# Patient Record
Sex: FEMALE | Race: WHITE | Employment: OTHER | URBAN - METROPOLITAN AREA
[De-identification: names, ages, dates, MRNs, and addresses within clinical notes are randomized per-mention and may not be internally consistent; named-entity substitution may affect disease eponyms.]

---

## 2017-01-09 ENCOUNTER — OFFICE VISIT (OUTPATIENT)
Dept: DERMATOLOGY | Age: 72
End: 2017-01-09

## 2017-01-09 DIAGNOSIS — L29.9 GENERALIZED PRURITUS: Primary | ICD-10-CM

## 2017-01-09 PROCEDURE — 99213 OFFICE O/P EST LOW 20 MIN: CPT | Performed by: DERMATOLOGY

## 2017-01-09 RX ORDER — GABAPENTIN 100 MG/1
100 CAPSULE ORAL EVERY MORNING
Qty: 30 CAPSULE | Refills: 2 | Status: SHIPPED | OUTPATIENT
Start: 2017-01-09 | End: 2017-01-16

## 2017-01-11 LAB
ALBUMIN SERPL-MCNC: 3.8 G/DL
ALP BLD-CCNC: 71 U/L
ALT SERPL-CCNC: 18 U/L
AST SERPL-CCNC: 23 U/L
BASOPHILS ABSOLUTE: 0 /ΜL
BASOPHILS RELATIVE PERCENT: 0 %
BILIRUB SERPL-MCNC: 0.6 MG/DL (ref 0.1–1.4)
BUN BLDV-MCNC: 20 MG/DL
CALCIUM SERPL-MCNC: 8.9 MG/DL
CHLORIDE BLD-SCNC: 95 MMOL/L
CHOLESTEROL, TOTAL: 148 MG/DL
CHOLESTEROL/HDL RATIO: ABNORMAL
CO2: 22 MMOL/L
CREAT SERPL-MCNC: 0.84 MG/DL
EOSINOPHILS ABSOLUTE: 0.1 /ΜL
EOSINOPHILS RELATIVE PERCENT: 1 %
GFR CALCULATED: NORMAL
GLUCOSE BLD-MCNC: NORMAL MG/DL
HCT VFR BLD CALC: 28.2 % (ref 36–46)
HDLC SERPL-MCNC: 73 MG/DL (ref 35–70)
HEMOGLOBIN: 9.2 G/DL (ref 12–16)
INR BLD: 1
LDL CHOLESTEROL CALCULATED: 53 MG/DL (ref 0–160)
LYMPHOCYTES ABSOLUTE: 1.4 /ΜL
LYMPHOCYTES RELATIVE PERCENT: 22 %
MCH RBC QN AUTO: 24.7 PG
MCHC RBC AUTO-ENTMCNC: 32.6 G/DL
MCV RBC AUTO: 76 FL
MONOCYTES ABSOLUTE: 0.4 /ΜL
MONOCYTES RELATIVE PERCENT: 6 %
NEUTROPHILS ABSOLUTE: 4.6 /ΜL
NEUTROPHILS RELATIVE PERCENT: 71 %
PLATELET # BLD: 222 K/ΜL
PMV BLD AUTO: ABNORMAL FL
POTASSIUM SERPL-SCNC: 4.6 MMOL/L
PROTIME: NORMAL SECONDS
RBC # BLD: 3.72 10^6/ΜL
SODIUM BLD-SCNC: 135 MMOL/L
TOTAL PROTEIN: 7
TRIGL SERPL-MCNC: 110 MG/DL
VLDLC SERPL CALC-MCNC: 22 MG/DL
WBC # BLD: 6.6 10^3/ML

## 2017-01-16 ENCOUNTER — TELEPHONE (OUTPATIENT)
Dept: DERMATOLOGY | Age: 72
End: 2017-01-16

## 2017-01-20 ENCOUNTER — OFFICE VISIT (OUTPATIENT)
Dept: ORTHOPEDIC SURGERY | Age: 72
End: 2017-01-20

## 2017-01-20 VITALS
WEIGHT: 211 LBS | SYSTOLIC BLOOD PRESSURE: 140 MMHG | DIASTOLIC BLOOD PRESSURE: 84 MMHG | HEIGHT: 66 IN | HEART RATE: 84 BPM | BODY MASS INDEX: 33.91 KG/M2

## 2017-01-20 DIAGNOSIS — M25.552 LEFT HIP PAIN: Primary | ICD-10-CM

## 2017-01-20 DIAGNOSIS — M70.72 HIP BURSITIS, LEFT: ICD-10-CM

## 2017-01-20 PROBLEM — M70.70 HIP BURSITIS: Status: ACTIVE | Noted: 2017-01-20

## 2017-01-20 PROCEDURE — 73502 X-RAY EXAM HIP UNI 2-3 VIEWS: CPT | Performed by: PHYSICIAN ASSISTANT

## 2017-01-20 PROCEDURE — 99213 OFFICE O/P EST LOW 20 MIN: CPT | Performed by: PHYSICIAN ASSISTANT

## 2017-01-20 RX ORDER — DICLOFENAC SODIUM 75 MG/1
75 TABLET, DELAYED RELEASE ORAL 2 TIMES DAILY
Qty: 60 TABLET | Refills: 3 | Status: SHIPPED | OUTPATIENT
Start: 2017-01-20 | End: 2017-03-31 | Stop reason: ALTCHOICE

## 2017-01-26 ENCOUNTER — OFFICE VISIT (OUTPATIENT)
Dept: CARDIOLOGY CLINIC | Age: 72
End: 2017-01-26

## 2017-01-26 VITALS
OXYGEN SATURATION: 95 % | WEIGHT: 224 LBS | BODY MASS INDEX: 36 KG/M2 | HEIGHT: 66 IN | DIASTOLIC BLOOD PRESSURE: 66 MMHG | HEART RATE: 75 BPM | SYSTOLIC BLOOD PRESSURE: 128 MMHG

## 2017-01-26 DIAGNOSIS — R06.02 SHORTNESS OF BREATH: ICD-10-CM

## 2017-01-26 DIAGNOSIS — E78.2 MIXED HYPERLIPIDEMIA: ICD-10-CM

## 2017-01-26 DIAGNOSIS — I10 ESSENTIAL HYPERTENSION: Primary | ICD-10-CM

## 2017-01-26 DIAGNOSIS — I47.1 SVT (SUPRAVENTRICULAR TACHYCARDIA) (HCC): ICD-10-CM

## 2017-01-26 PROCEDURE — 99214 OFFICE O/P EST MOD 30 MIN: CPT | Performed by: INTERNAL MEDICINE

## 2017-01-26 RX ORDER — METOPROLOL SUCCINATE 50 MG/1
75 TABLET, EXTENDED RELEASE ORAL DAILY
Qty: 135 TABLET | Refills: 3 | Status: SHIPPED | OUTPATIENT
Start: 2017-01-26 | End: 2017-04-12 | Stop reason: ALTCHOICE

## 2017-02-06 ENCOUNTER — OFFICE VISIT (OUTPATIENT)
Dept: ORTHOPEDIC SURGERY | Age: 72
End: 2017-02-06

## 2017-02-06 VITALS
HEIGHT: 66 IN | HEART RATE: 67 BPM | DIASTOLIC BLOOD PRESSURE: 74 MMHG | BODY MASS INDEX: 34.39 KG/M2 | SYSTOLIC BLOOD PRESSURE: 167 MMHG | WEIGHT: 214 LBS

## 2017-02-06 DIAGNOSIS — M79.644 FINGER PAIN, RIGHT: ICD-10-CM

## 2017-02-06 DIAGNOSIS — S60.031A CONTUSION OF RIGHT MIDDLE FINGER WITHOUT DAMAGE TO NAIL, INITIAL ENCOUNTER: ICD-10-CM

## 2017-02-06 DIAGNOSIS — M25.511 RIGHT SHOULDER PAIN, UNSPECIFIED CHRONICITY: Primary | ICD-10-CM

## 2017-02-06 PROCEDURE — 73140 X-RAY EXAM OF FINGER(S): CPT | Performed by: ORTHOPAEDIC SURGERY

## 2017-02-06 PROCEDURE — 73030 X-RAY EXAM OF SHOULDER: CPT | Performed by: ORTHOPAEDIC SURGERY

## 2017-02-06 PROCEDURE — 99213 OFFICE O/P EST LOW 20 MIN: CPT | Performed by: ORTHOPAEDIC SURGERY

## 2017-02-27 ENCOUNTER — OFFICE VISIT (OUTPATIENT)
Dept: DERMATOLOGY | Age: 72
End: 2017-02-27

## 2017-02-27 DIAGNOSIS — G47.00 INSOMNIA, UNSPECIFIED TYPE: ICD-10-CM

## 2017-02-27 DIAGNOSIS — L29.9 PRURITUS: Primary | ICD-10-CM

## 2017-02-27 PROCEDURE — 99213 OFFICE O/P EST LOW 20 MIN: CPT | Performed by: DERMATOLOGY

## 2017-03-31 ENCOUNTER — OFFICE VISIT (OUTPATIENT)
Dept: CARDIOLOGY CLINIC | Age: 72
End: 2017-03-31

## 2017-03-31 VITALS
SYSTOLIC BLOOD PRESSURE: 138 MMHG | OXYGEN SATURATION: 92 % | HEART RATE: 70 BPM | DIASTOLIC BLOOD PRESSURE: 60 MMHG | WEIGHT: 226 LBS | HEIGHT: 66 IN | BODY MASS INDEX: 36.32 KG/M2

## 2017-03-31 DIAGNOSIS — E78.2 MIXED HYPERLIPIDEMIA: ICD-10-CM

## 2017-03-31 DIAGNOSIS — R00.2 PALPITATIONS: ICD-10-CM

## 2017-03-31 DIAGNOSIS — R05.8 PRODUCTIVE COUGH: ICD-10-CM

## 2017-03-31 DIAGNOSIS — R06.02 SHORTNESS OF BREATH: Primary | ICD-10-CM

## 2017-03-31 DIAGNOSIS — I10 ESSENTIAL HYPERTENSION: ICD-10-CM

## 2017-03-31 PROCEDURE — 99214 OFFICE O/P EST MOD 30 MIN: CPT | Performed by: INTERNAL MEDICINE

## 2017-03-31 RX ORDER — ROPINIROLE 1 MG/1
TABLET, FILM COATED ORAL
COMMUNITY
Start: 2017-03-09 | End: 2022-07-15

## 2017-03-31 RX ORDER — FLUTICASONE PROPIONATE 50 MCG
SPRAY, SUSPENSION (ML) NASAL
Status: ON HOLD | COMMUNITY
Start: 2017-03-15 | End: 2018-02-21

## 2017-03-31 RX ORDER — MULTIVIT-MIN/IRON/FOLIC ACID/K 18-600-40
2000 CAPSULE ORAL DAILY
COMMUNITY
End: 2021-09-23

## 2017-03-31 RX ORDER — ASPIRIN 81 MG/1
81 TABLET ORAL DAILY
COMMUNITY
End: 2022-07-12 | Stop reason: ALTCHOICE

## 2017-04-10 DIAGNOSIS — R00.2 PALPITATIONS: ICD-10-CM

## 2017-04-10 PROCEDURE — 93224 XTRNL ECG REC UP TO 48 HRS: CPT | Performed by: INTERNAL MEDICINE

## 2017-04-12 ENCOUNTER — TELEPHONE (OUTPATIENT)
Dept: CARDIOLOGY CLINIC | Age: 72
End: 2017-04-12

## 2017-04-12 PROBLEM — A41.9 SEPSIS (HCC): Status: ACTIVE | Noted: 2017-04-12

## 2017-04-12 RX ORDER — DILTIAZEM HYDROCHLORIDE 180 MG/1
180 CAPSULE, COATED, EXTENDED RELEASE ORAL DAILY
Qty: 30 CAPSULE | Refills: 3 | Status: SHIPPED | OUTPATIENT
Start: 2017-04-12 | End: 2017-04-18 | Stop reason: SDUPTHER

## 2017-04-18 RX ORDER — DILTIAZEM HYDROCHLORIDE 180 MG/1
180 CAPSULE, COATED, EXTENDED RELEASE ORAL DAILY
Qty: 90 CAPSULE | Refills: 3 | Status: ON HOLD | OUTPATIENT
Start: 2017-04-18 | End: 2018-02-25 | Stop reason: HOSPADM

## 2017-04-20 ENCOUNTER — TELEPHONE (OUTPATIENT)
Dept: CASE MANAGEMENT | Age: 72
End: 2017-04-20

## 2017-05-08 RX ORDER — LOSARTAN POTASSIUM 50 MG/1
TABLET ORAL
Qty: 90 TABLET | Refills: 3 | Status: ON HOLD | OUTPATIENT
Start: 2017-05-08 | End: 2017-06-25

## 2017-05-30 ENCOUNTER — OFFICE VISIT (OUTPATIENT)
Dept: DERMATOLOGY | Age: 72
End: 2017-05-30

## 2017-05-30 DIAGNOSIS — L29.9 GENERALIZED PRURITUS: Primary | ICD-10-CM

## 2017-05-30 DIAGNOSIS — K75.81 LIVER CIRRHOSIS SECONDARY TO NASH (HCC): ICD-10-CM

## 2017-05-30 DIAGNOSIS — K74.60 LIVER CIRRHOSIS SECONDARY TO NASH (HCC): ICD-10-CM

## 2017-05-30 PROCEDURE — 99213 OFFICE O/P EST LOW 20 MIN: CPT | Performed by: DERMATOLOGY

## 2017-06-12 LAB
CREATININE URINE: NORMAL MG/DL
MICROALBUMIN/CREAT 24H UR: 0.3 MG/G{CREAT}

## 2017-06-22 ENCOUNTER — OFFICE VISIT (OUTPATIENT)
Dept: ORTHOPEDIC SURGERY | Age: 72
End: 2017-06-22

## 2017-06-22 VITALS — WEIGHT: 235.23 LBS | HEIGHT: 66 IN | BODY MASS INDEX: 37.8 KG/M2

## 2017-06-22 DIAGNOSIS — M54.50 LUMBAR SPINE PAIN: ICD-10-CM

## 2017-06-22 DIAGNOSIS — M25.562 PAIN IN BOTH KNEES, UNSPECIFIED CHRONICITY: ICD-10-CM

## 2017-06-22 DIAGNOSIS — M25.561 PAIN IN BOTH KNEES, UNSPECIFIED CHRONICITY: ICD-10-CM

## 2017-06-22 DIAGNOSIS — E08.42 DIABETIC POLYNEUROPATHY ASSOCIATED WITH DIABETES MELLITUS DUE TO UNDERLYING CONDITION (HCC): Primary | ICD-10-CM

## 2017-06-22 PROCEDURE — 99213 OFFICE O/P EST LOW 20 MIN: CPT | Performed by: ORTHOPAEDIC SURGERY

## 2017-06-22 PROCEDURE — 73562 X-RAY EXAM OF KNEE 3: CPT | Performed by: ORTHOPAEDIC SURGERY

## 2017-06-22 PROCEDURE — 72100 X-RAY EXAM L-S SPINE 2/3 VWS: CPT | Performed by: ORTHOPAEDIC SURGERY

## 2017-06-25 PROBLEM — R07.9 CHEST PAIN: Status: ACTIVE | Noted: 2017-06-25

## 2017-07-13 ENCOUNTER — OFFICE VISIT (OUTPATIENT)
Dept: CARDIOLOGY CLINIC | Age: 72
End: 2017-07-13

## 2017-07-13 ENCOUNTER — TELEPHONE (OUTPATIENT)
Dept: CARDIOLOGY CLINIC | Age: 72
End: 2017-07-13

## 2017-07-13 VITALS
SYSTOLIC BLOOD PRESSURE: 134 MMHG | DIASTOLIC BLOOD PRESSURE: 60 MMHG | BODY MASS INDEX: 37.45 KG/M2 | HEIGHT: 66 IN | HEART RATE: 80 BPM | WEIGHT: 233 LBS | OXYGEN SATURATION: 95 %

## 2017-07-13 DIAGNOSIS — I10 ESSENTIAL HYPERTENSION: ICD-10-CM

## 2017-07-13 DIAGNOSIS — E78.2 MIXED HYPERLIPIDEMIA: ICD-10-CM

## 2017-07-13 DIAGNOSIS — I47.1 SVT (SUPRAVENTRICULAR TACHYCARDIA) (HCC): Primary | ICD-10-CM

## 2017-07-13 DIAGNOSIS — D64.89 ANEMIA DUE TO OTHER CAUSE: ICD-10-CM

## 2017-07-13 PROCEDURE — 99214 OFFICE O/P EST MOD 30 MIN: CPT | Performed by: INTERNAL MEDICINE

## 2017-07-13 RX ORDER — FAMOTIDINE 20 MG/1
20 TABLET, FILM COATED ORAL 2 TIMES DAILY
COMMUNITY
End: 2020-06-26 | Stop reason: SDUPTHER

## 2017-07-13 RX ORDER — DIPHENHYDRAMINE HCL 25 MG
50 TABLET ORAL NIGHTLY
COMMUNITY
End: 2022-07-15

## 2017-07-14 PROBLEM — M70.70 HIP BURSITIS: Status: RESOLVED | Noted: 2017-01-20 | Resolved: 2017-07-14

## 2017-07-14 PROBLEM — A41.9 SEPSIS (HCC): Status: RESOLVED | Noted: 2017-04-12 | Resolved: 2017-07-14

## 2017-08-13 RX ORDER — GABAPENTIN 300 MG/1
300 CAPSULE ORAL NIGHTLY
Qty: 90 CAPSULE | Refills: 3 | Status: SHIPPED | OUTPATIENT
Start: 2017-08-13 | End: 2018-04-23 | Stop reason: SDUPTHER

## 2017-08-31 PROBLEM — D50.9 MICROCYTIC ANEMIA: Status: ACTIVE | Noted: 2017-08-31

## 2017-08-31 PROBLEM — J18.9 PNA (PNEUMONIA): Status: ACTIVE | Noted: 2017-08-31

## 2017-08-31 PROBLEM — D72.829 LEUKOCYTOSIS: Status: ACTIVE | Noted: 2017-08-31

## 2017-08-31 PROBLEM — J96.20 RESPIRATORY FAILURE, ACUTE AND CHRONIC (HCC): Status: ACTIVE | Noted: 2017-08-31

## 2017-09-12 ENCOUNTER — OFFICE VISIT (OUTPATIENT)
Dept: DERMATOLOGY | Age: 72
End: 2017-09-12

## 2017-09-12 DIAGNOSIS — L29.9 GENERALIZED PRURITUS: Primary | ICD-10-CM

## 2017-09-12 PROCEDURE — 99213 OFFICE O/P EST LOW 20 MIN: CPT | Performed by: DERMATOLOGY

## 2017-10-17 ENCOUNTER — TELEPHONE (OUTPATIENT)
Dept: DERMATOLOGY | Age: 72
End: 2017-10-17

## 2017-10-17 NOTE — TELEPHONE ENCOUNTER
Pt c/b [phone]  Pt states:   - to return call after 12 if you have any questions   - need medication refill  Medication Triamcinolone ointment  [address]  [phone]  Please call to discuss thanks

## 2017-11-02 ENCOUNTER — HOSPITAL ENCOUNTER (OUTPATIENT)
Dept: MAMMOGRAPHY | Age: 72
Discharge: HOME | End: 2017-11-02
Attending: OBSTETRICS & GYNECOLOGY | Admitting: OBSTETRICS & GYNECOLOGY

## 2017-11-02 DIAGNOSIS — Z12.31 VISIT FOR SCREENING MAMMOGRAM: ICD-10-CM

## 2018-01-18 ENCOUNTER — OFFICE VISIT (OUTPATIENT)
Dept: ORTHOPEDIC SURGERY | Age: 73
End: 2018-01-18

## 2018-01-18 VITALS — WEIGHT: 222 LBS | BODY MASS INDEX: 35.68 KG/M2 | HEIGHT: 66 IN

## 2018-01-18 DIAGNOSIS — M25.512 PAIN OF LEFT SHOULDER REGION: Primary | ICD-10-CM

## 2018-01-18 DIAGNOSIS — M75.42 IMPINGEMENT SYNDROME OF LEFT SHOULDER: ICD-10-CM

## 2018-01-18 PROCEDURE — 99213 OFFICE O/P EST LOW 20 MIN: CPT | Performed by: ORTHOPAEDIC SURGERY

## 2018-01-18 NOTE — PROGRESS NOTES
lateral deltoid. Range of Motion: Forward flexion to 160°. Internal rotation to the level of back pocket. External rotation to 50°. Strength:  5/5 internal and external rotation strength, 4/5 supraspinatus strength with pain. Biceps and triceps strength is 5/5. Special Tests:  Positive Collier and Neer impingement exam.  Negative speed sign. Negative crossover examination. Skin: There are no rashes, ulcerations or lesions. Gait: Normal gait pattern    Reflex normal deep tendon reflexes, hand is neurovascularly intact    Additional Comments:       Additional Examinations:         Contralateral Exam: Examination of the right shoulder reveals no atrophy or deformity. Skin is warm and dry. Range of motion is within normal limits. There is no focal tenderness with palpation. No AC joint tenderness. Negative Neer and Collier-Shant exams. Strength is graded 5/5 throughout. Neck: Examination of the neck does not show any tenderness, deformity or injury. Range of motion is unremarkable. There is no gross instability. There are no rashes, ulcerations or lesions. Strength and tone are normal.    Radiology:     X-rays obtained and reviewed in office:  Views 3 views including AP, Y, axillary  Location left shoulder  Impression there is a well-maintained glenohumeral joint with mild arthritic changes. There is superior migration of the humeral head. No fractures or dislocations. Impression:  Encounter Diagnoses   Name Primary?     Pain of left shoulder region Yes    Impingement syndrome of left shoulder        Office Procedures:  Orders Placed This Encounter   Procedures    XR SHOULDER LEFT (MIN 2 VIEWS)     [id number]     Order Specific Question:   Reason for exam:     Answer:   Pain    MRI Shoulder Left WO Contrast     Scheduling Instructions:      Sandie Thorne [phone]      MRI LT SHOULDER W/O CONTRAST      [facility]            SCHED: ONCE AUTHORIZED Treatment Plan:  Given the character 6 of her pain and her x-ray findings I am concerned for a rotator cuff tear. We will get an MRI to evaluate this.   She will follow up once her scan is done

## 2018-02-15 ENCOUNTER — OFFICE VISIT (OUTPATIENT)
Dept: ORTHOPEDIC SURGERY | Age: 73
End: 2018-02-15

## 2018-02-15 VITALS
SYSTOLIC BLOOD PRESSURE: 191 MMHG | HEIGHT: 66 IN | BODY MASS INDEX: 35.68 KG/M2 | DIASTOLIC BLOOD PRESSURE: 81 MMHG | WEIGHT: 222 LBS | HEART RATE: 88 BPM

## 2018-02-15 DIAGNOSIS — M76.899 QUADRICEPS TENDINITIS: ICD-10-CM

## 2018-02-15 DIAGNOSIS — M25.562 LEFT KNEE PAIN, UNSPECIFIED CHRONICITY: Primary | ICD-10-CM

## 2018-02-15 DIAGNOSIS — M62.81 QUADRICEPS WEAKNESS: ICD-10-CM

## 2018-02-15 DIAGNOSIS — Z96.652 HISTORY OF TOTAL LEFT KNEE REPLACEMENT: ICD-10-CM

## 2018-02-15 DIAGNOSIS — M75.02 ADHESIVE CAPSULITIS OF LEFT SHOULDER: ICD-10-CM

## 2018-02-15 PROCEDURE — 99214 OFFICE O/P EST MOD 30 MIN: CPT | Performed by: ORTHOPAEDIC SURGERY

## 2018-02-15 RX ORDER — MELOXICAM 15 MG/1
15 TABLET ORAL DAILY
Qty: 30 TABLET | Refills: 3 | Status: ON HOLD | OUTPATIENT
Start: 2018-02-15 | End: 2018-02-25 | Stop reason: HOSPADM

## 2018-02-15 NOTE — PROGRESS NOTES
tendinopathy of supraspinatus and infraspinatus, no tear. Teres minor tendon intact. Subscapularis tendon intact. Biceps long head tendon intact and in normal position.        No substantive active AC arthropathy. Acromion type 2.        No acute muscle strain. Generalized decreased muscle volume and mild fatty infiltration.        Mild-moderate glenohumeral capsular inflammation with a miniscule effusion.        CONCLUSION:    1. Mild-moderate glenohumeral capsulitis, probably with an adhesive component. 2. Mild tendinopathy of supraspinatus and infraspinatus, no tear.                  Impression:  Encounter Diagnoses   Name Primary?  Left knee pain, unspecified chronicity Yes    Quadriceps weakness     Adhesive capsulitis of left shoulder     History of total left knee replacement     Quadriceps tendinitis        Office Procedures:  Orders Placed This Encounter   Procedures    XR KNEE LEFT (3 VIEWS)     [id number]     Order Specific Question:   Reason for exam:     Answer:   Pain    [facility] PT [facility] Physical Therapy     Referral Priority:   Routine     Referral Type:   Eval and Treat     Referral Reason:   Specialty Services Required     Requested Specialty:   Physical Therapy     Number of Visits Requested:   1       Treatment Plan: Today we've gone over the patient's diagnosis and recommendations. At this time there is no indication for any surgical intervention. Recommended a course of oral anti-inflammatories. She is going to check with her primary care physician about the use of meloxicam for both her left shoulder and her left knee. We'll also get her involved in outpatient physical therapy for both issues as well. We've also explained that there may be a cervical component related to her left upper extremity symptoms. Follow-up with us as needed.

## 2018-02-20 PROBLEM — A41.9 SEPSIS (HCC): Status: ACTIVE | Noted: 2018-02-20

## 2018-02-25 PROBLEM — A41.9 SEPSIS (HCC): Status: RESOLVED | Noted: 2018-02-20 | Resolved: 2018-02-25

## 2018-03-01 ENCOUNTER — HOSPITAL ENCOUNTER (OUTPATIENT)
Dept: PHYSICAL THERAPY | Age: 73
Discharge: HOME | End: 2018-03-31
Attending: ORTHOPAEDIC SURGERY | Admitting: ORTHOPAEDIC SURGERY

## 2018-03-05 ENCOUNTER — OFFICE VISIT (OUTPATIENT)
Dept: CARDIOLOGY CLINIC | Age: 73
End: 2018-03-05

## 2018-03-05 VITALS
WEIGHT: 230 LBS | OXYGEN SATURATION: 95 % | HEART RATE: 74 BPM | HEIGHT: 65 IN | DIASTOLIC BLOOD PRESSURE: 42 MMHG | SYSTOLIC BLOOD PRESSURE: 156 MMHG | BODY MASS INDEX: 38.32 KG/M2

## 2018-03-05 DIAGNOSIS — G45.3 AMAUROSIS FUGAX: ICD-10-CM

## 2018-03-05 DIAGNOSIS — I10 ESSENTIAL HYPERTENSION: ICD-10-CM

## 2018-03-05 DIAGNOSIS — R06.02 SHORTNESS OF BREATH: ICD-10-CM

## 2018-03-05 DIAGNOSIS — E78.2 MIXED HYPERLIPIDEMIA: Primary | ICD-10-CM

## 2018-03-05 DIAGNOSIS — I47.1 SVT (SUPRAVENTRICULAR TACHYCARDIA) (HCC): ICD-10-CM

## 2018-03-05 PROCEDURE — 99214 OFFICE O/P EST MOD 30 MIN: CPT | Performed by: INTERNAL MEDICINE

## 2018-03-05 NOTE — COMMUNICATION BODY
mouth daily       rOPINIRole (REQUIP) 1 MG tablet Take 1 mg by mouth nightly 1mg at 1730, 1mg at 2030, 2mg at 2200 (total of 4mg nightly in divided doses)      potassium chloride SA (K-DUR;KLOR-CON M) 20 MEQ tablet Take 20 mEq by mouth daily       ursodiol (ACTIGALL) 500 MG tablet Take 500 mg by mouth 2 times daily       doxepin (SINEQUAN) 25 MG capsule Take 50 mg by mouth nightly       losartan (COZAAR) 50 MG tablet Take 50 mg by mouth daily       calcium carbonate 600 MG TABS tablet Take 1 tablet by mouth daily       levothyroxine (SYNTHROID) 100 MCG tablet Take 100 mcg by mouth Daily      atorvastatin (LIPITOR) 20 MG tablet Take 1 tablet by mouth nightly. 30 tablet 1    Multiple Vitamins-Minerals (CENTRUM PO) Take 1 tablet by mouth daily.  beclomethasone (QVAR) 80 MCG/ACT inhaler Inhale 2 puffs into the lungs 2 times daily.  Aclidinium Bromide (TUDORZA PRESSAIR) 400 MCG/ACT AEPB Inhale 1 puff into the lungs 2 times daily       traZODone (DESYREL) 50 MG tablet Take 50 mg by mouth nightly       metformin (GLUCOPHAGE) 500 MG tablet Take 1,000 mg by mouth 2 times daily (with meals)       cetirizine (ZYRTEC) 10 MG tablet Take 10 mg by mouth daily.  montelukast (SINGULAIR) 10 MG tablet Take 10 mg by mouth daily       venlafaxine (EFFEXOR) 37.5 MG tablet Take 18.25 mg by mouth daily       cyanocobalamin 1000 MCG/ML injection Inject 1,000 mcg into the muscle every 14 days Last dose was taken on 4/9/17      estrogens, conjugated, (PREMARIN) 1.25 MG tablet Take 1.25 mg by mouth daily.  levalbuterol (XOPENEX HFA) 45 MCG/ACT inhaler Inhale 2 puffs into the lungs every 6 hours as needed        No facility-administered encounter medications on file as of 3/5/2018. Lab Data:  CBC: No results for input(s): WBC, HGB, HCT, MCV, PLT in the last 72 hours. BMP: No results for input(s): NA, K, CL, CO2, PHOS, BUN, CREATININE in the last 72 hours.     Invalid input(s): CA  LIVER PROFILE: No results for input(s): AST, ALT, LIPASE, BILIDIR, BILITOT, ALKPHOS in the last 72 hours. Invalid input(s): AMYLASE,  ALB  LIPID:   No components found for: CHLPL  Lab Results   Component Value Date    TRIG 110 2017    TRIG 125 2014    TRIG 248 (H) 2013     Lab Results   Component Value Date    HDL 73 (A) 2017    HDL 49 2014    HDL 72 (H) 2013     Lab Results   Component Value Date    LDLCALC 53 2017    LDLCALC 62 2014    LDLCALC 117 (H) 2013     Lab Results   Component Value Date    LABVLDL 25 2014    LABVLDL 50 2013     PT/INR: No results for input(s): PROTIME, INR in the last 72 hours. A1C:   Lab Results   Component Value Date    LABA1C 7.7 2017     BNP:  No results for input(s): BNP in the last 72 hours. IMAGIN/2015 CXR    Opinion: There is persistent mild middle lobe disease. The disease   is unchanged for the past one month and this may represent middle   lobe scarring. Cardiac cath: 2014  1. Mild coronary artery disease involving left anterior descending,  circumflex and right coronary arteries. 2.  Hyperdynamic left ventricle with an ejection fraction of 65%. 3.  Elevated left ventricular end-diastolic pressure, 15 mmHg. 4.  Patent renal arteries bilaterally. RECOMMENDATIONS:   The patient is noted to have mild coronary artery disease  which does not explain her shortness of breath. She, however, does have    elevated LVEDP from uncontrolled blood pressure. She needs aggressive  antihypertensive therapy for optimal blood pressure control. Will start her  on lasix for dyspnea and add losartan to her antihypertensive regimen. She    will continue the aspirin and Lipitor as well as Toprol. Echo: 4/16/15  Left ventricular size is normal .  Mild concentric left ventricular hypertrophy is present. Global ejection fraction is normal and estimated from 55 % to 60 %.   No regional wall motion abnormalities are noted.  Diastolic filling parameters suggests grade I diastolic dysfunction . Mild mitral regurgitation is present. Mild tricuspid regurgitation. Systolic pulmonary artery pressure (SPAP) is normal and estimated at 35   mmHg  (RA pressure 8 mm Hg). Mild pulmonic regurgitation present. Cardiac Cath: 04/05/15  1. Mild coronary artery disease involving left anterior descending,  circumflex and right coronary arteries. 2. Hyperdynamic left ventricle with an ejection fraction of 65%. 3. Elevated left ventricular end-diastolic pressure, 15 mmHg. 4. Patent renal arteries bilaterally. RECOMMENDATIONS: The patient is noted to have mild coronary artery disease  which does not explain her shortness of breath. She, however, does have   elevated LVEDP from uncontrolled blood pressure. She needs aggressive  antihypertensive therapy for optimal blood pressure control. Will start her  on lasix for dyspnea and add losartan to her antihypertensive regimen. She   will continue the aspirin and Lipitor as well as Toprol. Martina Larios MD    Assessment:  Markus Springer was seen today for  follow-up  SVT. Diagnoses and associated orders for this visit:    Type 2 diabetes mellitus ([facility] [address] PCP follows    Hyperlipidemia  PCP follows Just had done with PCP 8/2015 LDL 66   HTN (hypertension)   SVT with intermittent palpitations. BP (!) 156/42   Pulse 74   Ht 5' 5\" (1.651 m)   Wt 230 lb (104.3 kg)   SpO2 95%   BMI 38.27 kg/m²        Plan:  1. We will redo weight   2. Medications reviewed, no changes at this time   3. Remain as active as possible. 4. recommend compression stockings   5. Continue to follow with Dr. Franc Roth   6. Follow fluid restriction and daily weights  7. Follow up with me in 6 months.       Caleb Fairchild MD 3/5/2018 12:54 PM

## 2018-03-05 NOTE — LETTER
Past Surgical History:   Procedure Laterality Date    BONE MARROW BIOPSY      BREAST BIOPSY      CARDIAC CATHETERIZATION  07/14/14    CATARACT REMOVAL      COLONOSCOPY      X 3 per PT 6/8/16    FINE NEEDLE ASPIRATION      FOOT SURGERY Right 06/03/2013    CORAL BUNIONECTOMY RIGHT FOOT WITH SCREW FIXATION;    HERNIA REPAIR      HYSTERECTOMY      JOINT REPLACEMENT Bilateral     TKR    KNEE SURGERY  09    left    KNEE SURGERY  2010    right       Objective:   BP (!) 156/42   Pulse 74   Ht 5' 5\" (1.651 m)   Wt 230 lb (104.3 kg)   SpO2 95%   BMI 38.27 kg/m²      Wt Readings from Last 3 Encounters:   03/05/18 230 lb (104.3 kg)   02/25/18 219 lb 6.4 oz (99.5 kg)   02/15/18 222 lb 0.1 oz (100.7 kg)       Physical Exam:  General: No Respiratory distress, appears well developed and well nourished. Eyes:  Sclera nonicteric  Nose/Sinuses:  negative findings: nose shows no deformity, asymmetry, or inflammation, nasal mucosa normal, septum midline with no perforation or bleeding  Back:  no pain to palpation  Joint:  no active joint inflammation  Musculoskeletal:  negative  Skin:  Warm and dry  Neck:  Negative for JVD and Carotid Bruits. Pain on movement of neck to left. Chest:  Clear to auscultation, respiration easy  Cardiovascular:  RRR, S1S2 normal, no murmur, no rub or thrill.   Extremities:   No edema, clubbing, cyanosis,  Neuro: intact    Medications:   Outpatient Encounter Prescriptions as of 3/5/2018   Medication Sig Dispense Refill    hydrALAZINE (APRESOLINE) 25 MG tablet Take 1 tablet by mouth every 8 hours 90 tablet 3    diltiazem (CARDIZEM CD) 240 MG extended release capsule Take 1 capsule by mouth daily 30 capsule 3    furosemide (LASIX) 20 MG tablet Take 1 tablet by mouth 2 times daily 60 tablet 0    SITagliptin (JANUVIA) 100 MG tablet Take 100 mg by mouth daily      triamcinolone (KENALOG) 0.1 % ointment Apply to affected areas on the  cyanocobalamin 1000 MCG/ML injection Inject 1,000 mcg into the muscle every 14 days Last dose was taken on 17      estrogens, conjugated, (PREMARIN) 1.25 MG tablet Take 1.25 mg by mouth daily.  levalbuterol (XOPENEX HFA) 45 MCG/ACT inhaler Inhale 2 puffs into the lungs every 6 hours as needed        No facility-administered encounter medications on file as of 3/5/2018. Lab Data:  CBC: No results for input(s): WBC, HGB, HCT, MCV, PLT in the last 72 hours. BMP: No results for input(s): NA, K, CL, CO2, PHOS, BUN, CREATININE in the last 72 hours. Invalid input(s): CA  LIVER PROFILE: No results for input(s): AST, ALT, LIPASE, BILIDIR, BILITOT, ALKPHOS in the last 72 hours. Invalid input(s): AMYLASE,  ALB  LIPID:   No components found for: CHLPL  Lab Results   Component Value Date    TRIG 110 2017    TRIG 125 2014    TRIG 248 (H) 2013     Lab Results   Component Value Date    HDL 73 (A) 2017    HDL 49 2014    HDL 72 (H) 2013     Lab Results   Component Value Date    LDLCALC 53 2017    LDLCALC 62 2014    LDLCALC 117 (H) 2013     Lab Results   Component Value Date    LABVLDL 25 2014    LABVLDL 50 2013     PT/INR: No results for input(s): PROTIME, INR in the last 72 hours. A1C:   Lab Results   Component Value Date    LABA1C 7.7 2017     BNP:  No results for input(s): BNP in the last 72 hours. IMAGIN/2015 CXR    Opinion: There is persistent mild middle lobe disease. The disease   is unchanged for the past one month and this may represent middle   lobe scarring. Cardiac cath: 2014  1. Mild coronary artery disease involving left anterior descending,  circumflex and right coronary arteries. 2.  Hyperdynamic left ventricle with an ejection fraction of 65%. 3.  Elevated left ventricular end-diastolic pressure, 15 mmHg. 4.  Patent renal arteries bilaterally.

## 2018-03-05 NOTE — PROGRESS NOTES
95%   BMI 38.27 kg/m²     Wt Readings from Last 3 Encounters:   03/05/18 230 lb (104.3 kg)   02/25/18 219 lb 6.4 oz (99.5 kg)   02/15/18 222 lb 0.1 oz (100.7 kg)       Physical Exam:  General: No Respiratory distress, appears well developed and well nourished. Eyes:  Sclera nonicteric  Nose/Sinuses:  negative findings: nose shows no deformity, asymmetry, or inflammation, nasal mucosa normal, septum midline with no perforation or bleeding  Back:  no pain to palpation  Joint:  no active joint inflammation  Musculoskeletal:  negative  Skin:  Warm and dry  Neck:  Negative for JVD and Carotid Bruits. Pain on movement of neck to left. Chest:  Clear to auscultation, respiration easy  Cardiovascular:  RRR, S1S2 normal, no murmur, no rub or thrill. Extremities:   No edema, clubbing, cyanosis,  Neuro: intact    Medications:   Outpatient Encounter Prescriptions as of 3/5/2018   Medication Sig Dispense Refill    hydrALAZINE (APRESOLINE) 25 MG tablet Take 1 tablet by mouth every 8 hours 90 tablet 3    diltiazem (CARDIZEM CD) 240 MG extended release capsule Take 1 capsule by mouth daily 30 capsule 3    furosemide (LASIX) 20 MG tablet Take 1 tablet by mouth 2 times daily 60 tablet 0    SITagliptin (JANUVIA) 100 MG tablet Take 100 mg by mouth daily      triamcinolone (KENALOG) 0.1 % ointment Apply to affected areas on the abdomen twice daily for up to 2 weeks or until improved.  80 g 2    gabapentin (NEURONTIN) 300 MG capsule Take 1 capsule by mouth nightly 90 capsule 3    famotidine (PEPCID) 20 MG tablet Take 20 mg by mouth 2 times daily      diphenhydrAMINE (BENADRYL) 25 MG tablet Take 50 mg by mouth nightly       pantoprazole (PROTONIX) 40 MG tablet Take 40 mg by mouth 2 times daily      ferrous sulfate 325 (65 FE) MG tablet Take 1 tablet by mouth daily (with breakfast) 30 tablet 0    aspirin 81 MG EC tablet Take 81 mg by mouth daily       Cholecalciferol (VITAMIN D) 2000 UNITS CAPS capsule Take 2,000 Units by

## 2018-03-21 ENCOUNTER — HOSPITAL ENCOUNTER (OUTPATIENT)
Dept: PHYSICAL THERAPY | Age: 73
Discharge: HOME | End: 2018-03-22
Admitting: ORTHOPAEDIC SURGERY

## 2018-03-21 NOTE — PLAN OF CARE
[facility], [address]  Phone: [phone]  Fax [phone]     Physical Therapy Certification    Dear Referring Practitioner: Dr. Shiela Tenorio,    We had the pleasure of evaluating the following patient for physical therapy services at [address] [place] [address]. A summary of our findings can be found in the initial assessment below. This includes our plan of care. If you have any questions or concerns regarding these findings, please do not hesitate to contact me at the office phone number checked above. Thank you for the referral.       Physician Signature:_______________________________Date:__________________  By signing above (or electronic signature), therapists plan is approved by physician    Patient: Phuong Mejias   : 1945   MRN: [id number]  Referring Physician: Referring Practitioner: Dr. Shiela Tenorio      Evaluation Date: 3/21/2018      Medical Diagnosis Information:  Diagnosis: right knee pain  M25.562, quad weakness M62.81, quad tendinitis   M76.899   Treatment Diagnosis: right knee pain  M25.562,                                         Insurance information: PT Insurance Information: 2018 [employer]  - 100%-$0CP-PT/OT MED NEC - NO AUTH     Precautions/ Contra-indications: DM, TIA, OA, RA, bilat TKR, history of pneumonia 5x in a year (recent hospitalization)   Latex Allergy:  []NO      [x]YES  Preferred Language for Healthcare:   [x]English       []other:    SUBJECTIVE: Patient stated complaint: Pt had TKR on the left , right . Pt did well after replacements, and she was able to return to golf. Right before 2017, she started having pain. Pt is not able to take anti inflammatory due to liver issues. Pt has pain along the superior portion of her knee and into the thigh. Pt has pain with walking and sleeping. Pt uses step to gait on stairs.   Pt has 14 limited. [x] Patient history, allergies, meds reviewed. Medical chart reviewed. See intake form. Review Of Systems (ROS):  [x]Performed Review of systems (Integumentary, CardioPulmonary, Neurological) by intake and observation. Intake form has been scanned into medical record. Patient has been instructed to contact their primary care physician regarding ROS issues if not already being addressed at this time. Co-morbidities/Complexities (which will affect course of rehabilitation):   []None           Arthritic conditions   [x]Rheumatoid arthritis (M05.9)  [x]Osteoarthritis (M19.91)   Cardiovascular conditions   []Hypertension (I10)  []Hyperlipidemia (E78.5)  []Angina pectoris (I20)  []Atherosclerosis (I70)   Musculoskeletal conditions   []Disc pathology   []Congenital spine pathologies   []Prior surgical intervention  [x]Osteoporosis (M81.8)  []Osteopenia (M85.8)   Endocrine conditions   []Hypothyroid (E03.9)  []Hyperthyroid Gastrointestinal conditions   []Constipation (S87.17)   Metabolic conditions   []Morbid obesity (E66.01)  [x]Diabetes type 1(E10.65) or 2 (E11.65)   [x]Neuropathy (G60.9)     Pulmonary conditions   [x]Asthma (J45)  []Coughing   []COPD (J44.9)   Psychological Disorders  []Anxiety (F41.9)  []Depression (F32.9)   []Other:   [x]Other:   TIA       Barriers to/and or personal factors that will affect rehab potential:              []Age  []Sex              []Motivation/Lack of Motivation                        [x]Co-Morbidities              []Cognitive Function, education/learning barriers              []Environmental, home barriers              []profession/work barriers  [x]past PT/medical experience  []other:  Justification: Pt lacks full knee ext after her total knees. Pt has had multiple health episodes that have led to increased deconditioning. Falls Risk Assessment (30 days):   [x] Falls Risk assessed and no intervention required.   [] Falls Risk assessed and

## 2018-03-21 NOTE — FLOWSHEET NOTE
[facility],  [address]  Phone: [phone]  Fax [phone]    Physical Therapy Daily Treatment Note  Date:  3/21/2018    Patient Name:  Jessica Turcios    :  1945  MRN: [id number]  Restrictions/Precautions:    Physician Information:  Referring Practitioner: Dr. Sky Bauman  Medical/Treatment Diagnosis Information:  · Diagnosis: right knee pain  M25.562, quad weakness M62.81, quad tendinitis   M76.899  · Treatment Diagnosis:right knee pain  M25.562,   [] Conservative / [] Surgical - DOS:  Therapy Diagnosis/Practice Pattern:  Practice Pattern E: Localized Inflammation  Insurance/Certification information:  PT Insurance Information:  [employer]  - 100%-$0CP-PT/OT MED NEC - NO AUTH  Plan of care signed: [] YES  [] NO  Number of Comorbidities:  []0     []1-2    [x]3+  Date of Patient follow up with Physician:     G-Code (if applicable):      Date G-Code Applied:   3/21/18  PT G-Codes  Functional Assessment Tool Used: LEFS  Score: 45%  Functional Limitation: Mobility: Walking and moving around  Mobility: Walking and Moving Around Current Status (): At least 40 percent but less than 60 percent impaired, limited or restricted  Mobility: Walking and Moving Around Goal Status ():  At least 20 percent but less than 40 percent impaired, limited or restricted    Progress Note: [x]  Yes  []  No  Next due by: Visit #10         Latex Allergy:  []NO      [x]YES  Preferred Language for Healthcare:   [x]English       []other:    Visit # Insurance Allowable Reporting Period   1 [facility] Dawson Begin Date: 3/21/2018               End Date:      RECERT DUE BY:     SUBJECTIVE:  See eval    OBJECTIVE: See eval  Observation:  Palpation:     Test used Initial score Current Score   Pain Summary VAS 4-5    Functional questionnaire LEFS 45    ROM flexion      extension     Strength quad      ABD      flexion coordination, kinesthetic sense, posture, motor skill, proprioception of core, proximal hip and LE for self care, mobility, lifting, and ambulation/stair navigation      Manual Treatments:  PROM / STM / Oscillations-Mobs:  G-I, II, III, IV (PA's, Inf., Post.)  [x] (20263) Provided manual therapy to mobilize LE, proximal hip and/or LS spine soft tissue/joints for the purpose of modulating pain, promoting relaxation,  increasing ROM, reducing/eliminating soft tissue swelling/inflammation/restriction, improving soft tissue extensibility and allowing for proper ROM for normal function with self care, mobility, lifting and ambulation. Modalities:  Esu/ cp x 15 min. Charges:  Timed Code Treatment Minutes: 30   Total Treatment Minutes: 60     [x] EVAL (LOW) 02127 (typically 20 minutes face-to-face)  [] EVAL (MOD) 95278 (typically 30 minutes face-to-face)  [] EVAL (HIGH) 05291 (typically 45 minutes face-to-face)  [] RE-EVAL     [x] KD(49442) x      [] IONTO  [] NMR (93511) x      [] VASO  [x] Manual (02034) x       [] Other:  [] TA x       [] Mech Traction (46836)  [] ES(attended) (36320)      [x] ES (un) (90067):     GOALS:   Patient stated goal: fix the problem. Therapist goals for Patient:   Short Term Goals: To be achieved in: 2 weeks  1. Independent in HEP and progression per patient tolerance, in order to prevent re-injury. 2. Patient will have a decrease in pain to facilitate improvement in movement, function, and ADLs as indicated by Functional Deficits. Long Term Goals: To be achieved in: 8 weeks  1. Disability index score of 20% or less for the LEFS to assist with reaching prior level of function. 2. Patient will demonstrate increased AROM of bilateral knees from 0 - 128 to allow for proper joint functioning as indicated by patients Functional Deficits.    3. Patient will demonstrate an increase in Strength to good proximal hip strength and control, within 5lb HHD in LE to allow for proper functional mobility as indicated by patients Functional Deficits. 4. Patient will return to amb on level and unlevel surfaces without increased symptoms or restriction. 5. Able to sleep 7-8 hours without waking from knee pain  6. Able to ascend and descend stairs with a reciprocal gait. New or Updated Goals (if applicable):  [x] No change to goals established upon initial eval/last progress note:  New Goals:    Progression Towards Functional goals:   [] Patient is progressing as expected towards functional goals listed. [] Progression is slowed due to complexities listed. [] Progression has been slowed due to co-morbidities.   [x] Plan just implemented, too soon to assess goals progression  [] Other:     ASSESSMENT:    [] Improvement noted relative to goals:  [] No Improvement noted related to goals:  Summary/Patient's response to treatment: See Eval    Treatment/Activity Tolerance:  [] Patient tolerated treatment well [x] Patient limited by fatique  [] Patient limited by pain  [] Patient limited by other medical complications  [] Other:     Prognosis: [] Good [x] Fair  [] Poor    Patient Requires Follow-up: [x] Yes  [] No    PLAN: See eval  [] Continue per plan of care [] Alter current plan (see comments)  [x] Plan of care initiated [] Hold pending MD visit [] Discharge    Electronically signed by: Jeffry Rob PT

## 2018-03-28 ENCOUNTER — HOSPITAL ENCOUNTER (OUTPATIENT)
Dept: PHYSICAL THERAPY | Age: 73
Discharge: HOME | End: 2018-03-29
Admitting: ORTHOPAEDIC SURGERY

## 2018-03-28 NOTE — FLOWSHEET NOTE
level of function. 2. Patient will demonstrate increased AROM of bilateral knees from 0 - 128 to allow for proper joint functioning as indicated by patients Functional Deficits. 3. Patient will demonstrate an increase in Strength to good proximal hip strength and control, within 5lb HHD in LE to allow for proper functional mobility as indicated by patients Functional Deficits. 4. Patient will return to amb on level and unlevel surfaces without increased symptoms or restriction. 5. Able to sleep 7-8 hours without waking from knee pain  6. Able to ascend and descend stairs with a reciprocal gait. New or Updated Goals (if applicable):  [x] No change to goals established upon initial eval/last progress note:  New Goals:    Progression Towards Functional goals:   [] Patient is progressing as expected towards functional goals listed. [] Progression is slowed due to complexities listed. [] Progression has been slowed due to co-morbidities. [x] Plan just implemented, too soon to assess goals progression  [] Other:     ASSESSMENT:    [] Improvement noted relative to goals:  [] No Improvement noted related to goals:  Summary/Patient's response to treatment: Patient hesitant with progression of exercises due to soreness last session. Complains of back pain with manual stretching / exercises in supine. Declined standing exercises due to balance and weakness. Encouraged patient on reasons behind performing exercises was to increase strength but patient declined due to deconditioned.      Treatment/Activity Tolerance:  [] Patient tolerated treatment well [x] Patient limited by fatique  [] Patient limited by pain  [] Patient limited by other medical complications  [] Other:     Prognosis: [] Good [x] Fair  [] Poor    Patient Requires Follow-up: [x] Yes  [] No    PLAN: See eval  [x] Continue per plan of care [] Alter current plan (see comments)  [] Plan of care initiated [] Hold pending MD visit []

## 2018-03-30 ENCOUNTER — HOSPITAL ENCOUNTER (OUTPATIENT)
Dept: PHYSICAL THERAPY | Age: 73
Discharge: HOME | End: 2018-03-31
Admitting: ORTHOPAEDIC SURGERY

## 2018-04-01 ENCOUNTER — HOSPITAL ENCOUNTER (OUTPATIENT)
Dept: PHYSICAL THERAPY | Age: 73
Discharge: HOME | End: 2018-04-30
Attending: ORTHOPAEDIC SURGERY | Admitting: ORTHOPAEDIC SURGERY

## 2018-04-23 ENCOUNTER — TELEPHONE (OUTPATIENT)
Dept: DERMATOLOGY | Age: 73
End: 2018-04-23

## 2018-04-23 RX ORDER — GABAPENTIN 300 MG/1
300 CAPSULE ORAL NIGHTLY
Qty: 90 CAPSULE | Refills: 0 | Status: CANCELLED | OUTPATIENT
Start: 2018-04-23

## 2018-04-23 RX ORDER — GABAPENTIN 300 MG/1
300 CAPSULE ORAL NIGHTLY
Qty: 14 CAPSULE | Refills: 0 | Status: SHIPPED | OUTPATIENT
Start: 2018-04-23 | End: 2018-04-23 | Stop reason: SDUPTHER

## 2018-04-23 RX ORDER — GABAPENTIN 300 MG/1
300 CAPSULE ORAL NIGHTLY
Qty: 90 CAPSULE | Refills: 1 | Status: SHIPPED | OUTPATIENT
Start: 2018-04-23 | End: 2018-04-25 | Stop reason: SDUPTHER

## 2018-04-24 ENCOUNTER — TELEPHONE (OUTPATIENT)
Dept: DERMATOLOGY | Age: 73
End: 2018-04-24

## 2018-04-24 ENCOUNTER — TELEPHONE (OUTPATIENT)
Dept: RHEUMATOLOGY | Age: 73
End: 2018-04-24

## 2018-04-25 RX ORDER — GABAPENTIN 300 MG/1
300 CAPSULE ORAL 3 TIMES DAILY
Qty: 270 CAPSULE | Refills: 3 | Status: SHIPPED | OUTPATIENT
Start: 2018-04-25 | End: 2018-04-26 | Stop reason: SDUPTHER

## 2018-04-26 ENCOUNTER — OFFICE VISIT (OUTPATIENT)
Dept: ORTHOPEDIC SURGERY | Age: 73
End: 2018-04-26

## 2018-04-26 VITALS
SYSTOLIC BLOOD PRESSURE: 176 MMHG | HEART RATE: 97 BPM | BODY MASS INDEX: 38.31 KG/M2 | DIASTOLIC BLOOD PRESSURE: 94 MMHG | WEIGHT: 229.94 LBS | HEIGHT: 65 IN

## 2018-04-26 DIAGNOSIS — Z96.652 STATUS POST LEFT KNEE REPLACEMENT: ICD-10-CM

## 2018-04-26 DIAGNOSIS — M76.899 QUADRICEPS TENDINITIS: Primary | ICD-10-CM

## 2018-04-26 DIAGNOSIS — M22.2X2 PATELLOFEMORAL PAIN SYNDROME OF LEFT KNEE: ICD-10-CM

## 2018-04-26 PROCEDURE — 99213 OFFICE O/P EST LOW 20 MIN: CPT | Performed by: ORTHOPAEDIC SURGERY

## 2018-04-26 PROCEDURE — L1812 KO ELASTIC W/JOINTS PRE OTS: HCPCS | Performed by: ORTHOPAEDIC SURGERY

## 2018-04-26 RX ORDER — GABAPENTIN 300 MG/1
300 CAPSULE ORAL 3 TIMES DAILY
Qty: 42 CAPSULE | Refills: 0 | Status: ON HOLD | OUTPATIENT
Start: 2018-04-26 | End: 2018-07-22 | Stop reason: HOSPADM

## 2018-04-26 RX ORDER — NIFEDIPINE 30 MG/1
30 TABLET, FILM COATED, EXTENDED RELEASE ORAL 2 TIMES DAILY
COMMUNITY
End: 2018-08-20 | Stop reason: ALTCHOICE

## 2018-05-16 ENCOUNTER — OFFICE VISIT (OUTPATIENT)
Dept: ORTHOPEDIC SURGERY | Age: 73
End: 2018-05-16

## 2018-05-16 VITALS — HEIGHT: 65 IN | WEIGHT: 229 LBS | BODY MASS INDEX: 38.15 KG/M2

## 2018-05-16 DIAGNOSIS — S13.9XXA CERVICAL SPRAIN, INITIAL ENCOUNTER: Primary | ICD-10-CM

## 2018-05-16 DIAGNOSIS — M54.2 CERVICAL SPINE PAIN: ICD-10-CM

## 2018-05-16 PROCEDURE — 99213 OFFICE O/P EST LOW 20 MIN: CPT | Performed by: PHYSICIAN ASSISTANT

## 2018-05-16 RX ORDER — METHOCARBAMOL 500 MG/1
500 TABLET, FILM COATED ORAL 4 TIMES DAILY
Qty: 40 TABLET | Refills: 0 | Status: SHIPPED | OUTPATIENT
Start: 2018-05-16 | End: 2018-05-25 | Stop reason: ALTCHOICE

## 2018-05-23 LAB
DIABETIC RETINOPATHY: NEGATIVE
DIABETIC RETINOPATHY: NEGATIVE

## 2018-05-25 ENCOUNTER — OFFICE VISIT (OUTPATIENT)
Dept: RHEUMATOLOGY | Age: 73
End: 2018-05-25

## 2018-05-25 VITALS
WEIGHT: 216.3 LBS | HEART RATE: 94 BPM | BODY MASS INDEX: 35.99 KG/M2 | DIASTOLIC BLOOD PRESSURE: 74 MMHG | SYSTOLIC BLOOD PRESSURE: 168 MMHG

## 2018-05-25 DIAGNOSIS — Z71.9 VISIT FOR COUNSELING: Primary | ICD-10-CM

## 2018-05-25 PROCEDURE — 99213 OFFICE O/P EST LOW 20 MIN: CPT | Performed by: INTERNAL MEDICINE

## 2018-07-20 ENCOUNTER — APPOINTMENT (OUTPATIENT)
Dept: CT IMAGING | Age: 73
DRG: 378 | End: 2018-07-20
Payer: MEDICARE

## 2018-07-20 ENCOUNTER — HOSPITAL ENCOUNTER (INPATIENT)
Age: 73
LOS: 2 days | Discharge: HOME | DRG: 378 | End: 2018-07-22
Attending: EMERGENCY MEDICINE | Admitting: INTERNAL MEDICINE
Payer: MEDICARE

## 2018-07-20 ENCOUNTER — APPOINTMENT (OUTPATIENT)
Dept: ULTRASOUND IMAGING | Age: 73
DRG: 378 | End: 2018-07-20
Payer: MEDICARE

## 2018-07-20 DIAGNOSIS — K92.2 GASTROINTESTINAL HEMORRHAGE, UNSPECIFIED GASTROINTESTINAL HEMORRHAGE TYPE: ICD-10-CM

## 2018-07-20 DIAGNOSIS — R31.9 URINARY TRACT INFECTION WITH HEMATURIA, SITE UNSPECIFIED: ICD-10-CM

## 2018-07-20 DIAGNOSIS — R10.9 ABDOMINAL PAIN, UNSPECIFIED ABDOMINAL LOCATION: Primary | ICD-10-CM

## 2018-07-20 DIAGNOSIS — R11.2 NAUSEA AND VOMITING, INTRACTABILITY OF VOMITING NOT SPECIFIED, UNSPECIFIED VOMITING TYPE: ICD-10-CM

## 2018-07-20 DIAGNOSIS — N39.0 URINARY TRACT INFECTION WITH HEMATURIA, SITE UNSPECIFIED: ICD-10-CM

## 2018-07-20 DIAGNOSIS — K80.20 CALCULUS OF GALLBLADDER WITHOUT CHOLECYSTITIS WITHOUT OBSTRUCTION: ICD-10-CM

## 2018-07-20 LAB
A/G RATIO: 1.1 (ref 1.1–2.2)
ALBUMIN SERPL-MCNC: 3.6 G/DL (ref 3.4–5)
ALP BLD-CCNC: 51 U/L (ref 40–129)
ALT SERPL-CCNC: 18 U/L (ref 10–40)
ANION GAP SERPL CALCULATED.3IONS-SCNC: 13 MMOL/L (ref 3–16)
AST SERPL-CCNC: 17 U/L (ref 15–37)
BACTERIA: ABNORMAL /HPF
BASOPHILS ABSOLUTE: 0 K/UL (ref 0–0.2)
BASOPHILS RELATIVE PERCENT: 0.5 %
BILIRUB SERPL-MCNC: 0.4 MG/DL (ref 0–1)
BILIRUBIN URINE: NEGATIVE
BLOOD, URINE: ABNORMAL
BUN BLDV-MCNC: 40 MG/DL (ref 7–20)
CALCIUM SERPL-MCNC: 9.6 MG/DL (ref 8.3–10.6)
CHLORIDE BLD-SCNC: 95 MMOL/L (ref 99–110)
CLARITY: CLEAR
CO2: 23 MMOL/L (ref 21–32)
COLOR: YELLOW
CREAT SERPL-MCNC: 0.8 MG/DL (ref 0.6–1.2)
EOSINOPHILS ABSOLUTE: 0.1 K/UL (ref 0–0.6)
EOSINOPHILS RELATIVE PERCENT: 0.6 %
EPITHELIAL CELLS, UA: ABNORMAL /HPF
GFR AFRICAN AMERICAN: >60
GFR NON-AFRICAN AMERICAN: >60
GLOBULIN: 3.4 G/DL
GLUCOSE BLD-MCNC: 139 MG/DL (ref 70–99)
GLUCOSE BLD-MCNC: 144 MG/DL (ref 70–99)
GLUCOSE BLD-MCNC: 90 MG/DL (ref 70–99)
GLUCOSE URINE: NEGATIVE MG/DL
HCT VFR BLD CALC: 25.1 % (ref 36–48)
HCT VFR BLD CALC: 28.7 % (ref 36–48)
HEMOGLOBIN: 8.5 G/DL (ref 12–16)
HEMOGLOBIN: 9.7 G/DL (ref 12–16)
INR BLD: 1.13 (ref 0.86–1.14)
KETONES, URINE: NEGATIVE MG/DL
LACTIC ACID: 2.1 MMOL/L (ref 0.4–2)
LEUKOCYTE ESTERASE, URINE: ABNORMAL
LIPASE: 28 U/L (ref 13–60)
LYMPHOCYTES ABSOLUTE: 1.3 K/UL (ref 1–5.1)
LYMPHOCYTES RELATIVE PERCENT: 13.6 %
MCH RBC QN AUTO: 28.6 PG (ref 26–34)
MCHC RBC AUTO-ENTMCNC: 33.9 G/DL (ref 31–36)
MCV RBC AUTO: 84.3 FL (ref 80–100)
MICROSCOPIC EXAMINATION: YES
MONOCYTES ABSOLUTE: 0.5 K/UL (ref 0–1.3)
MONOCYTES RELATIVE PERCENT: 4.9 %
NEUTROPHILS ABSOLUTE: 8 K/UL (ref 1.7–7.7)
NEUTROPHILS RELATIVE PERCENT: 80.4 %
NITRITE, URINE: NEGATIVE
OCCULT BLOOD SCREENING: ABNORMAL
PDW BLD-RTO: 16.5 % (ref 12.4–15.4)
PERFORMED ON: ABNORMAL
PERFORMED ON: NORMAL
PH UA: 5.5
PLATELET # BLD: 194 K/UL (ref 135–450)
PMV BLD AUTO: 7.4 FL (ref 5–10.5)
POTASSIUM SERPL-SCNC: 4.4 MMOL/L (ref 3.5–5.1)
PROTEIN UA: NEGATIVE MG/DL
PROTHROMBIN TIME: 12.9 SEC (ref 9.8–13)
RBC # BLD: 3.4 M/UL (ref 4–5.2)
RBC UA: ABNORMAL /HPF (ref 0–2)
SODIUM BLD-SCNC: 131 MMOL/L (ref 136–145)
SPECIFIC GRAVITY UA: 1.01
TOTAL PROTEIN: 7 G/DL (ref 6.4–8.2)
URINE REFLEX TO CULTURE: YES
URINE TYPE: ABNORMAL
UROBILINOGEN, URINE: 0.2 E.U./DL
WBC # BLD: 9.9 K/UL (ref 4–11)
WBC UA: ABNORMAL /HPF (ref 0–5)
YEAST: PRESENT /HPF

## 2018-07-20 PROCEDURE — 81001 URINALYSIS AUTO W/SCOPE: CPT

## 2018-07-20 PROCEDURE — 99285 EMERGENCY DEPT VISIT HI MDM: CPT

## 2018-07-20 PROCEDURE — 2580000003 HC RX 258: Performed by: PHYSICIAN ASSISTANT

## 2018-07-20 PROCEDURE — 83605 ASSAY OF LACTIC ACID: CPT

## 2018-07-20 PROCEDURE — 2580000003 HC RX 258: Performed by: INTERNAL MEDICINE

## 2018-07-20 PROCEDURE — 96361 HYDRATE IV INFUSION ADD-ON: CPT

## 2018-07-20 PROCEDURE — 96365 THER/PROPH/DIAG IV INF INIT: CPT

## 2018-07-20 PROCEDURE — 6370000000 HC RX 637 (ALT 250 FOR IP): Performed by: INTERNAL MEDICINE

## 2018-07-20 PROCEDURE — 6360000002 HC RX W HCPCS: Performed by: PHYSICIAN ASSISTANT

## 2018-07-20 PROCEDURE — 74177 CT ABD & PELVIS W/CONTRAST: CPT

## 2018-07-20 PROCEDURE — 85014 HEMATOCRIT: CPT

## 2018-07-20 PROCEDURE — 85018 HEMOGLOBIN: CPT

## 2018-07-20 PROCEDURE — C9113 INJ PANTOPRAZOLE SODIUM, VIA: HCPCS | Performed by: PHYSICIAN ASSISTANT

## 2018-07-20 PROCEDURE — 1200000000 HC SEMI PRIVATE

## 2018-07-20 PROCEDURE — 76705 ECHO EXAM OF ABDOMEN: CPT

## 2018-07-20 PROCEDURE — 94664 DEMO&/EVAL PT USE INHALER: CPT

## 2018-07-20 PROCEDURE — 2700000000 HC OXYGEN THERAPY PER DAY

## 2018-07-20 PROCEDURE — G0328 FECAL BLOOD SCRN IMMUNOASSAY: HCPCS

## 2018-07-20 PROCEDURE — 87086 URINE CULTURE/COLONY COUNT: CPT

## 2018-07-20 PROCEDURE — 94761 N-INVAS EAR/PLS OXIMETRY MLT: CPT

## 2018-07-20 PROCEDURE — 83690 ASSAY OF LIPASE: CPT

## 2018-07-20 PROCEDURE — 80053 COMPREHEN METABOLIC PANEL: CPT

## 2018-07-20 PROCEDURE — 6360000002 HC RX W HCPCS

## 2018-07-20 PROCEDURE — 94640 AIRWAY INHALATION TREATMENT: CPT

## 2018-07-20 PROCEDURE — 96375 TX/PRO/DX INJ NEW DRUG ADDON: CPT

## 2018-07-20 PROCEDURE — 36415 COLL VENOUS BLD VENIPUNCTURE: CPT

## 2018-07-20 PROCEDURE — 85610 PROTHROMBIN TIME: CPT

## 2018-07-20 PROCEDURE — 85025 COMPLETE CBC W/AUTO DIFF WBC: CPT

## 2018-07-20 PROCEDURE — 6360000004 HC RX CONTRAST MEDICATION: Performed by: PHYSICIAN ASSISTANT

## 2018-07-20 PROCEDURE — 6370000000 HC RX 637 (ALT 250 FOR IP): Performed by: NURSE PRACTITIONER

## 2018-07-20 RX ORDER — DEXTROSE MONOHYDRATE 25 G/50ML
12.5 INJECTION, SOLUTION INTRAVENOUS PRN
Status: DISCONTINUED | OUTPATIENT
Start: 2018-07-20 | End: 2018-07-22 | Stop reason: HOSPADM

## 2018-07-20 RX ORDER — GABAPENTIN 300 MG/1
600 CAPSULE ORAL NIGHTLY
Status: DISCONTINUED | OUTPATIENT
Start: 2018-07-20 | End: 2018-07-22 | Stop reason: HOSPADM

## 2018-07-20 RX ORDER — ACETAMINOPHEN 325 MG/1
650 TABLET ORAL EVERY 4 HOURS PRN
Status: DISCONTINUED | OUTPATIENT
Start: 2018-07-20 | End: 2018-07-22 | Stop reason: HOSPADM

## 2018-07-20 RX ORDER — GABAPENTIN 600 MG/1
600 TABLET ORAL NIGHTLY
Status: ON HOLD | COMMUNITY
End: 2021-05-12 | Stop reason: SDUPTHER

## 2018-07-20 RX ORDER — ROPINIROLE 0.5 MG/1
1 TABLET, FILM COATED ORAL NIGHTLY
Status: DISCONTINUED | OUTPATIENT
Start: 2018-07-20 | End: 2018-07-21

## 2018-07-20 RX ORDER — ONDANSETRON 2 MG/ML
4 INJECTION INTRAMUSCULAR; INTRAVENOUS ONCE
Status: COMPLETED | OUTPATIENT
Start: 2018-07-20 | End: 2018-07-20

## 2018-07-20 RX ORDER — VENLAFAXINE 37.5 MG/1
18.25 TABLET ORAL DAILY
Status: DISCONTINUED | OUTPATIENT
Start: 2018-07-20 | End: 2018-07-22 | Stop reason: HOSPADM

## 2018-07-20 RX ORDER — MORPHINE SULFATE 2 MG/ML
INJECTION, SOLUTION INTRAMUSCULAR; INTRAVENOUS
Status: COMPLETED
Start: 2018-07-20 | End: 2018-07-20

## 2018-07-20 RX ORDER — DOXEPIN HYDROCHLORIDE 25 MG/1
50 CAPSULE ORAL NIGHTLY
Status: DISCONTINUED | OUTPATIENT
Start: 2018-07-20 | End: 2018-07-22 | Stop reason: HOSPADM

## 2018-07-20 RX ORDER — TRAZODONE HYDROCHLORIDE 50 MG/1
50 TABLET ORAL NIGHTLY
Status: DISCONTINUED | OUTPATIENT
Start: 2018-07-20 | End: 2018-07-21

## 2018-07-20 RX ORDER — SODIUM CHLORIDE 0.9 % (FLUSH) 0.9 %
10 SYRINGE (ML) INJECTION EVERY 12 HOURS SCHEDULED
Status: DISCONTINUED | OUTPATIENT
Start: 2018-07-20 | End: 2018-07-22 | Stop reason: HOSPADM

## 2018-07-20 RX ORDER — 0.9 % SODIUM CHLORIDE 0.9 %
1000 INTRAVENOUS SOLUTION INTRAVENOUS ONCE
Status: COMPLETED | OUTPATIENT
Start: 2018-07-20 | End: 2018-07-20

## 2018-07-20 RX ORDER — DEXTROSE MONOHYDRATE 50 MG/ML
100 INJECTION, SOLUTION INTRAVENOUS PRN
Status: DISCONTINUED | OUTPATIENT
Start: 2018-07-20 | End: 2018-07-22 | Stop reason: HOSPADM

## 2018-07-20 RX ORDER — NICOTINE POLACRILEX 4 MG
15 LOZENGE BUCCAL PRN
Status: DISCONTINUED | OUTPATIENT
Start: 2018-07-20 | End: 2018-07-22 | Stop reason: HOSPADM

## 2018-07-20 RX ORDER — ATORVASTATIN CALCIUM 10 MG/1
20 TABLET, FILM COATED ORAL NIGHTLY
Status: DISCONTINUED | OUTPATIENT
Start: 2018-07-20 | End: 2018-07-22 | Stop reason: HOSPADM

## 2018-07-20 RX ORDER — SODIUM CHLORIDE 0.9 % (FLUSH) 0.9 %
10 SYRINGE (ML) INJECTION PRN
Status: DISCONTINUED | OUTPATIENT
Start: 2018-07-20 | End: 2018-07-22 | Stop reason: HOSPADM

## 2018-07-20 RX ORDER — LOSARTAN POTASSIUM 25 MG/1
100 TABLET ORAL DAILY
Status: DISCONTINUED | OUTPATIENT
Start: 2018-07-20 | End: 2018-07-22 | Stop reason: HOSPADM

## 2018-07-20 RX ORDER — URSODIOL 500 MG/1
500 TABLET, FILM COATED ORAL 2 TIMES DAILY
Status: DISCONTINUED | OUTPATIENT
Start: 2018-07-20 | End: 2018-07-22 | Stop reason: HOSPADM

## 2018-07-20 RX ORDER — MORPHINE SULFATE 4 MG/ML
4 INJECTION, SOLUTION INTRAMUSCULAR; INTRAVENOUS ONCE
Status: DISCONTINUED | OUTPATIENT
Start: 2018-07-20 | End: 2018-07-21

## 2018-07-20 RX ORDER — SODIUM CHLORIDE 9 MG/ML
INJECTION, SOLUTION INTRAVENOUS CONTINUOUS
Status: DISCONTINUED | OUTPATIENT
Start: 2018-07-20 | End: 2018-07-22

## 2018-07-20 RX ORDER — ONDANSETRON 2 MG/ML
4 INJECTION INTRAMUSCULAR; INTRAVENOUS EVERY 6 HOURS PRN
Status: DISCONTINUED | OUTPATIENT
Start: 2018-07-20 | End: 2018-07-22 | Stop reason: HOSPADM

## 2018-07-20 RX ORDER — LEVOTHYROXINE SODIUM 0.1 MG/1
100 TABLET ORAL DAILY
Status: DISCONTINUED | OUTPATIENT
Start: 2018-07-21 | End: 2018-07-22 | Stop reason: HOSPADM

## 2018-07-20 RX ADMIN — SODIUM CHLORIDE 8 MG/HR: 9 INJECTION, SOLUTION INTRAVENOUS at 23:12

## 2018-07-20 RX ADMIN — LOSARTAN POTASSIUM 100 MG: 25 TABLET, FILM COATED ORAL at 17:53

## 2018-07-20 RX ADMIN — SODIUM CHLORIDE: 9 INJECTION, SOLUTION INTRAVENOUS at 17:53

## 2018-07-20 RX ADMIN — ONDANSETRON 4 MG: 2 INJECTION INTRAMUSCULAR; INTRAVENOUS at 11:23

## 2018-07-20 RX ADMIN — Medication 10 ML: at 20:41

## 2018-07-20 RX ADMIN — ATORVASTATIN CALCIUM 20 MG: 10 TABLET, FILM COATED ORAL at 20:41

## 2018-07-20 RX ADMIN — IOPAMIDOL 100 ML: 755 INJECTION, SOLUTION INTRAVENOUS at 14:10

## 2018-07-20 RX ADMIN — SODIUM CHLORIDE 80 MG: 9 INJECTION, SOLUTION INTRAVENOUS at 13:06

## 2018-07-20 RX ADMIN — TRAZODONE HYDROCHLORIDE 50 MG: 50 TABLET ORAL at 20:42

## 2018-07-20 RX ADMIN — VENLAFAXINE 18.75 MG: 37.5 TABLET ORAL at 17:53

## 2018-07-20 RX ADMIN — Medication 2 PUFF: at 19:59

## 2018-07-20 RX ADMIN — SODIUM CHLORIDE 1000 ML: 9 INJECTION, SOLUTION INTRAVENOUS at 11:23

## 2018-07-20 RX ADMIN — DOXEPIN HYDROCHLORIDE 50 MG: 25 CAPSULE ORAL at 20:41

## 2018-07-20 RX ADMIN — CEFTRIAXONE SODIUM 1 G: 1 INJECTION, POWDER, FOR SOLUTION INTRAMUSCULAR; INTRAVENOUS at 15:43

## 2018-07-20 RX ADMIN — MORPHINE SULFATE 4 MG: 2 INJECTION, SOLUTION INTRAMUSCULAR; INTRAVENOUS at 11:23

## 2018-07-20 RX ADMIN — GABAPENTIN 600 MG: 300 CAPSULE ORAL at 21:52

## 2018-07-20 RX ADMIN — SODIUM CHLORIDE 8 MG/HR: 9 INJECTION, SOLUTION INTRAVENOUS at 13:43

## 2018-07-20 RX ADMIN — ROPINIROLE HYDROCHLORIDE 1 MG: 0.5 TABLET, FILM COATED ORAL at 20:41

## 2018-07-20 ASSESSMENT — PAIN SCALES - GENERAL
PAINLEVEL_OUTOF10: 9
PAINLEVEL_OUTOF10: 0

## 2018-07-20 NOTE — ED NOTES
Assessment:  GI:abd dist (per pt is baseline), non-acute abd, non-rigid, BX x4.   : WNL  Cardiac: WNL  Lung:  WNL     Bao Johnson RN  07/20/18 9403

## 2018-07-20 NOTE — ED NOTES
Report given bedside to RN. Pt to new room on portable tele mon via stretcher. Will s/o.      Valencia Bonilla RN  07/20/18 3197

## 2018-07-21 LAB
ANION GAP SERPL CALCULATED.3IONS-SCNC: 10 MMOL/L (ref 3–16)
BUN BLDV-MCNC: 19 MG/DL (ref 7–20)
CALCIUM SERPL-MCNC: 8.4 MG/DL (ref 8.3–10.6)
CHLORIDE BLD-SCNC: 103 MMOL/L (ref 99–110)
CO2: 26 MMOL/L (ref 21–32)
CREAT SERPL-MCNC: 0.7 MG/DL (ref 0.6–1.2)
GFR AFRICAN AMERICAN: >60
GFR NON-AFRICAN AMERICAN: >60
GLUCOSE BLD-MCNC: 92 MG/DL (ref 70–99)
GLUCOSE BLD-MCNC: 96 MG/DL (ref 70–99)
GLUCOSE BLD-MCNC: 98 MG/DL (ref 70–99)
HCT VFR BLD CALC: 25.6 % (ref 36–48)
HCT VFR BLD CALC: 25.9 % (ref 36–48)
HEMOGLOBIN: 8.6 G/DL (ref 12–16)
HEMOGLOBIN: 8.8 G/DL (ref 12–16)
PERFORMED ON: NORMAL
PERFORMED ON: NORMAL
POTASSIUM REFLEX MAGNESIUM: 4.2 MMOL/L (ref 3.5–5.1)
SODIUM BLD-SCNC: 139 MMOL/L (ref 136–145)
URINE CULTURE, ROUTINE: NORMAL

## 2018-07-21 PROCEDURE — 6370000000 HC RX 637 (ALT 250 FOR IP): Performed by: INTERNAL MEDICINE

## 2018-07-21 PROCEDURE — C9113 INJ PANTOPRAZOLE SODIUM, VIA: HCPCS | Performed by: PHYSICIAN ASSISTANT

## 2018-07-21 PROCEDURE — 7100000011 HC PHASE II RECOVERY - ADDTL 15 MIN: Performed by: INTERNAL MEDICINE

## 2018-07-21 PROCEDURE — 88305 TISSUE EXAM BY PATHOLOGIST: CPT

## 2018-07-21 PROCEDURE — 6360000002 HC RX W HCPCS: Performed by: INTERNAL MEDICINE

## 2018-07-21 PROCEDURE — 85014 HEMATOCRIT: CPT

## 2018-07-21 PROCEDURE — 36415 COLL VENOUS BLD VENIPUNCTURE: CPT

## 2018-07-21 PROCEDURE — 80048 BASIC METABOLIC PNL TOTAL CA: CPT

## 2018-07-21 PROCEDURE — 2709999900 HC NON-CHARGEABLE SUPPLY: Performed by: INTERNAL MEDICINE

## 2018-07-21 PROCEDURE — 2580000003 HC RX 258: Performed by: PHYSICIAN ASSISTANT

## 2018-07-21 PROCEDURE — 94640 AIRWAY INHALATION TREATMENT: CPT

## 2018-07-21 PROCEDURE — 1200000000 HC SEMI PRIVATE

## 2018-07-21 PROCEDURE — 0DB68ZX EXCISION OF STOMACH, VIA NATURAL OR ARTIFICIAL OPENING ENDOSCOPIC, DIAGNOSTIC: ICD-10-PCS | Performed by: INTERNAL MEDICINE

## 2018-07-21 PROCEDURE — 6370000000 HC RX 637 (ALT 250 FOR IP): Performed by: REGISTERED NURSE

## 2018-07-21 PROCEDURE — 6370000000 HC RX 637 (ALT 250 FOR IP): Performed by: NURSE PRACTITIONER

## 2018-07-21 PROCEDURE — 7100000010 HC PHASE II RECOVERY - FIRST 15 MIN: Performed by: INTERNAL MEDICINE

## 2018-07-21 PROCEDURE — 94761 N-INVAS EAR/PLS OXIMETRY MLT: CPT

## 2018-07-21 PROCEDURE — 6360000002 HC RX W HCPCS: Performed by: PHYSICIAN ASSISTANT

## 2018-07-21 PROCEDURE — 85018 HEMOGLOBIN: CPT

## 2018-07-21 PROCEDURE — 3609012400 HC EGD TRANSORAL BIOPSY SINGLE/MULTIPLE: Performed by: INTERNAL MEDICINE

## 2018-07-21 PROCEDURE — 99152 MOD SED SAME PHYS/QHP 5/>YRS: CPT | Performed by: INTERNAL MEDICINE

## 2018-07-21 PROCEDURE — 2700000000 HC OXYGEN THERAPY PER DAY

## 2018-07-21 PROCEDURE — 2580000003 HC RX 258: Performed by: INTERNAL MEDICINE

## 2018-07-21 PROCEDURE — 2720000010 HC SURG SUPPLY STERILE: Performed by: INTERNAL MEDICINE

## 2018-07-21 RX ORDER — GABAPENTIN 300 MG/1
300 CAPSULE ORAL
Status: DISCONTINUED | OUTPATIENT
Start: 2018-07-21 | End: 2018-07-22 | Stop reason: HOSPADM

## 2018-07-21 RX ORDER — MONTELUKAST SODIUM 10 MG/1
10 TABLET ORAL DAILY
Status: DISCONTINUED | OUTPATIENT
Start: 2018-07-21 | End: 2018-07-22 | Stop reason: HOSPADM

## 2018-07-21 RX ORDER — TRAZODONE HYDROCHLORIDE 50 MG/1
100 TABLET ORAL NIGHTLY
Status: DISCONTINUED | OUTPATIENT
Start: 2018-07-21 | End: 2018-07-22 | Stop reason: HOSPADM

## 2018-07-21 RX ORDER — FENTANYL CITRATE 50 UG/ML
INJECTION, SOLUTION INTRAMUSCULAR; INTRAVENOUS PRN
Status: DISCONTINUED | OUTPATIENT
Start: 2018-07-21 | End: 2018-07-21 | Stop reason: HOSPADM

## 2018-07-21 RX ORDER — ROPINIROLE 0.5 MG/1
1 TABLET, FILM COATED ORAL EVERY 24 HOURS
Status: DISCONTINUED | OUTPATIENT
Start: 2018-07-21 | End: 2018-07-22 | Stop reason: HOSPADM

## 2018-07-21 RX ORDER — MIDAZOLAM HYDROCHLORIDE 1 MG/ML
INJECTION INTRAMUSCULAR; INTRAVENOUS PRN
Status: DISCONTINUED | OUTPATIENT
Start: 2018-07-21 | End: 2018-07-21 | Stop reason: HOSPADM

## 2018-07-21 RX ORDER — ROPINIROLE 2 MG/1
2 TABLET, FILM COATED ORAL EVERY 24 HOURS
Status: DISCONTINUED | OUTPATIENT
Start: 2018-07-21 | End: 2018-07-22 | Stop reason: HOSPADM

## 2018-07-21 RX ORDER — NIFEDIPINE 30 MG/1
30 TABLET, FILM COATED, EXTENDED RELEASE ORAL 2 TIMES DAILY
Status: DISCONTINUED | OUTPATIENT
Start: 2018-07-21 | End: 2018-07-22 | Stop reason: HOSPADM

## 2018-07-21 RX ORDER — DIPHENHYDRAMINE HCL 25 MG
50 TABLET ORAL NIGHTLY PRN
Status: DISCONTINUED | OUTPATIENT
Start: 2018-07-21 | End: 2018-07-22 | Stop reason: HOSPADM

## 2018-07-21 RX ADMIN — GABAPENTIN 600 MG: 300 CAPSULE ORAL at 22:12

## 2018-07-21 RX ADMIN — DOXEPIN HYDROCHLORIDE 50 MG: 25 CAPSULE ORAL at 20:59

## 2018-07-21 RX ADMIN — ROPINIROLE HYDROCHLORIDE 2 MG: 2 TABLET, FILM COATED ORAL at 22:12

## 2018-07-21 RX ADMIN — ROPINIROLE HYDROCHLORIDE 1 MG: 0.5 TABLET, FILM COATED ORAL at 20:58

## 2018-07-21 RX ADMIN — GABAPENTIN 300 MG: 300 CAPSULE ORAL at 14:10

## 2018-07-21 RX ADMIN — SODIUM CHLORIDE 8 MG/HR: 9 INJECTION, SOLUTION INTRAVENOUS at 22:12

## 2018-07-21 RX ADMIN — INSULIN LISPRO 1 UNITS: 100 INJECTION, SOLUTION INTRAVENOUS; SUBCUTANEOUS at 17:51

## 2018-07-21 RX ADMIN — Medication 2 PUFF: at 21:00

## 2018-07-21 RX ADMIN — TRAZODONE HYDROCHLORIDE 100 MG: 50 TABLET ORAL at 22:12

## 2018-07-21 RX ADMIN — MONTELUKAST SODIUM 10 MG: 10 TABLET, FILM COATED ORAL at 14:10

## 2018-07-21 RX ADMIN — Medication 10 ML: at 10:36

## 2018-07-21 RX ADMIN — DIPHENHYDRAMINE HCL 50 MG: 25 TABLET ORAL at 20:58

## 2018-07-21 RX ADMIN — ESTROGENS, CONJUGATED 1.25 MG: 0.62 TABLET, FILM COATED ORAL at 14:20

## 2018-07-21 RX ADMIN — Medication 2 PUFF: at 08:40

## 2018-07-21 RX ADMIN — ROPINIROLE HYDROCHLORIDE 1 MG: 0.5 TABLET, FILM COATED ORAL at 17:54

## 2018-07-21 RX ADMIN — SODIUM CHLORIDE: 9 INJECTION, SOLUTION INTRAVENOUS at 19:11

## 2018-07-21 RX ADMIN — LEVOTHYROXINE SODIUM 100 MCG: 100 TABLET ORAL at 06:03

## 2018-07-21 RX ADMIN — LOSARTAN POTASSIUM 100 MG: 25 TABLET, FILM COATED ORAL at 10:36

## 2018-07-21 RX ADMIN — SODIUM CHLORIDE: 9 INJECTION, SOLUTION INTRAVENOUS at 06:04

## 2018-07-21 RX ADMIN — NIFEDIPINE 30 MG: 30 TABLET, FILM COATED, EXTENDED RELEASE ORAL at 20:59

## 2018-07-21 RX ADMIN — ATORVASTATIN CALCIUM 20 MG: 10 TABLET, FILM COATED ORAL at 21:00

## 2018-07-21 RX ADMIN — SODIUM CHLORIDE 8 MG/HR: 9 INJECTION, SOLUTION INTRAVENOUS at 16:13

## 2018-07-21 RX ADMIN — NIFEDIPINE 30 MG: 30 TABLET, FILM COATED, EXTENDED RELEASE ORAL at 14:10

## 2018-07-21 RX ADMIN — URSODIOL 500 MG: 500 TABLET, FILM COATED ORAL at 10:37

## 2018-07-21 RX ADMIN — URSODIOL 500 MG: 500 TABLET, FILM COATED ORAL at 21:02

## 2018-07-21 RX ADMIN — VENLAFAXINE 18.75 MG: 37.5 TABLET ORAL at 10:36

## 2018-07-21 RX ADMIN — SODIUM CHLORIDE 8 MG/HR: 9 INJECTION, SOLUTION INTRAVENOUS at 06:03

## 2018-07-21 ASSESSMENT — PAIN SCALES - GENERAL
PAINLEVEL_OUTOF10: 0

## 2018-07-21 NOTE — PROGRESS NOTES
per day    insulin lispro  0-6 Units Subcutaneous TID     insulin lispro  0-3 Units Subcutaneous Nightly    gabapentin  600 mg Oral Nightly     PRN Meds: diphenhydrAMINE, fentaNYL, midazolam, sodium chloride flush, acetaminophen, ondansetron, glucose, dextrose, glucagon (rDNA), dextrose      Intake/Output Summary (Last 24 hours) at 07/21/18 1322  Last data filed at 07/21/18 1238   Gross per 24 hour   Intake              550 ml   Output              600 ml   Net              -50 ml       Physical Exam Performed:    BP (!) 130/54   Pulse 80   Temp 97.7 °F (36.5 °C) (Temporal)   Resp 18   Ht 5' 6\" (1.676 m)   Wt 215 lb 9.6 oz (97.8 kg)   SpO2 96%   BMI 34.80 kg/m²     General appearance: No apparent distress, appears stated age and cooperative. HEENT: Pupils equal, round, and reactive to light. Conjunctivae/corneas clear. Neck: Supple, with full range of motion. No jugular venous distention. Trachea midline. Respiratory:  Normal respiratory effort. Clear to auscultation, bilaterally without Rales/Wheezes/Rhonchi. Cardiovascular: Regular rate and rhythm with normal S1/S2 without murmurs, rubs or gallops. Abdomen: Soft, non-tender, non-distended with normal bowel sounds. Musculoskeletal: No clubbing, cyanosis or edema bilaterally. Full range of motion without deformity. Skin: Skin color, texture, turgor normal.  No rashes or lesions. Neurologic:  Neurovascularly intact without any focal sensory/motor deficits.  Cranial nerves: II-XII intact, grossly non-focal.  Psychiatric: Alert and oriented, thought content appropriate, normal insight  Capillary Refill: Brisk,< 3 seconds   Peripheral Pulses: +2 palpable, equal bilaterally       Labs:   Recent Labs      07/20/18   1001  07/20/18   2312  07/21/18   0637   WBC  9.9   --    --    HGB  9.7*  8.5*  8.6*   HCT  28.7*  25.1*  25.6*   PLT  194   --    --      Recent Labs      07/20/18   1001  07/21/18   0637   NA  131*  139   K  4.4  4.2   CL  95*  103   CO2 23  26   BUN  40*  19   CREATININE  0.8  0.7   CALCIUM  9.6  8.4     Recent Labs      07/20/18   1001   AST  17   ALT  18   BILITOT  0.4   ALKPHOS  51     Recent Labs      07/20/18   0957   INR  1.13     Urinalysis:    Lab Results   Component Value Date    NITRU Negative 07/20/2018    WBCUA  07/20/2018    BACTERIA 2+ 07/20/2018    RBCUA 10-20 07/20/2018    BLOODU LARGE 07/20/2018    SPECGRAV 1.010 07/20/2018    GLUCOSEU Negative 07/20/2018    GLUCOSEU NEGATIVE 12/30/2009       Radiology:  US GALLBLADDER RUQ   Final Result   1. Cholelithiasis. 2.  Sonography findings are consistent with cirrhosis. CT ABDOMEN PELVIS W IV CONTRAST Additional Contrast? None   Final Result   Cirrhotic appearing liver with enlarged portal and splenic veins suggestive   of portal hypertension. Cholelithiasis. Assessment/Plan:    Active Hospital Problems    Diagnosis Date Noted    Upper GI bleed [K92.2] 07/20/2018       Nausea vomiting with black tarry stools with history of SNOW and esophageal varices status post banding X 5 (last done one and half years ago) concerning for acute upper GI bleed:   - As evidenced by positive stool occult and history   - Hemoglobin on admission 9.7 at baseline; monitor H&H every 12 hours  - GI consulted from ED and advised to start on IV Protonix gtt. Plan for EGD today. - Initial lactic 2.1 - will treat with IV fluids and repeat lactic.  - Patient usually follows with [facility] and had H/o 100 benign Polyps - Reports having 3 EGD and 4 colonoscopy in the past by Dr. Rigo Ivory . - Hold home aspirin   - Continue home Ursodiol       Hypertension:  - Optimal control on home medications      Hyperlipidemia:  - Continue statin      Hypothyroidism:   - Continue levothyroxine      Obesity:  - With Body mass index is 35.67 kg/m². Complicating assessment and treatment. Placing patient at risk for multiple co-morbidities .  Counseled on weight loss.      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 Will continue to follow serial labs. Reviewed and documented as above.      Diabetes mellitus type 2:  - Hold home meds while inpt, continue SSI    Chronic hypoxic respiratory failure/COPD:  - Stable, continue home O2 of 3 LPM and home inhalers     DVT Prophylaxis: SCDs  Diet: Diet NPO, After Midnight  Code Status: Full Code    PT/OT Eval Status: Not indicated     Dispo - Pending GI workup/evaluation     ZIGGY Beck - CNP

## 2018-07-21 NOTE — BRIEF OP NOTE
Padmini Bhardwaj   7/21/2018  Esophagogastroduodenoscopy  A pre-procedure re-evaluation was performed immediately prior to the procedure.   Preprocedure Dx: coffee ground emesis  Postprocedure Dx: very small esophageal varices without bleeding  Nodular gastritis biopsied  Low risk for brisk bleed  Continue PPI  Slight drop in Hct likely dilutional  Observe overnight  Medications: Procedural sedation with Versed & Fentanyl  Complications: None  Estimated Blood Loss: <5cc  Specimens: were obtained  Isadora Peralta

## 2018-07-21 NOTE — H&P
History and Physical / Pre-Sedation Assessment    Patient: Lord Armenta   :   1945     Intended Procedure: EGD     HPI: melena coffee ground emesis    Nurses notes reviewed and agreed. Medications reviewed  Allergies: Allergies   Allergen Reactions    Latex Swelling    Actos [Pioglitazone]     Ativan [Lorazepam] Other (See Comments)     Had weird dreams and hallucinations    Augmentin [Amoxicillin-Pot Clavulanate]     Bactrim [Sulfamethoxazole-Trimethoprim]     Ciprofloxacin Other (See Comments)     Makes anxious and she has weird dreams    Doxycycline Other (See Comments)     Feels like she is smothering, chest tightness    Pcn [Penicillins] Rash    Proventil [Albuterol] Anxiety       Physical Exam:  Vital Signs: /62   Pulse 78   Temp 97.6 °F (36.4 °C) (Oral)   Resp 16   Ht 5' 6\" (1.676 m)   Wt 215 lb 9.6 oz (97.8 kg)   SpO2 98%   BMI 34.80 kg/m²  Body mass index is 34.8 kg/m². Airway:Normal  Pulmonary:Normal  Cardiac:Normal  Abdomen:Normal    Pre-Procedure Assessment / Plan:  ASA 2 - Patient with mild systemic disease with no functional limitations  Level of Sedation Plan: Moderate  sedation  Post Procedure plan: Return to same level of care    I assessed the patient and find that the patient is in satisfactory condition to proceed with the planned procedure and sedation plan. I have explained the risk, benefits, and alternatives to the procedure. The patient understands and agrees to proceed.   Yes    Diana Ran  12:28 PM 2018

## 2018-07-22 VITALS
TEMPERATURE: 98.3 F | BODY MASS INDEX: 34.65 KG/M2 | WEIGHT: 215.6 LBS | HEIGHT: 66 IN | SYSTOLIC BLOOD PRESSURE: 129 MMHG | HEART RATE: 87 BPM | RESPIRATION RATE: 18 BRPM | OXYGEN SATURATION: 97 % | DIASTOLIC BLOOD PRESSURE: 54 MMHG

## 2018-07-22 LAB
ANION GAP SERPL CALCULATED.3IONS-SCNC: 11 MMOL/L (ref 3–16)
BUN BLDV-MCNC: 10 MG/DL (ref 7–20)
CALCIUM SERPL-MCNC: 8.1 MG/DL (ref 8.3–10.6)
CHLORIDE BLD-SCNC: 101 MMOL/L (ref 99–110)
CO2: 24 MMOL/L (ref 21–32)
CREAT SERPL-MCNC: 0.6 MG/DL (ref 0.6–1.2)
GFR AFRICAN AMERICAN: >60
GFR NON-AFRICAN AMERICAN: >60
GLUCOSE BLD-MCNC: 121 MG/DL (ref 70–99)
GLUCOSE BLD-MCNC: 122 MG/DL (ref 70–99)
GLUCOSE BLD-MCNC: 135 MG/DL (ref 70–99)
GLUCOSE BLD-MCNC: 197 MG/DL (ref 70–99)
HCT VFR BLD CALC: 23.3 % (ref 36–48)
HEMOGLOBIN: 7.9 G/DL (ref 12–16)
MCH RBC QN AUTO: 28.9 PG (ref 26–34)
MCHC RBC AUTO-ENTMCNC: 34 G/DL (ref 31–36)
MCV RBC AUTO: 85.1 FL (ref 80–100)
PDW BLD-RTO: 16.1 % (ref 12.4–15.4)
PERFORMED ON: ABNORMAL
PLATELET # BLD: 136 K/UL (ref 135–450)
PMV BLD AUTO: 7.7 FL (ref 5–10.5)
POTASSIUM REFLEX MAGNESIUM: 4.2 MMOL/L (ref 3.5–5.1)
RBC # BLD: 2.74 M/UL (ref 4–5.2)
SODIUM BLD-SCNC: 136 MMOL/L (ref 136–145)
WBC # BLD: 8.5 K/UL (ref 4–11)

## 2018-07-22 PROCEDURE — 85027 COMPLETE CBC AUTOMATED: CPT

## 2018-07-22 PROCEDURE — 2700000000 HC OXYGEN THERAPY PER DAY

## 2018-07-22 PROCEDURE — 6370000000 HC RX 637 (ALT 250 FOR IP): Performed by: INTERNAL MEDICINE

## 2018-07-22 PROCEDURE — 2580000003 HC RX 258: Performed by: INTERNAL MEDICINE

## 2018-07-22 PROCEDURE — 94664 DEMO&/EVAL PT USE INHALER: CPT

## 2018-07-22 PROCEDURE — 6370000000 HC RX 637 (ALT 250 FOR IP): Performed by: REGISTERED NURSE

## 2018-07-22 PROCEDURE — 36415 COLL VENOUS BLD VENIPUNCTURE: CPT

## 2018-07-22 PROCEDURE — 94640 AIRWAY INHALATION TREATMENT: CPT

## 2018-07-22 PROCEDURE — 94761 N-INVAS EAR/PLS OXIMETRY MLT: CPT

## 2018-07-22 PROCEDURE — 80048 BASIC METABOLIC PNL TOTAL CA: CPT

## 2018-07-22 RX ORDER — PANTOPRAZOLE SODIUM 40 MG/1
40 TABLET, DELAYED RELEASE ORAL
Status: DISCONTINUED | OUTPATIENT
Start: 2018-07-22 | End: 2018-07-22 | Stop reason: HOSPADM

## 2018-07-22 RX ADMIN — MONTELUKAST SODIUM 10 MG: 10 TABLET, FILM COATED ORAL at 09:05

## 2018-07-22 RX ADMIN — Medication 10 ML: at 09:06

## 2018-07-22 RX ADMIN — VENLAFAXINE 18.75 MG: 37.5 TABLET ORAL at 09:05

## 2018-07-22 RX ADMIN — URSODIOL 500 MG: 500 TABLET, FILM COATED ORAL at 09:06

## 2018-07-22 RX ADMIN — LOSARTAN POTASSIUM 100 MG: 25 TABLET, FILM COATED ORAL at 09:05

## 2018-07-22 RX ADMIN — Medication 2 PUFF: at 09:35

## 2018-07-22 RX ADMIN — LEVOTHYROXINE SODIUM 100 MCG: 100 TABLET ORAL at 05:33

## 2018-07-22 RX ADMIN — NIFEDIPINE 30 MG: 30 TABLET, FILM COATED, EXTENDED RELEASE ORAL at 09:06

## 2018-07-22 ASSESSMENT — PAIN SCALES - GENERAL
PAINLEVEL_OUTOF10: 0
PAINLEVEL_OUTOF10: 0

## 2018-07-22 NOTE — DISCHARGE SUMMARY
transitioned to protonix PO 40 mg BID. Pt is s/p EGD which showed 1+ esophageal varices without active bleeding, nodular gastritis, biopsy pending.   - Initial lactic 2.1 - Pt given IV fluids.   - Patient usually follows with [facility] and had H/o 100 benign Polyps - Reports having 3 EGD and 4 colonoscopy in the past by Dr. Rigo Ivory . - Hold home aspirin - can resume at discharge   - Continue home Ursodiol       Hypertension:  - Optimal control on home medications      Hyperlipidemia:  - Continue statin      Hypothyroidism:   - Continue levothyroxine      Obesity:  - With Body mass index is 00.43 kg/m². Complicating assessment and treatment. Placing patient at risk for multiple co-morbidities . Counseled on weight loss.      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      Diabetes mellitus type 2:  - Hold home meds and use SSI while inpt      Chronic hypoxic respiratory failure/COPD:  - Stable, continue home O2 of 3 LPM and home inhalers     Physical Exam Performed:     BP (!) 129/54   Pulse 87   Temp 98.3 °F (36.8 °C) (Oral)   Resp 18   Ht 5' 6\" (1.676 m)   Wt 215 lb 9.6 oz (97.8 kg)   SpO2 98%   BMI 34.80 kg/m²       General appearance:  Elderly female in no apparent distress, appears stated age and cooperative. HEENT:  Normal cephalic, atraumatic without obvious deformity. Pupils equal, round, and reactive to light. Extra ocular muscles intact. Conjunctivae/corneas clear. Neck: Supple, with full range of motion. No jugular venous distention. Trachea midline. Respiratory:  Normal respiratory effort. Clear to auscultation, bilaterally without Rales/Wheezes/Rhonchi. Cardiovascular:  Regular rate and rhythm with normal S1/S2 without murmurs, rubs or gallops. Abdomen: Soft, non-tender, non-distended with normal bowel sounds. Musculoskeletal:  No clubbing, cyanosis or edema bilaterally. until improved. Qty: 80 g, Refills: 2      famotidine (PEPCID) 20 MG tablet Take 20 mg by mouth 2 times daily      diphenhydrAMINE (BENADRYL) 25 MG tablet Take 50 mg by mouth nightly       pantoprazole (PROTONIX) 40 MG tablet Take 40 mg by mouth 2 times daily      aspirin 81 MG EC tablet Take 81 mg by mouth daily       Cholecalciferol (VITAMIN D) 2000 UNITS CAPS capsule Take 2,000 Units by mouth daily       rOPINIRole (REQUIP) 1 MG tablet Take 1 mg by mouth nightly 1mg at 1730, 1mg at 2030, 2mg at 2200 (total of 4mg nightly in divided doses)      potassium chloride SA (K-DUR;KLOR-CON M) 20 MEQ tablet Take 20 mEq by mouth daily       ursodiol (ACTIGALL) 500 MG tablet Take 500 mg by mouth 2 times daily       doxepin (SINEQUAN) 25 MG capsule Take 50 mg by mouth nightly       losartan (COZAAR) 50 MG tablet Take 100 mg by mouth daily       calcium carbonate 600 MG TABS tablet Take 1 tablet by mouth daily       levothyroxine (SYNTHROID) 100 MCG tablet Take 100 mcg by mouth Daily      atorvastatin (LIPITOR) 20 MG tablet Take 1 tablet by mouth nightly. Qty: 30 tablet, Refills: 1      Multiple Vitamins-Minerals (CENTRUM PO) Take 1 tablet by mouth daily. levalbuterol (XOPENEX HFA) 45 MCG/ACT inhaler Inhale 2 puffs into the lungs every 6 hours as needed       beclomethasone (QVAR) 80 MCG/ACT inhaler Inhale 2 puffs into the lungs 2 times daily. Aclidinium Bromide (TUDORZA PRESSAIR) 400 MCG/ACT AEPB Inhale 1 puff into the lungs 2 times daily       traZODone (DESYREL) 50 MG tablet Take 50 mg by mouth nightly       metformin (GLUCOPHAGE) 500 MG tablet Take 1,000 mg by mouth 2 times daily (with meals)       cetirizine (ZYRTEC) 10 MG tablet Take 10 mg by mouth daily.       montelukast (SINGULAIR) 10 MG tablet Take 10 mg by mouth daily       venlafaxine (EFFEXOR) 37.5 MG tablet Take 18.25 mg by mouth daily       cyanocobalamin 1000 MCG/ML injection Inject 1,000 mcg into the muscle every 14 days Last dose was taken on 4/9/17      estrogens, conjugated, (PREMARIN) 1.25 MG tablet Take 1.25 mg by mouth daily. Time Spent on discharge is more than 30 minutes in the examination, evaluation, counseling and review of medications and discharge plan. Signed:    [facility], APRN - CNP   7/22/2018      Thank you Aylin Henderson MD for the opportunity to be involved in this patient's care. If you have any questions or concerns please feel free to contact me at [phone].

## 2018-07-27 LAB
AVERAGE GLUCOSE: NORMAL
HBA1C MFR BLD: 5.6 %

## 2018-08-16 ENCOUNTER — TELEPHONE (OUTPATIENT)
Dept: CARDIOLOGY CLINIC | Age: 73
End: 2018-08-16

## 2018-08-16 NOTE — TELEPHONE ENCOUNTER
Spoke with patient. She does not have a copy of EKG. Sent message to [facility] - medical records - asking for her to have copy sent to our office. She is scheduled to see Dr. Dimitry Godwin 8/20/18 at 345 (ok per [facility]). Dr. Dimitry Godwin - EKG reports in Care Everywhere, without tracings, for your review.

## 2018-08-20 ENCOUNTER — OFFICE VISIT (OUTPATIENT)
Dept: CARDIOLOGY CLINIC | Age: 73
End: 2018-08-20

## 2018-08-20 VITALS
OXYGEN SATURATION: 95 % | WEIGHT: 224 LBS | SYSTOLIC BLOOD PRESSURE: 162 MMHG | DIASTOLIC BLOOD PRESSURE: 78 MMHG | BODY MASS INDEX: 36 KG/M2 | HEART RATE: 78 BPM | HEIGHT: 66 IN

## 2018-08-20 DIAGNOSIS — I47.1 SVT (SUPRAVENTRICULAR TACHYCARDIA) (HCC): Primary | ICD-10-CM

## 2018-08-20 DIAGNOSIS — I10 ESSENTIAL HYPERTENSION: ICD-10-CM

## 2018-08-20 PROCEDURE — 99214 OFFICE O/P EST MOD 30 MIN: CPT | Performed by: INTERNAL MEDICINE

## 2018-08-20 RX ORDER — FERROUS SULFATE 325(65) MG
325 TABLET ORAL
COMMUNITY

## 2018-08-20 RX ORDER — DILTIAZEM HYDROCHLORIDE 120 MG/1
120 CAPSULE, COATED, EXTENDED RELEASE ORAL DAILY
COMMUNITY
End: 2018-08-20 | Stop reason: SDUPTHER

## 2018-08-20 RX ORDER — DILTIAZEM HYDROCHLORIDE 240 MG/1
120 CAPSULE, COATED, EXTENDED RELEASE ORAL DAILY
Qty: 30 CAPSULE | Refills: 1 | Status: SHIPPED | OUTPATIENT
Start: 2018-08-20 | End: 2019-10-03

## 2018-08-20 RX ORDER — DILTIAZEM HYDROCHLORIDE 240 MG/1
120 CAPSULE, COATED, EXTENDED RELEASE ORAL DAILY
Qty: 90 CAPSULE | Refills: 3 | Status: SHIPPED | OUTPATIENT
Start: 2018-08-20 | End: 2018-08-20 | Stop reason: SDUPTHER

## 2018-08-20 NOTE — LETTER
[address] [place] [address] Cardiology - [address] [place] [address] GARETH Cohen. [address]  Phone: [phone]  Fax: [phone]    Gerardo Conley MD        August 21, 2018     Sourav Torres, [address] [facility] [address]    Patient: Vira Howell  MR Number: [id number]  YOB: 1945  Date of Visit: 8/20/2018    Dear Dr. Sourav Torres:    Thank you for the request for consultation for Marty Cheng to me for the evaluation. Below are the relevant portions of my assessment and plan of care. [address] Office Note  8/20/2018     Subjective: Vira Howell is here for follow up of SVT, HTN, HLD   Today she reports feeling ok. She states she recently had her BP med changed and it is running higher than normal.  She had an episode on SVT on 8/15/18 while at [facility], . In July 2018 she had a bout of vomiting and diarrhea and was hospitalized. She had an EGD done and it showed gastritis and esophageal varices without active bleeding. She wears NC O2 @ 3L. She states she does have some BLE edema. She denies CP, dizziness or syncope. [place]:    On 6/24/16 she stated she had not had palpitations since her discharge from the hospital.  Her main complaint was of fatigue which worsened in April. 4/2016 TSH 1.08. She had lost 45 lbs over the past year. She is on multiple medications for rash. She is also on Trazodone and Effexor. She has a rash due to liver disease and has been on multiple medications for her rash and itching. She started taking Atarax for itching - first dose taken today. She was taken off Benadryl and Periactin due to liver disease. She is also doing phototherapy by Dr. Lindajo Bosworth (dermatology). She has had abdominal cramping since undergoing Colonoscopy in May 2016. She has pernicious anemia for which she takes Vit V 12 every two weeks. 6/2016 H/H 9.6, 29.8. On 3/31/17 she reported her palpitations have returned.  They occur  triamcinolone (KENALOG) 0.1 % ointment Apply to affected areas on the abdomen twice daily for up to 2 weeks or until improved. 80 g 2    gabapentin (NEURONTIN) 600 MG tablet Take 600 mg by mouth nightly. 600 mg at bedtime and 300 mg BID.  SITagliptin (JANUVIA) 100 MG tablet Take 100 mg by mouth daily      famotidine (PEPCID) 20 MG tablet Take 20 mg by mouth 2 times daily      diphenhydrAMINE (BENADRYL) 25 MG tablet Take 50 mg by mouth nightly       pantoprazole (PROTONIX) 40 MG tablet Take 40 mg by mouth 2 times daily      aspirin 81 MG EC tablet Take 81 mg by mouth daily       Cholecalciferol (VITAMIN D) 2000 UNITS CAPS capsule Take 2,000 Units by mouth daily       rOPINIRole (REQUIP) 1 MG tablet Take 1 mg by mouth nightly 1mg at 1730, 1mg at 2030, 2mg at 2200 (total of 4mg nightly in divided doses)      potassium chloride SA (K-DUR;KLOR-CON M) 20 MEQ tablet Take 20 mEq by mouth daily       ursodiol (ACTIGALL) 500 MG tablet Take 500 mg by mouth 2 times daily       doxepin (SINEQUAN) 25 MG capsule Take 50 mg by mouth nightly       losartan (COZAAR) 50 MG tablet Take 100 mg by mouth daily       calcium carbonate 600 MG TABS tablet Take 1 tablet by mouth daily       levothyroxine (SYNTHROID) 100 MCG tablet Take 100 mcg by mouth Daily      atorvastatin (LIPITOR) 20 MG tablet Take 1 tablet by mouth nightly. 30 tablet 1    Multiple Vitamins-Minerals (CENTRUM PO) Take 1 tablet by mouth daily.  levalbuterol (XOPENEX HFA) 45 MCG/ACT inhaler Inhale 2 puffs into the lungs every 6 hours as needed       beclomethasone (QVAR) 80 MCG/ACT inhaler Inhale 2 puffs into the lungs 2 times daily.       Aclidinium Bromide (TUDORZA PRESSAIR) 400 MCG/ACT AEPB Inhale 1 puff into the lungs 2 times daily       traZODone (DESYREL) 50 MG tablet Take 100 mg by mouth nightly       metformin (GLUCOPHAGE) 500 MG tablet Take 1,000 mg by mouth 2 times daily (with meals) will continue the aspirin and Lipitor as well as Toprol. Natalia Alcantar MD    Assessment:  hospitals was seen today for  follow-up  SVT. Diagnoses and associated orders for this visit:    Type 2 diabetes mellitus ([address] PCP follows    Hyperlipidemia  PCP follows Just had done with PCP 8/2015 LDL 66   HTN (hypertension)   SVT with intermittent palpitations. BP (!) 162/78   Pulse 78   Ht 5' 6\" (1.676 m)   Wt 224 lb (101.6 kg)   SpO2 95%   BMI 36.15 kg/m²        Plan:  1. Increase Diltiazem to 240 mg daily for HBP and SVT  2. Take the Lasix on Monday, Wednesday and Friday  3. Continue to monitor your blood pressure  4. Follow up with me in 3 months  5. [facility] - LAB    Debora Harmon MD 8/20/2018 4:17 PM        If you have questions, please do not hesitate to call me. I look forward to following hospitals along with you.     Sincerely,        [address] MD Ezio

## 2018-08-20 NOTE — PROGRESS NOTES
[facility] Office Note  8/20/2018     Subjective: Vira Howell is here for follow up of SVT, HTN, HLD   Today she reports feeling ok. She states she recently had her BP med changed and it is running higher than normal.  She had an episode on SVT on 8/15/18 while at [facility], . In July 2018 she had a bout of vomiting and diarrhea and was hospitalized. She had an EGD done and it showed gastritis and esophageal varices without active bleeding. She wears NC O2 @ 3L. She states she does have some BLE edema. She denies CP, dizziness or syncope. [place]:    On 6/24/16 she stated she had not had palpitations since her discharge from the hospital.  Her main complaint was of fatigue which worsened in April. 4/2016 TSH 1.08. She had lost 45 lbs over the past year. She is on multiple medications for rash. She is also on Trazodone and Effexor. She has a rash due to liver disease and has been on multiple medications for her rash and itching. She started taking Atarax for itching - first dose taken today. She was taken off Benadryl and Periactin due to liver disease. She is also doing phototherapy by Dr. Lindajo Bosworth (dermatology). She has had abdominal cramping since undergoing Colonoscopy in May 2016. She has pernicious anemia for which she takes Vit V 12 every two weeks. 6/2016 H/H 9.6, 29.8. On 3/31/17 she reported her palpitations have returned. They occur randomly and last a few minutes. She describes it as a racing heart beat. They do not occur daily. They have been worsening in frequency. She also states her SOB is worsening. She follows up with Silvano Howard for this. Her oxygen level today in the office began at 88% and ivone to 92%. She complains of a productive cough. She states this has been occurring for 1 year and has had no testing. She smoked for roughly 40 years. She also complains of worsening fatigue and poor sleep. This has also been worsening recently.  She has been given Requip left    KNEE SURGERY  2010    right    UPPER GASTROINTESTINAL ENDOSCOPY N/A 7/21/2018    EGD BIOPSY performed by Cale Wright MD at [address] [place] [address]       Objective:   BP (!) 162/78   Pulse 78   Ht 5' 6\" (1.676 m)   Wt 224 lb (101.6 kg)   SpO2 95%   BMI 36.15 kg/m²     Wt Readings from Last 3 Encounters:   08/20/18 224 lb (101.6 kg)   07/20/18 215 lb 9.6 oz (97.8 kg)   05/25/18 216 lb 4.8 oz (98.1 kg)       Physical Exam:  General: No Respiratory distress, appears well developed and well nourished. Eyes:  Sclera nonicteric  Nose/Sinuses:  negative findings: nose shows no deformity, asymmetry, or inflammation, nasal mucosa normal, septum midline with no perforation or bleeding  Back:  no pain to palpation  Joint:  no active joint inflammation  Musculoskeletal:  negative  Skin:  Warm and dry  Neck:  Negative for JVD and Carotid Bruits. Pain on movement of neck to left. Chest:  Crackles bilaterally, respiration easy  Cardiovascular:  RRR, S1S2 normal, no murmur, no rub or thrill. Extremities:   No edema, clubbing, cyanosis,  Neuro: intact    Medications:   Outpatient Encounter Prescriptions as of 8/20/2018   Medication Sig Dispense Refill    ferrous sulfate 325 (65 Fe) MG tablet Take 325 mg by mouth 3 times daily (with meals)      diltiazem (CARDIZEM CD) 240 MG extended release capsule Take 1 capsule by mouth daily 90 capsule 3    triamcinolone (KENALOG) 0.1 % ointment Apply to affected areas on the abdomen twice daily for up to 2 weeks or until improved. 80 g 2    gabapentin (NEURONTIN) 600 MG tablet Take 600 mg by mouth nightly. 600 mg at bedtime and 300 mg BID.       SITagliptin (JANUVIA) 100 MG tablet Take 100 mg by mouth daily      famotidine (PEPCID) 20 MG tablet Take 20 mg by mouth 2 times daily      diphenhydrAMINE (BENADRYL) 25 MG tablet Take 50 mg by mouth nightly       pantoprazole (PROTONIX) 40 MG tablet Take 40 mg by mouth 2 times daily      aspirin 81 MG EC tablet Take 81 mg by No facility-administered encounter medications on file as of 2018. Lab Data:  CBC: No results for input(s): WBC, HGB, HCT, MCV, PLT in the last 72 hours. BMP: No results for input(s): NA, K, CL, CO2, PHOS, BUN, CREATININE in the last 72 hours. Invalid input(s): CA  LIVER PROFILE: No results for input(s): AST, ALT, LIPASE, BILIDIR, BILITOT, ALKPHOS in the last 72 hours. Invalid input(s): AMYLASE,  ALB  LIPID:   No components found for: CHLPL  Lab Results   Component Value Date    TRIG 110 2017    TRIG 125 2014    TRIG 248 (H) 2013     Lab Results   Component Value Date    HDL 73 (A) 2017    HDL 49 2014    HDL 72 (H) 2013     Lab Results   Component Value Date    LDLCALC 53 2017    LDLCALC 62 2014    LDLCALC 117 (H) 2013     Lab Results   Component Value Date    LABVLDL 25 2014    LABVLDL 50 2013     PT/INR: No results for input(s): PROTIME, INR in the last 72 hours. A1C:   Lab Results   Component Value Date    LABA1C 7.7 2017     BNP:  No results for input(s): BNP in the last 72 hours. IMAGIN/2015 CXR    Opinion: There is persistent mild middle lobe disease. The disease   is unchanged for the past one month and this may represent middle   lobe scarring. Cardiac cath: 2014  1. Mild coronary artery disease involving left anterior descending,  circumflex and right coronary arteries. 2.  Hyperdynamic left ventricle with an ejection fraction of 65%. 3.  Elevated left ventricular end-diastolic pressure, 15 mmHg. 4.  Patent renal arteries bilaterally. RECOMMENDATIONS:   The patient is noted to have mild coronary artery disease  which does not explain her shortness of breath. She, however, does have    elevated LVEDP from uncontrolled blood pressure. She needs aggressive  antihypertensive therapy for optimal blood pressure control.   Will start her  on lasix for dyspnea and add losartan to her antihypertensive regimen. She    will continue the aspirin and Lipitor as well as Toprol. Echo: 4/16/15  Left ventricular size is normal .  Mild concentric left ventricular hypertrophy is present. Global ejection fraction is normal and estimated from 55 % to 60 %. No regional wall motion abnormalities are noted. Diastolic filling parameters suggests grade I diastolic dysfunction . Mild mitral regurgitation is present. Mild tricuspid regurgitation. Systolic pulmonary artery pressure (SPAP) is normal and estimated at 35   mmHg  (RA pressure 8 mm Hg). Mild pulmonic regurgitation present. Cardiac Cath: 04/05/15  1. Mild coronary artery disease involving left anterior descending,  circumflex and right coronary arteries. 2. Hyperdynamic left ventricle with an ejection fraction of 65%. 3. Elevated left ventricular end-diastolic pressure, 15 mmHg. 4. Patent renal arteries bilaterally. RECOMMENDATIONS: The patient is noted to have mild coronary artery disease  which does not explain her shortness of breath. She, however, does have   elevated LVEDP from uncontrolled blood pressure. She needs aggressive  antihypertensive therapy for optimal blood pressure control. Will start her  on lasix for dyspnea and add losartan to her antihypertensive regimen. She   will continue the aspirin and Lipitor as well as Toprol. Adelaida Vasquez MD    Assessment:  Akanksha Marquez was seen today for  follow-up  SVT. Diagnoses and associated orders for this visit:    Type 2 diabetes mellitus ([facility] [address] PCP follows    Hyperlipidemia  PCP follows Just had done with PCP 8/2015 LDL 66   HTN (hypertension)   SVT with intermittent palpitations. BP (!) 162/78   Pulse 78   Ht 5' 6\" (1.676 m)   Wt 224 lb (101.6 kg)   SpO2 95%   BMI 36.15 kg/m²       Plan:  1. Increase Diltiazem to 240 mg daily for HBP and SVT  2. Take the Lasix on Monday, Wednesday and Friday  3. Continue to monitor your blood pressure  4.  Follow up with me in 3

## 2018-08-21 ENCOUNTER — TELEPHONE (OUTPATIENT)
Dept: CARDIOLOGY CLINIC | Age: 73
End: 2018-08-21

## 2018-08-21 NOTE — COMMUNICATION BODY
[facility] Office Note  8/20/2018     Subjective: Yolie Bass is here for follow up of SVT, HTN, HLD   Today she reports feeling ok. She states she recently had her BP med changed and it is running higher than normal.  She had an episode on SVT on 8/15/18 while at [facility], . In July 2018 she had a bout of vomiting and diarrhea and was hospitalized. She had an EGD done and it showed gastritis and esophageal varices without active bleeding. She wears NC O2 @ 3L. She states she does have some BLE edema. She denies CP, dizziness or syncope. [place]:    On 6/24/16 she stated she had not had palpitations since her discharge from the hospital.  Her main complaint was of fatigue which worsened in April. 4/2016 TSH 1.08. She had lost 45 lbs over the past year. She is on multiple medications for rash. She is also on Trazodone and Effexor. She has a rash due to liver disease and has been on multiple medications for her rash and itching. She started taking Atarax for itching - first dose taken today. She was taken off Benadryl and Periactin due to liver disease. She is also doing phototherapy by Dr. Margarito Lagos (dermatology). She has had abdominal cramping since undergoing Colonoscopy in May 2016. She has pernicious anemia for which she takes Vit V 12 every two weeks. 6/2016 H/H 9.6, 29.8. On 3/31/17 she reported her palpitations have returned. They occur randomly and last a few minutes. She describes it as a racing heart beat. They do not occur daily. They have been worsening in frequency. She also states her SOB is worsening. She follows up with Silvano Obrien for this. Her oxygen level today in the office began at 88% and ivone to 92%. She complains of a productive cough. She states this has been occurring for 1 year and has had no testing. She smoked for roughly 40 years. She also complains of worsening fatigue and poor sleep. This has also been worsening recently.  She has been given Requip No facility-administered encounter medications on file as of 2018. Lab Data:  CBC: No results for input(s): WBC, HGB, HCT, MCV, PLT in the last 72 hours. BMP: No results for input(s): NA, K, CL, CO2, PHOS, BUN, CREATININE in the last 72 hours. Invalid input(s): CA  LIVER PROFILE: No results for input(s): AST, ALT, LIPASE, BILIDIR, BILITOT, ALKPHOS in the last 72 hours. Invalid input(s): AMYLASE,  ALB  LIPID:   No components found for: CHLPL  Lab Results   Component Value Date    TRIG 110 2017    TRIG 125 2014    TRIG 248 (H) 2013     Lab Results   Component Value Date    HDL 73 (A) 2017    HDL 49 2014    HDL 72 (H) 2013     Lab Results   Component Value Date    LDLCALC 53 2017    LDLCALC 62 2014    LDLCALC 117 (H) 2013     Lab Results   Component Value Date    LABVLDL 25 2014    LABVLDL 50 2013     PT/INR: No results for input(s): PROTIME, INR in the last 72 hours. A1C:   Lab Results   Component Value Date    LABA1C 7.7 2017     BNP:  No results for input(s): BNP in the last 72 hours. IMAGIN/2015 CXR    Opinion: There is persistent mild middle lobe disease. The disease   is unchanged for the past one month and this may represent middle   lobe scarring. Cardiac cath: 2014  1. Mild coronary artery disease involving left anterior descending,  circumflex and right coronary arteries. 2.  Hyperdynamic left ventricle with an ejection fraction of 65%. 3.  Elevated left ventricular end-diastolic pressure, 15 mmHg. 4.  Patent renal arteries bilaterally. RECOMMENDATIONS:   The patient is noted to have mild coronary artery disease  which does not explain her shortness of breath. She, however, does have    elevated LVEDP from uncontrolled blood pressure. She needs aggressive  antihypertensive therapy for optimal blood pressure control.   Will start her  on lasix for dyspnea and add losartan to her antihypertensive regimen. She    will continue the aspirin and Lipitor as well as Toprol. Echo: 4/16/15  Left ventricular size is normal .  Mild concentric left ventricular hypertrophy is present. Global ejection fraction is normal and estimated from 55 % to 60 %. No regional wall motion abnormalities are noted. Diastolic filling parameters suggests grade I diastolic dysfunction . Mild mitral regurgitation is present. Mild tricuspid regurgitation. Systolic pulmonary artery pressure (SPAP) is normal and estimated at 35   mmHg  (RA pressure 8 mm Hg). Mild pulmonic regurgitation present. Cardiac Cath: 04/05/15  1. Mild coronary artery disease involving left anterior descending,  circumflex and right coronary arteries. 2. Hyperdynamic left ventricle with an ejection fraction of 65%. 3. Elevated left ventricular end-diastolic pressure, 15 mmHg. 4. Patent renal arteries bilaterally. RECOMMENDATIONS: The patient is noted to have mild coronary artery disease  which does not explain her shortness of breath. She, however, does have   elevated LVEDP from uncontrolled blood pressure. She needs aggressive  antihypertensive therapy for optimal blood pressure control. Will start her  on lasix for dyspnea and add losartan to her antihypertensive regimen. She   will continue the aspirin and Lipitor as well as Toprol. Bailey Hardin MD    Assessment:  [place] was seen today for  follow-up  SVT. Diagnoses and associated orders for this visit:    Type 2 diabetes mellitus ([facility] [address] PCP follows    Hyperlipidemia  PCP follows Just had done with PCP 8/2015 LDL 66   HTN (hypertension)   SVT with intermittent palpitations. BP (!) 162/78   Pulse 78   Ht 5' 6\" (1.676 m)   Wt 224 lb (101.6 kg)   SpO2 95%   BMI 36.15 kg/m²        Plan:  1. Increase Diltiazem to 240 mg daily for HBP and SVT  2. Take the Lasix on Monday, Wednesday and Friday  3. Continue to monitor your blood pressure  4.  Follow up with me in 3 months  5. [facility] - LAB    Jennifer Dubois MD 8/20/2018 4:17 PM

## 2018-08-27 ENCOUNTER — TELEPHONE (OUTPATIENT)
Dept: CARDIOLOGY CLINIC | Age: 73
End: 2018-08-27

## 2018-08-31 LAB
AVERAGE GLUCOSE: NORMAL
HBA1C MFR BLD: 7.6 %

## 2018-09-10 ENCOUNTER — TELEPHONE (OUTPATIENT)
Dept: CARDIOLOGY CLINIC | Age: 73
End: 2018-09-10

## 2018-09-18 ENCOUNTER — OFFICE VISIT (OUTPATIENT)
Dept: DERMATOLOGY | Age: 73
End: 2018-09-18

## 2018-09-18 DIAGNOSIS — L29.9 GENERALIZED PRURITUS: Primary | ICD-10-CM

## 2018-09-18 PROCEDURE — 99213 OFFICE O/P EST LOW 20 MIN: CPT | Performed by: DERMATOLOGY

## 2018-09-26 ENCOUNTER — TELEPHONE (OUTPATIENT)
Dept: CARDIOLOGY CLINIC | Age: 73
End: 2018-09-26

## 2018-09-26 NOTE — TELEPHONE ENCOUNTER
Spoke to pt. She will be seeing her neuropathy doctor today. Advised against tylenol due to cirrhosis. Suggest ibuprofen occasionally but not daily due to history of GI bleed.  MARILYN

## 2018-10-08 ENCOUNTER — TELEPHONE (OUTPATIENT)
Dept: CARDIOLOGY CLINIC | Age: 73
End: 2018-10-08

## 2018-10-10 ENCOUNTER — OFFICE VISIT (OUTPATIENT)
Dept: ORTHOPEDIC SURGERY | Age: 73
End: 2018-10-10
Payer: COMMERCIAL

## 2018-10-10 VITALS
BODY MASS INDEX: 36 KG/M2 | DIASTOLIC BLOOD PRESSURE: 60 MMHG | SYSTOLIC BLOOD PRESSURE: 145 MMHG | HEART RATE: 89 BPM | WEIGHT: 224 LBS | HEIGHT: 66 IN

## 2018-10-10 DIAGNOSIS — M79.671 FOOT PAIN, BILATERAL: Primary | ICD-10-CM

## 2018-10-10 DIAGNOSIS — M79.672 FOOT PAIN, BILATERAL: Primary | ICD-10-CM

## 2018-10-10 PROCEDURE — 99214 OFFICE O/P EST MOD 30 MIN: CPT | Performed by: ORTHOPAEDIC SURGERY

## 2018-10-16 ENCOUNTER — HOSPITAL ENCOUNTER (OUTPATIENT)
Dept: PHYSICAL THERAPY | Age: 73
Setting detail: THERAPIES SERIES
Discharge: HOME | End: 2018-10-16
Payer: MEDICARE

## 2018-10-16 PROCEDURE — G0283 ELEC STIM OTHER THAN WOUND: HCPCS | Performed by: PHYSICAL THERAPIST

## 2018-10-16 PROCEDURE — 97110 THERAPEUTIC EXERCISES: CPT | Performed by: PHYSICAL THERAPIST

## 2018-10-16 PROCEDURE — 97162 PT EVAL MOD COMPLEX 30 MIN: CPT | Performed by: PHYSICAL THERAPIST

## 2018-10-16 PROCEDURE — G8979 MOBILITY GOAL STATUS: HCPCS | Performed by: PHYSICAL THERAPIST

## 2018-10-16 PROCEDURE — G8978 MOBILITY CURRENT STATUS: HCPCS | Performed by: PHYSICAL THERAPIST

## 2018-10-16 PROCEDURE — 97112 NEUROMUSCULAR REEDUCATION: CPT | Performed by: PHYSICAL THERAPIST

## 2018-10-16 NOTE — PLAN OF CARE
[facility], [address] [place] [address]  Phone: [phone]  Fax [phone]     Physical Therapy Certification    Dear Referring Practitioner: Dr. Jahaira Thorne,    We had the pleasure of evaluating the following patient for physical therapy services at [address] [place] [address]. A summary of our findings can be found in the initial assessment below. This includes our plan of care. If you have any questions or concerns regarding these findings, please do not hesitate to contact me at the office phone number checked above. Thank you for the referral.       Physician Signature:_______________________________Date:__________________  By signing above (or electronic signature), therapists plan is approved by physician    Patient: Fabiano Alba   : 1945   MRN: [id number]  Referring Physician: Referring Practitioner: Dr. 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 Insidious in onset. Woke up one night and had pain with walking to bathroom. Hurts the most with standing and walking. Also has pain with sitting and lying. History of Restless leg syndrome and neuropathy. Recent increase in Neurotin helps a little. Ongoing knee pain since February. Has old fracture( healed) on 3rd MT on L foot. Previous history of PF B feet several years ago.     [employer]

## 2018-10-23 ENCOUNTER — HOSPITAL ENCOUNTER (OUTPATIENT)
Dept: PHYSICAL THERAPY | Age: 73
Setting detail: THERAPIES SERIES
Discharge: HOME | End: 2018-10-23
Payer: MEDICARE

## 2018-10-23 ENCOUNTER — TELEPHONE (OUTPATIENT)
Dept: ORTHOPEDIC SURGERY | Age: 73
End: 2018-10-23

## 2018-10-23 PROCEDURE — 97110 THERAPEUTIC EXERCISES: CPT | Performed by: PHYSICAL THERAPIST

## 2018-10-23 PROCEDURE — 97112 NEUROMUSCULAR REEDUCATION: CPT | Performed by: PHYSICAL THERAPIST

## 2018-10-23 PROCEDURE — G0283 ELEC STIM OTHER THAN WOUND: HCPCS | Performed by: PHYSICAL THERAPIST

## 2018-10-23 PROCEDURE — 97140 MANUAL THERAPY 1/> REGIONS: CPT | Performed by: PHYSICAL THERAPIST

## 2018-10-24 NOTE — TELEPHONE ENCOUNTER
Pt states that she is already on 600 mg 3 times per day and it hasn't helped. Do you have any other suggestions?

## 2018-10-26 ENCOUNTER — HOSPITAL ENCOUNTER (OUTPATIENT)
Dept: PHYSICAL THERAPY | Age: 73
Setting detail: THERAPIES SERIES
Discharge: HOME | End: 2018-10-26
Payer: MEDICARE

## 2018-10-26 PROCEDURE — 97140 MANUAL THERAPY 1/> REGIONS: CPT | Performed by: PHYSICAL THERAPIST

## 2018-10-26 PROCEDURE — 97110 THERAPEUTIC EXERCISES: CPT | Performed by: PHYSICAL THERAPIST

## 2018-10-26 PROCEDURE — 97112 NEUROMUSCULAR REEDUCATION: CPT | Performed by: PHYSICAL THERAPIST

## 2018-10-26 PROCEDURE — G0283 ELEC STIM OTHER THAN WOUND: HCPCS | Performed by: PHYSICAL THERAPIST

## 2018-10-29 ENCOUNTER — HOSPITAL ENCOUNTER (OUTPATIENT)
Dept: PHYSICAL THERAPY | Age: 73
Setting detail: THERAPIES SERIES
Discharge: HOME | End: 2018-10-29
Payer: MEDICARE

## 2018-10-29 PROCEDURE — G0283 ELEC STIM OTHER THAN WOUND: HCPCS | Performed by: PHYSICAL THERAPIST

## 2018-10-29 PROCEDURE — 97110 THERAPEUTIC EXERCISES: CPT | Performed by: PHYSICAL THERAPIST

## 2018-10-29 PROCEDURE — 97112 NEUROMUSCULAR REEDUCATION: CPT | Performed by: PHYSICAL THERAPIST

## 2018-10-29 PROCEDURE — 97140 MANUAL THERAPY 1/> REGIONS: CPT | Performed by: PHYSICAL THERAPIST

## 2018-10-29 NOTE — FLOWSHEET NOTE
[facility],  [address] [place]  Phone: [phone]  Fax [phone]    Physical Therapy Daily Treatment Note  Date:  10/29/2018    Patient Name:  Corey Thomas    :  1945  MRN: [id number]  Restrictions/Precautions:    Medical/Treatment Diagnosis Information:  · Diagnosis: B foot pain M79.671 and M79.672  · Treatment Diagnosis: B foot pain M79.671 and R12.605  Insurance/Certification information:  PT Insurance Information: [employer]/ BMN/ No auth  Physician Information:  Referring Practitioner: Dr. Megan Vale of care signed (Y/N):     Date of Patient follow up with Physician: none scheduled  G-Code (if applicable):      Date G-Code Applied:         Progress Note: []  Yes  [x]  No  Next due by: Visit #10       Latex Allergy:  []NO      [x]YES  Preferred Language for Healthcare:   [x]English       []other:    Visit # Insurance Allowable Requires auth   4 BMN    [x]no        []yes:       Pain level:  10/10     SUBJECTIVE: Reports feels like it continues to get worse with the pain on B feet. OBJECTIVE: See eval. Suggested that a home trial of TENS may be helpful for pain. Patient at this time is not interested.    Observation:   Test measurements:  DF: L -5, R: 0.    RESTRICTIONS/PRECAUTIONS:     Exercises/Interventions:     Therapeutic Ex Sets/sec Reps Notes   Gastroc/ Soleus stretches with strap H30 5 HEP         Ankle pumps/Circles  10x HEP         Sitting TR/HR H5 2x10 reps HEP   1/2 foam roller A-P, M-L 15 each     Bottle roll 5' each   HEP         Standing WS M-L 20 reps  + to HEP               Manual Intervention      Joint mobs/PROM B feet/talar/rearfoot mobs 15'                                   NMR re-education      Rubbing/tapping/desensitization techniques Patient education                                       Therapeutic Exercise and NMR EXR  [x] (20712) Provided verbal/tactile cueing for activities related to strengthening, flexibility, endurance, ROM for improvements in LE, proximal hip, and core control with self care, mobility, lifting, ambulation.  [] (58863) Provided verbal/tactile cueing for activities related to improving balance, coordination, kinesthetic sense, posture, motor skill, proprioception  to assist with LE, proximal hip, and core control in self care, mobility, lifting, ambulation and eccentric single leg control. NMR and Therapeutic Activities:    [x] (45098 or 52150) Provided verbal/tactile cueing for activities related to improving balance, coordination, kinesthetic sense, posture, motor skill, proprioception and motor activation to allow for proper function of core, proximal hip and LE with self care and ADLs  [] (85599) Gait Re-education- Provided training and instruction to the patient for proper LE, core and proximal hip recruitment and positioning and eccentric body weight control with ambulation re-education including up and down stairs     Home Exercise Program:    [x] (70671) Reviewed/Progressed HEP activities related to strengthening, flexibility, endurance, ROM of core, proximal hip and LE for functional self-care, mobility, lifting and ambulation/stair navigation   [] (91806)Reviewed/Progressed HEP activities related to improving balance, coordination, kinesthetic sense, posture, motor skill, proprioception of core, proximal hip and LE for self care, mobility, lifting, and ambulation/stair navigation      Manual Treatments:  PROM / STM / Oscillations-Mobs:  G-I, II, III, IV (PA's, Inf., Post.)  [x] (24077) Provided manual therapy to mobilize LE, proximal hip and/or LS spine soft tissue/joints for the purpose of modulating pain, promoting relaxation,  increasing ROM, reducing/eliminating soft tissue swelling/inflammation/restriction, improving soft tissue extensibility and allowing for proper ROM for normal function with self care, mobility, lifting and ambulation.

## 2018-10-30 ENCOUNTER — TELEPHONE (OUTPATIENT)
Dept: DERMATOLOGY | Age: 73
End: 2018-10-30

## 2018-10-31 RX ORDER — TRIAMCINOLONE ACETONIDE 1 MG/G
CREAM TOPICAL
Qty: 80 G | Refills: 3 | Status: SHIPPED | OUTPATIENT
Start: 2018-10-31 | End: 2019-11-04 | Stop reason: SDUPTHER

## 2018-11-02 ENCOUNTER — APPOINTMENT (OUTPATIENT)
Dept: PHYSICAL THERAPY | Age: 73
End: 2018-11-02
Payer: MEDICARE

## 2018-11-05 ENCOUNTER — HOSPITAL ENCOUNTER (OUTPATIENT)
Dept: PHYSICAL THERAPY | Age: 73
Setting detail: THERAPIES SERIES
Discharge: HOME | End: 2018-11-05
Payer: MEDICARE

## 2018-11-05 PROCEDURE — 97140 MANUAL THERAPY 1/> REGIONS: CPT | Performed by: PHYSICAL THERAPIST

## 2018-11-05 PROCEDURE — 97110 THERAPEUTIC EXERCISES: CPT | Performed by: PHYSICAL THERAPIST

## 2018-11-05 PROCEDURE — G0283 ELEC STIM OTHER THAN WOUND: HCPCS | Performed by: PHYSICAL THERAPIST

## 2018-11-05 PROCEDURE — 97112 NEUROMUSCULAR REEDUCATION: CPT | Performed by: PHYSICAL THERAPIST

## 2018-11-07 ENCOUNTER — APPOINTMENT (OUTPATIENT)
Dept: PHYSICAL THERAPY | Age: 73
End: 2018-11-07
Payer: MEDICARE

## 2018-11-12 ENCOUNTER — APPOINTMENT (OUTPATIENT)
Dept: PHYSICAL THERAPY | Age: 73
End: 2018-11-12
Payer: MEDICARE

## 2018-11-12 LAB
AVERAGE GLUCOSE: NORMAL
AVERAGE GLUCOSE: NORMAL
HBA1C MFR BLD: 4.9 %
HBA1C MFR BLD: 4.9 %

## 2018-11-14 ENCOUNTER — APPOINTMENT (OUTPATIENT)
Dept: PHYSICAL THERAPY | Age: 73
End: 2018-11-14
Payer: MEDICARE

## 2019-01-18 ENCOUNTER — TELEPHONE (OUTPATIENT)
Dept: INTERNAL MEDICINE CLINIC | Age: 74
End: 2019-01-18

## 2019-01-18 NOTE — TELEPHONE ENCOUNTER
Pt calling wanting to get in to see you right away---hurting all over wakes her up in the middle of the night---offered her this afternoon but she can't do it someone is coming to there home today for something---Please call the pt. Thanks.

## 2019-01-22 ENCOUNTER — HOSPITAL ENCOUNTER (OUTPATIENT)
Dept: WOMENS IMAGING | Age: 74
Discharge: HOME | End: 2019-01-22
Payer: MEDICARE

## 2019-01-22 DIAGNOSIS — Z78.0 ASYMPTOMATIC MENOPAUSAL STATE: ICD-10-CM

## 2019-01-22 DIAGNOSIS — Z12.31 ENCOUNTER FOR SCREENING MAMMOGRAM FOR BREAST CANCER: ICD-10-CM

## 2019-01-22 PROCEDURE — 77080 DXA BONE DENSITY AXIAL: CPT

## 2019-01-22 PROCEDURE — 77067 SCR MAMMO BI INCL CAD: CPT

## 2019-01-25 ENCOUNTER — OFFICE VISIT (OUTPATIENT)
Dept: RHEUMATOLOGY | Age: 74
End: 2019-01-25
Payer: MEDICARE

## 2019-01-25 VITALS
HEART RATE: 79 BPM | SYSTOLIC BLOOD PRESSURE: 163 MMHG | WEIGHT: 215.4 LBS | DIASTOLIC BLOOD PRESSURE: 68 MMHG | BODY MASS INDEX: 34.77 KG/M2

## 2019-01-25 DIAGNOSIS — M15.9 PRIMARY OSTEOARTHRITIS INVOLVING MULTIPLE JOINTS: ICD-10-CM

## 2019-01-25 DIAGNOSIS — E55.9 VITAMIN D DEFICIENCY: ICD-10-CM

## 2019-01-25 DIAGNOSIS — M15.9 PRIMARY OSTEOARTHRITIS INVOLVING MULTIPLE JOINTS: Primary | ICD-10-CM

## 2019-01-25 DIAGNOSIS — R20.2 PARESTHESIAS: ICD-10-CM

## 2019-01-25 LAB
TOTAL CK: 34 U/L (ref 26–192)
URIC ACID, SERUM: 4.1 MG/DL (ref 2.6–6)
VITAMIN D 25-HYDROXY: 54.9 NG/ML

## 2019-01-25 PROCEDURE — 99214 OFFICE O/P EST MOD 30 MIN: CPT | Performed by: INTERNAL MEDICINE

## 2019-01-25 PROCEDURE — G8417 CALC BMI ABV UP PARAM F/U: HCPCS | Performed by: INTERNAL MEDICINE

## 2019-01-25 PROCEDURE — 4040F PNEUMOC VAC/ADMIN/RCVD: CPT | Performed by: INTERNAL MEDICINE

## 2019-01-25 PROCEDURE — 1036F TOBACCO NON-USER: CPT | Performed by: INTERNAL MEDICINE

## 2019-01-25 PROCEDURE — G8399 PT W/DXA RESULTS DOCUMENT: HCPCS | Performed by: INTERNAL MEDICINE

## 2019-01-25 PROCEDURE — 1101F PT FALLS ASSESS-DOCD LE1/YR: CPT | Performed by: INTERNAL MEDICINE

## 2019-01-25 PROCEDURE — 3017F COLORECTAL CA SCREEN DOC REV: CPT | Performed by: INTERNAL MEDICINE

## 2019-01-25 PROCEDURE — G8427 DOCREV CUR MEDS BY ELIG CLIN: HCPCS | Performed by: INTERNAL MEDICINE

## 2019-01-25 PROCEDURE — 1090F PRES/ABSN URINE INCON ASSESS: CPT | Performed by: INTERNAL MEDICINE

## 2019-01-25 PROCEDURE — 1123F ACP DISCUSS/DSCN MKR DOCD: CPT | Performed by: INTERNAL MEDICINE

## 2019-01-25 PROCEDURE — G8484 FLU IMMUNIZE NO ADMIN: HCPCS | Performed by: INTERNAL MEDICINE

## 2019-01-25 RX ORDER — DILTIAZEM HYDROCHLORIDE 240 MG/1
240 CAPSULE, EXTENDED RELEASE ORAL DAILY
COMMUNITY
End: 2019-02-07

## 2019-01-28 ENCOUNTER — TELEPHONE (OUTPATIENT)
Dept: INTERNAL MEDICINE CLINIC | Age: 74
End: 2019-01-28

## 2019-01-28 LAB
ALDOLASE: 3.1 U/L (ref 1.5–8.1)
ANA INTERPRETATION: ABNORMAL
ANA TITER: ABNORMAL
ANGIOTENSIN CONVERTING ENZYME: 26 U/L (ref 9–67)
ANTI-NUCLEAR ANTIBODY (ANA): POSITIVE

## 2019-02-04 LAB
AVERAGE GLUCOSE: NORMAL
CHOLESTEROL, TOTAL: 171 MG/DL
CHOLESTEROL/HDL RATIO: NORMAL
HBA1C MFR BLD: 4.9 %
HDLC SERPL-MCNC: 78 MG/DL (ref 35–70)
LDL CHOLESTEROL CALCULATED: 66 MG/DL (ref 0–160)
TRIGL SERPL-MCNC: 133 MG/DL
VLDLC SERPL CALC-MCNC: 27 MG/DL

## 2019-02-06 PROBLEM — S60.031A CONTUSION OF RIGHT MIDDLE FINGER WITHOUT DAMAGE TO NAIL: Status: RESOLVED | Noted: 2017-02-06 | Resolved: 2019-02-06

## 2019-02-06 PROBLEM — M75.42 IMPINGEMENT SYNDROME OF LEFT SHOULDER: Status: RESOLVED | Noted: 2018-01-18 | Resolved: 2019-02-06

## 2019-02-06 PROBLEM — S13.9XXA CERVICAL SPRAIN, INITIAL ENCOUNTER: Status: RESOLVED | Noted: 2018-05-16 | Resolved: 2019-02-06

## 2019-02-07 ENCOUNTER — OFFICE VISIT (OUTPATIENT)
Dept: FAMILY MEDICINE CLINIC | Age: 74
End: 2019-02-07
Payer: MEDICARE

## 2019-02-07 VITALS
OXYGEN SATURATION: 95 % | SYSTOLIC BLOOD PRESSURE: 167 MMHG | HEIGHT: 66 IN | DIASTOLIC BLOOD PRESSURE: 66 MMHG | WEIGHT: 215 LBS | HEART RATE: 77 BPM | BODY MASS INDEX: 34.55 KG/M2

## 2019-02-07 DIAGNOSIS — I10 ESSENTIAL HYPERTENSION: ICD-10-CM

## 2019-02-07 DIAGNOSIS — Z23 NEED FOR PROPHYLACTIC VACCINATION AND INOCULATION AGAINST VARICELLA: ICD-10-CM

## 2019-02-07 DIAGNOSIS — Z13.220 SCREENING FOR HYPERLIPIDEMIA: ICD-10-CM

## 2019-02-07 DIAGNOSIS — E08.42 DIABETIC POLYNEUROPATHY ASSOCIATED WITH DIABETES MELLITUS DUE TO UNDERLYING CONDITION (HCC): Primary | ICD-10-CM

## 2019-02-07 DIAGNOSIS — Z12.11 SCREENING FOR COLON CANCER: ICD-10-CM

## 2019-02-07 DIAGNOSIS — G62.9 NEUROPATHY: ICD-10-CM

## 2019-02-07 DIAGNOSIS — I25.10 CORONARY ARTERY DISEASE INVOLVING NATIVE HEART WITHOUT ANGINA PECTORIS, UNSPECIFIED VESSEL OR LESION TYPE: ICD-10-CM

## 2019-02-07 DIAGNOSIS — K75.81 NASH (NONALCOHOLIC STEATOHEPATITIS): ICD-10-CM

## 2019-02-07 DIAGNOSIS — J44.9 CHRONIC OBSTRUCTIVE PULMONARY DISEASE, UNSPECIFIED COPD TYPE (HCC): ICD-10-CM

## 2019-02-07 DIAGNOSIS — D51.0 PERNICIOUS ANEMIA: ICD-10-CM

## 2019-02-07 LAB — HBA1C MFR BLD: 5.2 %

## 2019-02-07 PROCEDURE — G8399 PT W/DXA RESULTS DOCUMENT: HCPCS | Performed by: FAMILY MEDICINE

## 2019-02-07 PROCEDURE — G8417 CALC BMI ABV UP PARAM F/U: HCPCS | Performed by: FAMILY MEDICINE

## 2019-02-07 PROCEDURE — 1101F PT FALLS ASSESS-DOCD LE1/YR: CPT | Performed by: FAMILY MEDICINE

## 2019-02-07 PROCEDURE — 2022F DILAT RTA XM EVC RTNOPTHY: CPT | Performed by: FAMILY MEDICINE

## 2019-02-07 PROCEDURE — 3023F SPIROM DOC REV: CPT | Performed by: FAMILY MEDICINE

## 2019-02-07 PROCEDURE — 83036 HEMOGLOBIN GLYCOSYLATED A1C: CPT | Performed by: FAMILY MEDICINE

## 2019-02-07 PROCEDURE — 1090F PRES/ABSN URINE INCON ASSESS: CPT | Performed by: FAMILY MEDICINE

## 2019-02-07 PROCEDURE — G8598 ASA/ANTIPLAT THER USED: HCPCS | Performed by: FAMILY MEDICINE

## 2019-02-07 PROCEDURE — 1123F ACP DISCUSS/DSCN MKR DOCD: CPT | Performed by: FAMILY MEDICINE

## 2019-02-07 PROCEDURE — 3017F COLORECTAL CA SCREEN DOC REV: CPT | Performed by: FAMILY MEDICINE

## 2019-02-07 PROCEDURE — G8926 SPIRO NO PERF OR DOC: HCPCS | Performed by: FAMILY MEDICINE

## 2019-02-07 PROCEDURE — 99204 OFFICE O/P NEW MOD 45 MIN: CPT | Performed by: FAMILY MEDICINE

## 2019-02-07 PROCEDURE — 1036F TOBACCO NON-USER: CPT | Performed by: FAMILY MEDICINE

## 2019-02-07 PROCEDURE — G8427 DOCREV CUR MEDS BY ELIG CLIN: HCPCS | Performed by: FAMILY MEDICINE

## 2019-02-07 PROCEDURE — 4040F PNEUMOC VAC/ADMIN/RCVD: CPT | Performed by: FAMILY MEDICINE

## 2019-02-07 PROCEDURE — G8484 FLU IMMUNIZE NO ADMIN: HCPCS | Performed by: FAMILY MEDICINE

## 2019-02-07 ASSESSMENT — PATIENT HEALTH QUESTIONNAIRE - PHQ9
2. FEELING DOWN, DEPRESSED OR HOPELESS: 0
SUM OF ALL RESPONSES TO PHQ QUESTIONS 1-9: 0
1. LITTLE INTEREST OR PLEASURE IN DOING THINGS: 0
SUM OF ALL RESPONSES TO PHQ9 QUESTIONS 1 & 2: 0
SUM OF ALL RESPONSES TO PHQ QUESTIONS 1-9: 0

## 2019-02-07 ASSESSMENT — ENCOUNTER SYMPTOMS: BLOOD IN STOOL: 0

## 2019-02-08 ENCOUNTER — OFFICE VISIT (OUTPATIENT)
Dept: ORTHOPEDIC SURGERY | Age: 74
End: 2019-02-08
Payer: MEDICARE

## 2019-02-08 DIAGNOSIS — G60.9 IDIOPATHIC PERIPHERAL NEUROPATHY: Primary | ICD-10-CM

## 2019-02-08 PROCEDURE — 95886 MUSC TEST DONE W/N TEST COMP: CPT | Performed by: PHYSICAL MEDICINE & REHABILITATION

## 2019-02-08 PROCEDURE — 95909 NRV CNDJ TST 5-6 STUDIES: CPT | Performed by: PHYSICAL MEDICINE & REHABILITATION

## 2019-02-22 ENCOUNTER — OFFICE VISIT (OUTPATIENT)
Dept: RHEUMATOLOGY | Age: 74
End: 2019-02-22
Payer: MEDICARE

## 2019-02-22 VITALS
SYSTOLIC BLOOD PRESSURE: 158 MMHG | WEIGHT: 217 LBS | HEART RATE: 81 BPM | BODY MASS INDEX: 35.02 KG/M2 | DIASTOLIC BLOOD PRESSURE: 70 MMHG

## 2019-02-22 DIAGNOSIS — R20.2 PARESTHESIAS: ICD-10-CM

## 2019-02-22 DIAGNOSIS — E55.9 VITAMIN D DEFICIENCY: ICD-10-CM

## 2019-02-22 DIAGNOSIS — M15.9 PRIMARY OSTEOARTHRITIS INVOLVING MULTIPLE JOINTS: Primary | ICD-10-CM

## 2019-02-22 PROCEDURE — 1036F TOBACCO NON-USER: CPT | Performed by: INTERNAL MEDICINE

## 2019-02-22 PROCEDURE — G8399 PT W/DXA RESULTS DOCUMENT: HCPCS | Performed by: INTERNAL MEDICINE

## 2019-02-22 PROCEDURE — 99213 OFFICE O/P EST LOW 20 MIN: CPT | Performed by: INTERNAL MEDICINE

## 2019-02-22 PROCEDURE — 1090F PRES/ABSN URINE INCON ASSESS: CPT | Performed by: INTERNAL MEDICINE

## 2019-02-22 PROCEDURE — 3017F COLORECTAL CA SCREEN DOC REV: CPT | Performed by: INTERNAL MEDICINE

## 2019-02-22 PROCEDURE — G8417 CALC BMI ABV UP PARAM F/U: HCPCS | Performed by: INTERNAL MEDICINE

## 2019-02-22 PROCEDURE — G8427 DOCREV CUR MEDS BY ELIG CLIN: HCPCS | Performed by: INTERNAL MEDICINE

## 2019-02-22 PROCEDURE — 4040F PNEUMOC VAC/ADMIN/RCVD: CPT | Performed by: INTERNAL MEDICINE

## 2019-02-22 PROCEDURE — G8598 ASA/ANTIPLAT THER USED: HCPCS | Performed by: INTERNAL MEDICINE

## 2019-02-22 PROCEDURE — G8484 FLU IMMUNIZE NO ADMIN: HCPCS | Performed by: INTERNAL MEDICINE

## 2019-02-22 PROCEDURE — 1101F PT FALLS ASSESS-DOCD LE1/YR: CPT | Performed by: INTERNAL MEDICINE

## 2019-02-22 PROCEDURE — 1123F ACP DISCUSS/DSCN MKR DOCD: CPT | Performed by: INTERNAL MEDICINE

## 2019-03-26 ENCOUNTER — OFFICE VISIT (OUTPATIENT)
Dept: CARDIOLOGY CLINIC | Age: 74
End: 2019-03-26
Payer: MEDICARE

## 2019-03-26 VITALS
HEART RATE: 79 BPM | SYSTOLIC BLOOD PRESSURE: 160 MMHG | DIASTOLIC BLOOD PRESSURE: 80 MMHG | WEIGHT: 223 LBS | BODY MASS INDEX: 35.84 KG/M2 | OXYGEN SATURATION: 97 % | HEIGHT: 66 IN

## 2019-03-26 DIAGNOSIS — E78.2 MIXED HYPERLIPIDEMIA: ICD-10-CM

## 2019-03-26 DIAGNOSIS — I10 ESSENTIAL HYPERTENSION: ICD-10-CM

## 2019-03-26 DIAGNOSIS — I47.1 SVT (SUPRAVENTRICULAR TACHYCARDIA) (HCC): Primary | ICD-10-CM

## 2019-03-26 PROCEDURE — G8399 PT W/DXA RESULTS DOCUMENT: HCPCS | Performed by: INTERNAL MEDICINE

## 2019-03-26 PROCEDURE — G8484 FLU IMMUNIZE NO ADMIN: HCPCS | Performed by: INTERNAL MEDICINE

## 2019-03-26 PROCEDURE — 1090F PRES/ABSN URINE INCON ASSESS: CPT | Performed by: INTERNAL MEDICINE

## 2019-03-26 PROCEDURE — 99214 OFFICE O/P EST MOD 30 MIN: CPT | Performed by: INTERNAL MEDICINE

## 2019-03-26 PROCEDURE — G8427 DOCREV CUR MEDS BY ELIG CLIN: HCPCS | Performed by: INTERNAL MEDICINE

## 2019-03-26 PROCEDURE — 4040F PNEUMOC VAC/ADMIN/RCVD: CPT | Performed by: INTERNAL MEDICINE

## 2019-03-26 PROCEDURE — G8598 ASA/ANTIPLAT THER USED: HCPCS | Performed by: INTERNAL MEDICINE

## 2019-03-26 PROCEDURE — 1123F ACP DISCUSS/DSCN MKR DOCD: CPT | Performed by: INTERNAL MEDICINE

## 2019-03-26 PROCEDURE — 1101F PT FALLS ASSESS-DOCD LE1/YR: CPT | Performed by: INTERNAL MEDICINE

## 2019-03-26 PROCEDURE — G8417 CALC BMI ABV UP PARAM F/U: HCPCS | Performed by: INTERNAL MEDICINE

## 2019-03-26 PROCEDURE — 3017F COLORECTAL CA SCREEN DOC REV: CPT | Performed by: INTERNAL MEDICINE

## 2019-03-26 PROCEDURE — 1036F TOBACCO NON-USER: CPT | Performed by: INTERNAL MEDICINE

## 2019-03-26 RX ORDER — FUROSEMIDE 20 MG/1
10 TABLET ORAL DAILY
Qty: 90 TABLET | Refills: 3 | Status: SHIPPED | OUTPATIENT
Start: 2019-03-26 | End: 2019-10-03 | Stop reason: DRUGHIGH

## 2019-03-26 RX ORDER — DOXAZOSIN 2 MG/1
2 TABLET ORAL NIGHTLY
Qty: 30 TABLET | Refills: 3 | Status: SHIPPED | OUTPATIENT
Start: 2019-03-26 | End: 2019-03-27 | Stop reason: ALTCHOICE

## 2019-03-26 RX ORDER — LOSARTAN POTASSIUM 50 MG/1
100 TABLET ORAL DAILY
Qty: 90 TABLET | Refills: 3 | Status: SHIPPED | OUTPATIENT
Start: 2019-03-26 | End: 2019-04-30 | Stop reason: DRUGHIGH

## 2019-03-27 ENCOUNTER — TELEPHONE (OUTPATIENT)
Dept: CARDIOLOGY CLINIC | Age: 74
End: 2019-03-27

## 2019-03-27 RX ORDER — CLONIDINE HYDROCHLORIDE 0.1 MG/1
0.1 TABLET ORAL NIGHTLY
Qty: 60 TABLET | Refills: 3 | Status: SHIPPED | OUTPATIENT
Start: 2019-03-27 | End: 2019-12-03 | Stop reason: ALTCHOICE

## 2019-03-28 ENCOUNTER — TELEPHONE (OUTPATIENT)
Dept: CARDIOLOGY CLINIC | Age: 74
End: 2019-03-28

## 2019-04-30 ENCOUNTER — TELEPHONE (OUTPATIENT)
Dept: CARDIOLOGY CLINIC | Age: 74
End: 2019-04-30

## 2019-04-30 RX ORDER — LOSARTAN POTASSIUM 100 MG/1
100 TABLET ORAL DAILY
COMMUNITY
End: 2019-06-04 | Stop reason: SDUPTHER

## 2019-04-30 NOTE — TELEPHONE ENCOUNTER
Patient called stating she received a letter from [address] [place], [place] [address]y [address] stating that her 50mg Losartan was recalled. Patient states the last prescription for 50mg Losartan that was called in was wrong and should have been for 100mg. Patient states she does not currently need a prescription that she has 100mg Losartan and it has refills.  Patient would like the 50mg removed from her medication list

## 2019-05-13 ENCOUNTER — TELEPHONE (OUTPATIENT)
Dept: CARDIOLOGY CLINIC | Age: 74
End: 2019-05-13

## 2019-05-13 NOTE — TELEPHONE ENCOUNTER
Pt sts her losartan (COZAAR) was [facility] as [facility]. She take 100 mg. 1 time a day.   Please send corrected order.    :  Swapna Zuniga, [address] [facility] [phone] - F [phone]

## 2019-05-13 NOTE — TELEPHONE ENCOUNTER
Please order losartan 100 mg daily [facility] by patient  #90 at a time R F X3

## 2019-06-04 RX ORDER — LOSARTAN POTASSIUM 100 MG/1
100 TABLET ORAL DAILY
Qty: 90 TABLET | Refills: 3 | Status: CANCELLED | OUTPATIENT
Start: 2019-06-04

## 2019-06-04 RX ORDER — LOSARTAN POTASSIUM 100 MG/1
100 TABLET ORAL DAILY
Qty: 90 TABLET | Refills: 3 | Status: SHIPPED | OUTPATIENT
Start: 2019-06-04 | End: 2019-06-05 | Stop reason: SDUPTHER

## 2019-06-05 RX ORDER — LOSARTAN POTASSIUM 100 MG/1
100 TABLET ORAL DAILY
Qty: 90 TABLET | Refills: 3 | Status: SHIPPED | OUTPATIENT
Start: 2019-06-05 | End: 2020-06-02

## 2019-07-23 RX ORDER — DILTIAZEM HYDROCHLORIDE 240 MG/1
CAPSULE, EXTENDED RELEASE ORAL
Qty: 90 CAPSULE | Refills: 3 | Status: SHIPPED | OUTPATIENT
Start: 2019-07-23 | End: 2020-06-15 | Stop reason: SDUPTHER

## 2019-08-14 ENCOUNTER — TELEPHONE (OUTPATIENT)
Dept: FAMILY MEDICINE CLINIC | Age: 74
End: 2019-08-14

## 2019-10-03 ENCOUNTER — OFFICE VISIT (OUTPATIENT)
Dept: CARDIOLOGY CLINIC | Age: 74
End: 2019-10-03
Payer: MEDICARE

## 2019-10-03 VITALS — DIASTOLIC BLOOD PRESSURE: 72 MMHG | OXYGEN SATURATION: 95 % | SYSTOLIC BLOOD PRESSURE: 145 MMHG | HEART RATE: 84 BPM

## 2019-10-03 DIAGNOSIS — I10 ESSENTIAL HYPERTENSION: ICD-10-CM

## 2019-10-03 DIAGNOSIS — I10 HYPERTENSION, UNSPECIFIED TYPE: ICD-10-CM

## 2019-10-03 DIAGNOSIS — I47.1 SVT (SUPRAVENTRICULAR TACHYCARDIA) (HCC): Primary | ICD-10-CM

## 2019-10-03 DIAGNOSIS — E78.2 MIXED HYPERLIPIDEMIA: ICD-10-CM

## 2019-10-03 PROCEDURE — G8427 DOCREV CUR MEDS BY ELIG CLIN: HCPCS | Performed by: INTERNAL MEDICINE

## 2019-10-03 PROCEDURE — G8417 CALC BMI ABV UP PARAM F/U: HCPCS | Performed by: INTERNAL MEDICINE

## 2019-10-03 PROCEDURE — 1090F PRES/ABSN URINE INCON ASSESS: CPT | Performed by: INTERNAL MEDICINE

## 2019-10-03 PROCEDURE — G8484 FLU IMMUNIZE NO ADMIN: HCPCS | Performed by: INTERNAL MEDICINE

## 2019-10-03 PROCEDURE — 99214 OFFICE O/P EST MOD 30 MIN: CPT | Performed by: INTERNAL MEDICINE

## 2019-10-03 PROCEDURE — G8598 ASA/ANTIPLAT THER USED: HCPCS | Performed by: INTERNAL MEDICINE

## 2019-10-03 PROCEDURE — 3017F COLORECTAL CA SCREEN DOC REV: CPT | Performed by: INTERNAL MEDICINE

## 2019-10-03 PROCEDURE — 1123F ACP DISCUSS/DSCN MKR DOCD: CPT | Performed by: INTERNAL MEDICINE

## 2019-10-03 PROCEDURE — G8399 PT W/DXA RESULTS DOCUMENT: HCPCS | Performed by: INTERNAL MEDICINE

## 2019-10-03 PROCEDURE — 4040F PNEUMOC VAC/ADMIN/RCVD: CPT | Performed by: INTERNAL MEDICINE

## 2019-10-03 PROCEDURE — 1036F TOBACCO NON-USER: CPT | Performed by: INTERNAL MEDICINE

## 2019-10-03 RX ORDER — POTASSIUM CHLORIDE 20 MEQ/1
20 TABLET, EXTENDED RELEASE ORAL DAILY
Qty: 90 TABLET | Refills: 3 | Status: SHIPPED | OUTPATIENT
Start: 2019-10-03 | End: 2020-06-19 | Stop reason: SDUPTHER

## 2019-10-03 RX ORDER — FUROSEMIDE 20 MG/1
10 TABLET ORAL DAILY
Qty: 90 TABLET | Refills: 3 | Status: CANCELLED | OUTPATIENT
Start: 2019-10-03

## 2019-10-03 RX ORDER — FUROSEMIDE 20 MG/1
20 TABLET ORAL DAILY
Qty: 90 TABLET | Refills: 3 | Status: SHIPPED | OUTPATIENT
Start: 2019-10-03 | End: 2020-05-14 | Stop reason: SDUPTHER

## 2019-10-03 RX ORDER — ATORVASTATIN CALCIUM 20 MG/1
20 TABLET, FILM COATED ORAL NIGHTLY
Qty: 90 TABLET | Refills: 3 | Status: SHIPPED | OUTPATIENT
Start: 2019-10-03 | End: 2020-09-17

## 2019-10-07 ENCOUNTER — TELEPHONE (OUTPATIENT)
Dept: CARDIOLOGY CLINIC | Age: 74
End: 2019-10-07

## 2019-10-17 ENCOUNTER — TELEPHONE (OUTPATIENT)
Dept: CARDIOLOGY CLINIC | Age: 74
End: 2019-10-17

## 2019-10-25 ENCOUNTER — TELEPHONE (OUTPATIENT)
Dept: CARDIOLOGY CLINIC | Age: 74
End: 2019-10-25

## 2019-10-25 RX ORDER — TRAZODONE HYDROCHLORIDE 50 MG/1
100 TABLET ORAL NIGHTLY
Qty: 180 TABLET | Refills: 1 | Status: SHIPPED | OUTPATIENT
Start: 2019-10-25 | End: 2020-09-18 | Stop reason: SDUPTHER

## 2019-11-04 ENCOUNTER — OFFICE VISIT (OUTPATIENT)
Dept: DERMATOLOGY | Age: 74
End: 2019-11-04
Payer: MEDICARE

## 2019-11-04 DIAGNOSIS — L29.9 PRURITUS: Primary | ICD-10-CM

## 2019-11-04 PROCEDURE — G8399 PT W/DXA RESULTS DOCUMENT: HCPCS | Performed by: DERMATOLOGY

## 2019-11-04 PROCEDURE — 99213 OFFICE O/P EST LOW 20 MIN: CPT | Performed by: DERMATOLOGY

## 2019-11-04 PROCEDURE — 4040F PNEUMOC VAC/ADMIN/RCVD: CPT | Performed by: DERMATOLOGY

## 2019-11-04 PROCEDURE — G8417 CALC BMI ABV UP PARAM F/U: HCPCS | Performed by: DERMATOLOGY

## 2019-11-04 PROCEDURE — 1123F ACP DISCUSS/DSCN MKR DOCD: CPT | Performed by: DERMATOLOGY

## 2019-11-04 PROCEDURE — 1090F PRES/ABSN URINE INCON ASSESS: CPT | Performed by: DERMATOLOGY

## 2019-11-04 PROCEDURE — G8598 ASA/ANTIPLAT THER USED: HCPCS | Performed by: DERMATOLOGY

## 2019-11-04 PROCEDURE — G8427 DOCREV CUR MEDS BY ELIG CLIN: HCPCS | Performed by: DERMATOLOGY

## 2019-11-04 PROCEDURE — 3017F COLORECTAL CA SCREEN DOC REV: CPT | Performed by: DERMATOLOGY

## 2019-11-04 PROCEDURE — G8484 FLU IMMUNIZE NO ADMIN: HCPCS | Performed by: DERMATOLOGY

## 2019-11-04 PROCEDURE — 1036F TOBACCO NON-USER: CPT | Performed by: DERMATOLOGY

## 2019-11-04 RX ORDER — TRIAMCINOLONE ACETONIDE 1 MG/G
CREAM TOPICAL
Qty: 80 G | Refills: 3 | Status: SHIPPED | OUTPATIENT
Start: 2019-11-04 | End: 2019-12-03 | Stop reason: ALTCHOICE

## 2019-11-25 ENCOUNTER — TELEPHONE (OUTPATIENT)
Dept: CARDIOLOGY CLINIC | Age: 74
End: 2019-11-25

## 2019-12-03 ENCOUNTER — OFFICE VISIT (OUTPATIENT)
Dept: FAMILY MEDICINE CLINIC | Age: 74
End: 2019-12-03
Payer: MEDICARE

## 2019-12-03 VITALS
HEIGHT: 66 IN | OXYGEN SATURATION: 96 % | WEIGHT: 228.2 LBS | HEART RATE: 80 BPM | SYSTOLIC BLOOD PRESSURE: 156 MMHG | BODY MASS INDEX: 36.67 KG/M2 | DIASTOLIC BLOOD PRESSURE: 58 MMHG

## 2019-12-03 DIAGNOSIS — I10 ESSENTIAL HYPERTENSION: Primary | ICD-10-CM

## 2019-12-03 DIAGNOSIS — E03.9 HYPOTHYROIDISM, UNSPECIFIED TYPE: ICD-10-CM

## 2019-12-03 DIAGNOSIS — E55.9 VITAMIN D DEFICIENCY: ICD-10-CM

## 2019-12-03 DIAGNOSIS — E78.2 MIXED HYPERLIPIDEMIA: ICD-10-CM

## 2019-12-03 DIAGNOSIS — E11.42 TYPE 2 DIABETES MELLITUS WITH DIABETIC POLYNEUROPATHY, WITHOUT LONG-TERM CURRENT USE OF INSULIN (HCC): ICD-10-CM

## 2019-12-03 DIAGNOSIS — K75.81 NASH (NONALCOHOLIC STEATOHEPATITIS): ICD-10-CM

## 2019-12-03 DIAGNOSIS — D51.0 PERNICIOUS ANEMIA: ICD-10-CM

## 2019-12-03 DIAGNOSIS — I25.10 CORONARY ARTERY DISEASE INVOLVING NATIVE HEART WITHOUT ANGINA PECTORIS, UNSPECIFIED VESSEL OR LESION TYPE: ICD-10-CM

## 2019-12-03 DIAGNOSIS — J44.9 CHRONIC OBSTRUCTIVE PULMONARY DISEASE, UNSPECIFIED COPD TYPE (HCC): ICD-10-CM

## 2019-12-03 PROCEDURE — G8484 FLU IMMUNIZE NO ADMIN: HCPCS | Performed by: FAMILY MEDICINE

## 2019-12-03 PROCEDURE — 3023F SPIROM DOC REV: CPT | Performed by: FAMILY MEDICINE

## 2019-12-03 PROCEDURE — 3017F COLORECTAL CA SCREEN DOC REV: CPT | Performed by: FAMILY MEDICINE

## 2019-12-03 PROCEDURE — G8598 ASA/ANTIPLAT THER USED: HCPCS | Performed by: FAMILY MEDICINE

## 2019-12-03 PROCEDURE — 99213 OFFICE O/P EST LOW 20 MIN: CPT | Performed by: FAMILY MEDICINE

## 2019-12-03 PROCEDURE — G8427 DOCREV CUR MEDS BY ELIG CLIN: HCPCS | Performed by: FAMILY MEDICINE

## 2019-12-03 PROCEDURE — 3044F HG A1C LEVEL LT 7.0%: CPT | Performed by: FAMILY MEDICINE

## 2019-12-03 PROCEDURE — 4040F PNEUMOC VAC/ADMIN/RCVD: CPT | Performed by: FAMILY MEDICINE

## 2019-12-03 PROCEDURE — 2022F DILAT RTA XM EVC RTNOPTHY: CPT | Performed by: FAMILY MEDICINE

## 2019-12-03 PROCEDURE — G8399 PT W/DXA RESULTS DOCUMENT: HCPCS | Performed by: FAMILY MEDICINE

## 2019-12-03 PROCEDURE — 1090F PRES/ABSN URINE INCON ASSESS: CPT | Performed by: FAMILY MEDICINE

## 2019-12-03 PROCEDURE — 1123F ACP DISCUSS/DSCN MKR DOCD: CPT | Performed by: FAMILY MEDICINE

## 2019-12-03 PROCEDURE — 1036F TOBACCO NON-USER: CPT | Performed by: FAMILY MEDICINE

## 2019-12-03 PROCEDURE — G8417 CALC BMI ABV UP PARAM F/U: HCPCS | Performed by: FAMILY MEDICINE

## 2019-12-03 PROCEDURE — G8926 SPIRO NO PERF OR DOC: HCPCS | Performed by: FAMILY MEDICINE

## 2019-12-13 ENCOUNTER — OFFICE VISIT (OUTPATIENT)
Dept: ORTHOPEDIC SURGERY | Age: 74
End: 2019-12-13
Payer: MEDICARE

## 2019-12-13 VITALS — WEIGHT: 228.18 LBS | HEIGHT: 66 IN | BODY MASS INDEX: 36.67 KG/M2

## 2019-12-13 DIAGNOSIS — R52 PAIN: Primary | ICD-10-CM

## 2019-12-13 DIAGNOSIS — M19.90 ARTHRITIS: ICD-10-CM

## 2019-12-13 PROCEDURE — G8399 PT W/DXA RESULTS DOCUMENT: HCPCS | Performed by: ORTHOPAEDIC SURGERY

## 2019-12-13 PROCEDURE — G8427 DOCREV CUR MEDS BY ELIG CLIN: HCPCS | Performed by: ORTHOPAEDIC SURGERY

## 2019-12-13 PROCEDURE — 1036F TOBACCO NON-USER: CPT | Performed by: ORTHOPAEDIC SURGERY

## 2019-12-13 PROCEDURE — G8484 FLU IMMUNIZE NO ADMIN: HCPCS | Performed by: ORTHOPAEDIC SURGERY

## 2019-12-13 PROCEDURE — 99213 OFFICE O/P EST LOW 20 MIN: CPT | Performed by: ORTHOPAEDIC SURGERY

## 2019-12-13 PROCEDURE — 1090F PRES/ABSN URINE INCON ASSESS: CPT | Performed by: ORTHOPAEDIC SURGERY

## 2019-12-13 PROCEDURE — 4040F PNEUMOC VAC/ADMIN/RCVD: CPT | Performed by: ORTHOPAEDIC SURGERY

## 2019-12-13 PROCEDURE — 3017F COLORECTAL CA SCREEN DOC REV: CPT | Performed by: ORTHOPAEDIC SURGERY

## 2019-12-13 PROCEDURE — 1123F ACP DISCUSS/DSCN MKR DOCD: CPT | Performed by: ORTHOPAEDIC SURGERY

## 2019-12-13 PROCEDURE — G8598 ASA/ANTIPLAT THER USED: HCPCS | Performed by: ORTHOPAEDIC SURGERY

## 2019-12-13 PROCEDURE — G8417 CALC BMI ABV UP PARAM F/U: HCPCS | Performed by: ORTHOPAEDIC SURGERY

## 2019-12-17 ENCOUNTER — TELEPHONE (OUTPATIENT)
Dept: ORTHOPEDIC SURGERY | Age: 74
End: 2019-12-17

## 2019-12-23 ASSESSMENT — ENCOUNTER SYMPTOMS
SHORTNESS OF BREATH: 0
COUGH: 0
DIARRHEA: 0
EYE PAIN: 0
ABDOMINAL PAIN: 0

## 2019-12-26 NOTE — TELEPHONE ENCOUNTER
Patient would like to know if you would take over the medication Cetirizine - HCL 10 mg. It was originally written by Dr. Daniel Quan.     Aguila Giles [phone] (home) [phone] (work)   is requesting refill(s) of medication Cetirizine - HCL 10 mg to preferred pharmacy [facility], [address] [place] [address] 12-3-19 (pertaining to medication)   Last refill 02-20-18 (per medication requested)  Next office visit scheduled or attempted Yes  Date 06-04-20  If No, reason made

## 2019-12-30 RX ORDER — CETIRIZINE HYDROCHLORIDE 10 MG/1
10 TABLET ORAL DAILY
Qty: 30 TABLET | Refills: 5 | Status: SHIPPED | OUTPATIENT
Start: 2019-12-30 | End: 2020-06-19 | Stop reason: SDUPTHER

## 2019-12-30 NOTE — TELEPHONE ENCOUNTER
Spoke to pt and gave message. She will be waiting on letter to see what other locations she can call for a doctor. I told her we could send a message back to one of the doctor's here or PA's. She refused a PA and wanted a male doctor.

## 2019-12-30 NOTE — TELEPHONE ENCOUNTER
Done.      Nurse - Please let her know I refilled it for 6 months, but unfortunately, she will be needing to get a NEW provider again. Can let her know I was planning to go to the [place] at some point later this year, but I will be leaving our practice as of late Feb/early March, with immediate plans pending, but long-term will still likely be heading to the [place]. She will be getting a letter in the mail about this within the next 2 weeks or so, but wanted to let her know now, since she JUST became an established patient here less than one month ago. I am sorry for all the hassle this causes for her.

## 2020-01-10 ENCOUNTER — TELEPHONE (OUTPATIENT)
Dept: CARDIOLOGY CLINIC | Age: 75
End: 2020-01-10

## 2020-01-10 NOTE — TELEPHONE ENCOUNTER
Pt would like another recommendation for a PCP. She went to Dr Salas Sender as recommended and really liked him, but he is leaving to go to the [place] now. She prefers a male doctor and not an NP or PA she states. Please advise.

## 2020-01-13 ENCOUNTER — TELEPHONE (OUTPATIENT)
Dept: FAMILY MEDICINE CLINIC | Age: 75
End: 2020-01-13

## 2020-01-13 NOTE — TELEPHONE ENCOUNTER
Patient is calling wanting lab results she had done at [facility] on 1/8/2020. Please call.

## 2020-01-16 NOTE — TELEPHONE ENCOUNTER
Nurse - My question to patient was never answered - was she feeling sick or having any significant breathing sxs (around when her blood was drawn on 1/6/20)? ? Also - Why is patient wanting the YONATAN, rheumatoid factor and sed rate? (We have to provide a dx for every test we order.   In addition, we try to avoid ordering an YONATAN without a fairly high suspicion of lupus or similar significant autoimmune process, because it often comes back positive at a low titer, in which case it often causes concern/worry, when the vast majority of the time a low titer does NOT mean any significant autoimmune process.)

## 2020-01-16 NOTE — TELEPHONE ENCOUNTER
Reviewed result note with pt. She is wanting the lab orders sent to [facility]. She is wanting to know if Dr. Keisha Dumont will also order an YONATAN, Rheumatoid factor,and Sed rate.  Please advise

## 2020-01-20 NOTE — TELEPHONE ENCOUNTER
Patient returned call. She had a cough and her legs were swollen the day of the blood draw. She states she has had the cough for a long time. No other symptoms. She said her daughter has Lupus. She states she would like to be tested for Rheumatoid Arthritis because of constant feet numbing, and leg swelling up to the knees.

## 2020-01-20 NOTE — TELEPHONE ENCOUNTER
Please have her look up some one in [address] [facility] practice on [address] [place] [address] DrLeslie There is big group in [place] [address] [address] they are [facility]. Please tell her that it is hard to find primary care docs.

## 2020-01-29 ENCOUNTER — OFFICE VISIT (OUTPATIENT)
Dept: ENDOCRINOLOGY | Age: 75
End: 2020-01-29
Payer: COMMERCIAL

## 2020-01-29 VITALS
BODY MASS INDEX: 36.64 KG/M2 | HEART RATE: 76 BPM | SYSTOLIC BLOOD PRESSURE: 159 MMHG | DIASTOLIC BLOOD PRESSURE: 70 MMHG | WEIGHT: 228 LBS | OXYGEN SATURATION: 90 % | HEIGHT: 66 IN

## 2020-01-29 PROCEDURE — 99204 OFFICE O/P NEW MOD 45 MIN: CPT | Performed by: INTERNAL MEDICINE

## 2020-01-29 NOTE — PROGRESS NOTES
Endocrinology       New Patient Consultation  Evangelist Dudley M.D. Phone: [phone]   FAX: [phone]       Randy Fleming [address] [place] [address]   YOB: 1945    Date of Visit:  1/29/2020    Allergies   Allergen Reactions    Latex Swelling and Rash    Clindamycin Itching    Pennsaid [Diclofenac Sodium] Itching and Rash    Torsemide Itching    Actos [Pioglitazone] Diarrhea    Amoxicillin Other (See Comments)    Augmentin [Amoxicillin-Pot Clavulanate] Other (See Comments)    Bactrim [Sulfamethoxazole-Trimethoprim] Other (See Comments)    Ciprofloxacin Other (See Comments)     Makes her weird  Makes anxious and she has weird dreams    Doxycycline Other (See Comments)     Feels like she is smothering, chest tightness    Ativan [Lorazepam] Other (See Comments) and Anxiety     And confusion  Had weird dreams and hallucinations    Cephalosporins Rash    Pcn [Penicillins] Rash and Itching    Proventil [Albuterol] Anxiety     Outpatient Medications Marked as Taking for the 1/29/20 encounter (Office Visit) with Álvaro Dave MD   Medication Sig Dispense Refill    cetirizine (ZYRTEC) 10 MG tablet Take 1 tablet by mouth daily 30 tablet 5    diclofenac 2 % SOLN Place 2 Pump onto the skin 2 times daily 1 Bottle 0    traZODone (DESYREL) 50 MG tablet Take 2 tablets by mouth nightly 180 tablet 1    potassium chloride (KLOR-CON M) 20 MEQ extended release tablet Take 1 tablet by mouth daily 90 tablet 3    atorvastatin (LIPITOR) 20 MG tablet Take 1 tablet by mouth nightly 90 tablet 3    furosemide (LASIX) 20 MG tablet Take 1 tablet by mouth daily 90 tablet 3    diltiazem (TIAZAC) 240 MG extended release capsule TAKE 1 CAPSULE DAILY 90 capsule 3    losartan (COZAAR) 100 MG tablet Take 1 tablet by mouth daily 90 tablet 3    ferrous sulfate 325 (65 Fe) MG tablet Take 325 mg by mouth 3 times daily (with meals)      gabapentin (NEURONTIN) 600 MG tablet Take 600 mg by mouth nightly.  600 mg at bedtime and Readings from Last 3 Encounters:   01/29/20 228 lb (103.4 kg)   12/13/19 228 lb 2.8 oz (103.5 kg)   12/03/19 228 lb 3.2 oz (103.5 kg)     BP Readings from Last 3 Encounters:   01/29/20 (!) 156/66   12/03/19 (!) 156/58   10/03/19 (!) 145/72        Past Medical History:   Diagnosis Date    Allergic     Anemia     Anesthesia complication     \"woke up during surgery\"    Arthritis     Osteoarthritis of bilateral CMC joints    Asthma     Followed by Dr. De La Paz Client Cirrhosis [facility])     non alcoholic    COPD (chronic obstructive [facility] disease) ([facility]     GERD (gastroesophageal reflux disease)     GIB (gastrointestinal bleeding)     LGIB 4/2016    Heart murmur     Hernia     Hyperlipidemia     Hypertension     Lung collapse     Murmur, heart     Pneumonia     Reflux     Rheumatic fever     Stress fracture     right foot    SVT (supraventricular tachycardia) ([facility]     6/19/16 - admitted X 1 day per PT     TIA (transient ischemic attack) 2015    Type II or unspecified type diabetes mellitus without mention of complication, not stated as uncontrolled     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 ([facility]     Wears glasses     as needed    Wears partial dentures     upper partial     Past Surgical History:   Procedure Laterality Date    BONE MARROW BIOPSY      BREAST BIOPSY      CARDIAC CATHETERIZATION  07/14/14    CATARACT REMOVAL      COLONOSCOPY      X 3 per PT 6/8/16    FINE NEEDLE ASPIRATION      FOOT SURGERY Right 06/03/2013    CORAL BUNIONECTOMY RIGHT FOOT WITH SCREW FIXATION;    HERNIA REPAIR      HYSTERECTOMY      JOINT REPLACEMENT Bilateral     TKR    KNEE SURGERY  09    left    KNEE SURGERY  2010    right    UPPER GASTROINTESTINAL ENDOSCOPY N/A 7/21/2018    EGD BIOPSY performed by Jim Blum MD at [address] [place] [address]     Family History   Problem Relation Age of Onset    Lupus Other     Colon Cancer Mother     Diabetes Father     Kidney Disease Paternal Grandmother Status: She is alert and oriented to person, place, and time. Psychiatric:         Behavior: Behavior normal.         Thought Content: Thought content normal.           Lab Results   Component Value Date    LABA1C 5.2 02/07/2019         Assessment/Plan        1. Type 2 DM     Angela Franco is a 69 y.o. female has Type 2 DM with obesity and insulin resistance. J3L 5.4 %     Complicated by neuropathy    Discussed with her that given her age, tight glycemic control is not recommended. - Reduce the dose of metformin to 500 mg BID    -Continue januvia 100 mg daily. Advised follow-up with the ophthalmologist once year. Urine microalbumin/cr ratio normal 08/19. Follows with podiatry every 9 weeks ( Dr. Sravani Muñoz )         2. SVT/Hypertension. Managed by cardiology. 3. Hyperlipidemia. On statins. 4. Hypothyroidism     On levothyroxine 100 mcg daily. TSH 1.86 in 01/20    Continue same dose.

## 2020-01-29 NOTE — Clinical Note
Dear Dr. Merlyn Parker,  Thank you so much for involving me in the care of your patient. Please review the enclosed note.  Clement Urias

## 2020-02-06 ENCOUNTER — TELEPHONE (OUTPATIENT)
Dept: ENDOCRINOLOGY | Age: 75
End: 2020-02-06

## 2020-03-17 NOTE — TELEPHONE ENCOUNTER
Patient called and said since she has been off of metformin her sugars have been high. She would like go back on it and go over her sugars with payton.  Please call patient     [phone]

## 2020-03-30 ENCOUNTER — TELEPHONE (OUTPATIENT)
Dept: ENDOCRINOLOGY | Age: 75
End: 2020-03-30

## 2020-03-30 NOTE — TELEPHONE ENCOUNTER
Patient needs a new prescription for januvia and metformin sent to    [address], [address] [facility] [phone]

## 2020-05-14 RX ORDER — FUROSEMIDE 20 MG/1
20 TABLET ORAL DAILY
Qty: 90 TABLET | Refills: 3 | Status: SHIPPED | OUTPATIENT
Start: 2020-05-14 | End: 2020-06-15

## 2020-05-14 NOTE — TELEPHONE ENCOUNTER
Refill furosemide (LASIX) 20 MG tablet     Pt requesting 90 day supply  :  [address], [address]

## 2020-06-02 RX ORDER — LOSARTAN POTASSIUM 100 MG/1
TABLET ORAL
Qty: 90 TABLET | Refills: 3 | Status: SHIPPED | OUTPATIENT
Start: 2020-06-02 | End: 2021-05-28

## 2020-06-15 ENCOUNTER — OFFICE VISIT (OUTPATIENT)
Dept: CARDIOLOGY CLINIC | Age: 75
End: 2020-06-15
Payer: COMMERCIAL

## 2020-06-15 VITALS
DIASTOLIC BLOOD PRESSURE: 70 MMHG | SYSTOLIC BLOOD PRESSURE: 160 MMHG | BODY MASS INDEX: 36.48 KG/M2 | WEIGHT: 227 LBS | HEIGHT: 66 IN | HEART RATE: 88 BPM | OXYGEN SATURATION: 96 %

## 2020-06-15 PROCEDURE — 99214 OFFICE O/P EST MOD 30 MIN: CPT | Performed by: INTERNAL MEDICINE

## 2020-06-15 RX ORDER — FUROSEMIDE 40 MG/1
40 TABLET ORAL DAILY
Qty: 90 TABLET | Refills: 3 | Status: SHIPPED | OUTPATIENT
Start: 2020-06-15 | End: 2020-06-16

## 2020-06-15 RX ORDER — DILTIAZEM HYDROCHLORIDE 240 MG/1
CAPSULE, EXTENDED RELEASE ORAL
Qty: 90 CAPSULE | Refills: 3 | Status: SHIPPED | OUTPATIENT
Start: 2020-06-15 | End: 2021-05-27

## 2020-06-15 NOTE — PROGRESS NOTES
glasses     as needed    Wears partial dentures     upper partial     Past Surgical History:   Procedure Laterality Date    BONE MARROW BIOPSY      BREAST BIOPSY      CARDIAC CATHETERIZATION  07/14/14    CATARACT REMOVAL      COLONOSCOPY      X 3 per PT 6/8/16    FINE NEEDLE ASPIRATION      FOOT SURGERY Right 06/03/2013    CORAL BUNIONECTOMY RIGHT FOOT WITH SCREW FIXATION;    HERNIA REPAIR      HYSTERECTOMY      JOINT REPLACEMENT Bilateral     TKR    KNEE SURGERY  09    left    KNEE SURGERY  2010    right    UPPER GASTROINTESTINAL ENDOSCOPY N/A 7/21/2018    EGD BIOPSY performed by Antonio Zacarias MD at [address] [place] [address]       Objective:   BP (!) 174/70   Pulse 88   Ht 5' 6\" (1.676 m)   Wt 227 lb (103 kg)   SpO2 96%   BMI 36.64 kg/m²     Wt Readings from Last 3 Encounters:   06/15/20 227 lb (103 kg)   01/29/20 228 lb (103.4 kg)   12/13/19 228 lb 2.8 oz (103.5 kg)       Physical Exam:  General: No Respiratory distress, appears well developed and well nourished. Eyes:  Sclera nonicteric  Nose/Sinuses:  negative findings: nose shows no deformity, asymmetry, or inflammation, nasal mucosa normal, septum midline with no perforation or bleeding  Back:  no pain to palpation  Joint:  no active joint inflammation  Musculoskeletal:  negative  Skin:  Warm and dry  Neck:  Negative for JVD and Carotid Bruits. Pain on movement of neck to left. Chest:  Clear bilaterally, respiration easy  Cardiovascular:  RRR, S1S2 normal, 2/6 MOHSEN all over heart, no rub or thrill.   Extremities:   1+ bilat pretibial leg edema, no clubbing, cyanosis,  Neuro: intact    Medications:   Outpatient Encounter Medications as of 6/15/2020   Medication Sig Dispense Refill    losartan (COZAAR) 100 MG tablet TAKE 1 TABLET DAILY 90 tablet 3    furosemide (LASIX) 20 MG tablet Take 1 tablet by mouth daily 90 tablet 3    metFORMIN (GLUCOPHAGE) 500 MG tablet Take 1 tablet BID. 180 tablet 2    SITagliptin (JANUVIA) 100 MG tablet Take 1 tablet by mouth daily 90 tablet 2    cetirizine (ZYRTEC) 10 MG tablet Take 1 tablet by mouth daily 30 tablet 5    diclofenac 2 % SOLN Place 2 Pump onto the skin 2 times daily 1 Bottle 0    traZODone (DESYREL) 50 MG tablet Take 2 tablets by mouth nightly 180 tablet 1    potassium chloride (KLOR-CON M) 20 MEQ extended release tablet Take 1 tablet by mouth daily 90 tablet 3    atorvastatin (LIPITOR) 20 MG tablet Take 1 tablet by mouth nightly 90 tablet 3    diltiazem (TIAZAC) 240 MG extended release capsule TAKE 1 CAPSULE DAILY 90 capsule 3    ferrous sulfate 325 (65 Fe) MG tablet Take 325 mg by mouth 3 times daily (with meals)      gabapentin (NEURONTIN) 600 MG tablet Take 600 mg by mouth nightly. 600 mg at bedtime and 300 mg BID.  famotidine (PEPCID) 20 MG tablet Take 20 mg by mouth 2 times daily      diphenhydrAMINE (BENADRYL) 25 MG tablet Take 50 mg by mouth nightly       pantoprazole (PROTONIX) 40 MG tablet Take 40 mg by mouth 2 times daily      aspirin 81 MG EC tablet Take 81 mg by mouth daily       Cholecalciferol (VITAMIN D) 2000 UNITS CAPS capsule Take 2,000 Units by mouth daily       rOPINIRole (REQUIP) 1 MG tablet Take 1 mg by mouth nightly 1mg at 1730, 1mg at 2030, 2mg at 2200 (total of 4mg nightly in divided doses)      doxepin (SINEQUAN) 25 MG capsule Take 50 mg by mouth nightly       calcium carbonate 600 MG TABS tablet Take 1 tablet by mouth daily       levothyroxine (SYNTHROID) 100 MCG tablet Take 100 mcg by mouth Daily      Multiple Vitamins-Minerals (CENTRUM PO) Take 1 tablet by mouth daily.  levalbuterol (XOPENEX HFA) 45 MCG/ACT inhaler Inhale 2 puffs into the lungs every 6 hours as needed       beclomethasone (QVAR) 80 MCG/ACT inhaler Inhale 2 puffs into the lungs 2 times daily.       Aclidinium Bromide (TUDORZA PRESSAIR) 400 MCG/ACT AEPB Inhale 1 puff into the lungs 2 times daily       montelukast (SINGULAIR) 10 MG tablet Take 10 mg by mouth daily       venlafaxine (EFFEXOR) 37.5 MG tablet Take 37.5 mg by mouth daily       cyanocobalamin 1000 MCG/ML injection Inject 1,000 mcg into the muscle every 14 days Last dose was taken on 17      estrogens, conjugated, (PREMARIN) 1.25 MG tablet Take 1.25 mg by mouth daily.  ursodiol (ACTIGALL) 500 MG tablet Take 500 mg by mouth 2 times daily        No facility-administered encounter medications on file as of 6/15/2020. Lab Data:    Lab Results   Component Value Date    TRIG 133 2019    TRIG 110 2017    TRIG 125 2014     Lab Results   Component Value Date    HDL 78 (A) 2019    HDL 73 (A) 2017    HDL 49 2014     Lab Results   Component Value Date    LDLCALC 66 2019    LDLCALC 53 2017    LDLCALC 62 2014     Lab Results   Component Value Date    LABVLDL 25 2014    LABVLDL 50 2013     A1C:   Lab Results   Component Value Date    LABA1C 5.2 2019   LABS: 2019 - [facility] - , , HDL 78, LDL 66, AIC 4.9    IMAGIN/2015 CXR    Opinion: There is persistent mild middle lobe disease. The disease   is unchanged for the past one month and this may represent middle   lobe scarring. Cardiac cath: 2014  1. Mild coronary artery disease involving left anterior descending,  circumflex and right coronary arteries. 2.  Hyperdynamic left ventricle with an ejection fraction of 65%. 3.  Elevated left ventricular end-diastolic pressure, 15 mmHg. 4.  Patent renal arteries bilaterally. RECOMMENDATIONS:   The patient is noted to have mild coronary artery disease  which does not explain her shortness of breath. She, however, does have    elevated LVEDP from uncontrolled blood pressure. She needs aggressive  antihypertensive therapy for optimal blood pressure control. Will start her  on lasix for dyspnea and add losartan to her antihypertensive regimen. She    will continue the aspirin and Lipitor as well as Toprol.     Echo: 4/16/15  Left ventricular size is normal .  Mild concentric left ventricular hypertrophy is present. Global ejection fraction is normal and estimated from 55 % to 60 %. No regional wall motion abnormalities are noted. Diastolic filling parameters suggests grade I diastolic dysfunction . Mild mitral regurgitation is present. Mild tricuspid regurgitation. Systolic pulmonary artery pressure (SPAP) is normal and estimated at 35   mmHg  (RA pressure 8 mm Hg). Mild pulmonic regurgitation present. Cardiac Cath: 04/05/15  1. Mild coronary artery disease involving left anterior descending,  circumflex and right coronary arteries. 2. Hyperdynamic left ventricle with an ejection fraction of 65%. 3. Elevated left ventricular end-diastolic pressure, 15 mmHg. 4. Patent renal arteries bilaterally. Assessment:  Diagnoses and associated orders for this visit:  1. SVT controlled   2. Hyperlipidemia Most recent lipids 2/4/19   3. HTN (hypertension) mild patient very anxious   4. Edema legs appears to be chronic dependent no crackles in lungs. Type 2 diabetes mellitus [facility]) seeing Dr Jessie Dixon endocrinologist  Plan:  1. Increase lasix to 40mg a day in the AM.  2. Referral for PCP establishment   - Dr Paco Landry at patient's request  3. Follow up with me in 6 months  4. LABS- TSH, A1C, Lipids, CMP. Fasting   5. Echo doppler of heart        QUALITY MEASURES  1. Tobacco Cessation Counseling: Yes  2. Retake of BP if >140/90:   NA  3. Documentation to PCP/referring for new patient:  Sent to PCP at close of office visit  4. CAD patient on anti-platelet: NA   5. CAD patient on STATIN therapy:  yes  6. Patient with CHF and aFib on anticoagulation:  NA     I, Dr. Braydon Roth, personally performed the services described in this documentation, as scribed by the above signed scribe in my presence. It is both accurate and complete to my knowledge.  I agree with the details independently gathered by the clinical support staff, while the remaining

## 2020-06-16 ENCOUNTER — TELEPHONE (OUTPATIENT)
Dept: CARDIOLOGY CLINIC | Age: 75
End: 2020-06-16

## 2020-06-16 RX ORDER — FUROSEMIDE 20 MG/1
20 TABLET ORAL DAILY
Qty: 90 TABLET | Refills: 3 | Status: SHIPPED | OUTPATIENT
Start: 2020-06-16 | End: 2021-07-19

## 2020-06-16 NOTE — TELEPHONE ENCOUNTER
Pt called and sts that [facility] send in a script for a Lasix 40mg tablet but the pt needs a new script for Lasix 20mg tablet? 90 day supply to be sent to [address] [place] [address] Delivery [phone], please advise, thank you

## 2020-06-18 ENCOUNTER — TELEPHONE (OUTPATIENT)
Dept: CARDIOLOGY CLINIC | Age: 75
End: 2020-06-18

## 2020-06-19 RX ORDER — CETIRIZINE HYDROCHLORIDE 10 MG/1
10 TABLET ORAL DAILY
Qty: 30 TABLET | Refills: 3 | Status: SHIPPED | OUTPATIENT
Start: 2020-06-19 | End: 2021-06-28

## 2020-06-19 RX ORDER — CETIRIZINE HYDROCHLORIDE 10 MG/1
10 TABLET ORAL DAILY
Qty: 90 TABLET | Refills: 3 | Status: ON HOLD
Start: 2020-06-19 | End: 2021-05-12 | Stop reason: HOSPADM

## 2020-06-19 RX ORDER — POTASSIUM CHLORIDE 20 MEQ/1
20 TABLET, EXTENDED RELEASE ORAL DAILY
Qty: 90 TABLET | Refills: 3 | Status: SHIPPED | OUTPATIENT
Start: 2020-06-19 | End: 2021-11-04

## 2020-06-19 RX ORDER — POTASSIUM CHLORIDE 20 MEQ/1
20 TABLET, EXTENDED RELEASE ORAL DAILY
Qty: 90 TABLET | Refills: 3 | Status: SHIPPED | OUTPATIENT
Start: 2020-06-19 | End: 2020-10-29 | Stop reason: SDUPTHER

## 2020-06-22 ENCOUNTER — HOSPITAL ENCOUNTER (OUTPATIENT)
Age: 75
Discharge: HOME | End: 2020-06-22
Payer: MEDICARE

## 2020-06-22 LAB
A/G RATIO: 1 (ref 1.1–2.2)
ALBUMIN SERPL-MCNC: 4 G/DL (ref 3.4–5)
ALP BLD-CCNC: 60 U/L (ref 40–129)
ALT SERPL-CCNC: 22 U/L (ref 10–40)
ANION GAP SERPL CALCULATED.3IONS-SCNC: 16 MMOL/L (ref 3–16)
AST SERPL-CCNC: 25 U/L (ref 15–37)
BILIRUB SERPL-MCNC: 0.4 MG/DL (ref 0–1)
BUN BLDV-MCNC: 12 MG/DL (ref 7–20)
CALCIUM SERPL-MCNC: 8.6 MG/DL (ref 8.3–10.6)
CHLORIDE BLD-SCNC: 92 MMOL/L (ref 99–110)
CHOLESTEROL, FASTING: 163 MG/DL (ref 0–199)
CO2: 27 MMOL/L (ref 21–32)
CREAT SERPL-MCNC: 0.6 MG/DL (ref 0.6–1.2)
GFR AFRICAN AMERICAN: >60
GFR NON-AFRICAN AMERICAN: >60
GLOBULIN: 3.9 G/DL
GLUCOSE BLD-MCNC: 118 MG/DL (ref 70–99)
HDLC SERPL-MCNC: 75 MG/DL (ref 40–60)
LDL CHOLESTEROL CALCULATED: 69 MG/DL
POTASSIUM SERPL-SCNC: 4.2 MMOL/L (ref 3.5–5.1)
SODIUM BLD-SCNC: 135 MMOL/L (ref 136–145)
TOTAL PROTEIN: 7.9 G/DL (ref 6.4–8.2)
TRIGLYCERIDE, FASTING: 97 MG/DL (ref 0–150)
TSH REFLEX FT4: 1.72 UIU/ML (ref 0.27–4.2)
VLDLC SERPL CALC-MCNC: 19 MG/DL

## 2020-06-22 PROCEDURE — 83036 HEMOGLOBIN GLYCOSYLATED A1C: CPT

## 2020-06-22 PROCEDURE — 84443 ASSAY THYROID STIM HORMONE: CPT

## 2020-06-22 PROCEDURE — 80053 COMPREHEN METABOLIC PANEL: CPT

## 2020-06-22 PROCEDURE — 80061 LIPID PANEL: CPT

## 2020-06-22 PROCEDURE — 36415 COLL VENOUS BLD VENIPUNCTURE: CPT

## 2020-06-23 LAB
ESTIMATED AVERAGE GLUCOSE: 128.4 MG/DL
HBA1C MFR BLD: 6.1 %

## 2020-06-24 ENCOUNTER — TELEPHONE (OUTPATIENT)
Dept: CARDIOLOGY CLINIC | Age: 75
End: 2020-06-24

## 2020-06-24 NOTE — TELEPHONE ENCOUNTER
Created telephone encounter. Spoke with Bijan Smith relayed message per [facility] regarding labs. Pt verbalized understanding.

## 2020-06-26 ENCOUNTER — TELEPHONE (OUTPATIENT)
Dept: CARDIOLOGY CLINIC | Age: 75
End: 2020-06-26

## 2020-06-26 RX ORDER — PANTOPRAZOLE SODIUM 40 MG/1
40 TABLET, DELAYED RELEASE ORAL 2 TIMES DAILY
Qty: 60 TABLET | Refills: 1 | Status: SHIPPED | OUTPATIENT
Start: 2020-06-26 | End: 2020-12-09 | Stop reason: SDUPTHER

## 2020-06-26 RX ORDER — FAMOTIDINE 20 MG/1
20 TABLET, FILM COATED ORAL 2 TIMES DAILY
Qty: 30 TABLET | Refills: 1 | Status: SHIPPED | OUTPATIENT
Start: 2020-06-26 | End: 2020-08-05 | Stop reason: SDUPTHER

## 2020-07-07 ENCOUNTER — TELEPHONE (OUTPATIENT)
Dept: CARDIOLOGY CLINIC | Age: 75
End: 2020-07-07

## 2020-07-07 RX ORDER — DOXEPIN HYDROCHLORIDE 50 MG/1
50 CAPSULE ORAL NIGHTLY
Qty: 90 CAPSULE | Refills: 1 | Status: SHIPPED | OUTPATIENT
Start: 2020-07-07 | End: 2020-12-21 | Stop reason: SDUPTHER

## 2020-07-07 NOTE — TELEPHONE ENCOUNTER
I will fill this one time please have her see her PCP  If she is having trouble with getting appointment let me know we can call her PCP

## 2020-07-16 RX ORDER — METFORMIN HYDROCHLORIDE 500 MG/1
TABLET, EXTENDED RELEASE ORAL
Qty: 360 TABLET | Refills: 1 | Status: SHIPPED | OUTPATIENT
Start: 2020-07-16 | End: 2020-07-24 | Stop reason: SDUPTHER

## 2020-07-16 NOTE — TELEPHONE ENCOUNTER
LOV: 1/29/2020    NOV: NONE     DOSE: 1000 mg    Frequency: BID    Pharmacy as Pended:     Per LOV Note: Ok to increase the dose of metformin to 1000 mg BID.      Per Last PHONE NOTE:     Lab Results   Component Value Date    LABA1C 6.1 06/22/2020       No results found for: TSH, [id number], [id number], THYROIDAB, FT3, T4FREE

## 2020-07-16 NOTE — TELEPHONE ENCOUNTER
Pt called and said the metformin that was sent in was not the ER that she was taking before so she needs resent  .  Also now needs januvia called in 100 mg 1 a day with refills

## 2020-07-24 ENCOUNTER — TELEPHONE (OUTPATIENT)
Dept: ENDOCRINOLOGY | Age: 75
End: 2020-07-24

## 2020-07-24 RX ORDER — METFORMIN HYDROCHLORIDE 500 MG/1
TABLET, EXTENDED RELEASE ORAL
Qty: 120 TABLET | Refills: 1 | Status: SHIPPED | OUTPATIENT
Start: 2020-07-24 | End: 2020-11-18 | Stop reason: SDUPTHER

## 2020-07-24 NOTE — TELEPHONE ENCOUNTER
LOV: 1/29/2020    NOV: 7/31/2020    DOSE: 1000 mg    Frequency: BID    Pharmacy as Pended:     Per LOV Note:     Per Last PHONE NOTE: [id number] Jaylyn Garduno to increase the dose of metformin to 1000 mg BID.      Lab Results   Component Value Date    LABA1C 6.1 06/22/2020

## 2020-07-24 NOTE — TELEPHONE ENCOUNTER
[facility] called and stated [facility] not [facility] medication to her until next week.  She needs a 30 day supply sent to the local pharmacy     [facility] OF [address], [facility] [address] [facility] - [facility] [phone]

## 2020-08-05 RX ORDER — FAMOTIDINE 20 MG/1
20 TABLET, FILM COATED ORAL 2 TIMES DAILY
Qty: 180 TABLET | Refills: 3 | Status: SHIPPED | OUTPATIENT
Start: 2020-08-05 | End: 2020-10-29 | Stop reason: SDUPTHER

## 2020-08-05 RX ORDER — FAMOTIDINE 20 MG/1
20 TABLET, FILM COATED ORAL 2 TIMES DAILY
Qty: 60 TABLET | Refills: 1 | Status: SHIPPED | OUTPATIENT
Start: 2020-08-05 | End: 2020-08-05 | Stop reason: SDUPTHER

## 2020-08-05 NOTE — TELEPHONE ENCOUNTER
Pt requesting a 90 day supply refill of Famotidine to [facility] and also, a 30 day supply of Famotidine to her local [address] [place] [address] -  [phone])  per her request.

## 2020-08-06 RX ORDER — FAMOTIDINE 20 MG/1
20 TABLET, FILM COATED ORAL 2 TIMES DAILY
Qty: 30 TABLET | Refills: 6 | OUTPATIENT
Start: 2020-08-06

## 2020-09-17 RX ORDER — ATORVASTATIN CALCIUM 20 MG/1
TABLET, FILM COATED ORAL
Qty: 90 TABLET | Refills: 3 | Status: SHIPPED | OUTPATIENT
Start: 2020-09-17 | End: 2021-09-09 | Stop reason: SDUPTHER

## 2020-09-18 ENCOUNTER — TELEPHONE (OUTPATIENT)
Dept: CARDIOLOGY CLINIC | Age: 75
End: 2020-09-18

## 2020-09-18 NOTE — TELEPHONE ENCOUNTER
Refill    traZODone (DESYREL) 50 MG tablet   90 day supply    venlafaxine (EFFEXOR) 37.5 MG tablet     :  [address], [address]

## 2020-09-19 RX ORDER — TRAZODONE HYDROCHLORIDE 50 MG/1
100 TABLET ORAL NIGHTLY
Qty: 180 TABLET | Refills: 0 | Status: SHIPPED | OUTPATIENT
Start: 2020-09-19 | End: 2020-12-22

## 2020-09-19 RX ORDER — VENLAFAXINE 37.5 MG/1
37.5 TABLET ORAL DAILY
Qty: 90 TABLET | Refills: 1 | Status: SHIPPED | OUTPATIENT
Start: 2020-09-19 | End: 2021-03-16

## 2020-09-21 ENCOUNTER — TELEPHONE (OUTPATIENT)
Dept: DERMATOLOGY | Age: 75
End: 2020-09-21

## 2020-09-21 RX ORDER — TRIAMCINOLONE ACETONIDE 1 MG/G
CREAM TOPICAL
Qty: 80 G | Refills: 3 | Status: SHIPPED | OUTPATIENT
Start: 2020-09-21 | End: 2021-03-04 | Stop reason: ALTCHOICE

## 2020-09-21 NOTE — TELEPHONE ENCOUNTER
Dr Abdoul Teran patient  Pt c/b #[phone]  Pt stated  Requesting refill for Triamcinolone cream.  [address] [facility] - [facility] [phone]

## 2020-10-07 ENCOUNTER — TELEPHONE (OUTPATIENT)
Dept: CARDIOLOGY CLINIC | Age: 75
End: 2020-10-07

## 2020-10-28 ENCOUNTER — TELEPHONE (OUTPATIENT)
Dept: CARDIOLOGY CLINIC | Age: 75
End: 2020-10-28

## 2020-10-28 NOTE — TELEPHONE ENCOUNTER
Patient called stating they have not received a prescription for famotidine. Patient also needs new prescription for her potassium. Patient states [facility] states she does not have refills for potassium but patient has bottle showing refills.

## 2020-10-29 NOTE — TELEPHONE ENCOUNTER
These have already been sent in. [facility] to call in RX by phone as this is the third time patient has called with this request. Called patient back and informed her that RX x2 called in. Please sign for approval.     ROMIE OOT. Pended to BENNETT.

## 2020-11-01 RX ORDER — POTASSIUM CHLORIDE 20 MEQ/1
20 TABLET, EXTENDED RELEASE ORAL DAILY
Qty: 90 TABLET | Refills: 3 | OUTPATIENT
Start: 2020-11-01 | End: 2020-11-18 | Stop reason: SDUPTHER

## 2020-11-01 RX ORDER — FAMOTIDINE 20 MG/1
20 TABLET, FILM COATED ORAL 2 TIMES DAILY
Qty: 180 TABLET | Refills: 3 | OUTPATIENT
Start: 2020-11-01 | End: 2022-01-07

## 2020-11-18 ENCOUNTER — OFFICE VISIT (OUTPATIENT)
Dept: ENDOCRINOLOGY | Age: 75
End: 2020-11-18
Payer: MEDICARE

## 2020-11-18 ENCOUNTER — OFFICE VISIT (OUTPATIENT)
Dept: CARDIOLOGY CLINIC | Age: 75
End: 2020-11-18
Payer: MEDICARE

## 2020-11-18 VITALS
BODY MASS INDEX: 36.58 KG/M2 | DIASTOLIC BLOOD PRESSURE: 62 MMHG | HEART RATE: 80 BPM | SYSTOLIC BLOOD PRESSURE: 133 MMHG | WEIGHT: 227.6 LBS | HEIGHT: 66 IN | OXYGEN SATURATION: 94 %

## 2020-11-18 VITALS
DIASTOLIC BLOOD PRESSURE: 70 MMHG | SYSTOLIC BLOOD PRESSURE: 130 MMHG | HEIGHT: 67 IN | OXYGEN SATURATION: 96 % | HEART RATE: 81 BPM | WEIGHT: 226 LBS | BODY MASS INDEX: 35.47 KG/M2

## 2020-11-18 PROBLEM — E66.01 MORBIDLY OBESE (HCC): Status: ACTIVE | Noted: 2020-11-18

## 2020-11-18 PROCEDURE — 99214 OFFICE O/P EST MOD 30 MIN: CPT | Performed by: INTERNAL MEDICINE

## 2020-11-18 RX ORDER — METFORMIN HYDROCHLORIDE 500 MG/1
TABLET, EXTENDED RELEASE ORAL
Qty: 120 TABLET | Refills: 1 | Status: SHIPPED | OUTPATIENT
Start: 2020-11-18 | End: 2021-03-01 | Stop reason: SDUPTHER

## 2020-11-18 RX ORDER — CYANOCOBALAMIN 1000 UG/ML
1000 INJECTION INTRAMUSCULAR; SUBCUTANEOUS
Qty: 6 VIAL | Refills: 3 | Status: SHIPPED | OUTPATIENT
Start: 2020-11-18 | End: 2021-10-28 | Stop reason: SDUPTHER

## 2020-11-18 ASSESSMENT — ENCOUNTER SYMPTOMS
PHOTOPHOBIA: 0
BACK PAIN: 0
COUGH: 0

## 2020-11-18 NOTE — PATIENT INSTRUCTIONS
Plan:  1. Echocardiogram as ordered. 2. Labs: CBC   3. Continue medications as prescribed. No refills needed today per patient. 4. Follow up in February 2021.

## 2020-11-18 NOTE — PROGRESS NOTES
[facility] Office Note  11/18/2020     Subjective: Anya Langston is here for follow up of SVT, HTN, HLD  She c/o pain in legs and swelling. She is on O2 3 L/min 24/7    [place]:  Patient's lasix was increased to 40mg per day at last office visit on 6/15/2020 due to swelling. She was instructed to establish care with a new endocrinologist, but Dr. Ariana Dudley is moving out of the country and she has to find a new doctor. She reported has not found a new PCP yet. Today, 11/18/2020, she reports she has not a lot of time for herself. She is tearful and worried about her . She reports her swelling comes and goes. She reports she has been taking all medications as prescribed and tolerates them well. Patient denies current chest pain, palpitations, dizziness or syncope. PMH:    On 6/24/16 she stated she had not had palpitations since her discharge from the hospital.  Her main complaint was of fatigue which worsened in April. 4/2016 TSH 1.08. She had lost 45 lbs over the past year. She is on multiple medications for rash. She is also on Trazodone and Effexor. She has a rash due to liver disease and has been on multiple medications for her rash and itching. She started taking Atarax for itching - first dose taken today. She was taken off Benadryl and Periactin due to liver disease. She is also doing phototherapy by Dr. Marvel Dancer (dermatology). She has had abdominal cramping since undergoing Colonoscopy in May 2016. She has pernicious anemia for which she takes Vit V 12 every two weeks. 6/2016 H/H 9.6, 29.8. On 3/31/17 she reported her palpitations have returned. They occur randomly and last a few minutes. She describes it as a racing heart beat. They do not occur daily. They have been worsening in frequency. She also states her SOB is worsening. She follows up with Silvano Cota for this. Her oxygen level today in the office began at 88% and ivone to 92%. She complains of a productive cough.  She states this has been occurring for 1 year and has had no testing. She smoked for roughly 40 years. She also complains of worsening fatigue and poor sleep. This has also been worsening recently. She has been given Requip for this. She attributes her poor sleep to her cough. She also complains of generalized dryness. She had an episode on SVT on 8/15/18 while at [facility], . In 2018 she had a bout of vomiting and diarrhea and was hospitalized. She had an EGD done and it showed gastritis and esophageal varices without active bleeding. She did follow up with Dr Bc Mantilla on 2020 and he reduced her metformin to 500mg BID.  6/15/2020 patient presented to office for follow up. Patient reports that today she is having difficulty sleeping and that her legs and feet hurt which she relates to her diabetic neuropathy, and is not new. Patient reports she has swelling in bilateral legs and feet and she mentioned it to her foot doctor. She states she has swelling to her hands and legs in the morning when she wakes, reports her left foot is worse. Patient reports that she wears 3L oxygen at night. Patient reports she drinks about 50 ounces of water a day and 16 ounces of iced tea a day and a cup of coffee in the morning. Review of Systems: 12 point ROS negative in all areas as listed below except as in [place]  Constitutional, EENT, Cardiovascular, pulmonary, GI, , Musculoskeletal, skin, neurological, hematological, endocrine, Psychiatric   Reviewed past medical history, social, and family history. Nonsmoker. No alcohol. Mother: no cardiac history. Father:  of MI age 63's.    Past Medical History:   Diagnosis Date    Allergic     Anemia     Anesthesia complication     \"woke up during surgery\"    Arthritis     Osteoarthritis of bilateral CMC joints    Asthma     Followed by Dr. Citllai Owusu [facility])     non alcoholic    COPD [facility] obstructive [facility] disease) [facility] [address]     GERD (gastroesophageal reflux disease)     GIB (gastrointestinal bleeding)     LGIB 4/2016    Heart murmur     Hernia     Hyperlipidemia     Hypertension     Lung collapse     Murmur, heart     Pneumonia     Reflux     Rheumatic fever     Stress fracture     right foot    SVT (supraventricular tachycardia) ([facility] [address]     6/19/16 - admitted X 1 day per PT     TIA (transient ischemic attack) 2015    Type II or unspecified type diabetes mellitus without mention of complication, not stated as uncontrolled     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 ([address]     Wears glasses     as needed    Wears partial dentures     upper partial     Past Surgical History:   Procedure Laterality Date    BONE MARROW BIOPSY      BREAST BIOPSY      CARDIAC CATHETERIZATION  07/14/14    CATARACT REMOVAL      COLONOSCOPY      X 3 per PT 6/8/16    FINE NEEDLE ASPIRATION      FOOT SURGERY Right 06/03/2013    CORAL BUNIONECTOMY RIGHT FOOT WITH SCREW FIXATION;    HERNIA REPAIR      HYSTERECTOMY      JOINT REPLACEMENT Bilateral     TKR    KNEE SURGERY  09    left    KNEE SURGERY  2010    right    UPPER GASTROINTESTINAL ENDOSCOPY N/A 7/21/2018    EGD BIOPSY performed by Meredith Gaxiola MD at [facility]       Objective:   /70   Pulse 81   Ht 5' 7\" (1.702 m)   Wt 226 lb (102.5 kg)   SpO2 96%   BMI 35.40 kg/m²     Wt Readings from Last 3 Encounters:   11/18/20 226 lb (102.5 kg)   11/18/20 227 lb 9.6 oz (103.2 kg)   06/15/20 227 lb (103 kg)       Physical Exam:  General: No Respiratory distress, appears well developed and well nourished. Eyes:  Sclera nonicteric  Nose/Sinuses:  negative findings: nose shows no deformity, asymmetry, or inflammation, nasal mucosa normal, septum midline with no perforation or bleeding  Back:  no pain to palpation  Joint:  no active joint inflammation  Musculoskeletal:  negative  Skin:  Warm and dry  Neck:  Negative for JVD and Carotid Bruits.  Pain on movement of neck to left.  Chest:  Crackles bases bilaterally, respiration easy  Cardiovascular:  RRR, S1S2 normal, 2/6 MOHSEN lower left sternal border, no rub or thrill. Extremities:   1+ bilat pretibial leg edema, no clubbing, cyanosis,  Neuro: intact    Medications:   Outpatient Encounter Medications as of 11/18/2020   Medication Sig Dispense Refill    cyanocobalamin 1000 MCG/ML injection Inject 1 mL into the muscle every 14 days Last dose was taken on 4/9/17 6 vial 3    SITagliptin (JANUVIA) 100 MG tablet Take 1 tablet by mouth daily 90 tablet 2    metFORMIN (GLUCOPHAGE-XR) 500 MG extended release tablet Take 2 tablets PO BID. 120 tablet 1    famotidine (PEPCID) 20 MG tablet Take 1 tablet by mouth 2 times daily 180 tablet 3    traZODone (DESYREL) 50 MG tablet Take 2 tablets by mouth nightly 180 tablet 0    venlafaxine (EFFEXOR) 37.5 MG tablet Take 1 tablet by mouth daily 90 tablet 1    atorvastatin (LIPITOR) 20 MG tablet TAKE 1 TABLET NIGHTLY 90 tablet 3    doxepin (SINEQUAN) 50 MG capsule Take 1 capsule by mouth nightly 90 capsule 1    pantoprazole (PROTONIX) 40 MG tablet Take 1 tablet by mouth 2 times daily 60 tablet 1    cetirizine (ZYRTEC) 10 MG tablet Take 1 tablet by mouth daily 30 tablet 3    cetirizine (ZYRTEC) 10 MG tablet Take 1 tablet by mouth daily 90 tablet 3    potassium chloride (KLOR-CON M) 20 MEQ extended release tablet Take 1 tablet by mouth daily 90 tablet 3    furosemide (LASIX) 20 MG tablet Take 1 tablet by mouth daily 90 tablet 3    dilTIAZem (TIAZAC) 240 MG extended release capsule TAKE 1 CAPSULE DAILY 90 capsule 3    losartan (COZAAR) 100 MG tablet TAKE 1 TABLET DAILY 90 tablet 3    diclofenac 2 % SOLN Place 2 Pump onto the skin 2 times daily 1 Bottle 0    ferrous sulfate 325 (65 Fe) MG tablet Take 325 mg by mouth 3 times daily (with meals)      gabapentin (NEURONTIN) 600 MG tablet Take 600 mg by mouth nightly. 600 mg at bedtime and 300 mg BID.       diphenhydrAMINE (BENADRYL) 25 MG tablet Take 50 mg by mouth nightly       aspirin 81 MG EC tablet Take 81 mg by mouth daily       Cholecalciferol (VITAMIN D) 2000 UNITS CAPS capsule Take 2,000 Units by mouth daily       rOPINIRole (REQUIP) 1 MG tablet Take 1 mg by mouth nightly 1mg at 1730, 1mg at 2030, 2mg at 2200 (total of 4mg nightly in divided doses)      ursodiol (ACTIGALL) 500 MG tablet Take 500 mg by mouth 2 times daily       doxepin (SINEQUAN) 25 MG capsule Take 50 mg by mouth nightly       calcium carbonate 600 MG TABS tablet Take 1 tablet by mouth daily       levothyroxine (SYNTHROID) 100 MCG tablet Take 100 mcg by mouth Daily      Multiple Vitamins-Minerals (CENTRUM PO) Take 1 tablet by mouth daily.  levalbuterol (XOPENEX HFA) 45 MCG/ACT inhaler Inhale 2 puffs into the lungs every 6 hours as needed       beclomethasone (QVAR) 80 MCG/ACT inhaler Inhale 2 puffs into the lungs 2 times daily.  Aclidinium Bromide (TUDORZA PRESSAIR) 400 MCG/ACT AEPB Inhale 1 puff into the lungs 2 times daily       montelukast (SINGULAIR) 10 MG tablet Take 10 mg by mouth daily       estrogens, conjugated, (PREMARIN) 1.25 MG tablet Take 1.25 mg by mouth daily.  [DISCONTINUED] potassium chloride (KLOR-CON M) 20 MEQ extended release tablet Take 1 tablet by mouth daily (Patient not taking: Reported on 11/18/2020) 90 tablet 3    triamcinolone (KENALOG) 0.1 % cream Apply to affected areas twice daily for up to 2 weeks or until improved. 80 g 3    [DISCONTINUED] metFORMIN (GLUCOPHAGE-XR) 500 MG extended release tablet Take 2 tablets PO BID. 120 tablet 1    [DISCONTINUED] SITagliptin (JANUVIA) 100 MG tablet Take 1 tablet by mouth daily 90 tablet 2    [DISCONTINUED] cyanocobalamin 1000 MCG/ML injection Inject 1,000 mcg into the muscle every 14 days Last dose was taken on 4/9/17       No facility-administered encounter medications on file as of 11/18/2020.          Lab Data:    Lab Results   Component Value Date    TRIG 133 02/04/2019    TRIG 110 01/11/2017    TRIG 125 07/14/2014     Lab Results   Component Value Date    HDL 75 (H) 06/22/2020    HDL 78 (A) 02/04/2019    HDL 73 (A) 01/11/2017     Lab Results   Component Value Date    LDLCALC 69 06/22/2020    LDLCALC 66 02/04/2019    LDLCALC 53 01/11/2017     Lab Results   Component Value Date    LABVLDL 19 06/22/2020    LABVLDL 25 07/14/2014    LABVLDL 50 11/13/2013     A1C:   Lab Results   Component Value Date    LABA1C 6.1 06/22/2020   LABS: 2/2019 - [facility] - , , HDL 78, LDL 66, AIC 4.9    IMAGING:     ECHO 4/12/2017:    Summary   Left ventricular systolic function is hyperdynamic with ejection fraction   estimated at >65 %. No regional wall motion abnormalities. Grade II diastolic dysfunction with elevated filling pressure. There is moderate concentric left ventricular hypertrophy. Left ventricle size is normal.   Mildly dilated left atrium. 5/2015 CXR    Opinion: There is persistent mild middle lobe disease. The disease   is unchanged for the past one month and this may represent middle   lobe scarring. Cardiac cath: 07/14/2014  1. Mild coronary artery disease involving left anterior descending,  circumflex and right coronary arteries. 2.  Hyperdynamic left ventricle with an ejection fraction of 65%. 3.  Elevated left ventricular end-diastolic pressure, 15 mmHg. 4.  Patent renal arteries bilaterally. RECOMMENDATIONS:   The patient is noted to have mild coronary artery disease  which does not explain her shortness of breath. She, however, does have    elevated LVEDP from uncontrolled blood pressure. She needs aggressive  antihypertensive therapy for optimal blood pressure control. Will start her  on lasix for dyspnea and add losartan to her antihypertensive regimen. She    will continue the aspirin and Lipitor as well as Toprol. Echo: 4/16/15  Left ventricular size is normal .  Mild concentric left ventricular hypertrophy is present.   Global ejection fraction is normal and estimated from 55 % to 60 %. No regional wall motion abnormalities are noted. Diastolic filling parameters suggests grade I diastolic dysfunction . Mild mitral regurgitation is present. Mild tricuspid regurgitation. Systolic pulmonary artery pressure (SPAP) is normal and estimated at 35   mmHg  (RA pressure 8 mm Hg). Mild pulmonic regurgitation present. Cardiac Cath: 04/05/15  1. Mild coronary artery disease involving left anterior descending,  circumflex and right coronary arteries. 2. Hyperdynamic left ventricle with an ejection fraction of 65%. 3. Elevated left ventricular end-diastolic pressure, 15 mmHg. 4. Patent renal arteries bilaterally. Assessment:  Encounter Diagnoses   Name Primary?  SVT (supraventricular tachycardia) (HCC) Yes    Hypertension, unspecified type     Mixed hyperlipidemia     TIA (transient ischemic attack)     SOB (shortness of breath)     Medication management        Diagnoses and associated orders for this visit:  1. SVT controlled   2. Hyperlipidemia Most recent lipids 6.22.20   3. HTN (hypertension) mild patient very anxious   4. Edema legs appears to be chronic dependent no crackles in lungs. Type 2 diabetes mellitus [facility]) seeing Dr Annika Reeder endocrinologist    Plan:  1. Echocardiogram as ordered. 2. Labs: CBC   3. Continue medications as prescribed. No refills needed today per patient. 4. Follow up in February 2021. QUALITY MEASURES  1. Tobacco Cessation Counseling: Yes  2. Retake of BP if >140/90:   NA  3. Documentation to PCP/referring for new patient:  Sent to PCP at close of office visit  4. CAD patient on anti-platelet: NA   5. CAD patient on STATIN therapy:  yes  6.  Patient with CHF and aFib on anticoagulation:  NA     This note has been scribed in the presence of Dr. Brett Bauman MD by Lyons FRANKLYN Mohan.     I, Dr. Lyda Mcardle, personally performed the services described in this documentation, as scribed by the above signed scribe in my presence. It is both accurate and complete to my knowledge. I agree with the details independently gathered by the clinical support staff, while the remaining scribed note accurately describes my personal service to the patient.

## 2020-11-18 NOTE — PROGRESS NOTES
Emmett Lee M.D. Phone: [phone]   FAX: [phone]       Gay Palumbo [address] [facility]   YOB: 1945    Date of Visit:  11/18/2020    Allergies   Allergen Reactions    Latex Swelling and Rash    Clindamycin Itching    Pennsaid [Diclofenac Sodium] Itching and Rash    Torsemide Itching    Actos [Pioglitazone] Diarrhea    Amoxicillin Other (See Comments)    Augmentin [Amoxicillin-Pot Clavulanate] Other (See Comments)    Bactrim [Sulfamethoxazole-Trimethoprim] Other (See Comments)    Ciprofloxacin Other (See Comments)     Makes her weird  Makes anxious and she has weird dreams    Doxycycline Other (See Comments)     Feels like she is smothering, chest tightness    Ativan [Lorazepam] Other (See Comments) and Anxiety     And confusion  Had weird dreams and hallucinations    Cephalosporins Rash    Pcn [Penicillins] Rash and Itching    Proventil [Albuterol] Anxiety     Outpatient Medications Marked as Taking for the 11/18/20 encounter (Office Visit) with Hanh Arceo MD   Medication Sig Dispense Refill    famotidine (PEPCID) 20 MG tablet Take 1 tablet by mouth 2 times daily 180 tablet 3    triamcinolone (KENALOG) 0.1 % cream Apply to affected areas twice daily for up to 2 weeks or until improved.  80 g 3    traZODone (DESYREL) 50 MG tablet Take 2 tablets by mouth nightly 180 tablet 0    venlafaxine (EFFEXOR) 37.5 MG tablet Take 1 tablet by mouth daily 90 tablet 1    atorvastatin (LIPITOR) 20 MG tablet TAKE 1 TABLET NIGHTLY 90 tablet 3    metFORMIN (GLUCOPHAGE-XR) 500 MG extended release tablet Take 2 tablets PO BID. 120 tablet 1    SITagliptin (JANUVIA) 100 MG tablet Take 1 tablet by mouth daily 90 tablet 2    doxepin (SINEQUAN) 50 MG capsule Take 1 capsule by mouth nightly 90 capsule 1    pantoprazole (PROTONIX) 40 MG tablet Take 1 tablet by mouth 2 times daily 60 tablet 1    cetirizine (ZYRTEC) 10 MG tablet Take 1 tablet by mouth daily 30 tablet 3    cetirizine (ZYRTEC) tablet Take 1.25 mg by mouth daily. Vitals:    11/18/20 0952   BP: 133/62   Site: Right Upper Arm   Position: Sitting   Cuff Size: Medium Adult   Pulse: 80   SpO2: 94%   Weight: 227 lb 9.6 oz (103.2 kg)   Height: 5' 6\" (1.676 m)     Body mass index is 36.74 kg/m².      Wt Readings from Last 3 Encounters:   11/18/20 227 lb 9.6 oz (103.2 kg)   06/15/20 227 lb (103 kg)   01/29/20 228 lb (103.4 kg)     BP Readings from Last 3 Encounters:   11/18/20 133/62   06/15/20 (!) 160/70   01/29/20 (!) 159/70        Past Medical History:   Diagnosis Date    Allergic     Anemia     Anesthesia complication     \"woke up during surgery\"    Arthritis     Osteoarthritis of bilateral CMC joints    Asthma     Followed by Dr. Hendricks [facility])     non alcoholic    COPD (ch[facility] obstructive pulmonary disease) ([facility] [address]     GERD (gastroesophageal reflux disease)     GIB (gastrointestinal bleeding)     LGIB 4/2016    Heart murmur     Hernia     Hyperlipidemia     Hypertension     Lung collapse     Murmur, heart     Pneumonia     Reflux     Rheumatic fever     Stress fracture     right foot    SVT (supraventricular tachycardia) ([address]     6/19/16 - admitted X 1 day per PT     TIA (transient ischemic attack) 2015    Type II or unspecified type diabetes mellitus without mention of complication, not stated as uncontrolled     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 ([facility] [address]     Wears glasses     as needed    Wears partial dentures     upper partial     Past Surgical History:   Procedure Laterality Date    BONE MARROW BIOPSY      BREAST BIOPSY      CARDIAC CATHETERIZATION  07/14/14    CATARACT REMOVAL      COLONOSCOPY      X 3 per PT 6/8/16    FINE NEEDLE ASPIRATION      FOOT SURGERY Right 06/03/2013    CORAL BUNIONECTOMY RIGHT FOOT WITH SCREW FIXATION;    HERNIA REPAIR      HYSTERECTOMY      JOINT REPLACEMENT Bilateral     TKR    KNEE SURGERY  09    left    KNEE SURGERY  2010    right    UPPER GASTROINTESTINAL ENDOSCOPY N/A 2018    EGD BIOPSY performed by Florence Christianson MD at [address] [place] [address]     Family History   Problem Relation Age of Onset    Lupus Other     Colon Cancer Mother     Diabetes Father     Kidney Disease Paternal Grandmother      Social History     Tobacco Use   Smoking Status Former Smoker    Packs/day: 1.00    Years: 40.00    Pack years: 40.00    Types: Cigarettes    Last attempt to quit: 2000    Years since quittin.4   Smokeless Tobacco Never Used   Tobacco Comment    stopped cold turkey in !! ( )      Social History     Substance and Sexual Activity   Alcohol Use No    Alcohol/week: 0.0 standard drinks       HPI      Herlinda Herrera is a 77 y.o. female who is here for  management of DM. Patty Sam MD ( cardiology )      Patient has a PMH of Type 2 DM, hypertension, hyperlipidemia, obesity , hypothyroidism, COPD    Diagnosed with Diabetes Mellitus type 2 in . Course has been variable . Microvascular complications: No known retinopathy (Last eye exam: )   No Nephropathy :  Has  Peripheral neuropathy:    Home regimen:   Metformin 1000 mg BID  Januvia 100 mg daily. Previous medications:    Blood glucose trend  Fasting     Evening 120-180      Diet: Eats  meals/day . Nutrition education:  Exercise:  No     Hyperlipidemia:  On atorvastatin 20 mg daily. Hypothyroidism : On levothyroxine 100 mcg daily      Review of Systems   Constitutional: Negative for chills, diaphoresis and fever. Eyes: Negative for photophobia. Respiratory: Negative for cough. Cardiovascular: Negative for chest pain and palpitations. Endocrine: Negative for polydipsia. Genitourinary: Negative for dysuria, flank pain, frequency, hematuria and urgency. Musculoskeletal: Negative for back pain, myalgias and neck pain. Skin: Negative for rash. Allergic/Immunologic: Negative for environmental allergies.    Neurological: Negative for dizziness, tremors, seizures and headaches. Hematological: Does not bruise/bleed easily. Psychiatric/Behavioral: Negative for hallucinations and suicidal ideas. The patient is not nervous/anxious. Physical Exam  Constitutional:       General: She is not in acute distress. Appearance: She is well-developed. She is not diaphoretic. Neck:      Thyroid: No thyromegaly. Cardiovascular:      Rate and Rhythm: Normal rate. Heart sounds: Murmur present. Pulmonary:      Effort: Pulmonary effort is normal. No respiratory distress. Breath sounds: No wheezing. Abdominal:      General: Bowel sounds are normal.      Palpations: Abdomen is soft. Tenderness: There is no abdominal tenderness. Skin:     General: Skin is warm and dry. Neurological:      Mental Status: She is alert and oriented to person, place, and time. Psychiatric:         Behavior: Behavior normal.         Thought Content: Thought content normal.         Foot exam :   Visual inspection:  Skin lesions/pre-ulcerative calluses: + callus   Edema: right- 1+, left- 1+    Sensory exam:  Monofilament sensation: abnormal -     Plus at least one of the following:  Pulses: normal,         Lab Results   Component Value Date    LABA1C 6.1 06/22/2020         Assessment/Plan        1. Type 2 DM     Jay Trotter is a 77 y.o. female has Type 2 DM with obesity and insulin resistance. A1c 5.4 % ---> 6.2 %   Complicated by neuropathy        - Continue metformin 1000 mg BID    -Continue januvia 100 mg daily. Advised follow-up with the ophthalmologist once year. Urine microalbumin/cr ratio normal 08/19. Follows with podiatry every 9 weeks ( Dr. Laura Coles )     Foot exam done today     2. SVT/Hypertension. Managed by cardiology. 3. Hyperlipidemia. On statins. Lipids at goal.     4. Hypothyroidism     On levothyroxine 100 mcg daily. TSH 1.86 in 01/20---> 1.34 ( 11/05/20)     Continue same dose.

## 2020-11-18 NOTE — LETTER
[facility] Office Note  11/18/2020     Subjective: Antoinette Santillan is here for follow up of SVT, HTN, HLD  She c/o pain in legs and swelling. She is on O2 3 L/min 24/7    [place]:  Patient's lasix was increased to 40mg per day at last office visit on 6/15/2020 due to swelling. She was instructed to establish care with a new endocrinologist, but Dr. Felipe Craig is moving out of the country and she has to find a new doctor. She reported has not found a new PCP yet. Today, 11/18/2020, she reports she has not a lot of time for herself. She is tearful and worried about her . She reports her swelling comes and goes. She reports she has been taking all medications as prescribed and tolerates them well. Patient denies current chest pain, palpitations, dizziness or syncope. PMH:    On 6/24/16 she stated she had not had palpitations since her discharge from the hospital.  Her main complaint was of fatigue which worsened in April. 4/2016 TSH 1.08. She had lost 45 lbs over the past year. She is on multiple medications for rash. She is also on Trazodone and Effexor. She has a rash due to liver disease and has been on multiple medications for her rash and itching. She started taking Atarax for itching - first dose taken today. She was taken off Benadryl and Periactin due to liver disease. She is also doing phototherapy by Dr. Danielle Verduzco (dermatology). She has had abdominal cramping since undergoing Colonoscopy in May 2016. She has pernicious anemia for which she takes Vit V 12 every two weeks. 6/2016 H/H 9.6, 29.8. On 3/31/17 she reported her palpitations have returned. They occur randomly and last a few minutes. She describes it as a racing heart beat. They do not occur daily. They have been worsening in frequency. She also states her SOB is worsening. She follows up with Silvano Solis for this. Her oxygen level today in the office began at 88% and ivone to 92%.  She complains of a productive cough. She states this has been occurring for 1 year and has had no testing. She smoked for roughly 40 years. She also complains of worsening fatigue and poor sleep. This has also been worsening recently. She has been given Requip for this. She attributes her poor sleep to her cough. She also complains of generalized dryness. She had an episode on SVT on 8/15/18 while at [facility], . In 2018 she had a bout of vomiting and diarrhea and was hospitalized. She had an EGD done and it showed gastritis and esophageal varices without active bleeding. She did follow up with Dr Felipe Craig on 2020 and he reduced her metformin to 500mg BID.  6/15/2020 patient presented to office for follow up. Patient reports that today she is having difficulty sleeping and that her legs and feet hurt which she relates to her diabetic neuropathy, and is not new. Patient reports she has swelling in bilateral legs and feet and she mentioned it to her foot doctor. She states she has swelling to her hands and legs in the morning when she wakes, reports her left foot is worse. Patient reports that she wears 3L oxygen at night. Patient reports she drinks about 50 ounces of water a day and 16 ounces of iced tea a day and a cup of coffee in the morning. Review of Systems: 12 point ROS negative in all areas as listed below except as in [place]  Constitutional, EENT, Cardiovascular, pulmonary, GI, , Musculoskeletal, skin, neurological, hematological, endocrine, Psychiatric   Reviewed past medical history, social, and family history. Nonsmoker. No alcohol. Mother: no cardiac history. Father:  of MI age 63's.    Past Medical History:   Diagnosis Date    Allergic     Anemia     Anesthesia complication     \"woke up during surgery\"    Arthritis     Osteoarthritis of bilateral CMC joints    Asthma     Followed by Dr. Diego Calvo Cirrh[facility])     non alcoholic  COPD (chronic obstructive pulmonary disease) (HCC)     GERD (gastroesophageal reflux disease)     GIB (gastrointestinal bleeding)     LGIB 4/2016    Heart murmur     Hernia     Hyperlipidemia     Hypertension     Lung collapse     Murmur, heart     Pneumonia     Reflux     Rheumatic fever     Stress fracture     right foot    SVT [facility]supraventricular tachycardia) ([facility] [address]     6/19/16 - admitted X 1 day per PT     TIA (transient ischemic attack) 2015    Type II or unspecified type diabetes mellitus without mention of complication, not stated as uncontrolled     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 ([facility] [address]     Wears glasses     as needed    Wears partial dentures     upper partial     Past Surgical History:   Procedure Laterality Date    BONE MARROW BIOPSY      BREAST BIOPSY      CARDIAC CATHETERIZATION  07/14/14    CATARACT REMOVAL      COLONOSCOPY      X 3 per PT 6/8/16    FINE NEEDLE ASPIRATION      FOOT SURGERY Right 06/03/2013    CORAL BUNIONECTOMY RIGHT FOOT WITH SCREW FIXATION;    HERNIA REPAIR      HYSTERECTOMY      JOINT REPLACEMENT Bilateral     TKR    KNEE SURGERY  09    left    KNEE SURGERY  2010    right    UPPER GASTROINTESTINAL ENDOSCOPY N/A 7/21/2018    EGD BIOPSY performed by Douglas Hull MD at [id number] [facility] ENDOSCOPY       Objective:   /70   Pulse 81   Ht 5' 7\" (1.702 m)   Wt 226 lb (102.5 kg)   SpO2 96%   BMI 35.40 kg/m²     Wt Readings from Last 3 Encounters:   11/18/20 226 lb (102.5 kg)   11/18/20 227 lb 9.6 oz (103.2 kg)   06/15/20 227 lb (103 kg)       Physical Exam:  General: No Respiratory distress, appears well developed and well nourished.    Eyes:  Sclera nonicteric  Nose/Sinuses:  negative findings: nose shows no deformity, asymmetry, or inflammation, nasal mucosa normal, septum midline with no perforation or bleeding  Back:  no pain to palpation  Joint:  no active joint inflammation  Musculoskeletal:  negative  Skin:  Warm and dry Neck:  Negative for JVD and Carotid Bruits. Pain on movement of neck to left. Chest:  Crackles bases bilaterally, respiration easy  Cardiovascular:  RRR, S1S2 normal, 2/6 MOHSEN lower left sternal border, no rub or thrill. Extremities:   1+ bilat pretibial leg edema, no clubbing, cyanosis,  Neuro: intact    Medications:   Outpatient Encounter Medications as of 11/18/2020   Medication Sig Dispense Refill    cyanocobalamin 1000 MCG/ML injection Inject 1 mL into the muscle every 14 days Last dose was taken on 4/9/17 6 vial 3    SITagliptin (JANUVIA) 100 MG tablet Take 1 tablet by mouth daily 90 tablet 2    metFORMIN (GLUCOPHAGE-XR) 500 MG extended release tablet Take 2 tablets PO BID. 120 tablet 1    famotidine (PEPCID) 20 MG tablet Take 1 tablet by mouth 2 times daily 180 tablet 3    traZODone (DESYREL) 50 MG tablet Take 2 tablets by mouth nightly 180 tablet 0    venlafaxine (EFFEXOR) 37.5 MG tablet Take 1 tablet by mouth daily 90 tablet 1    atorvastatin (LIPITOR) 20 MG tablet TAKE 1 TABLET NIGHTLY 90 tablet 3    doxepin (SINEQUAN) 50 MG capsule Take 1 capsule by mouth nightly 90 capsule 1    pantoprazole (PROTONIX) 40 MG tablet Take 1 tablet by mouth 2 times daily 60 tablet 1    cetirizine (ZYRTEC) 10 MG tablet Take 1 tablet by mouth daily 30 tablet 3    cetirizine (ZYRTEC) 10 MG tablet Take 1 tablet by mouth daily 90 tablet 3    potassium chloride (KLOR-CON M) 20 MEQ extended release tablet Take 1 tablet by mouth daily 90 tablet 3    furosemide (LASIX) 20 MG tablet Take 1 tablet by mouth daily 90 tablet 3    dilTIAZem (TIAZAC) 240 MG extended release capsule TAKE 1 CAPSULE DAILY 90 capsule 3    losartan (COZAAR) 100 MG tablet TAKE 1 TABLET DAILY 90 tablet 3    diclofenac 2 % SOLN Place 2 Pump onto the skin 2 times daily 1 Bottle 0    ferrous sulfate 325 (65 Fe) MG tablet Take 325 mg by mouth 3 times daily (with meals)      gabapentin (NEURONTIN) 600 MG tablet Take 600 mg by mouth nightly.  0 [facility] mg at bedtime and 300 mg BID.  diphenhydrAMINE (BENADRYL) 25 MG tablet Take 50 mg by mouth nightly       aspirin 81 MG EC tablet Take 81 mg by mouth daily       Cholecalciferol (VITAMIN D) 2000 UNITS CAPS capsule Take 2,000 Units by mouth daily       rOPINIRole (REQUIP) 1 MG tablet Take 1 mg by mouth nightly 1mg at 1730, 1mg at 2030, 2mg at 2200 (total of 4mg nightly in divided doses)      ursodiol (ACTIGALL) 500 MG tablet Take 500 mg by mouth 2 times daily       doxepin (SINEQUAN) 25 MG capsule Take 50 mg by mouth nightly       calcium carbonate 600 MG TABS tablet Take 1 tablet by mouth daily       levothyroxine (SYNTHROID) 100 MCG tablet Take 100 mcg by mouth Daily      Multiple Vitamins-Minerals (CENTRUM PO) Take 1 tablet by mouth daily.  levalbuterol (XOPENEX HFA) 45 MCG/ACT inhaler Inhale 2 puffs into the lungs every 6 hours as needed       beclomethasone (QVAR) 80 MCG/ACT inhaler Inhale 2 puffs into the lungs 2 times daily.  Aclidinium Bromide (TUDORZA PRESSAIR) 400 MCG/ACT AEPB Inhale 1 puff into the lungs 2 times daily       montelukast (SINGULAIR) 10 MG tablet Take 10 mg by mouth daily       estrogens, conjugated, (PREMARIN) 1.25 MG tablet Take 1.25 mg by mouth daily.  [DISCONTINUED] potassium chloride (KLOR-CON M) 20 MEQ extended release tablet Take 1 tablet by mouth daily (Patient not taking: Reported on 11/18/2020) 90 tablet 3    triamcinolone (KENALOG) 0.1 % cream Apply to affected areas twice daily for up to 2 weeks or until improved. 80 g 3    [DISCONTINUED] metFORMIN (GLUCOPHAGE-XR) 500 MG extended release tablet Take 2 tablets PO BID. 120 tablet 1    [DISCONTINUED] SITagliptin (JANUVIA) 100 MG tablet Take 1 tablet by mouth daily 90 tablet 2    [DISCONTINUED] cyanocobalamin 1000 MCG/ML injection Inject 1,000 mcg into the muscle every 14 days Last dose was taken on 4/9/17       No facility-administered encounter medications on file as of 11/18/2020. Lab Data:    Lab Results   Component Value Date    TRIG 133 02/04/2019    TRIG 110 01/11/2017    TRIG 125 07/14/2014     Lab Results   Component Value Date    HDL 75 (H) 06/22/2020    HDL 78 (A) 02/04/2019    HDL 73 (A) 01/11/2017     Lab Results   Component Value Date    LDLCALC 69 06/22/2020    LDLCALC 66 02/04/2019    LDLCALC 53 01/11/2017     Lab Results   Component Value Date    LABVLDL 19 06/22/2020    LABVLDL 25 07/14/2014    LABVLDL 50 11/13/2013     A1C:   Lab Results   Component Value Date    LABA1C 6.1 06/22/2020   LABS: 2/2019 - [facility] - , , HDL 78, LDL 66, AIC 4.9    IMAGING:     ECHO 4/12/2017:    Summary   Left ventricular systolic function is hyperdynamic with ejection fraction   estimated at >65 %. No regional wall motion abnormalities. Grade II diastolic dysfunction with elevated filling pressure. There is moderate concentric left ventricular hypertrophy. Left ventricle size is normal.   Mildly dilated left atrium. 5/2015 CXR    Opinion: There is persistent mild middle lobe disease. The disease   is unchanged for the past one month and this may represent middle   lobe scarring. Cardiac cath: 07/14/2014  1. Mild coronary artery disease involving left anterior descending,  circumflex and right coronary arteries. 2.  Hyperdynamic left ventricle with an ejection fraction of 65%. 3.  Elevated left ventricular end-diastolic pressure, 15 mmHg. 4.  Patent renal arteries bilaterally. RECOMMENDATIONS:   The patient is noted to have mild coronary artery disease  which does not explain her shortness of breath. She, however, does have    elevated LVEDP from uncontrolled blood pressure. She needs aggressive  antihypertensive therapy for optimal blood pressure control. Will start her  on lasix for dyspnea and add losartan to her antihypertensive regimen. She    will continue the aspirin and Lipitor as well as Toprol.     Echo: 4/16/15  Left ventricular size is normal . Mild concentric left ventricular hypertrophy is present. Global ejection fraction is normal and estimated from 55 % to 60 %. No regional wall motion abnormalities are noted. Diastolic filling parameters suggests grade I diastolic dysfunction . Mild mitral regurgitation is present. Mild tricuspid regurgitation. Systolic pulmonary artery pressure (SPAP) is normal and estimated at 35   mmHg  (RA pressure 8 mm Hg). Mild pulmonic regurgitation present. Cardiac Cath: 04/05/15  1. Mild coronary artery disease involving left anterior descending,  circumflex and right coronary arteries. 2. Hyperdynamic left ventricle with an ejection fraction of 65%. 3. Elevated left ventricular end-diastolic pressure, 15 mmHg. 4. Patent renal arteries bilaterally. Assessment:  Encounter Diagnoses   Name Primary?  SVT (supraventricular tachycardia) (HCC) Yes    Hypertension, unspecified type     Mixed hyperlipidemia     TIA (transient ischemic attack)     SOB (shortness of breath)     Medication management        Diagnoses and associated orders for this visit:  1. SVT controlled   2. Hyperlipidemia Most recent lipids 6.22.20   3. HTN (hypertension) mild patient very anxious   4. Edema legs appears to be chronic dependent no crackles in lungs. Type 2 diabetes mellitus [facility]) seeing Dr Cristina Demarco endocrinologist    Plan:  1. Echocardiogram as ordered. 2. Labs: CBC   3. Continue medications as prescribed. No refills needed today per patient. 4. Follow up in February 2021. QUALITY MEASURES  1. Tobacco Cessation Counseling: Yes  2. Retake of BP if >140/90:   NA  3. Documentation to PCP/referring for new patient:  Sent to PCP at close of office visit  4. CAD patient on anti-platelet: NA   5. CAD patient on STATIN therapy:  yes  6. Patient with CHF and aFib on anticoagulation:  NA     This note has been scribed in the presence of Dr. Sofía Gallegos MD by Vimal Rowan RN. I, Dr. Iveth Mendoza, personally performed the services described in this documentation, as scribed by the above signed scribe in my presence. It is both accurate and complete to my knowledge. I agree with the details independently gathered by the clinical support staff, while the remaining scribed note accurately describes my personal service to the patient.

## 2020-12-01 ENCOUNTER — TELEPHONE (OUTPATIENT)
Dept: CARDIOLOGY CLINIC | Age: 75
End: 2020-12-01

## 2020-12-01 LAB
BASOPHILS ABSOLUTE: 0 /ΜL
BASOPHILS RELATIVE PERCENT: 1 %
EOSINOPHILS ABSOLUTE: 0.1 /ΜL
EOSINOPHILS RELATIVE PERCENT: 1 %
HCT VFR BLD CALC: 34.1 % (ref 36–46)
HEMOGLOBIN: 11.3 G/DL (ref 12–16)
LYMPHOCYTES ABSOLUTE: 1 /ΜL
LYMPHOCYTES RELATIVE PERCENT: 14 %
MCH RBC QN AUTO: 29 PG
MCHC RBC AUTO-ENTMCNC: 33.1 G/DL
MCV RBC AUTO: 87 FL
MONOCYTES ABSOLUTE: 0.4 /ΜL
MONOCYTES RELATIVE PERCENT: 6 %
NEUTROPHILS ABSOLUTE: 6 /ΜL
NEUTROPHILS RELATIVE PERCENT: 78 %
PLATELET # BLD: 183 K/ΜL
PMV BLD AUTO: ABNORMAL FL
RBC # BLD: 3.9 10^6/ΜL
WBC # BLD: 7.6 10^3/ML

## 2020-12-01 NOTE — TELEPHONE ENCOUNTER
----- Message from Oliver Hill MD sent at 12/1/2020 10:24 AM EST -----  CBC test looks good. Please call patient.

## 2020-12-09 RX ORDER — PANTOPRAZOLE SODIUM 40 MG/1
40 TABLET, DELAYED RELEASE ORAL 2 TIMES DAILY
Qty: 180 TABLET | Refills: 2 | Status: SHIPPED | OUTPATIENT
Start: 2020-12-09 | End: 2021-08-17

## 2020-12-09 NOTE — TELEPHONE ENCOUNTER
Pharmacy called, pt is requesting refill of pantoprazole (PROTONIX) 40 MG tablet    [address], [address] [facility] [phone]

## 2020-12-21 NOTE — TELEPHONE ENCOUNTER
Pt last OV 11/18/20 RKG  Assessment:       Encounter Diagnoses   Name Primary?  SVT (supraventricular tachycardia) (HCC) Yes    Hypertension, unspecified type      Mixed hyperlipidemia      TIA (transient ischemic attack)      SOB (shortness of breath)      Medication management           Diagnoses and associated orders for this visit:  1. SVT controlled   2. Hyperlipidemia Most recent lipids 6.22.20   3. HTN (hypertension) mild patient very anxious   4. Edema legs appears to be chronic dependent no crackles in lungs. Type 2 diabetes mellitus [facility]) seeing Dr Bc Mantilla endocrinologist     Plan:  1. Echocardiogram as ordered. 2. Labs: CBC   3. Continue medications as prescribed. No refills needed today per patient.    4. Follow up in February 2021.

## 2020-12-22 RX ORDER — DOXEPIN HYDROCHLORIDE 50 MG/1
50 CAPSULE ORAL NIGHTLY
Qty: 90 CAPSULE | Refills: 1 | Status: SHIPPED | OUTPATIENT
Start: 2020-12-22 | End: 2021-02-15 | Stop reason: SDUPTHER

## 2020-12-22 RX ORDER — TRAZODONE HYDROCHLORIDE 50 MG/1
TABLET ORAL
Qty: 180 TABLET | Refills: 3 | Status: SHIPPED | OUTPATIENT
Start: 2020-12-22 | End: 2021-12-03

## 2021-01-25 ENCOUNTER — TELEPHONE (OUTPATIENT)
Dept: ENDOCRINOLOGY | Age: 76
End: 2021-01-25

## 2021-01-25 ENCOUNTER — TELEPHONE (OUTPATIENT)
Dept: FAMILY MEDICINE CLINIC | Age: 76
End: 2021-01-25

## 2021-01-25 ENCOUNTER — CLINICAL DOCUMENTATION (OUTPATIENT)
Dept: SPIRITUAL SERVICES | Age: 76
End: 2021-01-25

## 2021-01-25 NOTE — PROGRESS NOTES
received a call from patient. She lost her spouse recently and called to express her gratitude for the support in her life. We processed her thoughts/feelings, offered prayer and ongoing calls to support her as the grief journey unfolds. Continue periodic  calls.

## 2021-01-25 NOTE — TELEPHONE ENCOUNTER
pts daughter called and said snow signed off for diabetic shoes, and they will not accept unless by a doctor. Please redo and send back to [facility] clinic.

## 2021-01-25 NOTE — TELEPHONE ENCOUNTER
Spoke with [facility] clinic and they have the needed paperwork signed by Dr. Shiva Ortiz. Called the patients daughter and informed her of this. She states her mother might have been confused and will check into the matter with [facility]. She will call back if she has anymore questions or concerns.

## 2021-02-15 RX ORDER — DOXEPIN HYDROCHLORIDE 50 MG/1
50 CAPSULE ORAL NIGHTLY
Qty: 90 CAPSULE | Refills: 3 | Status: SHIPPED | OUTPATIENT
Start: 2021-02-15 | End: 2022-03-03 | Stop reason: SDUPTHER

## 2021-02-15 NOTE — TELEPHONE ENCOUNTER
Last OV 11/18/20 , next scheduled OV 4/21/21 . RKG, pt sts that she takes 50mg BID @ night please clarify and change on script. TY  Assessment:       Encounter Diagnoses   Name Primary?  SVT (supraventricular tachycardia) (HCC) Yes    Hypertension, unspecified type      Mixed hyperlipidemia      TIA (transient ischemic attack)      SOB (shortness of breath)      Medication management           Diagnoses and associated orders for this visit:  1. SVT controlled   2. Hyperlipidemia Most recent lipids 6.22.20   3. HTN (hypertension) mild patient very anxious   4. Edema legs appears to be chronic dependent no crackles in lungs. Type 2 diabetes mellitus [facility]) seeing Dr Lidia Cuevas endocrinologist     Plan:  1. Echocardiogram as ordered. 2. Labs: CBC   3. Continue medications as prescribed. No refills needed today per patient. 4. Follow up in February 2021.

## 2021-02-15 NOTE — TELEPHONE ENCOUNTER
Pt sts she takes doxepin (SINEQUAN) 50 MG capsule BID at night. She will need new order sent to   [address], [place]. Nayeli Cheema   For 90 day supply.

## 2021-02-24 ENCOUNTER — TELEPHONE (OUTPATIENT)
Dept: SPIRITUAL SERVICES | Age: 76
End: 2021-02-24

## 2021-02-24 NOTE — TELEPHONE ENCOUNTER
Outpatient spiritual care follow-up check in call. Patient did not answer. I left a message and will try to reach her later.

## 2021-02-25 ENCOUNTER — CLINICAL DOCUMENTATION (OUTPATIENT)
Dept: SPIRITUAL SERVICES | Age: 76
End: 2021-02-25

## 2021-02-25 NOTE — PROGRESS NOTES
Outpatient spiritual care follow-up. Patient is still grieving and states family has been very supportive. She states nights are the hardest.  We processed her grief and had prayer. We will continue to offer support calls.

## 2021-03-01 DIAGNOSIS — E11.42 TYPE 2 DIABETES MELLITUS WITH DIABETIC POLYNEUROPATHY, WITHOUT LONG-TERM CURRENT USE OF INSULIN (HCC): ICD-10-CM

## 2021-03-01 RX ORDER — METFORMIN HYDROCHLORIDE 500 MG/1
TABLET, EXTENDED RELEASE ORAL
Qty: 120 TABLET | Refills: 1 | Status: SHIPPED | OUTPATIENT
Start: 2021-03-01 | End: 2021-04-26 | Stop reason: SDUPTHER

## 2021-03-01 NOTE — TELEPHONE ENCOUNTER
Medication:   Requested Prescriptions     Pending Prescriptions Disp Refills    metFORMIN (GLUCOPHAGE-XR) 500 MG extended release tablet 120 tablet 1     Sig: Take 2 tablets PO BID.        Last Filled:      Patient Phone Number: [phone] (home)     Last appt: Visit date not found   Next appt: 4/12/2021    Last Labs DM:   Lab Results   Component Value Date    LABA1C 6.1 06/22/2020

## 2021-03-04 ENCOUNTER — OFFICE VISIT (OUTPATIENT)
Dept: FAMILY MEDICINE CLINIC | Age: 76
End: 2021-03-04
Payer: MEDICARE

## 2021-03-04 VITALS
HEIGHT: 66 IN | OXYGEN SATURATION: 96 % | HEART RATE: 86 BPM | BODY MASS INDEX: 36.58 KG/M2 | TEMPERATURE: 97.1 F | WEIGHT: 227.6 LBS

## 2021-03-04 DIAGNOSIS — K74.60 CIRRHOSIS OF LIVER WITHOUT ASCITES, UNSPECIFIED HEPATIC CIRRHOSIS TYPE (HCC): ICD-10-CM

## 2021-03-04 DIAGNOSIS — I27.20 PULMONARY HTN (HCC): ICD-10-CM

## 2021-03-04 DIAGNOSIS — E08.42 DIABETIC POLYNEUROPATHY ASSOCIATED WITH DIABETES MELLITUS DUE TO UNDERLYING CONDITION (HCC): Primary | ICD-10-CM

## 2021-03-04 DIAGNOSIS — I47.1 SVT (SUPRAVENTRICULAR TACHYCARDIA) (HCC): ICD-10-CM

## 2021-03-04 DIAGNOSIS — I85.00 ESOPHAGEAL VARICES WITHOUT BLEEDING, UNSPECIFIED ESOPHAGEAL VARICES TYPE (HCC): ICD-10-CM

## 2021-03-04 DIAGNOSIS — I10 ESSENTIAL HYPERTENSION: ICD-10-CM

## 2021-03-04 DIAGNOSIS — K21.9 GASTROESOPHAGEAL REFLUX DISEASE, UNSPECIFIED WHETHER ESOPHAGITIS PRESENT: ICD-10-CM

## 2021-03-04 DIAGNOSIS — G25.81 RESTLESS LEG SYNDROME: ICD-10-CM

## 2021-03-04 DIAGNOSIS — J44.9 ASTHMA WITH COPD (HCC): ICD-10-CM

## 2021-03-04 DIAGNOSIS — E11.42 TYPE 2 DIABETES MELLITUS WITH DIABETIC POLYNEUROPATHY, WITHOUT LONG-TERM CURRENT USE OF INSULIN (HCC): ICD-10-CM

## 2021-03-04 PROBLEM — E66.01 MORBIDLY OBESE (HCC): Status: RESOLVED | Noted: 2020-11-18 | Resolved: 2021-03-04

## 2021-03-04 PROBLEM — J18.9 PNA (PNEUMONIA): Status: RESOLVED | Noted: 2017-08-31 | Resolved: 2021-03-04

## 2021-03-04 PROBLEM — J96.20 RESPIRATORY FAILURE, ACUTE AND CHRONIC (HCC): Status: RESOLVED | Noted: 2017-08-31 | Resolved: 2021-03-04

## 2021-03-04 PROCEDURE — 99214 OFFICE O/P EST MOD 30 MIN: CPT | Performed by: STUDENT IN AN ORGANIZED HEALTH CARE EDUCATION/TRAINING PROGRAM

## 2021-03-04 ASSESSMENT — PATIENT HEALTH QUESTIONNAIRE - PHQ9
SUM OF ALL RESPONSES TO PHQ QUESTIONS 1-9: 0
2. FEELING DOWN, DEPRESSED OR HOPELESS: 0
1. LITTLE INTEREST OR PLEASURE IN DOING THINGS: 0
SUM OF ALL RESPONSES TO PHQ9 QUESTIONS 1 & 2: 0
SUM OF ALL RESPONSES TO PHQ QUESTIONS 1-9: 0

## 2021-03-04 NOTE — PROGRESS NOTES
3/4/2021    Krys Baig (:  1945) is a 77 y.o. female, here for evaluation of the following medical concerns:    HPI    HTN  Dilt, losartan. No issues with medication    Esophageal Varices/Cirrhosis  SNOW. Dr. Howard Khan [phone]). Placed on ursodiol 600mg BID. Has had 5 banded varices. Asthma/COPD  Dr. Aida Sandy, inhaled steroid, montelukast  5 years on oxygen, 3L  Stable unless walking long distances  Has chairs spaced throughout the house if she needs to rest.     Pulm HTN/SVT  Following with Dr. Feliciano Gaviria. diltizaem  No issues with Svt for 3-4 years. Has discussed ablation but decided against it do to surgical risk. DM2  Dr. Bran Sam, Januvia  Lipitor, ARB    Acid reflux  pepcid BID    Vitamind D def  2000u QD,     Hypothyroid  Synthroid 100mcg, stable for 2 years    Restless leg  Requip nightly, stable for years. 1 at 3pm, 8pm, and 2 before bed    Neuropathy from diabetes  Gabapentin 600mg BId    Lower extremity swelling  Lasix, K    Follows Dr. Raymond Sheffield for Derm    Review of Systems  All other systems reviewed and negative    Prior to Visit Medications    Medication Sig Taking? Authorizing Provider   metFORMIN (GLUCOPHAGE-XR) 500 MG extended release tablet Take 2 tablets PO BID.  Yes Long Vieira MD   doxepin (SINEQUAN) 50 MG capsule Take 1 capsule by mouth nightly Yes Ramon Dalal MD   traZODone (DESYREL) 50 MG tablet TAKE 2 TABLETS NIGHTLY Yes Ramon Dalal MD   pantoprazole (PROTONIX) 40 MG tablet Take 1 tablet by mouth 2 times daily Yes Ramon Dalal MD   cyanocobalamin 1000 MCG/ML injection Inject 1 mL into the muscle every 14 days Last dose was taken on 17 Yes Burnie Kehr, MD   SITagliptin (JANUVIA) 100 MG tablet Take 1 tablet by mouth daily Yes Burnie Kehr, MD   famotidine (PEPCID) 20 MG tablet Take 1 tablet by mouth 2 times daily Yes Ramon Dalal MD   venlafaxine (EFFEXOR) 37.5 MG tablet Take 1 tablet by mouth daily Yes Scott Owusu Radhames Forrest MD   atorvastatin (LIPITOR) 20 MG tablet TAKE 1 TABLET NIGHTLY Yes Dot Fearing, MD   cetirizine (ZYRTEC) 10 MG tablet Take 1 tablet by mouth daily Yes Dot Fearing, MD   cetirizine (ZYRTEC) 10 MG tablet Take 1 tablet by mouth daily Yes Dot Fearing, MD   potassium chloride (KLOR-CON M) 20 MEQ extended release tablet Take 1 tablet by mouth daily Yes Dot Fearing, MD   furosemide (LASIX) 20 MG tablet Take 1 tablet by mouth daily Yes Dot Fearing, MD   dilTIAZem [facility]) 240 MG extended release capsule TAKE 1 CAPSULE DAILY Yes Dot Fearing, MD   losartan (COZAAR) 100 MG tablet TAKE 1 TABLET DAILY Yes Dot Fearing, MD   diclofenac 2 % SOLN Place 2 Pump onto the skin 2 times daily Yes Ketan William MD   ferrous sulfate 325 (65 Fe) MG tablet Take 325 mg by mouth 3 times daily (with meals) Yes Historical Provider, MD   gabapentin (NEURONTIN) 600 MG tablet Take 600 mg by mouth nightly. 600 mg at bedtime and 300 mg BID. Yes Historical Provider, MD   diphenhydrAMINE (BENADRYL) 25 MG tablet Take 50 mg by mouth nightly  Yes Historical Provider, MD   aspirin 81 MG EC tablet Take 81 mg by mouth daily  Yes Historical Provider, MD   Cholecalciferol (VITAMIN D) 2000 UNITS CAPS capsule Take 2,000 Units by mouth daily  Yes Historical Provider, MD   rOPINIRole (REQUIP) 1 MG tablet Take 1 mg by mouth nightly 1mg at 1730, 1mg at 2030, 2mg at 2200 (total of 4mg nightly in divided doses) Yes Historical Provider, MD   ursodiol (ACTIGALL) 500 MG tablet Take 500 mg by mouth 2 times daily  Yes Historical Provider, MD   calcium carbonate 600 MG TABS tablet Take 1 tablet by mouth daily  Yes Historical Provider, MD   levothyroxine (SYNTHROID) 100 MCG tablet Take 100 mcg by mouth Daily Yes Historical Provider, MD   Multiple Vitamins-Minerals (CENTRUM PO) Take 1 tablet by mouth daily.  Yes Historical Provider, MD   levalbuterol (XOPENEX HFA) 45 MCG/ACT inhaler Inhale 2 puffs into the lungs every 6 hours as needed  Yes Historical Provider, MD   beclomethasone (QVAR) 80 MCG/ACT inhaler Inhale 2 puffs into the lungs 2 times daily. Yes Historical Provider, MD   Aclidinium Bromide (TUDORZA PRESSAIR) 400 MCG/ACT AEPB Inhale 1 puff into the lungs 2 times daily  Yes Historical Provider, MD   montelukast (SINGULAIR) 10 MG tablet Take 10 mg by mouth daily  Yes Historical Provider, MD   estrogens, conjugated, (PREMARIN) 1.25 MG tablet Take 1.25 mg by mouth daily.  Yes Historical Provider, MD        Allergies   Allergen Reactions    Latex Swelling and Rash    Clindamycin Itching    Pennsaid [Diclofenac Sodium] Itching and Rash    Torsemide Itching    Actos [Pioglitazone] Diarrhea    Amoxicillin Other (See Comments)    Augmentin [Amoxicillin-Pot Clavulanate] Other (See Comments)    Bactrim [Sulfamethoxazole-Trimethoprim] Other (See Comments)    Ciprofloxacin Other (See Comments)     Makes her weird  Makes anxious and she has weird dreams    Doxycycline Other (See Comments)     Feels like she is smothering, chest tightness    Ativan [Lorazepam] Other (See Comments) and Anxiety     And confusion  Had weird dreams and hallucinations    Cephalosporins Rash    Pcn [Penicillins] Rash and Itching    Proventil [Albuterol] Anxiety       Past Medical History:   Diagnosis Date    Allergic     Anemia     Anesthesia complication     \"woke up during surgery\"    Arthritis     Osteoarthritis of bilateral CMC joints    Asthma     Followed by Dr. Debi Gillespie [facility]rhosis [facility])     non alcoholic    [facility]     GERD (gastroesophageal reflux disease)     GIB (gastrointestinal bleeding)     LGIB 4/2016    Heart murmur     Hernia     Hyperlipidemia     Hypertension     Lung collapse     Murmur, heart     Pneumonia     Reflux     Rheumatic fever     Stress fracture     right foot    SVT [facility]ra[facility]ricular tachycardia) [facility]     6/19/16 - admitted X 1 day per PT     TIA (transient ischemic attack) 2015    Type II or unspecified type diabetes mellitus without mention of complication, not stated as uncontrolled     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 ([facility]     Wears glasses     as needed    Wears partial dentures     upper partial       Past Surgical History:   Procedure Laterality Date    BONE MARROW BIOPSY      BREAST BIOPSY      CARDIAC CATHETERIZATION  14    CATARACT REMOVAL      COLONOSCOPY      X 3 per PT 16    FINE NEEDLE ASPIRATION      FOOT SURGERY Right 2013    CORAL BUNIONECTOMY RIGHT FOOT WITH SCREW FIXATION;    HERNIA REPAIR      HYSTERECTOMY      JOINT REPLACEMENT Bilateral     TKR    KNEE SURGERY  09    left    KNEE SURGERY  2010    right    UPPER GASTROINTESTINAL ENDOSCOPY N/A 2018    EGD BIOPSY performed by Vicky Castaneda MD at [address] History     Socioeconomic History    Marital status:      Spouse name: Not on file    Number of children: Not on file    Years of education: Not on file    Highest education level: Not on file   Occupational History    Not on file   Social Needs    Financial resource strain: Not on file    Food insecurity     Worry: Not on file     Inability: Not on file    Transportation needs     Medical: Not on file     Non-medical: Not on file   Tobacco Use    Smoking status: Former Smoker     Packs/day: 1.00     Years: 40.00     Pack years: 40.00     Types: Cigarettes     Quit date: 2000     Years since quittin.7    Smokeless tobacco: Never Used    Tobacco comment: stopped cold turkey in !! ( )   Substance and Sexual Activity    Alcohol use: No     Alcohol/week: 0.0 standard drinks    Drug use: No    Sexual activity: Not Currently   Lifestyle    Physical activity     Days per week: Not on file     Minutes per session: Not on file    Stress: Not on file   Relationships    Social connections     Talks on phone: Not on file     Gets together: Not on file     Attends [employer] service: Not on file     Active member of club or organization: Not on file     Attends meetings of clubs or organizations: Not on file     Relationship status: Not on file    Intimate partner violence     Fear of current or ex partner: Not on file     Emotionally abused: Not on file     Physically abused: Not on file     Forced sexual activity: Not on file   Other Topics Concern    Not on file   Social History Narrative    Not on file        Family History   Problem Relation Age of Onset    Lupus Other     Colon Cancer Mother     Diabetes Father     Kidney Disease Paternal Grandmother        Vitals:    03/04/21 1406   Pulse: 86   Temp: 97.1 °F (36.2 °C)   SpO2: 96%   Weight: 227 lb 9.6 oz (103.2 kg)   Height: 5' 6\" (1.676 m)     Estimated body mass index is 36.74 kg/m² as calculated from the following:    Height as of this encounter: 5' 6\" (1.676 m). Weight as of this encounter: 227 lb 9.6 oz (103.2 kg). Physical Exam  Vitals signs reviewed. Constitutional:       General: She is not in acute distress. Appearance: Normal appearance. She is not ill-appearing. HENT:      Head: Normocephalic and atraumatic. Right Ear: Tympanic membrane normal.      Left Ear: Tympanic membrane normal.   Eyes:      Extraocular Movements: Extraocular movements intact. Conjunctiva/sclera: Conjunctivae normal.   Cardiovascular:      Rate and Rhythm: Normal rate and regular rhythm. Pulses: Normal pulses. Heart sounds: Normal heart sounds. No murmur. Pulmonary:      Effort: Pulmonary effort is normal.      Breath sounds: Normal breath sounds. Abdominal:      General: Abdomen is flat. Bowel sounds are normal. There is no distension. Palpations: Abdomen is soft. Tenderness: There is no abdominal tenderness. Musculoskeletal: Normal range of motion. General: No swelling. Right lower leg: No edema. Left lower leg: No edema.    Skin:     General: Skin is warm and

## 2021-03-08 ENCOUNTER — TELEPHONE (OUTPATIENT)
Dept: ENDOCRINOLOGY | Age: 76
End: 2021-03-08

## 2021-03-08 NOTE — TELEPHONE ENCOUNTER
Pt called back they wont except the ones that was sent due to having Dr. Chery Marc name. I spoke to the clinic they are faxing us new forms and she will need another foot exam as well .

## 2021-03-08 NOTE — TELEPHONE ENCOUNTER
Pt said in November her paperwork was faxed to [facility] clinic for her diabetic shoes. They are now  Saying they never got the paperwork or a script for her shoes. She would like this to be resent ( the paperwork is in media) and someone call her when done .

## 2021-03-15 ENCOUNTER — TELEPHONE (OUTPATIENT)
Dept: FAMILY MEDICINE CLINIC | Age: 76
End: 2021-03-15

## 2021-03-16 RX ORDER — VENLAFAXINE 37.5 MG/1
TABLET ORAL
Qty: 90 TABLET | Refills: 1 | Status: SHIPPED | OUTPATIENT
Start: 2021-03-16 | End: 2021-09-16

## 2021-03-18 ENCOUNTER — VIRTUAL VISIT (OUTPATIENT)
Dept: FAMILY MEDICINE CLINIC | Age: 76
End: 2021-03-18
Payer: MEDICARE

## 2021-03-18 DIAGNOSIS — Z00.00 ROUTINE GENERAL MEDICAL EXAMINATION AT A HEALTH CARE FACILITY: Primary | ICD-10-CM

## 2021-03-18 PROCEDURE — G0438 PPPS, INITIAL VISIT: HCPCS | Performed by: STUDENT IN AN ORGANIZED HEALTH CARE EDUCATION/TRAINING PROGRAM

## 2021-03-18 RX ORDER — BECLOMETHASONE DIPROPIONATE HFA 80 UG/1
AEROSOL, METERED RESPIRATORY (INHALATION)
COMMUNITY
Start: 2021-02-22 | End: 2021-06-08 | Stop reason: CLARIF

## 2021-03-18 RX ORDER — LEVOFLOXACIN 500 MG/1
TABLET, FILM COATED ORAL
COMMUNITY
Start: 2021-03-16 | End: 2021-04-12

## 2021-03-18 RX ORDER — GABAPENTIN 100 MG/1
CAPSULE ORAL
Status: ON HOLD | COMMUNITY
Start: 2021-02-17 | End: 2021-05-12 | Stop reason: HOSPADM

## 2021-03-18 ASSESSMENT — PATIENT HEALTH QUESTIONNAIRE - PHQ9
SUM OF ALL RESPONSES TO PHQ9 QUESTIONS 1 & 2: 1
SUM OF ALL RESPONSES TO PHQ QUESTIONS 1-9: 1
1. LITTLE INTEREST OR PLEASURE IN DOING THINGS: 0

## 2021-03-18 NOTE — PROGRESS NOTES
Medicare Annual Wellness Visit  Name: Diana Dowd Date: 3/18/2021   MRN: <[id number]> Sex: Female   Age: 77 y.o. Ethnicity: Non-/Non    : 1945 Race: Billy Hopper is here for Medicare AWV    Screenings for behavioral, psychosocial and functional/safety risks, and cognitive dysfunction are all negative except as indicated below. These results, as well as other patient data from the [address] form, are documented in Flowsheets linked to this Encounter. Allergies   Allergen Reactions    Latex Swelling and Rash    Clindamycin Itching    Pennsaid [Diclofenac Sodium] Itching and Rash    Torsemide Itching    Actos [Pioglitazone] Diarrhea    Amoxicillin Other (See Comments)    Augmentin [Amoxicillin-Pot Clavulanate] Other (See Comments)    Bactrim [Sulfamethoxazole-Trimethoprim] Other (See Comments)    Ciprofloxacin Other (See Comments)     Makes her weird  Makes anxious and she has weird dreams    Doxycycline Other (See Comments)     Feels like she is smothering, chest tightness    Ativan [Lorazepam] Other (See Comments) and Anxiety     And confusion  Had weird dreams and hallucinations    Cephalosporins Rash    Pcn [Penicillins] Rash and Itching    Proventil [Albuterol] Anxiety       Prior to Visit Medications    Medication Sig Taking? Authorizing Provider   mupirocin (BACTROBAN) 2 % ointment   Historical Provider, MD   levoFLOXacin (LEVAQUIN) 500 MG tablet   Historical Provider, MD   gabapentin (NEURONTIN) 100 MG capsule   Historical Provider, MD   QVAR REDIHALER 80 MCG/ACT AERB inhaler   Historical Provider, MD   venlafaxine (EFFEXOR) 37.5 MG tablet TAKE 1 TABLET DAILY  Jordon Astorga MD   metFORMIN (GLUCOPHAGE-XR) 500 MG extended release tablet Take 2 tablets PO BID.   Chloe Champagne MD   doxepin (SINEQUAN) 50 MG capsule Take 1 capsule by mouth nightly  Jordon Astorga MD   traZODone (DESYREL) 50 MG tablet TAKE 2 TABLETS NIGHTLY  Jazzmine Rascon tablet by mouth daily   Historical Provider, MD   levothyroxine (SYNTHROID) 100 MCG tablet Take 100 mcg by mouth Daily  Historical Provider, MD   Multiple Vitamins-Minerals (CENTRUM PO) Take 1 tablet by mouth daily. Historical Provider, MD   [facility]val[facility]erol [facility]) 45 MCG/ACT inhaler Inhale 2 puffs into the lungs every 6 hours as needed   Historical Provider, MD   beclomethasone (QVAR) 80 MCG/ACT inhaler Inhale 2 puffs into the lungs 2 times daily. Historical Provider, MD   Aclidinium Bromide (TUDORZA PRESSAIR) 400 MCG/ACT AEPB Inhale 1 puff into the lungs 2 times daily   Historical Provider, MD   montelukast (SINGULAIR) 10 MG tablet Take 10 mg by mouth daily   Historical Provider, MD   estrogens, conjugated, (PREMARIN) 1.25 MG tablet Take 1.25 mg by mouth daily.   Historical Provider, MD       Past Medical History:   Diagnosis Date    Allergic     Anemia     Anesthesia complication     \"woke up during surgery\"    Arthritis     Osteoarthritis of bilateral CMC joints    Asthma     Followed by Dr. Sarita Hays Cirrhosis [facility])     non alcoholic    COPD (chronic obstructive pulmonary disease) ([address]     GERD (gastroesophageal reflux disease)     GIB (gastrointestinal bleeding)     LGIB 4/2016    Heart murmur     Hernia     Hyperlipidemia     Hypertension     Lung collapse     Murmur, heart     Pneumonia     Reflux     Rheumatic fever     Stress fracture     right foot    SVT (supraventricular tachycardia) ([address]     6/19/16 - admitted X 1 day per PT     TIA (transient ischemic attack) 2015    Type II or unspecified type diabetes mellitus without mention of complication, not stated as uncontrolled     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 ([address]     Wears glasses     as needed    Wears partial dentures     upper partial       Past Surgical History:   Procedure Laterality Date    BONE MARROW BIOPSY      BREAST BIOPSY      CARDIAC CATHETERIZATION  07/14/14    CATARACT REMOVAL      COLONOSCOPY      X 3 per PT 6/8/16    FINE NEEDLE ASPIRATION      FOOT SURGERY Right 06/03/2013    CORAL BUNIONECTOMY RIGHT FOOT WITH SCREW FIXATION;    HERNIA REPAIR      HYSTERECTOMY      JOINT REPLACEMENT Bilateral     TKR    KNEE SURGERY  09    left    KNEE SURGERY  2010    right    UPPER GASTROINTESTINAL ENDOSCOPY N/A 7/21/2018    EGD BIOPSY performed by Kin De León MD at [address]       Family History   Problem Relation Age of Onset    Lupus Other     Colon Cancer Mother     Diabetes Father     Kidney Disease Paternal Grandmother        CareTeam (Including outside providers/suppliers regularly involved in providing care):   Patient Care Team:  Harmon Sicard, DO as PCP - General (Family Medicine)  Kortney Dorman MD as PCP - Hematology/Oncology (Hematology and Oncology)  Harmon Sicard, DO as PCP - [address] [place] [address]  [facility] Provider  Lester Horton MD as Consulting Physician (Pulmonology)  Mraia A Holbrook MD as Consulting Physician (Otolaryngology)  Edi Mckenzie MD as Referring Physician (Rheumatology)  Francia Jordan MD as Consulting Physician (Orthopedic Surgery)    Wt Readings from Last 3 Encounters:   03/04/21 227 lb 9.6 oz (103.2 kg)   11/18/20 226 lb (102.5 kg)   11/18/20 227 lb 9.6 oz (103.2 kg)     There were no vitals filed for this visit. There is no height or weight on file to calculate BMI. Patient reports vitals. Based upon direct observation of the patient, evaluation of cognition reveals recent and remote memory intact. Patient's complete Health Risk Assessment and screening values have been reviewed and are found in Flowsheets. The following problems were reviewed today and where indicated follow up appointments were made and/or referrals ordered.     Positive Risk Factor Screenings with Interventions:           Health Habits/Nutrition:  Health Habits/Nutrition  Do you exercise for at least 20 minutes 2-3 times per week?: (!) No  Have you lost any weight without trying in the past 3 months?: No  Do you eat only one meal per day?: No  Have you seen the dentist within the past year?: Yes     Health Habits/Nutrition Interventions:  · Inadequate physical activity:  educational materials provided to promote increased physical activity       Personalized Preventive Plan   Current Health Maintenance Status  Immunization History   Administered Date(s) Administered    COVID-19, J&J, PF, 0.5 mL 03/08/2021    Pneumococcal Conjugate 13-valent ([id number]) 06/25/2013    Pneumococcal Polysaccharide ([id number]) 01/01/2005        Health Maintenance   Topic Date Due    Hepatitis A vaccine (1 of 2 - Risk 2-dose series) Never done    DTaP/Tdap/Td vaccine (1 - Tdap) Never done    Shingles Vaccine (1 of 2) Never done    Pneumococcal 65+ years Vaccine (2 of 2 - PPSV23) 06/25/2014    Diabetic microalbuminuria test  06/12/2018    Diabetic retinal exam  05/23/2019    Colon cancer screen colonoscopy  02/14/2020    Flu vaccine (1) Never done   [employer] Visit (AWV)  Never done    Breast cancer screen  01/22/2021    A1C test (Diabetic or Prediabetic)  06/22/2021    Lipid screen  06/22/2021    Potassium monitoring  06/22/2021    Creatinine monitoring  06/22/2021    Diabetic foot exam  11/18/2021    DEXA (modify frequency per FRAX score)  Completed    COVID-19 Vaccine  Completed    Hepatitis C screen  Completed    Hib vaccine  Aged Out    Meningococcal (ACWY) vaccine  Aged Out     Recommendations for [employer] Due: see orders and patient instructions/AVS.  . Recommended screening schedule for the next 5-10 years is provided to the patient in written form: see Patient Instructions/AVS.    Maryann SALAZAR LPN, 4[birth year], performed the documented evaluation under the direct supervision of the attending physician. Randy Jenkins, was evaluated through a synchronous (real-time) audio-video encounter.  The patient (or guardian if applicable) is aware that this is a billable service. Verbal consent to proceed has been obtained within the past 12 months. The visit was conducted pursuant to the emergency declaration under the [facility], [address] [place] [address] authority and the [employer] General Act. Patient identification was verified, and a caregiver was present when appropriate. The patient was located in a state where the provider was credentialed to provide care. Total time spent for this encounter: Not billed by time     Location [facility]    --Gretta Oliva LPN on 1/99/6426 at 3:84 PM    An electronic signature was used to authenticate this note. This encounter was performed under myLin DOs, direct supervision, 3/18/2021.   Lin Merrill

## 2021-03-18 NOTE — PATIENT INSTRUCTIONS
Personalized Preventive Plan for Ronaldo Lopez - 3/18/2021  Medicare offers a range of preventive health benefits. Some of the tests and screenings are paid in full while other may be subject to a deductible, co-insurance, and/or copay. Some of these benefits include a comprehensive review of your medical history including lifestyle, illnesses that may run in your family, and various assessments and screenings as appropriate. After reviewing your medical record and screening and assessments performed today your provider may have ordered immunizations, labs, imaging, and/or referrals for you. A list of these orders (if applicable) as well as your Preventive Care list are included within your After Visit Summary for your review. Other Preventive Recommendations:    · A preventive eye exam performed by an eye specialist is recommended every 1-2 years to screen for glaucoma; cataracts, macular degeneration, and other eye disorders. · A preventive dental visit is recommended every 6 months. · Try to get at least 150 minutes of exercise per week or 10,000 steps per day on a pedometer . · Order or download the FREE \"Exercise & Physical Activity: Your Everyday Guide\" from [employer]. Call 5-165.229.1070 or search [employer] online. · You need 1896-5641 mg of calcium and 3530-3494 IU of vitamin D per day. It is possible to meet your calcium requirement with diet alone, but a vitamin D supplement is usually necessary to meet this goal.  · When exposed to the sun, use a sunscreen that protects against both UVA and UVB radiation with an SPF of 30 or greater. Reapply every 2 to 3 hours or after sweating, drying off with a towel, or swimming. · Always wear a seat belt when traveling in a car. Always wear a helmet when riding a bicycle or motorcycle.     Keep Your Memory Ivan Umanzor       Many factors can affect your ability to remembera hectic lifestyle, aging, stress, chronic disease, and certain medicines. But, there are steps you can take to sharpen your mind and help preserve your memory. Challenge Your Brain   Regularly challenging your mind may help keeps it in top shape. Good mental exercises include:   Crossword puzzlesUse a dictionary if you need it; you will learn more that way. Brainteasers Try some! Crafts, such as wood working and sewing   Hobbies, such as gardening and building model airplanes   SocializingVisit old friends or join groups to meet new ones. Reading   Learning a new language   Taking a class, whether it be art history or pham chi   TravelingExperience the food, history, and culture of your destination   Learning to use a computer   Going to museums, the theater, or thought-provoking movies   Changing things in your daily life, such as reversing your pattern in the grocery store or brushing your teeth using your nondominant hand   Use Memory Aids   There is no need to remember every detail on your own. These memory aids can help:   Calendars and day planners   Electronic organizers to store all sorts of helpful informationThese devices can \"beep\" to remind you of appointments. A book of days to record birthdays, anniversaries, and other occasions that occur on the same date every year   Detailed \"to-do\" lists and strategically placed sticky notes   Quick \"study\" sessionsBefore a gathering, review who will be there so their names will be fresh in your mind. Establish routinesFor example, keep your keys, wallet, and umbrella in the same place all the time or take medicine with your 8:00 AM glass of juice   Live a Healthy Life   Many actions that will keep your body strong will do the same for your mind. For example:   Talk to Your Doctor About Herbs and Supplements    Malnutrition and vitamin deficiencies can impair your mental function. For example, vitamin B12 deficiency can cause a range of symptoms, including confusion.  But, what if your

## 2021-03-22 ENCOUNTER — CLINICAL DOCUMENTATION (OUTPATIENT)
Dept: SPIRITUAL SERVICES | Age: 76
End: 2021-03-22

## 2021-04-02 ENCOUNTER — HOSPITAL ENCOUNTER (OUTPATIENT)
Dept: ULTRASOUND IMAGING | Age: 76
Discharge: HOME | End: 2021-04-02
Payer: MEDICARE

## 2021-04-02 DIAGNOSIS — K76.0 FATTY METAMORPHOSIS OF LIVER: ICD-10-CM

## 2021-04-02 PROCEDURE — 76705 ECHO EXAM OF ABDOMEN: CPT

## 2021-04-12 ENCOUNTER — OFFICE VISIT (OUTPATIENT)
Dept: ENDOCRINOLOGY | Age: 76
End: 2021-04-12
Payer: MEDICARE

## 2021-04-12 VITALS
DIASTOLIC BLOOD PRESSURE: 68 MMHG | HEIGHT: 66 IN | BODY MASS INDEX: 35.52 KG/M2 | WEIGHT: 221 LBS | SYSTOLIC BLOOD PRESSURE: 142 MMHG | OXYGEN SATURATION: 94 % | HEART RATE: 82 BPM

## 2021-04-12 DIAGNOSIS — E03.9 ACQUIRED HYPOTHYROIDISM: ICD-10-CM

## 2021-04-12 DIAGNOSIS — E11.42 TYPE 2 DIABETES MELLITUS WITH DIABETIC POLYNEUROPATHY, WITHOUT LONG-TERM CURRENT USE OF INSULIN (HCC): Primary | ICD-10-CM

## 2021-04-12 LAB — HBA1C MFR BLD: 6.6 %

## 2021-04-12 PROCEDURE — 99214 OFFICE O/P EST MOD 30 MIN: CPT | Performed by: INTERNAL MEDICINE

## 2021-04-12 PROCEDURE — 83036 HEMOGLOBIN GLYCOSYLATED A1C: CPT | Performed by: INTERNAL MEDICINE

## 2021-04-12 NOTE — PROGRESS NOTES
LATASHA Carmen is a 77 y.o. female who is here for  management of DM. Felipe Rivas MD ( cardiology )      She has seen Dr. Wili Bob    Patient has a PMH of Type 2 DM, hypertension, hyperlipidemia, obesity , hypothyroidism, COPD    Diagnosed with Diabetes Mellitus type 2 in 2000. Course has been variable . Microvascular complications: No known retinopathy (Last eye exam: 2019)   No Nephropathy :  Has  Peripheral neuropathy:    Home regimen:   Metformin 1000 mg BID  Januvia 100 mg daily. Mild, controlled    A1c 5.4 % ---> 6.2 % --->6.6%    Previous medications:    Blood glucose trend      Urine micro albumin elevated 373  Will need repeat  She is on losartan 100mg    Diet: Eats  meals/day . Nutrition education:  Exercise:  No     Hyperlipidemia:  On atorvastatin 20 mg daily. Hypothyroidism :  On levothyroxine 100 mcg daily  stable    Past Medical History:   Diagnosis Date    Allergic     Anemia     Anesthesia complication     \"woke up during surgery\"    Arthritis     Osteoarthritis of bilateral CMC joints    Asthma     Followed by Dr. Shruti Lemons Cirrhosis [facility])     non alcoholic    COPD (chronic obstructive pulmonary disease) ([address]     GERD (gastroesophageal reflux disease)     GIB (gastrointestinal bleeding)     LGIB 4/2016    Heart murmur     Hernia     Hyperlipidemia     Hypertension     Lung collapse     Murmur, heart     Pneumonia     Reflux     Rheumatic fever     Stress fracture     right foot    SVT (supraventricular tachycardia) ([address]     6/19/16 - admitted X 1 day per PT     TIA (transient ischemic attack) 2015    Type II or unspecified type diabetes mellitus without mention of complication, not stated as uncontrolled     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 ([address]     Wears glasses     as needed    Wears partial dentures     upper partial     Past Surgical History:   Procedure Laterality Date    BONE MARROW BIOPSY      BREAST BIOPSY  CARDIAC CATHETERIZATION  07/14/14    CATARACT REMOVAL      COLONOSCOPY      X 3 per PT 6/8/16    FINE NEEDLE ASPIRATION      FOOT SURGERY Right 06/03/2013    CORAL BUNIONECTOMY RIGHT FOOT WITH SCREW FIXATION;    HERNIA REPAIR      HYSTERECTOMY      JOINT REPLACEMENT Bilateral     TKR    KNEE SURGERY  09    left    KNEE SURGERY  2010    right    UPPER GASTROINTESTINAL ENDOSCOPY N/A 7/21/2018    EGD BIOPSY performed by Marquise Naqvi MD at [facility]     Current Outpatient Medications   Medication Sig Dispense Refill    mupirocin (BACTROBAN) 2 % ointment       gabapentin (NEURONTIN) 100 MG capsule       QVAR REDIHALER 80 MCG/ACT AERB inhaler       venlafaxine (EFFEXOR) 37.5 MG tablet TAKE 1 TABLET DAILY 90 tablet 1    metFORMIN (GLUCOPHAGE-XR) 500 MG extended release tablet Take 2 tablets PO BID. 120 tablet 1    doxepin (SINEQUAN) 50 MG capsule Take 1 capsule by mouth nightly 90 capsule 3    traZODone (DESYREL) 50 MG tablet TAKE 2 TABLETS NIGHTLY 180 tablet 3    pantoprazole (PROTONIX) 40 MG tablet Take 1 tablet by mouth 2 times daily 180 tablet 2    cyanocobalamin 1000 MCG/ML injection Inject 1 mL into the muscle every 14 days Last dose was taken on 4/9/17 6 vial 3    SITagliptin (JANUVIA) 100 MG tablet Take 1 tablet by mouth daily 90 tablet 2    famotidine (PEPCID) 20 MG tablet Take 1 tablet by mouth 2 times daily 180 tablet 3    atorvastatin (LIPITOR) 20 MG tablet TAKE 1 TABLET NIGHTLY 90 tablet 3    cetirizine (ZYRTEC) 10 MG tablet Take 1 tablet by mouth daily 30 tablet 3    cetirizine (ZYRTEC) 10 MG tablet Take 1 tablet by mouth daily 90 tablet 3    potassium chloride (KLOR-CON M) 20 MEQ extended release tablet Take 1 tablet by mouth daily 90 tablet 3    furosemide (LASIX) 20 MG tablet Take 1 tablet by mouth daily 90 tablet 3    dilTIAZem (TIAZAC) 240 MG extended release capsule TAKE 1 CAPSULE DAILY 90 capsule 3    losartan (COZAAR) 100 MG tablet TAKE 1 TABLET DAILY 90 tablet 3    diclofenac 2 % SOLN Place 2 Pump onto the skin 2 times daily 1 Bottle 0    ferrous sulfate 325 (65 Fe) MG tablet Take 325 mg by mouth 3 times daily (with meals)      gabapentin (NEURONTIN) 600 MG tablet Take 600 mg by mouth nightly. 600 mg at bedtime and 300 mg BID.  diphenhydrAMINE (BENADRYL) 25 MG tablet Take 50 mg by mouth nightly       aspirin 81 MG EC tablet Take 81 mg by mouth daily       Cholecalciferol (VITAMIN D) 2000 UNITS CAPS capsule Take 2,000 Units by mouth daily       rOPINIRole (REQUIP) 1 MG tablet Take 1 mg by mouth nightly 1mg at 1730, 1mg at 2030, 2mg at 2200 (total of 4mg nightly in divided doses)      ursodiol (ACTIGALL) 500 MG tablet Take 500 mg by mouth 2 times daily       calcium carbonate 600 MG TABS tablet Take 1 tablet by mouth daily       levothyroxine (SYNTHROID) 100 MCG tablet Take 100 mcg by mouth Daily      Multiple Vitamins-Minerals (CENTRUM PO) Take 1 tablet by mouth daily.  levalbuterol (XOPENEX HFA) 45 MCG/ACT inhaler Inhale 2 puffs into the lungs every 6 hours as needed       beclomethasone (QVAR) 80 MCG/ACT inhaler Inhale 2 puffs into the lungs 2 times daily.  Aclidinium Bromide (TUDORZA PRESSAIR) 400 MCG/ACT AEPB Inhale 1 puff into the lungs 2 times daily       montelukast (SINGULAIR) 10 MG tablet Take 10 mg by mouth daily       estrogens, conjugated, (PREMARIN) 1.25 MG tablet Take 1.25 mg by mouth daily. No current facility-administered medications for this visit. ROS: Scanned, reviewed    Vitals:    04/12/21 1420   BP: (!) 142/68   Pulse: 82   SpO2: 94%       Constitutional: Well-developed, appears stated age, cooperative, in no acute distress  H/E/N/M/T:atraumatic, normocephalic, external ears, nose, lips normal without lesions  Eyes: Lids, lashes, conjunctivae and sclerae normal, No proptosis, no redness  Neck: supple, symmetrical, no swelling  Skin: No obvious rashes or lesions present.   Skin and hair texture normal  Psychiatric: Judgement and Insight:  judgement and insight appear normal  Neuro: Normal without focal findings, speech is normal normal, speech is spontaneous  Chest: No labored breathing, no chest deformity, no stridor  Musculoskeletal: No joint deformity, swelling    Foot exam :   Visual inspection:  Skin lesions/pre-ulcerative calluses: + callus     Sensory exam:  Monofilament sensation: decreased  Right foot deformity        Lab Results   Component Value Date    LABA1C 6.1 06/22/2020         Assessment/Plan        1. Type 2 DM     Noy Wilkerson is a 77 y.o. female has Type 2 DM with obesity and insulin resistance. Complicated by neuropathy    - Continue metformin 1000 mg BID    -Continue januvia 100 mg daily. Advised follow-up with the ophthalmologist once year. Urine microalbumin/cr ratio normal 3/21    Follows with podiatry every 9 weeks ( Dr. En Bello )     Foot exam done today     2. SVT/Hypertension. Managed by cardiology. 3. Hyperlipidemia. On statins. Lipids at goal.     4. Hypothyroidism     On levothyroxine 100 mcg daily. TSH 1.86 in 01/20---> 1.34 ( 11/05/20) ---> 1.72    Continue same dose.

## 2021-04-15 ENCOUNTER — OFFICE VISIT (OUTPATIENT)
Dept: DERMATOLOGY | Age: 76
End: 2021-04-15
Payer: MEDICARE

## 2021-04-15 VITALS — TEMPERATURE: 98.1 F

## 2021-04-15 DIAGNOSIS — L29.9 PRURITUS: Primary | ICD-10-CM

## 2021-04-15 PROCEDURE — 99213 OFFICE O/P EST LOW 20 MIN: CPT | Performed by: DERMATOLOGY

## 2021-04-15 RX ORDER — TRIAMCINOLONE ACETONIDE 1 MG/G
CREAM TOPICAL
Qty: 450 G | Refills: 2 | Status: ON HOLD
Start: 2021-04-15 | End: 2021-05-12 | Stop reason: HOSPADM

## 2021-04-15 NOTE — PROGRESS NOTES
[facility]  Merly Bennett MD  [address]  1945    77 y.o. female     Date of Visit: 4/15/2021    Chief Complaint: itching    History of Present Illness:    She returns today to follow-up for previously generalized severe pruritus in the setting of SNOW and diabetic neuropathy. She reports great control with gabapentin 600 nightly and 100 mg in the late afternoon. She does still experience intermittent itching that quickly improves with triamcinolone 0.1% cream.    Overall she reports that she is doing well. She does report some increased pruritus at times that she relates to stress from the recent passing of her . Review of Systems:  Gen: Feels well, good sense of health. Past Medical History, Family History, Surgical History, Medications and Allergies reviewed.     Past Medical History:   Diagnosis Date    Allergic     Anemia     Anesthesia complication     \"woke up during surgery\"    Arthritis     Osteoarthritis of bilateral CMC joints    Asthma     Followed by Dr. Tiffany Oneill Cirrhosis [facility])     non alcoholic    COPD (chronic obstructive pulmonary disease) ([address]     GERD (gastroesophageal reflux disease)     GIB (gastrointestinal bleeding)     LGIB 4/2016    Heart murmur     Hernia     Hyperlipidemia     Hypertension     Lung collapse     Murmur, heart     Pneumonia     Reflux     Rheumatic fever     Stress fracture     right foot    SVT (supraventricular tachycardia) ([address]     6/19/16 - admitted X 1 day per PT     TIA (transient ischemic attack) 2015    Type II or unspecified type diabetes mellitus without mention of complication, not stated as uncontrolled     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 ([address]     Wears glasses     as needed    Wears partial dentures     upper partial     Past Surgical History:   Procedure Laterality Date    BONE MARROW BIOPSY      BREAST BIOPSY      CARDIAC CATHETERIZATION (PROTONIX) 40 MG tablet Take 1 tablet by mouth 2 times daily 180 tablet 2    cyanocobalamin 1000 MCG/ML injection Inject 1 mL into the muscle every 14 days Last dose was taken on 4/9/17 6 vial 3    SITagliptin (JANUVIA) 100 MG tablet Take 1 tablet by mouth daily 90 tablet 2    famotidine (PEPCID) 20 MG tablet Take 1 tablet by mouth 2 times daily 180 tablet 3    atorvastatin (LIPITOR) 20 MG tablet TAKE 1 TABLET NIGHTLY 90 tablet 3    cetirizine (ZYRTEC) 10 MG tablet Take 1 tablet by mouth daily 30 tablet 3    cetirizine (ZYRTEC) 10 MG tablet Take 1 tablet by mouth daily 90 tablet 3    potassium chloride (KLOR-CON M) 20 MEQ extended release tablet Take 1 tablet by mouth daily 90 tablet 3    furosemide (LASIX) 20 MG tablet Take 1 tablet by mouth daily 90 tablet 3    dilTIAZem (TIAZAC) 240 MG extended release capsule TAKE 1 CAPSULE DAILY 90 capsule 3    losartan (COZAAR) 100 MG tablet TAKE 1 TABLET DAILY 90 tablet 3    diclofenac 2 % SOLN Place 2 Pump onto the skin 2 times daily 1 Bottle 0    ferrous sulfate 325 (65 Fe) MG tablet Take 325 mg by mouth 3 times daily (with meals)      gabapentin (NEURONTIN) 600 MG tablet Take 600 mg by mouth nightly. 600 mg at bedtime and 300 mg BID.  diphenhydrAMINE (BENADRYL) 25 MG tablet Take 50 mg by mouth nightly       aspirin 81 MG EC tablet Take 81 mg by mouth daily       Cholecalciferol (VITAMIN D) 2000 UNITS CAPS capsule Take 2,000 Units by mouth daily       rOPINIRole (REQUIP) 1 MG tablet Take 1 mg by mouth nightly 1mg at 1730, 1mg at 2030, 2mg at 2200 (total of 4mg nightly in divided doses)      ursodiol (ACTIGALL) 500 MG tablet Take 500 mg by mouth 2 times daily       calcium carbonate 600 MG TABS tablet Take 1 tablet by mouth daily       levothyroxine (SYNTHROID) 100 MCG tablet Take 100 mcg by mouth Daily      Multiple Vitamins-Minerals (CENTRUM PO) Take 1 tablet by mouth daily.       levalbuterol (XOPENEX HFA) 45 MCG/ACT inhaler Inhale 2 puffs into the lungs every 6 hours as needed       beclomethasone (QVAR) 80 MCG/ACT inhaler Inhale 2 puffs into the lungs 2 times daily.  Aclidinium Bromide (TUDORZA PRESSAIR) 400 MCG/ACT AEPB Inhale 1 puff into the lungs 2 times daily       montelukast (SINGULAIR) 10 MG tablet Take 10 mg by mouth daily       estrogens, conjugated, (PREMARIN) 1.25 MG tablet Take 1.25 mg by mouth daily. Physical Examination       Well appearing. 1.  Upper back and lower back with few excoriations. Assessment and Plan     1. Generalized pruritus in the setting of SNOW and diabetic neuropathy, under good control    She will remain on gabapentin per her other providers. Triamcinolone 0.1% cream twice daily as needed for any recurrences of itch. Encouraged daily use of emollients.            --Lena Esteban MD

## 2021-04-19 NOTE — PROGRESS NOTES
X 3 per PT 6/8/16    FINE NEEDLE ASPIRATION      FOOT SURGERY Right 06/03/2013    CORAL BUNIONECTOMY RIGHT FOOT WITH SCREW FIXATION;    HERNIA REPAIR      HYSTERECTOMY      JOINT REPLACEMENT Bilateral     TKR    KNEE SURGERY  09    left    KNEE SURGERY  2010    right    UPPER GASTROINTESTINAL ENDOSCOPY N/A 7/21/2018    EGD BIOPSY performed by Yoselin Ram MD at [address] [place] [address]       Objective:   BP (!) 140/56   Pulse 82   Ht 5' 6\" (1.676 m)   Wt 221 lb (100.2 kg)   SpO2 96%   BMI 35.67 kg/m²     Wt Readings from Last 3 Encounters:   04/21/21 221 lb (100.2 kg)   04/12/21 221 lb (100.2 kg)   03/04/21 227 lb 9.6 oz (103.2 kg)       Physical Exam:  General: No Respiratory distress, appears well developed and well nourished. Eyes:  Sclera nonicteric  Nose/Sinuses:  negative findings: nose shows no deformity, asymmetry, or inflammation, nasal mucosa normal, septum midline with no perforation or bleeding  Back:  no pain to palpation  Joint:  no active joint inflammation  Musculoskeletal:  negative  Skin:  Warm and dry  Neck:  Negative for JVD and Carotid Bruits. Pain on movement of neck to left. Chest:clear to P&A  respiration easy  Cardiovascular:  RRR, S1S2 normal, 2/6 MOHSEN lower left sternal border, no rub or thrill. Extremities:   no edema, no clubbing, cyanosis,  Neuro: intact    Medications:   Outpatient Encounter Medications as of 4/21/2021   Medication Sig Dispense Refill    triamcinolone (KENALOG) 0.1 % cream Apply to affected area twice daily for up to 2 weeks or until improved.  450 g 2    gabapentin (NEURONTIN) 100 MG capsule       QVAR REDIHALER 80 MCG/ACT AERB inhaler       venlafaxine (EFFEXOR) 37.5 MG tablet TAKE 1 TABLET DAILY 90 tablet 1    metFORMIN (GLUCOPHAGE-XR) 500 MG extended release tablet Take 2 tablets PO BID. 120 tablet 1    doxepin (SINEQUAN) 50 MG capsule Take 1 capsule by mouth nightly 90 capsule 3    traZODone (DESYREL) 50 MG tablet TAKE 2 TABLETS NIGHTLY 180 tablet 3    pantoprazole (PROTONIX) 40 MG tablet Take 1 tablet by mouth 2 times daily 180 tablet 2    cyanocobalamin 1000 MCG/ML injection Inject 1 mL into the muscle every 14 days Last dose was taken on 4/9/17 6 vial 3    SITagliptin (JANUVIA) 100 MG tablet Take 1 tablet by mouth daily 90 tablet 2    famotidine (PEPCID) 20 MG tablet Take 1 tablet by mouth 2 times daily 180 tablet 3    atorvastatin (LIPITOR) 20 MG tablet TAKE 1 TABLET NIGHTLY 90 tablet 3    cetirizine (ZYRTEC) 10 MG tablet Take 1 tablet by mouth daily 30 tablet 3    cetirizine (ZYRTEC) 10 MG tablet Take 1 tablet by mouth daily 90 tablet 3    potassium chloride (KLOR-CON M) 20 MEQ extended release tablet Take 1 tablet by mouth daily 90 tablet 3    furosemide (LASIX) 20 MG tablet Take 1 tablet by mouth daily 90 tablet 3    dilTIAZem (TIAZAC) 240 MG extended release capsule TAKE 1 CAPSULE DAILY 90 capsule 3    losartan (COZAAR) 100 MG tablet TAKE 1 TABLET DAILY 90 tablet 3    ferrous sulfate 325 (65 Fe) MG tablet Take 325 mg by mouth 3 times daily (with meals)      gabapentin (NEURONTIN) 600 MG tablet Take 600 mg by mouth nightly. 600 mg at bedtime and 300 mg BID.  diphenhydrAMINE (BENADRYL) 25 MG tablet Take 50 mg by mouth nightly       aspirin 81 MG EC tablet Take 81 mg by mouth daily       Cholecalciferol (VITAMIN D) 2000 UNITS CAPS capsule Take 2,000 Units by mouth daily       rOPINIRole (REQUIP) 1 MG tablet Take 1 mg by mouth nightly 1mg at 1730, 1mg at 2030, 2mg at 2200 (total of 4mg nightly in divided doses)      calcium carbonate 600 MG TABS tablet Take 1 tablet by mouth daily       levothyroxine (SYNTHROID) 100 MCG tablet Take 100 mcg by mouth Daily      Multiple Vitamins-Minerals (CENTRUM PO) Take 1 tablet by mouth daily.  levalbuterol (XOPENEX HFA) 45 MCG/ACT inhaler Inhale 2 puffs into the lungs every 6 hours as needed       beclomethasone (QVAR) 80 MCG/ACT inhaler Inhale 2 puffs into the lungs 2 times daily.  Aclidinium Bromide (TUDORZA PRESSAIR) 400 MCG/ACT AEPB Inhale 1 puff into the lungs 2 times daily       montelukast (SINGULAIR) 10 MG tablet Take 10 mg by mouth daily       estrogens, conjugated, (PREMARIN) 1.25 MG tablet Take 1.25 mg by mouth daily.  [DISCONTINUED] mupirocin (BACTROBAN) 2 % ointment       [DISCONTINUED] diclofenac 2 % SOLN Place 2 Pump onto the skin 2 times daily (Patient not taking: Reported on 4/21/2021) 1 Bottle 0    ursodiol (ACTIGALL) 500 MG tablet Take 500 mg by mouth 2 times daily        No facility-administered encounter medications on file as of 4/21/2021. Lab Data:    Lab Results   Component Value Date    TRIG 133 02/04/2019    TRIG 110 01/11/2017    TRIG 125 07/14/2014     Lab Results   Component Value Date    HDL 75 (H) 06/22/2020    HDL 78 (A) 02/04/2019    HDL 73 (A) 01/11/2017     Lab Results   Component Value Date    LDLCALC 69 06/22/2020    LDLCALC 66 02/04/2019    LDLCALC 53 01/11/2017     Lab Results   Component Value Date    LABVLDL 19 06/22/2020    LABVLDL 25 07/14/2014    LABVLDL 50 11/13/2013     A1C:   Lab Results   Component Value Date    LABA1C 6.6 04/12/2021   LABS: 2/2019 - [facility] - , , HDL 78, LDL 66, AIC 4.9    IMAGING:   I have reviewed the following tests and documented in this encounter as follows:   Discussed with patient  ECHO 4/12/2017:    Summary   Left ventricular systolic function is hyperdynamic with ejection fraction   estimated at >65 %. No regional wall motion abnormalities. Grade II diastolic dysfunction with elevated filling pressure. There is moderate concentric left ventricular hypertrophy. Left ventricle size is normal.   Mildly dilated left atrium. 5/2015 CXR    Opinion: There is persistent mild middle lobe disease. The disease   is unchanged for the past one month and this may represent middle   lobe scarring. Cardiac cath: 07/14/2014  1.   Mild coronary artery disease involving left anterior descending,  circumflex and right coronary arteries. 2.  Hyperdynamic left ventricle with an ejection fraction of 65%. 3.  Elevated left ventricular end-diastolic pressure, 15 mmHg. 4.  Patent renal arteries bilaterally. RECOMMENDATIONS:   The patient is noted to have mild coronary artery disease  which does not explain her shortness of breath. She, however, does have    elevated LVEDP from uncontrolled blood pressure. She needs aggressive  antihypertensive therapy for optimal blood pressure control. Will start her  on lasix for dyspnea and add losartan to her antihypertensive regimen. She    will continue the aspirin and Lipitor as well as Toprol. Echo: 4/16/15  Left ventricular size is normal .  Mild concentric left ventricular hypertrophy is present. Global ejection fraction is normal and estimated from 55 % to 60 %. No regional wall motion abnormalities are noted. Diastolic filling parameters suggests grade I diastolic dysfunction . Mild mitral regurgitation is present. Mild tricuspid regurgitation. Systolic pulmonary artery pressure (SPAP) is normal and estimated at 35   mmHg  (RA pressure 8 mm Hg). Mild pulmonic regurgitation present. Cardiac Cath: 04/05/15  1. Mild coronary artery disease involving left anterior descending,  circumflex and right coronary arteries. 2. Hyperdynamic left ventricle with an ejection fraction of 65%. 3. Elevated left ventricular end-diastolic pressure, 15 mmHg. 4. Patent renal arteries bilaterally. Assessment:  Encounter Diagnoses   Name Primary?  Pulmonary HTN (HCC)     SVT (supraventricular tachycardia) (HCC) Yes    Essential hypertension     SOB (shortness of breath)     Mixed hyperlipidemia        Diagnoses and associated orders for this visit:  1. SVT controlled   2. Hyperlipidemia Most recent lipids 6.22.20   3. HTN (hypertension) controlled   4. Edema legs resolved   5. Type 2 diabetes mellitus ([facility]  Managed by her PCP A1C <7.  Murmur Plan:  1. Echo for murmur, shortness of breath and pulmonary HTN  2. Lipids, CBC, CMP, TSH  3. Follow up in 6 months      QUALITY MEASURES  1. Tobacco Cessation Counseling: Yes  2. Retake of BP if >140/90:   NA  3. Documentation to PCP/referring for new patient:  Sent to PCP at close of office visit  4. CAD patient on anti-platelet: ASA   5. CAD patient on STATIN therapy:  yes  6. Patient with CHF and aFib on anticoagulation:  NA       Scribe's attestation: This note was scribed in the presence of Dr. Bindu Vidal by Iraida Lucia RN   I, Dr. Bindu Vidal, personally performed the services described in this documentation, as scribed by the above signed scribe in my presence. It is both accurate and complete to my knowledge. I agree with the details independently gathered by the clinical support staff, while the remaining scribed note accurately describes my personal service to the patient.       Raven Roberto MD

## 2021-04-21 ENCOUNTER — OFFICE VISIT (OUTPATIENT)
Dept: CARDIOLOGY CLINIC | Age: 76
End: 2021-04-21
Payer: MEDICARE

## 2021-04-21 VITALS
WEIGHT: 221 LBS | HEART RATE: 82 BPM | BODY MASS INDEX: 35.52 KG/M2 | DIASTOLIC BLOOD PRESSURE: 56 MMHG | OXYGEN SATURATION: 96 % | HEIGHT: 66 IN | SYSTOLIC BLOOD PRESSURE: 140 MMHG

## 2021-04-21 DIAGNOSIS — E78.2 MIXED HYPERLIPIDEMIA: ICD-10-CM

## 2021-04-21 DIAGNOSIS — I47.1 SVT (SUPRAVENTRICULAR TACHYCARDIA) (HCC): Primary | ICD-10-CM

## 2021-04-21 DIAGNOSIS — R06.02 SOB (SHORTNESS OF BREATH): ICD-10-CM

## 2021-04-21 DIAGNOSIS — I10 ESSENTIAL HYPERTENSION: ICD-10-CM

## 2021-04-21 DIAGNOSIS — I27.20 PULMONARY HTN (HCC): ICD-10-CM

## 2021-04-21 PROCEDURE — 99214 OFFICE O/P EST MOD 30 MIN: CPT | Performed by: INTERNAL MEDICINE

## 2021-04-21 NOTE — LETTER
[facility]  4/21/2021     Subjective: Irma Hernadez is here for follow up of SVT, HTN, HLD  Feeling pretty good. She is on O2 3 L/min 24/7    [place]:  Today she reports she was ill about 3 weeks ago. She was sick for 1 week. She feels well overall now. She continues on supplemental oxygen. She denies CP, palpitations, dizziness or syncope. She is getting a chair lift for her house. Her sons and son in law cuts her grass. S/p J&J  covid vaccine  Feb 26,2021  PMH:    On 6/24/16 she stated she had not had palpitations since her discharge from the hospital.  Her main complaint was of fatigue which worsened in April. 4/2016 TSH 1.08. She had lost 45 lbs over the past year. She is on multiple medications for rash. She is also on Trazodone and Effexor. She has a rash due to liver disease and has been on multiple medications for her rash and itching. She started taking Atarax for itching - first dose taken today. She was taken off Benadryl and Periactin due to liver disease. She is also doing phototherapy by Dr. Ingrid Wood (dermatology). She has had abdominal cramping since undergoing Colonoscopy in May 2016. She has pernicious anemia for which she takes Vit V 12 every two weeks. 6/2016 H/H 9.6, 29.8. On 3/31/17 she reported her palpitations have returned. They occur randomly and last a few minutes. She describes it as a racing heart beat. They do not occur daily. They have been worsening in frequency. She also states her SOB is worsening. She follows up with Silvano Pitts for this. Her oxygen level today in the office began at 88% and ivone to 92%. She complains of a productive cough. She states this has been occurring for 1 year and has had no testing. She smoked for roughly 40 years. She also complains of worsening fatigue and poor sleep. This has also been worsening recently. She has been given Requip for this. She attributes her poor sleep to her cough.  She also complains of generalized dryness. She had an episode on SVT on 8/15/18 while at [facility], . In 2018 she had a bout of vomiting and diarrhea and was hospitalized. She had an EGD done and it showed gastritis and esophageal varices without active bleeding. She did follow up with Dr Fernanda Teixeira on 2020 and he reduced her metformin to 500mg BID. Patient's lasix was increased to 40mg per day at last office visit on 6/15/2020 due to swelling She had J&J vaccine on 21. Review of Systems: 12 point ROS negative in all areas as listed below except as in [place]  Constitutional, EENT, GI, , Musculoskeletal, skin, neurological, hematological, endocrine, Psychiatric   Reviewed past medical history, social, and family history. Nonsmoker. No alcohol. Mother: no cardiac history. Father:  of MI age 63's.    Past Medical History:   Diagnosis Date    Allergic     Anemia     Anesthesia complication     \"woke up during surgery\"    Arthritis     Osteoarthritis of bilateral CMC joints    Asthma     Followed by Dr. Wesley Sal Cirrhosis [facility])     non alcoholic    COPD (chronic obstructive pulmonary disease) ([address]     GERD (gastroesophageal reflux disease)     GIB (gastrointestinal bleeding)     [facility] 2016    Heart murmur     Hernia     Hyperlipidemia     Hypertension     Lung collapse     Murmur, heart     Pneumonia     Reflux     Rheumatic fever     Stress fracture     right foot    SVT (supraventricular tachycardia) ([address]     16 - admitted X 1 day per PT     TIA (transient ischemic attack)     Type II or unspecified type diabetes mellitus without mention of complication, not stated as uncontrolled     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 ([address]     Wears glasses     as needed    Wears partial dentures     upper partial     Past Surgical History:   Procedure Laterality Date    BONE MARROW BIOPSY      BREAST BIOPSY      CARDIAC CATHETERIZATION  14    CATARACT REMOVAL      COLONOSCOPY      X 3 per PT 6/8/16    FINE NEEDLE ASPIRATION      FOOT SURGERY Right 06/03/2013    CORAL BUNIONECTOMY RIGHT FOOT WITH SCREW FIXATION;    HERNIA REPAIR      HYSTERECTOMY      JOINT REPLACEMENT Bilateral     TKR    KNEE SURGERY  09    left    KNEE SURGERY  2010    right    UPPER GASTROINTESTINAL ENDOSCOPY N/A 7/21/2018    EGD BIOPSY performed by Levan Claude, [place] at [id number] [facility]       Objective:   BP (!) 140/56   Pulse 82   Ht 5' 6\" (1.676 m)   Wt 221 lb (100.2 kg)   SpO2 96%   BMI 35.67 kg/m²     Wt Readings from Last 3 Encounters:   04/21/21 221 lb (100.2 kg)   04/12/21 221 lb (100.2 kg)   03/04/21 227 lb 9.6 oz (103.2 kg)       Physical Exam:  General: No Respiratory distress, appears well developed and well nourished. Eyes:  Sclera nonicteric  Nose/Sinuses:  negative findings: nose shows no deformity, asymmetry, or inflammation, nasal mucosa normal, septum midline with no perforation or bleeding  Back:  no pain to palpation  Joint:  no active joint inflammation  Musculoskeletal:  negative  Skin:  Warm and dry  Neck:  Negative for JVD and Carotid Bruits. Pain on movement of neck to left. Chest:clear to P&A  respiration easy  Cardiovascular:  RRR, S1S2 normal, 2/6 MOSHEN lower left sternal border, no rub or thrill. Extremities:   no edema, no clubbing, cyanosis,  Neuro: intact    Medications:   Outpatient Encounter Medications as of 4/21/2021   Medication Sig Dispense Refill    triamcinolone (KENALOG) 0.1 % cream Apply to affected area twice daily for up to 2 weeks or until improved.  450 g 2    gabapentin (NEURONTIN) 100 MG capsule       QVAR REDIHALER 80 MCG/ACT AERB inhaler       venlafaxine (EFFEXOR) 37.5 MG tablet TAKE 1 TABLET DAILY 90 tablet 1    metFORMIN (GLUCOPHAGE-XR) 500 MG extended release tablet Take 2 tablets PO BID. 120 tablet 1    doxepin (SINEQUAN) 50 MG capsule Take 1 capsule by mouth nightly 90 capsule 3    traZODone (DESYREL) 50 MG tablet TAKE 2 lungs 2 times daily.  Aclidinium Bromide (TUDORZA PRESSAIR) 400 MCG/ACT AEPB Inhale 1 puff into the lungs 2 times daily       montelukast (SINGULAIR) 10 MG tablet Take 10 mg by mouth daily       estrogens, conjugated, (PREMARIN) 1.25 MG tablet Take 1.25 mg by mouth daily.  [DISCONTINUED] mupirocin (BACTROBAN) 2 % ointment       [DISCONTINUED] diclofenac 2 % SOLN Place 2 Pump onto the skin 2 times daily (Patient not taking: Reported on 4/21/2021) 1 Bottle 0    ursodiol (ACTIGALL) 500 MG tablet Take 500 mg by mouth 2 times daily        No facility-administered encounter medications on file as of 4/21/2021. Lab Data:    Lab Results   Component Value Date    TRIG 133 02/04/2019    TRIG 110 01/11/2017    TRIG 125 07/14/2014     Lab Results   Component Value Date    HDL 75 (H) 06/22/2020    HDL 78 (A) 02/04/2019    HDL 73 (A) 01/11/2017     Lab Results   Component Value Date    LDLCALC 69 06/22/2020    LDLCALC 66 02/04/2019    LDLCALC 53 01/11/2017     Lab Results   Component Value Date    LABVLDL 19 06/22/2020    LABVLDL 25 07/14/2014    LABVLDL 50 11/13/2013     A1C:   Lab Results   Component Value Date    LABA1C 6.6 04/12/2021   LABS: 2/2019 - [facility] - , , HDL 78, LDL 66, AIC 4.9    IMAGING:   I have reviewed the following tests and documented in this encounter as follows:   Discussed with patient  ECHO 4/12/2017:    Summary   Left ventricular systolic function is hyperdynamic with ejection fraction   estimated at >65 %. No regional wall motion abnormalities. Grade II diastolic dysfunction with elevated filling pressure. There is moderate concentric left ventricular hypertrophy. Left ventricle size is normal.   Mildly dilated left atrium. 5/2015 CXR    Opinion: There is persistent mild middle lobe disease. The disease   is unchanged for the past one month and this may represent middle   lobe scarring. Cardiac cath: 07/14/2014  1.   Mild coronary artery disease involving left anterior descending,  circumflex and right coronary arteries. 2.  Hyperdynamic left ventricle with an ejection fraction of 65%. 3.  Elevated left ventricular end-diastolic pressure, 15 mmHg. 4.  Patent renal arteries bilaterally. RECOMMENDATIONS:   The patient is noted to have mild coronary artery disease  which does not explain her shortness of breath. She, however, does have    elevated LVEDP from uncontrolled blood pressure. She needs aggressive  antihypertensive therapy for optimal blood pressure control. Will start her  on lasix for dyspnea and add losartan to her antihypertensive regimen. She    will continue the aspirin and Lipitor as well as Toprol. Echo: 4/16/15  Left ventricular size is normal .  Mild concentric left ventricular hypertrophy is present. Global ejection fraction is normal and estimated from 55 % to 60 %. No regional wall motion abnormalities are noted. Diastolic filling parameters suggests grade I diastolic dysfunction . Mild mitral regurgitation is present. Mild tricuspid regurgitation. Systolic pulmonary artery pressure (SPAP) is normal and estimated at 35   mmHg  (RA pressure 8 mm Hg). Mild pulmonic regurgitation present. Cardiac Cath: 04/05/15  1. Mild coronary artery disease involving left anterior descending,  circumflex and right coronary arteries. 2. Hyperdynamic left ventricle with an ejection fraction of 65%. 3. Elevated left ventricular end-diastolic pressure, 15 mmHg. 4. Patent renal arteries bilaterally. Assessment:  Encounter Diagnoses   Name Primary?  Pulmonary HTN (HCC)     SVT (supraventricular tachycardia) (HCC) Yes    Essential hypertension     SOB (shortness of breath)     Mixed hyperlipidemia        Diagnoses and associated orders for this visit:  1. SVT controlled   2. Hyperlipidemia Most recent lipids 6.22.20   3. HTN (hypertension) controlled   4. Edema legs resolved   5.  Type 2 diabetes mellitus ([facility]  Managed by her PCP A1C <7. Murmur     Plan:  1. Echo for murmur, shortness of breath and pulmonary HTN  2. Lipids, CBC, CMP, TSH  3. Follow up in 6 months      QUALITY MEASURES  1. Tobacco Cessation Counseling: Yes  2. Retake of BP if >140/90:   NA  3. Documentation to PCP/referring for new patient:  Sent to PCP at close of office visit  4. CAD patient on anti-platelet: ASA   5. CAD patient on STATIN therapy:  yes  6. Patient with CHF and aFib on anticoagulation:  NA       Scribe's attestation: This note was scribed in the presence of Dr. Dany Hidalgo by Chary Mackey RN   I, Dr. Dany Hidalgo, personally performed the services described in this documentation, as scribed by the above signed scribe in my presence. It is both accurate and complete to my knowledge. I agree with the details independently gathered by the clinical support staff, while the remaining scribed note accurately describes my personal service to the patient.       Alisson Reyes MD

## 2021-04-21 NOTE — PATIENT INSTRUCTIONS
Plan:  1. Echo for murmur, shortness of breath and pulmonary HTN  2. Lipids, CBC, CMP, TSH  3. Follow up in 6 months    Your provider has ordered testing for further evaluation. An order/prescription has been included in your paper work.  To schedule outpatient testing, contact Central Scheduling by calling [address] [place] [address] ([phone]).

## 2021-04-26 ENCOUNTER — TELEPHONE (OUTPATIENT)
Dept: ENDOCRINOLOGY | Age: 76
End: 2021-04-26

## 2021-04-26 DIAGNOSIS — E11.42 TYPE 2 DIABETES MELLITUS WITH DIABETIC POLYNEUROPATHY, WITHOUT LONG-TERM CURRENT USE OF INSULIN (HCC): ICD-10-CM

## 2021-04-26 RX ORDER — METFORMIN HYDROCHLORIDE 500 MG/1
TABLET, EXTENDED RELEASE ORAL
Qty: 360 TABLET | Refills: 1 | Status: SHIPPED | OUTPATIENT
Start: 2021-04-26 | End: 2021-04-27 | Stop reason: SDUPTHER

## 2021-04-26 NOTE — TELEPHONE ENCOUNTER
Patient needs a refill on her metformin qty of 360.  4 pills a day  For a 90 day supply       [address], [address] [facility] [phone] - F [phone]

## 2021-04-27 RX ORDER — METFORMIN HYDROCHLORIDE 500 MG/1
TABLET, EXTENDED RELEASE ORAL
Qty: 360 TABLET | Refills: 1 | Status: SHIPPED | OUTPATIENT
Start: 2021-04-27 | End: 2022-01-03

## 2021-04-29 ENCOUNTER — CLINICAL DOCUMENTATION (OUTPATIENT)
Dept: SPIRITUAL SERVICES | Age: 76
End: 2021-04-29

## 2021-05-04 ENCOUNTER — TELEPHONE (OUTPATIENT)
Dept: FAMILY MEDICINE CLINIC | Age: 76
End: 2021-05-04

## 2021-05-04 NOTE — TELEPHONE ENCOUNTER
----- Message from Martha Castro sent at 5/4/2021  2:52 PM EDT -----  Subject: Message to Provider    QUESTIONS  Information for Provider? pt would like for  to call her ASAP   please to speak with him about a bunch of flame that she keeps spitting   out since Sunday   ---------------------------------------------------------------------------  --------------  CALL BACK INFO  What is the best way for the office to contact you? OK to leave message on   voicemail  Preferred Call Back Phone Number? [phone]  ---------------------------------------------------------------------------  --------------  SCRIPT ANSWERS  Relationship to Patient?  Self

## 2021-05-06 ENCOUNTER — APPOINTMENT (OUTPATIENT)
Dept: GENERAL RADIOLOGY | Age: 76
DRG: 871 | End: 2021-05-06
Payer: MEDICARE

## 2021-05-06 ENCOUNTER — HOSPITAL ENCOUNTER (INPATIENT)
Age: 76
LOS: 6 days | Discharge: OTHER INSTITUTION | DRG: 871 | End: 2021-05-12
Attending: EMERGENCY MEDICINE | Admitting: INTERNAL MEDICINE
Payer: MEDICARE

## 2021-05-06 DIAGNOSIS — J96.01 ACUTE RESPIRATORY FAILURE WITH HYPOXIA (HCC): Primary | ICD-10-CM

## 2021-05-06 DIAGNOSIS — E87.1 HYPONATREMIA: ICD-10-CM

## 2021-05-06 DIAGNOSIS — J44.1 COPD EXACERBATION (HCC): ICD-10-CM

## 2021-05-06 DIAGNOSIS — J18.9 PNEUMONIA DUE TO ORGANISM: ICD-10-CM

## 2021-05-06 PROBLEM — J96.21 ACUTE AND CHRONIC RESPIRATORY FAILURE WITH HYPOXIA (HCC): Status: ACTIVE | Noted: 2021-05-06

## 2021-05-06 PROBLEM — R79.89 ELEVATED PROCALCITONIN: Status: ACTIVE | Noted: 2021-05-06

## 2021-05-06 PROBLEM — I51.89 GRADE II DIASTOLIC DYSFUNCTION: Status: ACTIVE | Noted: 2021-05-06

## 2021-05-06 PROBLEM — R76.8 ANA POSITIVE: Status: ACTIVE | Noted: 2021-05-06

## 2021-05-06 PROBLEM — R91.8 PULMONARY INFILTRATES: Status: ACTIVE | Noted: 2021-05-06

## 2021-05-06 PROBLEM — D64.9 ANEMIA: Status: ACTIVE | Noted: 2017-08-31

## 2021-05-06 LAB
A/G RATIO: 0.7 (ref 1.1–2.2)
ALBUMIN SERPL-MCNC: 3.4 G/DL (ref 3.4–5)
ALP BLD-CCNC: 82 U/L (ref 40–129)
ALT SERPL-CCNC: 13 U/L (ref 10–40)
ANION GAP SERPL CALCULATED.3IONS-SCNC: 12 MMOL/L (ref 3–16)
AST SERPL-CCNC: 16 U/L (ref 15–37)
BASE EXCESS VENOUS: 1.5 MMOL/L (ref -3–3)
BASOPHILS ABSOLUTE: 0 K/UL (ref 0–0.2)
BASOPHILS RELATIVE PERCENT: 0.1 %
BILIRUB SERPL-MCNC: 1.4 MG/DL (ref 0–1)
BUN BLDV-MCNC: 11 MG/DL (ref 7–20)
CALCIUM SERPL-MCNC: 8.8 MG/DL (ref 8.3–10.6)
CARBOXYHEMOGLOBIN: 2.1 % (ref 0–1.5)
CHLORIDE BLD-SCNC: 84 MMOL/L (ref 99–110)
CO2: 25 MMOL/L (ref 21–32)
CREAT SERPL-MCNC: 0.7 MG/DL (ref 0.6–1.2)
EKG ATRIAL RATE: 88 BPM
EKG DIAGNOSIS: NORMAL
EKG P AXIS: 67 DEGREES
EKG P-R INTERVAL: 214 MS
EKG Q-T INTERVAL: 374 MS
EKG QRS DURATION: 84 MS
EKG QTC CALCULATION (BAZETT): 452 MS
EKG R AXIS: -43 DEGREES
EKG T AXIS: 57 DEGREES
EKG VENTRICULAR RATE: 88 BPM
EOSINOPHILS ABSOLUTE: 0 K/UL (ref 0–0.6)
EOSINOPHILS RELATIVE PERCENT: 0.2 %
GFR AFRICAN AMERICAN: >60
GFR NON-AFRICAN AMERICAN: >60
GLOBULIN: 4.8 G/DL
GLUCOSE BLD-MCNC: 146 MG/DL (ref 70–99)
HCO3 VENOUS: 26.3 MMOL/L (ref 23–29)
HCT VFR BLD CALC: 31.6 % (ref 36–48)
HEMOGLOBIN: 10.5 G/DL (ref 12–16)
LACTIC ACID, SEPSIS: 1.1 MMOL/L (ref 0.4–1.9)
LACTIC ACID, SEPSIS: 1.1 MMOL/L (ref 0.4–1.9)
LACTIC ACID, SEPSIS: 1.3 MMOL/L (ref 0.4–1.9)
LYMPHOCYTES ABSOLUTE: 0.6 K/UL (ref 1–5.1)
LYMPHOCYTES RELATIVE PERCENT: 5.1 %
MCH RBC QN AUTO: 28.9 PG (ref 26–34)
MCHC RBC AUTO-ENTMCNC: 33.2 G/DL (ref 31–36)
MCV RBC AUTO: 87 FL (ref 80–100)
METHEMOGLOBIN VENOUS: 0.3 %
MONOCYTES ABSOLUTE: 0.9 K/UL (ref 0–1.3)
MONOCYTES RELATIVE PERCENT: 7.4 %
NEUTROPHILS ABSOLUTE: 10.2 K/UL (ref 1.7–7.7)
NEUTROPHILS RELATIVE PERCENT: 87.2 %
O2 CONTENT, VEN: 9 VOL %
O2 SAT, VEN: 55 %
O2 THERAPY: ABNORMAL
PCO2, VEN: 42.3 MMHG (ref 40–50)
PDW BLD-RTO: 16.6 % (ref 12.4–15.4)
PH VENOUS: 7.41 (ref 7.35–7.45)
PLATELET # BLD: 191 K/UL (ref 135–450)
PMV BLD AUTO: 7.4 FL (ref 5–10.5)
PO2, VEN: 29.1 MMHG (ref 25–40)
POTASSIUM REFLEX MAGNESIUM: 4.3 MMOL/L (ref 3.5–5.1)
PRO-BNP: 389 PG/ML (ref 0–449)
PROCALCITONIN: 0.19 NG/ML (ref 0–0.15)
RBC # BLD: 3.63 M/UL (ref 4–5.2)
SARS-COV-2, NAAT: NOT DETECTED
SODIUM BLD-SCNC: 121 MMOL/L (ref 136–145)
TCO2 CALC VENOUS: 28 MMOL/L
TOTAL PROTEIN: 8.2 G/DL (ref 6.4–8.2)
TROPONIN: <0.01 NG/ML
TSH REFLEX: 0.83 UIU/ML (ref 0.27–4.2)
WBC # BLD: 11.7 K/UL (ref 4–11)

## 2021-05-06 PROCEDURE — 87635 SARS-COV-2 COVID-19 AMP PRB: CPT

## 2021-05-06 PROCEDURE — 84145 PROCALCITONIN (PCT): CPT

## 2021-05-06 PROCEDURE — 6360000002 HC RX W HCPCS: Performed by: INTERNAL MEDICINE

## 2021-05-06 PROCEDURE — 83605 ASSAY OF LACTIC ACID: CPT

## 2021-05-06 PROCEDURE — 84484 ASSAY OF TROPONIN QUANT: CPT

## 2021-05-06 PROCEDURE — 2000000000 HC ICU R&B

## 2021-05-06 PROCEDURE — 93010 ELECTROCARDIOGRAM REPORT: CPT | Performed by: INTERNAL MEDICINE

## 2021-05-06 PROCEDURE — 99223 1ST HOSP IP/OBS HIGH 75: CPT | Performed by: INTERNAL MEDICINE

## 2021-05-06 PROCEDURE — 6370000000 HC RX 637 (ALT 250 FOR IP): Performed by: INTERNAL MEDICINE

## 2021-05-06 PROCEDURE — 80053 COMPREHEN METABOLIC PANEL: CPT

## 2021-05-06 PROCEDURE — 2580000003 HC RX 258: Performed by: PHYSICIAN ASSISTANT

## 2021-05-06 PROCEDURE — 71045 X-RAY EXAM CHEST 1 VIEW: CPT

## 2021-05-06 PROCEDURE — 82803 BLOOD GASES ANY COMBINATION: CPT

## 2021-05-06 PROCEDURE — 99284 EMERGENCY DEPT VISIT MOD MDM: CPT

## 2021-05-06 PROCEDURE — 94660 CPAP INITIATION&MGMT: CPT

## 2021-05-06 PROCEDURE — 96374 THER/PROPH/DIAG INJ IV PUSH: CPT

## 2021-05-06 PROCEDURE — 85025 COMPLETE CBC W/AUTO DIFF WBC: CPT

## 2021-05-06 PROCEDURE — 36415 COLL VENOUS BLD VENIPUNCTURE: CPT

## 2021-05-06 PROCEDURE — 87040 BLOOD CULTURE FOR BACTERIA: CPT

## 2021-05-06 PROCEDURE — 6360000002 HC RX W HCPCS: Performed by: PHYSICIAN ASSISTANT

## 2021-05-06 PROCEDURE — 2700000000 HC OXYGEN THERAPY PER DAY

## 2021-05-06 PROCEDURE — 93005 ELECTROCARDIOGRAM TRACING: CPT | Performed by: PHYSICIAN ASSISTANT

## 2021-05-06 PROCEDURE — 94640 AIRWAY INHALATION TREATMENT: CPT

## 2021-05-06 PROCEDURE — 84443 ASSAY THYROID STIM HORMONE: CPT

## 2021-05-06 PROCEDURE — 83880 ASSAY OF NATRIURETIC PEPTIDE: CPT

## 2021-05-06 PROCEDURE — 94761 N-INVAS EAR/PLS OXIMETRY MLT: CPT

## 2021-05-06 RX ORDER — DIPHENHYDRAMINE HCL 25 MG
50 TABLET ORAL NIGHTLY PRN
Status: DISCONTINUED | OUTPATIENT
Start: 2021-05-06 | End: 2021-05-12 | Stop reason: HOSPADM

## 2021-05-06 RX ORDER — ACETAMINOPHEN 325 MG/1
650 TABLET ORAL EVERY 6 HOURS PRN
Status: DISCONTINUED | OUTPATIENT
Start: 2021-05-06 | End: 2021-05-12 | Stop reason: HOSPADM

## 2021-05-06 RX ORDER — ONDANSETRON 2 MG/ML
4 INJECTION INTRAMUSCULAR; INTRAVENOUS EVERY 6 HOURS PRN
Status: DISCONTINUED | OUTPATIENT
Start: 2021-05-06 | End: 2021-05-12 | Stop reason: HOSPADM

## 2021-05-06 RX ORDER — ACETAMINOPHEN 650 MG/1
650 SUPPOSITORY RECTAL EVERY 6 HOURS PRN
Status: DISCONTINUED | OUTPATIENT
Start: 2021-05-06 | End: 2021-05-12 | Stop reason: HOSPADM

## 2021-05-06 RX ORDER — VITAMIN B COMPLEX
2000 TABLET ORAL DAILY
Status: DISCONTINUED | OUTPATIENT
Start: 2021-05-06 | End: 2021-05-12 | Stop reason: HOSPADM

## 2021-05-06 RX ORDER — LEVALBUTEROL 1.25 MG/.5ML
0.63 SOLUTION, CONCENTRATE RESPIRATORY (INHALATION) ONCE
Status: COMPLETED | OUTPATIENT
Start: 2021-05-06 | End: 2021-05-06

## 2021-05-06 RX ORDER — ROPINIROLE 0.5 MG/1
1 TABLET, FILM COATED ORAL NIGHTLY
Status: DISCONTINUED | OUTPATIENT
Start: 2021-05-06 | End: 2021-05-12 | Stop reason: HOSPADM

## 2021-05-06 RX ORDER — DILTIAZEM HYDROCHLORIDE 240 MG/1
240 CAPSULE, COATED, EXTENDED RELEASE ORAL DAILY
Status: DISCONTINUED | OUTPATIENT
Start: 2021-05-06 | End: 2021-05-12 | Stop reason: HOSPADM

## 2021-05-06 RX ORDER — MONTELUKAST SODIUM 10 MG/1
10 TABLET ORAL DAILY
Status: DISCONTINUED | OUTPATIENT
Start: 2021-05-06 | End: 2021-05-12 | Stop reason: HOSPADM

## 2021-05-06 RX ORDER — GABAPENTIN 300 MG/1
600 CAPSULE ORAL NIGHTLY
Status: DISCONTINUED | OUTPATIENT
Start: 2021-05-06 | End: 2021-05-12 | Stop reason: HOSPADM

## 2021-05-06 RX ORDER — FLUTICASONE PROPIONATE 110 UG/1
1 AEROSOL, METERED RESPIRATORY (INHALATION) 2 TIMES DAILY
Status: DISCONTINUED | OUTPATIENT
Start: 2021-05-06 | End: 2021-05-06

## 2021-05-06 RX ORDER — URSODIOL 300 MG/1
300 CAPSULE ORAL 2 TIMES DAILY
Status: DISCONTINUED | OUTPATIENT
Start: 2021-05-06 | End: 2021-05-12 | Stop reason: HOSPADM

## 2021-05-06 RX ORDER — FUROSEMIDE 20 MG/1
20 TABLET ORAL DAILY
Status: DISCONTINUED | OUTPATIENT
Start: 2021-05-06 | End: 2021-05-06

## 2021-05-06 RX ORDER — PANTOPRAZOLE SODIUM 40 MG/1
40 TABLET, DELAYED RELEASE ORAL 2 TIMES DAILY
Status: DISCONTINUED | OUTPATIENT
Start: 2021-05-06 | End: 2021-05-12 | Stop reason: HOSPADM

## 2021-05-06 RX ORDER — ASPIRIN 81 MG/1
81 TABLET ORAL DAILY
Status: DISCONTINUED | OUTPATIENT
Start: 2021-05-06 | End: 2021-05-12 | Stop reason: HOSPADM

## 2021-05-06 RX ORDER — CALCIUM CARBONATE 500(1250)
500 TABLET ORAL DAILY
Status: DISCONTINUED | OUTPATIENT
Start: 2021-05-06 | End: 2021-05-12 | Stop reason: HOSPADM

## 2021-05-06 RX ORDER — LOSARTAN POTASSIUM 100 MG/1
100 TABLET ORAL DAILY
Status: DISCONTINUED | OUTPATIENT
Start: 2021-05-06 | End: 2021-05-12 | Stop reason: HOSPADM

## 2021-05-06 RX ORDER — SODIUM CHLORIDE 0.9 % (FLUSH) 0.9 %
5-40 SYRINGE (ML) INJECTION PRN
Status: DISCONTINUED | OUTPATIENT
Start: 2021-05-06 | End: 2021-05-12 | Stop reason: HOSPADM

## 2021-05-06 RX ORDER — VENLAFAXINE 37.5 MG/1
37.5 TABLET ORAL 2 TIMES DAILY WITH MEALS
Status: DISCONTINUED | OUTPATIENT
Start: 2021-05-06 | End: 2021-05-12 | Stop reason: HOSPADM

## 2021-05-06 RX ORDER — 0.9 % SODIUM CHLORIDE 0.9 %
30 INTRAVENOUS SOLUTION INTRAVENOUS ONCE
Status: COMPLETED | OUTPATIENT
Start: 2021-05-06 | End: 2021-05-06

## 2021-05-06 RX ORDER — DIPHENHYDRAMINE HCL 25 MG
50 TABLET ORAL NIGHTLY
Status: DISCONTINUED | OUTPATIENT
Start: 2021-05-06 | End: 2021-05-06

## 2021-05-06 RX ORDER — URSODIOL 500 MG/1
500 TABLET, FILM COATED ORAL 2 TIMES DAILY
Status: DISCONTINUED | OUTPATIENT
Start: 2021-05-06 | End: 2021-05-06

## 2021-05-06 RX ORDER — TRAZODONE HYDROCHLORIDE 50 MG/1
50 TABLET ORAL NIGHTLY
Status: DISCONTINUED | OUTPATIENT
Start: 2021-05-06 | End: 2021-05-12 | Stop reason: HOSPADM

## 2021-05-06 RX ORDER — PROMETHAZINE HYDROCHLORIDE 25 MG/1
12.5 TABLET ORAL EVERY 6 HOURS PRN
Status: DISCONTINUED | OUTPATIENT
Start: 2021-05-06 | End: 2021-05-12 | Stop reason: HOSPADM

## 2021-05-06 RX ORDER — LEVALBUTEROL 1.25 MG/.5ML
0.63 SOLUTION, CONCENTRATE RESPIRATORY (INHALATION) EVERY 6 HOURS
Status: DISCONTINUED | OUTPATIENT
Start: 2021-05-06 | End: 2021-05-07

## 2021-05-06 RX ORDER — DOXEPIN HYDROCHLORIDE 25 MG/1
50 CAPSULE ORAL NIGHTLY
Status: DISCONTINUED | OUTPATIENT
Start: 2021-05-06 | End: 2021-05-12 | Stop reason: HOSPADM

## 2021-05-06 RX ORDER — LEVOTHYROXINE SODIUM 0.1 MG/1
100 TABLET ORAL DAILY
Status: DISCONTINUED | OUTPATIENT
Start: 2021-05-06 | End: 2021-05-12 | Stop reason: HOSPADM

## 2021-05-06 RX ORDER — FUROSEMIDE 10 MG/ML
60 INJECTION INTRAMUSCULAR; INTRAVENOUS ONCE
Status: COMPLETED | OUTPATIENT
Start: 2021-05-06 | End: 2021-05-06

## 2021-05-06 RX ORDER — SODIUM CHLORIDE 9 MG/ML
25 INJECTION, SOLUTION INTRAVENOUS PRN
Status: DISCONTINUED | OUTPATIENT
Start: 2021-05-06 | End: 2021-05-12 | Stop reason: HOSPADM

## 2021-05-06 RX ORDER — METHYLPREDNISOLONE SODIUM SUCCINATE 125 MG/2ML
60 INJECTION, POWDER, LYOPHILIZED, FOR SOLUTION INTRAMUSCULAR; INTRAVENOUS ONCE
Status: COMPLETED | OUTPATIENT
Start: 2021-05-06 | End: 2021-05-06

## 2021-05-06 RX ORDER — ATORVASTATIN CALCIUM 10 MG/1
20 TABLET, FILM COATED ORAL NIGHTLY
Status: DISCONTINUED | OUTPATIENT
Start: 2021-05-06 | End: 2021-05-12 | Stop reason: HOSPADM

## 2021-05-06 RX ORDER — SODIUM CHLORIDE 0.9 % (FLUSH) 0.9 %
5-40 SYRINGE (ML) INJECTION EVERY 12 HOURS SCHEDULED
Status: DISCONTINUED | OUTPATIENT
Start: 2021-05-06 | End: 2021-05-12 | Stop reason: HOSPADM

## 2021-05-06 RX ORDER — CETIRIZINE HYDROCHLORIDE 10 MG/1
10 TABLET ORAL DAILY
Status: DISCONTINUED | OUTPATIENT
Start: 2021-05-06 | End: 2021-05-06

## 2021-05-06 RX ORDER — FERROUS SULFATE 325(65) MG
325 TABLET ORAL
Status: DISCONTINUED | OUTPATIENT
Start: 2021-05-06 | End: 2021-05-12 | Stop reason: HOSPADM

## 2021-05-06 RX ORDER — POLYETHYLENE GLYCOL 3350 17 G/17G
17 POWDER, FOR SOLUTION ORAL DAILY PRN
Status: DISCONTINUED | OUTPATIENT
Start: 2021-05-06 | End: 2021-05-12 | Stop reason: HOSPADM

## 2021-05-06 RX ADMIN — FERROUS SULFATE TAB 325 MG (65 MG ELEMENTAL FE) 325 MG: 325 (65 FE) TAB at 16:51

## 2021-05-06 RX ADMIN — GABAPENTIN 600 MG: 300 CAPSULE ORAL at 22:17

## 2021-05-06 RX ADMIN — MUPIROCIN: 20 OINTMENT TOPICAL at 22:20

## 2021-05-06 RX ADMIN — DIPHENHYDRAMINE HCL 50 MG: 25 TABLET ORAL at 22:20

## 2021-05-06 RX ADMIN — LEVALBUTEROL HYDROCHLORIDE 1.25 MG: 1.25 SOLUTION, CONCENTRATE RESPIRATORY (INHALATION) at 15:50

## 2021-05-06 RX ADMIN — CEFEPIME 2000 MG: 2 INJECTION, POWDER, FOR SOLUTION INTRAMUSCULAR; INTRAVENOUS at 17:23

## 2021-05-06 RX ADMIN — LEVALBUTEROL HYDROCHLORIDE 0.63 MG: 1.25 SOLUTION, CONCENTRATE RESPIRATORY (INHALATION) at 19:40

## 2021-05-06 RX ADMIN — FUROSEMIDE 60 MG: 10 INJECTION, SOLUTION INTRAMUSCULAR; INTRAVENOUS at 16:51

## 2021-05-06 RX ADMIN — VENLAFAXINE 37.5 MG: 37.5 TABLET ORAL at 16:51

## 2021-05-06 RX ADMIN — TRAZODONE HYDROCHLORIDE 50 MG: 50 TABLET ORAL at 22:17

## 2021-05-06 RX ADMIN — VANCOMYCIN HYDROCHLORIDE 1500 MG: 10 INJECTION, POWDER, LYOPHILIZED, FOR SOLUTION INTRAVENOUS at 11:40

## 2021-05-06 RX ADMIN — METHYLPREDNISOLONE SODIUM SUCCINATE 60 MG: 125 INJECTION, POWDER, FOR SOLUTION INTRAMUSCULAR; INTRAVENOUS at 10:56

## 2021-05-06 RX ADMIN — PANTOPRAZOLE SODIUM 40 MG: 40 TABLET, DELAYED RELEASE ORAL at 22:17

## 2021-05-06 RX ADMIN — DOXEPIN HYDROCHLORIDE 50 MG: 25 CAPSULE ORAL at 22:17

## 2021-05-06 RX ADMIN — URSODIOL 300 MG: 300 CAPSULE ORAL at 22:20

## 2021-05-06 RX ADMIN — ROPINIROLE HYDROCHLORIDE 1 MG: 0.5 TABLET, FILM COATED ORAL at 22:17

## 2021-05-06 RX ADMIN — SODIUM CHLORIDE 1779 ML: 9 INJECTION, SOLUTION INTRAVENOUS at 10:55

## 2021-05-06 RX ADMIN — ATORVASTATIN CALCIUM 20 MG: 10 TABLET, FILM COATED ORAL at 22:17

## 2021-05-06 RX ADMIN — LEVALBUTEROL HYDROCHLORIDE 0.63 MG: 1.25 SOLUTION, CONCENTRATE RESPIRATORY (INHALATION) at 11:42

## 2021-05-06 ASSESSMENT — ENCOUNTER SYMPTOMS
COUGH: 1
SHORTNESS OF BREATH: 1
ABDOMINAL PAIN: 0
NAUSEA: 1
WHEEZING: 1
VOMITING: 0
EYES NEGATIVE: 1
BACK PAIN: 0
COLOR CHANGE: 0

## 2021-05-06 NOTE — ED PROVIDER NOTES
I independently performed a history and physical on Silver. All diagnostic, treatment, and disposition decisions were made by myself in conjunction with the advanced practice provider. I have participated in the medical decision making and directed the treatment plan and disposition of the patient. For further details of [address] [place] [address] emergency department encounter, please see the advanced practice provider's documentation. CHIEF COMPLAINT  Chief Complaint   Patient presents with    Shortness of Breath     began sunday with shortness of breath, +productive cough brown,  pt wears home oxygen 3 liters pt increased it to 4 liters saturday night. pt denies chest pain. fever sunday night        Briefly, Silver is a 77 y.o. female  who presents to the ED complaining of worsening shortness of breath and productive cough    FOCUSED PHYSICAL EXAMINATION  BP (!) 157/54   Pulse 88   Temp 98.6 °F (37 °C) (Oral)   Resp 19   Ht 5' 6\" (1.676 m)   Wt 210 lb (95.3 kg)   SpO2 100%   BMI 33.89 kg/m²      Focused physical examination:  General appearance:  Cooperative. Increased work of breathing  Skin:  Warm. Dry. Eye:  Extraocular movements intact. Ears, nose, mouth and throat:  Oral mucosa moist,  Neck:  Trachea midline. Heart:  Regular rate and rhythm  Perfusion:  intact  Respiratory: Increased work of breathing with frequent productive cough. Rales heard throughout all lung fields     Abdominal:   Non distended. Nontender  Neurological:  Alert and oriented x 3. Moves all extremities spontaneously  Musculoskeletal:   Normal ROM, no deformities          Psychiatric:  Normal mood      EKG: EKG: Sinus rhythm first-degree AV block rate of 88 bpm.  Left axis deviation. Left trickle hypertrophy. No ST elevation. Overall similar to prior from 2/19/2018    MDM: Patient presents emergency department today complaining of shortness of breath. Denies chest pain.   Has significant productive cough here. Initially on 4 L nasal cannula up from her to 3 L but ultimately had to be placed on BiPAP due to increased work of breathing. Chest x-ray concerning for multifocal infiltrates. She was found to be hyponatremic here. She was given 1 L of IV fluids but could not complete a full 30 mL/kg IV fluid bolus due to her hyponatremia. Patient's BNP is normal she does not have clinical signs of fluid overload lower suspicion for CHF. Multiple antibiotic allergies and after discussion with pharmacy patient was started on vancomycin and cefepime. Patient will ultimately require admission to the hospital for continued antibiotics and respiratory support    CRITICAL CARE TIME   Total Critical Care time was 30 minutes, excluding separately reportable procedures. There was a high probability of clinically significant/life threatening deterioration in the patient's condition which required my urgent intervention. This time was spent reviewing the patient chart, interpreting diagnostic/laboratory data, administration of submental oxygen, management of BiPAP settings, initiation of broad-spectrum antibiotics      During the patient's ED course, the patient was given:  Medications   0.9 % sodium chloride bolus (1,779 mLs Intravenous New Bag 5/6/21 1055)   cefepime (MAXIPIME) 2000 mg IVPB minibag (has no administration in time range)   vancomycin 1500 mg in dextrose 5% 300 mL IVPB (1,500 mg Intravenous New Bag 5/6/21 1140)   methylPREDNISolone sodium (SOLU-MEDROL) injection 60 mg (60 mg Intravenous Given 5/6/21 1056)   levalbuterol (XOPENEX) nebulizer solution 0.63 mg (0.63 mg Nebulization Given 5/6/21 1142)        CLINICAL IMPRESSION  1. Acute respiratory failure with hypoxia ([address]    2. Pneumonia due to organism    3. COPD exacerbation ([address]    4. Hyponatremia        DISPOSITION  Admission      This chart was created using Dragon dictation software.   Efforts were made by me to ensure accuracy, however some errors may be present due to limitations of this technology.             Joesph Bedoya MD  05/06/21 5658

## 2021-05-06 NOTE — PROGRESS NOTES
05/06/21 1610   NIV Type   Skin Assessment Clean, dry, & intact   Skin Protection for O2 Device Yes   Location Nose   NIV Started/Stopped On   Equipment Type V60   Mode Bilevel   Mask Type Full face mask   Mask Size Medium   Settings/Measurements   IPAP 12 cmH20   CPAP/EPAP 6 cmH2O   Resp 18   FiO2  60 %   Vt Exhaled 374 mL   Minute Volume 17.4 Liters   Comfort Level Good   Using Accessory Muscles Yes   SpO2 98   Breath Sounds   Right Upper Lobe Diminished   Right Middle Lobe Diminished   Right Lower Lobe Diminished   Left Upper Lobe Diminished   Left Lower Lobe Diminished   Patient Observation   Observations MEPILEX PLACED ON NOSE, HHN GIVEN WITH BIPAP   Alarm Settings   Alarms On Y   Press Low Alarm 6 cmH2O   High Pressure Alarm 30 cmH2O   Apnea (secs) 220 secs   Resp Rate Low Alarm 6   High Respiratory Rate 45 br/min

## 2021-05-06 NOTE — Clinical Note
Patient Class: Inpatient [101]   REQUIRED: Diagnosis: PNA (pneumonia) [574904]   Estimated Length of Stay: Estimated stay of more than 2 midnights   Admitting Provider: Evin Salomon [[id number]]   Telemetry/Cardiac Monitoring Required?: Yes

## 2021-05-06 NOTE — ED NOTES
RN spoke to Kristen Burns, daughter and [place], whom sts that she talked to her mother today and that she does not want to be intubated if it comes to that. Pt is A&Ox4 currently and sts this to RN as well.         Jonatan Frazier RN  05/06/21 1540

## 2021-05-06 NOTE — ED NOTES
Pt calm and resting quietly with equal rise and fall of chest. Pt in NAD, RR even and unlabored. Side rails up, bed locked and in lowest position. Pt updated on plan of care. No needs at this time. Pt instructed on use of call light if needed.       Liyah Osuna RN  05/06/21 9435

## 2021-05-06 NOTE — CONSULTS
Pharmacy Note  Vancomycin Consult    Gabby Ríos is a 77 y.o. female started on Vancomycin for PNA; consult received from Dr. Kiara Parker to manage therapy. Also receiving the following antibiotics: Cefepime. Allergies:  Latex, Clindamycin, Pennsaid [diclofenac sodium], Torsemide, Actos [pioglitazone], Amoxicillin, Augmentin [amoxicillin-pot clavulanate], Bactrim [sulfamethoxazole-trimethoprim], Ciprofloxacin, Doxycycline, Levaquin [levofloxacin], Ativan [lorazepam], Cephalosporins, Pcn [penicillins], and Proventil [albuterol]     Recent Labs     05/06/21  1027   CREATININE 0.7     Recent Labs     05/06/21  1027   WBC 11.7*     Estimated Creatinine Clearance: 82 mL/min (based on SCr of 0.7 mg/dL). Intake/Output Summary (Last 24 hours) at 5/6/2021 1521  Last data filed at 5/6/2021 1340  Gross per 24 hour   Intake 2079 ml   Output --   Net 2079 ml     Wt Readings from Last 1 Encounters:   05/06/21 210 lb (95.3 kg)     Body mass index is 33.89 kg/m². Goal Vancomycin trough: 15 mcg/mL   Goal Vancomycin AUC: 400-600     Assessment/Plan:  Vancomycin 1500 mg IVPB x 1 administered in ER at 1140. Recommend to start Vancomycin 1250 mg IVPB q24h tonight at 2300. Vancomycin level tomorrow at 2230. Shaniqua Mc.  Tarik Henry, BCPS   5/6/2021 3:24 PM

## 2021-05-06 NOTE — ED PROVIDER NOTES
[address] [facility]  ED  EMERGENCY DEPARTMENT ENCOUNTER        Pt Name: Dagoberto Arteaga  MRN: [id number]  [place] 1945  Date of evaluation: 5/6/2021  Provider: Magdi Morris PA-C  PCP: Silas Hollingsworth DO  ED Attending: Germán Bowens MD      This patient was seen by the attending provider      History provided by the patient    CHIEF COMPLAINT:     Chief Complaint   Patient presents with    Shortness of Breath     began sunday with shortness of breath, +productive cough brown,  pt wears home oxygen 3 liters pt increased it to 4 liters saturday night. pt denies chest pain. fever sunday night        HISTORY OF PRESENT ILLNESS:      Dagoberto Arteaga is a 77 y.o. female who arrives to the ED by [facility]. Patient has an extensive past medical history that includes but is not limited to: COPD (oxygen dependent), type 2 diabetes, hypertension, hyperlipidemia, TIA, GERD. Patient lives alone. Her  passed away just this past January. Since Sunday, 5/2 the patient has begun feeling ill. She started with a fever on Sunday and has developed a runny productive cough of brown and yellow sputum. She is short of breath and wheezing. She reports fatigue and generalized weakness. Patient typically wears 3 L of oxygen but has had to increase this to 4.5 L of oxygen. She has a virtual appointment with primary care this afternoon but was feeling worse this morning to the point where she called 911 to be transported here for evaluation. The patient symptoms are exacerbated with any exertion/activity and improved but not resolved at rest.    Nursing Notes were reviewed     REVIEW OF SYSTEMS:     Review of Systems   Constitutional: Positive for activity change, appetite change, fatigue and fever. HENT: Negative. Eyes: Negative. Respiratory: Positive for cough, shortness of breath and wheezing. Cardiovascular: Negative for chest pain. Gastrointestinal: Positive for nausea.  Negative for abdominal pain and vomiting. Genitourinary: Negative. Musculoskeletal: Negative for back pain, gait problem and neck pain. Skin: Negative for color change. Neurological: Positive for weakness (generalized). Negative for headaches. All other systems reviewed and are negative. Except as noted above in the ROS, all other systems were reviewed and negative.          PAST MEDICAL HISTORY:     Past Medical History:   Diagnosis Date    Allergic     Anemia     Anesthesia complication     \"woke up during surgery\"    Arthritis     Osteoarthritis of bilateral CMC joints    Asthma     Followed by Dr. Franc Gaitan Cirrhosis [facility])     non alcoholic    COPD (chronic obstructive pulmonary disease) ([facility]     GERD (gastroesophageal reflux disease)     GIB (gastrointestinal bleeding)     LGIB 4/2016    Heart murmur     Hernia     Hyperlipidemia     Hypertension     Lung collapse     Murmur, heart     Pneumonia     Reflux     Rheumatic fever     Stress fracture     right foot    SVT (supraventricular tachycardia) ([facility]     6/19/16 - admitted X 1 day per PT     TIA (transient ischemic attack) 2015    Type II or unspecified type diabetes mellitus without mention of complication, not stated as uncontrolled     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 ([facility]     Wears glasses     as needed    Wears partial dentures     upper partial         SURGICAL HISTORY:      Past Surgical History:   Procedure Laterality Date    BONE MARROW BIOPSY      BREAST BIOPSY      CARDIAC CATHETERIZATION  07/14/14    CATARACT REMOVAL      COLONOSCOPY      X 3 per PT 6/8/16    FINE NEEDLE ASPIRATION      FOOT SURGERY Right 06/03/2013    CORAL BUNIONECTOMY RIGHT FOOT WITH SCREW FIXATION;    HERNIA REPAIR      HYSTERECTOMY      JOINT REPLACEMENT Bilateral     TKR    KNEE SURGERY  09    left    KNEE SURGERY  2010    right    UPPER GASTROINTESTINAL ENDOSCOPY N/A 7/21/2018    EGD BIOPSY performed by Eduard Pinto Eduardo Merrill MD at [facility]:       Previous Medications    ACLIDINIUM BROMIDE (TUDORZA PRESSAIR) 400 MCG/ACT AEPB    Inhale 1 puff into the lungs 2 times daily     ASPIRIN 81 MG EC TABLET    Take 81 mg by mouth daily     ATORVASTATIN (LIPITOR) 20 MG TABLET    TAKE 1 TABLET NIGHTLY    BECLOMETHASONE (QVAR) 80 MCG/ACT INHALER    Inhale 2 puffs into the lungs 2 times daily. CALCIUM CARBONATE 600 MG TABS TABLET    Take 1 tablet by mouth daily     CETIRIZINE (ZYRTEC) 10 MG TABLET    Take 1 tablet by mouth daily    CETIRIZINE (ZYRTEC) 10 MG TABLET    Take 1 tablet by mouth daily    CHOLECALCIFEROL (VITAMIN D) 2000 UNITS CAPS CAPSULE    Take 2,000 Units by mouth daily     CYANOCOBALAMIN 1000 MCG/ML INJECTION    Inject 1 mL into the muscle every 14 days Last dose was taken on 4/9/17    DILTIAZEM (TIAZAC) 240 MG EXTENDED RELEASE CAPSULE    TAKE 1 CAPSULE DAILY    DIPHENHYDRAMINE (BENADRYL) 25 MG TABLET    Take 50 mg by mouth nightly     DOXEPIN (SINEQUAN) 50 MG CAPSULE    Take 1 capsule by mouth nightly    ESTROGENS, CONJUGATED, (PREMARIN) 1.25 MG TABLET    Take 1.25 mg by mouth daily. FAMOTIDINE (PEPCID) 20 MG TABLET    Take 1 tablet by mouth 2 times daily    FERROUS SULFATE 325 (65 FE) MG TABLET    Take 325 mg by mouth 3 times daily (with meals)    FUROSEMIDE (LASIX) 20 MG TABLET    Take 1 tablet by mouth daily    GABAPENTIN (NEURONTIN) 100 MG CAPSULE        GABAPENTIN (NEURONTIN) 600 MG TABLET    Take 600 mg by mouth nightly. 600 mg at bedtime and 300 mg BID. LEVALBUTEROL (XOPENEX HFA) 45 MCG/ACT INHALER    Inhale 2 puffs into the lungs every 6 hours as needed     LEVOTHYROXINE (SYNTHROID) 100 MCG TABLET    Take 100 mcg by mouth Daily    LOSARTAN (COZAAR) 100 MG TABLET    TAKE 1 TABLET DAILY    METFORMIN (GLUCOPHAGE-XR) 500 MG EXTENDED RELEASE TABLET    Take 2 tablets PO BID.     MONTELUKAST (SINGULAIR) 10 MG TABLET    Take 10 mg by mouth daily     MULTIPLE VITAMINS-MINERALS Tobacco comment: stopped cold turkey in 2000!! ( 1999)   Substance and Sexual Activity    Alcohol use: No     Alcohol/week: 0.0 standard drinks    Drug use: No    Sexual activity: Not Currently   Lifestyle    Physical activity     Days per week: None     Minutes per session: None    Stress: None   Relationships    Social connections     Talks on phone: None     Gets together: None     Attends [employer] service: None     Active member of club or organization: None     Attends meetings of clubs or organizations: None     Relationship status: None    Intimate partner violence     Fear of current or ex partner: None     Emotionally abused: None     Physically abused: None     Forced sexual activity: None   Other Topics Concern    None   Social History Narrative    None       SCREENINGS:             PHYSICAL EXAM:       ED Triage Vitals   BP Temp Temp Source Pulse Resp SpO2 Height Weight   05/06/21 1008 05/06/21 1008 05/06/21 1008 05/06/21 1008 05/06/21 1008 05/06/21 1008 05/06/21 1008 05/06/21 1018   (!) 150/133 98.6 °F (37 °C) Oral 89 22 91 % 5' 6\" (1.676 m) 210 lb (95.3 kg)       Physical Exam    CONSTITUTIONAL: Awake and alert. Cooperative. Well-developed. Well-nourished. Ill in appearance with increased respiratory effort. Mild to moderate distress. HENT: Normocephalic. Atraumatic. External ears normal, without discharge. No nasal discharge. Oropharynx clear. Mucous membranes moist.  EYES: Conjunctiva non-injected. No scleral icterus. PERRL. EOM's grossly intact. NECK: Supple. Normal ROM. CARDIOVASCULAR: RRR. No Murmer. Intact distal pulses. PULMONARY/CHEST WALL: Effort normal. No tachypnea. Lungs with diffuse rhonchi to ausculation. ABDOMEN: Normal BS. Soft. Nondistended. No tenderness to palpate. No guarding. /ANORECTAL: Not assessed  MUSKULOSKELETAL: Normal ROM. No acute deformities. No edema. No tenderness to palpate. SKIN: Warm and dry. No rash.   NEUROLOGICAL: Alert and oriented x 3. GCS 15. CN II-XII grossly intact. Strength is 5/5 in all extremities and sensation is intact.     PSYCHIATRIC: Normal affect        DIAGNOSTICRESULTS:     LABS:    Results for orders placed or performed during the hospital encounter of 05/06/21   SARS-CoV-2 NAAT (Rapid)    Specimen: Nasopharyngeal Swab; Throat   Result Value Ref Range    SARS-CoV-2, NAAT Not Detected Not Detected   CBC Auto Differential   Result Value Ref Range    WBC 11.7 (H) 4.0 - 11.0 K/uL    RBC 3.63 (L) 4.00 - 5.20 M/uL    Hemoglobin 10.5 (L) 12.0 - 16.0 g/dL    Hematocrit 31.6 (L) 36.0 - 48.0 %    MCV 87.0 80.0 - 100.0 fL    MCH 28.9 26.0 - 34.0 pg    MCHC 33.2 31.0 - 36.0 g/dL    RDW 16.6 (H) 12.4 - 15.4 %    Platelets 650 445 - 404 K/uL    MPV 7.4 5.0 - 10.5 fL    Neutrophils % 87.2 %    Lymphocytes % 5.1 %    Monocytes % 7.4 %    Eosinophils % 0.2 %    Basophils % 0.1 %    Neutrophils Absolute 10.2 (H) 1.7 - 7.7 K/uL    Lymphocytes Absolute 0.6 (L) 1.0 - 5.1 K/uL    Monocytes Absolute 0.9 0.0 - 1.3 K/uL    Eosinophils Absolute 0.0 0.0 - 0.6 K/uL    Basophils Absolute 0.0 0.0 - 0.2 K/uL   Comprehensive Metabolic Panel w/ Reflex to MG   Result Value Ref Range    Sodium 121 (L) 136 - 145 mmol/L    Potassium reflex Magnesium 4.3 3.5 - 5.1 mmol/L    Chloride 84 (L) 99 - 110 mmol/L    CO2 25 21 - 32 mmol/L    Anion Gap 12 3 - 16    Glucose 146 (H) 70 - 99 mg/dL    BUN 11 7 - 20 mg/dL    CREATININE 0.7 0.6 - 1.2 mg/dL    GFR Non-African American >60 >60    GFR African American >60 >60    Calcium 8.8 8.3 - 10.6 mg/dL    Total Protein 8.2 6.4 - 8.2 g/dL    Albumin 3.4 3.4 - 5.0 g/dL    Albumin/Globulin Ratio 0.7 (L) 1.1 - 2.2    Total Bilirubin 1.4 (H) 0.0 - 1.0 mg/dL    Alkaline Phosphatase 82 40 - 129 U/L    ALT 13 10 - 40 U/L    AST 16 15 - 37 U/L    Globulin 4.8 g/dL   Troponin   Result Value Ref Range    Troponin <0.01 <0.01 ng/mL   Brain Natriuretic Peptide   Result Value Ref Range    Pro- 0 - 449 pg/mL   Lactate, Sepsis   Result Value Ref Range    Lactic Acid, Sepsis 1.3 0.4 - 1.9 mmol/L   Blood Gas, Venous   Result Value Ref Range    pH, Simone 7.411 7.350 - 7.450    pCO2, Simone 42.3 40.0 - 50.0 mmHg    pO2, Simone 29.1 25 - 40 mmHg    HCO3, Venous 26.3 23.0 - 29.0 mmol/L    Base Excess, Simone 1.5 -3.0 - 3.0 mmol/L    O2 Sat, Simone 55 Not Established %    Carboxyhemoglobin 2.1 (H) 0.0 - 1.5 %    MetHgb, Simone 0.3 <1.5 %    TC02 (Calc), Simone 28 Not Established mmol/L    O2 Content, Simone 9 Not Established VOL %    O2 Therapy Unknown    Procalcitonin   Result Value Ref Range    Procalcitonin 0.19 (H) 0.00 - 0.15 ng/mL   EKG 12 Lead   Result Value Ref Range    Ventricular Rate 88 BPM    Atrial Rate 88 BPM    P-R Interval 214 ms    QRS Duration 84 ms    Q-T Interval 374 ms    QTc Calculation (Bazett) 452 ms    P Axis 67 degrees    R Axis -43 degrees    T Axis 57 degrees    Diagnosis       Sinus rhythm with 1st degree A-V block with Premature supraventricular complexesLeft axis deviationMinimal voltage criteria for LVH, may be normal variantAbnormal ECGWhen compared with ECG of 19-FEB-2018 23:06,No significant change was found         RADIOLOGY:  All x-ray studies areviewed/reviewed by me. Formal interpretations per the radiologist are as follows:    Xr Chest Portable    Result Date: 5/6/2021  EXAMINATION: ONE XRAY VIEW OF THE CHEST 5/6/2021 11:34 am COMPARISON: Chest x-ray dated 05/06/2021 HISTORY: ORDERING SYSTEM PROVIDED HISTORY: worsening SOB TECHNOLOGIST PROVIDED HISTORY: Reason for exam:->worsening SOB Reason for Exam: CHEST PAIN Acuity: Acute Type of Exam: Initial FINDINGS: HEART/MEDIASTINUM: The cardiac silhouette is enlarged, but stable. PLEURA/LUNGS: Opacity again noted in the left mid lung peripherally reflecting airspace disease. Prominence of the right hilum is again noted. There are increased interstitial markings reflecting pulmonary vascular congestion/interstitial edema versus an atypical pneumonia. There is no appreciable pneumothorax. 05/06/21 1144 05/06/21 1145 05/06/21 1147   BP:    (!) 157/54   Pulse: 88   88   Resp: 24 22 26 19   Temp:       TempSrc:       SpO2: 100% 100%  100%   Weight:       Height:           Patient was given the following medications:  Medications   cefepime (MAXIPIME) 2000 mg IVPB minibag (has no administration in time range)   vancomycin 1500 mg in dextrose 5% 300 mL IVPB (1,500 mg Intravenous New Bag 5/6/21 1140)   methylPREDNISolone sodium (SOLU-MEDROL) injection 60 mg (60 mg Intravenous Given 5/6/21 1056)   0.9 % sodium chloride bolus (1,779 mLs Intravenous New Bag 5/6/21 1055)   levalbuterol (XOPENEX) nebulizer solution 0.63 mg (0.63 mg Nebulization Given 5/6/21 1142)         Patient was evaluated by both myself and MD Alma  Old records were reviewed. Patient arrives by EMS describing illness since Sunday with fevers, productive coughing and shortness of breath. Symptoms have worsened. She arrives to the ED tachypneic and satting barely in the 90s while at rest on 4 L nasal cannula oxygen. Patient appears ill and mild to moderate respiratory distress. CBC white count 11.7 with H&H 10.5 and 31.6  CMP hyponatremia 121 chloride 84. CO2 normal 25. Glucose 146. BUN is 11 and creatinine 0.7  Troponin negative  proBNP 389  Lactate 1.3  ABG pH 7.411, PCO2 42.3 and PO2 29.1  Procalcitonin 0.19  Rapid Covid negative  Blood cultures x2 drawn  Upon seeing this patient she is initially ordered normal saline per sepsis protocol (30 mL/kg), Solu-Medrol 60 mg IV and Xopenex nebulizer treatment. As her work-up has come back I initiated antibiotics. I consulted our pharmacist given the patient's extensive drug allergies. Based on review of medications given in the past it was decided cefepime and vancomycin for good choices to cover for sepsis, pneumonia. The initial fluids that were ordered were slowed and patient is just given 1 L of normal saline down in the ED due to the significant hyponatremia at 121.   There were times in the ED the patient became more dyspneic and tachypneic. O2 sats dropped to the 80s and even briefly to the 70s on nasal cannula oxygen and patient continued to reports she was having trouble breathing. Ultimately respiratory was called and patient is placed on BiPAP. I consulted both hospital and pulmonology. Pulmonology did come down and see the patient in the ED. They recommended continued BiPAP and ICU placement. Patient is now on BiPAP and is improving. She will require close monitoring for this acute respiratory failure, pneumonia as well as correction of her hyponatremia. I spoke with Dr. Tiny Steinberg and pulmonology. We thoroughly discussed the history, physical exam, laboratory and imaging studies, as well as, emergency department course. Based upon that discussion, we've decided to admit Althea Collier for further observation and evaluation of Daniel Bhardwaj's acute respiratory failure with hypoxia, pneumonia, hyponatremia. As I have deemed necessary from their history, physical, and studies, I have considered and evaluated Althea Collier for the following diagnoses:  ACUTE CORONARY SYNDROME, PERICARDIAL TAMPONADE, CHF, SEPSIS, COPD EXACERBATION, PNEUMONIA, PNEUMOTHORAX, PULMONARY EMBOLISM, and THORACIC DISSECTION. FINAL IMPRESSION:      1. Acute respiratory failure with hypoxia ([address]    2. Pneumonia due to organism    3. COPD exacerbation ([address]    4.  Hyponatremia          DISPOSITION/PLAN:   DISPOSITION     ADMIT             (Please note thatportions of this note were completed with a voice recognition program.  Efforts were made to edit the dictations, but occasionally words are mis-transcribed.)    Won Brown PA-C (electronicallysigned)              Spencer Ahumada, [place]  05/06/21 0044

## 2021-05-06 NOTE — PROGRESS NOTES
Pt tolerated transport well, spo2 remained 95-98% during transport. Will continue to monitor.       05/06/21 1420   Vent Information   FiO2  60 %   SpO2 98 %   SpO2/FiO2 ratio 163.33   Patient Transport   Time spent transporting 1-15   Transport ventillation type Noninvasive   Transport from ER   Transport destination ICU   Emergency equipment included Yes   Additional Respiratory  Assessments   Resp 23

## 2021-05-06 NOTE — PROGRESS NOTES
05/06/21 1943   NIV Type   Skin Assessment Clean, dry, & intact   Skin Protection for O2 Device Yes   Location Nose   Equipment Type v60   Mode Bilevel   Mask Type Full face mask   Mask Size Medium   Settings/Measurements   IPAP 12 cmH20   CPAP/EPAP 6 cmH2O   Resp (!) 36   FiO2  60 %   I Time/ I Time % 1 s   Vt Exhaled 479 mL   Minute Volume 14.8 Liters   Mask Leak (lpm) 0 lpm   Comfort Level Good   Using Accessory Muscles Yes   SpO2 98

## 2021-05-06 NOTE — PROGRESS NOTES
05/06/21 1144   Treatment   Treatment Type HHN   $Treatment Type $Inhaled Therapy/Meds   Medications Levalbuterol HCL   Pre-Tx Pulse 83   Pre-Tx Resps 23   Breath Sounds Pre-Tx CRISTINE Inspiratory Wheezes; Expiratory Wheezes; Diminished   Breath Sounds Pre-Tx LLL Inspiratory Wheezes; Expiratory Wheezes; Diminished   Breath Sounds Pre-Tx RUL Inspiratory Wheezes; Expiratory Wheezes; Diminished   Breath Sounds Pre-Tx RML Inspiratory Wheezes; Expiratory Wheezes; Diminished   Breath Sounds Pre-Tx RLL Inspiratory Wheezes; Expiratory Wheezes; Diminished   Breath Sounds Post-Tx CRISTINE Inspiratory Wheezes; Expiratory Wheezes; Diminished   Breath Sounds Post-Tx LLL Inspiratory Wheezes; Expiratory Wheezes; Diminished   Breath Sounds Post-Tx RUL Inspiratory Wheezes; Expiratory Wheezes; Diminished   Breath Sounds Post-Tx RML Inspiratory Wheezes; Expiratory Wheezes; Diminished   Breath Sounds Post-Tx RLL Inspiratory Wheezes; Expiratory Wheezes; Diminished   Post-Tx Pulse 90   Post-Tx Resps 26   Delivery Source Oxygen  (Through bipap)   Position Sergoi's   Tx Tolerance Well   Duration 10   Is patient on O2?  Y   Oxygen Therapy/Pulse Ox   O2 Therapy Oxygen   O2 Device PAP (positive airway pressure)   FiO2  60 %   Resp 22   SpO2 100 %

## 2021-05-06 NOTE — PROGRESS NOTES
Daughter, POA bedside and pt and writer discussed advance directives and code status. Pt does not want intubation, CPR, or any interventions if she respiratory arrests or cardiac arrests. Pt is alert and oriented x4. Daughter is and RN. Dr. Christiana West notified.

## 2021-05-06 NOTE — PROGRESS NOTES
RESPIRATORY THERAPY ASSESSMENT    [facility] Record Number:  [id number]  Age: 77 y.o. Gender: female  : 1945  Today's Date:  2021  Room:  [address] [place], [place] [address]    Assessment     Is the patient being admitted for a COPD or Asthma exacerbation? No   (If yes the patient will be seen every 4 hours for the first 24 hours and then reassessed)    Patient Admission Diagnosis      Allergies  Allergies   Allergen Reactions    Latex Swelling and Rash    Clindamycin Itching    Pennsaid [Diclofenac Sodium] Itching and Rash    Torsemide Itching    Actos [Pioglitazone] Diarrhea    Amoxicillin Other (See Comments)    Augmentin [Amoxicillin-Pot Clavulanate] Other (See Comments)    Bactrim [Sulfamethoxazole-Trimethoprim] Other (See Comments)    Ciprofloxacin Other (See Comments)     Makes her weird  Makes anxious and she has weird dreams    Doxycycline Other (See Comments)     Feels like she is smothering, chest tightness    Levaquin [Levofloxacin]      Body aches    Ativan [Lorazepam] Other (See Comments) and Anxiety     And confusion  Had weird dreams and hallucinations    Cephalosporins Rash    Pcn [Penicillins] Rash and Itching    Proventil [Albuterol] Anxiety       Minimum Predicted Vital Capacity:     PT ON BIPAP          Actual Vital Capacity:      PT ON BIPAP              Pulmonary History:COPD and Asthma  Home Oxygen Therapy:  3 - 4 LPM OXYGEN AT HOME  Home Respiratory Therapy:Levalbuterol MDI 2 PUFFS Q6H PRN, QVAR INHALER  Current Respiratory Therapy:    XOPENEX HHN Q6H,   Treatment Type: HHN  Medications: Levalbuterol HCL    Respiratory Severity Index(RSI)   Patients with orders for inhalation medications, oxygen, or any therapeutic treatment modality will be placed on Respiratory Protocol. They will be assessed with the first treatment and at least every 72 hours thereafter. The following severity scale will be used to determine frequency of treatment intervention.     Smoking History: Pulmonary Disease or Smoking History, Greater than 15 pack year = 2    Social History  Social History     Tobacco Use    Smoking status: Former Smoker     Packs/day: 1.00     Years: 40.00     Pack years: 40.00     Types: Cigarettes     Quit date: 2000     Years since quittin.9    Smokeless tobacco: Never Used    Tobacco comment: stopped cold turkey in !! ( )   Substance Use Topics    Alcohol use: No     Alcohol/week: 0.0 standard drinks    Drug use: No       Recent Surgical History: None = 0  Past Surgical History  Past Surgical History:   Procedure Laterality Date    BONE MARROW BIOPSY      BREAST BIOPSY      CARDIAC CATHETERIZATION  14    CATARACT REMOVAL      COLONOSCOPY      X 3 per PT 16    FINE NEEDLE ASPIRATION      FOOT SURGERY Right 2013    CORAL BUNIONECTOMY RIGHT FOOT WITH SCREW FIXATION;    HERNIA REPAIR      HYSTERECTOMY      JOINT REPLACEMENT Bilateral     TKR    KNEE SURGERY  09    left    KNEE SURGERY  2010    right    UPPER GASTROINTESTINAL ENDOSCOPY N/A 2018    EGD BIOPSY [facility]elin Ram [facility]       Level of Consciousness: Alert, Oriented, and Cooperative = 0    Level of Activity: Mostly sedentary, minimal walking = 2    Respiratory Pattern: Increased; RR 21-30 = 1    Breath Sounds: Diminshed bilaterally and/or crackles = 2    Sputum   ,  ,    Cough: Strong, spontaneous, non-productive = 0    Vital Signs   BP (!) 168/79   Pulse 149   Temp 97.4 °F (36.3 °C) (Axillary)   Resp 22   Ht 5' 6\" (1.676 m)   Wt 210 lb (95.3 kg)   SpO2 98%   BMI 33.89 kg/m²   SPO2 (COPD values may differ): Less than 86% on room air or greater than 92% on FiO2 greater than 50% = 4    Peak Flow (asthma only): not applicable = 0    RSI: 53-38 = Q6H or QID and Q4HPRN for dyspnea        Plan       Goals: mobilize retained secretions and volume expansion    Patient/caregiver was educated on the proper method of use for Respiratory Care Devices:  No: PT ON BIPAP      Level of patient/caregiver understanding able to:   ? Verbalize understanding   ? Demonstrate understanding       ? Teach back        ? Needs reinforcement       ? No available caregiver               ? Other:     Response to education:  Good     Is patient being placed on Home Treatment Regimen? No     Does the patient have everything they need prior to discharge? NA     Comments: PT ASSESSED CHART AND HOME MEDICATIONS REVIEWED    Plan of Care: MAINTAIN CURRENT REGIMEN    Electronically signed by Cheko Solis RCP on 5/6/2021 at 6:08 PM    Respiratory Protocol Guidelines     1. Assessment and treatment by Respiratory Therapy will be initiated for medication and therapeutic interventions upon initiation of aerosolized medication. 2. Physician will be contacted for respiratory rate (RR) greater than 35 breaths per minute. Therapy will be held for heart rate (HR) greater than 140 beats per minute, pending direction from physician. 3. Bronchodilators will be administered via Metered Dose Inhaler (MDI) with spacer when the following criteria are met:  a. Alert and cooperative     b. HR < 140 bpm  c. RR < 30 bpm                d. Can demonstrate a 2-3 second inspiratory hold  4. Bronchodilators will be administered via Hand Held Nebulizer [facility]) to patients when ANY of the following criteria are met  a. Incognizant or uncooperative          b. Patients treated with HHN at Home        c. Unable to demonstrate proper use of MDI with spacer     d. RR > 30 bpm   5. Bronchodilators will be delivered via Metered Dose Inhaler (MDI), HHN, Aerogen to intubated patients on mechanical ventilation. 6. Inhalation medication orders will be delivered and/or substituted as outlined below. Aerosolized Medications Ordering and Administration Guidelines:    1.  All Medications will be ordered by a physician, and their frequency and/or modality will be adjusted as defined by the patients Respiratory Severity Index (RSI) score. 2. If the patient does not have documented COPD, consider discontinuing anticholinergics when RSI is less than 9.  3. If the bronchospasm worsens (increased RSI), then the bronchodilator frequency can be increased to a maximum of every 4 hours. If greater than every 4 hours is required, the physician will be contacted. 4. If the bronchospasm improves, the frequency of the bronchodilator can be decreased, based on the patient's RSI, but not less than home treatment regimen frequency. 5. Bronchodilator(s) will be discontinued if patient has a RSI less than 9 and has received no scheduled or as needed treatment for 72  Hrs. Patients Ordered on a Mucolytic Agent:    1. Must always be administered with a bronchodilator. 2. Discontinue if patient experiences worsened bronchospasm, or secretions have lessened to the point that the patient is able to clear them with a cough. Anti-inflammatory and Combination Medications:    1. If the patient lacks prior history of lung disease, is not using inhaled anti-inflammatory medication at home, and lacks wheezing by examination or by history for at least 24 hours, contact physician for possible discontinuation.

## 2021-05-06 NOTE — FLOWSHEET NOTE
Antonieta Wilson phone visit. Patient known to spiritual care. Offered prayer. Patient aware we will need to await COVID testing results to have in-person visit. We will continue support as able. 05/06/21 1122   Encounter Summary   Services provided to: Patient  (Phone visit)   Referral/Consult From: Patient   [facility] Visiting   (5-6, ED visit via phone, prayer)   Complexity of Encounter Low   Length of Encounter 15 minutes   Spiritual/Latter-day   Type Spiritual support   Assessment Calm; Approachable   Intervention Active listening;Explored feelings, thoughts, concerns;Prayer   Outcome Engaged in conversation;Receptive

## 2021-05-06 NOTE — ED NOTES
[facility] - Dr Guy Sahni in ED speaking with DAVONTE Khan  Re: Acute respiratory failure     Poppy Garcia  05/06/21 1207

## 2021-05-06 NOTE — ED NOTES
[address]  - Dr Araujo Living called back to speak with DAVONTE Barrera  Re: Acute respiratory failure with hypoxia, pneumonia, hyponatremia  1134 - Dr Gayle Jacome put in admission orders on pt at this time     Meryle Mailman  05/06/21 1202

## 2021-05-06 NOTE — CONSULTS
swallowing, no coughing or choking while eating, patient has profound shortness of breath and wheezing, patient does not have any chest pain per se but has had chest congestion along with sensation of a vice around the chest, patient has had nausea but no vomiting, patient was also denying any diarrhea or any hematochezia, no increasing leg edema, patient having increased generalized tiredness and weakness, patient does not have any confusion lethargy on BiPAP when seen, patient denies any change in the ambient environment any sick contacts, patient has chronic respiratory failure with hypoxemia and uses 3 L of nasal cannula oxygen on the regular basis, no other pertinent review of system of concern       Patient Active Problem List    Diagnosis Date Noted    Hyponatremia 05/06/2021    Acute and chronic respiratory failure with hypoxia ([address] 05/06/2021    Pulmonary infiltrates 05/06/2021    Grade II diastolic dysfunction 97/35/9543    Elevated procalcitonin 05/06/2021    YONATAN positive 05/06/2021    SOB (shortness of breath) 04/21/2021    Esophageal varices without bleeding ([address] 03/04/2021    Pulmonary HTN ([address] 03/04/2021    Restless leg syndrome 03/04/2021    Primary osteoarthritis involving multiple joints 02/22/2019    Vitamin D deficiency 02/22/2019    Paresthesias 02/22/2019    Pernicious anemia 02/07/2019    Neuropathy 02/07/2019    Upper GI bleed 07/20/2018    Patellofemoral pain syndrome of left knee 04/26/2018    Quadriceps tendinitis 04/26/2018    PNA (pneumonia) 08/31/2017    Anemia 08/31/2017    Leukocytosis 08/31/2017    Diabetic polyneuropathy associated with diabetes mellitus due to underlying condition ([address] 06/22/2017    SVT (supraventricular tachycardia) ([address] 06/19/2016    GERD (gastroesophageal reflux disease)     Cirrhosis (HCC)     COPD (chronic obstructive pulmonary disease) ([address]     Heart murmur 07/11/2014    HTN (hypertension) 07/11/2014    Obesity (BMI 30.0-34.9) 07/04/2014    S/P knee replacement 01/09/2014    Hyperlipidemia 11/14/2013    TIA (transient ischemic attack) 11/12/2013    Type 2 diabetes mellitus ([address] 11/12/2013    Asthma 11/12/2013       Past Medical History:   Diagnosis Date    Allergic     Anemia     Anesthesia complication     \"woke up during surgery\"    Arthritis     Osteoarthritis of bilateral CMC joints    Asthma     Followed by Dr. Haley Guevara Cirrhosis [facility])     non alcoholic    COPD (chronic obstructive pulmonary disease) ([address]     GERD (gastroesophageal reflux disease)     GIB (gastrointestinal bleeding)     LGIB 4/2016    Heart murmur     Hernia     Hyperlipidemia     Hypertension     Lung collapse     Murmur, heart     Pneumonia     Reflux     Rheumatic fever     Stress fracture     right foot    SVT (supraventricular tachycardia) ([address]     6/19/16 - admitted X 1 day per PT     TIA (transient ischemic attack) 2015    Type II or unspecified type diabetes mellitus without mention of complication, not stated as uncontrolled     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 ([address]     Wears glasses     as needed    Wears partial dentures     upper partial        Past Surgical History:   Procedure Laterality Date    BONE MARROW BIOPSY      BREAST BIOPSY      CARDIAC CATHETERIZATION  07/14/14    CATARACT REMOVAL      COLONOSCOPY      X 3 per PT 6/8/16    FINE NEEDLE ASPIRATION      FOOT SURGERY Right 06/03/2013    CORAL BUNIONECTOMY RIGHT FOOT WITH SCREW FIXATION;    HERNIA REPAIR      HYSTERECTOMY      JOINT REPLACEMENT Bilateral     TKR    KNEE SURGERY  09    left    KNEE SURGERY  2010    right    UPPER GASTROINTESTINAL ENDOSCOPY N/A 7/21/2018    EGD BIOPSY performed by Ximena Garcia MD at [address] [place] [address]        Family History   Problem Relation Age of Onset    Lupus Other     Colon Cancer Mother     Diabetes Father     Kidney Disease Paternal Grandmother         Social History     Tobacco Use    Smoking status: Former Smoker     Packs/day: 1.00     Years: 40.00     Pack years: 40.00     Types: Cigarettes     Quit date: 2000     Years since quittin.9    Smokeless tobacco: Never Used    Tobacco comment: stopped cold turkey in !! ( )   Substance Use Topics    Alcohol use: No     Alcohol/week: 0.0 standard drinks        Allergies   Allergen Reactions    Latex Swelling and Rash    Clindamycin Itching    Pennsaid [Diclofenac Sodium] Itching and Rash    Torsemide Itching    Actos [Pioglitazone] Diarrhea    Amoxicillin Other (See Comments)    Augmentin [Amoxicillin-Pot Clavulanate] Other (See Comments)    Bactrim [Sulfamethoxazole-Trimethoprim] Other (See Comments)    Ciprofloxacin Other (See Comments)     Makes her weird  Makes anxious and she has weird dreams    Doxycycline Other (See Comments)     Feels like she is smothering, chest tightness    Levaquin [Levofloxacin]      Body aches    Ativan [Lorazepam] Other (See Comments) and Anxiety     And confusion  Had weird dreams and hallucinations    Cephalosporins Rash    Pcn [Penicillins] Rash and Itching    Proventil [Albuterol] Anxiety               Physical Exam:  Blood pressure (!) 170/66, pulse 84, temperature 97.4 °F (36.3 °C), temperature source Axillary, resp. rate 16, height 5' 6\" (1.676 m), weight 210 lb (95.3 kg), SpO2 97 %.'     Constitutional: In profound respiratory distress on BiPAP when seen in the ER having increased work of breathing with somewhat paradoxical breathing  HENT: BiPAP mask intact. No thyromegaly. Eyes:  Conjunctivae are normal. Pupils equal, round, and reactive to light. No scleral icterus. Neck: . No tracheal deviation present. No obvious thyroid mass. Cardiovascular: Normal rate, regular rhythm, normal heart sounds. No right ventricular heave. No lower extremity edema. Pulmonary/Chest: Scattered wheezes. Bilateral rales. Chest wall is not dull to percussion.   Significant use of accessory muscle respiration when seen somewhat prolonged expiration with decreased breath sound history  Abdominal: Soft. Bowel sounds present. No distension or hernia. No tenderness. Musculoskeletal: No cyanosis. No clubbing. No obvious joint deformity. Lymphadenopathy: No cervical or supraclavicular adenopathy. Skin: Skin is warm and dry. No rash or nodules on the exposed extremities. Psychiatric: Normal mood and affect. Behavior is normal.  Somewhat anxiety. Neurologic: Alert, awake and oriented. PERRL. Speech fluent        Results:  CBC:   Recent Labs     05/06/21  1027   WBC 11.7*   HGB 10.5*   HCT 31.6*   MCV 87.0        BMP:   Recent Labs     05/06/21  1027   *   K 4.3   CL 84*   CO2 25   BUN 11   CREATININE 0.7     LIVER PROFILE:   Recent Labs     05/06/21  1027   AST 16   ALT 13   BILITOT 1.4*   ALKPHOS 82       Imaging:  I have reviewed radiology images personally. XR CHEST PORTABLE   Final Result   Right hilar prominence which may be due to lymphadenopathy or increased   central pulmonary vessels. Increased interstitial markings reflecting interstitial edema/pulmonary   vascular congestion versus an atypical pneumonia. Left mid lung opacity likely reflecting airspace disease. Malignancy cannot   be excluded. Further evaluation with chest CT is recommended to better evaluate the above   pathologies. XR CHEST PORTABLE   Final Result   Consolidative opacities in the left mid to lower lung zone are noted. There   also prominence of the perihilar markings. Differential includes pulmonary   edema and multifocal pneumonia. Pulmonary neoplasm, particularly in the   periphery of the left lower lobe cannot be completely excluded. Follow-up to   resolution is recommended and consideration of a short term follow-up CT. XR CHEST PORTABLE    (Results Pending)       Results for Oleksandr Mercado (MRN [id number]) as of 5/6/2021 15:10   Ref.  Range 8/31/2017 16:31 2/19/2018 23:30 5/6/2021 10:27   Carboxyhemoglobin Latest Ref Range: 0.0 - 1.5 % 2.7 (H)  2.1 (H)   O2 Therapy Unknown Unknown Unknown Unknown   Hemoglobin, Art, Extended Latest Ref Range: 12.0 - 16.0 g/dL  10.0 (L)    pH, Arterial Latest Ref Range: 7.350 - 7.450   7.397    pCO2, Arterial Latest Ref Range: 35.0 - 45.0 mmHg  34.4 (L)    pO2, Arterial Latest Ref Range: 75.0 - 108.0 mmHg  65.7 (L)    HCO3, Arterial Latest Ref Range: 21.0 - 29.0 mmol/L  20.7 (L)    TCO2 (calc), Art Latest Ref Range: Not Established mmol/L  21.7    Base Excess, Arterial Latest Ref Range: -3.0 - 3.0 mmol/L  -3.6 (L)    O2 Sat, Arterial Latest Ref Range: >92 %  90.8 (L)    O2 Content, Arterial Latest Ref Range: Not Established mL/dL  13    Methemoglobin, Arterial Latest Ref Range: <1.5 %  0.2    Carboxyhgb, Arterial Latest Ref Range: 0.0 - 1.5 %  1.5    pH, Simone Latest Ref Range: 7.350 - 7.450  7.36  7.411   pCO2, Simone Latest Ref Range: 40.0 - 50.0 mmHg 44.8  42.3   pO2, Simone Latest Ref Range: 25 - 40 mmHg 49.9 (H)  29.1   HCO3, Venous Latest Ref Range: 23.0 - 29.0 mmol/L 24.7  26.3   TC02 (Calc), Simone Latest Ref Range: Not Established mmol/L 26  28   Base Excess, Simone Latest Ref Range: -3.0 - 3.0 mmol/L -0.9  1.5   O2 Content, Simone Latest Ref Range: Not Established VOL % 12  9   MetHgb, Simone Latest Ref Range: <1.5 % 0.2  0.3   O2 Sat, Simone Latest Ref Range: Not Established % 82  55       Results for Yvette Peña (MRN [id number]) as of 5/6/2021 15:10   Ref.  Range 6/22/2020 10:00 4/12/2021 14:47 5/6/2021 10:27   Sodium Latest Ref Range: 136 - 145 mmol/L 135 (L)  121 (L)   Potassium Latest Ref Range: 3.5 - 5.1 mmol/L 4.2  4.3   Chloride Latest Ref Range: 99 - 110 mmol/L 92 (L)  84 (L)   CO2 Latest Ref Range: 21 - 32 mmol/L 27  25   BUN Latest Ref Range: 7 - 20 mg/dL 12  11   Creatinine Latest Ref Range: 0.6 - 1.2 mg/dL 0.6  0.7   Anion Gap Latest Ref Range: 3 - 16  16  12   GFR Non- Latest Ref Range: >60  >60 >60   GFR  Latest Ref Range: >60  >60  >60   Lactic Acid, Sepsis Latest Ref Range: 0.4 - 1.9 mmol/L   1.3   Glucose Latest Ref Range: 70 - 99 mg/dL 118 (H)  146 (H)   Calcium Latest Ref Range: 8.3 - 10.6 mg/dL 8.6  8.8   Total Protein Latest Ref Range: 6.4 - 8.2 g/dL 7.9  8.2   Procalcitonin Latest Ref Range: 0.00 - 0.15 ng/mL   0.19 (H)   Pro-BNP Latest Ref Range: 0 - 449 pg/mL   389   Troponin Latest Ref Range: <0.01 ng/mL   <0.01   Cholesterol, Fasting Latest Ref Range: 0 - 199 mg/dL 163     HDL Cholesterol Latest Ref Range: 40 - 60 mg/dL 75 (H)     LDL Calculated Latest Ref Range: <100 mg/dL 69     Triglyceride, Fasting Latest Ref Range: 0 - 150 mg/dL 97     VLDL Cholesterol Calculated Latest Ref Range: Not Established mg/dL 19     Albumin Latest Ref Range: 3.4 - 5.0 g/dL 4.0  3.4   Globulin Latest Units: g/dL 3.9  4.8   Albumin/Globulin Ratio Latest Ref Range: 1.1 - 2.2  1.0 (L)  0.7 (L)   Alk Phos Latest Ref Range: 40 - 129 U/L 60  82   ALT Latest Ref Range: 10 - 40 U/L 22  13   AST Latest Ref Range: 15 - 37 U/L 25  16   Bilirubin Latest Ref Range: 0.0 - 1.0 mg/dL 0.4  1.4 (H)     Results for Ann Marie Chavez (MRN [id number]) as of 5/6/2021 15:10   Ref. Range 5/28/2015 11:35 1/29/2016 21:01 2/18/2016 14:28 1/25/2019 16:02   Chromogranin A Latest Ref Range: 0 - 95 ng/mL 1265 (H) 2968 (H) 1292 (H)    YONATAN Latest Ref Range: Negative   Negative  POSITIVE (A)   YONATAN Interpretation Unknown  see below  see below   YONATAN TITER Unknown    1:40   ANCA IFA Latest Ref Range: <1:20   <1:20     Rheumatoid Factor Latest Ref Range: <14 IU/mL  <10.0         Echocardiogram:2017-Summary   Left ventricular systolic function is hyperdynamic with ejection fraction   estimated at >65 %. No regional wall motion abnormalities. Grade II diastolic dysfunction with elevated filling pressure. There is moderate concentric left ventricular hypertrophy.    Left ventricle size is normal.   Mildly dilated left decompensation and will admit the patient to the ICU for further management  · Please titrate oxygen as per the above parameters  · Once patient's work of breathing decreases then BiPAP can be changed to intermittent basis  · Patient was started on cefepime and vancomycin by the admitting provider-patient is coming from home- will hold off on any vancomycin at this time and reassess as per clinical status and cultures  · Bronchodilators  · Patient was given 1 dose of IV Solu-Medrol in the ER  · Patient does not have any acute bronchospasm-we will hold off on any systemic steroids at this time  · Patient's previous echocardiograms were reviewed and patient has significant diastolic dysfunction  · Patient has significant hyponatremia which can be either secondary to hypervolemic hyponatremia or SIADH secondary patient being on SSRI  · Strict input output charting  · Monitor BMP  · Correct electrolytes and whenever necessary basis  · BGM with SSI as per IM  · If patient's clinical status is better but patient has persistent pulmonary infiltrates in spite of diuresis will consider bronchoscopy for diagnostic/therapeutic purposes  · Patient to be kept n.p.o. after midnight  · Cardiac rhythm and hemodynamics to be monitored closely  · PUD and DVT prophylaxis as per IM    Case discussed with ER providers and the ICU team    Patient's clinical status is precarious and has potential for decompensation and needs to monitor closely in the ICU overnight    Further management depending on patient clinical status and the follow-up on above recommendations along with the labs         Electronically signed by:  Mariana Francois MD    5/6/2021    3:48 PM.

## 2021-05-06 NOTE — ED NOTES
Bed: 17  Expected date:   Expected time:   Means of arrival:   Comments:  SUBHASH Gaviria RN  05/06/21 1005

## 2021-05-06 NOTE — H&P
Hospital Medicine History & Physical      PCP: Adore Mills DO    Date of Admission: 5/6/2021    Date of Service: Pt seen/examined on 7 May and Admitted to Inpatient with expected LOS greater than two midnights due to medical therapy. Chief Complaint:  SOB      History Of Present Illness:       77 y.o. female w/ hx O2 dependent COPD at baseline 3L per NC who presented to [facility] with a 3-4 day hx of progressive SOB/NICE now severe w/out associated CP. She reported an associated productive cough of discolored sputum and subjective fever but no chills or other signs/sxs of systemic illness or identifiable aggravating/alleviating factors.       Past Medical History:          Diagnosis Date    Allergic     Anemia     Anesthesia complication     \"woke up during surgery\"    Arthritis     Osteoarthritis of bilateral CMC joints    Asthma     Followed by Dr. Ulisses Hoyt Cirrhosis [facility])     non alcoholic    COPD [facility]ch[facility] obstructive [facility] disease) [facility] [facility]     GERD (gastroesophageal reflux disease)     GIB (gastrointestinal bleeding)     LGIB 4/2016    Heart murmur     Hernia     Hyperlipidemia     Hypertension     Lung collapse     Murmur, heart     Pneumonia     Reflux     Rheumatic fever     Stress fracture     right foot    SVT (supraventricular tachycardia) ([address]     6/19/16 - admitted X 1 day per PT     TIA (transient ischemic attack) 2015    Type II or unspecified type diabetes mellitus without mention of complication, not stated as uncontrolled     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 ([facility] [address]     Wears glasses     as needed    Wears partial dentures     upper partial       Past Surgical History:          Procedure Laterality Date    BONE MARROW BIOPSY      BREAST BIOPSY      CARDIAC CATHETERIZATION  07/14/14    CATARACT REMOVAL      COLONOSCOPY      X 3 per PT 6/8/16    FINE NEEDLE ASPIRATION      FOOT SURGERY Right 06/03/2013    CORAL BUNIONECTOMY RIGHT FOOT WITH SCREW FIXATION;    HERNIA REPAIR      HYSTERECTOMY      JOINT REPLACEMENT Bilateral     TKR    KNEE SURGERY  09    left    KNEE SURGERY  2010    right    UPPER GASTROINTESTINAL ENDOSCOPY N/A 7/21/2018    EGD BIOPSY performed by Terry Perdue MD at [address] [place] [address]       Medications Prior to Admission:      Prior to Admission medications    Medication Sig Start Date End Date Taking? Authorizing Provider   metFORMIN (GLUCOPHAGE-XR) 500 MG extended release tablet Take 2 tablets PO BID. 4/27/21  Yes Nancy Adames MD   triamcinolone (KENALOG) 0.1 % cream Apply to affected area twice daily for up to 2 weeks or until improved.  4/15/21  Yes Jeevan Mcgrath MD   gabapentin (NEURONTIN) 100 MG capsule  2/17/21  Yes Historical Provider, MD   QVAR REDIHALER 80 MCG/ACT AERB inhaler  2/22/21  Yes Historical Provider, MD   venlafaxine (EFFEXOR) 37.5 MG tablet TAKE 1 TABLET DAILY 3/16/21  Yes Clayton Ellis MD   doxepin [facility]) 50 MG capsule Take 1 capsule by mouth nightly 2/15/21 2/15/22 Yes Clayton Ellis MD   traZODone (DESYREL) 50 MG tablet TAKE 2 TABLETS NIGHTLY 12/22/20  Yes Clayton Ellis MD   pantoprazole (PROTONIX) 40 MG tablet Take 1 tablet by mouth 2 times daily 12/9/20  Yes Clayton Ellis MD   cyanocobalamin 1000 MCG/ML injection Inject 1 mL into the muscle every 14 days Last dose was taken on 4/9/17 11/18/20  Yes Orlin Toribio MD   SITagliptin (JANUVIA) 100 MG tablet Take 1 tablet by mouth daily 11/18/20  Yes Orlin Toribio MD   famotidine (PEPCID) 20 MG tablet Take 1 tablet by mouth 2 times daily 11/1/20  Yes Clayton Ellis MD   atorvastatin (LIPITOR) 20 MG tablet TAKE 1 TABLET NIGHTLY 9/17/20  Yes Clayton Ellis MD   cetirizine (ZYRTEC) 10 MG tablet Take 1 tablet by mouth daily 6/19/20  Yes Clayton Ellis MD   cetirizine (ZYRTEC) 10 MG tablet Take 1 tablet by mouth daily 6/19/20  Yes Clayton Ellis MD   potassium chloride (KLOR-CON M) 20 MEQ extended release tablet Take 1 tablet by mouth daily 6/19/20  Yes Tahir Parker MD   furosemide (LASIX) 20 MG tablet Take 1 tablet by mouth daily 6/16/20  Yes Tahir Parker MD   dilTIAZem [facility]) 240 MG extended release capsule TAKE 1 CAPSULE DAILY 6/15/20  Yes Tahir Parker MD   losartan (COZAAR) 100 MG tablet TAKE 1 TABLET DAILY 6/2/20  Yes Tahir Parker MD   ferrous sulfate 325 (65 Fe) MG tablet Take 325 mg by mouth 3 times daily (with meals)   Yes Historical Provider, MD   gabapentin (NEURONTIN) 600 MG tablet Take 600 mg by mouth nightly. 600 mg at bedtime and 300 mg BID. Yes Historical Provider, MD   diphenhydrAMINE (BENADRYL) 25 MG tablet Take 50 mg by mouth nightly    Yes Historical Provider, MD   Cholecalciferol (VITAMIN D) 2000 UNITS CAPS capsule Take 2,000 Units by mouth daily    Yes Historical Provider, MD   rOPINIRole (REQUIP) 1 MG tablet Take 1 mg by mouth nightly 1mg at 1730, 1mg at 2030, 2mg at 2200 (total of 4mg nightly in divided doses) 3/9/17  Yes Historical Provider, MD   ursodiol (ACTIGALL) 500 MG tablet Take 500 mg by mouth 2 times daily    Yes Historical Provider, MD   calcium carbonate 600 MG TABS tablet Take 1 tablet by mouth daily    Yes Historical Provider, MD   levothyroxine (SYNTHROID) 100 MCG tablet Take 100 mcg by mouth Daily   Yes Historical Provider, MD   Multiple Vitamins-Minerals (CENTRUM PO) Take 1 tablet by mouth daily. Yes Historical Provider, MD   levalbuterol (XOPENEX HFA) 45 MCG/ACT inhaler Inhale 2 puffs into the lungs every 6 hours as needed    Yes Historical Provider, MD   beclomethasone (QVAR) 80 MCG/ACT inhaler Inhale 2 puffs into the lungs 2 times daily. Yes Historical Provider, MD   Aclidinium Bromide (TUDORZA PRESSAIR) 400 MCG/ACT AEPB Inhale 1 puff into the lungs 2 times daily    Yes Historical Provider, MD   montelukast (SINGULAIR) 10 MG tablet Take 10 mg by mouth daily    Yes Historical Provider, MD   estrogens, conjugated, (PREMARIN) 1.25 MG tablet Take 1.25 mg by mouth daily. deformity. Skin: Skin color, texture, turgor normal.  No rashes or lesions. Neurologic:  Neurovascularly intact without any focal sensory/motor deficits. Cranial nerves: II-XII intact, grossly non-focal.  Psychiatric:  Alert and oriented, thought content appropriate, normal insight  Capillary Refill: Brisk,< 3 seconds   Peripheral Pulses: +2 palpable, equal bilaterally       CXR:  I have reviewed the CXR with the following interpretation: Bilateral ASD   EKG:  I have reviewed the EKG with the following interpretation:     Labs:     Recent Labs     05/06/21  1027 05/07/21  0423   WBC 11.7* 10.4   HGB 10.5* 9.8*   HCT 31.6* 29.5*    190     Recent Labs     05/06/21  1027 05/07/21  0423   * 125*   K 4.3 3.8   CL 84* 89*   CO2 25 24   BUN 11 15   CREATININE 0.7 0.6   CALCIUM 8.8 8.4   PHOS  --  3.6     Recent Labs     05/06/21  1027   AST 16   ALT 13   BILITOT 1.4*   ALKPHOS 82     No results for input(s): INR in the last 72 hours. Recent Labs     05/06/21  1027   TROPONINI <0.01       Urinalysis:      Lab Results   Component Value Date    NITRU Negative 07/20/2018    WBCUA  07/20/2018    BACTERIA 2+ 07/20/2018    RBCUA 10-20 07/20/2018    BLOODU LARGE 07/20/2018    SPECGRAV 1.010 07/20/2018    GLUCOSEU Negative 07/20/2018    GLUCOSEU NEGATIVE 12/30/2009         ASSESSMENT:    Active Hospital Problems    Diagnosis Date Noted    Hyponatremia [E87.1] 05/06/2021    Acute and chronic respiratory failure with hypoxia (HCC) [J96.21] 05/06/2021    Esophageal varices without bleeding ([facility] [address] [I85.00] 03/04/2021    Anemia [D64.9] 08/31/2017    PNA (pneumonia) [J18.9] 08/31/2017    GERD (gastroesophageal reflux disease) [K21.9]     Cirrhosis ([facility] [address] [K74.60]     COPD (chronic obstructive pulmonary disease) (HCC) [J44.9]     HTN (hypertension) [I10] 07/11/2014    Obesity (BMI 30.0-34. 9) [E66.9] 07/04/2014    Hyperlipidemia [E78.5] 11/14/2013    Type 2 diabetes mellitus [facility] [E11.9] 11/12/2013 PLAN:      PNA - likely CAP w/ Strep Pneumonia. Started on empiric Vanco/Cefepime in ED on 6 May - Cefepime continued. COPD - w/ chronic respiratory failure on baseline home O2. Controlled on home medication regimen - continued. Acute on Chronic Respiratory Failure - w/ hypoxia, POArrival.  Presence of clinical respiratory distress w/ tachypnea/dyspnea/SOB and wheezing w/ use of accessory muscles to breath. Initially requiring BiPap at high risk for decompensation - improved. Supplemental O2 and wean as tolerated. Pulmonology consulted and appreciated - admitted to ICU but now transferring to floor 7 May. Sepsis - w/ Leukocytosis/Tachycardia POArrival 2nd to above infection. Continue IVF as appropriate and monitor clinical response w/ ABX as written. HTN - w/out known CAD and no evidence of active signs/sxs of ischemia/failure. Currently controlled on home meds w/ vitals reviewed and documented as above. HyperLipidemia - controlled on home Statin. Continue, w/ f/u and med adjustment w/ PCP    DM2 - controlled on home oral antiGlycemics - held. Follow FSBS/SSI low regimen. Last HbA1c 6.6% dated April 2021. Anticipate resuming/continuing home regimen at discharge. Cirrhosis - w/ hx esophageal varices s/p prior banding but no evidence of acute decompensation. Anemia - etiology clinically unable to determine, w/out evidence of active bleeding/hemolysis. Asymptomatic w/out indication for transfusion. Follow serial labs. Reviewed and documented as above. HypoNatremia - etiology clinically unable to determine but likely hypovolemic. Follow serial labs on IVF. Reviewed and documented as above. HypoThyroid - clinically euthyroid on oral replacement therapy. Continue, w/ outpt monitoring as previously arranged. Obesity -  With Body mass index is 33.89 kg/m². Complicating assessment and treatment.  Placing patient at risk for multiple co-morbidities as well as early death and

## 2021-05-06 NOTE — PROGRESS NOTES
05/06/21 1145   NIV Type   $NIV $Daily Charge   NIV Started/Stopped On   Equipment Type v60   Mode Bilevel   Mask Type Full face mask   Mask Size Medium   Settings/Measurements   IPAP 12 cmH20   CPAP/EPAP 6 cmH2O   Rate Ordered 12   Resp 26   FiO2  60 %   I Time/ I Time % 1 s   Vt Exhaled 604 mL   Minute Volume 21 Liters   Mask Leak (lpm) 0 lpm   Comfort Level Good   Using Accessory Muscles Yes   SpO2 100   Breath Sounds   Right Upper Lobe Inspiratory Wheezes; Expiratory Wheezes; Diminished   Right Middle Lobe Inspiratory Wheezes; Expiratory Wheezes; Diminished   Right Lower Lobe Inspiratory Wheezes; Expiratory Wheezes; Diminished   Left Upper Lobe Inspiratory Wheezes; Expiratory Wheezes; Diminished   Left Lower Lobe Inspiratory Wheezes; Expiratory Wheezes; Diminished   Alarm Settings   Alarms On Y   Press Low Alarm 6 cmH2O   High Pressure Alarm 30 cmH2O   Apnea (secs) 20 secs   Resp Rate Low Alarm 6   High Respiratory Rate 45 br/min

## 2021-05-06 NOTE — ED NOTES
Writer came back into pt room, pt O2 status was 78% on 4L NC. Staff was attempting to place pt on bedpan, pt was apologizing continuously. Writer called respiratory for breathing treatment. RT said they were on their way. Staff placed pt on non-rebreather to which increased pt O2 to 97%. Pt continued to desat as she was talking. PA at bedside determined pt needed continuous bipap, had writer place call to respiratory to bring bipap.         Kev Vasquez RN  05/06/21 3542

## 2021-05-07 ENCOUNTER — APPOINTMENT (OUTPATIENT)
Dept: GENERAL RADIOLOGY | Age: 76
DRG: 871 | End: 2021-05-07
Payer: MEDICARE

## 2021-05-07 LAB
ALBUMIN SERPL-MCNC: 3.1 G/DL (ref 3.4–5)
ANION GAP SERPL CALCULATED.3IONS-SCNC: 12 MMOL/L (ref 3–16)
BUN BLDV-MCNC: 15 MG/DL (ref 7–20)
CALCIUM SERPL-MCNC: 8.4 MG/DL (ref 8.3–10.6)
CHLORIDE BLD-SCNC: 89 MMOL/L (ref 99–110)
CO2: 24 MMOL/L (ref 21–32)
CREAT SERPL-MCNC: 0.6 MG/DL (ref 0.6–1.2)
GFR AFRICAN AMERICAN: >60
GFR NON-AFRICAN AMERICAN: >60
GLUCOSE BLD-MCNC: 207 MG/DL (ref 70–99)
GLUCOSE BLD-MCNC: 208 MG/DL (ref 70–99)
GLUCOSE BLD-MCNC: 214 MG/DL (ref 70–99)
GLUCOSE BLD-MCNC: 241 MG/DL (ref 70–99)
HCT VFR BLD CALC: 29.5 % (ref 36–48)
HEMOGLOBIN: 9.8 G/DL (ref 12–16)
MCH RBC QN AUTO: 28.7 PG (ref 26–34)
MCHC RBC AUTO-ENTMCNC: 33.3 G/DL (ref 31–36)
MCV RBC AUTO: 86.1 FL (ref 80–100)
PDW BLD-RTO: 16.4 % (ref 12.4–15.4)
PERFORMED ON: ABNORMAL
PHOSPHORUS: 3.6 MG/DL (ref 2.5–4.9)
PLATELET # BLD: 190 K/UL (ref 135–450)
PMV BLD AUTO: 7.8 FL (ref 5–10.5)
POTASSIUM SERPL-SCNC: 3.8 MMOL/L (ref 3.5–5.1)
RBC # BLD: 3.42 M/UL (ref 4–5.2)
SODIUM BLD-SCNC: 125 MMOL/L (ref 136–145)
WBC # BLD: 10.4 K/UL (ref 4–11)

## 2021-05-07 PROCEDURE — 85027 COMPLETE CBC AUTOMATED: CPT

## 2021-05-07 PROCEDURE — 88305 TISSUE EXAM BY PATHOLOGIST: CPT

## 2021-05-07 PROCEDURE — 99152 MOD SED SAME PHYS/QHP 5/>YRS: CPT | Performed by: INTERNAL MEDICINE

## 2021-05-07 PROCEDURE — 87070 CULTURE OTHR SPECIMN AEROBIC: CPT

## 2021-05-07 PROCEDURE — 6370000000 HC RX 637 (ALT 250 FOR IP): Performed by: INTERNAL MEDICINE

## 2021-05-07 PROCEDURE — 87102 FUNGUS ISOLATION CULTURE: CPT

## 2021-05-07 PROCEDURE — 87116 MYCOBACTERIA CULTURE: CPT

## 2021-05-07 PROCEDURE — 87106 FUNGI IDENTIFICATION YEAST: CPT

## 2021-05-07 PROCEDURE — 87205 SMEAR GRAM STAIN: CPT

## 2021-05-07 PROCEDURE — 94761 N-INVAS EAR/PLS OXIMETRY MLT: CPT

## 2021-05-07 PROCEDURE — 2709999900 HC NON-CHARGEABLE SUPPLY: Performed by: INTERNAL MEDICINE

## 2021-05-07 PROCEDURE — 36415 COLL VENOUS BLD VENIPUNCTURE: CPT

## 2021-05-07 PROCEDURE — 6360000002 HC RX W HCPCS: Performed by: INTERNAL MEDICINE

## 2021-05-07 PROCEDURE — 88312 SPECIAL STAINS GROUP 1: CPT

## 2021-05-07 PROCEDURE — 6370000000 HC RX 637 (ALT 250 FOR IP): Performed by: ANESTHESIOLOGY

## 2021-05-07 PROCEDURE — 2700000000 HC OXYGEN THERAPY PER DAY

## 2021-05-07 PROCEDURE — 31624 DX BRONCHOSCOPE/LAVAGE: CPT | Performed by: INTERNAL MEDICINE

## 2021-05-07 PROCEDURE — 0B9F8ZX DRAINAGE OF RIGHT LOWER LUNG LOBE, VIA NATURAL OR ARTIFICIAL OPENING ENDOSCOPIC, DIAGNOSTIC: ICD-10-PCS | Performed by: INTERNAL MEDICINE

## 2021-05-07 PROCEDURE — 94640 AIRWAY INHALATION TREATMENT: CPT

## 2021-05-07 PROCEDURE — 31645 BRNCHSC W/THER ASPIR 1ST: CPT | Performed by: INTERNAL MEDICINE

## 2021-05-07 PROCEDURE — 87206 SMEAR FLUORESCENT/ACID STAI: CPT

## 2021-05-07 PROCEDURE — 0BJ08ZZ INSPECTION OF TRACHEOBRONCHIAL TREE, VIA NATURAL OR ARTIFICIAL OPENING ENDOSCOPIC: ICD-10-PCS | Performed by: INTERNAL MEDICINE

## 2021-05-07 PROCEDURE — 99233 SBSQ HOSP IP/OBS HIGH 50: CPT | Performed by: INTERNAL MEDICINE

## 2021-05-07 PROCEDURE — 1200000000 HC SEMI PRIVATE

## 2021-05-07 PROCEDURE — 2580000003 HC RX 258: Performed by: INTERNAL MEDICINE

## 2021-05-07 PROCEDURE — 87631 RESP VIRUS 3-5 TARGETS: CPT

## 2021-05-07 PROCEDURE — 87077 CULTURE AEROBIC IDENTIFY: CPT

## 2021-05-07 PROCEDURE — 94660 CPAP INITIATION&MGMT: CPT

## 2021-05-07 PROCEDURE — 71045 X-RAY EXAM CHEST 1 VIEW: CPT

## 2021-05-07 PROCEDURE — 80069 RENAL FUNCTION PANEL: CPT

## 2021-05-07 PROCEDURE — 3609010800 HC BRONCHOSCOPY ALVEOLAR LAVAGE: Performed by: INTERNAL MEDICINE

## 2021-05-07 PROCEDURE — 3609010900 HC BRONCHOSCOPY THERAPUTIC ASPIRATION INITIAL: Performed by: INTERNAL MEDICINE

## 2021-05-07 PROCEDURE — 3609027000 HC BRONCHOSCOPY: Performed by: INTERNAL MEDICINE

## 2021-05-07 PROCEDURE — 88112 CYTOPATH CELL ENHANCE TECH: CPT

## 2021-05-07 PROCEDURE — 87015 SPECIMEN INFECT AGNT CONCNTJ: CPT

## 2021-05-07 RX ORDER — DEXTROSE MONOHYDRATE 25 G/50ML
12.5 INJECTION, SOLUTION INTRAVENOUS PRN
Status: DISCONTINUED | OUTPATIENT
Start: 2021-05-07 | End: 2021-05-12 | Stop reason: HOSPADM

## 2021-05-07 RX ORDER — ROPINIROLE 0.5 MG/1
1 TABLET, FILM COATED ORAL NIGHTLY
Status: DISCONTINUED | OUTPATIENT
Start: 2021-05-07 | End: 2021-05-12 | Stop reason: HOSPADM

## 2021-05-07 RX ORDER — ROPINIROLE 2 MG/1
2 TABLET, FILM COATED ORAL NIGHTLY
Status: DISCONTINUED | OUTPATIENT
Start: 2021-05-07 | End: 2021-05-07

## 2021-05-07 RX ORDER — FUROSEMIDE 10 MG/ML
60 INJECTION INTRAMUSCULAR; INTRAVENOUS ONCE
Status: COMPLETED | OUTPATIENT
Start: 2021-05-07 | End: 2021-05-07

## 2021-05-07 RX ORDER — NICOTINE POLACRILEX 4 MG
15 LOZENGE BUCCAL PRN
Status: DISCONTINUED | OUTPATIENT
Start: 2021-05-07 | End: 2021-05-12 | Stop reason: HOSPADM

## 2021-05-07 RX ORDER — LEVALBUTEROL 1.25 MG/.5ML
1.25 SOLUTION, CONCENTRATE RESPIRATORY (INHALATION) 2 TIMES DAILY
Status: DISCONTINUED | OUTPATIENT
Start: 2021-05-07 | End: 2021-05-10

## 2021-05-07 RX ORDER — FENTANYL CITRATE 50 UG/ML
INJECTION, SOLUTION INTRAMUSCULAR; INTRAVENOUS PRN
Status: DISCONTINUED | OUTPATIENT
Start: 2021-05-07 | End: 2021-05-07 | Stop reason: ALTCHOICE

## 2021-05-07 RX ORDER — ROPINIROLE 2 MG/1
2 TABLET, FILM COATED ORAL NIGHTLY
Status: DISCONTINUED | OUTPATIENT
Start: 2021-05-07 | End: 2021-05-12 | Stop reason: HOSPADM

## 2021-05-07 RX ORDER — MIDAZOLAM HYDROCHLORIDE 5 MG/ML
INJECTION INTRAMUSCULAR; INTRAVENOUS PRN
Status: DISCONTINUED | OUTPATIENT
Start: 2021-05-07 | End: 2021-05-07 | Stop reason: ALTCHOICE

## 2021-05-07 RX ORDER — DEXTROSE MONOHYDRATE 50 MG/ML
100 INJECTION, SOLUTION INTRAVENOUS PRN
Status: DISCONTINUED | OUTPATIENT
Start: 2021-05-07 | End: 2021-05-12 | Stop reason: HOSPADM

## 2021-05-07 RX ADMIN — PANTOPRAZOLE SODIUM 40 MG: 40 TABLET, DELAYED RELEASE ORAL at 21:21

## 2021-05-07 RX ADMIN — FERROUS SULFATE TAB 325 MG (65 MG ELEMENTAL FE) 325 MG: 325 (65 FE) TAB at 08:09

## 2021-05-07 RX ADMIN — ATORVASTATIN CALCIUM 20 MG: 10 TABLET, FILM COATED ORAL at 21:20

## 2021-05-07 RX ADMIN — GABAPENTIN 600 MG: 300 CAPSULE ORAL at 21:21

## 2021-05-07 RX ADMIN — FUROSEMIDE 60 MG: 10 INJECTION, SOLUTION INTRAMUSCULAR; INTRAVENOUS at 10:35

## 2021-05-07 RX ADMIN — FERROUS SULFATE TAB 325 MG (65 MG ELEMENTAL FE) 325 MG: 325 (65 FE) TAB at 18:00

## 2021-05-07 RX ADMIN — TRAZODONE HYDROCHLORIDE 50 MG: 50 TABLET ORAL at 21:21

## 2021-05-07 RX ADMIN — Medication: at 17:14

## 2021-05-07 RX ADMIN — INSULIN LISPRO 2 UNITS: 100 INJECTION, SOLUTION INTRAVENOUS; SUBCUTANEOUS at 13:27

## 2021-05-07 RX ADMIN — Medication 500 MG: at 08:09

## 2021-05-07 RX ADMIN — CEFEPIME HYDROCHLORIDE 2000 MG: 2 INJECTION, POWDER, FOR SOLUTION INTRAVENOUS at 03:28

## 2021-05-07 RX ADMIN — URSODIOL 300 MG: 300 CAPSULE ORAL at 09:47

## 2021-05-07 RX ADMIN — CEFEPIME HYDROCHLORIDE 2000 MG: 2 INJECTION, POWDER, FOR SOLUTION INTRAVENOUS at 15:45

## 2021-05-07 RX ADMIN — ROPINIROLE HYDROCHLORIDE 1 MG: 0.5 TABLET, FILM COATED ORAL at 21:21

## 2021-05-07 RX ADMIN — DOXEPIN HYDROCHLORIDE 50 MG: 25 CAPSULE ORAL at 21:20

## 2021-05-07 RX ADMIN — PANTOPRAZOLE SODIUM 40 MG: 40 TABLET, DELAYED RELEASE ORAL at 08:08

## 2021-05-07 RX ADMIN — SODIUM CHLORIDE, PRESERVATIVE FREE 10 ML: 5 INJECTION INTRAVENOUS at 08:09

## 2021-05-07 RX ADMIN — LEVALBUTEROL HYDROCHLORIDE 0.63 MG: 1.25 SOLUTION, CONCENTRATE RESPIRATORY (INHALATION) at 08:54

## 2021-05-07 RX ADMIN — ESTROGENS, CONJUGATED 1.25 MG: 0.62 TABLET, FILM COATED ORAL at 08:10

## 2021-05-07 RX ADMIN — DILTIAZEM HYDROCHLORIDE 240 MG: 240 CAPSULE, COATED, EXTENDED RELEASE ORAL at 10:37

## 2021-05-07 RX ADMIN — SODIUM CHLORIDE, PRESERVATIVE FREE 10 ML: 5 INJECTION INTRAVENOUS at 21:24

## 2021-05-07 RX ADMIN — MONTELUKAST SODIUM 10 MG: 10 TABLET ORAL at 08:09

## 2021-05-07 RX ADMIN — LOSARTAN POTASSIUM 100 MG: 100 TABLET, FILM COATED ORAL at 08:09

## 2021-05-07 RX ADMIN — LEVALBUTEROL HYDROCHLORIDE 1.25 MG: 1.25 SOLUTION, CONCENTRATE RESPIRATORY (INHALATION) at 19:15

## 2021-05-07 RX ADMIN — LEVALBUTEROL HYDROCHLORIDE 0.63 MG: 1.25 SOLUTION, CONCENTRATE RESPIRATORY (INHALATION) at 12:50

## 2021-05-07 RX ADMIN — INSULIN LISPRO 1 UNITS: 100 INJECTION, SOLUTION INTRAVENOUS; SUBCUTANEOUS at 21:21

## 2021-05-07 RX ADMIN — VENLAFAXINE 37.5 MG: 37.5 TABLET ORAL at 18:00

## 2021-05-07 RX ADMIN — VENLAFAXINE 37.5 MG: 37.5 TABLET ORAL at 08:09

## 2021-05-07 RX ADMIN — ENOXAPARIN SODIUM 40 MG: 40 INJECTION SUBCUTANEOUS at 08:09

## 2021-05-07 RX ADMIN — URSODIOL 300 MG: 300 CAPSULE ORAL at 21:24

## 2021-05-07 RX ADMIN — Medication 2000 UNITS: at 08:09

## 2021-05-07 RX ADMIN — ASPIRIN 81 MG: 81 TABLET, COATED ORAL at 08:09

## 2021-05-07 RX ADMIN — ACETAMINOPHEN 650 MG: 325 TABLET ORAL at 00:35

## 2021-05-07 RX ADMIN — FERROUS SULFATE TAB 325 MG (65 MG ELEMENTAL FE) 325 MG: 325 (65 FE) TAB at 13:26

## 2021-05-07 RX ADMIN — INSULIN LISPRO 2 UNITS: 100 INJECTION, SOLUTION INTRAVENOUS; SUBCUTANEOUS at 18:01

## 2021-05-07 ASSESSMENT — PAIN SCALES - GENERAL: PAINLEVEL_OUTOF10: 0

## 2021-05-07 NOTE — PROGRESS NOTES
RESPIRATORY THERAPY ASSESSMENT    [facility]:  [id number]  Age: 77 y.o. Gender: female  : 1945  Today's Date:  2021  Room:  [facility] [id number]    Assessment     Is the patient being admitted for a COPD or Asthma exacerbation? No   (If yes the patient will be seen every 4 hours for the first 24 hours and then reassessed)    Patient Admission Diagnosis      Allergies  Allergies   Allergen Reactions    Latex Swelling and Rash    Clindamycin Itching    Pennsaid [Diclofenac Sodium] Itching and Rash    Torsemide Itching    Actos [Pioglitazone] Diarrhea    Amoxicillin Other (See Comments)    Augmentin [Amoxicillin-Pot Clavulanate] Other (See Comments)    Bactrim [Sulfamethoxazole-Trimethoprim] Other (See Comments)    Ciprofloxacin Other (See Comments)     Makes her weird  Makes anxious and she has weird dreams    Doxycycline Other (See Comments)     Feels like she is smothering, chest tightness    Levaquin [Levofloxacin]      Body aches    Ativan [Lorazepam] Other (See Comments) and Anxiety     And confusion  Had weird dreams and hallucinations    Cephalosporins Rash    Pcn [Penicillins] Rash and Itching    Proventil [Albuterol] Anxiety       Minimum Predicted Vital Capacity:     NA          Actual Vital Capacity:      NA              Pulmonary History:COPD  Home Oxygen Therapy:  3 liters/min via nasal cannula  Home Respiratory Therapy:Levalbuterol PRN  Current Respiratory Therapy:  Xopenex Q6  Treatment Type: HHN  Medications: Levalbuterol HCL    Respiratory Severity Index(RSI)   Patients with orders for inhalation medications, oxygen, or any therapeutic treatment modality will be placed on Respiratory Protocol. They will be assessed with the first treatment and at least every 72 hours thereafter. The following severity scale will be used to determine frequency of treatment intervention.     Smoking History: Pulmonary Disease or Smoking History, Greater than 15 pack year = 2    Social History  Social History     Tobacco Use    Smoking status: Former Smoker     Packs/day: 1.00     Years: 40.00     Pack years: 40.00     Types: Cigarettes     Quit date: 2000     Years since quittin.9    Smokeless tobacco: Never Used    Tobacco comment: stopped cold turkey in !! ( )   Substance Use Topics    Alcohol use: No     Alcohol/week: 0.0 standard drinks    Drug use: No       Recent Surgical History: None = 0  Past Surgical History  Past Surgical History:   Procedure Laterality Date    BONE MARROW BIOPSY      BREAST BIOPSY      CARDIAC CATHETERIZATION  14    CATARACT REMOVAL      COLONOSCOPY      X 3 per PT 16    FINE NEEDLE ASPIRATION      FOOT SURGERY Right 2013    CORAL BUNIONECTOMY RIGHT FOOT WITH SCREW FIXATION;    HERNIA REPAIR      HYSTERECTOMY      JOINT REPLACEMENT Bilateral     TKR    KNEE SURGERY  09    left    KNEE SURGERY  2010    right    UPPER GASTROINTESTINAL ENDOSCOPY N/A 2018    EGD BIOPSY performed by Kelli Ortiz MD at [facility]       Level of Consciousness: Alert, Oriented, and Cooperative = 0    Level of Activity: Walking with assistance = 1    Respiratory Pattern: Increased; RR 21-30 = 1    Breath Sounds: Diminshed bilaterally and/or crackles = 2    Sputum  Sputum Color: Cloudy,  , Sputum How Obtained: Spontaneous cough  Cough: Strong, productive = 1    Vital Signs   BP (!) 157/63   Pulse 77   Temp 98 °F (36.7 °C) (Oral)   Resp 20   Ht 5' 6\" (1.676 m)   Wt 210 lb (95.3 kg)   SpO2 90%   BMI 33.89 kg/m²   SPO2 (COPD values may differ): 88-89% on room air or greater than 92% on FiO2 28- 35% = 2    Peak Flow (asthma only): not applicable = 0    RSI: 7-8 = BID and Q4HPRN (every four hours as needed) for dyspnea        Plan       Goals: medication delivery, mobilize retained secretions, volume expansion and improve oxygenation    Patient/caregiver was educated on the proper method of use for Respiratory Care Devices:  Yes      Level of patient/caregiver understanding able to:   ? Verbalize understanding   ? Demonstrate understanding       ? Teach back        ? Needs reinforcement       ? No available caregiver               ? Other:     Response to education:  Excellent     Is patient being placed on Home Treatment Regimen? No     Does the patient have everything they need prior to discharge? Yes     Comments: pt assessed and chart reviewed    Plan of Care: HHN BID- RE - EVAL 5/10    Electronically signed by Karli Martin RCP on 5/7/2021 at 7:31 PM    Respiratory Protocol Guidelines     1. Assessment and treatment by Respiratory Therapy will be initiated for medication and therapeutic interventions upon initiation of aerosolized medication. 2. Physician will be contacted for respiratory rate (RR) greater than 35 breaths per minute. Therapy will be held for heart rate (HR) greater than 140 beats per minute, pending direction from physician. 3. Bronchodilators will be administered via Metered Dose Inhaler (MDI) with spacer when the following criteria are met:  a. Alert and cooperative     b. HR < 140 bpm  c. RR < 30 bpm                d. Can demonstrate a 2-3 second inspiratory hold  4. Bronchodilators will be administered via Hand Held Nebulizer TORI [facility]) to patients when ANY of the following criteria are met  a. Incognizant or uncooperative          b. Patients treated with HHN at Home        c. Unable to demonstrate proper use of MDI with spacer     d. RR > 30 bpm   5. Bronchodilators will be delivered via Metered Dose Inhaler (MDI), HHN, Aerogen to intubated patients on mechanical ventilation. 6. Inhalation medication orders will be delivered and/or substituted as outlined below. Aerosolized Medications Ordering and Administration Guidelines:    1.  All Medications will be ordered by a physician, and their frequency and/or modality will be adjusted as defined by the patients Respiratory Severity Index (RSI) score. 2. If the patient does not have documented COPD, consider discontinuing anticholinergics when RSI is less than 9.  3. If the bronchospasm worsens (increased RSI), then the bronchodilator frequency can be increased to a maximum of every 4 hours. If greater than every 4 hours is required, the physician will be contacted. 4. If the bronchospasm improves, the frequency of the bronchodilator can be decreased, based on the patient's RSI, but not less than home treatment regimen frequency. 5. Bronchodilator(s) will be discontinued if patient has a RSI less than 9 and has received no scheduled or as needed treatment for 72  Hrs. Patients Ordered on a Mucolytic Agent:    1. Must always be administered with a bronchodilator. 2. Discontinue if patient experiences worsened bronchospasm, or secretions have lessened to the point that the patient is able to clear them with a cough. Anti-inflammatory and Combination Medications:    1. If the patient lacks prior history of lung disease, is not using inhaled anti-inflammatory medication at home, and lacks wheezing by examination or by history for at least 24 hours, contact physician for possible discontinuation.

## 2021-05-07 NOTE — PLAN OF CARE
Problem: Falls - Risk of:  Goal: Will remain free from falls  Description: Will remain free from falls  Outcome: Ongoing  Note: Pt high fall risk. Instructed to use call light before getting out of bed and when in need of assistance. Call light within reach. Bed in low position. Bed alarm and nonskid footwear on. Will continue to monitor. Problem: Skin Integrity:  Goal: Absence of new skin breakdown  Description: Absence of new skin breakdown  Outcome: Ongoing  Note: Pt is at risk for skin breakdown. Pt will have skin assessments every shift, Q2 turns, heels elevated off of the bed, and friction and shear prevented when possible. Will continue to monitor for signs of skin breakdown and enforce prevention measures.

## 2021-05-07 NOTE — PROGRESS NOTES
05/07/21 0522   NIV Type   $NIV $Daily Charge   Skin Assessment Clean, dry, & intact   Equipment Type v60   Mode Bilevel   Mask Type Full face mask   Settings/Measurements   IPAP 12 cmH20   CPAP/EPAP 6 cmH2O   Resp 16   FiO2  60 %   I Time/ I Time % 1 s   Vt Exhaled 536 mL   Minute Volume 12.1 Liters   Mask Leak (lpm) 0 lpm   Comfort Level Good   Using Accessory Muscles Yes   SpO2 97

## 2021-05-07 NOTE — PROGRESS NOTES
Pt transferred to B3 room 369, report given to Haritha, [facility]. Pt placed on remote telemetry, transported via [facility] accompanied by RN and all belongings.

## 2021-05-07 NOTE — CARE COORDINATION
CASE MANAGEMENT INITIAL ASSESSMENT      Reviewed chart and completed assessment at bedside with patient    Explained Case Management role/services     Primary contact information:daughter/POA Crystal Ambrosia as below  Health Care Decision Maker :   Primary Decision Maker (Active): Refugio Smoker - Child - [phone]          Can this person be reached and be able to respond quickly, such as within a few minutes or hours? Yes  Who would be your back-up decision maker? Name Gene Mccall (son)  Phone Number [phone]    Admit date/status:5/6/21 Jenny Cuellar    Is this a Readmission?:  No      Insurance:[employer] Medicare    Precert required for SNF: Yes       3 night stay required: No    Living arrangements, Adls, care needs, prior to admission:Lives alone two story home. IPTA    Transportation:self    Durable Medical Equipment at home:  Walker__Cane__RTS__ BSC__Shower Chair__  02__ HHN__ CPAP__  BiPap__  Hospital Bed__ W/C___ Other___chair lift_______    Services in the home and/or outpatient, prior to admission:[facility] home O2. Has transport tank. Family will bring in for transport home. PT/OT recs:not ordered    Hospital Exemption Notification (HEN): NA    Barriers to discharge:none    Plan/comments:Plans dc home alone.  Children available to assist.    ECOC on chart for MD signature  Yes      Venancio Hanna RN

## 2021-05-08 LAB
ALBUMIN SERPL-MCNC: 2.8 G/DL (ref 3.4–5)
ANION GAP SERPL CALCULATED.3IONS-SCNC: 12 MMOL/L (ref 3–16)
BUN BLDV-MCNC: 30 MG/DL (ref 7–20)
CALCIUM SERPL-MCNC: 9 MG/DL (ref 8.3–10.6)
CHLORIDE BLD-SCNC: 90 MMOL/L (ref 99–110)
CO2: 27 MMOL/L (ref 21–32)
CREAT SERPL-MCNC: 0.8 MG/DL (ref 0.6–1.2)
GFR AFRICAN AMERICAN: >60
GFR NON-AFRICAN AMERICAN: >60
GLUCOSE BLD-MCNC: 147 MG/DL (ref 70–99)
GLUCOSE BLD-MCNC: 164 MG/DL (ref 70–99)
GLUCOSE BLD-MCNC: 170 MG/DL (ref 70–99)
GLUCOSE BLD-MCNC: 180 MG/DL (ref 70–99)
GLUCOSE BLD-MCNC: 221 MG/DL (ref 70–99)
HCT VFR BLD CALC: 30.2 % (ref 36–48)
HEMOGLOBIN: 9.9 G/DL (ref 12–16)
MAGNESIUM: 2.2 MG/DL (ref 1.8–2.4)
MCH RBC QN AUTO: 28.8 PG (ref 26–34)
MCHC RBC AUTO-ENTMCNC: 32.7 G/DL (ref 31–36)
MCV RBC AUTO: 88.1 FL (ref 80–100)
PDW BLD-RTO: 16.6 % (ref 12.4–15.4)
PERFORMED ON: ABNORMAL
PHOSPHORUS: 3.6 MG/DL (ref 2.5–4.9)
PLATELET # BLD: 196 K/UL (ref 135–450)
PMV BLD AUTO: 8.2 FL (ref 5–10.5)
POTASSIUM SERPL-SCNC: 3.5 MMOL/L (ref 3.5–5.1)
RBC # BLD: 3.43 M/UL (ref 4–5.2)
SODIUM BLD-SCNC: 129 MMOL/L (ref 136–145)
WBC # BLD: 11.3 K/UL (ref 4–11)

## 2021-05-08 PROCEDURE — 6370000000 HC RX 637 (ALT 250 FOR IP): Performed by: INTERNAL MEDICINE

## 2021-05-08 PROCEDURE — 97162 PT EVAL MOD COMPLEX 30 MIN: CPT

## 2021-05-08 PROCEDURE — 94669 MECHANICAL CHEST WALL OSCILL: CPT

## 2021-05-08 PROCEDURE — 36415 COLL VENOUS BLD VENIPUNCTURE: CPT

## 2021-05-08 PROCEDURE — 97530 THERAPEUTIC ACTIVITIES: CPT

## 2021-05-08 PROCEDURE — 83735 ASSAY OF MAGNESIUM: CPT

## 2021-05-08 PROCEDURE — 94640 AIRWAY INHALATION TREATMENT: CPT

## 2021-05-08 PROCEDURE — 6370000000 HC RX 637 (ALT 250 FOR IP): Performed by: ANESTHESIOLOGY

## 2021-05-08 PROCEDURE — 6360000002 HC RX W HCPCS: Performed by: INTERNAL MEDICINE

## 2021-05-08 PROCEDURE — 2580000003 HC RX 258: Performed by: INTERNAL MEDICINE

## 2021-05-08 PROCEDURE — 94761 N-INVAS EAR/PLS OXIMETRY MLT: CPT

## 2021-05-08 PROCEDURE — 99232 SBSQ HOSP IP/OBS MODERATE 35: CPT | Performed by: INTERNAL MEDICINE

## 2021-05-08 PROCEDURE — 80069 RENAL FUNCTION PANEL: CPT

## 2021-05-08 PROCEDURE — 85027 COMPLETE CBC AUTOMATED: CPT

## 2021-05-08 PROCEDURE — 1200000000 HC SEMI PRIVATE

## 2021-05-08 PROCEDURE — 2700000000 HC OXYGEN THERAPY PER DAY

## 2021-05-08 RX ORDER — GUAIFENESIN 100 MG/5ML
200 SOLUTION ORAL EVERY 4 HOURS PRN
Status: DISCONTINUED | OUTPATIENT
Start: 2021-05-08 | End: 2021-05-12 | Stop reason: HOSPADM

## 2021-05-08 RX ADMIN — URSODIOL 300 MG: 300 CAPSULE ORAL at 09:27

## 2021-05-08 RX ADMIN — SODIUM CHLORIDE, PRESERVATIVE FREE 10 ML: 5 INJECTION INTRAVENOUS at 09:12

## 2021-05-08 RX ADMIN — ROPINIROLE HYDROCHLORIDE 2 MG: 2 TABLET, FILM COATED ORAL at 00:09

## 2021-05-08 RX ADMIN — Medication 500 MG: at 09:12

## 2021-05-08 RX ADMIN — GUAIFENESIN 200 MG: 200 SOLUTION ORAL at 23:07

## 2021-05-08 RX ADMIN — ROPINIROLE HYDROCHLORIDE 1 MG: 0.5 TABLET, FILM COATED ORAL at 21:29

## 2021-05-08 RX ADMIN — ENOXAPARIN SODIUM 40 MG: 40 INJECTION SUBCUTANEOUS at 09:12

## 2021-05-08 RX ADMIN — DOXEPIN HYDROCHLORIDE 50 MG: 25 CAPSULE ORAL at 20:28

## 2021-05-08 RX ADMIN — CEFEPIME HYDROCHLORIDE 2000 MG: 2 INJECTION, POWDER, FOR SOLUTION INTRAVENOUS at 03:32

## 2021-05-08 RX ADMIN — SODIUM CHLORIDE, PRESERVATIVE FREE 10 ML: 5 INJECTION INTRAVENOUS at 20:28

## 2021-05-08 RX ADMIN — GUAIFENESIN 200 MG: 200 SOLUTION ORAL at 06:24

## 2021-05-08 RX ADMIN — DILTIAZEM HYDROCHLORIDE 240 MG: 240 CAPSULE, COATED, EXTENDED RELEASE ORAL at 09:11

## 2021-05-08 RX ADMIN — INSULIN LISPRO 1 UNITS: 100 INJECTION, SOLUTION INTRAVENOUS; SUBCUTANEOUS at 13:09

## 2021-05-08 RX ADMIN — PANTOPRAZOLE SODIUM 40 MG: 40 TABLET, DELAYED RELEASE ORAL at 20:27

## 2021-05-08 RX ADMIN — INSULIN LISPRO 1 UNITS: 100 INJECTION, SOLUTION INTRAVENOUS; SUBCUTANEOUS at 17:28

## 2021-05-08 RX ADMIN — Medication 2000 UNITS: at 09:12

## 2021-05-08 RX ADMIN — CEFEPIME HYDROCHLORIDE 2000 MG: 2 INJECTION, POWDER, FOR SOLUTION INTRAVENOUS at 14:12

## 2021-05-08 RX ADMIN — VENLAFAXINE 37.5 MG: 37.5 TABLET ORAL at 17:25

## 2021-05-08 RX ADMIN — PANTOPRAZOLE SODIUM 40 MG: 40 TABLET, DELAYED RELEASE ORAL at 09:11

## 2021-05-08 RX ADMIN — GUAIFENESIN 200 MG: 200 SOLUTION ORAL at 17:25

## 2021-05-08 RX ADMIN — ATORVASTATIN CALCIUM 20 MG: 10 TABLET, FILM COATED ORAL at 20:27

## 2021-05-08 RX ADMIN — URSODIOL 300 MG: 300 CAPSULE ORAL at 20:28

## 2021-05-08 RX ADMIN — DIPHENHYDRAMINE HCL 50 MG: 25 TABLET ORAL at 00:08

## 2021-05-08 RX ADMIN — MONTELUKAST SODIUM 10 MG: 10 TABLET ORAL at 09:11

## 2021-05-08 RX ADMIN — VENLAFAXINE 37.5 MG: 37.5 TABLET ORAL at 09:20

## 2021-05-08 RX ADMIN — LEVALBUTEROL HYDROCHLORIDE 1.25 MG: 1.25 SOLUTION, CONCENTRATE RESPIRATORY (INHALATION) at 19:52

## 2021-05-08 RX ADMIN — FERROUS SULFATE TAB 325 MG (65 MG ELEMENTAL FE) 325 MG: 325 (65 FE) TAB at 13:14

## 2021-05-08 RX ADMIN — FERROUS SULFATE TAB 325 MG (65 MG ELEMENTAL FE) 325 MG: 325 (65 FE) TAB at 09:20

## 2021-05-08 RX ADMIN — INSULIN LISPRO 1 UNITS: 100 INJECTION, SOLUTION INTRAVENOUS; SUBCUTANEOUS at 21:28

## 2021-05-08 RX ADMIN — LEVALBUTEROL HYDROCHLORIDE 1.25 MG: 1.25 SOLUTION, CONCENTRATE RESPIRATORY (INHALATION) at 07:42

## 2021-05-08 RX ADMIN — LOSARTAN POTASSIUM 100 MG: 100 TABLET, FILM COATED ORAL at 09:11

## 2021-05-08 RX ADMIN — FERROUS SULFATE TAB 325 MG (65 MG ELEMENTAL FE) 325 MG: 325 (65 FE) TAB at 17:25

## 2021-05-08 RX ADMIN — LEVOTHYROXINE SODIUM 100 MCG: 0.1 TABLET ORAL at 05:59

## 2021-05-08 RX ADMIN — ASPIRIN 81 MG: 81 TABLET, COATED ORAL at 09:11

## 2021-05-08 RX ADMIN — TRAZODONE HYDROCHLORIDE 50 MG: 50 TABLET ORAL at 20:27

## 2021-05-08 RX ADMIN — GABAPENTIN 600 MG: 300 CAPSULE ORAL at 20:27

## 2021-05-08 RX ADMIN — INSULIN LISPRO 1 UNITS: 100 INJECTION, SOLUTION INTRAVENOUS; SUBCUTANEOUS at 09:15

## 2021-05-08 RX ADMIN — ROPINIROLE HYDROCHLORIDE 1 MG: 0.5 TABLET, FILM COATED ORAL at 20:27

## 2021-05-08 RX ADMIN — ROPINIROLE HYDROCHLORIDE 2 MG: 2 TABLET, FILM COATED ORAL at 23:07

## 2021-05-08 RX ADMIN — ESTROGENS, CONJUGATED 1.25 MG: 0.62 TABLET, FILM COATED ORAL at 09:20

## 2021-05-08 ASSESSMENT — PAIN SCALES - GENERAL
PAINLEVEL_OUTOF10: 0
PAINLEVEL_OUTOF10: 0

## 2021-05-08 NOTE — PRE SEDATION
Sedation Pre-Procedure Note    Patient Name: Venice Joy   YOB: 1945  Room/Bed: 0490/2829-05  Medical Record Number: [id number]  Date: 5/7/2021   Time: 10:42 PM       Indication:  Resp distress/chest congestion/mucus plugging/pulmonary infiltrates/ineffective airway clearance    Consent: I have discussed with the patient and/or the patient representative the indication, alternatives, and the possible risks and/or complications of the planned procedure and the anesthesia methods. The patient and/or patient representative appear to understand and agree to proceed. Vital Signs:   Vitals:    05/07/21 2100   BP: 129/61   Pulse: 93   Resp: 18   Temp: 98.2 °F (36.8 °C)   SpO2: 93%       Past Medical History:   has a past medical history of Allergic, Anemia, Anesthesia complication, Arthritis, Asthma, Cirrhosis ([address] COPD (chronic obstructive pulmonary disease) ([address] GERD (gastroesophageal reflux disease), GIB (gastrointestinal bleeding), Heart murmur, Hernia, Hyperlipidemia, Hypertension, Lung collapse, Murmur, heart, Pneumonia, Reflux, Rheumatic fever, Stress fracture, SVT (supraventricular tachycardia) ([address] TIA (transient ischemic attack), Type II or unspecified type diabetes mellitus without mention of complication, not stated as uncontrolled, 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 Past Surgical History:   has a past surgical history that includes hernia repair; Hysterectomy; joint replacement (Bilateral); Foot surgery (Right, 06/03/2013); knee surgery (09); knee surgery (2010); Cardiac catheterization (07/14/14); Cataract removal; bone marrow biopsy; Breast biopsy; fine needle aspiration; Colonoscopy; and Upper gastrointestinal endoscopy (N/A, 7/21/2018).     Medications:   Scheduled Meds:    insulin lispro  0-6 Units Subcutaneous TID WC    insulin lispro  0-3 Units Subcutaneous Nightly    Lip Balm   Topical Daily    levalbuterol Ally Ortega MD   pantoprazole (PROTONIX) 40 MG tablet Take 1 tablet by mouth 2 times daily 12/9/20  Yes Marilou Leiva MD   cyanocobalamin 1000 MCG/ML injection Inject 1 mL into the muscle every 14 days Last dose was taken on 4/9/17 11/18/20  Yes Sunil Zazueta MD   SITagliptin (JANUVIA) 100 MG tablet Take 1 tablet by mouth daily 11/18/20  Yes Sunil Zazueta MD   famotidine (PEPCID) 20 MG tablet Take 1 tablet by mouth 2 times daily 11/1/20  Yes Marilou Leiva MD   atorvastatin (LIPITOR) 20 MG tablet TAKE 1 TABLET NIGHTLY 9/17/20  Yes Marilou Leiva MD   cetirizine (ZYRTEC) 10 MG tablet Take 1 tablet by mouth daily 6/19/20  Yes Marilou Leiva MD   cetirizine (ZYRTEC) 10 MG tablet Take 1 tablet by mouth daily 6/19/20  Yes Marilou Leiva MD   potassium chloride (KLOR-CON M) 20 MEQ extended release tablet Take 1 tablet by mouth daily 6/19/20  Yes Marilou Leiva MD   furosemide (LASIX) 20 MG tablet Take 1 tablet by mouth daily 6/16/20  Yes Marilou Leiva MD   dilTIAZem [facility]) 240 MG extended release capsule TAKE 1 CAPSULE DAILY 6/15/20  Yes Marilou Leiva MD   losartan (COZAAR) 100 MG tablet TAKE 1 TABLET DAILY 6/2/20  Yes Marilou Leiva MD   ferrous sulfate 325 (65 Fe) MG tablet Take 325 mg by mouth 3 times daily (with meals)   Yes Historical Provider, MD   gabapentin (NEURONTIN) 600 MG tablet Take 600 mg by mouth nightly. 600 mg at bedtime and 300 mg BID.    Yes Historical Provider, MD   diphenhydrAMINE (BENADRYL) 25 MG tablet Take 50 mg by mouth nightly    Yes Historical Provider, MD   Cholecalciferol (VITAMIN D) 2000 UNITS CAPS capsule Take 2,000 Units by mouth daily    Yes Historical Provider, MD   rOPINIRole (REQUIP) 1 MG tablet Take 1 mg by mouth nightly 1mg at 1730, 1mg at 2030, 2mg at 2200 (total of 4mg nightly in divided doses) 3/9/17  Yes Historical Provider, MD   ursodiol (ACTIGALL) 500 MG tablet Take 500 mg by mouth 2 times daily    Yes Historical Provider, MD   calcium carbonate 600 MG TABS tablet Take 1 tablet by mouth daily    Yes Historical Provider, MD   levothyroxine (SYNTHROID) 100 MCG tablet Take 100 mcg by mouth Daily   Yes Historical Provider, MD   Multiple Vitamins-Minerals (CENTRUM PO) Take 1 tablet by mouth daily. Yes Historical Provider, MD   levalbuterol (XOPENEX HFA) 45 MCG/ACT inhaler Inhale 2 puffs into the lungs every 6 hours as needed    Yes Historical Provider, MD   beclomethasone (QVAR) 80 MCG/ACT inhaler Inhale 2 puffs into the lungs 2 times daily. Yes Historical Provider, MD   Aclidinium Bromide (TUDORZA PRESSAIR) 400 MCG/ACT AEPB Inhale 1 puff into the lungs 2 times daily    Yes Historical Provider, MD   montelukast (SINGULAIR) 10 MG tablet Take 10 mg by mouth daily    Yes Historical Provider, MD   estrogens, conjugated, (PREMARIN) 1.25 MG tablet Take 1.25 mg by mouth daily. Yes Historical Provider, MD   aspirin 81 MG EC tablet Take 81 mg by mouth daily     Historical Provider, MD          Pre-Sedation Documentation and Exam:   Vital signs have been reviewed (see flow sheet for vitals).     Mallampati Airway Assessment:  Mallampati Class III - (soft palate & base of uvula are visible)    Prior History of Anesthesia Complications:   none    ASA Classification:  Class 3 - A patient with severe systemic disease that limits activity but is not incapacitating    Sedation/ Anesthesia Plan:   intravenous sedation    Medications Planned:   midazolam (Versed) intravenously and fentanyl intravenously    Patient is an appropriate candidate for plan of sedation: yes    Electronically signed by Lucio French MD on 5/7/2021 at 10:42 PM

## 2021-05-08 NOTE — PROGRESS NOTES
Lymphadenopathy: No cervical or supraclavicular adenopathy. Skin: Skin is warm and dry. No rash or nodules on the exposed extremities. Psychiatric: Normal mood and affect. Behavior is normal.    No anxiety. Neurologic: Alert, awake and oriented. PERRL. Speech fluent         Results:  CBC:   Recent Labs     05/06/21  1027 05/07/21  0423 05/08/21  0541   WBC 11.7* 10.4 11.3*   HGB 10.5* 9.8* 9.9*   HCT 31.6* 29.5* 30.2*   MCV 87.0 86.1 88.1    190 196     BMP:   Recent Labs     05/06/21  1027 05/07/21  0423 05/08/21  0541   * 125* 129*   K 4.3 3.8 3.5   CL 84* 89* 90*   CO2 25 24 27   PHOS  --  3.6 3.6   BUN 11 15 30*   CREATININE 0.7 0.6 0.8     LIVER PROFILE:   Recent Labs     05/06/21  1027   AST 16   ALT 13   BILITOT 1.4*   ALKPHOS 82       Imaging:  I have reviewed radiology images personally. XR CHEST PORTABLE   Final Result   Mild improvement in CHF/pulmonary edema. Continued radiographic follow-up to   complete resolution recommended. XR CHEST PORTABLE   Final Result   Right hilar prominence which may be due to lymphadenopathy or increased   central pulmonary vessels. Increased interstitial markings reflecting interstitial edema/pulmonary   vascular congestion versus an atypical pneumonia. Left mid lung opacity likely reflecting airspace disease. Malignancy cannot   be excluded. Further evaluation with chest CT is recommended to better evaluate the above   pathologies. XR CHEST PORTABLE   Final Result   Consolidative opacities in the left mid to lower lung zone are noted. There   also prominence of the perihilar markings. Differential includes pulmonary   edema and multifocal pneumonia. Pulmonary neoplasm, particularly in the   periphery of the left lower lobe cannot be completely excluded. Follow-up to   resolution is recommended and consideration of a short term follow-up CT.            Results for Freddie Garrido (MRN [id number]) as of 5/8/2021 11:47   Ref. Range 5/7/2021 20:24 5/8/2021 05:41 5/8/2021 07:40   Sodium Latest Ref Range: 136 - 145 mmol/L  129 (L)    Potassium Latest Ref Range: 3.5 - 5.1 mmol/L  3.5    Chloride Latest Ref Range: 99 - 110 mmol/L  90 (L)    CO2 Latest Ref Range: 21 - 32 mmol/L  27    BUN Latest Ref Range: 7 - 20 mg/dL  30 (H)    Creatinine Latest Ref Range: 0.6 - 1.2 mg/dL  0.8    Anion Gap Latest Ref Range: 3 - 16   12    GFR Non- Latest Ref Range: >60   >60    GFR  Latest Ref Range: >60   >60    Magnesium Latest Ref Range: 1.80 - 2.40 mg/dL  2.20    Glucose Latest Ref Range: 70 - 99 mg/dL  170 (H)    POC Glucose Latest Ref Range: 70 - 99 mg/dl 208 (H)  147 (H)   Calcium Latest Ref Range: 8.3 - 10.6 mg/dL  9.0    Phosphorus Latest Ref Range: 2.5 - 4.9 mg/dL  3.6      Results for Amie Bergeron (MRN [id number]) as of 5/8/2021 11:47   Ref. Range 12/1/2020 00:00 5/6/2021 10:27 5/7/2021 04:23 5/8/2021 05:41   WBC Latest Ref Range: 4.0 - 11.0 K/uL 7.6 11.7 (H) 10.4 11.3 (H)   RBC Latest Ref Range: 4.00 - 5.20 M/uL 3.90 3.63 (L) 3.42 (L) 3.43 (L)   Hemoglobin Quant Latest Ref Range: 12.0 - 16.0 g/dL 11.3 (A) 10.5 (L) 9.8 (L) 9.9 (L)   Hematocrit Latest Ref Range: 36.0 - 48.0 % 34.1 (A) 31.6 (L) 29.5 (L) 30.2 (L)   MCV Latest Ref Range: 80.0 - 100.0 fL 87 87.0 86.1 88.1   MCH Latest Ref Range: 26.0 - 34.0 pg 29.0 28.9 28.7 28.8   MCHC Latest Ref Range: 31.0 - 36.0 g/dL 33.1 33.2 33.3 32.7   MPV Latest Ref Range: 5.0 - 10.5 fL Pend 7.4 7.8 8.2   RDW Latest Ref Range: 12.4 - 15.4 %  16.6 (H) 16.4 (H) 16.6 (H)   Platelet Count Latest Ref Range: 135 - 450 K/uL 183 191 190 196   Neutrophils % Latest Units: % 78 87.2         Assessment:  Principal Problem:    PNA (pneumonia)  Active Problems:    Type 2 diabetes mellitus (HCC)    Hyperlipidemia    Obesity (BMI 30.0-34. 9)    HTN (hypertension)    GERD (gastroesophageal reflux disease)    Cirrhosis (HCC)    COPD (chronic obstructive pulmonary disease) ([facility] Anemia    Esophageal varices without bleeding (HCC)    Hyponatremia    Acute and chronic respiratory failure with hypoxia (HCC)  Resolved Problems:    * No resolved hospital problems.  *          Plan:   · Oxygen supplementation to keep saturation between 90 to 94% only  · No need for any BiPAP  · Please titrate oxygen as per the above parameters  · Patient was started on cefepime and vancomycin by the admitting provider-patient is coming from home- will hold off on any vancomycin at this time and reassess as per clinical status and cultures  · Status post bronchoscopy and the results are pending  · Bronchodilators  · Patient was given 1 dose of IV Solu-Medrol in the ER  · Patient does not have any acute bronchospasm-we will hold off on any systemic steroids at this time  · Patient's previous echocardiograms were reviewed and patient has significant diastolic dysfunction  · Patient has significant hyponatremia which can be either secondary to hypervolemic hyponatremia or SIADH secondary patient being on SSRI-improving with diuresis   · Strict input output charting  · Monitor renal function  · Correct electrolytes on  whenever necessary basis  · BGM with SSI started for hyperglycemia -may need long acting insulin   · Cardiac rhythm and hemodynamics to be monitored closely  · PUD and DVT prophylaxis as per IM  · PT ordered after discussion with patient     Case discussed with patient and nursing        Electronically signed by:  Ran Ramos MD    5/8/2021    11:45 AM.

## 2021-05-08 NOTE — PROGRESS NOTES
tenderness. Musculoskeletal: No cyanosis. No clubbing. No obvious joint deformity. Lymphadenopathy: No cervical or supraclavicular adenopathy. Skin: Skin is warm and dry. No rash or nodules on the exposed extremities. Psychiatric: Normal mood and affect. Behavior is normal.    No anxiety. Neurologic: Alert, awake and oriented. PERRL. Speech fluent         Results:  CBC:   Recent Labs     05/06/21  1027 05/07/21  0423   WBC 11.7* 10.4   HGB 10.5* 9.8*   HCT 31.6* 29.5*   MCV 87.0 86.1    190     BMP:   Recent Labs     05/06/21  1027 05/07/21  0423   * 125*   K 4.3 3.8   CL 84* 89*   CO2 25 24   PHOS  --  3.6   BUN 11 15   CREATININE 0.7 0.6     LIVER PROFILE:   Recent Labs     05/06/21  1027   AST 16   ALT 13   BILITOT 1.4*   ALKPHOS 82       Imaging:  I have reviewed radiology images personally. XR CHEST PORTABLE   Final Result   Mild improvement in CHF/pulmonary edema. Continued radiographic follow-up to   complete resolution recommended. XR CHEST PORTABLE   Final Result   Right hilar prominence which may be due to lymphadenopathy or increased   central pulmonary vessels. Increased interstitial markings reflecting interstitial edema/pulmonary   vascular congestion versus an atypical pneumonia. Left mid lung opacity likely reflecting airspace disease. Malignancy cannot   be excluded. Further evaluation with chest CT is recommended to better evaluate the above   pathologies. XR CHEST PORTABLE   Final Result   Consolidative opacities in the left mid to lower lung zone are noted. There   also prominence of the perihilar markings. Differential includes pulmonary   edema and multifocal pneumonia. Pulmonary neoplasm, particularly in the   periphery of the left lower lobe cannot be completely excluded. Follow-up to   resolution is recommended and consideration of a short term follow-up CT.            Xr Chest Portable    Result Date: 5/7/2021  EXAMINATION: ONE XRAY VIEW OF THE CHEST 5/7/2021 5:35 am COMPARISON: 05/06/2021 HISTORY: ORDERING SYSTEM PROVIDED HISTORY: RF TECHNOLOGIST PROVIDED HISTORY: Reason for exam:->RF Reason for Exam: RF FINDINGS: The heart is enlarged with mild improvement in central pulmonary venous congestion. Bilateral perihilar and lower lobe airspace opacification persists along with small bilateral pleural effusions, also improved. Mediastinal contours are stable. There is no pneumothorax. Right hilar prominence and left lateral basilar soft tissue opacity are again noted. Mild improvement in CHF/pulmonary edema. Continued radiographic follow-up to complete resolution recommended. Xr Chest Portable    Result Date: 5/6/2021  EXAMINATION: ONE XRAY VIEW OF THE CHEST 5/6/2021 11:34 am COMPARISON: Chest x-ray dated 05/06/2021 HISTORY: ORDERING SYSTEM PROVIDED HISTORY: worsening SOB TECHNOLOGIST PROVIDED HISTORY: Reason for exam:->worsening SOB Reason for Exam: CHEST PAIN Acuity: Acute Type of Exam: Initial FINDINGS: HEART/MEDIASTINUM: The cardiac silhouette is enlarged, but stable. PLEURA/LUNGS: Opacity again noted in the left mid lung peripherally reflecting airspace disease. Prominence of the right hilum is again noted. There are increased interstitial markings reflecting pulmonary vascular congestion/interstitial edema versus an atypical pneumonia. There is no appreciable pneumothorax. BONES/SOFT TISSUE: No acute abnormality. Right hilar prominence which may be due to lymphadenopathy or increased central pulmonary vessels. Increased interstitial markings reflecting interstitial edema/pulmonary vascular congestion versus an atypical pneumonia. Left mid lung opacity likely reflecting airspace disease. Malignancy cannot be excluded. Further evaluation with chest CT is recommended to better evaluate the above pathologies.      Xr Chest Portable    Result Date: 5/6/2021  EXAMINATION: ONE XRAY VIEW OF THE CHEST 5/6/2021 10:18 am 20:10   Ref. Range 7/22/2018 06:51 12/1/2020 00:00 5/6/2021 10:27 5/7/2021 04:23   WBC Latest Ref Range: 4.0 - 11.0 K/uL 8.5 7.6 11.7 (H) 10.4   RBC Latest Ref Range: 4.00 - 5.20 M/uL 2.74 (L) 3.90 3.63 (L) 3.42 (L)   Hemoglobin Quant Latest Ref Range: 12.0 - 16.0 g/dL 7.9 (L) 11.3 (A) 10.5 (L) 9.8 (L)   Hematocrit Latest Ref Range: 36.0 - 48.0 % 23.3 (L) 34.1 (A) 31.6 (L) 29.5 (L)   MCV Latest Ref Range: 80.0 - 100.0 fL 85.1 87 87.0 86.1   MCH Latest Ref Range: 26.0 - 34.0 pg 28.9 29.0 28.9 28.7   MCHC Latest Ref Range: 31.0 - 36.0 g/dL 34.0 33.1 33.2 33.3   MPV Latest Ref Range: 5.0 - 10.5 fL 7.7 Pend 7.4 7.8   RDW Latest Ref Range: 12.4 - 15.4 % 16.1 (H)  16.6 (H) 16.4 (H)   Platelet Count Latest Ref Range: 135 - 450 K/uL 136 183 191 190   Neutrophils % Latest Units: %  78 87.2      ONE XRAY VIEW OF THE CHEST       5/7/2021 5:35 am       COMPARISON:   05/06/2021       HISTORY:   ORDERING SYSTEM PROVIDED HISTORY: RF   TECHNOLOGIST PROVIDED HISTORY:   Reason for exam:->RF   Reason for Exam: RF       FINDINGS:   The heart is enlarged with mild improvement in central pulmonary venous   congestion.  Bilateral perihilar and lower lobe airspace opacification   persists along with small bilateral pleural effusions, also improved. Mediastinal contours are stable.  There is no pneumothorax.  Right hilar   prominence and left lateral basilar soft tissue opacity are again noted. Assessment:  Principal Problem:    PNA (pneumonia)  Active Problems:    Type 2 diabetes mellitus (HCC)    Hyperlipidemia    Obesity (BMI 30.0-34. 9)    HTN (hypertension)    GERD (gastroesophageal reflux disease)    Cirrhosis (HCC)    COPD (chronic obstructive pulmonary disease) (HCC)    Anemia    Esophageal varices without bleeding (HCC)    Hyponatremia    Acute and chronic respiratory failure with hypoxia (HCC)  Resolved Problems:    * No resolved hospital problems.  *          Plan:   · Oxygen supplementation to keep saturation between 90 to 94% only  · Patient can be given BIPAP on PRN basis   · Please titrate oxygen as per the above parameters  · Patient was started on cefepime and vancomycin by the admitting provider-patient is coming from home- will hold off on any vancomycin at this time and reassess as per clinical status and cultures  · Bronchodilators  · Patient was given 1 dose of IV Solu-Medrol in the ER  · Patient does not have any acute bronchospasm-we will hold off on any systemic steroids at this time  · Patient's previous echocardiograms were reviewed and patient has significant diastolic dysfunction  · Patient has significant hyponatremia which can be either secondary to hypervolemic hyponatremia or SIADH secondary patient being on SSRI-improving with diuresis   · Will give another dose of lasix this morning and reassess   · Strict input output charting  · Monitor BMP  · Correct electrolytes on  whenever necessary basis  · Patient continues to have chest congestion and pulmonary infiltrates on CXR - will benefit from bronchoscopy for diagnostic and therapeutic purposes -patient was explained the procedure with pros and cons and patient was agreeable -will arrange today   · BGM with SSI started for hyperglycemia -may need long acting insulin   · Cardiac rhythm and hemodynamics to be monitored closely  · PUD and DVT prophylaxis as per IM     Case discussed with  ICU team    Further management as per clinical status /follow up on above recommendations and bronchoscopy findings         Electronically signed by:  Teddy Hannah MD    5/7/2021    8:09 PM.

## 2021-05-08 NOTE — PROGRESS NOTES
Physical Therapy    Facility/Department: [facility] B3 - MED SURG  Initial Assessment and Treatment    NAME: Do Parker  : 1945  MRN: [id number]    Date of Service: 2021    Discharge Recommendations:  Subacute/Skilled Nursing Facility(If pt discharged home, recommend 24hr assist and Home PT)   PT Equipment Recommendations  Equipment Needed: No  Other: Defer to next level of care; pt own SW    Assessment   Body structures, Functions, Activity limitations: Decreased functional mobility ; Decreased safe awareness;Decreased endurance;Decreased balance;Decreased posture  Assessment: Pt is a 78 y/o female presenting to [facility] with SOB. PTA, pt was IND without an AD and living silvia, but within the week requires a SW to complete functional mobility d/t fatigue. Pt ambulated 8 feet to the bathroom with SBA and Spo2 dropped to 80% without pt able to feel a difference. Pt required 5 minutes of seated rest to recover >90%. Pt is grossly SBA with functional mobility, but very limited by endurance and decreased SpO2. Pt will benefit from continued skilled PT to address deficits above. Recommend SNF at d/c in light of current deficits. Pt is adamantly refusing SNF, if pt declines, recommend 24hr assist and Home PT at d/c. Treatment Diagnosis: Decreased functional mobility  Prognosis: Good  Decision Making: Medium Complexity  PT Education: Goals;PT Role;Plan of Care; Functional Mobility Training;Gait Training;Energy Conservation;Transfer Training  Patient Education: Pt will benefit from reinforcement d/t decreased insight into deficits with decreased Spo2 despite multiple bouts of education. REQUIRES PT FOLLOW UP: Yes  Activity Tolerance  Activity Tolerance: Patient limited by fatigue;Treatment limited secondary to medical complications (free text)  Activity Tolerance: Spo2 dropped to 80% on 5 L O2 with ambulating 8 feet to the bathroom.        Patient Diagnosis(es): The primary encounter diagnosis was Acute respiratory failure with hypoxia ([address] Diagnoses of Pneumonia due to organism, COPD exacerbation ([address] and Hyponatremia were also pertinent to this visit. has a past medical history of Allergic, Anemia, Anesthesia complication, Arthritis, Asthma, Cirrhosis ([address] COPD (chronic obstructive pulmonary disease) ([address] GERD (gastroesophageal reflux disease), GIB (gastrointestinal bleeding), Heart murmur, Hernia, Hyperlipidemia, Hypertension, Lung collapse, Murmur, heart, Pneumonia, Reflux, Rheumatic fever, Stress fracture, SVT (supraventricular tachycardia) ([address] TIA (transient ischemic attack), Type II or unspecified type diabetes mellitus without mention of complication, not stated as uncontrolled, 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 ([facility] Wears glasses, and Wears partial dentures. has a past surgical history that includes hernia repair; Hysterectomy; joint replacement (Bilateral); Foot surgery (Right, 06/03/2013); knee surgery (09); knee surgery (2010); Cardiac catheterization (07/14/14); Cataract removal; bone marrow biopsy; Breast biopsy; fine needle aspiration; Colonoscopy; and Upper gastrointestinal endoscopy (N/A, 7/21/2018). Restrictions  Restrictions/Precautions  Restrictions/Precautions: General Precautions, Up as Tolerated  Position Activity Restriction  Other position/activity restrictions: 5 L O2; typically on 3 L O2 at home. Vision/Hearing  Vision: Impaired  Vision Exceptions: Wears glasses for distance  Hearing: Within functional limits     Subjective  General  Chart Reviewed: Yes  Patient assessed for rehabilitation services?: Yes  Response To Previous Treatment: Not applicable  Family / Caregiver Present: No  Referring Practitioner: Elenita Ruiz MD  Referral Date : 05/08/21  Subjective  Subjective: Pt agreeable to therapy.   Pain Screening  Patient Currently in Pain: Denies  Vital Signs  Patient Currently in Pain: Denies       Orientation  Orientation  Overall Orientation Status: Within Normal Limits  Social/Functional History  Social/Functional History  Lives With: Alone(PRN assist available from family and neighbors)  Type of Home: House  Home Layout: Multi-level(Bilevel wtih stair lift)  Home Access: Level entry  Bathroom Shower/Tub: Walk-in shower, Tub/Shower unit(Uses walkin-in shower)  Bathroom Toilet: Handicap height  Bathroom Equipment: Grab bars in shower, Shower chair  Home Equipment: Cane, Standard walker, Oxygen  ADL Assistance: Independent  Homemaking Assistance: Independent  Homemaking Responsibilities: Yes  Ambulation Assistance: Independent(began using SW last week d/t impaired breathing.)  Transfer Assistance: Independent  Active : Yes  Additional Comments: Denies falls in past 6 months. Cognition   Cognition  Overall Cognitive Status: Exceptions  Safety Judgement: Decreased awareness of need for assistance;Decreased awareness of need for safety    Objective          AROM RLE (degrees)  RLE AROM: WFL  AROM LLE (degrees)  LLE AROM : WFL  Strength RLE  Strength RLE: WFL  Comment: Grossly 4/5  Strength LLE  Strength LLE: WFL  Comment: Grossly 4/5  Tone RLE  RLE Tone: Normotonic  Tone LLE  LLE Tone: Normotonic  Sensation  Overall Sensation Status: WFL  Bed mobility  Supine to Sit: Supervision(with HOB elevated)  Sit to Supine: Unable to assess(Pt up in chair at end of session)  Transfers  Sit to Stand: Stand by assistance  Stand to sit: Stand by assistance  Bed to Chair: Stand by assistance  Comment: SBA with RW; slow to complete; SpO2 remained >90% with strict transfer and sit<>stand. Ambulation  Ambulation?: Yes  More Ambulation?: No  Ambulation 1  Surface: level tile  Device: Standard Walker  Assistance: Stand by assistance  Quality of Gait: Pt demos decreased step length, decreased step height, foward trunk flexion, SOB. SpO2 dropped to 80% without able to self regulate. RN notified and pt returned to sitting. Required 5 minutes to return to >90%.   Gait Deviations: Slow Yvonne;Decreased step length;Decreased step height;Shuffles  Distance: 8 feet + 8 feet  Comments: Monitor Spo2 d/t decrease with short mobility. Cues for PLB throughout. Stairs/Curb  Stairs?: No(Unsafe to attempt d/t impaired SpO2 with brief mobility; pt uses stair lift at home.)     Balance  Posture: Fair  Sitting - Static: Good  Sitting - Dynamic: Good;-  Standing - Static: Fair;+  Standing - Dynamic: Fair  Comments: with SW        Plan   Plan  Times per week: 3-5x  Times per day: Daily  Plan weeks: 1 week, by 5/15/21  Current Treatment Recommendations: Strengthening, Balance Training, Functional Mobility Training, Transfer Training, Gait Training, Stair training, Endurance Training, Home Exercise Program, Safety Education & Training, Patient/Caregiver Education & Training, Equipment Evaluation, Education, & procurement  Safety Devices  Type of devices: All fall risk precautions in place, Call light within reach, Chair alarm in place, Left in chair, Patient at risk for falls, Gait belt, Nurse notified    AM-PAC Score  AM-PAC Inpatient Mobility Raw Score : 17 (05/08/21 1540)  AM-PAC Inpatient T-Scale Score : 42.13 (05/08/21 1540)  Mobility Inpatient CMS 0-100% Score: 50.57 (05/08/21 1540)  Mobility Inpatient CMS G-Code Modifier : CK (05/08/21 1540)        Goals  Short term goals  Time Frame for Short term goals: 5-7 days, unless otherwise stated, by 5/15/21  Short term goal 1: Pt will complete bed mobility with independence. Short term goal 2: Pt will complete transfers with LRAD and supervision with Spo2 >90%. Short term goal 3: Pt will complete ambulation >50 feet with LRAD and CGA with Spo2 >90%. Short term goal 4: Pt will complete 12-15 reps of BLE ther ex for strength and balance by 5/11/21. Patient Goals   Patient goals : To go home and walk independently.      Therapy Time   Individual Concurrent Group Co-treatment   Time In 1346         Time Out 1424         Minutes 38         Timed Code Treatment Minutes: 28 Minutes(10 minute eval)     If the pt is discharged prior to the next treatment session, this will serve as the discharge summary.      Sabrina Dickey, PT

## 2021-05-08 NOTE — PROGRESS NOTES
Hospitalist Progress Note      PCP: Piter Baig DO    Date of Admission: 5/6/2021    Chief Complaint: SOB    Subjective: feeling better       Medications:  Reviewed    Infusion Medications    dextrose      sodium chloride       Scheduled Medications    insulin lispro  0-6 Units Subcutaneous TID WC    insulin lispro  0-3 Units Subcutaneous Nightly    Lip Balm   Topical Daily    levalbuterol  1.25 mg Nebulization BID    rOPINIRole  1 mg Oral Nightly    rOPINIRole  2 mg Oral Nightly    aspirin  81 mg Oral Daily    atorvastatin  20 mg Oral Nightly    calcium elemental  500 mg Oral Daily    Vitamin D  2,000 Units Oral Daily    dilTIAZem  240 mg Oral Daily    doxepin  50 mg Oral Nightly    estrogens (conjugated)  1.25 mg Oral Daily    ferrous sulfate  325 mg Oral TID WC    gabapentin  600 mg Oral Nightly    levothyroxine  100 mcg Oral Daily    losartan  100 mg Oral Daily    montelukast  10 mg Oral Daily    pantoprazole  40 mg Oral BID    rOPINIRole  1 mg Oral Nightly    traZODone  50 mg Oral Nightly    venlafaxine  37.5 mg Oral BID WC    sodium chloride flush  5-40 mL Intravenous 2 times per day    enoxaparin  40 mg Subcutaneous Daily    cefepime  2,000 mg Intravenous Q12H    mupirocin   Nasal BID    ursodiol  300 mg Oral BID     PRN Meds: guaiFENesin, glucose, dextrose, glucagon (rDNA), dextrose, sodium chloride flush, sodium chloride, promethazine **OR** ondansetron, polyethylene glycol, acetaminophen **OR** acetaminophen, diphenhydrAMINE      Intake/Output Summary (Last 24 hours) at 5/8/2021 0756  Last data filed at 5/8/2021 0538  Gross per 24 hour   Intake --   Output 2500 ml   Net -2500 ml       Physical Exam Performed:    BP (!) 158/54   Pulse 68   Temp 97.8 °F (36.6 °C) (Oral)   Resp 18   Ht 5' 6\" (1.676 m)   Wt 210 lb (95.3 kg)   SpO2 92%   BMI 33.89 kg/m²     General appearance: No apparent distress, appears stated age and cooperative.   HEENT: Pupils equal, round, and reactive to light. Conjunctivae/corneas clear. Neck: Supple, with full range of motion. No jugular venous distention. Trachea midline. Respiratory:  Normal respiratory effort. Clear to auscultation, bilaterally without Rales/Wheezes/Rhonchi. Cardiovascular: Regular rate and rhythm with normal S1/S2 without murmurs, rubs or gallops. Abdomen: Soft, non-tender, non-distended with normal bowel sounds. Musculoskeletal: No clubbing, cyanosis or edema bilaterally. Full range of motion without deformity. Skin: Skin color, texture, turgor normal.  No rashes or lesions. Neurologic:  Neurovascularly intact without any focal sensory/motor deficits. Cranial nerves: II-XII intact, grossly non-focal.  Psychiatric: Alert and oriented, thought content appropriate, normal insight  Capillary Refill: Brisk,< 3 seconds   Peripheral Pulses: +2 palpable, equal bilaterally       Labs:   Recent Labs     05/06/21  1027 05/07/21  0423 05/08/21  0541   WBC 11.7* 10.4 11.3*   HGB 10.5* 9.8* 9.9*   HCT 31.6* 29.5* 30.2*    190 196     Recent Labs     05/06/21  1027 05/07/21  0423 05/08/21  0541   * 125* 129*   K 4.3 3.8 3.5   CL 84* 89* 90*   CO2 25 24 27   BUN 11 15 30*   CREATININE 0.7 0.6 0.8   CALCIUM 8.8 8.4 9.0   PHOS  --  3.6 3.6     Recent Labs     05/06/21  1027   AST 16   ALT 13   BILITOT 1.4*   ALKPHOS 82     No results for input(s): INR in the last 72 hours.   Recent Labs     05/06/21  1027   TROPONINI <0.01       Urinalysis:      Lab Results   Component Value Date    NITRU Negative 07/20/2018    WBCUA  07/20/2018    BACTERIA 2+ 07/20/2018    RBCUA 10-20 07/20/2018    BLOODU LARGE 07/20/2018    SPECGRAV 1.010 07/20/2018    GLUCOSEU Negative 07/20/2018    GLUCOSEU NEGATIVE 12/30/2009       Consults:    IP CONSULT TO HOSPITALIST  IP CONSULT TO PULMONOLOGY  IP CONSULT TO PHARMACY      Assessment/Plan:    Active Hospital Problems    Diagnosis    Hyponatremia [E87.1]    Acute and chronic respiratory failure clinically unable to determine but likely hypovolemic. Follow serial labs on IVF. Reviewed and documented as above.     HypoThyroid - clinically euthyroid on oral replacement therapy. Continue, w/ outpt monitoring as previously arranged.      Obesity -  With Body mass index is 33.89 kg/m². Complicating assessment and treatment. Placing patient at risk for multiple co-morbidities as well as early death and contributing to the patient's presentation.  Counseled on weight loss.        DVT Prophylaxis: LMWH  Diet: DIET GENERAL;  Code Status: Limited      PT/OT Eval Status: Not yet ordered.      Dispo - Certainly here through the weekend pending clinical course and subspecialty Julia Lyons MD

## 2021-05-09 PROBLEM — J14 PNEUMONIA OF BOTH LOWER LOBES DUE TO HAEMOPHILUS INFLUENZAE (HCC): Status: ACTIVE | Noted: 2021-05-09

## 2021-05-09 LAB
ALBUMIN SERPL-MCNC: 2.8 G/DL (ref 3.4–5)
ANION GAP SERPL CALCULATED.3IONS-SCNC: 10 MMOL/L (ref 3–16)
BUN BLDV-MCNC: 20 MG/DL (ref 7–20)
CALCIUM SERPL-MCNC: 8.8 MG/DL (ref 8.3–10.6)
CHLORIDE BLD-SCNC: 93 MMOL/L (ref 99–110)
CO2: 26 MMOL/L (ref 21–32)
CREAT SERPL-MCNC: 0.6 MG/DL (ref 0.6–1.2)
CULTURE, RESPIRATORY: ABNORMAL
CULTURE, RESPIRATORY: ABNORMAL
GFR AFRICAN AMERICAN: >60
GFR NON-AFRICAN AMERICAN: >60
GLUCOSE BLD-MCNC: 123 MG/DL (ref 70–99)
GLUCOSE BLD-MCNC: 163 MG/DL (ref 70–99)
GLUCOSE BLD-MCNC: 168 MG/DL (ref 70–99)
GLUCOSE BLD-MCNC: 206 MG/DL (ref 70–99)
GLUCOSE BLD-MCNC: 285 MG/DL (ref 70–99)
GRAM STAIN RESULT: ABNORMAL
HCT VFR BLD CALC: 28.4 % (ref 36–48)
HEMOGLOBIN: 9.4 G/DL (ref 12–16)
INFLUENZA A BY PCR: NOT DETECTED
INFLUENZA B BY PCR: NOT DETECTED
MAGNESIUM: 2 MG/DL (ref 1.8–2.4)
MCH RBC QN AUTO: 28.4 PG (ref 26–34)
MCHC RBC AUTO-ENTMCNC: 33 G/DL (ref 31–36)
MCV RBC AUTO: 86.2 FL (ref 80–100)
ORGANISM: ABNORMAL
PDW BLD-RTO: 16.8 % (ref 12.4–15.4)
PERFORMED ON: ABNORMAL
PHOSPHORUS: 3.3 MG/DL (ref 2.5–4.9)
PLATELET # BLD: 212 K/UL (ref 135–450)
PMV BLD AUTO: 7.8 FL (ref 5–10.5)
POTASSIUM SERPL-SCNC: 3.9 MMOL/L (ref 3.5–5.1)
RBC # BLD: 3.29 M/UL (ref 4–5.2)
RSV BY PCR: NOT DETECTED
RSV SOURCE: NORMAL
SODIUM BLD-SCNC: 129 MMOL/L (ref 136–145)
WBC # BLD: 11.4 K/UL (ref 4–11)

## 2021-05-09 PROCEDURE — 6370000000 HC RX 637 (ALT 250 FOR IP): Performed by: INTERNAL MEDICINE

## 2021-05-09 PROCEDURE — 94761 N-INVAS EAR/PLS OXIMETRY MLT: CPT

## 2021-05-09 PROCEDURE — 83735 ASSAY OF MAGNESIUM: CPT

## 2021-05-09 PROCEDURE — 6360000002 HC RX W HCPCS: Performed by: INTERNAL MEDICINE

## 2021-05-09 PROCEDURE — 94669 MECHANICAL CHEST WALL OSCILL: CPT

## 2021-05-09 PROCEDURE — 94640 AIRWAY INHALATION TREATMENT: CPT

## 2021-05-09 PROCEDURE — 80069 RENAL FUNCTION PANEL: CPT

## 2021-05-09 PROCEDURE — 85027 COMPLETE CBC AUTOMATED: CPT

## 2021-05-09 PROCEDURE — 36415 COLL VENOUS BLD VENIPUNCTURE: CPT

## 2021-05-09 PROCEDURE — 2580000003 HC RX 258: Performed by: INTERNAL MEDICINE

## 2021-05-09 PROCEDURE — 6370000000 HC RX 637 (ALT 250 FOR IP): Performed by: ANESTHESIOLOGY

## 2021-05-09 PROCEDURE — 2700000000 HC OXYGEN THERAPY PER DAY

## 2021-05-09 PROCEDURE — 99232 SBSQ HOSP IP/OBS MODERATE 35: CPT | Performed by: INTERNAL MEDICINE

## 2021-05-09 PROCEDURE — 93005 ELECTROCARDIOGRAM TRACING: CPT | Performed by: INTERNAL MEDICINE

## 2021-05-09 PROCEDURE — 1200000000 HC SEMI PRIVATE

## 2021-05-09 RX ORDER — ACETYLCYSTEINE 200 MG/ML
600 SOLUTION ORAL; RESPIRATORY (INHALATION) 2 TIMES DAILY
Status: DISCONTINUED | OUTPATIENT
Start: 2021-05-09 | End: 2021-05-09

## 2021-05-09 RX ORDER — FUROSEMIDE 10 MG/ML
40 INJECTION INTRAMUSCULAR; INTRAVENOUS ONCE
Status: COMPLETED | OUTPATIENT
Start: 2021-05-09 | End: 2021-05-09

## 2021-05-09 RX ORDER — ACETYLCYSTEINE 200 MG/ML
600 SOLUTION ORAL; RESPIRATORY (INHALATION) 2 TIMES DAILY
Status: DISCONTINUED | OUTPATIENT
Start: 2021-05-09 | End: 2021-05-10

## 2021-05-09 RX ADMIN — SODIUM CHLORIDE, PRESERVATIVE FREE 10 ML: 5 INJECTION INTRAVENOUS at 20:16

## 2021-05-09 RX ADMIN — LEVOTHYROXINE SODIUM 100 MCG: 0.1 TABLET ORAL at 05:29

## 2021-05-09 RX ADMIN — ACETYLCYSTEINE 600 MG: 200 SOLUTION ORAL; RESPIRATORY (INHALATION) at 19:50

## 2021-05-09 RX ADMIN — FERROUS SULFATE TAB 325 MG (65 MG ELEMENTAL FE) 325 MG: 325 (65 FE) TAB at 09:26

## 2021-05-09 RX ADMIN — VENLAFAXINE 37.5 MG: 37.5 TABLET ORAL at 11:50

## 2021-05-09 RX ADMIN — ENOXAPARIN SODIUM 40 MG: 40 INJECTION SUBCUTANEOUS at 09:30

## 2021-05-09 RX ADMIN — INSULIN LISPRO 1 UNITS: 100 INJECTION, SOLUTION INTRAVENOUS; SUBCUTANEOUS at 09:19

## 2021-05-09 RX ADMIN — LOSARTAN POTASSIUM 100 MG: 100 TABLET, FILM COATED ORAL at 09:17

## 2021-05-09 RX ADMIN — ESTROGENS, CONJUGATED 1.25 MG: 0.62 TABLET, FILM COATED ORAL at 09:26

## 2021-05-09 RX ADMIN — URSODIOL 300 MG: 300 CAPSULE ORAL at 09:26

## 2021-05-09 RX ADMIN — VENLAFAXINE 37.5 MG: 37.5 TABLET ORAL at 16:44

## 2021-05-09 RX ADMIN — URSODIOL 300 MG: 300 CAPSULE ORAL at 20:15

## 2021-05-09 RX ADMIN — Medication 2000 UNITS: at 09:17

## 2021-05-09 RX ADMIN — DILTIAZEM HYDROCHLORIDE 240 MG: 240 CAPSULE, COATED, EXTENDED RELEASE ORAL at 09:17

## 2021-05-09 RX ADMIN — LEVALBUTEROL HYDROCHLORIDE 1.25 MG: 1.25 SOLUTION, CONCENTRATE RESPIRATORY (INHALATION) at 08:37

## 2021-05-09 RX ADMIN — ATORVASTATIN CALCIUM 20 MG: 10 TABLET, FILM COATED ORAL at 20:15

## 2021-05-09 RX ADMIN — CEFEPIME HYDROCHLORIDE 2000 MG: 2 INJECTION, POWDER, FOR SOLUTION INTRAVENOUS at 03:24

## 2021-05-09 RX ADMIN — ROPINIROLE HYDROCHLORIDE 2 MG: 2 TABLET, FILM COATED ORAL at 22:35

## 2021-05-09 RX ADMIN — Medication 500 MG: at 09:17

## 2021-05-09 RX ADMIN — PANTOPRAZOLE SODIUM 40 MG: 40 TABLET, DELAYED RELEASE ORAL at 09:26

## 2021-05-09 RX ADMIN — MONTELUKAST SODIUM 10 MG: 10 TABLET ORAL at 09:17

## 2021-05-09 RX ADMIN — ROPINIROLE HYDROCHLORIDE 1 MG: 0.5 TABLET, FILM COATED ORAL at 20:15

## 2021-05-09 RX ADMIN — FUROSEMIDE 40 MG: 10 INJECTION, SOLUTION INTRAMUSCULAR; INTRAVENOUS at 13:07

## 2021-05-09 RX ADMIN — INSULIN LISPRO 3 UNITS: 100 INJECTION, SOLUTION INTRAVENOUS; SUBCUTANEOUS at 13:11

## 2021-05-09 RX ADMIN — FERROUS SULFATE TAB 325 MG (65 MG ELEMENTAL FE) 325 MG: 325 (65 FE) TAB at 16:44

## 2021-05-09 RX ADMIN — FERROUS SULFATE TAB 325 MG (65 MG ELEMENTAL FE) 325 MG: 325 (65 FE) TAB at 11:49

## 2021-05-09 RX ADMIN — GUAIFENESIN 200 MG: 200 SOLUTION ORAL at 03:24

## 2021-05-09 RX ADMIN — ROPINIROLE HYDROCHLORIDE 1 MG: 0.5 TABLET, FILM COATED ORAL at 21:14

## 2021-05-09 RX ADMIN — GUAIFENESIN 200 MG: 200 SOLUTION ORAL at 16:44

## 2021-05-09 RX ADMIN — LEVALBUTEROL HYDROCHLORIDE 1.25 MG: 1.25 SOLUTION, CONCENTRATE RESPIRATORY (INHALATION) at 19:50

## 2021-05-09 RX ADMIN — SODIUM CHLORIDE, PRESERVATIVE FREE 10 ML: 5 INJECTION INTRAVENOUS at 09:17

## 2021-05-09 RX ADMIN — DOXEPIN HYDROCHLORIDE 50 MG: 25 CAPSULE ORAL at 20:15

## 2021-05-09 RX ADMIN — PANTOPRAZOLE SODIUM 40 MG: 40 TABLET, DELAYED RELEASE ORAL at 20:15

## 2021-05-09 RX ADMIN — TRAZODONE HYDROCHLORIDE 50 MG: 50 TABLET ORAL at 20:15

## 2021-05-09 RX ADMIN — CEFEPIME HYDROCHLORIDE 2000 MG: 2 INJECTION, POWDER, FOR SOLUTION INTRAVENOUS at 16:38

## 2021-05-09 RX ADMIN — ASPIRIN 81 MG: 81 TABLET, COATED ORAL at 09:16

## 2021-05-09 RX ADMIN — INSULIN LISPRO 2 UNITS: 100 INJECTION, SOLUTION INTRAVENOUS; SUBCUTANEOUS at 18:43

## 2021-05-09 RX ADMIN — GUAIFENESIN 200 MG: 200 SOLUTION ORAL at 21:13

## 2021-05-09 RX ADMIN — GABAPENTIN 600 MG: 300 CAPSULE ORAL at 20:15

## 2021-05-09 ASSESSMENT — ENCOUNTER SYMPTOMS: TACHYPNEA: 1

## 2021-05-09 ASSESSMENT — PAIN SCALES - GENERAL
PAINLEVEL_OUTOF10: 0

## 2021-05-09 NOTE — PROGRESS NOTES
Pt noted with episodes of HR in 150's-160's, de-sat without exertion. Pt assessed, denies any physical symptoms, VSS, 02 91% on 6L 02. MD notified, EKG ordered. Results called to cross cover MD. Magnesium level ordered. Pt remains asymptomatic. Call light within reach. Will monitor.

## 2021-05-09 NOTE — PROGRESS NOTES
oriented. PERRL. Speech fluent         Results:  CBC:   Recent Labs     05/07/21  0423 05/08/21  0541 05/09/21  0634   WBC 10.4 11.3* 11.4*   HGB 9.8* 9.9* 9.4*   HCT 29.5* 30.2* 28.4*   MCV 86.1 88.1 86.2    196 212     BMP:   Recent Labs     05/07/21  0423 05/08/21  0541 05/09/21  0634   * 129* 129*   K 3.8 3.5 3.9   CL 89* 90* 93*   CO2 24 27 26   PHOS 3.6 3.6 3.3   BUN 15 30* 20   CREATININE 0.6 0.8 0.6       Imaging:  I have reviewed radiology images personally. XR CHEST PORTABLE   Final Result   Mild improvement in CHF/pulmonary edema. Continued radiographic follow-up to   complete resolution recommended. XR CHEST PORTABLE   Final Result   Right hilar prominence which may be due to lymphadenopathy or increased   central pulmonary vessels. Increased interstitial markings reflecting interstitial edema/pulmonary   vascular congestion versus an atypical pneumonia. Left mid lung opacity likely reflecting airspace disease. Malignancy cannot   be excluded. Further evaluation with chest CT is recommended to better evaluate the above   pathologies. XR CHEST PORTABLE   Final Result   Consolidative opacities in the left mid to lower lung zone are noted. There   also prominence of the perihilar markings. Differential includes pulmonary   edema and multifocal pneumonia. Pulmonary neoplasm, particularly in the   periphery of the left lower lobe cannot be completely excluded. Follow-up to   resolution is recommended and consideration of a short term follow-up CT. Results for Nancy Riff (MRN [id number]) as of 5/9/2021 12:18   Ref.  Range 5/8/2021 05:41 5/8/2021 07:40 5/8/2021 11:58 5/8/2021 16:55 5/8/2021 20:34 5/9/2021 06:34   Sodium Latest Ref Range: 136 - 145 mmol/L 129 (L)     129 (L)   Potassium Latest Ref Range: 3.5 - 5.1 mmol/L 3.5     3.9   Chloride Latest Ref Range: 99 - 110 mmol/L 90 (L)     93 (L)   CO2 Latest Ref Range: 21 - 32 mmol/L 27     26 BUN Latest Ref Range: 7 - 20 mg/dL 30 (H)     20   Creatinine Latest Ref Range: 0.6 - 1.2 mg/dL 0.8     0.6   Anion Gap Latest Ref Range: 3 - 16  12     10   GFR Non- Latest Ref Range: >60  >60     >60   GFR  Latest Ref Range: >60  >60     >60   Magnesium Latest Ref Range: 1.80 - 2.40 mg/dL 2.20        Glucose Latest Ref Range: 70 - 99 mg/dL 170 (H)     168 (H)   POC Glucose Latest Ref Range: 70 - 99 mg/dl  147 (H) 164 (H) 180 (H) 221 (H)    Calcium Latest Ref Range: 8.3 - 10.6 mg/dL 9.0     8.8   Phosphorus Latest Ref Range: 2.5 - 4.9 mg/dL 3.6     3.3     Results for Laura Gilbert (MRN [id number]) as of 5/9/2021 12:18   Ref. Range 7/22/2018 06:51 12/1/2020 00:00 5/6/2021 10:27 5/7/2021 04:23 5/8/2021 05:41 5/9/2021 06:34   WBC Latest Ref Range: 4.0 - 11.0 K/uL 8.5 7.6 11.7 (H) 10.4 11.3 (H) 11.4 (H)   RBC Latest Ref Range: 4.00 - 5.20 M/uL 2.74 (L) 3.90 3.63 (L) 3.42 (L) 3.43 (L) 3.29 (L)   Hemoglobin Quant Latest Ref Range: 12.0 - 16.0 g/dL 7.9 (L) 11.3 (A) 10.5 (L) 9.8 (L) 9.9 (L) 9.4 (L)   Hematocrit Latest Ref Range: 36.0 - 48.0 % 23.3 (L) 34.1 (A) 31.6 (L) 29.5 (L) 30.2 (L) 28.4 (L)   MCV Latest Ref Range: 80.0 - 100.0 fL 85.1 87 87.0 86.1 88.1 86.2   MCH Latest Ref Range: 26.0 - 34.0 pg 28.9 29.0 28.9 28.7 28.8 28.4   MCHC Latest Ref Range: 31.0 - 36.0 g/dL 34.0 33.1 33.2 33.3 32.7 33.0   MPV Latest Ref Range: 5.0 - 10.5 fL 7.7 Pend 7.4 7.8 8.2 7.8   RDW Latest Ref Range: 12.4 - 15.4 % 16.1 (H)  16.6 (H) 16.4 (H) 16.6 (H) 16.8 (H)   Platelet Count Latest Ref Range: 135 - 450 K/uL 136 183 191 190 196 212   Neutrophils % Latest Units: %  78 87.2        Haemophilus influenzae    CULTURE, RESPIRATORY 05/07/2021  9:17 AM [facility]   50,000 to 100,000 CFU/mL   Beta Lactamase Negative   Sensitivities not routinely performed.  Drugs of choice are:   Ampicillin, Ceftriaxone or Trimethoprim/Sulfamethoxazole       Assessment:  Principal Problem:    PNA (pneumonia)  Active Problems:    Type 2 diabetes mellitus (HCC)    Hyperlipidemia    Obesity (BMI 30.0-34. 9)    HTN (hypertension)    GERD (gastroesophageal reflux disease)    Cirrhosis (HCC)    COPD (chronic obstructive pulmonary disease) (HCC)    Anemia    Esophageal varices without bleeding (HCC)    Hyponatremia    Acute and chronic respiratory failure with hypoxia (HCC)    Pneumonia of both lower lobes due to Haemophilus influenzae ([address]  Resolved Problems:    * No resolved hospital problems.  *          Plan:   · Oxygen supplementation to keep saturation between 90 to 94% only  · Please titrate oxygen as per the above parameters  · Patient is on cefepime and tolerating -consider changing to IV Rocephin if deemed appropriate   · Status post bronchoscopy and the results are suggestive of Hemophilus pneumonia   · Bronchodilators  · Patient was given 1 dose of IV Solu-Medrol in the ER  · Patient does not have any acute bronchospasm-we will hold off on any systemic steroids at this time  · Patient's previous echocardiograms were reviewed and patient has significant diastolic dysfunction  · Patient has significant hyponatremia which can be either secondary to hypervolemic hyponatremia or SIADH secondary patient being on SSRI-improving with diuresis   · Will give one dose of diuretic and reassess   · Strict input output charting  · Monitor renal function  · Correct electrolytes on  whenever necessary basis  · BGM with SSI started for hyperglycemia -may need long acting insulin   · Cardiac rhythm and hemodynamics to be monitored closely  · PUD and DVT prophylaxis as per IM  · PT ordered after discussion with patient     Case discussed with patient and nursing    Wants to go to SNF/[facility] for rehab         Electronically signed by:  Omero Moss MD    5/9/2021    12:19 PM.

## 2021-05-09 NOTE — PROGRESS NOTES
round, and reactive to light. Conjunctivae/corneas clear. Neck: Supple, with full range of motion. No jugular venous distention. Trachea midline. Respiratory:  Normal respiratory effort. Clear to auscultation, bilaterally without Rales/Wheezes/Rhonchi. Cardiovascular: Regular rate and rhythm with normal S1/S2 without murmurs, rubs or gallops. Abdomen: Soft, non-tender, non-distended with normal bowel sounds. Musculoskeletal: No clubbing, cyanosis or edema bilaterally. Full range of motion without deformity. Skin: Skin color, texture, turgor normal.  No rashes or lesions. Neurologic:  Neurovascularly intact without any focal sensory/motor deficits. Cranial nerves: II-XII intact, grossly non-focal.  Psychiatric: Alert and oriented, thought content appropriate, normal insight  Capillary Refill: Brisk,< 3 seconds   Peripheral Pulses: +2 palpable, equal bilaterally       Labs:   Recent Labs     05/07/21  0423 05/08/21  0541 05/09/21  0634   WBC 10.4 11.3* 11.4*   HGB 9.8* 9.9* 9.4*   HCT 29.5* 30.2* 28.4*    196 212     Recent Labs     05/07/21  0423 05/08/21  0541 05/09/21  0634   * 129* 129*   K 3.8 3.5 3.9   CL 89* 90* 93*   CO2 24 27 26   BUN 15 30* 20   CREATININE 0.6 0.8 0.6   CALCIUM 8.4 9.0 8.8   PHOS 3.6 3.6 3.3     Recent Labs     05/06/21  1027   AST 16   ALT 13   BILITOT 1.4*   ALKPHOS 82     No results for input(s): INR in the last 72 hours.   Recent Labs     05/06/21  1027   TROPONINI <0.01       Urinalysis:      Lab Results   Component Value Date    NITRU Negative 07/20/2018    WBCUA  07/20/2018    BACTERIA 2+ 07/20/2018    RBCUA 10-20 07/20/2018    BLOODU LARGE 07/20/2018    SPECGRAV 1.010 07/20/2018    GLUCOSEU Negative 07/20/2018    GLUCOSEU NEGATIVE 12/30/2009       Consults:    IP CONSULT TO HOSPITALIST  IP CONSULT TO PULMONOLOGY  IP CONSULT TO PHARMACY      Assessment/Plan:    Active Hospital Problems    Diagnosis    Hyponatremia [E87.1]    Acute and chronic respiratory failure with hypoxia ([facility]) [J96.21]    Esophageal varices without bleeding ([facility]) [I85.00]    Anemia [D64.9]    PNA (pneumonia) [J18.9]    GERD (gastroesophageal reflux disease) [K21.9]    Cirrhosis ([facility] [K74.60]    COPD (chronic obstructive pulmonary disease) (HCC) [J44.9]    HTN (hypertension) [I10]    Obesity (BMI 30.0-34. 9) [E66.9]    Hyperlipidemia [E78.5]    Type 2 diabetes mellitus ([facility] [E11.9]       PNA - likely CAP w/ Strep Pneumonia. Started on empiric Vanco/Cefepime in ED on 6 May - Cefepime continued as a single agent.      COPD - w/ chronic respiratory failure on baseline home O2. Controlled on home medication regimen - continued.      Acute on Chronic Respiratory Failure - w/ hypoxia, POArrival.  Presence of clinical respiratory distress w/ tachypnea/dyspnea/SOB and wheezing w/ use of accessory muscles to breath. Initially requiring BiPap at high risk for decompensation - improved. Supplemental O2 and wean as tolerated. Pulmonology consulted and appreciated - admitted to ICU but transferred to floor 7 May.     Sepsis - w/ Leukocytosis/Tachycardia POArrival 2nd to above infection. Continue IVF as appropriate and monitor clinical response w/ ABX as written.      HTN - w/out known CAD and no evidence of active signs/sxs of ischemia/failure. Currently controlled on home meds w/ vitals reviewed and documented as above.     HyperLipidemia - controlled on home Statin. Continue, w/ f/u and med adjustment w/ PCP     DM2 - controlled on home oral antiGlycemics - held. Follow FSBS/SSI low regimen. Last HbA1c 6.6% dated April 2021. Anticipate resuming/continuing home regimen at discharge.      Cirrhosis - w/ hx esophageal varices s/p prior banding but no evidence of acute decompensation.      Anemia - etiology clinically unable to determine, w/out evidence of active bleeding/hemolysis. Asymptomatic w/out indication for transfusion. Follow serial labs.   Reviewed and documented as above.     HypoNatremia - etiology clinically unable to determine but likely hypovolemic. Follow serial labs on IVF. Reviewed and documented as above.     HypoThyroid - clinically euthyroid on oral replacement therapy. Continue, w/ outpt monitoring as previously arranged.      Obesity -  With Body mass index is 33.89 kg/m². Complicating assessment and treatment. Placing patient at risk for multiple co-morbidities as well as early death and contributing to the patient's presentation. Counseled on weight loss.        DVT Prophylaxis: LMWH  Diet: DIET GENERAL;  Code Status: Limited      PT/OT Eval Status: seen w/ recs for possible SNF.      Dispo - Certainly here through the weekend pending clinical course and subspecialty recs.  Possibly to home Monday/Tues 10/11 May        Bandar Cantu MD

## 2021-05-10 LAB
ALBUMIN SERPL-MCNC: 2.8 G/DL (ref 3.4–5)
ANION GAP SERPL CALCULATED.3IONS-SCNC: 8 MMOL/L (ref 3–16)
BLOOD CULTURE, ROUTINE: NORMAL
BUN BLDV-MCNC: 18 MG/DL (ref 7–20)
CALCIUM SERPL-MCNC: 8.7 MG/DL (ref 8.3–10.6)
CHLORIDE BLD-SCNC: 89 MMOL/L (ref 99–110)
CO2: 31 MMOL/L (ref 21–32)
CREAT SERPL-MCNC: 0.8 MG/DL (ref 0.6–1.2)
CULTURE, BLOOD 2: NORMAL
EKG ATRIAL RATE: 99 BPM
EKG DIAGNOSIS: NORMAL
EKG P AXIS: 42 DEGREES
EKG P-R INTERVAL: 208 MS
EKG Q-T INTERVAL: 346 MS
EKG QRS DURATION: 94 MS
EKG QTC CALCULATION (BAZETT): 444 MS
EKG R AXIS: -47 DEGREES
EKG T AXIS: 80 DEGREES
EKG VENTRICULAR RATE: 99 BPM
GFR AFRICAN AMERICAN: >60
GFR NON-AFRICAN AMERICAN: >60
GLUCOSE BLD-MCNC: 135 MG/DL (ref 70–99)
GLUCOSE BLD-MCNC: 155 MG/DL (ref 70–99)
GLUCOSE BLD-MCNC: 160 MG/DL (ref 70–99)
GLUCOSE BLD-MCNC: 229 MG/DL (ref 70–99)
GLUCOSE BLD-MCNC: 284 MG/DL (ref 70–99)
HCT VFR BLD CALC: 29.5 % (ref 36–48)
HEMOGLOBIN: 9.7 G/DL (ref 12–16)
MCH RBC QN AUTO: 28.5 PG (ref 26–34)
MCHC RBC AUTO-ENTMCNC: 33 G/DL (ref 31–36)
MCV RBC AUTO: 86.4 FL (ref 80–100)
PDW BLD-RTO: 16.7 % (ref 12.4–15.4)
PERFORMED ON: ABNORMAL
PHOSPHORUS: 3.4 MG/DL (ref 2.5–4.9)
PLATELET # BLD: 241 K/UL (ref 135–450)
PMV BLD AUTO: 7.4 FL (ref 5–10.5)
POTASSIUM SERPL-SCNC: 3.7 MMOL/L (ref 3.5–5.1)
RBC # BLD: 3.41 M/UL (ref 4–5.2)
SODIUM BLD-SCNC: 128 MMOL/L (ref 136–145)
WBC # BLD: 11.7 K/UL (ref 4–11)

## 2021-05-10 PROCEDURE — 97110 THERAPEUTIC EXERCISES: CPT

## 2021-05-10 PROCEDURE — 97530 THERAPEUTIC ACTIVITIES: CPT

## 2021-05-10 PROCEDURE — 6370000000 HC RX 637 (ALT 250 FOR IP): Performed by: ANESTHESIOLOGY

## 2021-05-10 PROCEDURE — 94150 VITAL CAPACITY TEST: CPT

## 2021-05-10 PROCEDURE — 36415 COLL VENOUS BLD VENIPUNCTURE: CPT

## 2021-05-10 PROCEDURE — 93010 ELECTROCARDIOGRAM REPORT: CPT | Performed by: INTERNAL MEDICINE

## 2021-05-10 PROCEDURE — 94761 N-INVAS EAR/PLS OXIMETRY MLT: CPT

## 2021-05-10 PROCEDURE — 85027 COMPLETE CBC AUTOMATED: CPT

## 2021-05-10 PROCEDURE — 1200000000 HC SEMI PRIVATE

## 2021-05-10 PROCEDURE — 6370000000 HC RX 637 (ALT 250 FOR IP): Performed by: INTERNAL MEDICINE

## 2021-05-10 PROCEDURE — 97166 OT EVAL MOD COMPLEX 45 MIN: CPT

## 2021-05-10 PROCEDURE — 99233 SBSQ HOSP IP/OBS HIGH 50: CPT | Performed by: INTERNAL MEDICINE

## 2021-05-10 PROCEDURE — 94669 MECHANICAL CHEST WALL OSCILL: CPT

## 2021-05-10 PROCEDURE — 80069 RENAL FUNCTION PANEL: CPT

## 2021-05-10 PROCEDURE — 2580000003 HC RX 258: Performed by: INTERNAL MEDICINE

## 2021-05-10 PROCEDURE — 2700000000 HC OXYGEN THERAPY PER DAY

## 2021-05-10 PROCEDURE — 6360000002 HC RX W HCPCS: Performed by: INTERNAL MEDICINE

## 2021-05-10 PROCEDURE — 94640 AIRWAY INHALATION TREATMENT: CPT

## 2021-05-10 RX ORDER — LEVALBUTEROL 1.25 MG/.5ML
1.25 SOLUTION, CONCENTRATE RESPIRATORY (INHALATION) 3 TIMES DAILY
Status: DISCONTINUED | OUTPATIENT
Start: 2021-05-10 | End: 2021-05-12 | Stop reason: HOSPADM

## 2021-05-10 RX ADMIN — GABAPENTIN 600 MG: 300 CAPSULE ORAL at 20:50

## 2021-05-10 RX ADMIN — GUAIFENESIN 200 MG: 200 SOLUTION ORAL at 03:12

## 2021-05-10 RX ADMIN — ENOXAPARIN SODIUM 40 MG: 40 INJECTION SUBCUTANEOUS at 08:21

## 2021-05-10 RX ADMIN — MONTELUKAST SODIUM 10 MG: 10 TABLET ORAL at 08:21

## 2021-05-10 RX ADMIN — NYSTATIN 5 ML: 100000 SUSPENSION ORAL at 13:48

## 2021-05-10 RX ADMIN — ROPINIROLE HYDROCHLORIDE 2 MG: 2 TABLET, FILM COATED ORAL at 23:30

## 2021-05-10 RX ADMIN — CEFEPIME HYDROCHLORIDE 2000 MG: 2 INJECTION, POWDER, FOR SOLUTION INTRAVENOUS at 03:12

## 2021-05-10 RX ADMIN — ACETYLCYSTEINE 600 MG: 200 SOLUTION ORAL; RESPIRATORY (INHALATION) at 08:10

## 2021-05-10 RX ADMIN — LEVALBUTEROL HYDROCHLORIDE 1.25 MG: 1.25 SOLUTION, CONCENTRATE RESPIRATORY (INHALATION) at 08:08

## 2021-05-10 RX ADMIN — PANTOPRAZOLE SODIUM 40 MG: 40 TABLET, DELAYED RELEASE ORAL at 08:21

## 2021-05-10 RX ADMIN — URSODIOL 300 MG: 300 CAPSULE ORAL at 08:21

## 2021-05-10 RX ADMIN — INSULIN LISPRO 1 UNITS: 100 INJECTION, SOLUTION INTRAVENOUS; SUBCUTANEOUS at 08:22

## 2021-05-10 RX ADMIN — DILTIAZEM HYDROCHLORIDE 240 MG: 240 CAPSULE, COATED, EXTENDED RELEASE ORAL at 08:21

## 2021-05-10 RX ADMIN — SODIUM CHLORIDE, PRESERVATIVE FREE 10 ML: 5 INJECTION INTRAVENOUS at 20:57

## 2021-05-10 RX ADMIN — INSULIN LISPRO 1 UNITS: 100 INJECTION, SOLUTION INTRAVENOUS; SUBCUTANEOUS at 20:55

## 2021-05-10 RX ADMIN — GUAIFENESIN 200 MG: 200 SOLUTION ORAL at 08:26

## 2021-05-10 RX ADMIN — SODIUM CHLORIDE, PRESERVATIVE FREE 10 ML: 5 INJECTION INTRAVENOUS at 08:22

## 2021-05-10 RX ADMIN — VENLAFAXINE 37.5 MG: 37.5 TABLET ORAL at 16:27

## 2021-05-10 RX ADMIN — TRAZODONE HYDROCHLORIDE 50 MG: 50 TABLET ORAL at 20:50

## 2021-05-10 RX ADMIN — FERROUS SULFATE TAB 325 MG (65 MG ELEMENTAL FE) 325 MG: 325 (65 FE) TAB at 16:27

## 2021-05-10 RX ADMIN — LEVALBUTEROL HYDROCHLORIDE 1.25 MG: 1.25 SOLUTION, CONCENTRATE RESPIRATORY (INHALATION) at 19:39

## 2021-05-10 RX ADMIN — NYSTATIN 5 ML: 100000 SUSPENSION ORAL at 08:22

## 2021-05-10 RX ADMIN — ATORVASTATIN CALCIUM 20 MG: 10 TABLET, FILM COATED ORAL at 20:50

## 2021-05-10 RX ADMIN — CEFEPIME HYDROCHLORIDE 2000 MG: 2 INJECTION, POWDER, FOR SOLUTION INTRAVENOUS at 14:54

## 2021-05-10 RX ADMIN — NYSTATIN 5 ML: 100000 SUSPENSION ORAL at 20:53

## 2021-05-10 RX ADMIN — DOXEPIN HYDROCHLORIDE 50 MG: 25 CAPSULE ORAL at 20:49

## 2021-05-10 RX ADMIN — FERROUS SULFATE TAB 325 MG (65 MG ELEMENTAL FE) 325 MG: 325 (65 FE) TAB at 08:21

## 2021-05-10 RX ADMIN — ROPINIROLE HYDROCHLORIDE 1 MG: 0.5 TABLET, FILM COATED ORAL at 20:51

## 2021-05-10 RX ADMIN — ASPIRIN 81 MG: 81 TABLET, COATED ORAL at 08:21

## 2021-05-10 RX ADMIN — GUAIFENESIN 200 MG: 200 SOLUTION ORAL at 23:30

## 2021-05-10 RX ADMIN — PANTOPRAZOLE SODIUM 40 MG: 40 TABLET, DELAYED RELEASE ORAL at 20:50

## 2021-05-10 RX ADMIN — FERROUS SULFATE TAB 325 MG (65 MG ELEMENTAL FE) 325 MG: 325 (65 FE) TAB at 12:25

## 2021-05-10 RX ADMIN — Medication 2000 UNITS: at 08:21

## 2021-05-10 RX ADMIN — VENLAFAXINE 37.5 MG: 37.5 TABLET ORAL at 08:21

## 2021-05-10 RX ADMIN — INSULIN LISPRO 3 UNITS: 100 INJECTION, SOLUTION INTRAVENOUS; SUBCUTANEOUS at 12:25

## 2021-05-10 RX ADMIN — LOSARTAN POTASSIUM 100 MG: 100 TABLET, FILM COATED ORAL at 08:21

## 2021-05-10 RX ADMIN — LEVOTHYROXINE SODIUM 100 MCG: 0.1 TABLET ORAL at 05:56

## 2021-05-10 RX ADMIN — Medication 500 MG: at 08:21

## 2021-05-10 RX ADMIN — URSODIOL 300 MG: 300 CAPSULE ORAL at 20:53

## 2021-05-10 RX ADMIN — ROPINIROLE HYDROCHLORIDE 1 MG: 0.5 TABLET, FILM COATED ORAL at 20:49

## 2021-05-10 ASSESSMENT — PAIN SCALES - GENERAL
PAINLEVEL_OUTOF10: 0

## 2021-05-10 NOTE — DISCHARGE INSTR - COC
Continuity of Care Form    Patient Name: Bishop Gaona   :  1945  MRN:  [id number]    Admit date:  2021  Discharge date:  12 May 2021    Code Status Order: Limited   Advance Directives:   [facility] Documentation       Date/Time Healthcare Directive Type of Healthcare Directive Copy in [address] Agent's Name Healthcare Agent's Phone Number    21 1446  Yes, patient has an advance directive for healthcare treatment --  No, copy requested from family -- -- --            Admitting Physician:  Vangie Coleman MD  PCP: Adore Mills DO    Discharging Nurse: Angie Parker 23 Unit/Room#: 9853/8975-09  Discharging Unit Phone Number: [phone]    Emergency Contact:   Extended Emergency Contact Information  Primary Emergency Contact: Parisa Gardner, [address] Phone: [phone]  Relation: Child    Past Surgical History:  Past Surgical History:   Procedure Laterality Date    BONE MARROW BIOPSY      BREAST BIOPSY      BRONCHOSCOPY N/A 2021    BRONCHOSCOPY DIAGNOSTIC OR CELL [address] ONLY performed by Nando Alston MD at [address] [place] [address]  2021    [address] performed by Nando Alston MD at [address] [place] [address]  2021    BRONCHOSCOPY THERAPUTIC ASPIRATION INITIAL performed by Nando Alston MD at [place] [address]  14    CATARACT REMOVAL      COLONOSCOPY      X 3 per PT 16    FINE NEEDLE ASPIRATION      FOOT SURGERY Right 2013    CORAL BUNIONECTOMY RIGHT FOOT WITH SCREW FIXATION;    HERNIA REPAIR      HYSTERECTOMY      JOINT REPLACEMENT Bilateral     TKR    KNEE SURGERY  09    left    KNEE SURGERY  2010    right    UPPER GASTROINTESTINAL ENDOSCOPY N/A 2018    EGD BIOPSY performed by Salma Stewart MD at [address] [place] [address]       Immunization History:   Immunization History   Administered Date(s) Administered   [facility] COVID-19, J&J, PF, 0.5 mL 03/08/2021    Pneumococcal Conjugate 13-valent ([id number]) 06/25/2013    Pneumococcal Polysaccharide ([id number]) 01/01/2005       Active Problems:  Patient Active Problem List   Diagnosis Code    TIA (transient ischemic attack) G45.9    Type 2 diabetes mellitus ([facility] E11.9    Asthma J45.909    Hyperlipidemia E78.5    S/P knee replacement Z96.659    Obesity (BMI 30.0-34. 9) E66.9    Heart murmur R01.1    HTN (hypertension) I10    SVT (supraventricular tachycardia) ([facility]) I47.1    GERD (gastroesophageal reflux disease) K21.9    Cirrhosis ([facility]) K74.60    COPD (chronic obstructive pulmonary disease) ([facility]) J44.9    Diabetic polyneuropathy associated with diabetes mellitus due to underlying condition ([facility]) E08.42    PNA (pneumonia) J18.9    Anemia D64.9    Leukocytosis D72.829    Patellofemoral pain syndrome of left knee M22.2X2    Quadriceps tendinitis M76.899    Upper GI bleed K92.2    Pernicious anemia D51.0    Neuropathy G62.9    Primary osteoarthritis involving multiple joints M89.49    Vitamin D deficiency E55.9    Paresthesias R20.2    Esophageal varices without bleeding ([facility]) I85.00    Pulmonary HTN ([facility]) I27.20    Restless leg syndrome G25.81    SOB (shortness of breath) R06.02    Hyponatremia E87.1    Acute and chronic respiratory failure with hypoxia ([facility]) J96.21    Pulmonary infiltrates R91.8    Grade II diastolic dysfunction K68.1    Elevated procalcitonin R79.89    YONATAN positive R76.8    Pneumonia of both lower lobes due to Haemophilus influenzae ([facility] J14       Isolation/Infection:   Isolation            No Isolation          Patient Infection Status       Infection Onset Added Last Indicated Last Indicated By Review Planned Expiration Resolved Resolved By    None active    Resolved    Haemophilus influenza 05/07/21 05/08/21 05/07/21 Culture, Respiratory   05/10/21 Nino Jamison RN    COVID-19 Rule Out 05/06/21 05/06/21 05/06/21 SARS-CoV-2 NAAT patient):  Glasses, Dentures partials    RN SIGNATURE:  Electronically signed by Dom Ramírez RN on 5/12/21 at 12:19 PM EDT    CASE MANAGEMENT/SOCIAL WORK SECTION    Inpatient Status Date: 5/6/21    Readmission Risk Assessment Score:  Readmission Risk              Risk of Unplanned Readmission:        29           Discharging to Facility/ Agency   · Name: [facility]  · Address:  · Phone: [phone]  · Fax: [phone]    / signature: Electronically signed by George Sawyer RN on 5/12/21 at 12:02 PM EDT    PHYSICIAN SECTION    Prognosis: Good    Condition at Discharge: Stable    Rehab Potential (if transferring to Rehab): Good    Recommended Labs or Other Treatments After Discharge: None    Physician Certification: I certify the above information and transfer of Starla Chavez  is necessary for the continuing treatment of the diagnosis listed and that she requires [place] for less 30 days.      Update Admission H&P: No change in H&P    PHYSICIAN SIGNATURE:  Electronically signed by Ibis Ashraf MD on 5/12/21 at 11:53 AM EDT

## 2021-05-10 NOTE — CARE COORDINATION
Per provider and nurse at [address], pt wishes to discharge to [facility]. Referral efaxed and VM left for Ally in admissions.

## 2021-05-10 NOTE — PROGRESS NOTES
Hospitalist Progress Note      PCP: Kim Corona,     Date of Admission: 5/6/2021    Chief Complaint: SOB    Subjective: feeling better       Medications:  Reviewed    Infusion Medications    dextrose      sodium chloride       Scheduled Medications    acetylcysteine  600 mg Inhalation BID    insulin lispro  0-6 Units Subcutaneous TID WC    insulin lispro  0-3 Units Subcutaneous Nightly    Lip Balm   Topical Daily    levalbuterol  1.25 mg Nebulization BID    rOPINIRole  1 mg Oral Nightly    rOPINIRole  2 mg Oral Nightly    aspirin  81 mg Oral Daily    atorvastatin  20 mg Oral Nightly    calcium elemental  500 mg Oral Daily    Vitamin D  2,000 Units Oral Daily    dilTIAZem  240 mg Oral Daily    doxepin  50 mg Oral Nightly    estrogens (conjugated)  1.25 mg Oral Daily    ferrous sulfate  325 mg Oral TID WC    gabapentin  600 mg Oral Nightly    levothyroxine  100 mcg Oral Daily    losartan  100 mg Oral Daily    montelukast  10 mg Oral Daily    pantoprazole  40 mg Oral BID    rOPINIRole  1 mg Oral Nightly    traZODone  50 mg Oral Nightly    venlafaxine  37.5 mg Oral BID WC    sodium chloride flush  5-40 mL Intravenous 2 times per day    enoxaparin  40 mg Subcutaneous Daily    cefepime  2,000 mg Intravenous Q12H    mupirocin   Nasal BID    ursodiol  300 mg Oral BID     PRN Meds: magic (miracle) mouthwash with nystatin, guaiFENesin, glucose, dextrose, glucagon (rDNA), dextrose, sodium chloride flush, sodium chloride, promethazine **OR** ondansetron, polyethylene glycol, acetaminophen **OR** acetaminophen, diphenhydrAMINE      Intake/Output Summary (Last 24 hours) at 5/10/2021 0819  Last data filed at 5/10/2021 0327  Gross per 24 hour   Intake 480 ml   Output 2400 ml   Net -1920 ml       Physical Exam Performed:    BP (!) 148/57   Pulse 81   Temp 98.5 °F (36.9 °C) (Oral)   Resp 18   Ht 5' 6\" (1.676 m)   Wt 210 lb (95.3 kg)   SpO2 91%   BMI 33.89 kg/m²     General appearance: No apparent distress, appears stated age and cooperative. HEENT: Pupils equal, round, and reactive to light. Conjunctivae/corneas clear. Neck: Supple, with full range of motion. No jugular venous distention. Trachea midline. Respiratory:  Normal respiratory effort. Clear to auscultation, bilaterally without Rales/Wheezes/Rhonchi. Cardiovascular: Regular rate and rhythm with normal S1/S2 without murmurs, rubs or gallops. Abdomen: Soft, non-tender, non-distended with normal bowel sounds. Musculoskeletal: No clubbing, cyanosis or edema bilaterally. Full range of motion without deformity. Skin: Skin color, texture, turgor normal.  No rashes or lesions. Neurologic:  Neurovascularly intact without any focal sensory/motor deficits. Cranial nerves: II-XII intact, grossly non-focal.  Psychiatric: Alert and oriented, thought content appropriate, normal insight  Capillary Refill: Brisk,< 3 seconds   Peripheral Pulses: +2 palpable, equal bilaterally       Labs:   Recent Labs     05/08/21  0541 05/09/21  0634 05/10/21  0748   WBC 11.3* 11.4* 11.7*   HGB 9.9* 9.4* 9.7*   HCT 30.2* 28.4* 29.5*    212 241     Recent Labs     05/08/21  0541 05/09/21  0634   * 129*   K 3.5 3.9   CL 90* 93*   CO2 27 26   BUN 30* 20   CREATININE 0.8 0.6   CALCIUM 9.0 8.8   PHOS 3.6 3.3     No results for input(s): AST, ALT, BILIDIR, BILITOT, ALKPHOS in the last 72 hours. No results for input(s): INR in the last 72 hours. No results for input(s): Raoul Ang in the last 72 hours.     Urinalysis:      Lab Results   Component Value Date    NITRU Negative 07/20/2018    WBCUA  07/20/2018    BACTERIA 2+ 07/20/2018    RBCUA 10-20 07/20/2018    BLOODU LARGE 07/20/2018    SPECGRAV 1.010 07/20/2018    GLUCOSEU Negative 07/20/2018    GLUCOSEU NEGATIVE 12/30/2009       Consults:    IP CONSULT TO HOSPITALIST  IP CONSULT TO PULMONOLOGY  IP CONSULT TO PHARMACY      Assessment/Plan:    Active Hospital Problems    Diagnosis    Hyponatremia [E87.1]    Acute and chronic respiratory failure with hypoxia (HCC) [J96.21]    Esophageal varices without bleeding (HCC) [I85.00]    Anemia [D64.9]    PNA (pneumonia) [J18.9]    GERD (gastroesophageal reflux disease) [K21.9]    Cirrhosis ([facility] [K74.60]    COPD (chronic obstructive pulmonary disease) (HCC) [J44.9]    HTN (hypertension) [I10]    Obesity (BMI 30.0-34. 9) [E66.9]    Hyperlipidemia [E78.5]    Type 2 diabetes mellitus ([facility] [E11.9]       PNA - likely CAP w/ Strep Pneumonia. Started on empiric Vanco/Cefepime in ED on 6 May - Cefepime continued as a single agent.      COPD - w/ chronic respiratory failure on baseline home O2. Controlled on home medication regimen - continued.      Acute on Chronic Respiratory Failure - w/ hypoxia, POArrival.  Presence of clinical respiratory distress w/ tachypnea/dyspnea/SOB and wheezing w/ use of accessory muscles to breath. Initially requiring BiPap at high risk for decompensation - improved. Supplemental O2 and wean as tolerated. Pulmonology consulted and appreciated - admitted to ICU but transferred to floor 7 May.     Sepsis - w/ Leukocytosis/Tachycardia POArrival 2nd to above infection. Continue IVF as appropriate and monitor clinical response w/ ABX as written.      HTN - w/out known CAD and no evidence of active signs/sxs of ischemia/failure. Currently controlled on home meds w/ vitals reviewed and documented as above.     HyperLipidemia - controlled on home Statin. Continue, w/ f/u and med adjustment w/ PCP     DM2 - controlled on home oral antiGlycemics - held. Follow FSBS/SSI low regimen. Last HbA1c 6.6% dated April 2021. Anticipate resuming/continuing home regimen at discharge.      Cirrhosis - w/ hx esophageal varices s/p prior banding but no evidence of acute decompensation.      Anemia - etiology clinically unable to determine, w/out evidence of active bleeding/hemolysis. Asymptomatic w/out indication for transfusion.  Follow serial labs. Reviewed and documented as above.     HypoNatremia - etiology clinically unable to determine but likely hypovolemic. Follow serial labs on IVF. Reviewed and documented as above.     HypoThyroid - clinically euthyroid on oral replacement therapy. Continue, w/ outpt monitoring as previously arranged.      Obesity -  With Body mass index is 33.89 kg/m². Complicating assessment and treatment. Placing patient at risk for multiple co-morbidities as well as early death and contributing to the patient's presentation.  Counseled on weight loss.        DVT Prophylaxis: LMWH  Diet: DIET GENERAL;  Code Status: Limited      PT/OT Eval Status: seen w/ recs for possible SNF.      Dispo - Possibly to SNF Monday/Tues 10/11 May pending clinical course and placement decision.        Veronica Fountain MD

## 2021-05-10 NOTE — PROGRESS NOTES
Physical Therapy  Facility/Department: [facility] B3 - MED SURG  Daily Treatment Note  NAME: Yohannes Villasenor  : 1945  MRN: [id number]    Date of Service: 5/10/2021    Discharge Recommendations:  Subacute/Skilled Nursing Facility   PT Equipment Recommendations  Equipment Needed: No  Other: Defer to next level of care; pt own SW    Assessment    Body structures, Functions, Activity limitations: Decreased functional mobility ; Decreased safe awareness;Decreased endurance;Decreased balance;Decreased posture  Assessment: Patient seen for transfers and LE strengthening. Patient cleared by RN for therapy participation this date. Patient agreeable to therapy. Patient completed sit<>stand from bed and transferred to recliner with RW and CGA. Pt's SPO2 decreased to 87% following transfer and improved to >90% within a few mins with cues for PLB. Pt tolerates seated exercises well for strengthening. Did not attempt further mobility d/t desaturation with minimal activity. P.T .will continue to follow throughout LOS. Recommending DC to SNF d/t decreased endurance. Treatment Diagnosis: Decreased functional mobility  Prognosis: Good  Decision Making: Medium Complexity  PT Education: Goals;PT Role;Plan of Care; Functional Mobility Training;Gait Training;Energy Conservation;Transfer Training;Precautions; Home Exercise Program;Orientation;General Safety; Disease Specific Education  Patient Education: pt educated on importance of monitoring vitals and HEP - requires reinforcement  REQUIRES PT FOLLOW UP: Yes  Activity Tolerance  Activity Tolerance: Patient limited by fatigue;Treatment limited secondary to medical complications (free text); Patient limited by endurance  Activity Tolerance: 141/56, 93% on 6 L, 84 bpm. SPO2 decreased to 87% following transfer to chair and improved to 90% with 2 mins seated rest with cues for PLB on 6 L O2. SPO2 >90% while completing seated exercises.      Patient Diagnosis(es): The primary encounter diagnosis was Acute respiratory failure with hypoxia ([address] Diagnoses of Pneumonia due to organism, COPD exacerbation ([address] and Hyponatremia were also pertinent to this visit. has a past medical history of Allergic, Anemia, Anesthesia complication, Arthritis, Asthma, Cirrhosis ([address] COPD (chronic obstructive pulmonary disease) ([address] GERD (gastroesophageal reflux disease), GIB (gastrointestinal bleeding), Haemophilus infection, Heart murmur, Hernia, Hyperlipidemia, Hypertension, Lung collapse, Murmur, heart, Pneumonia, Reflux, Rheumatic fever, Stress fracture, SVT (supraventricular tachycardia) ([address] TIA (transient ischemic attack), Type II or unspecified type diabetes mellitus without mention of complication, not stated as uncontrolled, 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 has a past surgical history that includes hernia repair; Hysterectomy; joint replacement (Bilateral); Foot surgery (Right, 06/03/2013); knee surgery (09); knee surgery (2010); Cardiac catheterization (07/14/14); Cataract removal; bone marrow biopsy; Breast biopsy; fine needle aspiration; Colonoscopy; Upper gastrointestinal endoscopy (N/A, 7/21/2018); bronchoscopy (N/A, 5/7/2021); bronchoscopy (5/7/2021); and bronchoscopy (5/7/2021). Restrictions  Restrictions/Precautions  Restrictions/Precautions: General Precautions, Up as Tolerated  Position Activity Restriction  Other position/activity restrictions: 6 L O2; typically on 3 L O2 at home. Subjective   General  Chart Reviewed: Yes  Response To Previous Treatment: Patient with no complaints from previous session.   Family / Caregiver Present: No  Referring Practitioner: Lance Hayes MD  Subjective  Subjective: pt in bed, agreeable to therapy  Pain Screening  Patient Currently in Pain: Denies  Vital Signs  Patient Currently in Pain: Denies       Orientation  Orientation  Overall Orientation Status: Within Normal Limits Objective   Bed mobility  Supine to Sit: Supervision(HOB elevated)  Sit to Supine: Unable to assess(up in recliner at end of session)  Transfers  Sit to Stand: Contact guard assistance(with SW)  Stand to sit: Contact guard assistance(with SW)  Bed to Chair: Contact guard assistance(with SW)  Ambulation  Ambulation?: No  More Ambulation?: No        Exercises  Hip Flexion: 1x 10 BLE seated marches  Knee Long Arc Quad: 1x 10 BLE  Ankle Pumps: 1x 10 BLE  Other exercises  Other exercises?: Yes  Other exercises 1: 1x 10 ishan adduction holds against towel roll                     AM-PAC Score  AM-PAC Inpatient Mobility Raw Score : 14 (05/10/21 1117)  AM-PAC Inpatient T-Scale Score : 38.1 (05/10/21 1117)  Mobility Inpatient CMS 0-100% Score: 61.29 (05/10/21 1117)  Mobility Inpatient CMS G-Code Modifier : CL (05/10/21 1117)          Goals  Short term goals  Time Frame for Short term goals: 5-7 days, unless otherwise stated, by 5/15/21  Short term goal 1: Pt will complete bed mobility with independence. Short term goal 2: Pt will complete transfers with LRAD and supervision with Spo2 >90%. Short term goal 3: Pt will complete ambulation >50 feet with LRAD and CGA with Spo2 >90%. Short term goal 4: Pt will complete 12-15 reps of BLE ther ex for strength and balance by 5/11/21. Patient Goals   Patient goals : To go home and walk independently. Plan    Plan  Times per week: 3-5x  Times per day: Daily  Plan weeks: 1 week, by 5/15/21  Current Treatment Recommendations: Strengthening, Balance Training, Functional Mobility Training, Transfer Training, Gait Training, Stair training, Endurance Training, Home Exercise Program, Safety Education & Training, Patient/Caregiver Education & Training, Equipment Evaluation, Education, & procurement  Safety Devices  Type of devices:  All fall risk precautions in place, Call light within reach, Chair alarm in place, Left in chair, Patient at risk for falls, Gait belt, Nurse notified  Restraints  Initially in place: No     Therapy Time   Individual Concurrent Group Co-treatment   Time In 1026         Time Out 1050         Minutes 24         Timed Code Treatment Minutes: 24 Minutes     If pt is unable to be seen after this session, please let this note serve as discharge summary. Please see case management note for discharge disposition. Thank you.     Chace Rhodes, PT

## 2021-05-10 NOTE — FLOWSHEET NOTE
05/10/21 1632   Oxygen Therapy   SpO2 96 %   O2 Device Nasal cannula   O2 Flow Rate (L/min) 5 L/min   will continue to wean as appropriate

## 2021-05-10 NOTE — CARE COORDINATION
Per call from Adolfo Rios at [facility] [place] she is initiating precert at this time. Delay d/t late OT evaluation today (not documented until 1451).

## 2021-05-10 NOTE — FLOWSHEET NOTE
follow-up to offer spiritual/emotional support. Patient concerned about her test results today. She shared her struggle with loss and illness. Offered an opportunity for her to share thoughts and feelings. Had prayer. 05/10/21 6251   Encounter Summary   Services provided to: Patient   Referral/Consult From: Patient   Support System Children   Continue Visiting   (5-10, follow-up prayer support.)   Complexity of Encounter Low   Length of Encounter 45 minutes   Spiritual/Islam   Type Spiritual support   Assessment Calm; Approachable   Intervention Active listening;Explored feelings, thoughts, concerns;Sustaining presence/ Ministry of presence   Outcome Engaged in conversation;Expressed feelings/needs/concerns;Receptive   Continue spiritual support PRN.

## 2021-05-10 NOTE — PROGRESS NOTES
Pulmonary & Critical Care Inpatient Progress Note   Yandy Gonzales MD     REASON FOR TODAY'S VISIT:  Acute resp failure    SUBJECTIVE:   Remains on 6 LPM of oxygen, baseline is 3LPM  She still feels congested and short of breath  Had a very weak dry cough when seen  Using acapella and IS device liberally    Scheduled Meds:   levalbuterol  1.25 mg Nebulization TID    insulin lispro  0-6 Units Subcutaneous TID WC    insulin lispro  0-3 Units Subcutaneous Nightly    Lip Balm   Topical Daily    rOPINIRole  1 mg Oral Nightly    rOPINIRole  2 mg Oral Nightly    aspirin  81 mg Oral Daily    atorvastatin  20 mg Oral Nightly    calcium elemental  500 mg Oral Daily    Vitamin D  2,000 Units Oral Daily    dilTIAZem  240 mg Oral Daily    doxepin  50 mg Oral Nightly    estrogens (conjugated)  1.25 mg Oral Daily    ferrous sulfate  325 mg Oral TID WC    gabapentin  600 mg Oral Nightly    levothyroxine  100 mcg Oral Daily    losartan  100 mg Oral Daily    montelukast  10 mg Oral Daily    pantoprazole  40 mg Oral BID    rOPINIRole  1 mg Oral Nightly    traZODone  50 mg Oral Nightly    venlafaxine  37.5 mg Oral BID WC    sodium chloride flush  5-40 mL Intravenous 2 times per day    enoxaparin  40 mg Subcutaneous Daily    cefepime  2,000 mg Intravenous Q12H    mupirocin   Nasal BID    ursodiol  300 mg Oral BID       Continuous Infusions:   dextrose      sodium chloride         PRN Meds:  magic (miracle) mouthwash with nystatin, guaiFENesin, glucose, dextrose, glucagon (rDNA), dextrose, sodium chloride flush, sodium chloride, promethazine **OR** ondansetron, polyethylene glycol, acetaminophen **OR** acetaminophen, diphenhydrAMINE    ALLERGIES:  Patient is allergic to latex; clindamycin; pennsaid [diclofenac sodium]; torsemide; actos [pioglitazone]; amoxicillin; augmentin [amoxicillin-pot clavulanate]; bactrim [sulfamethoxazole-trimethoprim]; ciprofloxacin; doxycycline; levaquin [levofloxacin]; ativan [lorazepam]; cephalosporins; pcn [penicillins]; and proventil [albuterol]. Objective:   PHYSICAL EXAM:  BP (!) 132/59   Pulse 82   Temp 98.8 °F (37.1 °C) (Oral)   Resp 16   Ht 5' 6\" (1.676 m)   Wt 210 lb (95.3 kg)   SpO2 93%   BMI 33.89 kg/m²    Physical Exam  Constitutional:       General: She is not in acute distress. Appearance: She is well-developed. She is not diaphoretic. HENT:      Head: Normocephalic and atraumatic. Mouth/Throat:      Pharynx: No oropharyngeal exudate. Eyes:      Pupils: Pupils are equal, round, and reactive to light. Neck:      Musculoskeletal: Neck supple. Vascular: No JVD. Cardiovascular:      Heart sounds: Normal heart sounds. No murmur. No friction rub. No gallop. Pulmonary:      Effort: Pulmonary effort is normal.      Breath sounds: No wheezing or rales. Abdominal:      General: Bowel sounds are normal. There is no distension. Palpations: Abdomen is soft. Tenderness: There is no abdominal tenderness. Musculoskeletal: Normal range of motion. Lymphadenopathy:      Cervical: No cervical adenopathy. Skin:     General: Skin is warm and dry. Findings: No rash. Neurological:      Mental Status: She is alert. Cranial Nerves: No cranial nerve deficit. Comments: CN 2-12 grossly intact            Data Reviewed:   LABS:  CBC:  Recent Labs     05/08/21  0541 05/09/21  0634 05/10/21  0748   WBC 11.3* 11.4* 11.7*   HGB 9.9* 9.4* 9.7*   HCT 30.2* 28.4* 29.5*   MCV 88.1 86.2 86.4    212 241     BMP:  Recent Labs     05/08/21  0541 05/09/21  0634 05/10/21  0748   * 129* 128*   K 3.5 3.9 3.7   CL 90* 93* 89*   CO2 27 26 31   PHOS 3.6 3.3 3.4   BUN 30* 20 18   CREATININE 0.8 0.6 0.8     LIVER PROFILE: No results for input(s): AST, ALT, LIPASE, ALB, BILIDIR, BILITOT, ALKPHOS in the last 72 hours. Invalid input(s): AMYLASE  PT/INR:No results for input(s): PROTIME, INR in the last 72 hours.   APTT: No results for input(s): APTT in the last 72 hours. UA:No results for input(s): NITRITE, COLORU, PHUR, LABCAST, WBCUA, RBCUA, MUCUS, TRICHOMONAS, YEAST, BACTERIA, CLARITYU, SPECGRAV, LEUKOCYTESUR, UROBILINOGEN, BILIRUBINUR, BLOODU, GLUCOSEU, AMORPHOUS in the last 72 hours. Invalid input(s): KETONESU  No results for input(s): PHART, HDW0DIM, PO2ART in the last 72 hours. Vent Information  Skin Assessment: Clean, dry, & intact  FiO2 : 60 %  SpO2: 93 %  SpO2/FiO2 ratio: 166.67  I Time/ I Time %: 1 s  Mask Type: Full face mask  Mask Size: Medium    CXR personally reviewed, pulm edema/infiltrates          Assessment:     1. Acute on chronic resp failure, hypoxic  2. Haemophilus pneumonia, multifocal  3. COPD without acute exac    Plan:      -Escalate pulm toileting with a trial of metaneb  -Stop mucomyst nebs as prolonged use can cause adverse effects  -Repeat CXR in AM  -Bronchodilators  -Continue atb for 7 days, she has numerous drug allergies making oral options quite limited  -Encourage movement out of bed, PT working with her.  They are looking into rehab placement  -Will follow     Aliza Andrea MD

## 2021-05-10 NOTE — CARE COORDINATION
Per bedside conversation with family and patient, pt states she received the Sueanne Darby and Sueanne Darby covid vaccine on 2/26/21. Information relayed to [facility] [place]. CM provided pt and daughter with CM business card with Ally's number, so dtr can provide proof of vaccine to facility.

## 2021-05-10 NOTE — PROGRESS NOTES
RESPIRATORY THERAPY ASSESSMENT    [facility] Number:  [id number]  Age: 77 y.o. Gender: female  : 1945  Today's Date:  5/10/2021  Room:  [facility]9-    Assessment     Is the patient being admitted for a COPD or Asthma exacerbation? No   (If yes the patient will be seen every 4 hours for the first 24 hours and then reassessed)    Patient Admission Diagnosis      Allergies  Allergies   Allergen Reactions    Latex Swelling and Rash    Clindamycin Itching    Pennsaid [Diclofenac Sodium] Itching and Rash    Torsemide Itching    Actos [Pioglitazone] Diarrhea    Amoxicillin Other (See Comments)    Augmentin [Amoxicillin-Pot Clavulanate] Other (See Comments)    Bactrim [Sulfamethoxazole-Trimethoprim] Other (See Comments)    Ciprofloxacin Other (See Comments)     Makes her weird  Makes anxious and she has weird dreams    Doxycycline Other (See Comments)     Feels like she is smothering, chest tightness    Levaquin [Levofloxacin]      Body aches    Ativan [Lorazepam] Other (See Comments) and Anxiety     And confusion  Had weird dreams and hallucinations    Cephalosporins Rash    Pcn [Penicillins] Rash and Itching    Proventil [Albuterol] Anxiety       Minimum Predicted Vital Capacity:     889          Actual Vital Capacity:      1000              Pulmonary History:COPD  Home Oxygen Therapy:  3 liters/min via nasal cannula  Home Respiratory Therapy:Levalbuterol PRN  Current Respiratory Therapy:  XOPENEX BID, 20% MUCOMYST BID  Treatment Type: HHN, Vibratory mucous clearing therapy or intervention, IS  Medications: Levalbuterol HCL, N-Acetylcysteine    Respiratory Severity Index(RSI)   Patients with orders for inhalation medications, oxygen, or any therapeutic treatment modality will be placed on Respiratory Protocol. They will be assessed with the first treatment and at least every 72 hours thereafter.   The following severity scale will be used to determine frequency of treatment intervention. Smoking History: Pulmonary Disease or Smoking History, Greater than 15 pack year = 2    Social History  Social History     Tobacco Use    Smoking status: Former Smoker     Packs/day: 1.00     Years: 40.00     Pack years: 40.00     Types: Cigarettes     Quit date: 2000     Years since quittin.9    Smokeless tobacco: Never Used    Tobacco comment: stopped cold turkey in !! ( )   Substance Use Topics    Alcohol use: No     Alcohol/week: 0.0 standard drinks    Drug use: No       Recent Surgical History: None = 0  Past Surgical History  Past Surgical History:   Procedure Laterality Date    BONE MARROW BIOPSY      BREAST BIOPSY      BRONCHOSCOPY N/A 2021    BRONCHOSCOPY DIAGNOSTIC OR CELL [address] ONLY performed by Dora Soni MD at [address] [place] [address]  2021    BRONCHOSCOPY ALVEOLAR LAVAGE performed by Dora Soni MD at [address] [place] [address]  2021    BRONCHOSCOPY THERAPUTIC ASPIRATION INITIAL performed by Dora Soni MD at [place] [address]  14    CATARACT REMOVAL      COLONOSCOPY      X 3 per PT 16    FINE NEEDLE ASPIRATION      FOOT SURGERY Right 2013    CORAL BUNIONECTOMY RIGHT FOOT WITH SCREW FIXATION;    HERNIA REPAIR      HYSTERECTOMY      JOINT REPLACEMENT Bilateral     TKR    KNEE SURGERY  09    left    KNEE SURGERY  2010    right    UPPER GASTROINTESTINAL ENDOSCOPY N/A 2018    EGD BIOPSY performed by Ricky Abraham MD at [address] [place] [address]       Level of Consciousness: Alert, Oriented, and Cooperative = 0    Level of Activity: Walking with assistance = 1    Respiratory Pattern: Regular Pattern; RR 8-20 = 0    Breath Sounds: Absent bilaterally and/or with wheezes = 3    Sputum  Sputum Color: Dark Yellow, Tenacity:  Thick, Sputum How Obtained: Spontaneous cough  Cough: Strong, productive = 1    Vital Signs   BP (!) 148/57   Pulse 81   Temp 98.5 °F use of MDI with spacer     d. RR > 30 bpm   5. Bronchodilators will be delivered via Metered Dose Inhaler (MDI), HHN, Aerogen to intubated patients on mechanical ventilation. 6. Inhalation medication orders will be delivered and/or substituted as outlined below. Aerosolized Medications Ordering and Administration Guidelines:    1. All Medications will be ordered by a physician, and their frequency and/or modality will be adjusted as defined by the patients Respiratory Severity Index (RSI) score. 2. If the patient does not have documented COPD, consider discontinuing anticholinergics when RSI is less than 9.  3. If the bronchospasm worsens (increased RSI), then the bronchodilator frequency can be increased to a maximum of every 4 hours. If greater than every 4 hours is required, the physician will be contacted. 4. If the bronchospasm improves, the frequency of the bronchodilator can be decreased, based on the patient's RSI, but not less than home treatment regimen frequency. 5. Bronchodilator(s) will be discontinued if patient has a RSI less than 9 and has received no scheduled or as needed treatment for 72  Hrs. Patients Ordered on a Mucolytic Agent:    1. Must always be administered with a bronchodilator. 2. Discontinue if patient experiences worsened bronchospasm, or secretions have lessened to the point that the patient is able to clear them with a cough. Anti-inflammatory and Combination Medications:    1. If the patient lacks prior history of lung disease, is not using inhaled anti-inflammatory medication at home, and lacks wheezing by examination or by history for at least 24 hours, contact physician for possible discontinuation.

## 2021-05-10 NOTE — PROGRESS NOTES
Occupational Therapy   Occupational Therapy Initial Assessment  Date: 5/10/2021   Patient Name: Tonya Lara  MRN: [id number]     : 1945    Date of Service: 5/10/2021    Discharge Recommendations:  Subacute/Skilled Nursing Facility   Barriers to home discharge:   [x] Steps to access home entry or bed/bath:   [x] Patient reports expectation of post acute Discharge disposition to other than home         Assessment   Performance deficits / Impairments: Decreased functional mobility ; Decreased ADL status; Decreased endurance;Decreased strength;Decreased high-level IADLs  Assessment: pt normally independent with high level IADL's & functional mobility without AD on 3 L O 2, now fatigues easily with minimal exertion on 6 L O 2, dependent for toileting; pt to benefit from skilled OT services  OT Education: OT Role;Precautions;Plan of Care  Patient Education: disease specific: importance of OOB to chair 3x/ day for meals & short periods of time & walking to/from bathroom with A from  staff to decrease dependence on \"Purewick\"  REQUIRES OT FOLLOW UP: Yes  Activity Tolerance  Activity Tolerance: Patient Tolerated treatment well  Activity Tolerance: sitting EOB on 6 L O 2:  95 % O sats, HR = 87  Safety Devices  Safety Devices in place: Yes  Type of devices: Call light within reach; Left in bed;Bed alarm in place;Nurse notified           Patient Diagnosis(es): The primary encounter diagnosis was Acute respiratory failure with hypoxia ([address] Diagnoses of Pneumonia due to organism, COPD exacerbation ([address] and Hyponatremia were also pertinent to this visit.      has a past medical history of Allergic, Anemia, Anesthesia complication, Arthritis, Asthma, Cirrhosis ([address] COPD (chronic obstructive pulmonary disease) ([address] GERD (gastroesophageal reflux disease), GIB (gastrointestinal bleeding), Haemophilus infection, Heart murmur, Hernia, Hyperlipidemia, Hypertension, Lung collapse, Murmur, heart, Pneumonia, Reflux, Rheumatic fever, Stress fracture, SVT (supraventricular tachycardia) ([facility] TIA (transient ischemic attack), Type II or unspecified type diabetes mellitus without mention of complication, not stated as uncontrolled, 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 has a past surgical history that includes hernia repair; Hysterectomy; joint replacement (Bilateral); Foot surgery (Right, 06/03/2013); knee surgery (09); knee surgery (2010); Cardiac catheterization (07/14/14); Cataract removal; bone marrow biopsy; Breast biopsy; fine needle aspiration; Colonoscopy; Upper gastrointestinal endoscopy (N/A, 7/21/2018); bronchoscopy (N/A, 5/7/2021); bronchoscopy (5/7/2021); and bronchoscopy (5/7/2021). Restrictions  Restrictions/Precautions  Restrictions/Precautions: General Precautions, Up as Tolerated  Position Activity Restriction  Other position/activity restrictions: 6 L O2; typically on 3 L O2 at home. , telemetry, Purewick    Subjective   General  Chart Reviewed: Yes  Patient assessed for rehabilitation services?: Yes  Family / Caregiver Present: No  Referring Practitioner: Dr. Deon Sanchez  Diagnosis: pneumonia  General Comment  Comments: RN cleared pt for OT eval; pt resting in bed, agreeable to get up to EOB with OT  Patient Currently in Pain: Denies  Vital Signs  Patient Currently in Pain: Denies  Social/Functional History  Social/Functional History  Lives With: Alone(PRN assist available from family and neighbors)  Type of Home: House  Home Layout: Multi-level(Bilevel wtih stair lift)  Home Access: Level entry  Bathroom Shower/Tub: Walk-in shower, Tub/Shower unit(Uses walkin-in shower)  Bathroom Toilet: Handicap height  Bathroom Equipment: Grab bars in shower, Shower chair  Home Equipment: Cane, Standard walker, Oxygen(adjustable bed;)  ADL Assistance: [address] [place] [address] Avenue: [address] [facility] Responsibilities: Yes  Meal Prep Responsibility: Primary  Laundry Responsibility: Primary  Cleaning Responsibility: Primary  Shopping Responsibility: Primary(uses w/c cart)  Ambulation Assistance: Independent(without AD, required use of walker PTA due to SOB, fatigue, weakness)  Transfer Assistance: Independent  Active : Yes  Occupation: Retired  Type of occupation: Medical Assistant  Leisure & Hobbies: spending time with family  Additional Comments: Denies falls in past 6 months. Objective        Orientation  Overall Orientation Status: Within Functional Limits     Balance  Sitting Balance: Supervision  Standing Balance: Stand by assistance  Standing Balance  Activity: sit<-->stand from EOB  Toilet Transfers  Toilet Transfer: Unable to assess(declined walking to bathroom \"too tired, I have one of those things in \" re: abdon)  ADL  Feeding: Independent  Grooming: Setup(seated EOB)  LE Dressing: Minimal assistance  Toileting: Dependent/Total(Purewick)    RUE Tone: Normotonic  LUE Tone: Normotonic  Coordination  Movements Are Fluid And Coordinated: Yes     Bed mobility  Supine to Sit: Modified independent(HOB elevated & use of bed rail)  Sit to Supine: Modified independent(HOB elevated & use of bed rail)  Transfers  Sit to stand: Stand by assistance  Stand to sit: Stand by assistance     Vision - Basic Assessment  Prior Vision: Wears glasses for distance only     Cognition  Overall Cognitive Status: WFL  Arousal/Alertness: Appropriate responses to stimuli  Following Commands:  Follows one step commands consistently  Attention Span: Appears intact  Memory: Appears intact  Safety Judgement: Good awareness of safety precautions  Insights: Fully aware of deficits  Initiation: Does not require cues  Sequencing: Does not require cues    Type of ROM/Therapeutic Exercise: AROM  Comment: BUE seated EOB   Hand flex/ext: x  10  Reps  Elbow flex/ext:  x 10   Reps  Forearm sup/pron:  x 10   Reps  Shld flex/ext:  x  10  Reps    LUE AROM : WFL  RUE AROM : [facility] Plan   Plan  Times per week: 3-5x/ week  Current Treatment Recommendations: Strengthening, Functional Mobility Training, Endurance Training, Safety Education & Training, Self-Care / ADL    AM-PAC Score        AM-PAC Inpatient Daily Activity Raw Score: 16 (05/10/21 1443)  AM-PAC Inpatient ADL T-Scale Score : 35.96 (05/10/21 1443)  ADL Inpatient CMS 0-100% Score: 53.32 (05/10/21 1443)  ADL Inpatient CMS G-Code Modifier : CK (05/10/21 1443)    Goals  Short term goals  Time Frame for Short term goals: 1 week(5-17-21)  Short term goal 1: CGA with bathroom mobility with LRAD (Least Restrictive Assistive Device) by 5-15-21  Short term goal 2: SBA standing ADL's for 3 minutes by by 5-17-21  Short term goal 3: tolerate 15 reps BUE light strengthening exercises  Patient Goals   Patient goals : get stronger       Therapy Time   Individual Concurrent Group Co-treatment   Time In 1410         Time Out 1432         Minutes [address], [place]

## 2021-05-11 ENCOUNTER — APPOINTMENT (OUTPATIENT)
Dept: GENERAL RADIOLOGY | Age: 76
DRG: 871 | End: 2021-05-11
Payer: MEDICARE

## 2021-05-11 ENCOUNTER — APPOINTMENT (OUTPATIENT)
Dept: CT IMAGING | Age: 76
DRG: 871 | End: 2021-05-11
Payer: MEDICARE

## 2021-05-11 LAB
ALBUMIN SERPL-MCNC: 2.8 G/DL (ref 3.4–5)
ANION GAP SERPL CALCULATED.3IONS-SCNC: 6 MMOL/L (ref 3–16)
BUN BLDV-MCNC: 14 MG/DL (ref 7–20)
CALCIUM SERPL-MCNC: 8.8 MG/DL (ref 8.3–10.6)
CHLORIDE BLD-SCNC: 92 MMOL/L (ref 99–110)
CO2: 31 MMOL/L (ref 21–32)
CREAT SERPL-MCNC: 0.6 MG/DL (ref 0.6–1.2)
GFR AFRICAN AMERICAN: >60
GFR NON-AFRICAN AMERICAN: >60
GLUCOSE BLD-MCNC: 168 MG/DL (ref 70–99)
GLUCOSE BLD-MCNC: 174 MG/DL (ref 70–99)
GLUCOSE BLD-MCNC: 183 MG/DL (ref 70–99)
GLUCOSE BLD-MCNC: 204 MG/DL (ref 70–99)
GLUCOSE BLD-MCNC: 302 MG/DL (ref 70–99)
HCT VFR BLD CALC: 28 % (ref 36–48)
HEMOGLOBIN: 9.3 G/DL (ref 12–16)
MCH RBC QN AUTO: 28.7 PG (ref 26–34)
MCHC RBC AUTO-ENTMCNC: 33.3 G/DL (ref 31–36)
MCV RBC AUTO: 86.3 FL (ref 80–100)
PDW BLD-RTO: 16.8 % (ref 12.4–15.4)
PERFORMED ON: ABNORMAL
PHOSPHORUS: 3.2 MG/DL (ref 2.5–4.9)
PLATELET # BLD: 226 K/UL (ref 135–450)
PMV BLD AUTO: 7.7 FL (ref 5–10.5)
POTASSIUM SERPL-SCNC: 3.9 MMOL/L (ref 3.5–5.1)
RBC # BLD: 3.24 M/UL (ref 4–5.2)
SODIUM BLD-SCNC: 129 MMOL/L (ref 136–145)
WBC # BLD: 12.3 K/UL (ref 4–11)

## 2021-05-11 PROCEDURE — 97530 THERAPEUTIC ACTIVITIES: CPT

## 2021-05-11 PROCEDURE — 85027 COMPLETE CBC AUTOMATED: CPT

## 2021-05-11 PROCEDURE — 97110 THERAPEUTIC EXERCISES: CPT

## 2021-05-11 PROCEDURE — 6360000002 HC RX W HCPCS: Performed by: INTERNAL MEDICINE

## 2021-05-11 PROCEDURE — 99233 SBSQ HOSP IP/OBS HIGH 50: CPT | Performed by: INTERNAL MEDICINE

## 2021-05-11 PROCEDURE — 94640 AIRWAY INHALATION TREATMENT: CPT

## 2021-05-11 PROCEDURE — 6370000000 HC RX 637 (ALT 250 FOR IP): Performed by: INTERNAL MEDICINE

## 2021-05-11 PROCEDURE — 97535 SELF CARE MNGMENT TRAINING: CPT

## 2021-05-11 PROCEDURE — 2580000003 HC RX 258: Performed by: INTERNAL MEDICINE

## 2021-05-11 PROCEDURE — 71045 X-RAY EXAM CHEST 1 VIEW: CPT

## 2021-05-11 PROCEDURE — 80069 RENAL FUNCTION PANEL: CPT

## 2021-05-11 PROCEDURE — 1200000000 HC SEMI PRIVATE

## 2021-05-11 PROCEDURE — 2700000000 HC OXYGEN THERAPY PER DAY

## 2021-05-11 PROCEDURE — 94669 MECHANICAL CHEST WALL OSCILL: CPT

## 2021-05-11 PROCEDURE — 6370000000 HC RX 637 (ALT 250 FOR IP): Performed by: ANESTHESIOLOGY

## 2021-05-11 PROCEDURE — 71250 CT THORAX DX C-: CPT

## 2021-05-11 PROCEDURE — 94761 N-INVAS EAR/PLS OXIMETRY MLT: CPT

## 2021-05-11 PROCEDURE — 36415 COLL VENOUS BLD VENIPUNCTURE: CPT

## 2021-05-11 RX ORDER — PREDNISONE 20 MG/1
40 TABLET ORAL DAILY
Status: DISCONTINUED | OUTPATIENT
Start: 2021-05-11 | End: 2021-05-12 | Stop reason: HOSPADM

## 2021-05-11 RX ADMIN — PANTOPRAZOLE SODIUM 40 MG: 40 TABLET, DELAYED RELEASE ORAL at 21:42

## 2021-05-11 RX ADMIN — VENLAFAXINE 37.5 MG: 37.5 TABLET ORAL at 16:19

## 2021-05-11 RX ADMIN — Medication 2000 UNITS: at 08:51

## 2021-05-11 RX ADMIN — FERROUS SULFATE TAB 325 MG (65 MG ELEMENTAL FE) 325 MG: 325 (65 FE) TAB at 13:06

## 2021-05-11 RX ADMIN — INSULIN LISPRO 2 UNITS: 100 INJECTION, SOLUTION INTRAVENOUS; SUBCUTANEOUS at 18:00

## 2021-05-11 RX ADMIN — TRAZODONE HYDROCHLORIDE 50 MG: 50 TABLET ORAL at 23:10

## 2021-05-11 RX ADMIN — ROPINIROLE HYDROCHLORIDE 1 MG: 0.5 TABLET, FILM COATED ORAL at 20:56

## 2021-05-11 RX ADMIN — NYSTATIN 5 ML: 100000 SUSPENSION ORAL at 23:12

## 2021-05-11 RX ADMIN — GUAIFENESIN 200 MG: 200 SOLUTION ORAL at 05:30

## 2021-05-11 RX ADMIN — SODIUM CHLORIDE, PRESERVATIVE FREE 10 ML: 5 INJECTION INTRAVENOUS at 20:57

## 2021-05-11 RX ADMIN — INSULIN LISPRO 2 UNITS: 100 INJECTION, SOLUTION INTRAVENOUS; SUBCUTANEOUS at 21:44

## 2021-05-11 RX ADMIN — SODIUM CHLORIDE, PRESERVATIVE FREE 10 ML: 5 INJECTION INTRAVENOUS at 08:52

## 2021-05-11 RX ADMIN — PREDNISONE 40 MG: 20 TABLET ORAL at 16:19

## 2021-05-11 RX ADMIN — DOXEPIN HYDROCHLORIDE 50 MG: 25 CAPSULE ORAL at 21:42

## 2021-05-11 RX ADMIN — PANTOPRAZOLE SODIUM 40 MG: 40 TABLET, DELAYED RELEASE ORAL at 08:51

## 2021-05-11 RX ADMIN — LEVALBUTEROL HYDROCHLORIDE 1.25 MG: 1.25 SOLUTION, CONCENTRATE RESPIRATORY (INHALATION) at 19:29

## 2021-05-11 RX ADMIN — VENLAFAXINE 37.5 MG: 37.5 TABLET ORAL at 08:51

## 2021-05-11 RX ADMIN — FERROUS SULFATE TAB 325 MG (65 MG ELEMENTAL FE) 325 MG: 325 (65 FE) TAB at 08:51

## 2021-05-11 RX ADMIN — URSODIOL 300 MG: 300 CAPSULE ORAL at 21:43

## 2021-05-11 RX ADMIN — ATORVASTATIN CALCIUM 20 MG: 10 TABLET, FILM COATED ORAL at 21:42

## 2021-05-11 RX ADMIN — GUAIFENESIN 200 MG: 200 SOLUTION ORAL at 23:10

## 2021-05-11 RX ADMIN — CEFEPIME HYDROCHLORIDE 2000 MG: 2 INJECTION, POWDER, FOR SOLUTION INTRAVENOUS at 03:11

## 2021-05-11 RX ADMIN — FERROUS SULFATE TAB 325 MG (65 MG ELEMENTAL FE) 325 MG: 325 (65 FE) TAB at 16:19

## 2021-05-11 RX ADMIN — MONTELUKAST SODIUM 10 MG: 10 TABLET ORAL at 08:51

## 2021-05-11 RX ADMIN — CEFEPIME HYDROCHLORIDE 2000 MG: 2 INJECTION, POWDER, FOR SOLUTION INTRAVENOUS at 16:12

## 2021-05-11 RX ADMIN — INSULIN LISPRO 1 UNITS: 100 INJECTION, SOLUTION INTRAVENOUS; SUBCUTANEOUS at 13:07

## 2021-05-11 RX ADMIN — ENOXAPARIN SODIUM 40 MG: 40 INJECTION SUBCUTANEOUS at 08:51

## 2021-05-11 RX ADMIN — ROPINIROLE HYDROCHLORIDE 1 MG: 0.5 TABLET, FILM COATED ORAL at 21:42

## 2021-05-11 RX ADMIN — LEVOTHYROXINE SODIUM 100 MCG: 0.1 TABLET ORAL at 05:30

## 2021-05-11 RX ADMIN — INSULIN LISPRO 1 UNITS: 100 INJECTION, SOLUTION INTRAVENOUS; SUBCUTANEOUS at 08:52

## 2021-05-11 RX ADMIN — ESTROGENS, CONJUGATED 1.25 MG: 0.62 TABLET, FILM COATED ORAL at 08:51

## 2021-05-11 RX ADMIN — GABAPENTIN 600 MG: 300 CAPSULE ORAL at 21:42

## 2021-05-11 RX ADMIN — LEVALBUTEROL HYDROCHLORIDE 1.25 MG: 1.25 SOLUTION, CONCENTRATE RESPIRATORY (INHALATION) at 08:49

## 2021-05-11 RX ADMIN — Medication 500 MG: at 08:51

## 2021-05-11 RX ADMIN — NYSTATIN 5 ML: 100000 SUSPENSION ORAL at 09:58

## 2021-05-11 RX ADMIN — LOSARTAN POTASSIUM 100 MG: 100 TABLET, FILM COATED ORAL at 08:51

## 2021-05-11 RX ADMIN — ROPINIROLE HYDROCHLORIDE 2 MG: 2 TABLET, FILM COATED ORAL at 23:10

## 2021-05-11 RX ADMIN — URSODIOL 300 MG: 300 CAPSULE ORAL at 08:51

## 2021-05-11 RX ADMIN — LEVALBUTEROL HYDROCHLORIDE 1.25 MG: 1.25 SOLUTION, CONCENTRATE RESPIRATORY (INHALATION) at 13:58

## 2021-05-11 RX ADMIN — ASPIRIN 81 MG: 81 TABLET, COATED ORAL at 08:51

## 2021-05-11 RX ADMIN — DIPHENHYDRAMINE HCL 50 MG: 25 TABLET ORAL at 23:10

## 2021-05-11 RX ADMIN — DILTIAZEM HYDROCHLORIDE 240 MG: 240 CAPSULE, COATED, EXTENDED RELEASE ORAL at 08:51

## 2021-05-11 ASSESSMENT — PAIN SCALES - GENERAL: PAINLEVEL_OUTOF10: 0

## 2021-05-11 NOTE — PROGRESS NOTES
Hospitalist Progress Note      PCP: Adore Mills DO    Date of Admission: 5/6/2021    Chief Complaint: SOB    Subjective: feeling better       Medications:  Reviewed    Infusion Medications    dextrose      sodium chloride       Scheduled Medications    levalbuterol  1.25 mg Nebulization TID    insulin lispro  0-6 Units Subcutaneous TID WC    insulin lispro  0-3 Units Subcutaneous Nightly    rOPINIRole  1 mg Oral Nightly    rOPINIRole  2 mg Oral Nightly    aspirin  81 mg Oral Daily    atorvastatin  20 mg Oral Nightly    calcium elemental  500 mg Oral Daily    Vitamin D  2,000 Units Oral Daily    dilTIAZem  240 mg Oral Daily    doxepin  50 mg Oral Nightly    estrogens (conjugated)  1.25 mg Oral Daily    ferrous sulfate  325 mg Oral TID WC    gabapentin  600 mg Oral Nightly    levothyroxine  100 mcg Oral Daily    losartan  100 mg Oral Daily    montelukast  10 mg Oral Daily    pantoprazole  40 mg Oral BID    rOPINIRole  1 mg Oral Nightly    traZODone  50 mg Oral Nightly    venlafaxine  37.5 mg Oral BID WC    sodium chloride flush  5-40 mL Intravenous 2 times per day    enoxaparin  40 mg Subcutaneous Daily    cefepime  2,000 mg Intravenous Q12H    ursodiol  300 mg Oral BID     PRN Meds: magic (miracle) mouthwash with nystatin, guaiFENesin, glucose, dextrose, glucagon (rDNA), dextrose, sodium chloride flush, sodium chloride, promethazine **OR** ondansetron, polyethylene glycol, acetaminophen **OR** acetaminophen, diphenhydrAMINE      Intake/Output Summary (Last 24 hours) at 5/11/2021 0914  Last data filed at 5/11/2021 0347  Gross per 24 hour   Intake 240 ml   Output 2300 ml   Net -2060 ml       Physical Exam Performed:    BP (!) 149/76   Pulse 74   Temp 97.5 °F (36.4 °C) (Oral)   Resp 18   Ht 5' 6\" (1.676 m)   Wt 210 lb (95.3 kg)   SpO2 93%   BMI 33.89 kg/m²     General appearance: No apparent distress, appears stated age and cooperative.   HEENT: Pupils equal, round, and reactive to light. Conjunctivae/corneas clear. Neck: Supple, with full range of motion. No jugular venous distention. Trachea midline. Respiratory:  Normal respiratory effort. Clear to auscultation, bilaterally without Rales/Wheezes/Rhonchi. Cardiovascular: Regular rate and rhythm with normal S1/S2 without murmurs, rubs or gallops. Abdomen: Soft, non-tender, non-distended with normal bowel sounds. Musculoskeletal: No clubbing, cyanosis or edema bilaterally. Full range of motion without deformity. Skin: Skin color, texture, turgor normal.  No rashes or lesions. Neurologic:  Neurovascularly intact without any focal sensory/motor deficits. Cranial nerves: II-XII intact, grossly non-focal.  Psychiatric: Alert and oriented, thought content appropriate, normal insight  Capillary Refill: Brisk,< 3 seconds   Peripheral Pulses: +2 palpable, equal bilaterally       Labs:   Recent Labs     05/09/21  0634 05/10/21  0748 05/11/21  0719   WBC 11.4* 11.7* 12.3*   HGB 9.4* 9.7* 9.3*   HCT 28.4* 29.5* 28.0*    241 226     Recent Labs     05/09/21  0634 05/10/21  0748 05/11/21  0719   * 128* 129*   K 3.9 3.7 3.9   CL 93* 89* 92*   CO2 26 31 31   BUN 20 18 14   CREATININE 0.6 0.8 0.6   CALCIUM 8.8 8.7 8.8   PHOS 3.3 3.4 3.2     No results for input(s): AST, ALT, BILIDIR, BILITOT, ALKPHOS in the last 72 hours. No results for input(s): INR in the last 72 hours. No results for input(s): Cydney Councilman in the last 72 hours.     Urinalysis:      Lab Results   Component Value Date    NITRU Negative 07/20/2018    WBCUA  07/20/2018    BACTERIA 2+ 07/20/2018    RBCUA 10-20 07/20/2018    BLOODU LARGE 07/20/2018    SPECGRAV 1.010 07/20/2018    GLUCOSEU Negative 07/20/2018    GLUCOSEU NEGATIVE 12/30/2009       Consults:    IP CONSULT TO HOSPITALIST  IP CONSULT TO PULMONOLOGY  IP CONSULT TO PHARMACY      Assessment/Plan:    Active Hospital Problems    Diagnosis    Hyponatremia [E87.1]    Acute and chronic documented as above.     HypoNatremia - etiology clinically unable to determine but likely hypovolemic. Follow serial labs on IVF - stable. Reviewed and documented as above.     HypoThyroid - clinically euthyroid on oral replacement therapy. Continue, w/ outpt monitoring as previously arranged.      Obesity -  With Body mass index is 33.89 kg/m². Complicating assessment and treatment. Placing patient at risk for multiple co-morbidities as well as early death and contributing to the patient's presentation.  Counseled on weight loss.        DVT Prophylaxis: LMWH  Diet: DIET GENERAL;  Code Status: Limited      PT/OT Eval Status: seen w/ recs for possible SNF.      Dispo - Medically stable to SNF Tues 11 May pending clinical course and placement decision.        Veronica Fountain MD

## 2021-05-11 NOTE — CARE COORDINATION
No determination from payor at this time for precert to [facility]. CM left VM with Lilliam at facility for update on precert status.

## 2021-05-11 NOTE — PROGRESS NOTES
Occupational Therapy  Facility/Department: [facility] B3 - MED SURG  Daily Treatment Note  NAME: Tonya Lara  : 1945  MRN: [id number]    Date of Service: 2021    Discharge Recommendations:  [address] [facility]       Assessment   Performance deficits / Impairments: Decreased functional mobility ; Decreased ADL status; Decreased endurance;Decreased strength;Decreased high-level IADLs  Assessment: Patient remains motivated and pleasant, tolerance of multiple stands during ADLs and bouts of functional mobility with SW per request.  Pt does need seated rest breaks throughout and cues for PLB/energy conservation during standing vs sitting ADLs. Pt able to tolerate BUE ex sitting EOB with rest breaks PRN. Motivated and pleasant but would benefit from continued therapy to address fatigue and balance. COnt OT POC. OT Education: OT Role;Precautions;Plan of Care  Patient Education: disease specific: importance of OOB to chair 3x/ day for meals & short periods of time & walking to/from bathroom with A from  staff to decrease dependence on \"Purewick\"  REQUIRES OT FOLLOW UP: Yes  Activity Tolerance  Activity Tolerance: Patient Tolerated treatment well;Patient limited by fatigue  Activity Tolerance: O2 EOB: 95% HR 82; after standing sinkside 92% HR 83, afte rmobility 90% HR 91  Safety Devices  Safety Devices in place: Yes  Type of devices: Call light within reach; Left in bed;Bed alarm in place;Nurse notified; All fall risk precautions in place;Gait belt;Patient at risk for falls  Restraints  Initially in place: No         Patient Diagnosis(es): The primary encounter diagnosis was Acute respiratory failure with hypoxia ([address] Diagnoses of Pneumonia due to organism, COPD exacerbation ([address] and Hyponatremia were also pertinent to this visit.       has a past medical history of Allergic, Anemia, Anesthesia complication, Arthritis, Asthma, Cirrhosis ([address] COPD (chronic obstructive pulmonary disease) ([facility] GERD (gastroesophageal reflux disease), GIB (gastrointestinal bleeding), Haemophilus infection, Heart murmur, Hernia, Hyperlipidemia, Hypertension, Lung collapse, Murmur, heart, Pneumonia, Reflux, Rheumatic fever, Stress fracture, SVT (supraventricular tachycardia) ([facility] TIA (transient ischemic attack), Type II or unspecified type diabetes mellitus without mention of complication, not stated as uncontrolled, 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 has a past surgical history that includes hernia repair; Hysterectomy; joint replacement (Bilateral); Foot surgery (Right, 06/03/2013); knee surgery (09); knee surgery (2010); Cardiac catheterization (07/14/14); Cataract removal; bone marrow biopsy; Breast biopsy; fine needle aspiration; Colonoscopy; Upper gastrointestinal endoscopy (N/A, 7/21/2018); bronchoscopy (N/A, 5/7/2021); bronchoscopy (5/7/2021); and bronchoscopy (5/7/2021). Restrictions  Restrictions/Precautions  Restrictions/Precautions: General Precautions, Up as Tolerated  Position Activity Restriction  Other position/activity restrictions: 6 L O2; typically on 3 L O2 at home. , telemetry, Purewick  Subjective   General  Chart Reviewed: Yes  Patient assessed for rehabilitation services?: Yes  Family / Caregiver Present: No  Referring Practitioner: Dr. Jeannie Zazueta  Diagnosis: pneumonia  General Comment  Comments: RN cleared pt for OT eval; pt resting in bed, agreeable to get up to EOB with OT  Vital Signs  Patient Currently in Pain: Denies   Orientation  Orientation  Overall Orientation Status: Within Functional Limits  Objective    ADL  Feeding: Beverage management;Setup  Grooming: Setup;Supervision(standing sinkside to wash hands, brush teeth/hair x4 minutes)  UE Bathing: Setup;Supervision(sitting EOB to sponge UE)  UE Dressing: Contact guard assistance(standing to manage gown)  LE Dressing: Minimal assistance(donning L sock due to fatigue)  Toileting: Dependent/Total(purewick, incontinent)  Additional Comments: cues for PLB and energy conservation techniques, mod cues for safety/sequencing with sit <> stands and manuvering in tight spaces        Balance  Sitting Balance: Supervision  Standing Balance: Stand by assistance(SBA with SW (per patient request) for standing ADls and functional mobility tirals)  Standing Balance  Time: x5 minutes, x4 minutes, 2x2 minutes, x3 minutes  Activity: transfers, mobility, ADLs  Comment: cues for PLB/energy conservation  Functional Mobility  Functional - Mobility Device: Standard Walker(per patient request)  Activity: Retrieve items;Transport items; To/from bathroom; Other(to/from hallway)  Assist Level: Stand by assistance  Functional Mobility Comments: SBA, slow gait, mod cues for upright posture and balance strategies, PLB education  Toilet Transfers  Toilet - Technique: Ambulating; To left; To right  Equipment Used: Grab bars  Toilet Transfer: Minimal assistance  Bed mobility  Supine to Sit: Contact guard assistance;Stand by assistance  Sit to Supine: Contact guard assistance;Stand by assistance  Scooting: Contact guard assistance;Stand by assistance  Transfers  Stand Step Transfers: Stand by assistance  Sit to stand: Stand by assistance  Stand to sit: Stand by assistance        Coordination  Gross Motor: iance with mobility and tranfsers, mod cues for safety/sequencing with sit <> stands as well as energy conservation techniques with mobility/standing ADL tasks  Fine Motor: fair coordination with opening all containers for ADLs              Cognition  Overall Cognitive Status: WFL     Perception  Overall Perceptual Status: WFL              Type of ROM/Therapeutic Exercise  Type of ROM/Therapeutic Exercise: AROM  Comment: BUE seated EOB  Exercises  Scapular Protraction: 2x10  Scapular Retraction: 2x10  Shoulder Depression: 2x10  Shoulder Elevation: 2x10  Shoulder Flexion: 2x10  Horizontal ABduction: 2x10  Horizontal ADduction: 2x10  Elbow Flexion: 2x10  Elbow Extension: 2x10  Supination: 2x10  Pronation: 2x10  Wrist Flexion: 2x10  Wrist Extension: 2x10  Other: 2x10 chest press, circles forwards/backwards                    Plan   Plan  Times per week: 3-5x/ week  Current Treatment Recommendations: Strengthening, Functional Mobility Training, Endurance Training, Safety Education & Training, Self-Care / ADL    AM-PAC Score        AM-PAC Inpatient Daily Activity Raw Score: 16 (05/11/21 1121)  AM-PAC Inpatient ADL T-Scale Score : 35.96 (05/11/21 1121)  ADL Inpatient CMS 0-100% Score: 53.32 (05/11/21 1121)  ADL Inpatient CMS G-Code Modifier : CK (05/11/21 1121)    Goals  Short term goals  Time Frame for Short term goals: 1 week(5-17-21)  Short term goal 1: CGA with bathroom mobility with LRAD (Least Restrictive Assistive Device) by 5-15-21  Short term goal 2: SBA standing ADL's for 3 minutes by by 5-17-21  Short term goal 3: tolerate 15 reps BUE light strengthening exercises  Patient Goals   Patient goals : get stronger       Therapy Time   Individual Concurrent Group Co-treatment   Time In 6314         Time Out 4835         Minutes 39         Timed Code Treatment Minutes: [address], OTR/L

## 2021-05-11 NOTE — PROGRESS NOTES
Pulmonary & Critical Care Inpatient Progress Note   Ari Wells MD     REASON FOR TODAY'S VISIT:  Acute resp failure    SUBJECTIVE:   Remains on 6 LPM of oxygen, baseline is 3LPM  She still feels congested and short of breath  Coughing all night  Weak with mobility but does get out of bed at times with assist.  Approved for rehab post dc     Scheduled Meds:   levalbuterol  1.25 mg Nebulization TID    insulin lispro  0-6 Units Subcutaneous TID WC    insulin lispro  0-3 Units Subcutaneous Nightly    rOPINIRole  1 mg Oral Nightly    rOPINIRole  2 mg Oral Nightly    aspirin  81 mg Oral Daily    atorvastatin  20 mg Oral Nightly    calcium elemental  500 mg Oral Daily    Vitamin D  2,000 Units Oral Daily    dilTIAZem  240 mg Oral Daily    doxepin  50 mg Oral Nightly    estrogens (conjugated)  1.25 mg Oral Daily    ferrous sulfate  325 mg Oral TID WC    gabapentin  600 mg Oral Nightly    levothyroxine  100 mcg Oral Daily    losartan  100 mg Oral Daily    montelukast  10 mg Oral Daily    pantoprazole  40 mg Oral BID    rOPINIRole  1 mg Oral Nightly    traZODone  50 mg Oral Nightly    venlafaxine  37.5 mg Oral BID WC    sodium chloride flush  5-40 mL Intravenous 2 times per day    enoxaparin  40 mg Subcutaneous Daily    cefepime  2,000 mg Intravenous Q12H    ursodiol  300 mg Oral BID       Continuous Infusions:   dextrose      sodium chloride         PRN Meds:  magic (miracle) mouthwash with nystatin, guaiFENesin, glucose, dextrose, glucagon (rDNA), dextrose, sodium chloride flush, sodium chloride, promethazine **OR** ondansetron, polyethylene glycol, acetaminophen **OR** acetaminophen, diphenhydrAMINE    ALLERGIES:  Patient is allergic to latex; clindamycin; pennsaid [diclofenac sodium]; torsemide; actos [pioglitazone]; amoxicillin; augmentin [amoxicillin-pot clavulanate]; bactrim [sulfamethoxazole-trimethoprim]; ciprofloxacin; doxycycline; levaquin [levofloxacin]; ativan [lorazepam]; cephalosporins; pcn [penicillins]; and proventil [albuterol]. Objective:   PHYSICAL EXAM:  BP (!) 149/76   Pulse 74   Temp 97.5 °F (36.4 °C) (Oral)   Resp 18   Ht 5' 6\" (1.676 m)   Wt 210 lb (95.3 kg)   SpO2 93%   BMI 33.89 kg/m²    Physical Exam  Constitutional:       General: She is not in acute distress. Appearance: She is well-developed. She is not diaphoretic. HENT:      Head: Normocephalic and atraumatic. Mouth/Throat:      Pharynx: No oropharyngeal exudate. Eyes:      Pupils: Pupils are equal, round, and reactive to light. Neck:      Musculoskeletal: Neck supple. Vascular: No JVD. Cardiovascular:      Heart sounds: Normal heart sounds. No murmur. No friction rub. No gallop. Pulmonary:      Effort: Pulmonary effort is normal.      Breath sounds: No wheezing or rales. Abdominal:      General: Bowel sounds are normal. There is no distension. Palpations: Abdomen is soft. Tenderness: There is no abdominal tenderness. Musculoskeletal: Normal range of motion. Lymphadenopathy:      Cervical: No cervical adenopathy. Skin:     General: Skin is warm and dry. Findings: No rash. Neurological:      Mental Status: She is alert. Cranial Nerves: No cranial nerve deficit. Comments: CN 2-12 grossly intact            Data Reviewed:   LABS:  CBC:  Recent Labs     05/09/21  0634 05/10/21  0748 05/11/21  0719   WBC 11.4* 11.7* 12.3*   HGB 9.4* 9.7* 9.3*   HCT 28.4* 29.5* 28.0*   MCV 86.2 86.4 86.3    241 226     BMP:  Recent Labs     05/09/21  0634 05/10/21  0748 05/11/21  0719   * 128* 129*   K 3.9 3.7 3.9   CL 93* 89* 92*   CO2 26 31 31   PHOS 3.3 3.4 3.2   BUN 20 18 14   CREATININE 0.6 0.8 0.6     LIVER PROFILE: No results for input(s): AST, ALT, LIPASE, ALB, BILIDIR, BILITOT, ALKPHOS in the last 72 hours. Invalid input(s): AMYLASE  PT/INR:No results for input(s): PROTIME, INR in the last 72 hours. APTT: No results for input(s):  APTT in the last 72 hours. UA:No results for input(s): NITRITE, COLORU, PHUR, LABCAST, WBCUA, RBCUA, MUCUS, TRICHOMONAS, YEAST, BACTERIA, CLARITYU, SPECGRAV, LEUKOCYTESUR, UROBILINOGEN, BILIRUBINUR, BLOODU, GLUCOSEU, AMORPHOUS in the last 72 hours. Invalid input(s): KETONESU  No results for input(s): PHART, [id number], PO2ART in the last 72 hours. Vent Information  Skin Assessment: Clean, dry, & intact  FiO2 : 60 %  SpO2: 93 %  SpO2/FiO2 ratio: 166.67  I Time/ I Time %: 1 s  Mask Type: Full face mask  Mask Size: Medium    CXR personally reviewed, pulm edema/infiltrates          Assessment:     1. Acute on chronic resp failure, hypoxic  2. Haemophilus pneumonia, multifocal  3. COPD without acute exac    Plan:      -CT chest to evaluate for unresolving pneumonia, may need repeat bronchoscopy. She has a very weak cough  -Aggressive pulm toileting including metaneb   -Bronchodilators  -Continue atb for 7 days, she has numerous drug allergies making oral options quite limited  -Encourage movement out of bed, PT working with her.  She will be going to rehab after dc  -Will follow     Addendum: Diffuse infiltrates still on chest imaging, will start prednisone     Clarice Weinberg MD

## 2021-05-12 VITALS
DIASTOLIC BLOOD PRESSURE: 75 MMHG | HEIGHT: 66 IN | TEMPERATURE: 97.5 F | BODY MASS INDEX: 33.75 KG/M2 | OXYGEN SATURATION: 96 % | HEART RATE: 68 BPM | RESPIRATION RATE: 16 BRPM | SYSTOLIC BLOOD PRESSURE: 164 MMHG | WEIGHT: 210 LBS

## 2021-05-12 LAB
ALBUMIN SERPL-MCNC: 2.9 G/DL (ref 3.4–5)
ANION GAP SERPL CALCULATED.3IONS-SCNC: 8 MMOL/L (ref 3–16)
BUN BLDV-MCNC: 13 MG/DL (ref 7–20)
CALCIUM SERPL-MCNC: 8.9 MG/DL (ref 8.3–10.6)
CHLORIDE BLD-SCNC: 94 MMOL/L (ref 99–110)
CO2: 27 MMOL/L (ref 21–32)
CREAT SERPL-MCNC: <0.5 MG/DL (ref 0.6–1.2)
GFR AFRICAN AMERICAN: >60
GFR NON-AFRICAN AMERICAN: >60
GLUCOSE BLD-MCNC: 222 MG/DL (ref 70–99)
GLUCOSE BLD-MCNC: 254 MG/DL (ref 70–99)
GLUCOSE BLD-MCNC: 314 MG/DL (ref 70–99)
HCT VFR BLD CALC: 28.8 % (ref 36–48)
HEMOGLOBIN: 9.6 G/DL (ref 12–16)
MCH RBC QN AUTO: 28.9 PG (ref 26–34)
MCHC RBC AUTO-ENTMCNC: 33.3 G/DL (ref 31–36)
MCV RBC AUTO: 86.7 FL (ref 80–100)
PDW BLD-RTO: 16.5 % (ref 12.4–15.4)
PERFORMED ON: ABNORMAL
PERFORMED ON: ABNORMAL
PHOSPHORUS: 3.3 MG/DL (ref 2.5–4.9)
PLATELET # BLD: 231 K/UL (ref 135–450)
PMV BLD AUTO: 7.5 FL (ref 5–10.5)
POTASSIUM SERPL-SCNC: 4.1 MMOL/L (ref 3.5–5.1)
RBC # BLD: 3.32 M/UL (ref 4–5.2)
SARS-COV-2, NAAT: NOT DETECTED
SODIUM BLD-SCNC: 129 MMOL/L (ref 136–145)
WBC # BLD: 9.4 K/UL (ref 4–11)

## 2021-05-12 PROCEDURE — 6370000000 HC RX 637 (ALT 250 FOR IP): Performed by: NURSE PRACTITIONER

## 2021-05-12 PROCEDURE — 85027 COMPLETE CBC AUTOMATED: CPT

## 2021-05-12 PROCEDURE — 2700000000 HC OXYGEN THERAPY PER DAY

## 2021-05-12 PROCEDURE — 6370000000 HC RX 637 (ALT 250 FOR IP): Performed by: INTERNAL MEDICINE

## 2021-05-12 PROCEDURE — 94669 MECHANICAL CHEST WALL OSCILL: CPT

## 2021-05-12 PROCEDURE — 94761 N-INVAS EAR/PLS OXIMETRY MLT: CPT

## 2021-05-12 PROCEDURE — 94640 AIRWAY INHALATION TREATMENT: CPT

## 2021-05-12 PROCEDURE — 99233 SBSQ HOSP IP/OBS HIGH 50: CPT | Performed by: INTERNAL MEDICINE

## 2021-05-12 PROCEDURE — 97535 SELF CARE MNGMENT TRAINING: CPT

## 2021-05-12 PROCEDURE — 2580000003 HC RX 258: Performed by: INTERNAL MEDICINE

## 2021-05-12 PROCEDURE — 6360000002 HC RX W HCPCS: Performed by: INTERNAL MEDICINE

## 2021-05-12 PROCEDURE — 80069 RENAL FUNCTION PANEL: CPT

## 2021-05-12 PROCEDURE — 36415 COLL VENOUS BLD VENIPUNCTURE: CPT

## 2021-05-12 PROCEDURE — 97110 THERAPEUTIC EXERCISES: CPT

## 2021-05-12 PROCEDURE — 87635 SARS-COV-2 COVID-19 AMP PRB: CPT

## 2021-05-12 PROCEDURE — 97530 THERAPEUTIC ACTIVITIES: CPT

## 2021-05-12 RX ORDER — GABAPENTIN 600 MG/1
600 TABLET ORAL NIGHTLY
Qty: 30 TABLET | Refills: 0 | Status: ON HOLD | OUTPATIENT
Start: 2021-05-12 | End: 2022-05-12 | Stop reason: HOSPADM

## 2021-05-12 RX ORDER — PREDNISONE 20 MG/1
40 TABLET ORAL DAILY
Qty: 18 TABLET | Refills: 0 | Status: SHIPPED | OUTPATIENT
Start: 2021-05-13 | End: 2021-05-22

## 2021-05-12 RX ADMIN — INSULIN LISPRO 2 UNITS: 100 INJECTION, SOLUTION INTRAVENOUS; SUBCUTANEOUS at 10:02

## 2021-05-12 RX ADMIN — VENLAFAXINE 37.5 MG: 37.5 TABLET ORAL at 10:00

## 2021-05-12 RX ADMIN — LOSARTAN POTASSIUM 100 MG: 100 TABLET, FILM COATED ORAL at 10:00

## 2021-05-12 RX ADMIN — HYDROCODONE BITARTRATE AND HOMATROPINE METHYLBROMIDE 5 ML: 5; 1.5 SOLUTION ORAL at 02:53

## 2021-05-12 RX ADMIN — ASPIRIN 81 MG: 81 TABLET, COATED ORAL at 10:00

## 2021-05-12 RX ADMIN — URSODIOL 300 MG: 300 CAPSULE ORAL at 10:15

## 2021-05-12 RX ADMIN — LEVALBUTEROL HYDROCHLORIDE 1.25 MG: 1.25 SOLUTION, CONCENTRATE RESPIRATORY (INHALATION) at 08:13

## 2021-05-12 RX ADMIN — ESTROGENS, CONJUGATED 1.25 MG: 0.62 TABLET, FILM COATED ORAL at 10:09

## 2021-05-12 RX ADMIN — Medication 2000 UNITS: at 10:00

## 2021-05-12 RX ADMIN — SODIUM CHLORIDE, PRESERVATIVE FREE 10 ML: 5 INJECTION INTRAVENOUS at 10:00

## 2021-05-12 RX ADMIN — ENOXAPARIN SODIUM 40 MG: 40 INJECTION SUBCUTANEOUS at 09:59

## 2021-05-12 RX ADMIN — FERROUS SULFATE TAB 325 MG (65 MG ELEMENTAL FE) 325 MG: 325 (65 FE) TAB at 09:59

## 2021-05-12 RX ADMIN — INSULIN LISPRO 4 UNITS: 100 INJECTION, SOLUTION INTRAVENOUS; SUBCUTANEOUS at 12:24

## 2021-05-12 RX ADMIN — PREDNISONE 40 MG: 20 TABLET ORAL at 10:00

## 2021-05-12 RX ADMIN — PANTOPRAZOLE SODIUM 40 MG: 40 TABLET, DELAYED RELEASE ORAL at 09:59

## 2021-05-12 RX ADMIN — LEVOTHYROXINE SODIUM 100 MCG: 0.1 TABLET ORAL at 06:34

## 2021-05-12 RX ADMIN — MONTELUKAST SODIUM 10 MG: 10 TABLET ORAL at 10:00

## 2021-05-12 RX ADMIN — FERROUS SULFATE TAB 325 MG (65 MG ELEMENTAL FE) 325 MG: 325 (65 FE) TAB at 12:25

## 2021-05-12 RX ADMIN — Medication 500 MG: at 10:00

## 2021-05-12 RX ADMIN — DILTIAZEM HYDROCHLORIDE 240 MG: 240 CAPSULE, COATED, EXTENDED RELEASE ORAL at 10:00

## 2021-05-12 RX ADMIN — CEFEPIME HYDROCHLORIDE 2000 MG: 2 INJECTION, POWDER, FOR SOLUTION INTRAVENOUS at 02:53

## 2021-05-12 RX ADMIN — LEVALBUTEROL HYDROCHLORIDE 1.25 MG: 1.25 SOLUTION, CONCENTRATE RESPIRATORY (INHALATION) at 12:28

## 2021-05-12 ASSESSMENT — PAIN SCALES - GENERAL
PAINLEVEL_OUTOF10: 0
PAINLEVEL_OUTOF10: 0

## 2021-05-12 NOTE — PROGRESS NOTES
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 has a past surgical history that includes hernia repair; Hysterectomy; joint replacement (Bilateral); Foot surgery (Right, 06/03/2013); knee surgery (09); knee surgery (2010); Cardiac catheterization (07/14/14); Cataract removal; bone marrow biopsy; Breast biopsy; fine needle aspiration; Colonoscopy; Upper gastrointestinal endoscopy (N/A, 7/21/2018); bronchoscopy (N/A, 5/7/2021); bronchoscopy (5/7/2021); and bronchoscopy (5/7/2021).     Restrictions  Restrictions/Precautions  Restrictions/Precautions: General Precautions, Up as Tolerated  Position Activity Restriction  Other position/activity restrictions: 3L O2, tele  Subjective   General  Chart Reviewed: Yes  Patient assessed for rehabilitation services?: Yes  Family / Caregiver Present: No  Referring Practitioner: Dr. Nolene Litten  Diagnosis: pneumonia  Subjective  Subjective: Pt agreeable, very talkative  General Comment  Comments: RN clears for therapy  Vital Signs  Patient Currently in Pain: Denies   Orientation  Orientation  Overall Orientation Status: Within Functional Limits  Objective    ADL  Feeding: Beverage management;Setup  Grooming: Setup;Supervision(standing at sink)  Toileting: Minimal assistance(clothing mgmt)  Additional Comments: cues for PLB and energy conservation techniques, mod cues for safety/sequencing with sit <> stands and manuvering in tight spaces        Balance  Sitting Balance: Supervision  Standing Balance: Stand by assistance(SW per pt request)  Standing Balance  Time: 2-3 min x several attempts  Activity: transfers, mobility, ADLs  Comment: cues for PLB/energy conservation  Functional Mobility  Functional - Mobility Device: Standard Walker(per pt request)  Activity: To/from bathroom; Other  Assist Level: Stand by assistance  Functional Mobility Comments: SBA, slow gait, mod cues for upright posture and balance strategies, PLB education  Toilet Transfers  Toilet - Technique: Ambulating; To left; To right  Equipment Used: Grab bars  Toilet Transfer: Minimal assistance  Toilet Transfers Comments: cues for SW in tigh tspaces     Transfers  Sit to stand: Stand by assistance  Stand to sit: Stand by assistance                       Cognition  Overall Cognitive Status: WFL  Arousal/Alertness: Appropriate responses to stimuli  Following Commands:  Follows one step commands consistently  Attention Span: Appears intact  Memory: Appears intact  Safety Judgement: Good awareness of safety precautions  Insights: Fully aware of deficits  Initiation: Does not require cues  Sequencing: Does not require cues                    Type of ROM/Therapeutic Exercise  Type of ROM/Therapeutic Exercise: AROM  Comment: BUE seated EOB  Exercises  Scapular Protraction: 2x10  Scapular Retraction: 2x10  Shoulder Depression: 2x10  Shoulder Elevation: 2x10  Shoulder Flexion: 2x10  Horizontal ABduction: 2x10  Horizontal ADduction: 2x10  Elbow Flexion: 2x10  Elbow Extension: 2x10  Supination: 2x10  Pronation: 2x10  Wrist Flexion: 2x10  Wrist Extension: 2x10  Other: 2x10 chest press, circles forwards/backwards                    Plan   Plan  Times per week: 3-5x/ week  Current Treatment Recommendations: Strengthening, Functional Mobility Training, Endurance Training, Safety Education & Training, Self-Care / ADL    AM-[facility] Score        AM-[facility] Inpatient Daily Activity Raw Score: 16 (05/12/21 1253)  AM-PAC Inpatient ADL T-Scale Score : 35.96 (05/12/21 1253)  ADL Inpatient CMS 0-100% Score: 53.32 (05/12/21 1253)  ADL Inpatient CMS G-Code Modifier : CK (05/12/21 5793)    Goals  Short term goals  Time Frame for Short term goals: 1 week(5-17-21)  Short term goal 1: CGA with bathroom mobility with LRAD (Least Restrictive Assistive Device) by 5-15-21  Short term goal 2: SBA standing ADL's for 3 minutes by by 5-17-21  Short term goal 3: tolerate 15 reps BUE light strengthening exercises  Patient Goals   Patient goals : get stronger       Therapy Time   Individual Concurrent Group Co-treatment   Time In 0901         Time Out 0954         Minutes 53         Timed Code Treatment Minutes: [address] [facility],

## 2021-05-12 NOTE — CARE COORDINATION
Pt still on track board. Transport with [facility] scheduled for 1400 . CM spoke with Matheus Arredondo at agency, who states crew is \"4 minutes away\". CM requested a supervisor and spoke to Keko. She could not site specific reason crew was late, but promised to look into the issue and f/u with CM manager. New ETA relayed to primary nurse, Addison Mccord.

## 2021-05-12 NOTE — PROGRESS NOTES
Hospitalist Progress Note      PCP: Yanet Betancourt DO    Date of Admission: 5/6/2021    Chief Complaint: SOB    Subjective: feeling better       Medications:  Reviewed    Infusion Medications    dextrose      sodium chloride       Scheduled Medications    predniSONE  40 mg Oral Daily    levalbuterol  1.25 mg Nebulization TID    insulin lispro  0-6 Units Subcutaneous TID WC    insulin lispro  0-3 Units Subcutaneous Nightly    rOPINIRole  1 mg Oral Nightly    rOPINIRole  2 mg Oral Nightly    aspirin  81 mg Oral Daily    atorvastatin  20 mg Oral Nightly    calcium elemental  500 mg Oral Daily    Vitamin D  2,000 Units Oral Daily    dilTIAZem  240 mg Oral Daily    doxepin  50 mg Oral Nightly    estrogens (conjugated)  1.25 mg Oral Daily    ferrous sulfate  325 mg Oral TID WC    gabapentin  600 mg Oral Nightly    levothyroxine  100 mcg Oral Daily    losartan  100 mg Oral Daily    montelukast  10 mg Oral Daily    pantoprazole  40 mg Oral BID    rOPINIRole  1 mg Oral Nightly    traZODone  50 mg Oral Nightly    venlafaxine  37.5 mg Oral BID WC    sodium chloride flush  5-40 mL Intravenous 2 times per day    enoxaparin  40 mg Subcutaneous Daily    cefepime  2,000 mg Intravenous Q12H    ursodiol  300 mg Oral BID     PRN Meds: HYDROcodone-homatropine, magic (miracle) mouthwash with nystatin, guaiFENesin, glucose, dextrose, glucagon (rDNA), dextrose, sodium chloride flush, sodium chloride, promethazine **OR** ondansetron, polyethylene glycol, acetaminophen **OR** acetaminophen, diphenhydrAMINE      Intake/Output Summary (Last 24 hours) at 5/12/2021 0913  Last data filed at 5/11/2021 1919  Gross per 24 hour   Intake 480 ml   Output 1500 ml   Net -1020 ml       Physical Exam Performed:    BP (!) 164/75   Pulse 68   Temp 97.5 °F (36.4 °C) (Oral)   Resp 16   Ht 5' 6\" (1.676 m)   Wt 210 lb (95.3 kg)   SpO2 97%   BMI 33.89 kg/m²     General appearance: No apparent distress, appears stated age and cooperative. HEENT: Pupils equal, round, and reactive to light. Conjunctivae/corneas clear. Neck: Supple, with full range of motion. No jugular venous distention. Trachea midline. Respiratory:  Normal respiratory effort. Clear to auscultation, bilaterally without Rales/Wheezes/Rhonchi. Cardiovascular: Regular rate and rhythm with normal S1/S2 without murmurs, rubs or gallops. Abdomen: Soft, non-tender, non-distended with normal bowel sounds. Musculoskeletal: No clubbing, cyanosis or edema bilaterally. Full range of motion without deformity. Skin: Skin color, texture, turgor normal.  No rashes or lesions. Neurologic:  Neurovascularly intact without any focal sensory/motor deficits. Cranial nerves: II-XII intact, grossly non-focal.  Psychiatric: Alert and oriented, thought content appropriate, normal insight  Capillary Refill: Brisk,< 3 seconds   Peripheral Pulses: +2 palpable, equal bilaterally       Labs:   Recent Labs     05/10/21  0748 05/11/21  0719 05/12/21  0750   WBC 11.7* 12.3* 9.4   HGB 9.7* 9.3* 9.6*   HCT 29.5* 28.0* 28.8*    226 231     Recent Labs     05/10/21  0748 05/11/21  0719 05/12/21  0750   * 129* 129*   K 3.7 3.9 4.1   CL 89* 92* 94*   CO2 31 31 27   BUN 18 14 13   CREATININE 0.8 0.6 <0.5*   CALCIUM 8.7 8.8 8.9   PHOS 3.4 3.2 3.3     No results for input(s): AST, ALT, BILIDIR, BILITOT, ALKPHOS in the last 72 hours. No results for input(s): INR in the last 72 hours. No results for input(s): Earnie Lent in the last 72 hours.     Urinalysis:      Lab Results   Component Value Date    NITRU Negative 07/20/2018    WBCUA  07/20/2018    BACTERIA 2+ 07/20/2018    RBCUA 10-20 07/20/2018    BLOODU LARGE 07/20/2018    SPECGRAV 1.010 07/20/2018    GLUCOSEU Negative 07/20/2018    GLUCOSEU NEGATIVE 12/30/2009       Consults:    IP CONSULT TO HOSPITALIST  IP CONSULT TO PULMONOLOGY  IP CONSULT TO PHARMACY      Assessment/Plan:    Active Hospital Problems    Diagnosis    Anemia - etiology clinically unable to determine, w/out evidence of active bleeding/hemolysis. Stable and asymptomatic w/out indication for transfusion. Follow serial labs. Reviewed and documented as above.     HypoNatremia - etiology clinically unable to determine but likely hypovolemic. Follow serial labs on IVF - stable. Reviewed and documented as above.     HypoThyroid - clinically euthyroid on oral replacement therapy. Continue, w/ outpt monitoring as previously arranged.      Obesity -  With Body mass index is 33.89 kg/m². Complicating assessment and treatment. Placing patient at risk for multiple co-morbidities as well as early death and contributing to the patient's presentation. Counseled on weight loss.        DVT Prophylaxis: LMWH  Diet: DIET GENERAL;  Code Status: Limited      PT/OT Eval Status: seen w/ recs for possible SNF.      Dispo - Medically stable to SNF Wed/Thurs 12/13 May pending clinical course and placement decision as well as Pulmonology recs re: CT scan.  .        Carisa Stallings MD

## 2021-05-12 NOTE — PROGRESS NOTES
AMYLASE  PT/INR:No results for input(s): PROTIME, INR in the last 72 hours. APTT: No results for input(s): APTT in the last 72 hours. UA:No results for input(s): NITRITE, COLORU, PHUR, LABCAST, WBCUA, RBCUA, MUCUS, TRICHOMONAS, YEAST, BACTERIA, CLARITYU, SPECGRAV, LEUKOCYTESUR, UROBILINOGEN, BILIRUBINUR, BLOODU, GLUCOSEU, AMORPHOUS in the last 72 hours. Invalid input(s): KETONESU  No results for input(s): PHART, VMC5FAW, PO2ART in the last 72 hours. Vent Information  Skin Assessment: Clean, dry, & intact  FiO2 : 60 %  SpO2: 97 %  SpO2/FiO2 ratio: 166.67  I Time/ I Time %: 1 s  Mask Type: Full face mask  Mask Size: Medium    CXR personally reviewed, pulm edema/infiltrates    CT chest personally reviewed, patch dense infiltrates throughout       Assessment:     1. Acute on chronic resp failure, hypoxic  2. Haemophilus pneumonia, multifocal   -progressive organizing pneumonia  3. COPD without acute exac    Plan:      -Discussed CT findings in detail, appears to have progressed into organizing pneumonia. Explains her persistent coughing and dyspnea/hypoxia. Will plan to continue prednisone 40mg for 10 days total. If her symptoms persist at that time without improvement can consider extending further  -Continue cefepime for 7 days to finish 5/14  -stop metaneb, continue bronchodilators  -Antitussives as needed, will continue to cough due to above mentioned pneumonia; nothing to indicate mucous plugging however. Encouraged acapella use and ambulation out of bed.    -DC planning per IM, plan for rehab placement.   -She follows with Ej Franks MD

## 2021-05-12 NOTE — PROGRESS NOTES
Patient discharged. IV removed, telemetry box and leads removed and returned. Lockbox emptied. All belongings gathered and returned to patient. Discharge instructions reviewed with patient, all questions answered by RN. Prescriptions sent with patient. Patient transported via [employer] [employer] to [facility]. Report called by primary RN. No further needs.

## 2021-05-12 NOTE — DISCHARGE SUMMARY
Hospital Medicine Discharge Summary    Patient ID: Yohannes Villasenor      Patient's PCP: Juan Antonio Rodriguez DO    Admit Date: 5/6/2021     Discharge Date: 5/12/2021      Admitting Physician: Javier Ruiz MD     Discharge Physician: Javier Ruiz MD     Discharge Diagnoses: Active Hospital Problems    Diagnosis    Hyponatremia [E87.1]    Acute and chronic respiratory failure with hypoxia (HCC) [J96.21]    Esophageal varices without bleeding (HCC) [I85.00]    Anemia [D64.9]    PNA (pneumonia) [J18.9]    GERD (gastroesophageal reflux disease) [K21.9]    Cirrhosis ([facility] [K74.60]    COPD (chronic obstructive pulmonary disease) (HCC) [J44.9]    HTN (hypertension) [I10]    Obesity (BMI 30.0-34. 9) [E66.9]    Hyperlipidemia [E78.5]    Type 2 diabetes mellitus ([facility] [address] [E11.9]       The patient was seen and examined on day of discharge and this discharge summary is in conjunction with any daily progress note from day of discharge. Hospital Course:          PNA - likely CAP w/ Strep Pneumonia.  Started on empiric Vanco/Cefepime in ED on 6 May - Cefepime continued as a single agent. Anticipate no further ABX at discharge. Followed serial labs w/out evidence of ABX induced nephrotoxicity.   CT scan 11 May w/ persistent multifocal ASD - started on Steroids per Pulmonology.      COPD - w/ chronic respiratory failure on baseline home O2.  Controlled on home medication regimen - continued.      Acute on Chronic Respiratory Failure - w/ hypoxia, POArrival.  Presence of clinical respiratory distress w/ tachypnea/dyspnea/SOB and wheezing w/ use of accessory muscles to breath.  Initially requiring BiPap at high risk for decompensation - improved.  Supplemental O2 and wean as tolerated. Pulmonology consulted and appreciated - admitted to [address] transferred to floor 7 May.     Sepsis - w/ Leukocytosis/Tachycardia POArrival 2nd to above infection.  Continue IVF as appropriate and monitor clinical response w/ ABX represents   multifocal pneumonia with some degree of atelectasis as well. Underlying mild bronchiectasis greater right upper lobe as well as emphysema   upper lobe predominant. XR CHEST PORTABLE   Final Result   No significant interval change. XR CHEST PORTABLE   Final Result   Mild improvement in CHF/pulmonary edema. Continued radiographic follow-up to   complete resolution recommended. XR CHEST PORTABLE   Final Result   Right hilar prominence which may be due to lymphadenopathy or increased   central pulmonary vessels. Increased interstitial markings reflecting interstitial edema/pulmonary   vascular congestion versus an atypical pneumonia. Left mid lung opacity likely reflecting airspace disease. Malignancy cannot   be excluded. Further evaluation with chest CT is recommended to better evaluate the above   pathologies. XR CHEST PORTABLE   Final Result   Consolidative opacities in the left mid to lower lung zone are noted. There   also prominence of the perihilar markings. Differential includes pulmonary   edema and multifocal pneumonia. Pulmonary neoplasm, particularly in the   periphery of the left lower lobe cannot be completely excluded. Follow-up to   resolution is recommended and consideration of a short term follow-up CT. Consults:     IP CONSULT TO HOSPITALIST  IP CONSULT TO PULMONOLOGY  IP CONSULT TO PHARMACY    Disposition: home    Condition at Discharge: Stable    Discharge Instructions/Follow-up:  w/ PCP 1-2 weeks and subspecialists as arranged. Code Status: Limited    Activity: activity as tolerated    Diet: regular diet      Discharge Medications:     Discharge Medication List as of 5/12/2021 12:20 PM           Details   predniSONE (DELTASONE) 20 MG tablet Take 2 tablets by mouth daily for 9 days, Disp-18 tablet, R-0Print              Details   gabapentin (NEURONTIN) 600 MG tablet Take 1 tablet by mouth nightly for 30 days. 600 mg at bedtime and 300 mg BID, Disp-30 tablet, R-0Print              Details   metFORMIN (GLUCOPHAGE-XR) 500 MG extended release tablet Take 2 tablets PO BID., Disp-360 tablet, R-1Normal      QVAR REDIHALER 80 MCG/ACT AERB inhaler DAWHistorical Med      venlafaxine (EFFEXOR) 37.5 MG tablet TAKE 1 TABLET DAILY, Disp-90 tablet, R-1Normal      doxepin (SINEQUAN) 50 MG capsule Take 1 capsule by mouth nightly, Disp-90 capsule, R-3Normal      traZODone (DESYREL) 50 MG tablet TAKE 2 TABLETS NIGHTLY, Disp-180 tablet, R-3Normal      pantoprazole (PROTONIX) 40 MG tablet Take 1 tablet by mouth 2 times daily, Disp-180 tablet, R-2Normal      cyanocobalamin 1000 MCG/ML injection Inject 1 mL into the muscle every 14 days Last dose was taken on 4/9/17, Disp-6 vial,R-3Normal      SITagliptin (JANUVIA) 100 MG tablet Take 1 tablet by mouth daily, Disp-90 tablet,R-2Normal      famotidine (PEPCID) 20 MG tablet Take 1 tablet by mouth 2 times daily, Disp-180 tablet,R-3Phone In      atorvastatin (LIPITOR) 20 MG tablet TAKE 1 TABLET NIGHTLY, Disp-90 tablet,R-3Normal      cetirizine (ZYRTEC) 10 MG tablet Take 1 tablet by mouth daily, Disp-30 tablet, R-3Normal      potassium chloride (KLOR-CON M) 20 MEQ extended release tablet Take 1 tablet by mouth daily, Disp-90 tablet, R-3Normal      furosemide (LASIX) 20 MG tablet Take 1 tablet by mouth daily, Disp-90 tablet, R-3Normal      dilTIAZem (TIAZAC) 240 MG extended release capsule TAKE 1 CAPSULE DAILY, Disp-90 capsule, R-3Normal      losartan (COZAAR) 100 MG tablet TAKE 1 TABLET DAILY, Disp-90 tablet, R-3Normal      ferrous sulfate 325 (65 Fe) MG tablet Take 325 mg by mouth 3 times daily (with meals)Historical Med      diphenhydrAMINE (BENADRYL) 25 MG tablet Take 50 mg by mouth nightly Historical Med      aspirin 81 MG EC tablet Take 81 mg by mouth daily Historical Med      Cholecalciferol (VITAMIN D) 2000 UNITS CAPS capsule Take 2,000 Units by mouth daily Historical Med      rOPINIRole (REQUIP) 1 MG tablet Take 1 mg by mouth nightly 1mg at 1730, 1mg at 2030, 2mg at 2200 (total of 4mg nightly in divided doses)Historical Med      ursodiol (ACTIGALL) 500 MG tablet Take 500 mg by mouth 2 times daily       calcium carbonate 600 MG TABS tablet Take 1 tablet by mouth daily Historical Med      levothyroxine (SYNTHROID) 100 MCG tablet Take 100 mcg by mouth Daily      Multiple Vitamins-Minerals (CENTRUM PO) Take 1 tablet by mouth daily. levalbuterol (XOPENEX HFA) 45 MCG/ACT inhaler Inhale 2 puffs into the lungs every 6 hours as needed Historical Med      beclomethasone (QVAR) 80 MCG/ACT inhaler Inhale 2 puffs into the lungs 2 times daily. Aclidinium Bromide (TUDORZA PRESSAIR) 400 MCG/ACT AEPB Inhale 1 puff into the lungs 2 times daily Historical Med      montelukast (SINGULAIR) 10 MG tablet Take 10 mg by mouth daily Historical Med      estrogens, conjugated, (PREMARIN) 1.25 MG tablet Take 1.25 mg by mouth daily. Time Spent on discharge is more than 30 minutes in the examination, evaluation, counseling and review of medications and discharge plan. Signed:    Megan Santiago MD   5/12/2021      Thank you Pipe Arcos DO for the opportunity to be involved in this patient's care. If you have any questions or concerns please feel free to contact me at [phone].

## 2021-05-12 NOTE — PLAN OF CARE
Problem: Falls - Risk of:  Goal: Will remain free from falls  Description: Will remain free from falls  5/12/2021 0123 by Yuval Jaramillo RN  Outcome: Ongoing     Problem: Skin Integrity:  Goal: Absence of new skin breakdown  Description: Absence of new skin breakdown  5/12/2021 0123 by Yuval Jaramillo RN  Outcome: Ongoing

## 2021-05-13 ENCOUNTER — TELEPHONE (OUTPATIENT)
Dept: FAMILY MEDICINE CLINIC | Age: 76
End: 2021-05-13

## 2021-05-14 ENCOUNTER — TELEPHONE (OUTPATIENT)
Dept: PULMONOLOGY | Age: 76
End: 2021-05-14

## 2021-05-14 NOTE — TELEPHONE ENCOUNTER
Pt. Wants script for steroids. She discharged herself from care center and went home. They gave her no medication. Pt. Had been a pt. Of Dr Griselda Jones. Last seen 9/3/20 and they had been refilling her meds up until 12/3/20. Technically she needs to go back to him or get a release. What do you want me to do.

## 2021-05-14 NOTE — TELEPHONE ENCOUNTER
Called [facility] [place] and PILAR for SW to return call to office

## 2021-05-14 NOTE — TELEPHONE ENCOUNTER
Yes get a release and have her follow up with us in office next week.    We only saw her in the hospital according to notes

## 2021-05-19 NOTE — TELEPHONE ENCOUNTER
Spoke with Akash Newberry at [facility] [place], states that patient admitted 05/12/2021 and left AMA 05/13/2021 with no medications or home health services.

## 2021-05-20 ENCOUNTER — OFFICE VISIT (OUTPATIENT)
Dept: PULMONOLOGY | Age: 76
End: 2021-05-20
Payer: MEDICARE

## 2021-05-20 VITALS
HEIGHT: 66 IN | SYSTOLIC BLOOD PRESSURE: 145 MMHG | WEIGHT: 211 LBS | OXYGEN SATURATION: 94 % | BODY MASS INDEX: 33.91 KG/M2 | DIASTOLIC BLOOD PRESSURE: 62 MMHG | HEART RATE: 84 BPM

## 2021-05-20 DIAGNOSIS — R93.89 INFILTRATE NOTED ON IMAGING STUDY: Primary | ICD-10-CM

## 2021-05-20 DIAGNOSIS — J41.0 SIMPLE CHRONIC BRONCHITIS (HCC): ICD-10-CM

## 2021-05-20 DIAGNOSIS — J96.11 CHRONIC RESPIRATORY FAILURE WITH HYPOXIA (HCC): ICD-10-CM

## 2021-05-20 PROCEDURE — 99214 OFFICE O/P EST MOD 30 MIN: CPT | Performed by: INTERNAL MEDICINE

## 2021-05-20 RX ORDER — LEVALBUTEROL TARTRATE 45 UG/1
2 AEROSOL, METERED ORAL EVERY 6 HOURS PRN
Qty: 3 INHALER | Refills: 4 | Status: SHIPPED | OUTPATIENT
Start: 2021-05-20

## 2021-05-20 RX ORDER — ACLIDINIUM BROMIDE 400 UG/1
1 POWDER, METERED RESPIRATORY (INHALATION) 2 TIMES DAILY
Qty: 3 EACH | Refills: 4 | Status: SHIPPED | OUTPATIENT
Start: 2021-05-20 | End: 2021-09-20 | Stop reason: SDUPTHER

## 2021-05-20 RX ORDER — PREDNISONE 10 MG/1
TABLET ORAL
Qty: 35 TABLET | Refills: 0 | Status: SHIPPED | OUTPATIENT
Start: 2021-05-20 | End: 2021-06-04

## 2021-05-20 ASSESSMENT — ENCOUNTER SYMPTOMS
EYE DISCHARGE: 0
SORE THROAT: 0
VOICE CHANGE: 0
STRIDOR: 0
ABDOMINAL PAIN: 0
CONSTIPATION: 0
DIARRHEA: 0
CHOKING: 0
EYE PAIN: 0
SHORTNESS OF BREATH: 1
EYE ITCHING: 0
CHEST TIGHTNESS: 0

## 2021-05-20 NOTE — PROGRESS NOTES
Pulmonary Outpatient Note   Isadora Nieto MD       5/20/2021    Chief Complaint:  Follow-Up from Hospital     HPI:   77y.o. year old female here for follow up of organizing pneumonia and COPD. Had persistent worsening symptoms for three months including cough, congestion, and shortness of breath  Diagnosed with pneumonia and admitted  Seen while inpatient  Had bronchoscopy with cultures revealing haemophilus  She had a CT chest showing dense multifocal infiltrates  She was treated with steroids and atb. Discharged to rehab, she left there after one day AMA  Was not continued on prednisone after leaving  She is dependent on oxygen at 4 LPM  Has not seen her PCP yet and states she is planning to change to another provider  Adherent to her inhalers  Has a neb device but no medications.      Past Medical History:   Diagnosis Date    Allergic     Anemia     Anesthesia complication     \"woke up during surgery\"    Arthritis     Osteoarthritis of bilateral CMC joints    Asthma     Followed by Dr. Wesley Sal Cirrhosis [facility])     non alcoholic    COPD (chronic obstructive pulmonary disease) ([address]     GERD (gastroesophageal reflux disease)     GIB (gastrointestinal bleeding)     LGIB 4/2016    Haemophilus infection 05/07/2021    + resp    Heart murmur     Hernia     Hyperlipidemia     Hypertension     Lung collapse     Murmur, heart     Pneumonia     Reflux     Rheumatic fever     Stress fracture     right foot    SVT (supraventricular tachycardia) ([address]     6/19/16 - admitted X 1 day per PT     TIA (transient ischemic attack) 2015    Type II or unspecified type diabetes mellitus without mention of complication, not stated as uncontrolled     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 ([address]     Wears glasses     as needed    Wears partial dentures     upper partial       Past Surgical History:   Procedure Laterality Date    BONE MARROW BIOPSY      BREAST BIOPSY      BRONCHOSCOPY N/A 5/7/2021 BRONCHOSCOPY DIAGNOSTIC OR CELL [address] ONLY performed by Gloria Puga MD at [address] [place] [address]  2021    BRONCHOSCOPY ALVEOLAR LAVAGE performed by Gloria Puga MD at [address] [place] [address]  2021    BRONCHOSCOPY THERAPUTIC ASPIRATION INITIAL performed by Gloria Puga MD at [address]  14    CATARACT REMOVAL      COLONOSCOPY      X 3 per PT 16    FINE NEEDLE ASPIRATION      FOOT SURGERY Right 2013    CORAL BUNIONECTOMY RIGHT FOOT WITH SCREW FIXATION;    HERNIA REPAIR      HYSTERECTOMY      JOINT REPLACEMENT Bilateral     TKR    KNEE SURGERY  09    left    KNEE SURGERY  2010    right    UPPER GASTROINTESTINAL ENDOSCOPY N/A 2018    EGD BIOPSY performed by Hemalatha Christensen MD at [address] History     Tobacco Use    Smoking status: Former Smoker     Packs/day: 1.00     Years: 40.00     Pack years: 40.00     Types: Cigarettes     Quit date: 2000     Years since quittin.9    Smokeless tobacco: Never Used    Tobacco comment: stopped cold turkey in !! ( )   Substance Use Topics    Alcohol use: No     Alcohol/week: 0.0 standard drinks          Family History   Problem Relation Age of Onset    Lupus Other     Colon Cancer Mother     Diabetes Father     Kidney Disease Paternal Grandmother          Current Outpatient Medications:     predniSONE (DELTASONE) 10 MG tablet, Take 4 tablets by mouth daily for 5 days, THEN 2 tablets daily for 5 days, THEN 1 tablet daily for 5 days. , Disp: 35 tablet, Rfl: 0    ipratropium (ATROVENT) 0.02 % nebulizer solution, Take 2.5 mLs by nebulization 4 times daily as needed for Wheezing (shortness of breath), Disp: 2.5 mL, Rfl: 3    beclomethasone (QVAR) 80 MCG/ACT inhaler, Inhale 2 puffs into the lungs 2 times daily, Disp: 3 Inhaler, Rfl: 4    levalbuterol (XOPENEX HFA) 45 MCG/ACT inhaler, Inhale 2 puffs into the lungs every 6 hours as needed for Shortness of Breath, Disp: 3 Inhaler, Rfl: 4    aclidinium (TUDORZA PRESSAIR) 400 MCG/ACT AEPB inhaler, Inhale 1 puff into the lungs 2 times daily, Disp: 3 each, Rfl: 4    gabapentin (NEURONTIN) 600 MG tablet, Take 1 tablet by mouth nightly for 30 days.  600 mg at bedtime and 300 mg BID, Disp: 30 tablet, Rfl: 0    metFORMIN (GLUCOPHAGE-XR) 500 MG extended release tablet, Take 2 tablets PO BID., Disp: 360 tablet, Rfl: 1    QVAR REDIHALER 80 MCG/ACT AERB inhaler, , Disp: , Rfl:     venlafaxine (EFFEXOR) 37.5 MG tablet, TAKE 1 TABLET DAILY, Disp: 90 tablet, Rfl: 1    doxepin (SINEQUAN) 50 MG capsule, Take 1 capsule by mouth nightly, Disp: 90 capsule, Rfl: 3    pantoprazole (PROTONIX) 40 MG tablet, Take 1 tablet by mouth 2 times daily, Disp: 180 tablet, Rfl: 2    SITagliptin (JANUVIA) 100 MG tablet, Take 1 tablet by mouth daily, Disp: 90 tablet, Rfl: 2    famotidine (PEPCID) 20 MG tablet, Take 1 tablet by mouth 2 times daily, Disp: 180 tablet, Rfl: 3    atorvastatin (LIPITOR) 20 MG tablet, TAKE 1 TABLET NIGHTLY, Disp: 90 tablet, Rfl: 3    cetirizine (ZYRTEC) 10 MG tablet, Take 1 tablet by mouth daily, Disp: 30 tablet, Rfl: 3    potassium chloride (KLOR-CON M) 20 MEQ extended release tablet, Take 1 tablet by mouth daily, Disp: 90 tablet, Rfl: 3    furosemide (LASIX) 20 MG tablet, Take 1 tablet by mouth daily, Disp: 90 tablet, Rfl: 3    dilTIAZem (TIAZAC) 240 MG extended release capsule, TAKE 1 CAPSULE DAILY, Disp: 90 capsule, Rfl: 3    losartan (COZAAR) 100 MG tablet, TAKE 1 TABLET DAILY, Disp: 90 tablet, Rfl: 3    ferrous sulfate 325 (65 Fe) MG tablet, Take 325 mg by mouth 3 times daily (with meals), Disp: , Rfl:     diphenhydrAMINE (BENADRYL) 25 MG tablet, Take 50 mg by mouth nightly , Disp: , Rfl:     aspirin 81 MG EC tablet, Take 81 mg by mouth daily , Disp: , Rfl:     rOPINIRole (REQUIP) 1 MG tablet, Take 1 mg by mouth nightly 1mg at 1730, 1mg at 2030, 2mg at 2200 (total of 4mg nightly in divided doses), Disp: , Rfl:     ursodiol (ACTIGALL) 500 MG tablet, Take 500 mg by mouth 2 times daily , Disp: , Rfl:     calcium carbonate 600 MG TABS tablet, Take 1 tablet by mouth daily , Disp: , Rfl:     levothyroxine (SYNTHROID) 100 MCG tablet, Take 100 mcg by mouth Daily, Disp: , Rfl:     Multiple Vitamins-Minerals (CENTRUM PO), Take 1 tablet by mouth daily. , Disp: , Rfl:     Aclidinium Bromide (TUDORZA PRESSAIR) 400 MCG/ACT AEPB, Inhale 1 puff into the lungs 2 times daily , Disp: , Rfl:     montelukast (SINGULAIR) 10 MG tablet, Take 10 mg by mouth daily , Disp: , Rfl:     estrogens, conjugated, (PREMARIN) 1.25 MG tablet, Take 1.25 mg by mouth daily. , Disp: , Rfl:     predniSONE (DELTASONE) 20 MG tablet, Take 2 tablets by mouth daily for 9 days (Patient not taking: Reported on 5/20/2021), Disp: 18 tablet, Rfl: 0    traZODone (DESYREL) 50 MG tablet, TAKE 2 TABLETS NIGHTLY (Patient not taking: Reported on 5/20/2021), Disp: 180 tablet, Rfl: 3    cyanocobalamin 1000 MCG/ML injection, Inject 1 mL into the muscle every 14 days Last dose was taken on 4/9/17, Disp: 6 vial, Rfl: 3    Cholecalciferol (VITAMIN D) 2000 UNITS CAPS capsule, Take 2,000 Units by mouth daily , Disp: , Rfl:     Latex, Clindamycin, Pennsaid [diclofenac sodium], Torsemide, Actos [pioglitazone], Amoxicillin, Augmentin [amoxicillin-pot clavulanate], Bactrim [sulfamethoxazole-trimethoprim], Ciprofloxacin, Doxycycline, Levaquin [levofloxacin], Ativan [lorazepam], Cephalosporins, Pcn [penicillins], and Proventil [albuterol]    Vitals:    05/20/21 1152   BP: (!) 145/62   Site: Left Upper Arm   Position: Sitting   Cuff Size: Medium Adult   Pulse: 84   SpO2: 94%   Weight: 211 lb (95.7 kg)   Height: 5' 6\" (1.676 m)       Review of Systems   Constitutional: Negative for chills, fever and unexpected weight change. HENT: Negative for mouth sores, sore throat and voice change. Eyes: Negative for pain, discharge and itching.

## 2021-05-20 NOTE — PROGRESS NOTES
MA Communication: The following orders are received by verbal communication from Dr. Travis Lemon. Orders include:    CT wo contrast: 06/03/2021 1:30 pm  Follow up: 06/08/2021 11:45 am    Patient given list of PCP for [facility].

## 2021-05-24 ENCOUNTER — TELEPHONE (OUTPATIENT)
Dept: PULMONOLOGY | Age: 76
End: 2021-05-24

## 2021-05-24 NOTE — TELEPHONE ENCOUNTER
[facility] asking for alternative to Levalbuter HFA .  They want albuterol HFA inhaler 8.5gm or ventolin HFA inh 18 gm w COUNT

## 2021-05-25 ENCOUNTER — TELEPHONE (OUTPATIENT)
Dept: ADMINISTRATIVE | Age: 76
End: 2021-05-25

## 2021-05-25 NOTE — TELEPHONE ENCOUNTER
Patient worked for a [place] provider for years, and is inquiring to get a new pcp Nelly Sierra who is a DO, and patient has certain requirements, patient was referred to Dr. Royal Gross by Dr Shailesh Us.   Please call to schedule a new to provider appointment with patient if possible, or please advise patient needs to stay close to home near [facility]

## 2021-05-26 ENCOUNTER — CLINICAL DOCUMENTATION (OUTPATIENT)
Dept: SPIRITUAL SERVICES | Age: 76
End: 2021-05-26

## 2021-05-26 NOTE — FLOWSHEET NOTE
05/26/21 1413   Encounter Summary   Services provided to: Patient   Support System Children   Continue Visiting   (5-26, followup, prayer, support)   Complexity of Encounter Low   Length of Encounter 30 minutes   Grief and Life Adjustment   Type Grief and loss   Assessment Calm; Approachable;Coping   Intervention Active listening;Explored feelings, thoughts, concerns;Nurtured hope;Prayer   Outcome Engaged in conversation;Expressed feelings/needs/concerns;Coping;Receptive   Outpatient Spiritual Care follow-up to offer support. Patient doing well. She shared her recent experience in the hospital.  She just experienced the first anniversary and birthday without her late . Offered prayer and ongoing support. Follow-up call next month.

## 2021-05-27 RX ORDER — DILTIAZEM HYDROCHLORIDE 240 MG/1
CAPSULE, EXTENDED RELEASE ORAL
Qty: 90 CAPSULE | Refills: 3 | Status: SHIPPED | OUTPATIENT
Start: 2021-05-27 | End: 2022-03-02 | Stop reason: SDUPTHER

## 2021-05-27 NOTE — TELEPHONE ENCOUNTER
Last ov 4/21/21  Pending appt 9/9/21  (if apt not scheduled call the pt or send my chart message)  Last refill 6/15/20 #90x3  Last labs 5/11/21

## 2021-05-28 RX ORDER — LOSARTAN POTASSIUM 100 MG/1
TABLET ORAL
Qty: 90 TABLET | Refills: 3 | Status: SHIPPED | OUTPATIENT
Start: 2021-05-28 | End: 2022-03-02 | Stop reason: SDUPTHER

## 2021-06-02 NOTE — TELEPHONE ENCOUNTER
Called insurance and s/w Oksana Wongs at [employer]. Pt ID# [id number]. PA for Levalbuterol HFA was APPROVED effective 5/3/21-6/2/21    [facility] # [id number] (give [facility] # to pharmacy so rx will go through)    Case #[id number]    Pharmacy #[phone] - Calvin George and informed her of approval.  She said she saw the approval in their system and will process the Rx.

## 2021-06-03 ENCOUNTER — HOSPITAL ENCOUNTER (OUTPATIENT)
Dept: CT IMAGING | Age: 76
Discharge: HOME | End: 2021-06-03
Payer: MEDICARE

## 2021-06-03 DIAGNOSIS — R93.89 INFILTRATE NOTED ON IMAGING STUDY: ICD-10-CM

## 2021-06-03 PROCEDURE — 71250 CT THORAX DX C-: CPT

## 2021-06-07 LAB
FUNGUS (MYCOLOGY) CULTURE: ABNORMAL
FUNGUS STAIN: ABNORMAL
ORGANISM: ABNORMAL

## 2021-06-08 ENCOUNTER — OFFICE VISIT (OUTPATIENT)
Dept: PULMONOLOGY | Age: 76
End: 2021-06-08
Payer: MEDICARE

## 2021-06-08 VITALS
HEART RATE: 80 BPM | OXYGEN SATURATION: 95 % | DIASTOLIC BLOOD PRESSURE: 78 MMHG | BODY MASS INDEX: 34.07 KG/M2 | HEIGHT: 66 IN | RESPIRATION RATE: 20 BRPM | WEIGHT: 212 LBS | TEMPERATURE: 98 F | SYSTOLIC BLOOD PRESSURE: 139 MMHG

## 2021-06-08 DIAGNOSIS — J96.11 CHRONIC RESPIRATORY FAILURE WITH HYPOXIA (HCC): ICD-10-CM

## 2021-06-08 DIAGNOSIS — J41.0 SIMPLE CHRONIC BRONCHITIS (HCC): Primary | ICD-10-CM

## 2021-06-08 DIAGNOSIS — E66.9 OBESITY (BMI 30.0-34.9): ICD-10-CM

## 2021-06-08 PROCEDURE — 99214 OFFICE O/P EST MOD 30 MIN: CPT | Performed by: INTERNAL MEDICINE

## 2021-06-08 ASSESSMENT — ENCOUNTER SYMPTOMS
SORE THROAT: 0
ABDOMINAL PAIN: 0
EYE PAIN: 0
DIARRHEA: 0
EYE DISCHARGE: 0
STRIDOR: 0
EYE ITCHING: 0
CHEST TIGHTNESS: 0
CONSTIPATION: 0
VOICE CHANGE: 0
CHOKING: 0
SHORTNESS OF BREATH: 1

## 2021-06-08 NOTE — PROGRESS NOTES
MA Communication: The following orders are received by verbal communication from Dr. Jacinto Asp.     Orders include:  3 mo fu scheduled 9/7/21

## 2021-06-08 NOTE — PROGRESS NOTES
Pulmonary Outpatient Note   Leatha Ramirez MD       6/8/2021    Chief Complaint:  Results (CT fu)     HPI:   77y.o. year old female here for follow up of COPD. Treated with an extended course of prednisone for organizing pneumonia. She has completed this last week   CT chest personally reviewed showing resolution of infiltrates and marked improvement in aeration of the lung.    She does feel improved, but thinks that the prednisone was helping and now that she is off of it her symptoms are returning  Clear sputum production  She is dependent on 3 LPM of oxygen continuously, states that if she stops it she feels worse especially with exertion  Adherent to inhalers   Denies weight gain with steroids     Past Medical History:   Diagnosis Date    Allergic     Anemia     Anesthesia complication     \"woke up during surgery\"    Arthritis     Osteoarthritis of bilateral CMC joints    Asthma     Followed by Dr. Ilir Mccormick Cirrhosis [facility])     non alcoholic    [facility]     GERD (gastroesophageal reflux disease)     GIB (gastrointestinal bleeding)     LGIB 4/2016    Haemophilus infection 05/07/2021    + resp    Heart murmur     Hernia     Hyperlipidemia     Hypertension     Lung collapse     Murmur, heart     Pneumonia     Reflux     Rheumatic fever     Stress fracture     right foot    SVT (supraventricular tachycardia) ([address]     6/19/16 - admitted X 1 day per PT     TIA (transient ischemic attack) 2015    Type II or unspecified type diabetes mellitus without mention of complication, not stated as uncontrolled     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 ([facility]     Wears glasses     as needed    Wears partial dentures     upper partial       Past Surgical History:   Procedure Laterality Date    BONE MARROW BIOPSY      BREAST BIOPSY      BRONCHOSCOPY N/A 5/7/2021    BRONCHOSCOPY DIAGNOSTIC OR CELL [address] ONLY performed by Alla Brooks MD at [address] [place] [address] ENDOSCOPY    BRONCHOSCOPY  2021    BRONCHOSCOPY ALVEOLAR LAVAGE performed by Susan Barraza MD at [address] [facility]  2021    BRONCHOSCOPY THERAPUTIC ASPIRATION INITIAL performed by Susan Barraza MD at [place] [address]  14    CATARACT REMOVAL      COLONOSCOPY      X 3 per PT 16    FINE NEEDLE ASPIRATION      FOOT SURGERY Right 2013    CORAL BUNIONECTOMY RIGHT FOOT WITH SCREW FIXATION;    HERNIA REPAIR      HYSTERECTOMY      JOINT REPLACEMENT Bilateral     TKR    KNEE SURGERY  09    left    KNEE SURGERY  2010    right    UPPER GASTROINTESTINAL ENDOSCOPY N/A 2018    EGD BIOPSY performed by Renetta Holm MD at [address] [place] [address] History     Tobacco Use    Smoking status: Former Smoker     Packs/day: 1.00     Years: 40.00     Pack years: 40.00     Types: Cigarettes     Quit date: 2000     Years since quittin.0    Smokeless tobacco: Never Used    Tobacco comment: stopped cold turkey in !! ( )   Substance Use Topics    Alcohol use: No     Alcohol/week: 0.0 standard drinks          Family History   Problem Relation Age of Onset    Lupus Other     Colon Cancer Mother     Diabetes Father     Kidney Disease Paternal Grandmother          Current Outpatient Medications:     losartan (COZAAR) 100 MG tablet, TAKE 1 TABLET DAILY, Disp: 90 tablet, Rfl: 3    dilTIAZem (TIAZAC) 240 MG extended release capsule, TAKE 1 CAPSULE DAILY, Disp: 90 capsule, Rfl: 3    ipratropium (ATROVENT) 0.02 % nebulizer solution, Take 2.5 mLs by nebulization 4 times daily as needed for Wheezing (shortness of breath), Disp: 2.5 mL, Rfl: 3    beclomethasone (QVAR) 80 MCG/ACT inhaler, Inhale 2 puffs into the lungs 2 times daily, Disp: 3 Inhaler, Rfl: 4    levalbuterol (XOPENEX HFA) 45 MCG/ACT inhaler, Inhale 2 puffs into the lungs every 6 hours as needed for Shortness of Breath, Disp: 3 Inhaler, Rfl: 4    aclidinium (TUDORZA PRESSAIR) 400 MCG/ACT AEPB inhaler, Inhale 1 puff into the lungs 2 times daily, Disp: 3 each, Rfl: 4    gabapentin (NEURONTIN) 600 MG tablet, Take 1 tablet by mouth nightly for 30 days.  600 mg at bedtime and 300 mg BID, Disp: 30 tablet, Rfl: 0    metFORMIN (GLUCOPHAGE-XR) 500 MG extended release tablet, Take 2 tablets PO BID., Disp: 360 tablet, Rfl: 1    venlafaxine (EFFEXOR) 37.5 MG tablet, TAKE 1 TABLET DAILY, Disp: 90 tablet, Rfl: 1    doxepin (SINEQUAN) 50 MG capsule, Take 1 capsule by mouth nightly, Disp: 90 capsule, Rfl: 3    traZODone (DESYREL) 50 MG tablet, TAKE 2 TABLETS NIGHTLY, Disp: 180 tablet, Rfl: 3    pantoprazole (PROTONIX) 40 MG tablet, Take 1 tablet by mouth 2 times daily, Disp: 180 tablet, Rfl: 2    cyanocobalamin 1000 MCG/ML injection, Inject 1 mL into the muscle every 14 days Last dose was taken on 4/9/17, Disp: 6 vial, Rfl: 3    SITagliptin (JANUVIA) 100 MG tablet, Take 1 tablet by mouth daily, Disp: 90 tablet, Rfl: 2    famotidine (PEPCID) 20 MG tablet, Take 1 tablet by mouth 2 times daily, Disp: 180 tablet, Rfl: 3    atorvastatin (LIPITOR) 20 MG tablet, TAKE 1 TABLET NIGHTLY, Disp: 90 tablet, Rfl: 3    cetirizine (ZYRTEC) 10 MG tablet, Take 1 tablet by mouth daily, Disp: 30 tablet, Rfl: 3    potassium chloride (KLOR-CON M) 20 MEQ extended release tablet, Take 1 tablet by mouth daily, Disp: 90 tablet, Rfl: 3    furosemide (LASIX) 20 MG tablet, Take 1 tablet by mouth daily, Disp: 90 tablet, Rfl: 3    ferrous sulfate 325 (65 Fe) MG tablet, Take 325 mg by mouth 3 times daily (with meals), Disp: , Rfl:     diphenhydrAMINE (BENADRYL) 25 MG tablet, Take 50 mg by mouth nightly , Disp: , Rfl:     aspirin 81 MG EC tablet, Take 81 mg by mouth daily , Disp: , Rfl:     Cholecalciferol (VITAMIN D) 2000 UNITS CAPS capsule, Take 2,000 Units by mouth daily , Disp: , Rfl:     rOPINIRole (REQUIP) 1 MG tablet, Take 1 mg by mouth nightly 1mg at 1730, 1mg at 2030, 2mg at 2200 (total of 4mg nightly in divided doses), Disp: , Rfl:     ursodiol (ACTIGALL) 500 MG tablet, Take 500 mg by mouth 2 times daily , Disp: , Rfl:     calcium carbonate 600 MG TABS tablet, Take 1 tablet by mouth daily , Disp: , Rfl:     levothyroxine (SYNTHROID) 100 MCG tablet, Take 100 mcg by mouth Daily, Disp: , Rfl:     Multiple Vitamins-Minerals (CENTRUM PO), Take 1 tablet by mouth daily. , Disp: , Rfl:     montelukast (SINGULAIR) 10 MG tablet, Take 10 mg by mouth daily , Disp: , Rfl:     estrogens, conjugated, (PREMARIN) 1.25 MG tablet, Take 1.25 mg by mouth daily. , Disp: , Rfl:     Latex, Clindamycin, Pennsaid [diclofenac sodium], Torsemide, Actos [pioglitazone], Amoxicillin, Augmentin [amoxicillin-pot clavulanate], Bactrim [sulfamethoxazole-trimethoprim], Ciprofloxacin, Doxycycline, Levaquin [levofloxacin], Ativan [lorazepam], Cephalosporins, Pcn [penicillins], and Proventil [albuterol]    Vitals:    06/08/21 1128   BP: 139/78   Site: Left Upper Arm   Position: Sitting   Cuff Size: Medium Adult   Pulse: 80   Resp: 20   Temp: 98 °F (36.7 °C)   TempSrc: Oral   SpO2: 95%   Weight: 212 lb (96.2 kg)   Height: 5' 6\" (1.676 m)       Review of Systems   Constitutional: Negative for chills, fever and unexpected weight change. HENT: Negative for mouth sores, sore throat and voice change. Eyes: Negative for pain, discharge and itching. Respiratory: Positive for shortness of breath. Negative for choking, chest tightness and stridor. Cardiovascular: Negative for chest pain, palpitations and leg swelling. Gastrointestinal: Negative for abdominal pain, constipation and diarrhea. Endocrine: Negative for cold intolerance, heat intolerance and polydipsia. Genitourinary: Negative for dysuria, frequency and hematuria. Musculoskeletal: Negative for gait problem, joint swelling and neck stiffness. Neurological: Negative for dizziness, numbness and headaches.    Psychiatric/Behavioral: Negative for agitation, confusion and hallucinations. Physical Exam  Constitutional:       General: She is not in acute distress. Appearance: She is well-developed. She is not diaphoretic. HENT:      Head: Normocephalic and atraumatic. Mouth/Throat:      Pharynx: No oropharyngeal exudate. Eyes:      Pupils: Pupils are equal, round, and reactive to light. Neck:      Vascular: No JVD. Cardiovascular:      Heart sounds: Normal heart sounds. No murmur heard. No friction rub. No gallop. Pulmonary:      Effort: Pulmonary effort is normal.      Breath sounds: No wheezing or rales. Abdominal:      General: Bowel sounds are normal. There is no distension. Palpations: Abdomen is soft. Tenderness: There is no abdominal tenderness. Musculoskeletal:         General: Normal range of motion. Cervical back: Neck supple. Lymphadenopathy:      Cervical: No cervical adenopathy. Skin:     General: Skin is warm and dry. Findings: No rash. Neurological:      Mental Status: She is alert. Cranial Nerves: No cranial nerve deficit. Comments: CN 2-12 grossly intact         ASSESSMENT:    1. Simple chronic bronchitis ([address]    2. Chronic respiratory failure with hypoxia ([facility])    3. Obesity (BMI 30.0-34. 9)      PLAN:    -Improving organizing pneumonia, no further treatment needed at this time.  Will plan to repeat CT in six months but sooner if symptoms worsen   -Bronchodilators  -PRN xopenex  -Supplemental oxygen   -Offered pulm rehab, she declined  -Counseled on weight management, this is contributing to her respiratory issues   -Advised on the serious risks of long term prednisone, will try to hold off for now but restart if she worsens         Catherine Hess MD

## 2021-06-09 ENCOUNTER — ANESTHESIA EVENT (OUTPATIENT)
Dept: ENDOSCOPY | Age: 76
End: 2021-06-09
Payer: MEDICARE

## 2021-06-10 ENCOUNTER — HOSPITAL ENCOUNTER (OUTPATIENT)
Age: 76
Setting detail: OUTPATIENT SURGERY
Discharge: HOME | End: 2021-06-10
Attending: INTERNAL MEDICINE | Admitting: INTERNAL MEDICINE
Payer: MEDICARE

## 2021-06-10 ENCOUNTER — ANESTHESIA (OUTPATIENT)
Dept: ENDOSCOPY | Age: 76
End: 2021-06-10
Payer: MEDICARE

## 2021-06-10 VITALS
WEIGHT: 211 LBS | BODY MASS INDEX: 33.91 KG/M2 | HEART RATE: 72 BPM | RESPIRATION RATE: 16 BRPM | DIASTOLIC BLOOD PRESSURE: 64 MMHG | OXYGEN SATURATION: 100 % | SYSTOLIC BLOOD PRESSURE: 136 MMHG | HEIGHT: 66 IN | TEMPERATURE: 98 F

## 2021-06-10 VITALS — DIASTOLIC BLOOD PRESSURE: 61 MMHG | OXYGEN SATURATION: 93 % | SYSTOLIC BLOOD PRESSURE: 143 MMHG

## 2021-06-10 DIAGNOSIS — I85.00 ESOPHAGEAL VARICES WITHOUT BLEEDING, UNSPECIFIED ESOPHAGEAL VARICES TYPE (HCC): ICD-10-CM

## 2021-06-10 DIAGNOSIS — Z86.010 HX OF COLONIC POLYPS: ICD-10-CM

## 2021-06-10 LAB
GLUCOSE BLD-MCNC: 159 MG/DL (ref 70–99)
PERFORMED ON: ABNORMAL

## 2021-06-10 PROCEDURE — 3609010200 HC COLONOSCOPY ABLATION TUMOR POLYP/OTHER LES: Performed by: INTERNAL MEDICINE

## 2021-06-10 PROCEDURE — 3609012300 HC EGD BAND LIGATION ESOPHGEAL/GASTRIC VARICES: Performed by: INTERNAL MEDICINE

## 2021-06-10 PROCEDURE — 3609013200 HC EGD W/ ABLATION: Performed by: INTERNAL MEDICINE

## 2021-06-10 PROCEDURE — 3700000000 HC ANESTHESIA ATTENDED CARE: Performed by: INTERNAL MEDICINE

## 2021-06-10 PROCEDURE — 6360000002 HC RX W HCPCS: Performed by: NURSE ANESTHETIST, CERTIFIED REGISTERED

## 2021-06-10 PROCEDURE — 2580000003 HC RX 258: Performed by: ANESTHESIOLOGY

## 2021-06-10 PROCEDURE — 7100000010 HC PHASE II RECOVERY - FIRST 15 MIN: Performed by: INTERNAL MEDICINE

## 2021-06-10 PROCEDURE — 88341 IMHCHEM/IMCYTCHM EA ADD ANTB: CPT

## 2021-06-10 PROCEDURE — 2709999900 HC NON-CHARGEABLE SUPPLY: Performed by: INTERNAL MEDICINE

## 2021-06-10 PROCEDURE — 7100000011 HC PHASE II RECOVERY - ADDTL 15 MIN: Performed by: INTERNAL MEDICINE

## 2021-06-10 PROCEDURE — 88342 IMHCHEM/IMCYTCHM 1ST ANTB: CPT

## 2021-06-10 PROCEDURE — 2720000010 HC SURG SUPPLY STERILE: Performed by: INTERNAL MEDICINE

## 2021-06-10 PROCEDURE — 2500000003 HC RX 250 WO HCPCS: Performed by: NURSE ANESTHETIST, CERTIFIED REGISTERED

## 2021-06-10 PROCEDURE — 88305 TISSUE EXAM BY PATHOLOGIST: CPT

## 2021-06-10 PROCEDURE — 3700000001 HC ADD 15 MINUTES (ANESTHESIA): Performed by: INTERNAL MEDICINE

## 2021-06-10 RX ORDER — SODIUM CHLORIDE 9 MG/ML
INJECTION, SOLUTION INTRAVENOUS CONTINUOUS
Status: DISCONTINUED | OUTPATIENT
Start: 2021-06-10 | End: 2021-06-10 | Stop reason: HOSPADM

## 2021-06-10 RX ORDER — SODIUM CHLORIDE 0.9 % (FLUSH) 0.9 %
10 SYRINGE (ML) INJECTION EVERY 12 HOURS SCHEDULED
Status: DISCONTINUED | OUTPATIENT
Start: 2021-06-10 | End: 2021-06-10 | Stop reason: HOSPADM

## 2021-06-10 RX ORDER — PROPOFOL 10 MG/ML
INJECTION, EMULSION INTRAVENOUS PRN
Status: DISCONTINUED | OUTPATIENT
Start: 2021-06-10 | End: 2021-06-10 | Stop reason: SDUPTHER

## 2021-06-10 RX ORDER — SODIUM CHLORIDE 0.9 % (FLUSH) 0.9 %
10 SYRINGE (ML) INJECTION PRN
Status: DISCONTINUED | OUTPATIENT
Start: 2021-06-10 | End: 2021-06-10 | Stop reason: HOSPADM

## 2021-06-10 RX ORDER — LIDOCAINE HYDROCHLORIDE 20 MG/ML
INJECTION, SOLUTION EPIDURAL; INFILTRATION; INTRACAUDAL; PERINEURAL PRN
Status: DISCONTINUED | OUTPATIENT
Start: 2021-06-10 | End: 2021-06-10 | Stop reason: SDUPTHER

## 2021-06-10 RX ORDER — ONDANSETRON 2 MG/ML
4 INJECTION INTRAMUSCULAR; INTRAVENOUS
Status: DISCONTINUED | OUTPATIENT
Start: 2021-06-10 | End: 2021-06-10 | Stop reason: HOSPADM

## 2021-06-10 RX ORDER — SODIUM CHLORIDE 9 MG/ML
25 INJECTION, SOLUTION INTRAVENOUS PRN
Status: DISCONTINUED | OUTPATIENT
Start: 2021-06-10 | End: 2021-06-10 | Stop reason: HOSPADM

## 2021-06-10 RX ORDER — SODIUM CHLORIDE, SODIUM LACTATE, POTASSIUM CHLORIDE, CALCIUM CHLORIDE 600; 310; 30; 20 MG/100ML; MG/100ML; MG/100ML; MG/100ML
INJECTION, SOLUTION INTRAVENOUS CONTINUOUS
Status: DISCONTINUED | OUTPATIENT
Start: 2021-06-10 | End: 2021-06-10 | Stop reason: HOSPADM

## 2021-06-10 RX ADMIN — PROPOFOL 100 MG: 10 INJECTION, EMULSION INTRAVENOUS at 11:40

## 2021-06-10 RX ADMIN — LIDOCAINE HYDROCHLORIDE 100 MG: 20 INJECTION, SOLUTION EPIDURAL; INFILTRATION; INTRACAUDAL; PERINEURAL at 11:11

## 2021-06-10 RX ADMIN — PROPOFOL 100 MG: 10 INJECTION, EMULSION INTRAVENOUS at 11:48

## 2021-06-10 RX ADMIN — PROPOFOL 100 MG: 10 INJECTION, EMULSION INTRAVENOUS at 11:11

## 2021-06-10 RX ADMIN — PROPOFOL 100 MG: 10 INJECTION, EMULSION INTRAVENOUS at 11:58

## 2021-06-10 RX ADMIN — PROPOFOL 100 MG: 10 INJECTION, EMULSION INTRAVENOUS at 11:17

## 2021-06-10 RX ADMIN — SODIUM CHLORIDE, POTASSIUM CHLORIDE, SODIUM LACTATE AND CALCIUM CHLORIDE: 600; 310; 30; 20 INJECTION, SOLUTION INTRAVENOUS at 11:00

## 2021-06-10 RX ADMIN — PROPOFOL 100 MG: 10 INJECTION, EMULSION INTRAVENOUS at 11:24

## 2021-06-10 RX ADMIN — PROPOFOL 100 MG: 10 INJECTION, EMULSION INTRAVENOUS at 11:28

## 2021-06-10 ASSESSMENT — PULMONARY FUNCTION TESTS
PIF_VALUE: 1
PIF_VALUE: 2
PIF_VALUE: 2
PIF_VALUE: 1
PIF_VALUE: 2
PIF_VALUE: 1
PIF_VALUE: 2
PIF_VALUE: 1
PIF_VALUE: 1

## 2021-06-10 ASSESSMENT — PAIN - FUNCTIONAL ASSESSMENT
PAIN_FUNCTIONAL_ASSESSMENT: 0-10

## 2021-06-10 ASSESSMENT — COPD QUESTIONNAIRES: CAT_SEVERITY: SEVERE

## 2021-06-10 ASSESSMENT — ENCOUNTER SYMPTOMS: SHORTNESS OF BREATH: 1

## 2021-06-10 NOTE — OP NOTE
[address] [place] [address]  Esophagogastroduodenoscopy Note        Patient: Rony Age  : 1945     Procedure: Esophagogastroduodenoscopy with variceal band ligation, hot snare polypectomy    Date:  6/10/2021     Anesthesia: MAC    Indications: Surveillance of varices. Description of Procedure:  Informed consent was obtained from the patient after explanation of indications, benefits and possible risks and complications of the procedure. The procedure was done at bedside. Patient was placed in the left lateral decubitus position and the above IV sedation was administrered. The Olympus videoendoscope was placed in the patient's mouth and under direct visualization passed into the esophagus. The scope was then advanced into the stomach and then into the second portion of the duodenum. · The duodenum showed no abnormalities   · The stomach showed multiple ulcerated polyps in the body. Several of them were removed using a hot snare and retrieved with a wilkerson net. · Esophagus showed medium esophageal varices. 3 bands were placed. Retroflexed views of the cardia and fundus showed no other abnormalities. The scope was anteflexed and the stomach was decompressed, and the scope was completely withdrawn. The patient tolerated the procedure well. EBL: None    Impression:     Esophageal varices   Gastric polyps    Recommendations:      Call for biopsy results in 10 days.  Repeat EGD in 1 year.     Maira Infante MD, MD  [address] [facility]

## 2021-06-10 NOTE — H&P
History and Physical / Pre-Sedation Assessment      PMHx:   Past Medical History:   Diagnosis Date    Allergic     Anemia     Anesthesia complication     \"woke up during surgery\"    Arthritis     Osteoarthritis of bilateral CMC joints    Asthma     Followed by Dr. Vasquez Sal Cirrhosis [facility])     non alcoholic    [facility]     GERD (gastroesophageal reflux disease)     GIB (gastrointestinal bleeding)     LGIB 4/2016    Haemophilus infection 05/07/2021    + resp    Heart murmur     Hernia     Hyperlipidemia     Hypertension     Lung collapse     Murmur, heart     Pneumonia     Reflux     Rheumatic fever     Stress fracture     right foot    SVT ([facility]raventricular tachycardia) ([facility]     6/19/16 - admitted X 1 day per PT     TIA (transient ischemic attack) 2015    Type II or unspecified type diabetes mellitus without mention of complication, not stated as uncontrolled     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 ([facility]     Wears glasses     as needed    Wears partial dentures     upper partial       Medications:   Prior to Admission medications    Medication Sig Start Date End Date Taking?  Authorizing Provider   losartan (COZAAR) 100 MG tablet TAKE 1 TABLET DAILY 5/28/21   Darrell Larkin MD   [facility]) 240 MG extended release capsule TAKE 1 CAPSULE DAILY 5/27/21   Darrell Larkin MD   ipratropium (ATROVENT) 0.02 % nebulizer solution Take 2.5 mLs by nebulization 4 times daily as needed for Wheezing (shortness of breath) 5/20/21   Henri Gonsalez MD   beclomethasone (QVAR) 80 MCG/ACT inhaler Inhale 2 puffs into the lungs 2 times daily 5/20/21   Henri Gonsalez MD   [facility]erol [facility]) 45 MCG/ACT inhaler Inhale 2 puffs into the lungs every 6 hours as needed for Shortness of Breath 5/20/21   Henri Gonsalez MD   aclidinium (TUDORZA PRESSAIR) 400 MCG/ACT AEPB inhaler Inhale 1 puff into the lungs 2 times daily 5/20/21   Henri Gonsalez MD   gabapentin (NEURONTIN) 600 MG tablet Take 1 tablet by mouth nightly for 30 days.  600 mg at bedtime and 300 mg BID 5/12/21 6/11/21  Craig Fothergill, MD   metFORMIN (GLUCOPHAGE-XR) 500 MG extended release tablet Take 2 tablets PO BID. 4/27/21   Lin Reis MD   venlafaxine (EFFEXOR) 37.5 MG tablet TAKE 1 TABLET DAILY 3/16/21   Nazario Berger MD   doxepin [facility]) 50 MG capsule Take 1 capsule by mouth nightly 2/15/21 2/15/22  Nazario Berger MD   traZODone (DESYREL) 50 MG tablet TAKE 2 TABLETS NIGHTLY 12/22/20   Nazario Berger MD   pantoprazole (PROTONIX) 40 MG tablet Take 1 tablet by mouth 2 times daily 12/9/20   Nazario Berger MD   cyanocobalamin 1000 MCG/ML injection Inject 1 mL into the muscle every 14 days Last dose was taken on 4/9/17 11/18/20   Amanda Adams MD   SITagliptin (JANUVIA) 100 MG tablet Take 1 tablet by mouth daily 11/18/20   Amanda Adams MD   famotidine (PEPCID) 20 MG tablet Take 1 tablet by mouth 2 times daily 11/1/20   Nazario Berger MD   atorvastatin (LIPITOR) 20 MG tablet TAKE 1 TABLET NIGHTLY 9/17/20   Nazario Berger MD   cetirizine (ZYRTEC) 10 MG tablet Take 1 tablet by mouth daily 6/19/20   Nazario Berger MD   potassium chloride (KLOR-CON M) 20 MEQ extended release tablet Take 1 tablet by mouth daily 6/19/20   Nazario Berger MD   furosemide (LASIX) 20 MG tablet Take 1 tablet by mouth daily 6/16/20   Nazario Berger MD   ferrous sulfate 325 (65 Fe) MG tablet Take 325 mg by mouth 3 times daily (with meals)    Historical Provider, MD   diphenhydrAMINE (BENADRYL) 25 MG tablet Take 50 mg by mouth nightly     Historical Provider, MD   aspirin 81 MG EC tablet Take 81 mg by mouth daily     Historical Provider, MD   Cholecalciferol (VITAMIN D) 2000 UNITS CAPS capsule Take 2,000 Units by mouth daily     Historical Provider, MD   rOPINIRole (REQUIP) 1 MG tablet Take 1 mg by mouth nightly 1mg at 1730, 1mg at 2030, 2mg at 2200 (total of 4mg nightly in divided doses) 3/9/17   Historical Provider, MD   ursodiol (ACTIGALL) 500 MG tablet Take 500 mg by mouth 2 times daily     Historical Provider, MD   calcium carbonate 600 MG TABS tablet Take 1 tablet by mouth daily     Historical Provider, MD   levothyroxine (SYNTHROID) 100 MCG tablet Take 100 mcg by mouth Daily    Historical Provider, MD   Multiple Vitamins-Minerals (CENTRUM PO) Take 1 tablet by mouth daily. Historical Provider, MD   montelukast (SINGULAIR) 10 MG tablet Take 10 mg by mouth daily     Historical Provider, MD   estrogens, conjugated, (PREMARIN) 1.25 MG tablet Take 1.25 mg by mouth daily. Historical Provider, MD       Allergies:    Allergies   Allergen Reactions    Latex Swelling and Rash    Clindamycin Itching    Pennsaid [Diclofenac Sodium] Itching and Rash    Torsemide Itching    Actos [Pioglitazone] Diarrhea    Amoxicillin Other (See Comments)    Augmentin [Amoxicillin-Pot Clavulanate] Other (See Comments)    Bactrim [Sulfamethoxazole-Trimethoprim] Other (See Comments)    Ciprofloxacin Other (See Comments)     Makes her weird  Makes anxious and she has weird dreams    Doxycycline Other (See Comments)     Feels like she is smothering, chest tightness    Levaquin [Levofloxacin]      Body aches    Ativan [Lorazepam] Other (See Comments) and Anxiety     And confusion  Had weird dreams and hallucinations    Cephalosporins Rash    Pcn [Penicillins] Rash and Itching    Proventil [Albuterol] Anxiety       PSHx:   Past Surgical History:   Procedure Laterality Date    BONE MARROW BIOPSY      BREAST BIOPSY      BRONCHOSCOPY N/A 5/7/2021    BRONCHOSCOPY DIAGNOSTIC OR CELL [address] ONLY performed by Bishop Amezquita MD at [address] [place] [address]  5/7/2021    BRONCHOSCOPY ALVEOLAR LAVAGE performed by Bishop Amezquita MD at [address] [place] [address]  5/7/2021    BRONCHOSCOPY THERAPUTIC ASPIRATION INITIAL performed by Bishop Amezquita MD at [place] [address]  07/14/14    CATARACT Organization Meetings:     Marital Status:    Intimate Partner Violence:     Fear of Current or Ex-Partner:     Emotionally Abused:     Physically Abused:     Sexually Abused:        Family Hx:   Family History   Problem Relation Age of Onset    Lupus Other     Colon Cancer Mother     Diabetes Father     Kidney Disease Paternal Grandmother        Physical Exam:  Vital Signs: BP (!) 157/61   Pulse 82   Temp 97.1 °F (36.2 °C) (Oral)   Resp 16   Ht 5' 6\" (1.676 m)   Wt 211 lb (95.7 kg)   SpO2 95%   BMI 34.06 kg/m²    Airway: Mallampati: II (soft palate, uvula, fauces visible)  Pulmonary:Normal  Cardiac:Normal  Abdomen:Normal    Pre-Procedure Assessment / Plan:  ASA: Class 2 - A normal healthy patient with mild systemic disease  Level of Sedation Plan:Deep sedation  Post Procedure plan: Return to same level of care    I assessed the patient and find that the patient is in satisfactory condition to proceed with the planned procedure and sedation plan. I have explained the risk, benefits, and alternatives to the procedure; the patient understands and agrees to proceed.        Rahul Farah MD  6/10/2021

## 2021-06-10 NOTE — OP NOTE
[address] [facility]  Colonoscopy Note            Patient: Linda Nagel  : 1945     Procedure: Colonoscopy with polypectomy    Date:  6/10/2021    Surgeon:  Brett Villarreal MD, MD    Anesthesia:  MAC    Indications:  H/o colon polyps    Procedure: An informed consent was obtained from the patient after explanation of indications, benefits, possible risks and complications of the procedure. The patient was then taken to the endoscopy suite, placed in the left lateral decubitus position, and the above IV anesthesia was administered. A digital rectal examination was performed and revealed negative without mass, lesions or tenderness. The Olympus CFQ-180-AL video colonoscope was placed in the patient's rectum under digital direction and advanced to the cecum. The cecum was identified by characteristic anatomy and ballottment. The prep was fair. The scope was then withdrawn back through the cecum, ascending, transverse, descending and sigmoid colons. The scope was then withdrawn into the rectum and retroflexed. The scope was straightened, the colon was decompressed and the scope was withdrawn from the patient. The patient tolerated the procedure well and was taken to the PACU in good condition. Findings:     Cecum: normal   Ascending Colon: 2 polyps, 6-10 mm removed with a hot snare.  Transverse Colon: normal   Descending Colon: one 6 mm polyp removed with hot snare.  Sigmoid Colon: normal   Rectum: rectal varices    Estimated Blood Loss: None      Impression:     Polyps   Rectal varices    Recommendations:     Patient to call for biopsy results in 10 days.  Repeat Colonoscopy in 3 years.     Brett Villarreal MD, MD   [address] [facility]  6/10/2021

## 2021-06-10 NOTE — ANESTHESIA PRE PROCEDURE
4/9/17 11/18/20   Tameka Moon MD   SITagliptin (JANUVIA) 100 MG tablet Take 1 tablet by mouth daily 11/18/20   Tameka Moon MD   famotidine (PEPCID) 20 MG tablet Take 1 tablet by mouth 2 times daily 11/1/20   Al Mortensen MD   atorvastatin (LIPITOR) 20 MG tablet TAKE 1 TABLET NIGHTLY 9/17/20   Al Mortensen MD   cetirizine (ZYRTEC) 10 MG tablet Take 1 tablet by mouth daily 6/19/20   Al Mortensen MD   potassium chloride (KLOR-CON M) 20 MEQ extended release tablet Take 1 tablet by mouth daily 6/19/20   Al Mortensen MD   furosemide (LASIX) 20 MG tablet Take 1 tablet by mouth daily 6/16/20   Al Mortensen MD   ferrous sulfate 325 (65 Fe) MG tablet Take 325 mg by mouth 3 times daily (with meals)    Historical Provider, MD   diphenhydrAMINE (BENADRYL) 25 MG tablet Take 50 mg by mouth nightly     Historical Provider, MD   aspirin 81 MG EC tablet Take 81 mg by mouth daily     Historical Provider, MD   Cholecalciferol (VITAMIN D) 2000 UNITS CAPS capsule Take 2,000 Units by mouth daily     Historical Provider, MD   rOPINIRole (REQUIP) 1 MG tablet Take 1 mg by mouth nightly 1mg at 1730, 1mg at 2030, 2mg at 2200 (total of 4mg nightly in divided doses) 3/9/17   Historical Provider, MD   ursodiol (ACTIGALL) 500 MG tablet Take 500 mg by mouth 2 times daily     Historical Provider, MD   calcium carbonate 600 MG TABS tablet Take 1 tablet by mouth daily     Historical Provider, MD   levothyroxine (SYNTHROID) 100 MCG tablet Take 100 mcg by mouth Daily    Historical Provider, MD   Multiple Vitamins-Minerals (CENTRUM PO) Take 1 tablet by mouth daily. Historical Provider, MD   montelukast (SINGULAIR) 10 MG tablet Take 10 mg by mouth daily     Historical Provider, MD   estrogens, conjugated, (PREMARIN) 1.25 MG tablet Take 1.25 mg by mouth daily. Historical Provider, MD       Current medications:    No current facility-administered medications for this encounter. Allergies:     Allergies   Allergen Reactions    Latex Swelling and Rash    Clindamycin Itching    Pennsaid [Diclofenac Sodium] Itching and Rash    Torsemide Itching    Actos [Pioglitazone] Diarrhea    Amoxicillin Other (See Comments)    Augmentin [Amoxicillin-Pot Clavulanate] Other (See Comments)    Bactrim [Sulfamethoxazole-Trimethoprim] Other (See Comments)    Ciprofloxacin Other (See Comments)     Makes her weird  Makes anxious and she has weird dreams    Doxycycline Other (See Comments)     Feels like she is smothering, chest tightness    Levaquin [Levofloxacin]      Body aches    Ativan [Lorazepam] Other (See Comments) and Anxiety     And confusion  Had weird dreams and hallucinations    Cephalosporins Rash    Pcn [Penicillins] Rash and Itching    Proventil [Albuterol] Anxiety       Problem List:    Patient Active Problem List   Diagnosis Code    TIA (transient ischemic attack) G45.9    Type 2 diabetes mellitus ([facility] E11.9    Asthma J45.909    Hyperlipidemia E78.5    S/P knee replacement Z96.659    Obesity (BMI 30.0-34. 9) E66.9    Heart murmur R01.1    HTN (hypertension) I10    SVT (supraventricular tachycardia) ([facility]) I47.1    GERD (gastroesophageal reflux disease) K21.9    Cirrhosis ([facility]) K74.60    COPD (chronic obstructive pulmonary disease) ([facility]) J44.9    Diabetic polyneuropathy associated with diabetes mellitus due to underlying condition ([facility]) E08.42    PNA (pneumonia) J18.9    Anemia D64.9    Leukocytosis D72.829    Patellofemoral pain syndrome of left knee M22.2X2    Quadriceps tendinitis M76.899    Upper GI bleed K92.2    Pernicious anemia D51.0    Neuropathy G62.9    Primary osteoarthritis involving multiple joints M89.49    Vitamin D deficiency E55.9    Paresthesias R20.2    Esophageal varices without bleeding ([facility]) I85.00    Pulmonary HTN ([facility]) I27.20    Restless leg syndrome G25.81    SOB (shortness of breath) R06.02    Hyponatremia E87.1    Acute and chronic respiratory failure with hypoxia (HCC) J96.21    Pulmonary infiltrates R91.8    Grade II diastolic dysfunction O39.7    Elevated procalcitonin R79.89    YONATAN positive R76.8    Pneumonia of both lower lobes due to Haemophilus influenzae ([facility] [address] [address] J14       Past Medical History:        Diagnosis Date    Allergic     Anemia     Anesthesia complication     \"woke up during surgery\"    Arthritis     Osteoarthritis of bilateral CMC joints    Asthma     Followed by Dr. Meka Murray Cirrhosis [facility])     non alcoholic    COPD (chronic obstructive pulmonary disease) ([facility]     GERD (gastroesophageal reflux disease)     GIB (gastrointestinal bleeding)     LGIB 4/2016    Haemophilus infection 05/07/2021    + resp    Heart murmur     Hernia     Hyperlipidemia     Hypertension     Lung collapse     Murmur, heart     Pneumonia     Reflux     Rheumatic fever     Stress fracture     right foot    SVT (supraventricular tachycardia) ([facility]     6/19/16 - admitted X 1 day per PT     TIA (transient ischemic attack) 2015    Type II or unspecified type diabetes mellitus without mention of complication, not stated as uncontrolled     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 ([facility]     Wears glasses     as needed    Wears partial dentures     upper partial       Past Surgical History:        Procedure Laterality Date    BONE MARROW BIOPSY      BREAST BIOPSY      BRONCHOSCOPY N/A 5/7/2021    BRONCHOSCOPY DIAGNOSTIC OR CELL [address] ONLY performed by Conrado Dong MD at [address] [place] [address]  5/7/2021    BRONCHOSCOPY ALVEOLAR LAVAGE performed by Conrado Dong MD at [address] [place] [address]  5/7/2021    BRONCHOSCOPY THERAPUTIC ASPIRATION INITIAL performed by Conrado Dong MD at [address]  07/14/14    CATARACT REMOVAL      COLONOSCOPY      X 3 per PT 6/8/16    FINE NEEDLE ASPIRATION      FOOT SURGERY Right 06/03/2013    CORAL BUNIONECTOMY RIGHT FOOT WITH SCREW FIXATION;    HERNIA REPAIR      HYSTERECTOMY      JOINT REPLACEMENT Bilateral     TKR    KNEE SURGERY  09    left    KNEE SURGERY  2010    right    UPPER GASTROINTESTINAL ENDOSCOPY N/A 2018    EGD BIOPSY performed by Bill Alas MD at [address] History:    Social History     Tobacco Use    Smoking status: Former Smoker     Packs/day: 1.00     Years: 40.00     Pack years: 40.00     Types: Cigarettes     Quit date: 2000     Years since quittin.0    Smokeless tobacco: Never Used    Tobacco comment: stopped cold turkey in !! ( )   Substance Use Topics    Alcohol use: No     Alcohol/week: 0.0 standard drinks                                Counseling given: Not Answered  Comment: stopped cold turkey in !! ( )      Vital Signs (Current): There were no vitals filed for this visit.                                            BP Readings from Last 3 Encounters:   21 139/78   21 (!) 145/62   21 (!) 164/75       NPO Status:                                                                                 BMI:   Wt Readings from Last 3 Encounters:   21 212 lb (96.2 kg)   21 211 lb (95.7 kg)   21 210 lb (95.3 kg)     There is no height or weight on file to calculate BMI.    CBC:   Lab Results   Component Value Date    WBC 9.4 2021    RBC 3.32 2021    RBC 4.28 2016    HGB 9.6 2021    HCT 28.8 2021    MCV 86.7 2021    RDW 16.5 2021     2021       CMP:   Lab Results   Component Value Date     2021    K 4.1 2021    K 4.3 2021    CL 94 2021    CO2 27 2021    BUN 13 2021    CREATININE <0.5 2021    GFRAA >60 2021    GFRAA >60 2010    AGRATIO 0.7 2021    LABGLOM >60 2021    GLUCOSE 254 2021    PROT 8.2 2021    CALCIUM 8.9 2021    BILITOT 1.4 2021    ALKPHOS 82 2021    AST 16 2021    ALT 13 05/06/2021       POC Tests: No results for input(s): POCGLU, POCNA, POCK, POCCL, POCBUN, POCHEMO, POCHCT in the last 72 hours. Coags:   Lab Results   Component Value Date    PROTIME 12.9 07/20/2018    PROTIME 11.3 12/30/2009    INR 1.13 07/20/2018    APTT 24.2 02/18/2016       HCG (If Applicable): No results found for: PREGTESTUR, PREGSERUM, HCG, HCGQUANT     ABGs:   Lab Results   Component Value Date    PHART 7.397 02/19/2018    PO2ART 65.7 02/19/2018    ZPS6QCY 34.4 02/19/2018    KFV2KFK 20.7 02/19/2018    BEART -3.6 02/19/2018    I9SEFHUH 90.8 02/19/2018        Type & Screen (If Applicable):  Lab Results   Component Value Date    LABABO A 12/30/2009    LABRH Positive 12/30/2009       Drug/Infectious Status (If Applicable):  No results found for: HIV, HEPCAB    COVID-19 Screening (If Applicable):   Lab Results   Component Value Date    COVID19 Not Detected 05/12/2021           Anesthesia Evaluation  Patient summary reviewed and Nursing notes reviewed  Airway: Mallampati: II  TM distance: >3 FB   Neck ROM: full  Mouth opening: > = 3 FB Dental: normal exam         Pulmonary:normal exam    (+) pneumonia: resolved,  COPD: severe,  shortness of breath: chronic,  asthma:                            Cardiovascular:Negative CV ROS  Exercise tolerance: poor (<4 METS),   (+) hypertension: mild,       ECG reviewed  Rhythm: regular  Rate: normal  Echocardiogram reviewed         Beta Blocker:  Not on Beta Blocker         Neuro/Psych:   (+) neuromuscular disease:, TIA,             GI/Hepatic/Renal:   (+) GERD: well controlled, liver disease:,           Endo/Other:    (+) DiabetesType II DM, well controlled, using insulin, . Abdominal:       Abdomen: soft. Vascular: negative vascular ROS. Anesthesia Plan      MAC     ASA 3       Induction: intravenous. MIPS: Postoperative opioids intended and Prophylactic antiemetics administered.   Anesthetic plan and risks discussed with patient. Use of blood products discussed with patient whom consented to blood products. Plan discussed with attending and CRNA.     Attending anesthesiologist reviewed and agrees with Pre Eval content              Jeffrey Tavares DO   6/10/2021

## 2021-06-22 LAB
AFB CULTURE (MYCOBACTERIA): NORMAL
AFB SMEAR: NORMAL

## 2021-06-23 ENCOUNTER — TELEPHONE (OUTPATIENT)
Dept: PULMONOLOGY | Age: 76
End: 2021-06-23

## 2021-06-23 RX ORDER — PREDNISONE 20 MG/1
40 TABLET ORAL DAILY
Qty: 10 TABLET | Refills: 0 | Status: SHIPPED | OUTPATIENT
Start: 2021-06-23 | End: 2021-06-24 | Stop reason: CLARIF

## 2021-06-23 NOTE — TELEPHONE ENCOUNTER
**Former Teo Engel patient**    Pt was previously seen by Dr. Teo Engel for pneumonia. Pt said since Sunday she has been getting progressively more SOB (especially with exertion). She has a little cough that produces very little mucus (yellow) and a slight wheeze. Overall she feels fine but the SOB is getting worse. She said Dr. Teo Engel told her if this happened she should call and he could give her something to help her. She is already doing her nebulizer treatments 3 times a day. She is not on any antibiotics or prednisone.     Ph[facility]:  [facility]

## 2021-06-24 ENCOUNTER — TELEPHONE (OUTPATIENT)
Dept: PULMONOLOGY | Age: 76
End: 2021-06-24

## 2021-06-24 RX ORDER — PREDNISONE 20 MG/1
TABLET ORAL
Qty: 10 TABLET | Refills: 0
Start: 2021-06-24 | End: 2021-07-02

## 2021-06-24 NOTE — TELEPHONE ENCOUNTER
Patient called the office stating that her script for prednisone was not at [facility]. Script was sent to wrong pharmacy. Cancelled script sent to [facility] and called script to [facility]. Order pended to be signed.

## 2021-06-28 RX ORDER — CETIRIZINE HYDROCHLORIDE 10 MG/1
TABLET ORAL
Qty: 90 TABLET | Refills: 2 | Status: SHIPPED | OUTPATIENT
Start: 2021-06-28 | End: 2022-03-21 | Stop reason: SDUPTHER

## 2021-07-01 ENCOUNTER — TELEPHONE (OUTPATIENT)
Dept: PULMONOLOGY | Age: 76
End: 2021-07-01

## 2021-07-01 DIAGNOSIS — J42 CHRONIC BRONCHITIS, UNSPECIFIED CHRONIC BRONCHITIS TYPE (HCC): Primary | ICD-10-CM

## 2021-07-01 RX ORDER — AZITHROMYCIN 250 MG/1
250 TABLET, FILM COATED ORAL SEE ADMIN INSTRUCTIONS
Qty: 6 TABLET | Refills: 0 | Status: SHIPPED | OUTPATIENT
Start: 2021-07-01 | End: 2021-07-02

## 2021-07-01 RX ORDER — PREDNISONE 10 MG/1
TABLET ORAL
Qty: 30 TABLET | Refills: 0 | Status: SHIPPED | OUTPATIENT
Start: 2021-07-01 | End: 2021-07-02

## 2021-07-01 NOTE — TELEPHONE ENCOUNTER
Dr. Lowry Fret patient--    Pt called and said she is still coughing up yellow mucus and wheezing and is SOB. She finished a round of prednisone 5 days ago.   She wants something called into [facility]

## 2021-07-02 ENCOUNTER — OFFICE VISIT (OUTPATIENT)
Dept: PRIMARY CARE CLINIC | Age: 76
End: 2021-07-02
Payer: MEDICARE

## 2021-07-02 ENCOUNTER — CLINICAL DOCUMENTATION (OUTPATIENT)
Dept: SPIRITUAL SERVICES | Age: 76
End: 2021-07-02

## 2021-07-02 VITALS
DIASTOLIC BLOOD PRESSURE: 60 MMHG | HEART RATE: 88 BPM | OXYGEN SATURATION: 95 % | HEIGHT: 66 IN | TEMPERATURE: 97.2 F | WEIGHT: 220.8 LBS | BODY MASS INDEX: 35.48 KG/M2 | SYSTOLIC BLOOD PRESSURE: 142 MMHG

## 2021-07-02 DIAGNOSIS — I85.00 ESOPHAGEAL VARICES WITHOUT BLEEDING, UNSPECIFIED ESOPHAGEAL VARICES TYPE (HCC): ICD-10-CM

## 2021-07-02 DIAGNOSIS — K21.9 GASTROESOPHAGEAL REFLUX DISEASE, UNSPECIFIED WHETHER ESOPHAGITIS PRESENT: ICD-10-CM

## 2021-07-02 DIAGNOSIS — Z76.89 ENCOUNTER TO ESTABLISH CARE: ICD-10-CM

## 2021-07-02 DIAGNOSIS — G25.81 RESTLESS LEG SYNDROME: ICD-10-CM

## 2021-07-02 DIAGNOSIS — D51.0 PERNICIOUS ANEMIA: ICD-10-CM

## 2021-07-02 DIAGNOSIS — I10 ESSENTIAL HYPERTENSION, BENIGN: Primary | ICD-10-CM

## 2021-07-02 DIAGNOSIS — R91.8 PULMONARY INFILTRATES: ICD-10-CM

## 2021-07-02 DIAGNOSIS — E11.42 DIABETIC PERIPHERAL NEUROPATHY (HCC): ICD-10-CM

## 2021-07-02 DIAGNOSIS — E03.4 HYPOTHYROIDISM DUE TO ACQUIRED ATROPHY OF THYROID: ICD-10-CM

## 2021-07-02 DIAGNOSIS — K75.81 LIVER CIRRHOSIS SECONDARY TO NASH (HCC): ICD-10-CM

## 2021-07-02 DIAGNOSIS — E55.9 VITAMIN D DEFICIENCY: ICD-10-CM

## 2021-07-02 DIAGNOSIS — I47.1 SVT (SUPRAVENTRICULAR TACHYCARDIA) (HCC): ICD-10-CM

## 2021-07-02 DIAGNOSIS — M15.9 PRIMARY OSTEOARTHRITIS INVOLVING MULTIPLE JOINTS: ICD-10-CM

## 2021-07-02 DIAGNOSIS — E78.5 HYPERLIPIDEMIA ASSOCIATED WITH TYPE 2 DIABETES MELLITUS (HCC): ICD-10-CM

## 2021-07-02 DIAGNOSIS — K74.60 LIVER CIRRHOSIS SECONDARY TO NASH (HCC): ICD-10-CM

## 2021-07-02 DIAGNOSIS — E78.2 MIXED HYPERLIPIDEMIA: ICD-10-CM

## 2021-07-02 DIAGNOSIS — J44.9 CHRONIC OBSTRUCTIVE PULMONARY DISEASE, UNSPECIFIED COPD TYPE (HCC): ICD-10-CM

## 2021-07-02 DIAGNOSIS — I25.10 3-VESSEL CORONARY ARTERY DISEASE: ICD-10-CM

## 2021-07-02 DIAGNOSIS — E11.42 TYPE 2 DIABETES MELLITUS WITH DIABETIC POLYNEUROPATHY, WITHOUT LONG-TERM CURRENT USE OF INSULIN (HCC): ICD-10-CM

## 2021-07-02 DIAGNOSIS — E11.69 HYPERLIPIDEMIA ASSOCIATED WITH TYPE 2 DIABETES MELLITUS (HCC): ICD-10-CM

## 2021-07-02 PROBLEM — R20.2 PARESTHESIAS: Status: RESOLVED | Noted: 2019-02-22 | Resolved: 2021-07-02

## 2021-07-02 PROBLEM — J96.21 ACUTE AND CHRONIC RESPIRATORY FAILURE WITH HYPOXIA (HCC): Status: RESOLVED | Noted: 2021-05-06 | Resolved: 2021-07-02

## 2021-07-02 PROBLEM — J98.19 PULMONARY COLLAPSE: Status: ACTIVE | Noted: 2021-07-02

## 2021-07-02 PROBLEM — E11.59 HYPERTENSION ASSOCIATED WITH DIABETES (HCC): Status: RESOLVED | Noted: 2019-05-30 | Resolved: 2021-07-02

## 2021-07-02 PROBLEM — M76.899 QUADRICEPS TENDINITIS: Status: RESOLVED | Noted: 2018-04-26 | Resolved: 2021-07-02

## 2021-07-02 PROBLEM — D72.829 LEUKOCYTOSIS: Status: RESOLVED | Noted: 2017-08-31 | Resolved: 2021-07-02

## 2021-07-02 PROBLEM — I51.89 GRADE II DIASTOLIC DYSFUNCTION: Status: RESOLVED | Noted: 2021-05-06 | Resolved: 2021-07-02

## 2021-07-02 PROBLEM — J14 PNEUMONIA OF BOTH LOWER LOBES DUE TO HAEMOPHILUS INFLUENZAE (HCC): Status: RESOLVED | Noted: 2021-05-09 | Resolved: 2021-07-02

## 2021-07-02 PROBLEM — I27.20 PULMONARY HTN (HCC): Status: RESOLVED | Noted: 2021-03-04 | Resolved: 2021-07-02

## 2021-07-02 PROBLEM — G62.9 NEUROPATHY: Status: RESOLVED | Noted: 2019-02-07 | Resolved: 2021-07-02

## 2021-07-02 PROBLEM — R79.89 ELEVATED PROCALCITONIN: Status: RESOLVED | Noted: 2021-05-06 | Resolved: 2021-07-02

## 2021-07-02 PROBLEM — E08.42 DIABETIC POLYNEUROPATHY ASSOCIATED WITH DIABETES MELLITUS DUE TO UNDERLYING CONDITION (HCC): Status: RESOLVED | Noted: 2017-06-22 | Resolved: 2021-07-02

## 2021-07-02 PROBLEM — J98.19 PULMONARY COLLAPSE: Status: RESOLVED | Noted: 2021-07-02 | Resolved: 2021-07-02

## 2021-07-02 PROBLEM — I15.2 HYPERTENSION ASSOCIATED WITH DIABETES (HCC): Status: ACTIVE | Noted: 2019-05-30

## 2021-07-02 PROBLEM — J18.9 PNA (PNEUMONIA): Status: RESOLVED | Noted: 2017-08-31 | Resolved: 2021-07-02

## 2021-07-02 PROBLEM — M22.2X2 PATELLOFEMORAL PAIN SYNDROME OF LEFT KNEE: Status: RESOLVED | Noted: 2018-04-26 | Resolved: 2021-07-02

## 2021-07-02 PROBLEM — E87.1 HYPONATREMIA: Status: RESOLVED | Noted: 2021-05-06 | Resolved: 2021-07-02

## 2021-07-02 PROBLEM — E11.59 HYPERTENSION ASSOCIATED WITH DIABETES (HCC): Status: ACTIVE | Noted: 2019-05-30

## 2021-07-02 PROBLEM — I15.2 HYPERTENSION ASSOCIATED WITH DIABETES (HCC): Status: RESOLVED | Noted: 2019-05-30 | Resolved: 2021-07-02

## 2021-07-02 PROBLEM — D64.9 ANEMIA: Status: RESOLVED | Noted: 2017-08-31 | Resolved: 2021-07-02

## 2021-07-02 PROCEDURE — 99215 OFFICE O/P EST HI 40 MIN: CPT | Performed by: FAMILY MEDICINE

## 2021-07-02 RX ORDER — GABAPENTIN 100 MG/1
100 CAPSULE ORAL 2 TIMES DAILY
COMMUNITY
End: 2021-09-23

## 2021-07-02 ASSESSMENT — ENCOUNTER SYMPTOMS
CONSTIPATION: 0
DIARRHEA: 0
COLOR CHANGE: 0
RHINORRHEA: 0
EYE PAIN: 0
SHORTNESS OF BREATH: 0
SORE THROAT: 0
WHEEZING: 0
BACK PAIN: 0
ABDOMINAL PAIN: 0
EYE DISCHARGE: 0
BLOOD IN STOOL: 0

## 2021-07-02 NOTE — PROGRESS NOTES
carbonate 600 mg daily    Pernicious anemia:  Gets B12 1000 mg every 2 weeks, from Dr. Vielka Cabral  325 mg Iron at lunch, dinner  Wears partial dentures    Estrogen 1.25 mg daily  Sees [facility]. Monica Trevino of [facility]  Has to have mammogram/breast US in August [place] abou 1 month ago    Past Medical History:   Diagnosis Date    Allergic     Anemia     Anesthesia complication     \"woke up during surgery\"    Arthritis     Osteoarthritis of bilateral CMC joints    Asthma     Followed by Dr. Tyra Cavazos [facility]osis [facility])     non alcoholic    COPD (chronic obstructive pulmonary disease) ([facility]     GERD (gastroesophageal reflux disease)     GIB (gastrointestinal bleeding)     LGIB 4/2016    Haemophilus infection 05/07/2021    + resp    Heart murmur     Hernia     Hyperlipidemia     Hypertension     Lung collapse     Murmur, heart     Pneumonia     Reflux     Rheumatic fever     Stress fracture     right foot    SVT (supraventricular tachycardia) ([facility] [address]     6/19/16 - admitted X 1 day per PT     TIA (transient ischemic attack) 2015    Type II or unspecified type diabetes mellitus without mention of complication, not stated as uncontrolled     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 ([facility]     Wears glasses     as needed    Wears partial dentures     upper partial     Past Surgical History:   Procedure Laterality Date    BONE MARROW BIOPSY      BREAST BIOPSY      BRONCHOSCOPY N/A 5/7/2021    BRONCHOSCOPY DIAGNOSTIC OR CELL [address] ONLY performed by Kim Ludwig MD at [address] [place] [address]  5/7/2021    BRONCHOSCOPY ALVEOLAR LAVAGE performed by Kim Ludwig MD at [address] [place] [address]  5/7/2021    BRONCHOSCOPY THERAPUTIC ASPIRATION INITIAL performed by Kim Ludwig MD at [facility]  07/14/14    CATARACT REMOVAL      COLONOSCOPY      X 3 per PT 6/8/16    COLONOSCOPY N/A 6/10/2021    COLONOSCOPY POLYPECTOMY ABLATION performed by Arie Cochran MD at [address] [facility] FOOT SURGERY Right 06/03/2013    CORAL BUNIONECTOMY RIGHT FOOT WITH SCREW FIXATION;    HERNIA REPAIR      HYSTERECTOMY      JOINT REPLACEMENT Bilateral     TKR    KNEE SURGERY  09    left    KNEE SURGERY  2010    right    UPPER GASTROINTESTINAL ENDOSCOPY N/A 7/21/2018    EGD BIOPSY performed by Cinthya Escobar MD at [place] [address] 6/10/2021    EGD POLYP ABLATION/OTHER performed by Leelee Saleem MD at [facility] 6/10/2021    EGD BAND LIGATION performed by Leelee Saleem MD at [facility]     Current Outpatient Medications   Medication Sig Dispense Refill    gabapentin (NEURONTIN) 100 MG capsule Take 100 mg by mouth 2 times daily.  cetirizine (ZYRTEC) 10 MG tablet TAKE ONE TABLET BY MOUTH DAILY 90 tablet 2    losartan (COZAAR) 100 MG tablet TAKE 1 TABLET DAILY 90 tablet 3    dilTIAZem (TIAZAC) 240 MG extended release capsule TAKE 1 CAPSULE DAILY 90 capsule 3    beclomethasone (QVAR) 80 MCG/ACT inhaler Inhale 2 puffs into the lungs 2 times daily 3 Inhaler 4    levalbuterol (XOPENEX HFA) 45 MCG/ACT inhaler Inhale 2 puffs into the lungs every 6 hours as needed for Shortness of Breath 3 Inhaler 4    aclidinium (TUDORZA PRESSAIR) 400 MCG/ACT AEPB inhaler Inhale 1 puff into the lungs 2 times daily 3 each 4    gabapentin (NEURONTIN) 600 MG tablet Take 1 tablet by mouth nightly for 30 days.  600 mg at bedtime and 300 mg BID 30 tablet 0    metFORMIN (GLUCOPHAGE-XR) 500 MG extended release tablet Take 2 tablets PO BID. 360 tablet 1    venlafaxine (EFFEXOR) 37.5 MG tablet TAKE 1 TABLET DAILY 90 tablet 1    doxepin (SINEQUAN) 50 MG capsule Take 1 capsule by mouth nightly 90 capsule 3    traZODone (DESYREL) 50 MG tablet TAKE 2 TABLETS NIGHTLY 180 tablet 3    pantoprazole (PROTONIX) 40 MG tablet Take 1 tablet by mouth 2 times daily 180 tablet 2    cyanocobalamin 1000 MCG/ML injection Inject 1 mL into the muscle every 14 days Last dose was taken on 4/9/17 6 vial 3    SITagliptin (JANUVIA) 100 MG tablet Take 1 tablet by mouth daily 90 tablet 2    famotidine (PEPCID) 20 MG tablet Take 1 tablet by mouth 2 times daily 180 tablet 3    atorvastatin (LIPITOR) 20 MG tablet TAKE 1 TABLET NIGHTLY 90 tablet 3    potassium chloride (KLOR-CON M) 20 MEQ extended release tablet Take 1 tablet by mouth daily 90 tablet 3    furosemide (LASIX) 20 MG tablet Take 1 tablet by mouth daily 90 tablet 3    ferrous sulfate 325 (65 Fe) MG tablet Take 325 mg by mouth 3 times daily (with meals)      diphenhydrAMINE (BENADRYL) 25 MG tablet Take 50 mg by mouth nightly       aspirin 81 MG EC tablet Take 81 mg by mouth daily       Cholecalciferol (VITAMIN D) 2000 UNITS CAPS capsule Take 2,000 Units by mouth daily       rOPINIRole (REQUIP) 1 MG tablet Take 1 mg by mouth nightly 1mg at 1730, 1mg at 2030, 2mg at 2200 (total of 4mg nightly in divided doses)      ursodiol (ACTIGALL) 500 MG tablet Take 500 mg by mouth 2 times daily       calcium carbonate 600 MG TABS tablet Take 1 tablet by mouth daily       levothyroxine (SYNTHROID) 100 MCG tablet Take 100 mcg by mouth Daily      Multiple Vitamins-Minerals (CENTRUM PO) Take 1 tablet by mouth daily.  montelukast (SINGULAIR) 10 MG tablet Take 10 mg by mouth daily       estrogens, conjugated, (PREMARIN) 1.25 MG tablet Take 1.25 mg by mouth daily. No current facility-administered medications for this visit.      Allergies   Allergen Reactions    Latex Swelling and Rash    Clindamycin Itching    Pennsaid [Diclofenac Sodium] Itching and Rash    Torsemide Itching    Actos [Pioglitazone] Diarrhea    Amoxicillin Other (See Comments)    Augmentin [Amoxicillin-Pot Clavulanate] Other (See Comments)    Bactrim [Sulfamethoxazole-Trimethoprim] Other (See Comments)    Ciprofloxacin Other (See Comments)     Makes her weird  Makes anxious and she has weird dreams    Doxycycline Other (See Comments)     Feels like she is smothering, chest tightness    Levaquin [Levofloxacin]      Body aches    Ativan [Lorazepam] Other (See Comments) and Anxiety     And confusion  Had weird dreams and hallucinations    Cephalosporins Rash    Pcn [Penicillins] Rash and Itching    Proventil [Albuterol] Anxiety       Family History   Problem Relation Age of Onset    Colon Cancer Mother     Other Mother     Diabetes Father     Kidney Disease Paternal Grandmother     Lupus Daughter     Lupus Other      Social History     Socioeconomic History    Marital status:      Spouse name: Iraida Palacios \"Avinash\"    Number of children: 3    Years of education: Not on file    Highest education level: Not on file   Occupational History    Not on file   Tobacco Use    Smoking status: Former Smoker     Packs/day: 1.00     Years: 40.00     Pack years: 40.00     Types: Cigarettes     Quit date: 2000     Years since quittin.1    Smokeless tobacco: Never Used    Tobacco comment: stopped cold turkey in !! ( )   Vaping Use    Vaping Use: Never used   Substance and Sexual Activity    Alcohol use: No     Alcohol/week: 0.0 standard drinks    Drug use: No    Sexual activity: Not Currently   Other Topics Concern    Not on file   Social History Narrative    Not on file     Social Determinants of Health     Financial Resource Strain:     Difficulty of Paying Living Expenses:    Food Insecurity:     Worried About Running Out of Food in the Last Year:     [address] in the Last Year:    Transportation Needs:     Lack of Transportation (Medical):      Lack of Transportation (Non-Medical):    Physical Activity:     Days of Exercise per Week:     Minutes of Exercise per Session:    Stress:     Feeling of Stress :    Social Connections:     Frequency of Communication with Friends and Family:     Frequency of Social Gatherings with Friends and Family:     Attends [employer] Services:     Active Member of Clubs or Organizations:     Attends Club or Organization Meetings:     Marital Status:    Intimate Partner Violence:     Fear of Current or Ex-Partner:     Emotionally Abused:     Physically Abused:     Sexually Abused:      Review of Systems   Constitutional: Negative for chills, fever and unexpected weight change. HENT: Negative for congestion, rhinorrhea and sore throat. Eyes: Negative for pain, discharge and visual disturbance. Respiratory: Negative for shortness of breath and wheezing. Cardiovascular: Negative for chest pain and leg swelling. Gastrointestinal: Negative for abdominal pain, blood in stool, constipation and diarrhea. Endocrine: Negative for polyuria. Genitourinary: Negative for dysuria, flank pain and hematuria. Musculoskeletal: Negative for arthralgias, back pain and neck pain. Skin: Negative for color change, rash and wound. Allergic/Immunologic: Negative for environmental allergies, food allergies and immunocompromised state. Neurological: Negative for dizziness, speech difficulty, weakness, light-headedness, numbness and headaches. Hematological: Negative for adenopathy. Does not bruise/bleed easily. Psychiatric/Behavioral: Negative for dysphoric mood and sleep disturbance. The patient is not nervous/anxious. Vitals:    07/02/21 1338 07/02/21 1454   BP: (!) 158/98 (!) 142/60   Site:  Right Upper Arm   Position:  Sitting   Cuff Size:  Medium Adult   Pulse: 88 88   Temp: 97.2 °F (36.2 °C)    TempSrc: Temporal    SpO2: 95%    Weight: 220 lb 12.8 oz (100.2 kg)    Height: 5' 6\" (1.676 m)        Physical Exam  Constitutional:       General: She is not in acute distress. Appearance: She is well-developed. HENT:      Head: Normocephalic and atraumatic. Right Ear: Tympanic membrane normal.      Left Ear: Tympanic membrane normal.      Nose: Nose normal. No rhinorrhea. past that were banded  Pantoprazole 40 mg BID  Fatmotidine 20 mg BID  Ursodiol 500 mg daily    5. Type 2 diabetes mellitus with diabetic polyneuropathy, without long-term current use of insulin ([facility]  6. Diabetic peripheral neuropathy (HCC)  Checks glucose at home  Get eye exam results  Januvia 100 mg daily  Metformin 500 mg tablets, 2 tablets twice a day  Lab Results   Component Value Date    LABA1C 6.6 04/12/2021     Lab Results   Component Value Date    .4 06/22/2020     7. Hypothyroidism due to acquired atrophy of thyroid  Get thyroid levels  Synthroid 100 mcg daily    8. Hyperlipidemia associated with type 2 diabetes mellitus (HCC)  Atorvastatin 20 mg daily    9. Primary osteoarthritis involving multiple joints  History of knee replacements B/L  Vitamin  d 2000 units daily  Calcium carbonate 600 mg daily    10. Pulmonary infiltrates  11. Chronic obstructive pulmonary disease, unspecified COPD type ([facility])  Qvar inhaler  Tudorza inhaler   Xopenex inhlaer Prn stopped  Stopped xopenex nebulizer  Taking azithromycin and prednisone started today. .. Cetirtizine 10 mg am  Singulair 10 mg am    12. Vitamin D deficiency  2000 units daily    13. SVT (supraventricular tachycardia) ([facility]  14. Essential hypertension, benign  15. 3-vessel coronary artery disease  16. Mixed hyperlipidemia  Cardizem 240 mg daily  Losartan 100 mg daily  ASA 81 mg daily  Atorvastatin 20 mg daily  Lasix 20 mg daily  Kdur 20 meq daily    17. Pernicious anemia  Gets B12 1000 mg every 2 weeks, from Dr. Renetta Burroughs  325 mg Iron at lunch, dinner    18.  Restless leg syndrome  Trazodone 100 mg   Doxepin 50 mg nightly  Benadryl 50 mg daily  Ropinirole 1 mg 5:30 and 10 pm  Gabapentin 600 mg HS+ 100 mg BID    Health maintenance:  Estrogen 1.25 mg daily Sees Dr. Madan Gonsalves of [facility]  Has to have mammogram/breast US in August 2021  C scope and EGD Dr. Vargas Felt recently, follows up in 3 years    Spent 40-54 minutes of face to face interaction with patient counseling on diagnoses and treatment plan; including but not limited to pre visit planning, chart/lab review, new orders, instructions, charting    Return in about 3 months (around 10/2/2021). Rachael Kirk.  Augustina Alas., DO  7/2/2021

## 2021-07-02 NOTE — PROGRESS NOTES
Spiritual care attempted follow-up call for support. I reached her daughter, Didier Ross, who corrected to patients phone number (correct number: [phone]([email or web address]). We will try to reach her another time.

## 2021-07-02 NOTE — PATIENT INSTRUCTIONS
Continue current medications  Have labs faxed here  Follow up with Dr. Nicole Arenas for now    -Recommend 150 minutes of cardiovascular activity a week, or 10,000 to 15,000 steps a day, 2 days of weightbearing  -dietary wise, try to stick to your weight x 10 in calories a day  -avoid processed/refined carbohydrates (boxed/canned/frozen/fast)  -encourage healthy protein and fat, butter, avocado, egg  Follow up with me in 3 months

## 2021-07-13 ENCOUNTER — CLINICAL DOCUMENTATION (OUTPATIENT)
Dept: SPIRITUAL SERVICES | Age: 76
End: 2021-07-13

## 2021-07-13 NOTE — PROGRESS NOTES
Follow-up call with patient for spiritual support. She is doing great, getting out by herself, which is a big step. We had a good talk catching up on her journey. Offered listening presence and ongoing prn spiritual support.

## 2021-07-19 RX ORDER — FUROSEMIDE 20 MG/1
TABLET ORAL
Qty: 90 TABLET | Refills: 3 | Status: SHIPPED | OUTPATIENT
Start: 2021-07-19 | End: 2022-07-12

## 2021-07-27 ENCOUNTER — HOSPITAL ENCOUNTER (OUTPATIENT)
Dept: CARDIOLOGY | Age: 76
Discharge: HOME | End: 2021-07-27
Payer: MEDICARE

## 2021-07-27 DIAGNOSIS — I27.20 PULMONARY HTN (HCC): ICD-10-CM

## 2021-07-27 DIAGNOSIS — R06.02 SOB (SHORTNESS OF BREATH): ICD-10-CM

## 2021-08-02 RX ORDER — BENZONATATE 100 MG/1
100 CAPSULE ORAL 3 TIMES DAILY PRN
Qty: 90 CAPSULE | Refills: 0 | Status: SHIPPED | OUTPATIENT
Start: 2021-08-02 | End: 2021-10-18

## 2021-08-03 ENCOUNTER — HOSPITAL ENCOUNTER (OUTPATIENT)
Dept: WOMENS IMAGING | Age: 76
End: 2021-08-03
Payer: MEDICARE

## 2021-08-03 ENCOUNTER — HOSPITAL ENCOUNTER (OUTPATIENT)
Dept: WOMENS IMAGING | Age: 76
Discharge: HOME | End: 2021-08-03
Payer: MEDICARE

## 2021-08-03 VITALS — HEIGHT: 66 IN | WEIGHT: 220 LBS | BODY MASS INDEX: 35.36 KG/M2

## 2021-08-03 DIAGNOSIS — N64.4 BREAST PAIN: ICD-10-CM

## 2021-08-03 PROCEDURE — G0279 TOMOSYNTHESIS, MAMMO: HCPCS

## 2021-08-09 ENCOUNTER — CLINICAL DOCUMENTATION (OUTPATIENT)
Dept: SPIRITUAL SERVICES | Age: 76
End: 2021-08-09

## 2021-08-09 RX ORDER — GABAPENTIN 100 MG/1
100 CAPSULE ORAL 2 TIMES DAILY
Qty: 90 CAPSULE | OUTPATIENT
Start: 2021-08-09

## 2021-08-09 NOTE — TELEPHONE ENCOUNTER
Pt sts her regular MD who fills gabapentin (NEURONTIN) 100 MG capsule is OOT. Pt wants to know if [facility] will refill for her. Please advise.     [address], [address] - [address] [place] [address] [place] [phone]

## 2021-08-09 NOTE — TELEPHONE ENCOUNTER
ROMIE, would you be willing to refill the pt's Gabapentin since her PCP is OOT? Last OV 4/21/21 w/ ROMIE. Please advise and I will pend the med to you. Thank you.  Please state either way , if you will fill or deny

## 2021-08-10 ENCOUNTER — TELEPHONE (OUTPATIENT)
Dept: PRIMARY CARE CLINIC | Age: 76
End: 2021-08-10

## 2021-08-10 NOTE — TELEPHONE ENCOUNTER
----- Message from Katlyn Marquez sent at 8/10/2021 12:33 PM EDT -----  Subject: Refill Request    QUESTIONS  Name of Medication? gabapentin (NEURONTIN) 100 MG capsule  Patient-reported dosage and instructions? Take 100 mg by mouth 3 times   daily. How many days do you have left? 5  Preferred Pharmacy? [address] [place] [address]  Pharmacy phone number (if available)? [phone]  Additional Information for Provider? Dr. Stan Iglesias usually the one prescribes   but he is no longer seeing her for her neuropathy and was told to contact   pcp Dr. Renata Beasley to get that refilled. she usually gets 135 with refills. Would like a call back today if possible to let her know if you are able   to get that filled. ---------------------------------------------------------------------------  --------------  Emily TALBERT  What is the best way for the office to contact you? OK to leave message on   voicemail  Preferred Call Back Phone Number?  [phone]

## 2021-08-10 NOTE — TELEPHONE ENCOUNTER
Pt does not have CDA/UDS on file and did not ask us to fill at her appointment. She should make follow up with Dr. Candy James, as this is a scheduled medication. Per my last office visit note pt takes for RLS and neuropathy. ..    Gabapentin 600 mg HS+ 100 mg BID    Appears to have last filled in February 2021. .. Pt can get CDA/UDS at office but cannot prescribe without results of this and would prefer patient see her nerve pain doctor for this medication.

## 2021-08-17 RX ORDER — PANTOPRAZOLE SODIUM 40 MG/1
TABLET, DELAYED RELEASE ORAL
Qty: 180 TABLET | Refills: 3 | Status: SHIPPED | OUTPATIENT
Start: 2021-08-17 | End: 2022-03-02 | Stop reason: SDUPTHER

## 2021-08-31 ENCOUNTER — HOSPITAL ENCOUNTER (OUTPATIENT)
Dept: CARDIOLOGY | Age: 76
Discharge: HOME | End: 2021-08-31
Payer: MEDICARE

## 2021-08-31 LAB
LV EF: 65 %
LVEF MODALITY: NORMAL

## 2021-08-31 PROCEDURE — 93306 TTE W/DOPPLER COMPLETE: CPT

## 2021-09-09 ENCOUNTER — OFFICE VISIT (OUTPATIENT)
Dept: CARDIOLOGY CLINIC | Age: 76
End: 2021-09-09
Payer: MEDICARE

## 2021-09-09 VITALS
HEIGHT: 66 IN | DIASTOLIC BLOOD PRESSURE: 44 MMHG | BODY MASS INDEX: 35.52 KG/M2 | HEART RATE: 73 BPM | OXYGEN SATURATION: 98 % | SYSTOLIC BLOOD PRESSURE: 142 MMHG | WEIGHT: 221 LBS

## 2021-09-09 DIAGNOSIS — J96.11 CHRONIC RESPIRATORY FAILURE WITH HYPOXIA (HCC): ICD-10-CM

## 2021-09-09 DIAGNOSIS — Z79.899 MEDICATION MANAGEMENT: ICD-10-CM

## 2021-09-09 DIAGNOSIS — E78.2 MIXED HYPERLIPIDEMIA: ICD-10-CM

## 2021-09-09 DIAGNOSIS — I47.1 SVT (SUPRAVENTRICULAR TACHYCARDIA) (HCC): Primary | ICD-10-CM

## 2021-09-09 DIAGNOSIS — I09.9 RHEUMATIC HEART DISEASE: ICD-10-CM

## 2021-09-09 PROCEDURE — 99214 OFFICE O/P EST MOD 30 MIN: CPT | Performed by: INTERNAL MEDICINE

## 2021-09-09 RX ORDER — ATORVASTATIN CALCIUM 20 MG/1
TABLET, FILM COATED ORAL
Qty: 90 TABLET | Refills: 3 | Status: SHIPPED | OUTPATIENT
Start: 2021-09-09 | End: 2022-08-17

## 2021-09-09 NOTE — PROGRESS NOTES
[address] Office Note  9/9/2021     Subjective: Zackary Severin is here for follow up of SVT, HTN, HLD Chronic resp failure, history of rheumatic fever age 11  Feeling pretty good. She is on O2 3 L/min 24/7    [place]:  Patient underwent an echo on 8/31/2021 which demonstrated an EF of 65%. Today, she presents on oxygen. She reports that she recently went through her husbands clothes and cleaned them out and tolerated that activity well. She reports that she was fully vaccinated for COVID in February, 2021. She received Delta Rounds and Delta Rounds vaccine. She states that she had Rheumatic fever as a child. She reports that she is taking her medications as prescribed and tolerates them well. Patient denies current edema, chest pain, palpitations, dizziness or syncope. PMH:    On 6/24/16 she stated she had not had palpitations since her discharge from the hospital.  Her main complaint was of fatigue which worsened in April. 4/2016 TSH 1.08. She had lost 45 lbs over the past year. She is on multiple medications for rash. She is also on Trazodone and Effexor. She has a rash due to liver disease and has been on multiple medications for her rash and itching. She started taking Atarax for itching - first dose taken today. She was taken off Benadryl and Periactin due to liver disease. She is also doing phototherapy by Dr. Joycelyn Becerra (dermatology). She has had abdominal cramping since undergoing Colonoscopy in May 2016. She has pernicious anemia for which she takes Vit V 12 every two weeks. 6/2016 H/H 9.6, 29.8. On 3/31/17 she reported her palpitations have returned. They occur randomly and last a few minutes. She describes it as a racing heart beat. They do not occur daily. They have been worsening in frequency. She also states her SOB is worsening. She follows up with Silvano Lynne for this. Her oxygen level today in the office began at 88% and ivone to 92%. She complains of a productive cough.  She states this has been occurring for 1 year and has had no testing. She smoked for roughly 40 years. She also complains of worsening fatigue and poor sleep. This has also been worsening recently. She has been given Requip for this. She attributes her poor sleep to her cough. She also complains of generalized dryness. She had an episode on SVT on 8/15/18 while at [facility], . In 2018 she had a bout of vomiting and diarrhea and was hospitalized. She had an EGD done and it showed gastritis and esophageal varices without active bleeding. She did follow up with Dr Jeffry Plaza on 2020 and he reduced her metformin to 500mg BID. Patient's lasix was increased to 40mg per day at last office visit on 6/15/2020 due to swelling She had J&J vaccine on 21. Review of Systems: 12 point ROS negative in all areas as listed below except as in [place]  Constitutional, EENT, GI, , Musculoskeletal, skin, neurological, hematological, endocrine, Psychiatric   Reviewed past medical history, social, and family history. Nonsmoker. No alcohol. Mother: no cardiac history. Father:  of MI age 63's.    Past Medical History:   Diagnosis Date    Allergic     Anemia     Anesthesia complication     \"woke up during surgery\"    Arthritis     Osteoarthritis of bilateral CMC joints    Asthma     Followed by Dr. Tyra Cavazos Cirrhosis [facility])     non alcoholic    COPD [facility] obstructive [facility] disease) [facility]     GERD (gastroesophageal reflux disease)     GIB (gastrointestinal bleeding)     LGIB 2016    Haemophilus infection 2021    + resp    Heart murmur     Hernia     Hyperlipidemia     Hypertension     Lung collapse     Murmur, heart     Pneumonia     Reflux     Rheumatic fever     Stress fracture     right foot    SVT (supraventricular tachycardia) ([facility]     16 - admitted X 1 day per PT     TIA (transient ischemic attack)     Type II or unspecified type diabetes mellitus without mention of complication, not stated as uncontrolled     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 ([address]     Wears glasses     as needed    Wears partial dentures     upper partial     Past Surgical History:   Procedure Laterality Date    BONE MARROW BIOPSY      BREAST BIOPSY      BRONCHOSCOPY N/A 5/7/2021    BRONCHOSCOPY DIAGNOSTIC OR CELL [address] ONLY performed by Damon Zamarripa MD at [address]  5/7/2021    BRONCHOSCOPY ALVEOLAR LAVAGE performed by Damon Zamarripa MD at [address]  5/7/2021    BRONCHOSCOPY THERAPUTIC ASPIRATION INITIAL performed by Damon Zamarripa MD at [place] [address]  07/14/14    CATARACT REMOVAL      COLONOSCOPY      X 3 per PT 6/8/16    COLONOSCOPY N/A 6/10/2021    COLONOSCOPY POLYPECTOMY ABLATION performed by Rodney Koo MD at [address] Right 06/03/2013    CORAL BUNIONECTOMY RIGHT FOOT WITH SCREW FIXATION;    HERNIA REPAIR      HYSTERECTOMY      JOINT REPLACEMENT Bilateral     TKR    KNEE SURGERY  09    left    KNEE SURGERY  2010    right    UPPER GASTROINTESTINAL ENDOSCOPY N/A 7/21/2018    EGD BIOPSY performed by Douglas Hull MD at [address] [facility] 6/10/2021    EGD POLYP ABLATION/OTHER performed by Rodney Koo MD at [address] 6/10/2021    EGD BAND LIGATION performed by Rodney Koo MD at [facility] ENDOSCOPY       Objective:   BP (!) 142/44   Pulse 73   Ht 5' 6\" (1.676 m)   Wt 221 lb (100.2 kg)   SpO2 98%   BMI 35.67 kg/m²     Wt Readings from Last 3 Encounters:   09/09/21 221 lb (100.2 kg)   08/03/21 220 lb (99.8 kg)   07/02/21 220 lb 12.8 oz (100.2 kg)       Physical Exam:  General: No Respiratory distress, appears well developed and well nourished.    Eyes:  Sclera nonicteric  Nose/Sinuses:  negative findings: nose shows no deformity, asymmetry, or inflammation, nasal mucosa normal, septum midline with no perforation or bleeding  Back:  no pain to palpation  Joint:  no active joint inflammation  Musculoskeletal:  negative  Skin:  Warm and dry  Neck:  Negative for JVD and Carotid Bruits. Pain on movement of neck to left. Chest: coarse crackles to bilateral bases, respiration easy  Cardiovascular:  RRR, S1S2 normal, 2/6 MOHSEN lower left sternal border, no rub or thrill. Extremities:   trace edema, no clubbing, cyanosis,  Neuro: intact    Medications:   Outpatient Encounter Medications as of 9/9/2021   Medication Sig Dispense Refill    Benzonatate (TESSALON PO) Take by mouth      atorvastatin (LIPITOR) 20 MG tablet TAKE 1 TABLET NIGHTLY 90 tablet 3    pantoprazole (PROTONIX) 40 MG tablet TAKE 1 TABLET TWICE A  tablet 3    furosemide (LASIX) 20 MG tablet TAKE 1 TABLET DAILY 90 tablet 3    gabapentin (NEURONTIN) 100 MG capsule Take 100 mg by mouth 2 times daily.       cetirizine (ZYRTEC) 10 MG tablet TAKE ONE TABLET BY MOUTH DAILY 90 tablet 2    losartan (COZAAR) 100 MG tablet TAKE 1 TABLET DAILY 90 tablet 3    dilTIAZem (TIAZAC) 240 MG extended release capsule TAKE 1 CAPSULE DAILY 90 capsule 3    beclomethasone (QVAR) 80 MCG/ACT inhaler Inhale 2 puffs into the lungs 2 times daily 3 Inhaler 4    levalbuterol (XOPENEX HFA) 45 MCG/ACT inhaler Inhale 2 puffs into the lungs every 6 hours as needed for Shortness of Breath 3 Inhaler 4    aclidinium (TUDORZA PRESSAIR) 400 MCG/ACT AEPB inhaler Inhale 1 puff into the lungs 2 times daily 3 each 4    metFORMIN (GLUCOPHAGE-XR) 500 MG extended release tablet Take 2 tablets PO BID. 360 tablet 1    venlafaxine (EFFEXOR) 37.5 MG tablet TAKE 1 TABLET DAILY 90 tablet 1    doxepin (SINEQUAN) 50 MG capsule Take 1 capsule by mouth nightly 90 capsule 3    traZODone (DESYREL) 50 MG tablet TAKE 2 TABLETS NIGHTLY 180 tablet 3    cyanocobalamin 1000 MCG/ML injection Inject 1 mL into the muscle every 14 days Last dose was taken on 4/9/17 6 vial 3    SITagliptin (JANUVIA) 100 MG tablet Take 1 tablet by mouth daily 90 tablet 2    famotidine (PEPCID) 20 MG tablet Take 1 tablet by mouth 2 times daily 180 tablet 3    potassium chloride (KLOR-CON M) 20 MEQ extended release tablet Take 1 tablet by mouth daily 90 tablet 3    ferrous sulfate 325 (65 Fe) MG tablet Take 325 mg by mouth 3 times daily (with meals)      diphenhydrAMINE (BENADRYL) 25 MG tablet Take 50 mg by mouth nightly       aspirin 81 MG EC tablet Take 81 mg by mouth daily       Cholecalciferol (VITAMIN D) 2000 UNITS CAPS capsule Take 2,000 Units by mouth daily       rOPINIRole (REQUIP) 1 MG tablet Take 1 mg by mouth nightly 1mg at 1730, 1mg at 2030, 2mg at 2200 (total of 4mg nightly in divided doses)      ursodiol (ACTIGALL) 500 MG tablet Take 500 mg by mouth 2 times daily       calcium carbonate 600 MG TABS tablet Take 1 tablet by mouth daily       levothyroxine (SYNTHROID) 100 MCG tablet Take 100 mcg by mouth Daily      Multiple Vitamins-Minerals (CENTRUM PO) Take 1 tablet by mouth daily.  montelukast (SINGULAIR) 10 MG tablet Take 10 mg by mouth daily       estrogens, conjugated, (PREMARIN) 1.25 MG tablet Take 1.25 mg by mouth daily.  gabapentin (NEURONTIN) 600 MG tablet Take 1 tablet by mouth nightly for 30 days. 600 mg at bedtime and 300 mg BID 30 tablet 0    [DISCONTINUED] atorvastatin (LIPITOR) 20 MG tablet TAKE 1 TABLET NIGHTLY 90 tablet 3     No facility-administered encounter medications on file as of 9/9/2021.         Lab Data:    Lab Results   Component Value Date    TRIG 133 02/04/2019    TRIG 110 01/11/2017    TRIG 125 07/14/2014     Lab Results   Component Value Date    HDL 75 (H) 06/22/2020    HDL 78 (A) 02/04/2019    HDL 73 (A) 01/11/2017     Lab Results   Component Value Date    LDLCALC 69 06/22/2020    LDLCALC 66 02/04/2019    LDLCALC 53 01/11/2017     Lab Results   Component Value Date    LABVLDL 19 06/22/2020    LABVLDL 25 07/14/2014    LABVLDL 50 11/13/2013     A1C:   Lab Results   Component Value Date    LABA1C 6.6 04/12/2021   LABS: 2/2019 - [facility] - , , HDL 78, LDL 66, AIC 4.9    IMAGING:   I have reviewed the following tests and documented in this encounter as follows:   Discussed with patient    CXR 5/7/2021  Impression Mild improvement in CHF/pulmonary edema. Continued radiographic follow-up to complete resolution recommended. Chest CT 6/3/2021   Impression Marked improvement in the appearance of the chest with near complete resolution of the multifocal pneumonia previously present. Areas of consolidation medially and anteriorly in the right upper lobe and laterally in the superior lingula could potentially be sites of residual acute infection. Moderate postinflammatory changes throughout the lungs in sites of previous acute airspace disease. Slight to mild bullous changes. No CHF or pleural effusion. Small subpleural right lower lobe pulmonary nodule which has decreased in size since the prior study. Mild cardiomegaly. Cirrhotic appearance of the liver and mild splenomegaly appear present. Echo 8/31/2021  Summary   Left ventricular systolic function is hyperdynamic with an estimated   ejection fraction of >= 65%. The left ventricle is normal in size with mild concentric hypertrophy. No obvious regional wall motion abnormalities noted. Normal left ventricular diastolic function. Mild mitral and aortic regurgitation. Mild mitral stenosis   Systolic pulmonary artery pressure (SPAP) is normal and estimated at 32 mmHg   (right atrial pressure 3 mmHg). ECHO 4/12/2017:    Summary   Left ventricular systolic function is hyperdynamic with ejection fraction   estimated at >65 %. No regional wall motion abnormalities. Grade II diastolic dysfunction with elevated filling pressure. There is moderate concentric left ventricular hypertrophy.    Left ventricle size is normal.   Mildly dilated left atrium. 5/2015 CXR    Opinion: There is persistent mild middle lobe disease. The disease   is unchanged for the past one month and this may represent middle   lobe scarring. Cardiac cath: 07/14/2014  1. Mild coronary artery disease involving left anterior descending,  circumflex and right coronary arteries. 2.  Hyperdynamic left ventricle with an ejection fraction of 65%. 3.  Elevated left ventricular end-diastolic pressure, 15 mmHg. 4.  Patent renal arteries bilaterally. RECOMMENDATIONS:   The patient is noted to have mild coronary artery disease  which does not explain her shortness of breath. She, however, does have    elevated LVEDP from uncontrolled blood pressure. She needs aggressive  antihypertensive therapy for optimal blood pressure control. Will start her  on lasix for dyspnea and add losartan to her antihypertensive regimen. She    will continue the aspirin and Lipitor as well as Toprol. Echo: 4/16/15  Left ventricular size is normal .  Mild concentric left ventricular hypertrophy is present. Global ejection fraction is normal and estimated from 55 % to 60 %. No regional wall motion abnormalities are noted. Diastolic filling parameters suggests grade I diastolic dysfunction . Mild mitral regurgitation is present. Mild tricuspid regurgitation. Systolic pulmonary artery pressure (SPAP) is normal and estimated at 35   mmHg  (RA pressure 8 mm Hg). Mild pulmonic regurgitation present. Cardiac Cath: 04/05/15  1. Mild coronary artery disease involving left anterior descending,  circumflex and right coronary arteries. 2. Hyperdynamic left ventricle with an ejection fraction of 65%. 3. Elevated left ventricular end-diastolic pressure, 15 mmHg. 4. Patent renal arteries bilaterally. Assessment:  Encounter Diagnoses   Name Primary?     SVT (supraventricular tachycardia) (HCC) Yes    Mixed hyperlipidemia     Medication management     Rheumatic heart disease     Chronic respiratory failure with hypoxia ([address]        Diagnoses and associated orders for this visit:  1. SVT controlled   2. Hyperlipidemia Most recent lipids 6.22.20   3. HTN (hypertension) controlled   4. Edema legs resolved   5. Type 2 diabetes mellitus ([address]  Managed by her PCP A1C <7. Murmur     Plan:  1. Continue with current medications as prescribed. she is on statin losartan diltiazem  2. Refills given as warranted. 3. Follow up with me in 6 months. 4. CBC CMP TSH A1C Lipids          QUALITY MEASURES  1. Tobacco Cessation Counseling: Yes  2. Retake of BP if >140/90:   NA  3. Documentation to PCP/referring for new patient:  Sent to PCP at close of office visit  4. CAD patient on anti-platelet: ASA   5. CAD patient on STATIN therapy:  yes  6. Patient with CHF and aFib on anticoagulation:  NA       I, Vera Orta RN, am scribing for and in the presence of Dr. Michael Gibbons. Vera Orta RN    I, Dr. Michael Gibbons, personally performed the services described in this documentation, as scribed by the above signed scribe in my presence. It is both accurate and complete to my knowledge. I agree with the details independently gathered by the clinical support staff, while the remaining scribed note accurately describes my personal service to the patient.

## 2021-09-09 NOTE — LETTER
[facility] Office Note  9/9/2021     Subjective: Anegla To is here for follow up of SVT, HTN, HLD Chronic resp failure, history of rheumatic fever age 11  Feeling pretty good. She is on O2 3 L/min 24/7    [place]:  Patient underwent an echo on 8/31/2021 which demonstrated an EF of 65%. Today, she presents on oxygen. She reports that she recently went through her husbands clothes and cleaned them out and tolerated that activity well. She reports that she was fully vaccinated for COVID in February, 2021. She received Marilin Rast and Marilin Rast vaccine. She states that she had Rheumatic fever as a child. She reports that she is taking her medications as prescribed and tolerates them well. Patient denies current edema, chest pain, palpitations, dizziness or syncope. PMH:    On 6/24/16 she stated she had not had palpitations since her discharge from the hospital.  Her main complaint was of fatigue which worsened in April. 4/2016 TSH 1.08. She had lost 45 lbs over the past year. She is on multiple medications for rash. She is also on Trazodone and Effexor. She has a rash due to liver disease and has been on multiple medications for her rash and itching. She started taking Atarax for itching - first dose taken today. She was taken off Benadryl and Periactin due to liver disease. She is also doing phototherapy by Dr. Nata Shelton (dermatology). She has had abdominal cramping since undergoing Colonoscopy in May 2016. She has pernicious anemia for which she takes Vit V 12 every two weeks. 6/2016 H/H 9.6, 29.8. On 3/31/17 she reported her palpitations have returned. They occur randomly and last a few minutes. She describes it as a racing heart beat. They do not occur daily. They have been worsening in frequency. She also states her SOB is worsening. She follows up with Silvano Caputo for this. Her oxygen level today in the office began at 88% and ivone to 92%. She complains of a productive cough. She states this has been occurring for 1 year and has had no testing. She smoked for roughly 40 years. She also complains of worsening fatigue and poor sleep. This has also been worsening recently. She has been given Requip for this. She attributes her poor sleep to her cough. She also complains of generalized dryness. She had an episode on SVT on 8/15/18 while at [facility], . In 2018 she had a bout of vomiting and diarrhea and was hospitalized. She had an EGD done and it showed gastritis and esophageal varices without active bleeding. She did follow up with Dr Bob Petersen on 2020 and he reduced her metformin to 500mg BID. Patient's lasix was increased to 40mg per day at last office visit on 6/15/2020 due to swelling She had J&J vaccine on 21. Review of Systems: 12 point ROS negative in all areas as listed below except as in [place]  Constitutional, EENT, GI, , Musculoskeletal, skin, neurological, hematological, endocrine, Psychiatric   Reviewed past medical history, social, and family history. Nonsmoker. No alcohol. Mother: no cardiac history. Father:  of MI age 63's.    Past Medical History:   Diagnosis Date    Allergic     Anemia     Anesthesia complication     \"woke up during surgery\"    Arthritis     Osteoarthritis of bilateral CMC joints    Asthma     Followed by Dr. Cristian Ramirez Cirrhosis [facility])     non alcoholic    COPD [facility] obstructive [facility]     GERD (gastroesophageal reflux disease)     GIB (gastrointestinal bleeding)     LGIB 2016    Haemophilus infection 2021    + resp    Heart murmur     Hernia     Hyperlipidemia     Hypertension     Lung collapse     Murmur, heart     Pneumonia     Reflux     Rheumatic fever     Stress fracture     right foot    SVT (supraventricular tachycardia) ([facility]     16 - admitted X 1 day per PT     TIA (transient ischemic attack)     Type II or unspecified type diabetes mellitus without mention of complication, not stated as uncontrolled     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 ([address]     Wears glasses     as needed    Wears partial dentures     upper partial     Past Surgical History:   Procedure Laterality Date    BONE MARROW BIOPSY      BREAST BIOPSY      BRONCHOSCOPY N/A 5/7/2021    BRONCHOSCOPY DIAGNOSTIC OR CELL [address] ONLY performed by Kye Matthews MD at [address]  5/7/2021    BRONCHOSCOPY ALVEOLAR LAVAGE performed by Kye Matthews MD at [address]  5/7/2021    BRONCHOSCOPY THERAPUTIC ASPIRATION INITIAL performed by Kye Matthews MD at [place] [address]  07/14/14    CATARACT REMOVAL      COLONOSCOPY      X 3 per PT 6/8/16    COLONOSCOPY N/A 6/10/2021    COLONOSCOPY POLYPECTOMY ABLATION performed by Irma Blood MD at [address] [facility] Right 06/03/2013    CORAL BUNIONECTOMY RIGHT FOOT WITH SCREW FIXATION;    HERNIA REPAIR      HYSTERECTOMY      JOINT REPLACEMENT Bilateral     TKR    KNEE SURGERY  09    left    KNEE SURGERY  2010    right    UPPER GASTROINTESTINAL ENDOSCOPY N/A 7/21/2018    EGD BIOPSY performed by Ginny Mohan MD at [place] [address] 6/10/2021    EGD POLYP ABLATION/OTHER performed by Irma Blood MD at [address] 6/10/2021    EGD BAND LIGATION performed by Irma Blood MD at [address] ENDOSCOPY       Objective:   BP (!) 142/44   Pulse 73   Ht 5' 6\" (1.676 m)   Wt 221 lb (100.2 kg)   SpO2 98%   BMI 35.67 kg/m²     Wt Readings from Last 3 Encounters:   09/09/21 221 lb (100.2 kg)   08/03/21 220 lb (99.8 kg)   07/02/21 220 lb 12.8 oz (100.2 kg)       Physical Exam:  General: No Respiratory distress, appears well developed and well nourished.    Eyes:  Sclera nonicteric  Nose/Sinuses:  negative findings: nose shows no deformity, asymmetry, or inflammation, nasal mucosa normal, septum midline with no perforation or bleeding  Back:  no pain to palpation  Joint:  no active joint inflammation  Musculoskeletal:  negative  Skin:  Warm and dry  Neck:  Negative for JVD and Carotid Bruits. Pain on movement of neck to left. Chest: coarse crackles to bilateral bases, respiration easy  Cardiovascular:  RRR, S1S2 normal, 2/6 MOHSEN lower left sternal border, no rub or thrill. Extremities:   trace edema, no clubbing, cyanosis,  Neuro: intact    Medications:   Outpatient Encounter Medications as of 9/9/2021   Medication Sig Dispense Refill    Benzonatate (TESSALON PO) Take by mouth      atorvastatin (LIPITOR) 20 MG tablet TAKE 1 TABLET NIGHTLY 90 tablet 3    pantoprazole (PROTONIX) 40 MG tablet TAKE 1 TABLET TWICE A  tablet 3    furosemide (LASIX) 20 MG tablet TAKE 1 TABLET DAILY 90 tablet 3    gabapentin (NEURONTIN) 100 MG capsule Take 100 mg by mouth 2 times daily.       cetirizine (ZYRTEC) 10 MG tablet TAKE ONE TABLET BY MOUTH DAILY 90 tablet 2    losartan (COZAAR) 100 MG tablet TAKE 1 TABLET DAILY 90 tablet 3    dilTIAZem (TIAZAC) 240 MG extended release capsule TAKE 1 CAPSULE DAILY 90 capsule 3    beclomethasone (QVAR) 80 MCG/ACT inhaler Inhale 2 puffs into the lungs 2 times daily 3 Inhaler 4    levalbuterol (XOPENEX HFA) 45 MCG/ACT inhaler Inhale 2 puffs into the lungs every 6 hours as needed for Shortness of Breath 3 Inhaler 4    aclidinium (TUDORZA PRESSAIR) 400 MCG/ACT AEPB inhaler Inhale 1 puff into the lungs 2 times daily 3 each 4    metFORMIN (GLUCOPHAGE-XR) 500 MG extended release tablet Take 2 tablets PO BID. 360 tablet 1    venlafaxine (EFFEXOR) 37.5 MG tablet TAKE 1 TABLET DAILY 90 tablet 1    doxepin (SINEQUAN) 50 MG capsule Take 1 capsule by mouth nightly 90 capsule 3    traZODone (DESYREL) 50 MG tablet TAKE 2 TABLETS NIGHTLY 180 tablet 3    cyanocobalamin 1000 MCG/ML injection Inject 1 mL into the muscle every 14 days Last dose was taken on 4/9/17 6 vial 3    SITagliptin (JANUVIA) 100 MG tablet Take 1 tablet by mouth daily 90 tablet 2    famotidine (PEPCID) 20 MG tablet Take 1 tablet by mouth 2 times daily 180 tablet 3    potassium chloride (KLOR-CON M) 20 MEQ extended release tablet Take 1 tablet by mouth daily 90 tablet 3    ferrous sulfate 325 (65 Fe) MG tablet Take 325 mg by mouth 3 times daily (with meals)      diphenhydrAMINE (BENADRYL) 25 MG tablet Take 50 mg by mouth nightly       aspirin 81 MG EC tablet Take 81 mg by mouth daily       Cholecalciferol (VITAMIN D) 2000 UNITS CAPS capsule Take 2,000 Units by mouth daily       rOPINIRole (REQUIP) 1 MG tablet Take 1 mg by mouth nightly 1mg at 1730, 1mg at 2030, 2mg at 2200 (total of 4mg nightly in divided doses)      ursodiol (ACTIGALL) 500 MG tablet Take 500 mg by mouth 2 times daily       calcium carbonate 600 MG TABS tablet Take 1 tablet by mouth daily       levothyroxine (SYNTHROID) 100 MCG tablet Take 100 mcg by mouth Daily      Multiple Vitamins-Minerals (CENTRUM PO) Take 1 tablet by mouth daily.  montelukast (SINGULAIR) 10 MG tablet Take 10 mg by mouth daily       estrogens, conjugated, (PREMARIN) 1.25 MG tablet Take 1.25 mg by mouth daily.  gabapentin (NEURONTIN) 600 MG tablet Take 1 tablet by mouth nightly for 30 days. 600 mg at bedtime and 300 mg BID 30 tablet 0    [DISCONTINUED] atorvastatin (LIPITOR) 20 MG tablet TAKE 1 TABLET NIGHTLY 90 tablet 3     No facility-administered encounter medications on file as of 9/9/2021.         Lab Data:    Lab Results   Component Value Date    TRIG 133 02/04/2019    TRIG 110 01/11/2017    TRIG 125 07/14/2014     Lab Results   Component Value Date    HDL 75 (H) 06/22/2020    HDL 78 (A) 02/04/2019    HDL 73 (A) 01/11/2017     Lab Results   Component Value Date    LDLCALC 69 06/22/2020    LDLCALC 66 02/04/2019    LDLCALC 53 01/11/2017     Lab Results   Component Value Date    LABVLDL 19 06/22/2020    LABVLDL 25 07/14/2014    LABVLDL 50 11/13/2013     A1C:   Lab Results   Component Value Date    LABA1C 6.6 04/12/2021   LABS: 2/2019 - [facility] - , , HDL 78, LDL 66, AIC 4.9    IMAGING:   I have reviewed the following tests and documented in this encounter as follows:   Discussed with patient    CXR 5/7/2021  Impression Mild improvement in CHF/pulmonary edema. Continued radiographic follow-up to complete resolution recommended. Chest CT 6/3/2021   Impression Marked improvement in the appearance of the chest with near complete resolution of the multifocal pneumonia previously present. Areas of consolidation medially and anteriorly in the right upper lobe and laterally in the superior lingula could potentially be sites of residual acute infection. Moderate postinflammatory changes throughout the lungs in sites of previous acute airspace disease. Slight to mild bullous changes. No CHF or pleural effusion. Small subpleural right lower lobe pulmonary nodule which has decreased in size since the prior study. Mild cardiomegaly. Cirrhotic appearance of the liver and mild splenomegaly appear present. Echo 8/31/2021  Summary   Left ventricular systolic function is hyperdynamic with an estimated   ejection fraction of >= 65%. The left ventricle is normal in size with mild concentric hypertrophy. No obvious regional wall motion abnormalities noted. Normal left ventricular diastolic function. Mild mitral and aortic regurgitation. Mild mitral stenosis   Systolic pulmonary artery pressure (SPAP) is normal and estimated at 32 mmHg   (right atrial pressure 3 mmHg). ECHO 4/12/2017:    Summary   Left ventricular systolic function is hyperdynamic with ejection fraction   estimated at >65 %. No regional wall motion abnormalities. Grade II diastolic dysfunction with elevated filling pressure. There is moderate concentric left ventricular hypertrophy.    Left ventricle size is normal.   Mildly dilated left atrium. 5/2015 CXR    Opinion: There is persistent mild middle lobe disease. The disease   is unchanged for the past one month and this may represent middle   lobe scarring. Cardiac cath: 07/14/2014  1. Mild coronary artery disease involving left anterior descending,  circumflex and right coronary arteries. 2.  Hyperdynamic left ventricle with an ejection fraction of 65%. 3.  Elevated left ventricular end-diastolic pressure, 15 mmHg. 4.  Patent renal arteries bilaterally. RECOMMENDATIONS:   The patient is noted to have mild coronary artery disease  which does not explain her shortness of breath. She, however, does have    elevated LVEDP from uncontrolled blood pressure. She needs aggressive  antihypertensive therapy for optimal blood pressure control. Will start her  on lasix for dyspnea and add losartan to her antihypertensive regimen. She    will continue the aspirin and Lipitor as well as Toprol. Echo: 4/16/15  Left ventricular size is normal .  Mild concentric left ventricular hypertrophy is present. Global ejection fraction is normal and estimated from 55 % to 60 %. No regional wall motion abnormalities are noted. Diastolic filling parameters suggests grade I diastolic dysfunction . Mild mitral regurgitation is present. Mild tricuspid regurgitation. Systolic pulmonary artery pressure (SPAP) is normal and estimated at 35   mmHg  (RA pressure 8 mm Hg). Mild pulmonic regurgitation present. Cardiac Cath: 04/05/15  1. Mild coronary artery disease involving left anterior descending,  circumflex and right coronary arteries. 2. Hyperdynamic left ventricle with an ejection fraction of 65%. 3. Elevated left ventricular end-diastolic pressure, 15 mmHg. 4. Patent renal arteries bilaterally. Assessment:  Encounter Diagnoses   Name Primary?     SVT (supraventricular tachycardia) (HCC) Yes    Mixed hyperlipidemia     Medication management     Rheumatic heart disease     Chronic respiratory failure with hypoxia ([facility]        Diagnoses and associated orders for this visit:  1. SVT controlled   2. Hyperlipidemia Most recent lipids 6.22.20   3. HTN (hypertension) controlled   4. Edema legs resolved   5. Type 2 diabetes mellitus ([facility]  Managed by her PCP A1C <7. Murmur     Plan:  1. Continue with current medications as prescribed. she is on statin losartan diltiazem  2. Refills given as warranted. 3. Follow up with me in 6 months. 4. CBC CMP TSH A1C Lipids          QUALITY MEASURES  1. Tobacco Cessation Counseling: Yes  2. Retake of BP if >140/90:   NA  3. Documentation to PCP/referring for new patient:  Sent to PCP at close of office visit  4. CAD patient on anti-platelet: ASA   5. CAD patient on STATIN therapy:  yes  6. Patient with CHF and aFib on anticoagulation:  NA       I, Marianna James RN, am scribing for and in the presence of Dr. Agustina Voss. Marianna Jamse RN    I, Dr. Agustina Voss, personally performed the services described in this documentation, as scribed by the above signed scribe in my presence. It is both accurate and complete to my knowledge. I agree with the details independently gathered by the clinical support staff, while the remaining scribed note accurately describes my personal service to the patient.

## 2021-09-09 NOTE — PATIENT INSTRUCTIONS
Plan:  1. Continue with current medications as prescribed. 2. Refills given as warranted. 3. Follow up with me in 6 months.

## 2021-09-16 RX ORDER — VENLAFAXINE 37.5 MG/1
TABLET ORAL
Qty: 90 TABLET | Refills: 3 | Status: SHIPPED | OUTPATIENT
Start: 2021-09-16 | End: 2022-07-18

## 2021-09-18 LAB
ALBUMIN SERPL-MCNC: 4 G/DL
ALP BLD-CCNC: 80 U/L
ALT SERPL-CCNC: 20 U/L
ANION GAP SERPL CALCULATED.3IONS-SCNC: NORMAL MMOL/L
AST SERPL-CCNC: 24 U/L
AVERAGE GLUCOSE: NORMAL
BASOPHILS ABSOLUTE: 0 /ΜL
BASOPHILS RELATIVE PERCENT: 0 %
BILIRUB SERPL-MCNC: 0.5 MG/DL (ref 0.1–1.4)
BUN BLDV-MCNC: 11 MG/DL
CALCIUM SERPL-MCNC: 9.2 MG/DL
CHLORIDE BLD-SCNC: 90 MMOL/L
CHOLESTEROL, TOTAL: 155 MG/DL
CHOLESTEROL/HDL RATIO: ABNORMAL
CO2: 27 MMOL/L
CREAT SERPL-MCNC: 0.78 MG/DL
EOSINOPHILS ABSOLUTE: 0.2 /ΜL
EOSINOPHILS RELATIVE PERCENT: 4 %
GFR CALCULATED: NORMAL
GLUCOSE BLD-MCNC: 124 MG/DL
HBA1C MFR BLD: 6.4 %
HCT VFR BLD CALC: 32.2 % (ref 36–46)
HDLC SERPL-MCNC: 73 MG/DL (ref 35–70)
HEMOGLOBIN: 10.8 G/DL (ref 12–16)
LDL CHOLESTEROL CALCULATED: 63 MG/DL (ref 0–160)
LYMPHOCYTES ABSOLUTE: 0.8 /ΜL
LYMPHOCYTES RELATIVE PERCENT: 12 %
MCH RBC QN AUTO: 29.3 PG
MCHC RBC AUTO-ENTMCNC: 33.5 G/DL
MCV RBC AUTO: 88 FL
MONOCYTES ABSOLUTE: 0.4 /ΜL
MONOCYTES RELATIVE PERCENT: 6 %
NEUTROPHILS ABSOLUTE: 5.1 /ΜL
NEUTROPHILS RELATIVE PERCENT: 77 %
NONHDLC SERPL-MCNC: ABNORMAL MG/DL
PLATELET # BLD: 171 K/ΜL
PMV BLD AUTO: ABNORMAL FL
POTASSIUM SERPL-SCNC: 4.6 MMOL/L
RBC # BLD: 3.68 10^6/ΜL
SODIUM BLD-SCNC: 131 MMOL/L
TOTAL PROTEIN: 7.8
TRIGL SERPL-MCNC: 105 MG/DL
VLDLC SERPL CALC-MCNC: 19 MG/DL
WBC # BLD: 6.6 10^3/ML

## 2021-09-20 ENCOUNTER — TELEPHONE (OUTPATIENT)
Dept: FAMILY MEDICINE CLINIC | Age: 76
End: 2021-09-20

## 2021-09-20 DIAGNOSIS — J41.0 SIMPLE CHRONIC BRONCHITIS (HCC): ICD-10-CM

## 2021-09-20 DIAGNOSIS — B37.9 YEAST INFECTION: Primary | ICD-10-CM

## 2021-09-20 RX ORDER — ACLIDINIUM BROMIDE 400 UG/1
1 POWDER, METERED RESPIRATORY (INHALATION) 2 TIMES DAILY
Qty: 1 EACH | Refills: 0 | Status: SHIPPED | OUTPATIENT
Start: 2021-09-20 | End: 2021-09-23

## 2021-09-21 ENCOUNTER — TELEPHONE (OUTPATIENT)
Dept: PRIMARY CARE CLINIC | Age: 76
End: 2021-09-21

## 2021-09-21 NOTE — TELEPHONE ENCOUNTER
----- Message from Nancy Goodman sent at 9/20/2021  9:57 AM EDT -----  Subject: Message to Provider    QUESTIONS  Information for Provider? Patient has a yeast infection she has been   prescribed Diflucan by her OB/GYN. She requesting Nystatin 100,000 units   per mil suspension, qty of 180 - use 4 times per day. She has just one   more dose left of this medication is requesting 1 additional refill of the   medication. Please call in to [facility] [place] EO#[phone]. Please   call patient once called in to pharmacy.   ---------------------------------------------------------------------------  --------------  [address]  What is the best way for the office to contact you? OK to leave message on   voicemail  Preferred Call Back Phone Number? [phone]  ---------------------------------------------------------------------------  --------------  SCRIPT ANSWERS  Relationship to Patient?  Self

## 2021-09-21 NOTE — TELEPHONE ENCOUNTER
This was sent yesterday does it need to be sent again? No Interaction Warnings Shown    nystatin (MYCOSTATIN) 477137 UNIT/ML suspension  Date: 9/20/2021 Department: [facility] Ordering/Authorizing: Ankit Taylor, DO   Outpatient Medication Detail     Disp Refills Start End    nystatin (MYCOSTATIN) [id number] UNIT/ML suspension 180 mL 1 9/20/2021     Sig - Route:  Take 5 mLs by mouth 4 times daily - Oral    Sent to pharmacy as: Nystatin [id number] UNIT/ML Mouth/Throat Suspension (MYCOSTATIN)    E-Prescribing Status: Receipt confirmed by pharmacy (9/20/2021  4:38 PM EDT)

## 2021-09-21 NOTE — TELEPHONE ENCOUNTER
Refill Request diflucan   Last Seen: 7/2/2021    Last Written: was written by her ob/gyn    Next Appointment:   Future Appointments   Date Time Provider Krzysztof Michele   9/23/2021  3:15 PM Trevon Garcia MD [facility]   12/6/2021  1:40 PM MD Vivi John   4/14/2022  1:30 PM MD Dave Crabtree             Requested Prescriptions      No prescriptions requested or ordered in this encounter

## 2021-09-23 ENCOUNTER — OFFICE VISIT (OUTPATIENT)
Dept: PULMONOLOGY | Age: 76
End: 2021-09-23
Payer: MEDICARE

## 2021-09-23 ENCOUNTER — CLINICAL DOCUMENTATION (OUTPATIENT)
Dept: SPIRITUAL SERVICES | Age: 76
End: 2021-09-23

## 2021-09-23 VITALS
BODY MASS INDEX: 35.58 KG/M2 | RESPIRATION RATE: 16 BRPM | TEMPERATURE: 97.8 F | WEIGHT: 221.4 LBS | OXYGEN SATURATION: 94 % | DIASTOLIC BLOOD PRESSURE: 63 MMHG | HEART RATE: 75 BPM | SYSTOLIC BLOOD PRESSURE: 177 MMHG | HEIGHT: 66 IN

## 2021-09-23 DIAGNOSIS — J98.4 RESTRICTIVE LUNG DISEASE: ICD-10-CM

## 2021-09-23 DIAGNOSIS — Z87.891 FORMER SMOKER: ICD-10-CM

## 2021-09-23 DIAGNOSIS — J44.9 CHRONIC OBSTRUCTIVE PULMONARY DISEASE, UNSPECIFIED COPD TYPE (HCC): ICD-10-CM

## 2021-09-23 DIAGNOSIS — R93.89 ABNORMAL CT OF THE CHEST: Primary | ICD-10-CM

## 2021-09-23 PROCEDURE — 99213 OFFICE O/P EST LOW 20 MIN: CPT | Performed by: INTERNAL MEDICINE

## 2021-09-23 RX ORDER — ACETAMINOPHEN 160 MG
TABLET,DISINTEGRATING ORAL
COMMUNITY
End: 2022-07-18

## 2021-09-23 RX ORDER — GABAPENTIN 100 MG/1
100 CAPSULE ORAL DAILY
COMMUNITY
Start: 2021-08-11 | End: 2021-11-19

## 2021-09-23 NOTE — FLOWSHEET NOTE
09/23/21 1331   Encounter Summary   Services provided to: Patient   Referral/Consult From: Patient   Support System Family members   Complexity of Encounter Moderate   Length of Encounter 30 minutes   Grief and Life Adjustment   Type Grief and loss   Assessment Calm; Approachable   Intervention Active listening;Explored feelings, thoughts, concerns;Nurtured hope;Prayer   Outcome Engaged in conversation;Coping;Receptive   Follow-up call at patient request.  She states her good friend just lost her  today. She is concerned for her as they are lifelong friends. Offered support and prayer. Continue prn spiritual support.

## 2021-09-23 NOTE — PROGRESS NOTES
MA Communication:   The following orders are received by verbal communication from Domenico Corrales MD    Orders include:    6 month ct wo contrast  Follow up

## 2021-09-23 NOTE — PROGRESS NOTES
Pulmonary Outpatient Note   Abi Key MD       9/23/2021    1. Abnormal CT of the chest    2. Chronic obstructive pulmonary disease, unspecified COPD type ([address]    3. Restrictive lung disease    4. Former smoker          ASSESSMENT/PLAN:  Abnormal CT chest.  Images shown to her, discussed. Recommend repeat CT. She has subpleural nodule, other smaller nodules. Likely benign  COPD. PFT does not suggest an obstructive defect, there is minimal if any emphysema to my evaluation. Restrictive lung disease. Reduced FVC and ERV. There is questionable scarring of the right upper lobe. There is also a question of early pulmonary edema based on the lower lobe images to my evaluation. We will follow up on repeat CT chest.  Former smoker. Nonspecific lung nodules, to be followed on repeat CT chest  Prophylaxis. She has received Pneumovax, Prevnar. COVID-19 booster vaccination when available and annual flu shot recommended    RTC with CT chest    Orders Placed This Encounter   Procedures    CT CHEST WO CONTRAST       Return in about 6 months (around 3/23/2022). Chief Complaint:   Follow-up (3 mo f/u   former OP   r/s from 9/7)       HPI: Germán Ying is a 77y.o. year old female here for 3-month follow-up. Her PCP is Dr. William Owusu. Luzmaria Megan.DO Mi is a pleasant 71-year-old female who was followed by Dr. Rosangela Nicole, last seen on 6/8/2021. She has COPD, was treated with a prolonged course of steroids for organizing pneumonia. CT had shown improvement. She was on oxygen at 3 LPM. At baseline she has multiple medical problems including hypertension, SVT, hyperlipidemia, diabetes mellitus with neuropathy, hypothyroidism, SNOW with cirrhosis, esophageal varices, GERD, diabetic neuropathy, osteoporosis, low vitamin D level, pernicious anemia, DJD. She had esophageal varices. She does have a heart murmur, had rheumatic fever in childhood. She is followed by Dr. Arun Suarez.   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 She had smoked from age 9 for about 55 years, less than 1 pack/day. She smoked heavily since her 35s. She has 2 sons and 2 daughters. The patient was first seen by Dr. Eliseo Romero during her hospitalization on 5/6/2021, underwent bronchoscopy on 5/7/2021. Cultures were negative. Cytology showed severe acute inflammation. Her CT chest had shown improvement. The patient has been on Yvonnie Genta as needed. The patient lives in a bilevel house, does have difficulty with climbing steps, in the store she uses a cart. She can walk less than half a block. She does have wheezing and cough at night, the symptoms appear to wake her up. She denies GE reflux. She has lost 40 pounds, but her appetite has not improved. She did golf. She denies any  complaints, she has leg edema. She has RLS, is on Requip. Echo 8/31/2021  Left ventricular systolic function is hyperdynamic with an estimated   ejection fraction of >= 65%. The left ventricle is normal in size with mild concentric hypertrophy. No obvious regional wall motion abnormalities noted. Normal left ventricular diastolic function. Mild mitral and aortic regurgitation. Mild mitral stenosis   Systolic pulmonary artery pressure (SPAP) is normal and estimated at 32 mmHg   (right atrial pressure 3 mmHg).     CT chest 6/3/2021  Mediastinum: Thyroid normal in size.  Coarse calcification noted inferiorly   in the left lobe unchanged.  Multiple scattered lymph nodes in the   mediastinum which have decreased in size since the prior study. Christine Gilliam is   mildly enlarged.  Thoracic aorta normal in size.  No pericardial effusion.       Lungs/pleura: Slight emphysematous changes present.  Small amount of scarring   or postinflammatory change anteriorly in the right apex at the site of prior   acute airspace disease.  Small focal area of consolidation remains present   laterally in the superior lingula improved from the prior study.  Moderate   opacity also present anteriorly and medially in the right upper lobe with   some bronchiectasis.  Significant improvement has occurred in this area. Slight bibasilar atelectasis or postinflammatory changes present   significantly improved from the prior study.  6.4 x 3.8 subpleural nodule   present laterally the lower right lower lobe appearing slightly smaller. Multiple scattered areas of stranding seen throughout the lungs in areas of   previous acute airspace disease.  No abnormal vascular congestion.  Airways   are clear.  No pleural effusion.       Upper Abdomen: Liver has a mildly nodular contour with enlargement the   lateral segment of the left lobe.  Spleen appears mildly enlarged.  No other   significant abnormality.       Soft Tissues/Bones: No acute abnormality. PFT 2/16/2016  1. Spirometry: The FEV-1 is 2.07 L, which is 83% predicted. The FEV-1/FVC ratio is normal.  Inhaled bronchodilators are given. There is no significant improvement. 2.  Lung volumes: Total lung capacity is markedly elevated as measured. 3.  Diffusion capacity:  DLCO is 18.09 ml/min/mmHg, which is 76% predicted.   4.  Flow volume loop is relatively normal.     Past Medical History:   Diagnosis Date    Allergic     Anemia     Anesthesia complication     \"woke up during surgery\"    Arthritis     Osteoarthritis of bilateral CMC joints    Asthma     Followed by Dr. Merle Archuleta Cirrhosis [facility])     non alcoholic    Cirrhosis, non-alcoholic ([address]     COPD (chronic obstructive pulmonary disease) ([address]     GERD (gastroesophageal reflux disease)     GIB (gastrointestinal bleeding)     LGIB 4/2016    Haemophilus infection 05/07/2021    + resp    Heart murmur     Hernia     Hyperlipidemia     Hypertension     Lung collapse     Murmur, heart     Pneumonia     Reflux     Rheumatic fever     Stress fracture     right foot    SVT (supraventricular tachycardia) ([address]     6/19/16 - admitted X 1 day per PT     TIA (transient ischemic attack)     Type II or unspecified type diabetes mellitus without mention of complication, not stated as uncontrolled     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 ([facility]     Wears glasses     as needed    Wears partial dentures     upper partial       Past Surgical History:   Procedure Laterality Date    BONE MARROW BIOPSY      BREAST BIOPSY      BRONCHOSCOPY N/A 2021    BRONCHOSCOPY DIAGNOSTIC OR CELL [address] ONLY performed by Bernice Garza MD at [address]  2021    BRONCHOSCOPY ALVEOLAR LAVAGE performed by Bernice Garza MD at [address] [place] [address]  2021    BRONCHOSCOPY THERAPUTIC ASPIRATION INITIAL performed by Bernice Garza MD at [facility]  14    CATARACT REMOVAL      COLONOSCOPY      X 3 per PT 16    COLONOSCOPY N/A 6/10/2021    COLONOSCOPY POLYPECTOMY ABLATION performed by Mihaela Baker MD at [address] 2013    CORAL BUNIONECTOMY RIGHT FOOT WITH SCREW FIXATION;    HERNIA REPAIR      HYSTERECTOMY      JOINT REPLACEMENT Bilateral     TKR    KNEE SURGERY  09    left    KNEE SURGERY  2010    right    UPPER GASTROINTESTINAL ENDOSCOPY N/A 2018    EGD BIOPSY performed by Stefani Taylor MD at [facility] 6/10/2021    EGD POLYP ABLATION/OTHER performed by Mihaela Baker MD at [facility] 6/10/2021    EGD BAND LIGATION performed by [facility]haela Baker MD at [facility]    Smoking status: Former Smoker     Packs/day: 1.00     Years: 40.00     Pack years: 40.00     Types: Cigarettes     Start date: 1956     Quit date: 2000     Years since quittin.3    Smokeless tobacco: Never Used    Tobacco comment: stopped cold turkey in !! ( 1999)   Substance Use Topics    Alcohol use: No     Alcohol/week: 0.0 standard drinks       Family History   Problem Relation Age of Onset    Colon Cancer Mother     Other Mother     Diabetes Father     Kidney Disease Paternal Grandmother     Lupus Daughter     Lupus Other          Current Outpatient Medications:     Cholecalciferol (VITAMIN D3) 48 MCG (2000 UT) CAPS, Take by mouth, Disp: , Rfl:     gabapentin (NEURONTIN) 100 MG capsule, Take 100 mg by mouth daily. , Disp: , Rfl:     venlafaxine (EFFEXOR) 37.5 MG tablet, TAKE 1 TABLET DAILY, Disp: 90 tablet, Rfl: 3    atorvastatin (LIPITOR) 20 MG tablet, TAKE 1 TABLET NIGHTLY, Disp: 90 tablet, Rfl: 3    pantoprazole (PROTONIX) 40 MG tablet, TAKE 1 TABLET TWICE A DAY, Disp: 180 tablet, Rfl: 3    furosemide (LASIX) 20 MG tablet, TAKE 1 TABLET DAILY, Disp: 90 tablet, Rfl: 3    cetirizine (ZYRTEC) 10 MG tablet, TAKE ONE TABLET BY MOUTH DAILY, Disp: 90 tablet, Rfl: 2    losartan (COZAAR) 100 MG tablet, TAKE 1 TABLET DAILY, Disp: 90 tablet, Rfl: 3    dilTIAZem (TIAZAC) 240 MG extended release capsule, TAKE 1 CAPSULE DAILY, Disp: 90 capsule, Rfl: 3    beclomethasone (QVAR) 80 MCG/ACT inhaler, Inhale 2 puffs into the lungs 2 times daily, Disp: 3 Inhaler, Rfl: 4    levalbuterol (XOPENEX HFA) 45 MCG/ACT inhaler, Inhale 2 puffs into the lungs every 6 hours as needed for Shortness of Breath, Disp: 3 Inhaler, Rfl: 4    metFORMIN (GLUCOPHAGE-XR) 500 MG extended release tablet, Take 2 tablets PO BID., Disp: 360 tablet, Rfl: 1    doxepin (SINEQUAN) 50 MG capsule, Take 1 capsule by mouth nightly, Disp: 90 capsule, Rfl: 3    traZODone (DESYREL) 50 MG tablet, TAKE 2 TABLETS NIGHTLY, Disp: 180 tablet, Rfl: 3    cyanocobalamin 1000 MCG/ML injection, Inject 1 mL into the muscle every 14 days Last dose was taken on 4/9/17, Disp: 6 vial, Rfl: 3    SITagliptin (JANUVIA) 100 MG tablet, Take 1 tablet by mouth daily, Disp: 90 tablet, Rfl: 2    famotidine (PEPCID) 20 MG tablet, Take 1 tablet by mouth 2 times daily, Disp: 180 tablet, Rfl: 3    potassium chloride (KLOR-CON M) 20 MEQ extended release tablet, Take 1 tablet by mouth daily, Disp: 90 tablet, Rfl: 3    ferrous sulfate 325 (65 Fe) MG tablet, Take 325 mg by mouth 3 times daily (with meals), Disp: , Rfl:     diphenhydrAMINE (BENADRYL) 25 MG tablet, Take 50 mg by mouth nightly , Disp: , Rfl:     aspirin 81 MG EC tablet, Take 81 mg by mouth daily , Disp: , Rfl:     rOPINIRole (REQUIP) 1 MG tablet, Take 1 mg by mouth nightly 1mg at 1730, 1mg at 2030, 2mg at 2200 (total of 4mg nightly in divided doses), Disp: , Rfl:     ursodiol (ACTIGALL) 500 MG tablet, Take 500 mg by mouth 2 times daily , Disp: , Rfl:     calcium carbonate 600 MG TABS tablet, Take 1 tablet by mouth daily , Disp: , Rfl:     levothyroxine (SYNTHROID) 100 MCG tablet, Take 100 mcg by mouth Daily, Disp: , Rfl:     Multiple Vitamins-Minerals (CENTRUM PO), Take 1 tablet by mouth daily. , Disp: , Rfl:     montelukast (SINGULAIR) 10 MG tablet, Take 10 mg by mouth daily , Disp: , Rfl:     estrogens, conjugated, (PREMARIN) 1.25 MG tablet, Take 1.25 mg by mouth daily. , Disp: , Rfl:     gabapentin (NEURONTIN) 600 MG tablet, Take 1 tablet by mouth nightly for 30 days. 600 mg at bedtime and 300 mg BID, Disp: 30 tablet, Rfl: 0    Latex, Clindamycin, Pennsaid [diclofenac sodium], Torsemide, Actos [pioglitazone], Amoxicillin, Augmentin [amoxicillin-pot clavulanate], Bactrim [sulfamethoxazole-trimethoprim], Ciprofloxacin, Doxycycline, Levaquin [levofloxacin], Ativan [lorazepam], Cephalosporins, Pcn [penicillins], and Proventil [albuterol]    Vitals:    09/23/21 1504   BP: (!) 177/63   Site: Left Upper Arm   Position: Sitting   Cuff Size: Medium Adult   Pulse: 75   Resp: 16   Temp: 97.8 °F (36.6 °C)   TempSrc: Oral   SpO2: 94%   Weight: 221 lb 6.4 oz (100.4 kg)   Height: 5' 6\" (1.676 m)       Review of Systems   Constitutional: Positive for unexpected weight change. Respiratory: Positive for cough, shortness of breath and wheezing. All other systems reviewed and are negative. Physical Exam  Vitals reviewed. Constitutional:       General: She is not in acute distress. Appearance: Normal appearance. She is well-developed. She is not ill-appearing, toxic-appearing or diaphoretic. Comments: Pleasant, overweight   HENT:      Head: Normocephalic and atraumatic. Nose: Nose normal.      Mouth/Throat:      Mouth: Mucous membranes are moist.      Pharynx: Oropharynx is clear. No oropharyngeal exudate or posterior oropharyngeal erythema. Comments: Class IV airway  Eyes:      General: No scleral icterus. Right eye: No discharge. Left eye: No discharge. Conjunctiva/sclera: Conjunctivae normal.      Pupils: Pupils are equal, round, and reactive to light. Neck:      Thyroid: No thyromegaly. Vascular: No JVD. Trachea: No tracheal deviation. Comments: Relatively short neck  Cardiovascular:      Rate and Rhythm: Normal rate and regular rhythm. Heart sounds: Murmur (    Parasternal ejection systolic) heard. No friction rub. No gallop. Pulmonary:      Effort: Pulmonary effort is normal. No respiratory distress. Breath sounds: Normal breath sounds. No stridor. No wheezing, rhonchi or rales. Abdominal:      Palpations: Abdomen is soft. Tenderness: There is no abdominal tenderness. There is no guarding or rebound. Musculoskeletal:      Right lower leg: No edema. Left lower leg: No edema. Comments: Bilateral scars of TKR   Lymphadenopathy:      Cervical: No cervical adenopathy. Skin:     General: Skin is warm and dry. Coloration: Skin is not jaundiced or pale. Findings: No bruising, erythema, lesion or rash. Neurological:      Mental Status: She is alert and oriented to person, place, and time.       Gait: Gait normal.   Psychiatric:         Mood and Affect: Mood normal.         Behavior: Behavior normal.

## 2021-09-23 NOTE — PATIENT INSTRUCTIONS
Remember to bring a list of pulmonary medications and any CPAP or BiPAP machines to your next appointment with the office. Please keep all of your future appointments scheduled by [address] office. Out of respect for other patients and providers, you may be asked to reschedule your appointment if you arrive later than your scheduled appointment time. Appointments cancelled less than 24hrs in advance will be considered a no show. Patients with three missed appointments within 1 year or four missed appointments within 2 years can be dismissed from the practice. Please be aware that our physicians are required to work in the Intensive Care Unit at [facility].  Your appointment may need to be rescheduled if they are designated to work during your appointment time. You may receive a survey regarding the care you received during your visit. Your input is valuable to us. We encourage you to complete and return your survey. We hope you will choose us in the future for your healthcare needs. Pt instructed of all future appointment dates & times, including radiology, labs, procedures & referrals. If procedures were scheduled preparation instructions provided. Instructions on future appointments with [facility] were given.

## 2021-09-26 ASSESSMENT — ENCOUNTER SYMPTOMS
WHEEZING: 1
SHORTNESS OF BREATH: 1
COUGH: 1

## 2021-10-01 ENCOUNTER — CLINICAL DOCUMENTATION (OUTPATIENT)
Dept: SPIRITUAL SERVICES | Age: 76
End: 2021-10-01

## 2021-10-04 ENCOUNTER — TELEPHONE (OUTPATIENT)
Dept: PULMONOLOGY | Age: 76
End: 2021-10-04

## 2021-10-04 DIAGNOSIS — J20.9 ACUTE BRONCHITIS, UNSPECIFIED ORGANISM: Primary | ICD-10-CM

## 2021-10-04 DIAGNOSIS — J44.9 CHRONIC OBSTRUCTIVE PULMONARY DISEASE, UNSPECIFIED COPD TYPE (HCC): ICD-10-CM

## 2021-10-04 RX ORDER — AZITHROMYCIN 250 MG/1
250 TABLET, FILM COATED ORAL SEE ADMIN INSTRUCTIONS
Qty: 6 TABLET | Refills: 0 | Status: SHIPPED | OUTPATIENT
Start: 2021-10-04 | End: 2021-10-09

## 2021-10-04 RX ORDER — PREDNISONE 20 MG/1
40 TABLET ORAL DAILY
Qty: 10 TABLET | Refills: 0 | Status: SHIPPED | OUTPATIENT
Start: 2021-10-04 | End: 2021-10-09

## 2021-10-04 NOTE — TELEPHONE ENCOUNTER
Pt called and said the phlegm in her throat is much worse. She coughs up a lot (tan mucous). She said she feels like she cannot get it all up. She denies any other symptoms. She said she has an adjustable bed and sits almost completely straight up. Cough wakes her up all night long.   Pharmacy:  [facility]

## 2021-10-04 NOTE — TELEPHONE ENCOUNTER
Called scrip's into [facility] [place] for  and cancelled scrips that were e-scribed to [facility]  For prednisone and zithromax

## 2021-10-07 ENCOUNTER — CLINICAL DOCUMENTATION (OUTPATIENT)
Dept: SPIRITUAL SERVICES | Age: 76
End: 2021-10-07

## 2021-10-07 NOTE — PROGRESS NOTES
Patient called spiritual care for support. Her son is sick and she is struggling with family dynamics. I offered a listening presence and an opportunity to verbalize thoughts and feelings. We will continue periodic support calls and prn visits.

## 2021-10-18 RX ORDER — BENZONATATE 100 MG/1
CAPSULE ORAL
Qty: 270 CAPSULE | Refills: 1 | Status: SHIPPED | OUTPATIENT
Start: 2021-10-18

## 2021-10-25 NOTE — TELEPHONE ENCOUNTER
Pt called asking if [facility] would be willing to refill her B12 shots. She stated she cannot get her PCP or another physician  to do it? She is asking if  can refill her B12, milliliter bottle 10-12 bottles to [facility]. Please contact pt if this can or cannot happen.

## 2021-10-26 NOTE — TELEPHONE ENCOUNTER
Pt called again today stating she still cannot get through to her diabetes doctor. She is now running out of her Januvia and will be out by Thursday. She would need a 15 day supply to go to [address] and the rest of the script go to [facility]    I tried calling 's office on the [employer] line and no one answered. If Velvet Poag will not refill B12 ([facility] pharmacy) and Marilee Honey, maybe a high priority needs to be routed to [facility]? Please advise.

## 2021-10-27 DIAGNOSIS — E11.42 TYPE 2 DIABETES MELLITUS WITH DIABETIC POLYNEUROPATHY, WITHOUT LONG-TERM CURRENT USE OF INSULIN (HCC): ICD-10-CM

## 2021-10-27 NOTE — TELEPHONE ENCOUNTER
Spoke to pt and relayed [facility]'S message. V/U. Also relayed Dr. Priya Da Silva message regarding [facility]ridge.  V/U.

## 2021-10-28 ENCOUNTER — TELEPHONE (OUTPATIENT)
Dept: PRIMARY CARE CLINIC | Age: 76
End: 2021-10-28

## 2021-10-28 RX ORDER — CYANOCOBALAMIN 1000 UG/ML
1000 INJECTION INTRAMUSCULAR; SUBCUTANEOUS
Qty: 6 EACH | Refills: 0 | Status: SHIPPED | OUTPATIENT
Start: 2021-10-28 | End: 2022-01-10 | Stop reason: SDUPTHER

## 2021-10-28 NOTE — TELEPHONE ENCOUNTER
----- Message from [employer] sent at 10/27/2021  1:24 PM EDT -----  Subject: Message to Provider    QUESTIONS  Information for Provider? pt needs her b-12 injections she needs the 1ML   she usually gets 12 ; sent over to [facility] in [address] [employer] phone number   [phone]   ---------------------------------------------------------------------------  --------------  [address]  What is the best way for the office to contact you? OK to leave message on   voicemail  Preferred Call Back Phone Number? [phone]  ---------------------------------------------------------------------------  --------------  SCRIPT ANSWERS  Relationship to Patient?  Self

## 2021-10-29 ENCOUNTER — CLINICAL DOCUMENTATION (OUTPATIENT)
Dept: SPIRITUAL SERVICES | Age: 76
End: 2021-10-29

## 2021-10-29 NOTE — FLOWSHEET NOTE
10/29/21 1332   Encounter Summary   Services provided to: Patient   Referral/Consult From: Patient   Support System Family members   Continue Visiting   (10-29, phone visit.)   Complexity of Encounter Moderate   Length of Encounter 45 minutes   Spiritual/Lutheran   Type Spiritual support   Assessment Calm; Approachable; Angry   Intervention Active listening;Explored feelings, thoughts, concerns;Nurtured hope;Prayer   Outcome Engaged in conversation;Expressed feelings/needs/concerns;Expressed regrets   Patient follow-up call. Patient sharing her struggle with family dynamics. She has little support right now and states she is \"going on with her life. \"  Patient states her family is not talking to her. She is coming up on the holidays, which is a big event for her family. Had prayer. Offered possibility of therapy, which she declined. Continue support calls.

## 2021-11-01 ENCOUNTER — TELEPHONE (OUTPATIENT)
Dept: ENDOCRINOLOGY | Age: 76
End: 2021-11-01

## 2021-11-01 DIAGNOSIS — E11.42 TYPE 2 DIABETES MELLITUS WITH DIABETIC POLYNEUROPATHY, WITHOUT LONG-TERM CURRENT USE OF INSULIN (HCC): ICD-10-CM

## 2021-11-01 NOTE — TELEPHONE ENCOUNTER
Medication:   Requested Prescriptions     Pending Prescriptions Disp Refills    SITagliptin (JANUVIA) 100 MG tablet 15 tablet 0     Sig: Take 1 tablet by mouth daily       Last Filled:  10/27/2021    Patient Phone Number: [phone] (home)     Last appt: 4/12/2021   Next appt: 12/6/2021    Last Labs DM:   Lab Results   Component Value Date    LABA1C 6.4 09/18/2021

## 2021-11-02 ENCOUNTER — CLINICAL DOCUMENTATION (OUTPATIENT)
Dept: SPIRITUAL SERVICES | Age: 76
End: 2021-11-02

## 2021-11-02 NOTE — FLOWSHEET NOTE
11/02/21 1048   Encounter Summary   Services provided to: Patient   Referral/Consult From: Patient   Support System Family members   Complexity of Encounter Moderate   Length of Encounter 45 minutes   Routine   Type Follow up   Assessment Calm; Approachable   Intervention Active listening;Nurtured hope   Outcome Engaged in conversation;Expressed feelings/needs/concerns;Coping   Patient called  to update me on progress with family dynamics. I offered support and encouraged her to continue to keep an open heart.   Will continue to offer support

## 2021-11-04 RX ORDER — POTASSIUM CHLORIDE 1500 MG/1
TABLET, EXTENDED RELEASE ORAL
Qty: 90 TABLET | Refills: 3 | Status: SHIPPED | OUTPATIENT
Start: 2021-11-04 | End: 2022-10-27

## 2021-11-19 ENCOUNTER — OFFICE VISIT (OUTPATIENT)
Dept: PRIMARY CARE CLINIC | Age: 76
End: 2021-11-19
Payer: MEDICARE

## 2021-11-19 VITALS
HEART RATE: 76 BPM | DIASTOLIC BLOOD PRESSURE: 60 MMHG | WEIGHT: 224 LBS | BODY MASS INDEX: 36.15 KG/M2 | SYSTOLIC BLOOD PRESSURE: 165 MMHG | OXYGEN SATURATION: 98 % | TEMPERATURE: 98.4 F

## 2021-11-19 DIAGNOSIS — I85.00 ESOPHAGEAL VARICES WITHOUT BLEEDING, UNSPECIFIED ESOPHAGEAL VARICES TYPE (HCC): ICD-10-CM

## 2021-11-19 DIAGNOSIS — D51.0 PERNICIOUS ANEMIA: ICD-10-CM

## 2021-11-19 DIAGNOSIS — E55.9 VITAMIN D DEFICIENCY: ICD-10-CM

## 2021-11-19 DIAGNOSIS — G25.81 RESTLESS LEG SYNDROME: ICD-10-CM

## 2021-11-19 DIAGNOSIS — J44.9 CHRONIC OBSTRUCTIVE PULMONARY DISEASE, UNSPECIFIED COPD TYPE (HCC): ICD-10-CM

## 2021-11-19 DIAGNOSIS — I47.1 SVT (SUPRAVENTRICULAR TACHYCARDIA) (HCC): ICD-10-CM

## 2021-11-19 DIAGNOSIS — E11.42 TYPE 2 DIABETES MELLITUS WITH DIABETIC POLYNEUROPATHY, WITHOUT LONG-TERM CURRENT USE OF INSULIN (HCC): ICD-10-CM

## 2021-11-19 DIAGNOSIS — K21.9 GASTROESOPHAGEAL REFLUX DISEASE, UNSPECIFIED WHETHER ESOPHAGITIS PRESENT: ICD-10-CM

## 2021-11-19 DIAGNOSIS — I10 ESSENTIAL HYPERTENSION, BENIGN: Primary | ICD-10-CM

## 2021-11-19 DIAGNOSIS — K74.60 LIVER CIRRHOSIS SECONDARY TO NASH (HCC): ICD-10-CM

## 2021-11-19 DIAGNOSIS — E03.4 HYPOTHYROIDISM DUE TO ACQUIRED ATROPHY OF THYROID: ICD-10-CM

## 2021-11-19 DIAGNOSIS — K75.81 LIVER CIRRHOSIS SECONDARY TO NASH (HCC): ICD-10-CM

## 2021-11-19 PROBLEM — R06.02 SOB (SHORTNESS OF BREATH): Status: RESOLVED | Noted: 2021-04-21 | Resolved: 2021-11-19

## 2021-11-19 PROBLEM — R76.8 ANA POSITIVE: Status: RESOLVED | Noted: 2021-05-06 | Resolved: 2021-11-19

## 2021-11-19 PROBLEM — K92.2 UPPER GI BLEED: Status: RESOLVED | Noted: 2018-07-20 | Resolved: 2021-11-19

## 2021-11-19 PROCEDURE — 99214 OFFICE O/P EST MOD 30 MIN: CPT | Performed by: FAMILY MEDICINE

## 2021-11-19 SDOH — ECONOMIC STABILITY: FOOD INSECURITY: WITHIN THE PAST 12 MONTHS, THE FOOD YOU BOUGHT JUST DIDN'T LAST AND YOU DIDN'T HAVE MONEY TO GET MORE.: NEVER TRUE

## 2021-11-19 SDOH — ECONOMIC STABILITY: FOOD INSECURITY: WITHIN THE PAST 12 MONTHS, YOU WORRIED THAT YOUR FOOD WOULD RUN OUT BEFORE YOU GOT MONEY TO BUY MORE.: NEVER TRUE

## 2021-11-19 ASSESSMENT — ENCOUNTER SYMPTOMS
CONSTIPATION: 0
SHORTNESS OF BREATH: 1
BLOOD IN STOOL: 0
ABDOMINAL PAIN: 0
DIARRHEA: 0

## 2021-11-19 ASSESSMENT — SOCIAL DETERMINANTS OF HEALTH (SDOH): HOW HARD IS IT FOR YOU TO PAY FOR THE VERY BASICS LIKE FOOD, HOUSING, MEDICAL CARE, AND HEATING?: NOT HARD AT ALL

## 2021-11-19 NOTE — PROGRESS NOTES
SUBJECTIVE:  Chief Complaint   Patient presents with    Diabetes    Hypertension    Tachycardia    COPD    Gastroesophageal Reflux    Hepatic Disease    Obesity    Health Maintenance    Immunizations     declines hep a, Tdap, shingles, PCV23, covid booster today       Chief Complaint   Patient presents with    Diabetes    Hypertension    Tachycardia    COPD    Gastroesophageal Reflux    Hepatic Disease    Obesity    Health Maintenance    Immunizations     declines hep a, Tdap, shingles, PCV23, covid booster today     LATASHA Man is a 77 y. o.female that presents today for 3 month follow up from last office visit for chornic medical problems including DM2, HTN, HPL, hypothyroidism, neuropathy, GERD, RLS, Arithritis, SVT:    Complaint of insomnia:  Can't get or stay asleep  Always lived with her   Now hears creeks and groans  Bought  chairs for her steps goes around rail was over $12,000 dollars    -COPD  QVAR 80 mcg 2 puff BID  Xopenex 2 puff q 6 hrs PRN    SVT/HTN:  Bp not at goal today  BP taken with correct size cuff? Yes   Repeated if > 140/90 No ; repeated by me  Recently saw cardiology  On losartan 100 mg daily  Cardizem 320 mg daily     BP Readings from Last 2 Encounters:   11/19/21 (!) 165/60   09/23/21 (!) 177/63     LE edema:  Lasix 20 mg daily  Type 2 diabetes: On Metfomrin XR 2 tablets BID  Januvia 100 mg daily  Diabetic retinal exam done? Yes. Patient going next year; went to [facility] in [facility]; had retinal exam   If yes, document in Health Maintenance. Monofilament placed on counter? No  Shoes and socks removed?  No  Pt gets podiatry exam defrs today    Sees Dr. Maria Eugenia Rasmussen for her foot exam  Has neuropathy  On gabapentin 600 mg HS    Hemoglobin A1C (%)   Date Value   09/18/2021 6.4     Microscopic Examination (no units)   Date Value   07/20/2018 YES     LDL Calculated (mg/dL)   Date Value   09/18/2021 63            Lab Results   Component Value Date Sitting Sitting   Cuff Size:  Medium Adult Medium Adult   Pulse: 77 80 76   Temp: 98.4 °F (36.9 °C)     SpO2: 98%     Weight: 224 lb (101.6 kg)         BP (!) 156/60 (Site: Left Upper Arm)   Pulse 77   Temp 98.4 °F (36.9 °C)   Wt 224 lb (101.6 kg)   SpO2 98%   BMI 36.15 kg/m²     Physical Exam  Constitutional:       Appearance: Normal appearance. She is not ill-appearing. HENT:      Head: Normocephalic and atraumatic. Eyes:      Extraocular Movements: Extraocular movements intact. Pulmonary:      Effort: Pulmonary effort is normal.   Neurological:      General: No focal deficit present. Mental Status: She is alert and oriented to person, place, and time. Mental status is at baseline. Psychiatric:         Mood and Affect: Mood normal.         Behavior: Behavior normal.         Thought Content: Thought content normal.       ASSESSMENT/PLAN:  Will Francis is 67-year-old female seen today for a 3 to 4-month follow-up for multiple chronic problems including essential hypertension not at goal, supraventricular tachycardia, three-vessel coronary disease, type 2 diabetes, restless leg syndrome, COPD, hypothyroidism, Codi Monica with esophageal varices and GERD patient is anemic, vitamin D deficiency. 1. Essential hypertension, benign  NOT to goal; home monitoring; DASH diet, consider additional agent/further work up  Has cardiology follow up in interim    2. SVT (supraventricular tachycardia) (HCC)  Continue cozaar, cardizem, lasix and potassium    3. Type 2 diabetes mellitus with diabetic polyneuropathy, without long-term current use of insulin ([address]  At goal; continue metformin and javuvia  Declines urin microalbumin  Get eye exam results  Foot exam at podiatry    4. Restless leg syndrome  Continue requip and gabapentin    5. Chronic obstructive pulmonary disease, unspecified COPD type ([address]  6.  Hypothyroidism due to acquired atrophy of thyroid  Drawn 2 months ago and within normal limits, continue Synthroid 100 mcg daily    7. Esophageal varices without bleeding, unspecified esophageal varices type ([facility]  8. 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 Gastroesophageal reflux disease, unspecified whether esophagitis present  Protonix and pecid esophagogastroduodenoscopy this past summer, follows with GI    10. Pernicious anemia  CBC, continue vitamin B12 injections    11. Vitamin D deficiency  Continue calcium carbonate and vitamin d    Reviewed treatment plan with patient, questions answered. Patient verbalized understanding to treatment plan and questions were answered. Spent 30-39 minutes of face to face interaction with patient counseling on diagnoses and treatment plan; including but not limited to pre visit planning, chart/lab review, new orders, instructions, charting    Return in about 6 months (around 5/19/2022) for Medicare Annual Well Visit. Rubi Gardner.  Abril Duke., DO      11/19/2021

## 2021-11-19 NOTE — PATIENT INSTRUCTIONS
Sign a records release for the eye doctor to get diabetic retinal results    Can have faxed [phone]    Check blood pressure each arm in morning and report averages to me in next 2 weeks    Get cataracts repaired    Follow up with your specialty team    Continue current medication    Sleep hygiene for your insomnia    Continue current medication and oxygen at this time    Patient Education        Insomnia: Care Instructions  Overview     Insomnia is the inability to sleep well. Insomnia may make it hard for you to get to sleep, stay asleep, or sleep as long as you need to. This can make you tired and grouchy during the day. It can also make you forgetful, less effective at work, and unhappy. Insomnia can be linked to many things. These include health problems, medicines, and stressful events. Treatment may include treating problems that may be linked with your insomnia. Treatment also includes behavior and lifestyle changes. This may include cognitive-behavioral therapy for insomnia (CBT-I). CBT-I uses different ways to help you change your thoughts and behaviors that may interfere with sleep. Your doctor can recommend specific things you can try. Examples include doing relaxation exercises, keeping regular bedtimes and wake times, limiting alcohol, and making healthy sleep habits. Some people decide to take medicine for a while to help with sleep. Follow-up care is a key part of your treatment and safety. Be sure to make and go to all appointments, and call your doctor if you are having problems. It's also a good idea to know your test results and keep a list of the medicines you take. How can you care for yourself at home? Cognitive-behavioral therapy for insomnia (CBT-I)  · If your doctor recommends CBT-I, follow your treatment plan. Your doctor will give you instructions that are unique for you. · Your plan will likely include a few things that you can try at home.  For example:  ? Try meditation or help?  Watch closely for changes in your health, and be sure to contact your doctor if:    · Your efforts to improve your sleep do not work.     · Your insomnia gets worse.     · You have been feeling down, depressed, or hopeless or have lost interest in things that you usually enjoy. Where can you learn more? Go to [email or web address] and sign in to your [employer] account. Enter P513 in the [employer] box to learn more about \"Insomnia: Care Instructions. \"     If you do not have an account, please click on the \"Sign Up Now\" link. Current as of: June 16, 2021               Content Version: 13.0  © 4163-1579 Healthwise, Incorporated. Care instructions adapted under license by [facility] [place] ([facility]). If you have questions about a medical condition or this instruction, always ask your healthcare professional. [address] any warranty or liability for your use of this information.

## 2021-11-29 ENCOUNTER — CLINICAL DOCUMENTATION (OUTPATIENT)
Dept: SPIRITUAL SERVICES | Age: 76
End: 2021-11-29

## 2021-11-29 NOTE — PROGRESS NOTES
Outpatient spiritual care called patient to offer support. Shared memories and holiday plans. Continue periodic support calls. She is getting through the \"firsts\" since the loss of her . Follow-up next month.

## 2021-12-02 ENCOUNTER — TELEPHONE (OUTPATIENT)
Dept: PRIMARY CARE CLINIC | Age: 76
End: 2021-12-02

## 2021-12-02 NOTE — TELEPHONE ENCOUNTER
Would recommend patient seek care at urgent care if no appointment slots available; needs assessed to determine possible causes and treatment

## 2021-12-03 RX ORDER — TRAZODONE HYDROCHLORIDE 50 MG/1
TABLET ORAL
Qty: 180 TABLET | Refills: 3 | Status: SHIPPED | OUTPATIENT
Start: 2021-12-03

## 2021-12-20 ENCOUNTER — CLINICAL DOCUMENTATION (OUTPATIENT)
Dept: SPIRITUAL SERVICES | Age: 76
End: 2021-12-20

## 2021-12-30 ENCOUNTER — CLINICAL DOCUMENTATION (OUTPATIENT)
Dept: SPIRITUAL SERVICES | Age: 76
End: 2021-12-30

## 2022-01-03 DIAGNOSIS — E11.42 TYPE 2 DIABETES MELLITUS WITH DIABETIC POLYNEUROPATHY, WITHOUT LONG-TERM CURRENT USE OF INSULIN (HCC): ICD-10-CM

## 2022-01-03 RX ORDER — METFORMIN HYDROCHLORIDE 500 MG/1
TABLET, EXTENDED RELEASE ORAL
Qty: 360 TABLET | Refills: 3 | Status: SHIPPED | OUTPATIENT
Start: 2022-01-03

## 2022-01-03 NOTE — TELEPHONE ENCOUNTER
Medication:   Requested Prescriptions     Pending Prescriptions Disp Refills    metFORMIN (GLUCOPHAGE-XR) 500 MG extended release tablet [Pharmacy Med Name: METFORMIN HCL ER TABS 500MG] 360 tablet 3     Sig: TAKE 2 TABLETS TWICE A DAY       Last Filled:      Patient Phone Number: [phone] (home)     Last appt: 4/12/2021   Next appt: 01/21/2022  Last Labs DM:   Lab Results   Component Value Date    LABA1C 6.4 09/18/2021

## 2022-01-06 DIAGNOSIS — K21.9 GASTROESOPHAGEAL REFLUX DISEASE WITHOUT ESOPHAGITIS: ICD-10-CM

## 2022-01-07 ENCOUNTER — CLINICAL DOCUMENTATION (OUTPATIENT)
Dept: SPIRITUAL SERVICES | Age: 77
End: 2022-01-07

## 2022-01-07 RX ORDER — FAMOTIDINE 20 MG/1
TABLET, FILM COATED ORAL
Qty: 180 TABLET | Refills: 3 | Status: SHIPPED | OUTPATIENT
Start: 2022-01-07 | End: 2022-03-02 | Stop reason: SDUPTHER

## 2022-01-07 NOTE — PROGRESS NOTES
follow-up call with patient. Offered support and prayer. She is doing well with the \"first's\" through the holidays after her 's death. Continue periodic calls for support.

## 2022-01-10 RX ORDER — CYANOCOBALAMIN 1000 UG/ML
1000 INJECTION INTRAMUSCULAR; SUBCUTANEOUS
Qty: 6 EACH | Refills: 0 | Status: SHIPPED | OUTPATIENT
Start: 2022-01-10 | End: 2022-03-21 | Stop reason: SDUPTHER

## 2022-01-10 NOTE — TELEPHONE ENCOUNTER
Visit date 11/19/21    Last Written:10/28/21    Next Appointment:   Future Appointments   Date Time Provider Krzysztof Michele   1/21/2022 12:10 PM Samuel Betancur MD [facility]   2/25/2022  1:00 PM Sebastián Lawson.  Edin Beavers., DO [address] AND RES [facility]   4/6/2022  1:00 PM [facility]   4/6/2022  2:00 PM Kylah Phillips MD AND PULM [facility]   4/14/2022  1:30 PM Argelia Gamble MD [facility]             Requested Prescriptions      No prescriptions requested or ordered in this encounter

## 2022-01-10 NOTE — TELEPHONE ENCOUNTER
----- Message from Alisha Malcolm sent at 1/10/2022  1:08 PM EST -----  Subject: Refill Request    QUESTIONS  Name of Medication? cyanocobalamin 1000 MCG/ML injection  Patient-reported dosage and instructions? Take every 2 weeks. How many days do you have left? 0  [place] [facility] [place] phone [place] (if available[place] [phone]  Additional Information for Provider? She reports she gets 1 ML bottles due   to what insurance will cover. ---------------------------------------------------------------------------  --------------  Lear How INFO  What is the best way for the office to contact you? OK to leave message on   voicemail  Preferred Call Back Phone Number?  [phone]

## 2022-01-21 ENCOUNTER — VIRTUAL VISIT (OUTPATIENT)
Dept: ENDOCRINOLOGY | Age: 77
End: 2022-01-21

## 2022-01-21 DIAGNOSIS — E11.9 CONTROLLED TYPE 2 DIABETES MELLITUS WITHOUT COMPLICATION, UNSPECIFIED WHETHER LONG TERM INSULIN USE (HCC): Primary | ICD-10-CM

## 2022-01-21 NOTE — PROGRESS NOTES
Patient did not show up on Doxy me. Called patient, she reports now aware of it and does not have camera. Schedule in person visit.

## 2022-02-07 ENCOUNTER — TELEPHONE (OUTPATIENT)
Dept: PULMONOLOGY | Age: 77
End: 2022-02-07

## 2022-02-07 DIAGNOSIS — J44.9 CHRONIC OBSTRUCTIVE PULMONARY DISEASE, UNSPECIFIED COPD TYPE (HCC): Primary | ICD-10-CM

## 2022-02-07 RX ORDER — AZITHROMYCIN 250 MG/1
250 TABLET, FILM COATED ORAL SEE ADMIN INSTRUCTIONS
Qty: 6 TABLET | Refills: 0 | Status: SHIPPED | OUTPATIENT
Start: 2022-02-07 | End: 2022-02-12

## 2022-02-07 RX ORDER — METHYLPREDNISOLONE 4 MG/1
TABLET ORAL
Qty: 1 KIT | Refills: 0 | Status: SHIPPED | OUTPATIENT
Start: 2022-02-07 | End: 2022-02-13

## 2022-02-07 NOTE — TELEPHONE ENCOUNTER
Yellow productive cough wheezing. No temp SOB on excretion no more than usual. Having eye surgery next month and fearful of getting pneumonia. Requesting antibiotic and steroid be called in.

## 2022-02-11 ENCOUNTER — HOSPITAL ENCOUNTER (OUTPATIENT)
Dept: GENERAL RADIOLOGY | Age: 77
Discharge: HOME | End: 2022-02-11
Payer: MEDICARE

## 2022-02-11 ENCOUNTER — HOSPITAL ENCOUNTER (OUTPATIENT)
Age: 77
Discharge: HOME | End: 2022-02-11
Payer: MEDICARE

## 2022-02-11 ENCOUNTER — CLINICAL DOCUMENTATION (OUTPATIENT)
Dept: SPIRITUAL SERVICES | Age: 77
End: 2022-02-11

## 2022-02-11 ENCOUNTER — TELEPHONE (OUTPATIENT)
Dept: PULMONOLOGY | Age: 77
End: 2022-02-11

## 2022-02-11 DIAGNOSIS — R06.02 SOB (SHORTNESS OF BREATH): ICD-10-CM

## 2022-02-11 DIAGNOSIS — R06.02 SOB (SHORTNESS OF BREATH): Primary | ICD-10-CM

## 2022-02-11 PROCEDURE — 71046 X-RAY EXAM CHEST 2 VIEWS: CPT

## 2022-02-11 NOTE — FLOWSHEET NOTE
02/11/22 1445   Encounter Summary   Services provided to: Patient   Referral/Consult From: Patient   Continue Visiting   (2-11-22, met in den[place], had visit.)   Complexity of Encounter Moderate   Length of Encounter 30 minutes   Routine   Type Follow up   Assessment Approachable;Tearful   Intervention Active listening;Explored feelings, thoughts, concerns   Outcome Engaged in conversation;Expressed feelings/needs/concerns    follow-up. Patient having chest x-ray and we met in the [facility]. Offered support. Continue prn visits.

## 2022-02-11 NOTE — TELEPHONE ENCOUNTER
Pt was treated earlier for a cold. She is not sure if she is getting better. She is requesting she get a CXR because she has several surgeries coming up in March. Order pending if appropriate.

## 2022-02-14 ENCOUNTER — TELEPHONE (OUTPATIENT)
Dept: PULMONOLOGY | Age: 77
End: 2022-02-14

## 2022-02-14 NOTE — TELEPHONE ENCOUNTER
Patient got this med on 2/9 is requesting a refill,  When I called the patient to clarify she was very rude and argumentative. Please advise.

## 2022-02-14 NOTE — TELEPHONE ENCOUNTER
Pt wanting results of CXR. She states she will be finished her medication today.     Impression   Improved aeration of the lungs with scattered residual nodularity remaining,   left greater than right

## 2022-02-16 ENCOUNTER — OFFICE VISIT (OUTPATIENT)
Dept: PULMONOLOGY | Age: 77
End: 2022-02-16
Payer: MEDICARE

## 2022-02-16 VITALS
HEART RATE: 79 BPM | WEIGHT: 219 LBS | TEMPERATURE: 98.7 F | HEIGHT: 66 IN | SYSTOLIC BLOOD PRESSURE: 139 MMHG | OXYGEN SATURATION: 96 % | DIASTOLIC BLOOD PRESSURE: 64 MMHG | RESPIRATION RATE: 20 BRPM | BODY MASS INDEX: 35.2 KG/M2

## 2022-02-16 DIAGNOSIS — J20.9 ACUTE BRONCHITIS, UNSPECIFIED ORGANISM: Primary | ICD-10-CM

## 2022-02-16 DIAGNOSIS — R06.02 SOB (SHORTNESS OF BREATH): ICD-10-CM

## 2022-02-16 DIAGNOSIS — J44.9 CHRONIC OBSTRUCTIVE PULMONARY DISEASE, UNSPECIFIED COPD TYPE (HCC): ICD-10-CM

## 2022-02-16 DIAGNOSIS — J98.4 RESTRICTIVE LUNG DISEASE: ICD-10-CM

## 2022-02-16 PROCEDURE — 99214 OFFICE O/P EST MOD 30 MIN: CPT | Performed by: INTERNAL MEDICINE

## 2022-02-16 RX ORDER — LEVOFLOXACIN 500 MG/1
500 TABLET, FILM COATED ORAL DAILY
Qty: 5 TABLET | Refills: 0 | Status: SHIPPED | OUTPATIENT
Start: 2022-02-16 | End: 2022-02-21

## 2022-02-16 ASSESSMENT — ENCOUNTER SYMPTOMS
EYES NEGATIVE: 1
GASTROINTESTINAL NEGATIVE: 1
RESPIRATORY NEGATIVE: 1
ALLERGIC/IMMUNOLOGIC NEGATIVE: 1

## 2022-02-16 NOTE — PROGRESS NOTES
Eugene Milian (: 1945 ) is a 77 y.o. female here for an evaluation of   Chief Complaint   Patient presents with    Shortness of Breath         ASSESSMENT/PLAN:   Diagnosis Orders   1. Acute bronchitis, unspecified organism     2. Chronic obstructive pulmonary disease, unspecified COPD type ([address]     3. SOB (shortness of breath)     4. Restrictive lung disease       PFT done 16  INDICATION:  Shortness of breath. PULMONARY FUNCTION TEST:  1. Spirometry: The FEV-1 is 2.07 L, which is 83% predicted. The FEV-1/FVC  ratio is normal.  Inhaled bronchodilators are given. There is no significant  improvement. 2.  Lung volumes: Total lung capacity is markedly elevated as measured. 3.  Diffusion capacity:  DLCO is 18.09 ml/min/mmHg, which is 76% predicted. 4.  Flow volume loop is relatively normal.      IMPRESSION:  No obstructive or restrictive lung defect is seen. Diffusion  capacity is only slightly reduced. There does not seem to be a physiologic  explanation for the elevation in lung volumes. If clinically indicated,  repeat testing may be needed to clarify this issue. cxr done 2022    Impression   Improved aeration of the lungs with scattered residual nodularity remaining,   left greater than right             Continue with  qvar 80 mcg 2 puff twice a day  Turdorza  1puff twice a day  xopenex MDI as needed 2 puff  Albuterol nebulizer as needed    Oxygen at 3 lpm       Can take  Doxycycline and levaquin can be used short term    Could consider  1. Empiric abx, ie levaquin  2. Bronchoscopy      Will do levaquin 500 mg a day for 5 days    Call me in 1 week. If not working, call for bronchoscopy      RTC to see Dr. Eryn Muro        No follow-ups on file. SUBJECTIVE/OBJECTIVE:    Seen for acute issues  Pt normally sees Dr. Eryn Muro    Last seen by Dr. Eryn Muro 21     1. Abnormal CT of the chest   2.  Chronic obstructive pulmonary disease, unspecified COPD type ([facility]   3. Restrictive lung disease   4. Former smoker            ASSESSMENT/PLAN:  Abnormal CT chest.  Images shown to her, discussed. Recommend repeat CT. She has subpleural nodule, other smaller nodules. Likely benign  COPD. PFT does not suggest an obstructive defect, there is minimal if any emphysema to my evaluation. Restrictive lung disease. Reduced FVC and ERV. There is questionable scarring of the right upper lobe. There is also a question of early pulmonary edema based on the lower lobe images to my evaluation. We will follow up on repeat CT chest.  Former smoker. Nonspecific lung nodules, to be followed on repeat CT chest  Prophylaxis. She has received Pneumovax, Prevnar. COVID-19 booster vaccination when available and annual flu shot recommended             Has been on long term oxygen for 5 years    Over the last 1 month  And has been coughing up mucus with yellow mucus  Was given pred and zpac and didn't help    Having Surgery for Cataracts end of March  And       Taking   xopenex  qvar  tudorza    Has been doing nebulizer  And seems to help       Has had bronch done in the past        Review of Systems   Constitutional: Negative. HENT: Negative. Eyes: Negative. Respiratory: Negative. Cardiovascular: Negative. Gastrointestinal: Negative. Endocrine: Negative. Genitourinary: Negative. Musculoskeletal: Negative. Skin: Negative. Allergic/Immunologic: Negative. Neurological: Negative. Hematological: Negative. Psychiatric/Behavioral: Negative. Vitals:    02/16/22 1316   BP: 139/64   Site: Left Lower Arm   Position: Sitting   Cuff Size: Large Adult   Pulse: 79   Resp: 20   Temp: 98.7 °F (37.1 °C)   TempSrc: Temporal   SpO2: 96%   Weight: 219 lb (99.3 kg)   Height: 5' 6\" (1.676 m)        Physical Exam  Vitals and nursing note reviewed. Constitutional:       General: She is not in acute distress. Appearance: Normal appearance.  She is not ill-appearing. HENT:      Head: Normocephalic and atraumatic. Right Ear: External ear normal.      Left Ear: External ear normal.      Nose: Nose normal.      Mouth/Throat:      Mouth: Mucous membranes are moist.      Pharynx: Oropharynx is clear. Comments: Mallampati 2  Eyes:      General: No scleral icterus. Extraocular Movements: Extraocular movements intact. Conjunctiva/sclera: Conjunctivae normal.      Pupils: Pupils are equal, round, and reactive to light. Cardiovascular:      Rate and Rhythm: Normal rate and regular rhythm. Pulses: Normal pulses. Heart sounds: Normal heart sounds. No murmur heard. No friction rub. Pulmonary:      Effort: No respiratory distress. Breath sounds: No stridor. Rhonchi and rales present. No wheezing. Chest:      Chest wall: No tenderness. Abdominal:      General: Abdomen is flat. Bowel sounds are normal. There is no distension. Tenderness: There is no abdominal tenderness. There is no guarding. Musculoskeletal:         General: No swelling or tenderness. Normal range of motion. Cervical back: Normal range of motion and neck supple. No rigidity. Skin:     General: Skin is warm and dry. Coloration: Skin is not jaundiced. Neurological:      General: No focal deficit present. Mental Status: She is alert and oriented to person, place, and time. Mental status is at baseline. Cranial Nerves: No cranial nerve deficit. Sensory: No sensory deficit. Motor: No weakness. Gait: Gait normal.   Psychiatric:         Mood and Affect: Mood normal.         Thought Content:  Thought content normal.         Judgment: Judgment normal.              Tyrone Rhodes MD

## 2022-02-16 NOTE — PROGRESS NOTES
MA Communication: The following orders are received by verbal communication from Matt Parsons MD    Orders include:     Follow up with Dr. Jose F Obrien as previously scheduled

## 2022-02-16 NOTE — PATIENT INSTRUCTIONS
ASSESSMENT/PLAN:   Diagnosis Orders   1. Acute bronchitis, unspecified organism     2. Chronic obstructive pulmonary disease, unspecified COPD type ([facility]     3. SOB (shortness of breath)     4. Restrictive lung disease       PFT done 2/22/16  INDICATION:  Shortness of breath. PULMONARY FUNCTION TEST:  1. Spirometry: The FEV-1 is 2.07 L, which is 83% predicted. The FEV-1/FVC  ratio is normal.  Inhaled bronchodilators are given. There is no significant  improvement. 2.  Lung volumes: Total lung capacity is markedly elevated as measured. 3.  Diffusion capacity:  DLCO is 18.09 ml/min/mmHg, which is 76% predicted. 4.  Flow volume loop is relatively normal.      IMPRESSION:  No obstructive or restrictive lung defect is seen. Diffusion  capacity is only slightly reduced. There does not seem to be a physiologic  explanation for the elevation in lung volumes. If clinically indicated,  repeat testing may be needed to clarify this issue. cxr done 2/11/2022    Impression   Improved aeration of the lungs with scattered residual nodularity remaining,   left greater than right             Continue with  qvar 80 mcg 2 puff twice a day  Turdorza  1puff twice a day  xopenex MDI as needed 2 puff  Albuterol nebulizer as needed    Oxygen at 3 lpm       Can take  Doxycycline and levaquin can be used short term    Could consider  1. Empiric abx, ie levaquin  2. Bronchoscopy      Will do levaquin 500 mg a day for 5 days    Call me in 1 week. If not working, call for bronchoscopy      RTC to see Dr. Ernesto Juan  Remember to bring a list of pulmonary medications and any CPAP or BiPAP machines to your next appointment with the office. Please keep all of your future appointments scheduled by [facility]. Out of respect for other patients and providers, you may be asked to reschedule your appointment if you arrive later than your scheduled appointment time.  Appointments cancelled less than 24hrs in advance will be considered a no show. Patients with three missed appointments within 1 year or four missed appointments within 2 years can be dismissed from the practice. Please be aware that our physicians are required to work in the Intensive Care Unit at [facility].  Your appointment may need to be rescheduled if they are designated to work during your appointment time. You may receive a survey regarding the care you received during your visit. Your input is valuable to us. We encourage you to complete and return your survey. We hope you will choose us in the future for your healthcare needs. Pt instructed of all future appointment dates & times, including radiology, labs, procedures & referrals. If procedures were scheduled preparation instructions provided. Instructions on future appointments with [facility] were given.

## 2022-02-16 NOTE — LETTER
2/16/22        Hannah Reese      I have seen this patient in the office today and wanted to communicate my findings and recommendations. Patient Instructions     ASSESSMENT/PLAN:   Diagnosis Orders   1. Acute bronchitis, unspecified organism     2. Chronic obstructive pulmonary disease, unspecified COPD type ([address]     3. SOB (shortness of breath)     4. Restrictive lung disease       PFT done 2/22/16  INDICATION:  Shortness of breath. PULMONARY FUNCTION TEST:  1. Spirometry: The FEV-1 is 2.07 L, which is 83% predicted. The FEV-1/FVC  ratio is normal.  Inhaled bronchodilators are given. There is no significant  improvement. 2.  Lung volumes: Total lung capacity is markedly elevated as measured. 3.  Diffusion capacity:  DLCO is 18.09 ml/min/mmHg, which is 76% predicted. 4.  Flow volume loop is relatively normal.      IMPRESSION:  No obstructive or restrictive lung defect is seen. Diffusion  capacity is only slightly reduced. There does not seem to be a physiologic  explanation for the elevation in lung volumes. If clinically indicated,  repeat testing may be needed to clarify this issue. cxr done 2/11/2022    Impression   Improved aeration of the lungs with scattered residual nodularity remaining,   left greater than right             Continue with  qvar 80 mcg 2 puff twice a day  Turdorza  1puff twice a day  xopenex MDI as needed 2 puff  Albuterol nebulizer as needed    Oxygen at 3 lpm       Can take  Doxycycline and levaquin can be used short term    Could consider  1. Empiric abx, ie levaquin  2. Bronchoscopy      Will do levaquin 500 mg a day for 5 days    Call me in 1 week.   If not working, call for bronchoscopy      RTC to see Dr. Tiffany Hooker                     Thank you for allowing me to assist in the care of the Tova Rubinstein, MD

## 2022-02-22 ENCOUNTER — TELEPHONE (OUTPATIENT)
Dept: PULMONOLOGY | Age: 77
End: 2022-02-22

## 2022-02-22 DIAGNOSIS — K21.9 GASTROESOPHAGEAL REFLUX DISEASE WITHOUT ESOPHAGITIS: ICD-10-CM

## 2022-02-22 NOTE — TELEPHONE ENCOUNTER
Patient call stated she is much better now after taking medication the was prescribe after her OV. She saying her chest is not congested.

## 2022-02-25 ENCOUNTER — TELEPHONE (OUTPATIENT)
Dept: PULMONOLOGY | Age: 77
End: 2022-02-25

## 2022-02-25 ENCOUNTER — OFFICE VISIT (OUTPATIENT)
Dept: PRIMARY CARE CLINIC | Age: 77
End: 2022-02-25
Payer: MEDICARE

## 2022-02-25 ENCOUNTER — TELEPHONE (OUTPATIENT)
Dept: PRIMARY CARE CLINIC | Age: 77
End: 2022-02-25

## 2022-02-25 VITALS
WEIGHT: 221 LBS | OXYGEN SATURATION: 93 % | TEMPERATURE: 97.5 F | HEART RATE: 65 BPM | SYSTOLIC BLOOD PRESSURE: 138 MMHG | DIASTOLIC BLOOD PRESSURE: 72 MMHG | RESPIRATION RATE: 18 BRPM | BODY MASS INDEX: 35.52 KG/M2 | HEIGHT: 66 IN

## 2022-02-25 DIAGNOSIS — E78.2 MIXED HYPERLIPIDEMIA: ICD-10-CM

## 2022-02-25 DIAGNOSIS — Z01.818 PRE-OP EXAM: ICD-10-CM

## 2022-02-25 DIAGNOSIS — I25.10 3-VESSEL CORONARY ARTERY DISEASE: ICD-10-CM

## 2022-02-25 DIAGNOSIS — H25.9 AGE-RELATED CATARACT OF BOTH EYES, UNSPECIFIED AGE-RELATED CATARACT TYPE: Primary | ICD-10-CM

## 2022-02-25 DIAGNOSIS — E11.42 TYPE 2 DIABETES MELLITUS WITH DIABETIC POLYNEUROPATHY, WITHOUT LONG-TERM CURRENT USE OF INSULIN (HCC): ICD-10-CM

## 2022-02-25 DIAGNOSIS — E03.4 HYPOTHYROIDISM DUE TO ACQUIRED ATROPHY OF THYROID: ICD-10-CM

## 2022-02-25 DIAGNOSIS — I47.1 SVT (SUPRAVENTRICULAR TACHYCARDIA) (HCC): ICD-10-CM

## 2022-02-25 DIAGNOSIS — J44.9 CHRONIC OBSTRUCTIVE PULMONARY DISEASE, UNSPECIFIED COPD TYPE (HCC): ICD-10-CM

## 2022-02-25 DIAGNOSIS — D51.0 PERNICIOUS ANEMIA: ICD-10-CM

## 2022-02-25 PROCEDURE — 99214 OFFICE O/P EST MOD 30 MIN: CPT | Performed by: FAMILY MEDICINE

## 2022-02-25 RX ORDER — DOXEPIN HYDROCHLORIDE 50 MG/1
CAPSULE ORAL
Qty: 90 CAPSULE | Refills: 3 | OUTPATIENT
Start: 2022-02-25

## 2022-02-25 SDOH — ECONOMIC STABILITY: TRANSPORTATION INSECURITY
IN THE PAST 12 MONTHS, HAS THE LACK OF TRANSPORTATION KEPT YOU FROM MEDICAL APPOINTMENTS OR FROM GETTING MEDICATIONS?: NO

## 2022-02-25 SDOH — ECONOMIC STABILITY: TRANSPORTATION INSECURITY
IN THE PAST 12 MONTHS, HAS LACK OF TRANSPORTATION KEPT YOU FROM MEETINGS, WORK, OR FROM GETTING THINGS NEEDED FOR DAILY LIVING?: NO

## 2022-02-25 ASSESSMENT — PATIENT HEALTH QUESTIONNAIRE - PHQ9
SUM OF ALL RESPONSES TO PHQ QUESTIONS 1-9: 0
1. LITTLE INTEREST OR PLEASURE IN DOING THINGS: 0
SUM OF ALL RESPONSES TO PHQ9 QUESTIONS 1 & 2: 0
SUM OF ALL RESPONSES TO PHQ QUESTIONS 1-9: 0
2. FEELING DOWN, DEPRESSED OR HOPELESS: 0

## 2022-02-25 NOTE — TELEPHONE ENCOUNTER
----- Message from Emma Delgado sent at 2/25/2022  3:29 PM EST -----  Subject: Message to Provider    QUESTIONS  Information for Provider? Pt has orders put in for lab work, she is not   sure whether this is for her diabetes follow up in may or if this is for   her cataract surgery in march. she would like a call back to let her know   ---------------------------------------------------------------------------  --------------  CALL BACK INFO  What is the best way for the office to contact you? OK to leave message on   voicemail  Preferred Call Back Phone Number? [phone]  ---------------------------------------------------------------------------  --------------  SCRIPT ANSWERS  Relationship to Patient?  Self

## 2022-02-25 NOTE — TELEPHONE ENCOUNTER
Pt called yesterday and said we needed to do a precert in order for her to get her [employer] 3 oxygen concentrator. I explained to the patient that we do not do precertifications for DME equipment. It would need to come from the DME provider. We received a message from her insurance [employer] this morning asking us to do a precert for the In[employer] 3 as well. I called and s/w a representative there and informed him we do not do precerts for DME equipment and to make a note in her chart.

## 2022-02-25 NOTE — PATIENT INSTRUCTIONS
Take medicines morning of procedure with sip of water  Okay for planned procedure; no work up necessary  See me back in May get blood work prior

## 2022-02-25 NOTE — PROGRESS NOTES
Preoperative Consultation        [address] [facility]                                  [address] [address], [address] [address]         Phone: [phone]  YOB: 1945  Date of Service:  2/25/2022    Vitals:    02/25/22 1255   BP: 138/72   Site: Left Upper Arm   Position: Sitting   Cuff Size: Medium Adult   Pulse: 65   Resp: 18   Temp: 97.5 °F (36.4 °C)   TempSrc: Infrared   SpO2: 93%   Weight: 221 lb (100.2 kg)   Height: 5' 6\" (1.676 m)      Wt Readings from Last 2 Encounters:   02/25/22 221 lb (100.2 kg)   02/16/22 219 lb (99.3 kg)     BP Readings from Last 3 Encounters:   02/25/22 138/72   02/16/22 139/64   11/19/21 (!) 165/60        Chief Complaint   Patient presents with   [facility] Pre-op Exam     cataract removal by Dr[facility] Sharron Cortes [facility]    Cataract    Diabetes     Allergies   Allergen Reactions    Latex Swelling and Rash    Clindamycin Itching    Pennsaid [Diclofenac Sodium] Itching and Rash    Torsemide Itching    Actos [Pioglitazone] Diarrhea    Amoxicillin Other (See Comments)    Augmentin [Amoxicillin-Pot Clavulanate] Other (See Comments)    Bactrim [Sulfamethoxazole-Trimethoprim] Other (See Comments)    Ciprofloxacin Other (See Comments)     Makes her weird  Makes anxious and she has weird dreams    Doxycycline Other (See Comments)     Feels like she is smothering, chest tightness    Levaquin [Levofloxacin]      Body aches    Ativan [Lorazepam] Other (See Comments) and Anxiety     And confusion  Had weird dreams and hallucinations    Cephalosporins Rash    Pcn [Penicillins] Rash and Itching    Proventil [Albuterol] Anxiety     Outpatient Medications Marked as Taking for the 2/25/22 encounter (Office Visit) with Julianne Turcios.  Cherelle Ramirez, DO   Medication Sig Dispense Refill    SITagliptin (JANUVIA) 100 MG tablet Take 1 tablet by mouth daily 90 tablet 3    nystatin (MYCOSTATIN) 185536 UNIT/ML suspension Take by mouth 4 times daily 1 each 0    cyanocobalamin 1000 MCG/ML injection Inject 1 mL into the muscle every 14 days Last dose was taken on 4/9/17 6 each 0    famotidine (PEPCID) 20 MG tablet TAKE 1 TABLET TWICE A  tablet 3    metFORMIN (GLUCOPHAGE-XR) 500 MG extended release tablet TAKE 2 TABLETS TWICE A  tablet 3    traZODone (DESYREL) 50 MG tablet TAKE 2 TABLETS NIGHTLY 180 tablet 3    KLOR-CON M20 20 MEQ extended release tablet TAKE 1 TABLET DAILY 90 tablet 3    benzonatate (TESSALON) 100 MG capsule TAKE 1 CAPSULE THREE TIMES A DAY AS NEEDED FOR COUGH 270 capsule 1    Cholecalciferol (VITAMIN D3) 50 MCG (2000 UT) CAPS Take by mouth      venlafaxine (EFFEXOR) 37.5 MG tablet TAKE 1 TABLET DAILY 90 tablet 3    atorvastatin (LIPITOR) 20 MG tablet TAKE 1 TABLET NIGHTLY 90 tablet 3    pantoprazole (PROTONIX) 40 MG tablet TAKE 1 TABLET TWICE A  tablet 3    furosemide (LASIX) 20 MG tablet TAKE 1 TABLET DAILY 90 tablet 3    cetirizine (ZYRTEC) 10 MG tablet TAKE ONE TABLET BY MOUTH DAILY 90 tablet 2    losartan (COZAAR) 100 MG tablet TAKE 1 TABLET DAILY 90 tablet 3    dilTIAZem (TIAZAC) 240 MG extended release capsule TAKE 1 CAPSULE DAILY 90 capsule 3    beclomethasone (QVAR) 80 MCG/ACT inhaler Inhale 2 puffs into the lungs 2 times daily 3 Inhaler 4    levalbuterol (XOPENEX HFA) 45 MCG/ACT inhaler Inhale 2 puffs into the lungs every 6 hours as needed for Shortness of Breath 3 Inhaler 4    ferrous sulfate 325 (65 Fe) MG tablet Take 325 mg by mouth 3 times daily (with meals)      diphenhydrAMINE (BENADRYL) 25 MG tablet Take 50 mg by mouth nightly       aspirin 81 MG EC tablet Take 81 mg by mouth daily       rOPINIRole (REQUIP) 1 MG tablet Take 1 mg by mouth nightly 1mg at 1730, 1mg at 2030, 2mg at 2200 (total of 4mg nightly in divided doses)      ursodiol (ACTIGALL) 500 MG tablet Take 500 mg by mouth 2 times daily       calcium carbonate 600 MG TABS tablet Take 1 tablet by mouth daily       levothyroxine (SYNTHROID) 100 MCG tablet Take 100 mcg by mouth Daily      Multiple Vitamins-Minerals (CENTRUM PO) Take 1 tablet by mouth daily.  montelukast (SINGULAIR) 10 MG tablet Take 10 mg by mouth daily       estrogens, conjugated, (PREMARIN) 1.25 MG tablet Take 1.25 mg by mouth daily. Tom Mccollum is 77 /o female patient presents to the office today for a preoperative consultation at the request of surgeon, Dr. Derwin Rubinstein, who plans on performing phacoemulsification with toric intraocular lens implant on left eye one 3/9/22 and right eye on 3/23/22 at [facility]. The current problem began 2 months ago, and symptoms have been unchanged with time. Conservative therapy: Yes: drops, baby shampoo, which has been somewhat effective but did not resolve.     Planned anesthesia: Peribulbar short block/MAC   Known anesthesia problems: None   Bleeding risk: No recent or remote history of abnormal bleeding  Personal or FH of DVT/PE: No    Patient objection to receiving blood products: No    Patient Active Problem List   Diagnosis    Hyperlipidemia    S/P knee replacement    SVT (supraventricular tachycardia) (HCC)    GERD (gastroesophageal reflux disease)    COPD (chronic obstructive pulmonary disease) (HCC)    Pernicious anemia    Primary osteoarthritis involving multiple joints    Vitamin D deficiency    Esophageal varices without bleeding ([address]    Restless leg syndrome    Pulmonary infiltrates    3-vessel coronary artery disease    Essential hypertension, benign    Hypothyroidism due to acquired atrophy of thyroid    Insomnia    Osteoporosis    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 ([address]    Obesity    Type 2 diabetes mellitus with diabetic polyneuropathy, without long-term current use of insulin (HCC)       Past Medical History:   Diagnosis Date    Allergic     Anemia     Anesthesia complication     \"woke up during surgery\"    Arthritis     Osteoarthritis of bilateral CMC joints    Asthma     Followed by Dr. Dali Jeong Cirrhosis [facility])     non alcoholic    Cirrhosis, non-alcoholic ([facility]     COPD (chronic obstructive pulmonary disease) ([facility]     GERD (gastroesophageal reflux disease)     GIB (gastrointestinal bleeding)     LGIB 4/2016    Haemophilus infection 05/07/2021    + resp    Heart murmur     Hernia     Hyperlipidemia     Hypertension     Lung collapse     Murmur, heart     Pneumonia     Reflux     Rheumatic fever     Stress fracture     right foot    SVT (supraventricular tachycardia) ([facility]     6/19/16 - admitted X 1 day per PT     TIA (transient ischemic attack) 2015    Type II or unspecified type diabetes mellitus without mention of complication, not stated as uncontrolled     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 ([facility]     Wears glasses     as needed    Wears partial dentures     upper partial     Past Surgical History:   Procedure Laterality Date    BONE MARROW BIOPSY      BREAST BIOPSY      BRONCHOSCOPY N/A 5/7/2021    BRONCHOSCOPY DIAGNOSTIC OR CELL [address] ONLY performed by Cuco Esteban MD at [address]  5/7/2021    BRONCHOSCOPY ALVEOLAR LAVAGE performed by Cuco Esteban MD at [address]  5/7/2021    BRONCHOSCOPY THERAPUTIC ASPIRATION INITIAL performed by Cuco Esteban MD at [address]  07/14/14    CATARACT REMOVAL      COLONOSCOPY      X 3 per PT 6/8/16    COLONOSCOPY N/A 6/10/2021    COLONOSCOPY POLYPECTOMY ABLATION performed by Berhane Mccormick MD at [address] [facility] [employer] 06/03/2013    CORAL BUNIONECTOMY RIGHT FOOT WITH SCREW FIXATION;    HERNIA REPAIR      HYSTERECTOMY      JOINT REPLACEMENT Bilateral     TKR    KNEE SURGERY  09    left    KNEE SURGERY  2010    right    UPPER GASTROINTESTINAL ENDOSCOPY N/A 7/21/2018    EGD BIOPSY performed by Princess Gale MD at [facility] ENDOSCOPY N/A 6/10/2021    EGD POLYP ABLATION/OTHER performed by Elisabeth Phillip MD at [facility] N/A 6/10/2021    EGD B[facility] History   Problem Relation Age of Onset    Colon Cancer Mother     Other Mother     Diabetes Father     Kidney Disease Paternal Grandmother     Lupus Daughter     Lupus Other      Social History     Socioeconomic History    Marital status:      Spouse name: Jose Raul Blanca \"Avinash\"- 2021 colon cancer    Number of children: 3    Years of education: Not on file    Highest education level: Not on file   Occupational History     Employer: [employer] Use    Smoking status: Former Smoker     Packs/day: 1.00     Years: 40.00     Pack years: 40.00     Types: Cigarettes     Start date: 1956     Quit date: 2000     Years since quittin.7    Smokeless tobacco: Never Used    Tobacco comment: stopped cold turkey in !! ( )   Vaping Use    Vaping Use: Never used   Substance and Sexual Activity    Alcohol use: No     Alcohol/week: 0.0 standard drinks    Drug use: No    Sexual activity: Not Currently   Other Topics Concern    Not on file   Social History Narrative    Not on file     Social Determinants of Health     Financial Resource Strain: Low Risk     Difficulty of Paying Living Expenses: Not hard at all   Food Insecurity: No Food Insecurity    Worried About [facility] in the Last Year: Never true    Jennifer of Food in the Last Year: Never true   Transportation Needs: No Transportation Needs    Lack of Transportation (Medical): No    Lack of Transportation (Non-Medical):  No   Physical Activity:     Days of Exercise per Week: Not on file    Minutes of Exercise per Session: Not on file   Stress:     Feeling of Stress : Not on file Social Connections:     Frequency of Communication with Friends and Family: Not on file    Frequency of Social Gatherings with Friends and Family: Not on file    Attends Muslim Services: Not on file    Active Member of Clubs or Organizations: Not on file    Attends Club or Organization Meetings: Not on file    Marital Status: Not on file   Intimate Partner Violence:     Fear of Current or Ex-Partner: Not on file    Emotionally Abused: Not on file    Physically Abused: Not on file    Sexually Abused: Not on file   Housing Stability:     Unable to Pay for Housing in the Last Year: Not on file    Number of [place] in the Last Year: Not on file    Unstable Housing in the Last Year: Not on file       Review of Systems  Constitutional:  Negative for activity or appetite change, fever or fatigue  HENT:  Negative for congestion, sinus pressure, or rhinorrhea, +epistaxis  Eyes:  Negative for eye pain + for visual changes  Resp:  +for SOB, chest tightness, +NICE,, +for productive cough  Cardiovascular: Negative for CP, palpitations,, orthopnea, PND, +LE edema  Gastrointestinal: Negative for abd pain, melena, BRBPR, N/V/D  Endocrine:  Positive for polydipsia and negative  polyuria  :  Negative for dysuria, flank pain or urinary frequency  Musculoskeletal:  Negative for back pain or myalgias  Neuro:  Negative for dizziness or lightheadedness  Psych: negative for depression or anxiety, +grief reaction      Physical Exam   Constitutional: She is oriented to person, place, and time. She appears well-developed and well-nourished. No distress. HENT:   Head: Normocephalic and atraumatic. Mouth/Throat: Uvula is midline, oropharynx is clear and moist and mucous membranes are normal.   Eyes: Conjunctivae and EOM are normal. Pupils are equal, round, and reactive to light. Neck: Trachea normal and normal range of motion. Neck supple. No JVD present. Carotid bruit is not present.  No mass and no thyromegaly present. Cardiovascular: Normal rate, regular rhythm, normal heart sounds and intact distal pulses. Exam reveals no gallop and no friction rub.  + systolic murmur heard. Pulmonary/Chest: Effort normal and breath sounds normal. No respiratory distress. She has no wheezes. She has no rales. Abdominal: Soft. Normal aorta and bowel sounds are normal. She exhibits no distension and no mass. There is no hepatosplenomegaly. No tenderness. Musculoskeletal: She exhibits no edema and no tenderness. Neurological: She is alert and oriented to person, place, and time. She has normal strength. No cranial nerve deficit or sensory deficit. Coordination and gait normal.   Skin: Skin is warm and dry. No rash noted. No erythema. Psychiatric: She has a normal mood and affect. Her behavior is normal.     EKG Interpretation: not indicated    Lab Review no labs indicated; labs for chronic monitoring     Assessment and Plan: Fernie Falcon is a   77 y.o. patient with planned surgery as above. 1. Pre-op exam  2. Age-related cataract of both eyes, unspecified age-related cataract type  -Known risk factors for perioperative complications: Anemia, Coronary artery disease, pulmonary vascular disease, COPD, Diabetes mellitus, Hypertension  -Current medications which may produce withdrawal symptoms if withheld perioperatively: none   RCRI risk score: 2  Functional Capacity:  4-6 METS  -Preoperative workup as follows: none  -Change current medications as follows: ----Take all medications on morning of surgery with sip of water, and hold all other medications until after surgery  -Prophylaxis for cardiac events with   A)  perioperative beta-blockers: Not indicated  B)  perioperative statins: Continue statin as patient is already taking.    -Deep vein thrombosis prophylaxis: regimen to be chosen by surgical team  -As outlined above, measures were taken to assess risk, and decrease risk when possible.   Risks of surgery were discussed with patient, and benefits believed to outweigh risks. Patient is making an informed decision to proceed with surgery with an understanding of any risk,  Please follow all pre-op recommendations above.  -Okay for planned procedure      3. Type 2 diabetes mellitus with diabetic polyneuropathy, without long-term current use of insulin (HCC)  Continue januvia and metformin  Januvia refilled today  Annual eye and foot exam and micro albumin    Spent 30-39 minutes of face to face interaction with patient counseling on diagnoses and treatment plan; including but not limited to pre visit planning, chart/lab review, new orders, instructions, charting    Return in about 12 weeks (around 5/20/2022) for AWV, follow up. Jeremy Arango.  Jerzy Morales., DO  2/25/2022

## 2022-03-01 DIAGNOSIS — K21.9 GASTROESOPHAGEAL REFLUX DISEASE WITHOUT ESOPHAGITIS: ICD-10-CM

## 2022-03-01 NOTE — TELEPHONE ENCOUNTER
Along with the Doxepin, pt also needs the following medications sent to [facility] with refills added:    -Pantoprazole 40mg  -Famotidine 20mg 180 quantity  -Diltiazem 240mg  -Losartan 90mg  -Synthroid 100mg once daily (no longer seeing diabetic MD)    *pt has followup appt scheduled with NARINDERG for 3/29/22.

## 2022-03-02 RX ORDER — PANTOPRAZOLE SODIUM 40 MG/1
TABLET, DELAYED RELEASE ORAL
Qty: 180 TABLET | Refills: 3 | OUTPATIENT
Start: 2022-03-02

## 2022-03-02 RX ORDER — LOSARTAN POTASSIUM 100 MG/1
TABLET ORAL
Qty: 90 TABLET | Refills: 0 | Status: SHIPPED | OUTPATIENT
Start: 2022-03-02 | End: 2022-07-26 | Stop reason: SDUPTHER

## 2022-03-02 RX ORDER — LOSARTAN POTASSIUM 100 MG/1
TABLET ORAL
Qty: 90 TABLET | Refills: 3 | OUTPATIENT
Start: 2022-03-02

## 2022-03-02 RX ORDER — DILTIAZEM HYDROCHLORIDE 240 MG/1
240 CAPSULE, EXTENDED RELEASE ORAL DAILY
Qty: 90 CAPSULE | Refills: 0 | Status: ON HOLD | OUTPATIENT
Start: 2022-03-02 | End: 2022-05-12 | Stop reason: SDUPTHER

## 2022-03-02 RX ORDER — PANTOPRAZOLE SODIUM 40 MG/1
TABLET, DELAYED RELEASE ORAL
Qty: 180 TABLET | Refills: 0 | Status: SHIPPED | OUTPATIENT
Start: 2022-03-02 | End: 2022-07-25

## 2022-03-02 RX ORDER — FAMOTIDINE 20 MG/1
TABLET, FILM COATED ORAL
Qty: 180 TABLET | Refills: 3 | OUTPATIENT
Start: 2022-03-02

## 2022-03-02 RX ORDER — DILTIAZEM HYDROCHLORIDE 240 MG/1
CAPSULE, EXTENDED RELEASE ORAL
Qty: 90 CAPSULE | Refills: 3 | OUTPATIENT
Start: 2022-03-02

## 2022-03-02 RX ORDER — DOXEPIN HYDROCHLORIDE 50 MG/1
CAPSULE ORAL
Qty: 90 CAPSULE | Refills: 3 | OUTPATIENT
Start: 2022-03-02

## 2022-03-02 RX ORDER — LEVOTHYROXINE SODIUM 0.1 MG/1
100 TABLET ORAL DAILY
Qty: 90 TABLET | Refills: 0 | OUTPATIENT
Start: 2022-03-02 | End: 2022-05-31

## 2022-03-02 RX ORDER — FAMOTIDINE 20 MG/1
TABLET, FILM COATED ORAL
Qty: 180 TABLET | Refills: 0 | Status: ON HOLD | OUTPATIENT
Start: 2022-03-02 | End: 2022-05-12 | Stop reason: HOSPADM

## 2022-03-03 DIAGNOSIS — R39.89 SUSPECTED UTI: Primary | ICD-10-CM

## 2022-03-03 RX ORDER — DOXEPIN HYDROCHLORIDE 50 MG/1
50 CAPSULE ORAL NIGHTLY
Qty: 90 CAPSULE | Refills: 3 | Status: SHIPPED | OUTPATIENT
Start: 2022-03-03 | End: 2022-03-06 | Stop reason: SDUPTHER

## 2022-03-03 RX ORDER — NITROFURANTOIN 25; 75 MG/1; MG/1
100 CAPSULE ORAL 2 TIMES DAILY
Qty: 10 CAPSULE | Refills: 0 | Status: SHIPPED | OUTPATIENT
Start: 2022-03-03 | End: 2022-03-08

## 2022-03-03 RX ORDER — LEVOTHYROXINE SODIUM 0.1 MG/1
100 TABLET ORAL DAILY
Qty: 90 TABLET | Refills: 1 | Status: SHIPPED | OUTPATIENT
Start: 2022-03-03 | End: 2022-07-12 | Stop reason: SDUPTHER

## 2022-03-03 RX ORDER — NITROFURANTOIN 25; 75 MG/1; MG/1
100 CAPSULE ORAL 2 TIMES DAILY
Qty: 10 CAPSULE | Refills: 0 | Status: SHIPPED | OUTPATIENT
Start: 2022-03-03 | End: 2022-03-03 | Stop reason: SDUPTHER

## 2022-03-03 NOTE — TELEPHONE ENCOUNTER
nitrofurantoin, macrocrystal-monohydrate, (MACROBID) 100 MG capsule    This medication was sent to the incorrect pharmacy.  Please send to [facility]

## 2022-03-03 NOTE — TELEPHONE ENCOUNTER
Patient is calling stating that she has finished her z-pack, and then Levaquin and now believes she has a uti, having lower back pain, belly pain, and frequents urination, states urine is clear but painful. She can not take anything with sulfur in it. Needing this sent to [facility]       Patient is also needing a refills that [facility]    levothyroxine (SYNTHROID) 100 MCG tablet    doxepin (SINEQUAN) 50 MG capsule    Both of the medications above she is requesting them to dispense 90 tablets at a time and have three refill on each of them.      Chelita [phone] or [phone]

## 2022-03-04 ENCOUNTER — TELEPHONE (OUTPATIENT)
Dept: PRIMARY CARE CLINIC | Age: 77
End: 2022-03-04

## 2022-03-04 DIAGNOSIS — R39.89 SUSPECTED UTI: ICD-10-CM

## 2022-03-04 DIAGNOSIS — R30.0 DYSURIA: Primary | ICD-10-CM

## 2022-03-04 RX ORDER — GRANULES FOR ORAL 3 G/1
3 POWDER ORAL ONCE
Qty: 1 EACH | Refills: 0 | Status: SHIPPED | OUTPATIENT
Start: 2022-03-04 | End: 2022-07-17 | Stop reason: SDUPTHER

## 2022-03-04 NOTE — TELEPHONE ENCOUNTER
Patient is calling needing a refill of doxepin (SINEQUAN) 50 MG capsule but only enough for 10 days sent to [facility].  [facility] and wont get the medication to her in time and she will be out for 10 days      Please advise  Denis Srivastava [phone] (home)

## 2022-03-04 NOTE — TELEPHONE ENCOUNTER
Please advise patient that orders have been placed for urine test and urine culture for diagnosis of UTI. Fosfomycin 3g prescription sent to pharmacy to treat symptoms.

## 2022-03-04 NOTE — TELEPHONE ENCOUNTER
nitrofurantoin, macrocrystal-monohydrate, (MACROBID) 100 MG capsule        PT cant take this medication due to it making her sick. PT would like a different medication called in.         Please call with updates  [phone]

## 2022-03-06 RX ORDER — DOXEPIN HYDROCHLORIDE 50 MG/1
50 CAPSULE ORAL NIGHTLY
Qty: 10 CAPSULE | Refills: 0 | Status: SHIPPED | OUTPATIENT
Start: 2022-03-06 | End: 2022-07-15 | Stop reason: SDUPTHER

## 2022-03-10 ENCOUNTER — TELEPHONE (OUTPATIENT)
Dept: PRIMARY CARE CLINIC | Age: 77
End: 2022-03-10

## 2022-03-10 NOTE — TELEPHONE ENCOUNTER
Called and gave pt lab results. Sodium remains low normal @ 132 A1c unchanged from 9/18/21 6.4%    Fasting glucose great @ 109 mg/dl  Diabetes plan: annual eye exam   Get feet checked @ next visit and continue januvia 100 mg metformin  mg 2 tabs BID  Continue synthroid 100mcg- refill not needed yet.   TSH Therapeutic level @ 2.330     Pt also asked what is doctor's opinion on dexacom

## 2022-03-10 NOTE — TELEPHONE ENCOUNTER
----- Message from Efren Lopez sent at 3/10/2022  4:18 PM EST -----  Subject: Message to Provider    QUESTIONS  Information for Provider? Patient is calling in and requesting to have her   blood work results that she had done mailed to her. Patients address is   current and up to date on file. Please call her back as soon as possible. Thank you.   ---------------------------------------------------------------------------  --------------  CALL BACK INFO  What is the best way for the office to contact you? OK to leave message on   voicemail  Preferred Call Back Phone Number? [phone]  ---------------------------------------------------------------------------  --------------  SCRIPT ANSWERS  Relationship to Patient?  Self

## 2022-03-11 NOTE — TELEPHONE ENCOUNTER
Dexacom and anup freestyle are both continuous glucose monitoring systems; pt not on any medications potentially causing  hypoglycemia so not necessary to check blood glucose, and therefore insurance may not cover.   If patient prefers to or already checks blood glucose could consider as monitoring option but again may not be covered by insurance

## 2022-03-14 ENCOUNTER — CLINICAL DOCUMENTATION (OUTPATIENT)
Dept: SPIRITUAL SERVICES | Age: 77
End: 2022-03-14

## 2022-03-14 NOTE — PROGRESS NOTES
Outpatient  attempted phone call to offer ongoing support. No answer. We will continue to try to reach patient periodically.

## 2022-03-15 ENCOUNTER — CLINICAL DOCUMENTATION (OUTPATIENT)
Dept: SPIRITUAL SERVICES | Age: 77
End: 2022-03-15

## 2022-03-18 ENCOUNTER — CLINICAL DOCUMENTATION (OUTPATIENT)
Dept: SPIRITUAL SERVICES | Age: 77
End: 2022-03-18

## 2022-03-21 RX ORDER — CYANOCOBALAMIN 1000 UG/ML
1000 INJECTION INTRAMUSCULAR; SUBCUTANEOUS
Qty: 6 EACH | Refills: 0 | Status: SHIPPED | OUTPATIENT
Start: 2022-03-21 | End: 2022-03-29 | Stop reason: SDUPTHER

## 2022-03-21 RX ORDER — CETIRIZINE HYDROCHLORIDE 10 MG/1
TABLET ORAL
Qty: 90 TABLET | Refills: 2 | Status: SHIPPED | OUTPATIENT
Start: 2022-03-21

## 2022-03-28 ENCOUNTER — APPOINTMENT (OUTPATIENT)
Dept: GENERAL RADIOLOGY | Age: 77
End: 2022-03-28
Payer: MEDICARE

## 2022-03-28 ENCOUNTER — HOSPITAL ENCOUNTER (EMERGENCY)
Age: 77
Discharge: HOME | End: 2022-03-28
Attending: EMERGENCY MEDICINE
Payer: MEDICARE

## 2022-03-28 VITALS
SYSTOLIC BLOOD PRESSURE: 168 MMHG | WEIGHT: 211 LBS | DIASTOLIC BLOOD PRESSURE: 78 MMHG | HEART RATE: 72 BPM | TEMPERATURE: 97.9 F | BODY MASS INDEX: 33.91 KG/M2 | OXYGEN SATURATION: 96 % | HEIGHT: 66 IN | RESPIRATION RATE: 16 BRPM

## 2022-03-28 DIAGNOSIS — S76.109A INJURY OF QUADRICEPS MUSCLE: Primary | ICD-10-CM

## 2022-03-28 PROCEDURE — 99284 EMERGENCY DEPT VISIT MOD MDM: CPT

## 2022-03-28 PROCEDURE — 73502 X-RAY EXAM HIP UNI 2-3 VIEWS: CPT

## 2022-03-28 PROCEDURE — 73562 X-RAY EXAM OF KNEE 3: CPT

## 2022-03-28 PROCEDURE — 6370000000 HC RX 637 (ALT 250 FOR IP): Performed by: EMERGENCY MEDICINE

## 2022-03-28 RX ORDER — LIDOCAINE 4 G/G
1 PATCH TOPICAL DAILY PRN
Qty: 10 PATCH | Refills: 0 | Status: SHIPPED | OUTPATIENT
Start: 2022-03-28 | End: 2022-04-27

## 2022-03-28 RX ORDER — ACETAMINOPHEN 325 MG/1
650 TABLET ORAL ONCE
Status: COMPLETED | OUTPATIENT
Start: 2022-03-28 | End: 2022-03-28

## 2022-03-28 RX ORDER — LIDOCAINE 4 G/G
1 PATCH TOPICAL ONCE
Status: DISCONTINUED | OUTPATIENT
Start: 2022-03-28 | End: 2022-03-28 | Stop reason: HOSPADM

## 2022-03-28 RX ADMIN — ACETAMINOPHEN 650 MG: 325 TABLET ORAL at 03:37

## 2022-03-28 ASSESSMENT — PAIN SCALES - GENERAL
PAINLEVEL_OUTOF10: 7
PAINLEVEL_OUTOF10: 2
PAINLEVEL_OUTOF10: 7
PAINLEVEL_OUTOF10: 6

## 2022-03-28 ASSESSMENT — PAIN DESCRIPTION - ORIENTATION: ORIENTATION: LEFT

## 2022-03-28 ASSESSMENT — PAIN DESCRIPTION - PAIN TYPE: TYPE: ACUTE PAIN

## 2022-03-28 ASSESSMENT — PAIN DESCRIPTION - LOCATION: LOCATION: LEG

## 2022-03-28 ASSESSMENT — PAIN - FUNCTIONAL ASSESSMENT: PAIN_FUNCTIONAL_ASSESSMENT: 0-10

## 2022-03-28 NOTE — ED PROVIDER NOTES
CHIEF COMPLAINT  Leg Pain (Left leg pain that started a week ago)      HISTORY OF PRESENT ILLNESS  Shanti Sharpe  is a 77 y.o. female who presents to the ED at via EMS complaining of left lower extremity pain. Patient states she was cleaning her house 10 to 14 days ago and she bent over and leaned down and felt a pop in her left lateral thigh region. She has pain radiating from the left hip down to the left lateral knee. Has been taking OTC meds at home without adequate relief. Has an appointment with her PCP this morning at 10 AM.  No reported fever, chills, back pain, abdominal pain. There are no other complaints, modifying factors or associated symptoms. Nursing notes reviewed. Past medical history:  has a past medical history of Allergic, Anemia, Anesthesia complication, Arthritis, Asthma, Cirrhosis ([address] Cirrhosis, non-alcoholic ([address] COPD (chronic obstructive pulmonary disease) ([address] GERD (gastroesophageal reflux disease), GIB (gastrointestinal bleeding), Haemophilus infection (05/07/2021), Heart murmur, Hernia, Hyperlipidemia, Hypertension, Lung collapse, Murmur, heart, Pneumonia, Reflux, Rheumatic fever, Stress fracture, SVT (supraventricular tachycardia) ([address] TIA (transient ischemic attack) (2015), Type II or unspecified type diabetes mellitus without mention of complication, not stated as uncontrolled, 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 Past surgical history:  has a past surgical history that includes hernia repair; Hysterectomy; joint replacement (Bilateral); Foot surgery (Right, 06/03/2013); knee surgery (09); knee surgery (2010); Cardiac catheterization (07/14/14); Cataract removal; bone marrow biopsy; Breast biopsy; fine needle aspiration; Colonoscopy; Upper gastrointestinal endoscopy (N/A, 7/21/2018); bronchoscopy (N/A, 5/7/2021); bronchoscopy (5/7/2021); bronchoscopy (5/7/2021);  Colonoscopy (N/A, 6/10/2021); Upper gastrointestinal endoscopy (N/A, 6/10/2021); and Upper gastrointestinal endoscopy (N/A, 6/10/2021). Home medications:   Prior to Admission medications    Medication Sig Start Date End Date Taking? Authorizing Provider   lidocaine 4 % external patch Place 1 patch onto the skin daily as needed (leg pain) 3/28/22 4/27/22 Yes Nick Molina MD   cetirizine (ZYRTEC) 10 MG tablet TAKE ONE TABLET BY MOUTH DAILY 3/21/22   Mark Gandhi MD   cyanocobalamin 1000 MCG/ML injection Inject 1 mL into the muscle every 14 days Last dose was taken on 4/9/17 3/21/22   Med Watt. Pedro Lockwood DO   doxepin (SINEQUAN) 50 MG capsule Take 1 capsule by mouth nightly for 10 days 3/6/22 3/16/22  Med Watt. Pedro Lockwood DO   levothyroxine (SYNTHROID) 100 MCG tablet Take 1 tablet by mouth Daily 3/3/22   Med Watt. Pedro Lockwood DO   losartan (COZAAR) 100 MG tablet TAKE 1 TABLET DAILY 3/2/22   Mark Gandhi MD   pantoprazole (PROTONIX) 40 MG tablet TAKE 1 TABLET TWICE A DAY 3/2/22   Mark Gandhi MD   famotidine (PEPCID) 20 MG tablet TAKE 1 TABLET TWICE A DAY 3/2/22   Mark Gandhi MD   dilTIAZem [facility]) 240 MG extended release capsule Take 1 capsule by mouth daily 3/2/22   Mark Gandhi MD   SITagliptin (JANUVIA) 100 MG tablet Take 1 tablet by mouth daily 2/25/22   Med Watt.  Pedro Lockwood DO   nystatin (MYCOSTATIN) 747260 UNIT/ML suspension Take by mouth 4 times daily 2/14/22   Gail Owusu MD   metFORMIN (GLUCOPHAGE-XR) 500 MG extended release tablet TAKE 2 TABLETS TWICE A DAY 1/3/22   Mendoza Nevarez MD   traZODone (DESYREL) 50 MG tablet TAKE 2 TABLETS NIGHTLY 12/3/21   Mark Gandhi MD   KLOR-CON M20 20 MEQ extended release tablet TAKE 1 TABLET DAILY 11/4/21   Mark Gandhi MD   benzonatate (TESSALON) 100 MG capsule TAKE 1 CAPSULE THREE TIMES A DAY AS NEEDED FOR COUGH 10/18/21   Rain Ruff MD   Cholecalciferol (VITAMIN D3) 50 MCG (2000 UT) CAPS Take by mouth    Historical Provider, MD   venlafaxine (EFFEXOR) 37.5 MG tablet TAKE 1 TABLET DAILY 9/16/21   Janie Banuelos MD   atorvastatin (LIPITOR) 20 MG tablet TAKE 1 TABLET NIGHTLY 9/9/21   Janie Banuelos MD   furosemide (LASIX) 20 MG tablet TAKE 1 TABLET DAILY 7/19/21   Janie Banuelos MD   beclomethasone (QVAR) 80 MCG/ACT inhaler Inhale 2 puffs into the lungs 2 times daily 5/20/21   Eliel Schultz MD   [facility]erol [facility]) 45 MCG/ACT inhaler Inhale 2 puffs into the lungs every 6 hours as needed for Shortness of Breath 5/20/21   Eliel Schultz MD   gabapentin (NEURONTIN) 600 MG tablet Take 1 tablet by mouth nightly for 30 days. 600 mg at bedtime and 300 mg BID 5/12/21 2/16/22  Kelvin Valdes MD   ferrous sulfate 325 (65 Fe) MG tablet Take 325 mg by mouth 3 times daily (with meals)    Historical Provider, MD   diphenhydrAMINE (BENADRYL) 25 MG tablet Take 50 mg by mouth nightly     Historical Provider, MD   aspirin 81 MG EC tablet Take 81 mg by mouth daily     Historical Provider, MD   rOPINIRole (REQUIP) 1 MG tablet Take 1 mg by mouth nightly 1mg at 1730, 1mg at 2030, 2mg at 2200 (total of 4mg nightly in divided doses) 3/9/17   Historical Provider, MD   ursodiol (ACTIGALL) 500 MG tablet Take 500 mg by mouth 2 times daily     Historical Provider, MD   calcium carbonate 600 MG TABS tablet Take 1 tablet by mouth daily     Historical Provider, MD   Multiple Vitamins-Minerals (CENTRUM PO) Take 1 tablet by mouth daily. Historical Provider, MD   montelukast (SINGULAIR) 10 MG tablet Take 10 mg by mouth daily     Historical Provider, MD   estrogens, conjugated, (PREMARIN) 1.25 MG tablet Take 1.25 mg by mouth daily.     Historical Provider, MD       Allergies   Allergen Reactions    Latex Swelling and Rash    Clindamycin Itching    Pennsaid [Diclofenac Sodium] Itching and Rash    Torsemide Itching    Actos [Pioglitazone] Diarrhea    Amoxicillin Other (See Comments)    Augmentin [Amoxicillin-Pot Clavulanate] Other (See Comments)    Bactrim [Sulfamethoxazole-Trimethoprim] Other (See Comments)    Ciprofloxacin Other (See Comments)     Makes her weird  Makes anxious and she has weird dreams    Doxycycline Other (See Comments)     Feels like she is smothering, chest tightness    Levaquin [Levofloxacin]      Body aches    Ativan [Lorazepam] Other (See Comments) and Anxiety     And confusion  Had weird dreams and hallucinations    Cephalosporins Rash    Pcn [Penicillins] Rash and Itching    Proventil [Albuterol] Anxiety       Social history:  reports that she quit smoking about 21 years ago. Her smoking use included cigarettes. She started smoking about 65 years ago. She has a 40.00 pack-year smoking history. She has never used smokeless tobacco. She reports that she does not drink alcohol and does not use drugs. Family history:    Family History   Problem Relation Age of Onset    Colon Cancer Mother     Other Mother     Diabetes Father     Kidney Disease Paternal Grandmother     Lupus Daughter     Lupus Other        REVIEW OF SYSTEMS  6 systems reviewed, pertinent positives per HPI otherwise noted to be negative    PHYSICAL EXAM  Vitals:    03/28/22 0132   BP: (!) 163/63   Pulse: 63   Resp: 18   Temp: 97.9 °F (36.6 °C)   SpO2: 98%       GENERAL: Patient is well-developed, well-nourished,  no acute distress. No apparent discomfort, asleep. Non toxic appearing. HEENT:  Normocephalic, atraumatic. PERRL. Conjunctiva appear normal.  External ears are normal.  MMM  NECK: Supple with normal ROM. Trachea midline  LUNGS:  Normal work of breathing. Speaking comfortably in full sentences. EXTREMITIES: 2+ distal pulses w/o edema. MUSCULOSKELETAL:  Atraumatic extremities with normal ROM grossly. No obvious bony deformities. Diffuse tenderness to the left anterolateral thigh without swelling or ecchymosis. SKIN: Warm/dry. No rashes/lesions noted.    PSYCHIATRIC: Patient is alert and oriented with normal affect  NEUROLOGIC: Cranial nerves grossly intact. Moves all extremities with equal strength. No gross sensory deficits. Answers questions/follows commands appropriately. ED COURSE/MDM  Nursing notes reviewed. Patient with left lower extremity pain for nearly 2 weeks. X-rays are unremarkable and she is nontoxic-appearing. Plan for lidocaine patch and PCP follow-up today as scheduled. We discussed exercises and possible outpatient physical therapy. Clinical Impression  Based on the presenting complaint, history, and physical exam, multiple diagnoses were considered. Exam and workup here most c/w:  1. Injury of quadriceps muscle        I discussed with Trip Sanders the results of evaluation in the ED, diagnosis, care, and prognosis. The plan is to discharge to home. Patient is in agreement with plan and questions have been answered. I also discussed with Trip Sanders the reasons which may require a return visit and the importance of follow-up care. The patient is well-appearing, nontoxic, and improved at the time of discharge. Patient agrees to call to arrange follow-up care as directed. Trip Sanders understands to return immediately for worsening/change in symptoms. Patient will be started on the following medications from the ED:  New Prescriptions    LIDOCAINE 4 % EXTERNAL PATCH    Place 1 patch onto the skin daily as needed (leg pain)         Disposition  Pt is discharged in stable condition.     Disposition Vitals:  BP (!) 163/63   Pulse 63   Temp 97.9 °F (36.6 °C) (Oral)   Resp 18   Ht 5' 6\" (1.676 m)   Wt 211 lb (95.7 kg)   SpO2 98%   BMI 34.06 kg/m²                  Dylan Mclean MD  03/28/22 6188

## 2022-03-28 NOTE — ED TRIAGE NOTES
Pt arrived via squad for L from Hip to knee pain for a week - states she squatted down a week ago and felt something \"pop\" - has been trying OTC tx with no relief.

## 2022-03-29 RX ORDER — CYANOCOBALAMIN 1000 UG/ML
1000 INJECTION INTRAMUSCULAR; SUBCUTANEOUS
Qty: 6 EACH | Refills: 0 | Status: SHIPPED | OUTPATIENT
Start: 2022-03-29 | End: 2022-03-31 | Stop reason: SDUPTHER

## 2022-03-29 NOTE — TELEPHONE ENCOUNTER
Patient is calling she is needing her b-12 injection 6- 1 ml bottles so her insurance will cover this. please   sent to  [facility] [address], [place] [phone] [facility] - [facility] [phone] she is due for the injection on Sunday. She will have a ride and will be able to pick this up 3/29 she will not have a ride other than that.    Please advise  Keisha Luciano [phone]

## 2022-03-30 RX ORDER — CYANOCOBALAMIN 1000 UG/ML
INJECTION INTRAMUSCULAR; SUBCUTANEOUS
Qty: 6 ML | OUTPATIENT
Start: 2022-03-30

## 2022-03-30 NOTE — TELEPHONE ENCOUNTER
----- Message from Lesly Hinkle sent at 3/30/2022  4:19 PM EDT -----  Subject: Refill Request    QUESTIONS  Name of Medication? cyanocobalamin 1000 MCG/ML injection  Patient-reported dosage and instructions? Inject 1 mL into the muscle   every 14 days Last dose was taken on 4/9/17  How many days do you have left? 0  Preferred Pharmacy? [facility]  Pharmacy phone number (if available)? [phone]  Additional Information for Provider? patient states that she DOES NOT want   pres[facility] due to its not available it's on   back order and only want it with [facility] ; patient request a call when   prescription is sent over to [facility]  ---------------------------------------------------------------------------  --------------  [address]  What is the best way for the office to contact you? OK to leave message on   voicemail  Preferred Call Back Phone Number?  [phone]

## 2022-03-31 ENCOUNTER — TELEPHONE (OUTPATIENT)
Dept: PRIMARY CARE CLINIC | Age: 77
End: 2022-03-31

## 2022-03-31 DIAGNOSIS — S76.109A INJURY OF QUADRICEPS MUSCLE: Primary | ICD-10-CM

## 2022-03-31 RX ORDER — NAPROXEN 500 MG/1
500 TABLET ORAL 2 TIMES DAILY WITH MEALS
Qty: 28 TABLET | Refills: 0 | Status: ON HOLD
Start: 2022-03-31 | End: 2022-05-12 | Stop reason: HOSPADM

## 2022-03-31 RX ORDER — CYANOCOBALAMIN 1000 UG/ML
1000 INJECTION INTRAMUSCULAR; SUBCUTANEOUS
Qty: 6 EACH | Refills: 0 | Status: SHIPPED | OUTPATIENT
Start: 2022-03-31 | End: 2022-10-17 | Stop reason: SDUPTHER

## 2022-03-31 NOTE — TELEPHONE ENCOUNTER
----- Message from Jessica Liu sent at 3/31/2022  1:48 PM EDT -----  Subject: Message to Provider    QUESTIONS  Information for Provider? pt is wanting pcp to call her regarding her   cyanocobalamin 1000 MCG/ML injection  ---------------------------------------------------------------------------  --------------  CALL BACK INFO  What is the best way for the office to contact you? OK to leave message on   voicemail  Preferred Call Back Phone Number? [phone]  ---------------------------------------------------------------------------  --------------  SCRIPT ANSWERS  Relationship to Patient?  Self

## 2022-03-31 NOTE — TELEPHONE ENCOUNTER
-B12 shots go to [facility]  -This was sent on 3/21 and 3/29/22 to [facility] with [facility].  -Please clarify with patient where scripts are to be sent in future.  -This is a clerical error and needs to be corrected going forward.   - this script was sent 3 x to me and then I had to call patient, so 4 encounters for one refill

## 2022-04-06 ENCOUNTER — HOSPITAL ENCOUNTER (OUTPATIENT)
Dept: CT IMAGING | Age: 77
Discharge: HOME | End: 2022-04-06
Payer: MEDICARE

## 2022-04-06 DIAGNOSIS — J44.9 CHRONIC OBSTRUCTIVE PULMONARY DISEASE, UNSPECIFIED COPD TYPE (HCC): ICD-10-CM

## 2022-04-06 DIAGNOSIS — Z87.891 FORMER SMOKER: ICD-10-CM

## 2022-04-06 DIAGNOSIS — R93.89 ABNORMAL CT OF THE CHEST: ICD-10-CM

## 2022-04-06 PROCEDURE — 71250 CT THORAX DX C-: CPT

## 2022-04-18 ENCOUNTER — TELEPHONE (OUTPATIENT)
Dept: PULMONOLOGY | Age: 77
End: 2022-04-18

## 2022-04-19 RX ORDER — MONTELUKAST SODIUM 10 MG/1
10 TABLET ORAL DAILY
Qty: 90 TABLET | Refills: 3 | Status: SHIPPED | OUTPATIENT
Start: 2022-04-19

## 2022-04-19 RX ORDER — MONTELUKAST SODIUM 10 MG/1
10 TABLET ORAL DAILY
Qty: 15 TABLET | Refills: 1 | Status: ON HOLD | OUTPATIENT
Start: 2022-04-19 | End: 2022-05-12 | Stop reason: HOSPADM

## 2022-04-25 ENCOUNTER — CLINICAL DOCUMENTATION (OUTPATIENT)
Dept: SPIRITUAL SERVICES | Age: 77
End: 2022-04-25

## 2022-04-29 ENCOUNTER — HOSPITAL ENCOUNTER (EMERGENCY)
Age: 77
Discharge: HOME | End: 2022-04-29
Attending: EMERGENCY MEDICINE
Payer: MEDICARE

## 2022-04-29 VITALS
TEMPERATURE: 98 F | OXYGEN SATURATION: 97 % | BODY MASS INDEX: 33.75 KG/M2 | RESPIRATION RATE: 18 BRPM | DIASTOLIC BLOOD PRESSURE: 82 MMHG | HEIGHT: 66 IN | SYSTOLIC BLOOD PRESSURE: 101 MMHG | WEIGHT: 210 LBS | HEART RATE: 77 BPM

## 2022-04-29 DIAGNOSIS — M70.72 BURSITIS OF LEFT HIP, UNSPECIFIED BURSA: Primary | ICD-10-CM

## 2022-04-29 PROCEDURE — 6370000000 HC RX 637 (ALT 250 FOR IP): Performed by: EMERGENCY MEDICINE

## 2022-04-29 PROCEDURE — 99283 EMERGENCY DEPT VISIT LOW MDM: CPT

## 2022-04-29 RX ORDER — LIDOCAINE 50 MG/G
1 PATCH TOPICAL DAILY
Qty: 10 PATCH | Refills: 0 | Status: SHIPPED | OUTPATIENT
Start: 2022-04-29 | End: 2022-05-09

## 2022-04-29 RX ORDER — PREDNISONE 50 MG/1
50 TABLET ORAL DAILY
Qty: 5 TABLET | Refills: 0 | Status: SHIPPED | OUTPATIENT
Start: 2022-04-29 | End: 2022-05-04

## 2022-04-29 RX ORDER — OXYCODONE HYDROCHLORIDE AND ACETAMINOPHEN 5; 325 MG/1; MG/1
1 TABLET ORAL ONCE
Status: COMPLETED | OUTPATIENT
Start: 2022-04-29 | End: 2022-04-29

## 2022-04-29 RX ORDER — PREDNISONE 20 MG/1
60 TABLET ORAL ONCE
Status: COMPLETED | OUTPATIENT
Start: 2022-04-29 | End: 2022-04-29

## 2022-04-29 RX ORDER — LIDOCAINE 4 G/G
1 PATCH TOPICAL ONCE
Status: DISCONTINUED | OUTPATIENT
Start: 2022-04-29 | End: 2022-04-29 | Stop reason: HOSPADM

## 2022-04-29 RX ORDER — OXYCODONE HYDROCHLORIDE AND ACETAMINOPHEN 5; 325 MG/1; MG/1
1 TABLET ORAL EVERY 6 HOURS PRN
Qty: 8 TABLET | Refills: 0 | Status: SHIPPED | OUTPATIENT
Start: 2022-04-29 | End: 2022-05-02

## 2022-04-29 RX ADMIN — PREDNISONE 60 MG: 20 TABLET ORAL at 10:48

## 2022-04-29 RX ADMIN — OXYCODONE AND ACETAMINOPHEN 1 TABLET: 5; 325 TABLET ORAL at 10:48

## 2022-04-29 ASSESSMENT — PAIN DESCRIPTION - LOCATION: LOCATION: BACK

## 2022-04-29 ASSESSMENT — PAIN - FUNCTIONAL ASSESSMENT: PAIN_FUNCTIONAL_ASSESSMENT: 0-10

## 2022-04-29 ASSESSMENT — PAIN SCALES - GENERAL
PAINLEVEL_OUTOF10: 9

## 2022-04-29 ASSESSMENT — PAIN DESCRIPTION - DESCRIPTORS: DESCRIPTORS: SHARP

## 2022-04-29 NOTE — ED PROVIDER NOTES
Emergency Department Provider Note  Location: [address] [place] [address]  ED  4/29/2022     Patient Identification  Zackary Severin is a 77 y.o. female    Chief Complaint  Back Pain (patient has chronic back pain from cleaning x 2 months ago. was seen on 3/28 for her back pain and referred to [facility]. Saw Dr India Jansen and had an MRI on 4/22. Pt had a f/u appt yesterday but was unable to go. Has an additional f/u appt with Dr Kim Horner for the end of May )          HPI  (History provided by patient)  Patient is a 62-year-old female who presents with worsening acute on subacute to chronic left-sided hip pain. Patient has been seen by orthopedics evaluated by Dr. Lou Clayton and had an MRI earlier this month which showed no evidence of any acute findings other than trochanteric or other bursitis of the left hip reportedly. Patient has been on tramadol for the past few weeks which had partially controlled her symptoms. However over the past few days her tramadol has not been controlling her symptoms. She denies any new symptoms or any change in the nature of her pain. Constant achy pain worse with specific movements such as flexing at the hip. She denies any numbness or weakness saddle anesthesia urinary retention stool incontinence or fevers. She is supposed to follow-up with orthospine specialist Dr. Lorena Coates within the month. I have reviewed the following nursing documentation:  Allergies:    Allergies   Allergen Reactions    Latex Swelling and Rash    Clindamycin Itching    Pennsaid [Diclofenac Sodium] Itching and Rash    Torsemide Itching    Actos [Pioglitazone] Diarrhea    Amoxicillin Other (See Comments)    Augmentin [Amoxicillin-Pot Clavulanate] Other (See Comments)    Bactrim [Sulfamethoxazole-Trimethoprim] Other (See Comments)    Ciprofloxacin Other (See Comments)     Makes her weird  Makes anxious and she has weird dreams    Doxycycline Other (See Comments)     Feels like she is smothering, chest tightness    Levaquin [Levofloxacin]      Body aches    Ativan [Lorazepam] Other (See Comments) and Anxiety     And confusion  Had weird dreams and hallucinations    Cephalosporins Rash    Pcn [Penicillins] Rash and Itching    Proventil [Albuterol] Anxiety       Past medical history:  has a past medical history of Allergic, Anemia, Anesthesia complication, Arthritis, Asthma, Cirrhosis ([facility] [address] Cirrhosis, non-alcoholic ([facility] [address] COPD (chronic obstructive pulmonary disease) ([facility] [address] GERD (gastroesophageal reflux disease), GIB (gastrointestinal bleeding), Haemophilus infection (05/07/2021), Heart murmur, Hernia, Hyperlipidemia, Hypertension, Lung collapse, Murmur, heart, Pneumonia, Reflux, Rheumatic fever, Stress fracture, SVT (supraventricular tachycardia) ([facility] [address] TIA (transient ischemic attack) (2015), Type II or unspecified type diabetes mellitus without mention of complication, not stated as uncontrolled, 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 Past surgical history:  has a past surgical history that includes hernia repair; Hysterectomy; joint replacement (Bilateral); Foot surgery (Right, 06/03/2013); knee surgery (09); knee surgery (2010); Cardiac catheterization (07/14/14); Cataract removal; bone marrow biopsy; Breast biopsy; fine needle aspiration; Colonoscopy; Upper gastrointestinal endoscopy (N/A, 7/21/2018); bronchoscopy (N/A, 5/7/2021); bronchoscopy (5/7/2021); bronchoscopy (5/7/2021); Colonoscopy (N/A, 6/10/2021); Upper gastrointestinal endoscopy (N/A, 6/10/2021); and Upper gastrointestinal endoscopy (N/A, 6/10/2021). Home medications:   Prior to Admission medications    Medication Sig Start Date End Date Taking? Authorizing Provider   oxyCODONE-acetaminophen (PERCOCET) 5-325 MG per tablet Take 1 tablet by mouth every 6 hours as needed for Pain for up to 3 days. Intended supply: 3 days.  Take lowest dose possible to manage pain 4/29/22 5/2/22 Yes Xavi Garcia MD   predniSONE (DELTASONE) 50 MG tablet Take 1 tablet by mouth daily for 5 days 4/29/22 5/4/22 Yes Xavi Garcia MD   lidocaine (LIDODERM) 5 % Place 1 patch onto the skin daily for 10 days 12 hours on, 12 hours off. 4/29/22 5/9/22 Yes Xavi Garcia MD   montelukast (SINGULAIR) 10 MG tablet Take 1 tablet by mouth daily 4/19/22   Silvestre Ann MD   montelukast (SINGULAIR) 10 MG tablet Take 1 tablet by mouth daily for 15 days 4/19/22 5/4/22  Silvestre Ann MD   cyanocobalamin 1000 MCG/ML injection Inject 1 mL into the muscle every 14 days Last dose was taken on 4/9/17 3/31/22   Joon Moles. Yeny Abbott., DO   naproxen (NAPROSYN) 500 MG tablet Take 1 tablet by mouth 2 times daily (with meals) 3/31/22   Joon Moles. Yeny Abbott., DO   cetirizine (ZYRTEC) 10 MG tablet TAKE ONE TABLET BY MOUTH DAILY 3/21/22   Juventino Acevedo MD   doxepin [facility]) 50 MG capsule Take 1 capsule by mouth nightly for 10 days 3/6/22 3/16/22  Joon Moles. Yeny Woodruff, DO   levothyroxine (SYNTHROID) 100 MCG tablet Take 1 tablet by mouth Daily 3/3/22   Joon Moles. Yeny Abbott., DO   losartan (COZAAR) 100 MG tablet TAKE 1 TABLET DAILY 3/2/22   Juventino Acevedo MD   pantoprazole (PROTONIX) 40 MG tablet TAKE 1 TABLET TWICE A DAY 3/2/22   Juventino Acevedo MD   famotidine (PEPCID) 20 MG tablet TAKE 1 TABLET TWICE A DAY 3/2/22   Juventino Acevedo MD   dilTIAZem [facility]) 240 MG extended release capsule Take 1 capsule by mouth daily 3/2/22   Juventino Acevedo MD   SITagliptin (JANUVIA) 100 MG tablet Take 1 tablet by mouth daily 2/25/22   Joon Moles.  Yeny Woodruff, DO   nystatin (MYCOSTATIN) 056503 UNIT/ML suspension Take by mouth 4 times daily 2/14/22   Angelina Liang MD   metFORMIN (GLUCOPHAGE-XR) 500 MG extended release tablet TAKE 2 TABLETS TWICE A DAY 1/3/22   Rocky Rainey MD   traZODone (DESYREL) 50 MG tablet TAKE 2 TABLETS NIGHTLY 12/3/21   Juventino Acevedo MD   KLOR-CON M20 20 MEQ extended release tablet TAKE 1 TABLET DAILY 11/4/21   Parul Pink MD   benzonatate (TESSALON) 100 MG capsule TAKE 1 CAPSULE THREE TIMES A DAY AS NEEDED FOR COUGH 10/18/21   Tess Smith MD   Cholecalciferol (VITAMIN D3) 50 MCG (2000 UT) CAPS Take by mouth    Historical Provider, MD   venlafaxine (EFFEXOR) 37.5 MG tablet TAKE 1 TABLET DAILY 9/16/21   Parul Pink MD   atorvastatin (LIPITOR) 20 MG tablet TAKE 1 TABLET NIGHTLY 9/9/21   Parul Pink MD   furosemide (LASIX) 20 MG tablet TAKE 1 TABLET DAILY 7/19/21   Parul Pink MD   beclomethasone (QVAR) 80 MCG/ACT inhaler Inhale 2 puffs into the lungs 2 times daily 5/20/21   Jael Moses MD   [facility]) 45 MCG/ACT inhaler Inhale 2 puffs into the lungs every 6 hours as needed for Shortness of Breath 5/20/21   Jael Moses MD   gabapentin (NEURONTIN) 600 MG tablet Take 1 tablet by mouth nightly for 30 days. 600 mg at bedtime and 300 mg BID 5/12/21 2/16/22  Estevan Lovelace MD   ferrous sulfate 325 (65 Fe) MG tablet Take 325 mg by mouth 3 times daily (with meals)    Historical Provider, MD   diphenhydrAMINE (BENADRYL) 25 MG tablet Take 50 mg by mouth nightly     Historical Provider, MD   aspirin 81 MG EC tablet Take 81 mg by mouth daily     Historical Provider, MD   rOPINIRole (REQUIP) 1 MG tablet Take 1 mg by mouth nightly 1mg at 1730, 1mg at 2030, 2mg at 2200 (total of 4mg nightly in divided doses) 3/9/17   Historical Provider, MD   ursodiol (ACTIGALL) 500 MG tablet Take 500 mg by mouth 2 times daily     Historical Provider, MD   calcium carbonate 600 MG TABS tablet Take 1 tablet by mouth daily     Historical Provider, MD   Multiple Vitamins-Minerals (CENTRUM PO) Take 1 tablet by mouth daily. Historical Provider, MD   montelukast (SINGULAIR) 10 MG tablet Take 10 mg by mouth daily     Historical Provider, MD   estrogens, conjugated, (PREMARIN) 1.25 MG tablet Take 1.25 mg by mouth daily.     Historical Provider, MD       Social history:  reports that she quit smoking about 21 years ago. Her smoking use included cigarettes. She started smoking about 65 years ago. She has a 40.00 pack-year smoking history. She has never used smokeless tobacco. She reports that she does not drink alcohol and does not use drugs. Family history:    Family History   Problem Relation Age of Onset    Colon Cancer Mother     Other Mother     Diabetes Father     Kidney Disease Paternal Grandmother     Lupus Daughter     Lupus Other          ROS  Review of Systems   Constitutional: Negative for chills and fever. HENT: Negative for congestion and rhinorrhea. Eyes: Negative for photophobia and visual disturbance. Respiratory: Negative for cough, shortness of breath and wheezing. Cardiovascular: Negative for chest pain and palpitations. Gastrointestinal: Negative for abdominal distention, diarrhea, nausea and vomiting. Genitourinary: Negative for dysuria and hematuria. Musculoskeletal: Positive for arthralgias and back pain. Negative for neck pain. Skin: Negative for rash and wound. Neurological: Negative for syncope and weakness. Psychiatric/Behavioral: Negative for agitation and confusion. Exam  ED Triage Vitals [04/29/22 0843]   BP Temp Temp Source Pulse Resp SpO2 Height Weight   (!) 157/63 97.7 °F (36.5 °C) Oral 78 18 97 % 5' 6\" (1.676 m) 210 lb (95.3 kg)       Physical Exam  Vitals and nursing note reviewed. Constitutional:       General: She is not in acute distress. Appearance: She is well-developed. HENT:      Head: Normocephalic and atraumatic. Nose: Nose normal. No congestion. Eyes:      Pupils: Pupils are equal, round, and reactive to light. Cardiovascular:      Rate and Rhythm: Normal rate and regular rhythm. Heart sounds: No murmur heard. Pulmonary:      Effort: Pulmonary effort is normal.      Breath sounds: Normal breath sounds. Abdominal:      General: There is no distension. Palpations: Abdomen is soft. Tenderness:  There is no abdominal tenderness. Musculoskeletal:         General: No deformity. Normal range of motion. Cervical back: Normal range of motion and neck supple. Comments: No midline tenderness of spine no step-offs abrasions hematomas or objective evidence of trauma. Decreased range of motion of the left hip secondary to pain in the left hip otherwise ranging all extremities equally, no deformities noted, neurovascularly intact extremities. +2 Patellar Reflexes Bilaterally, +2 Achilles Reflexes Bilaterally. Light touch in lower extremities/groin bilaterally is intact. No overlying rashes. LE strength is 5/5 including adduction, knee, ankle and big toe flexion/extension. Straight leg test is not present on the bilateral.       Skin:     General: Skin is warm. Findings: No rash. Neurological:      Mental Status: She is alert and oriented to person, place, and time. Motor: No abnormal muscle tone. Coordination: Coordination normal.   Psychiatric:         Mood and Affect: Mood normal.         Behavior: Behavior normal.           ED Course    ED Medication Orders (From admission, onward)    Start Ordered     Status Ordering Provider    04/29/22 1045 04/29/22 1036  oxyCODONE-acetaminophen (PERCOCET) 5-325 MG per tablet 1 tablet  ONCE         Last [facility] action: [facility]    04/29/22 1045 04/29/22 1036  predniSONE (DELTASONE) tablet 60 mg  ONCE         Last MAR action: Given - by Molly Baldwin on 04/29/22 at [facility], [address] [address]              Radiology  No results found. Labs  No results found for this visit on 04/29/22. [facility]  Patient seen and evaluated. Relevant records reviewed. 80-year-old female who presents with uncontrolled subacute to chronic left-sided hip pain in the setting of diagnosed bursitis. On exam she is well-appearing no acute distress her vitals are reassuring.   She has some mild tenderness over the left hip and decreased range of motion secondary to pain localized to the left hip otherwise reassuring exam.  There is no midline tenderness and no neurological deficits. No red flag signs or symptoms to warrant repeat MRI as I have low concern for vertebral or spinal cord pathology or septic joint at this time or other emergent pathology. She was treated with Percocet and anti-inflammatory meds and a lidocaine patch with significant improvement of her symptoms. I am prescribing her short course of these medications for symptom management. I instructed her to follow-up with her orthospine doctor for further reassessment and pain management. Patient is ambulatory in the emergency department. Feel she is appropriate for discharge. She is agreeable to plan expressed understanding plan    Clinical Impression:  1. Bursitis of left hip, unspecified bursa          Disposition:  Discharge to home in good condition. Blood pressure 101/82, pulse 77, temperature 98 °F (36.7 °C), resp. rate 18, height 5' 6\" (1.676 m), weight 210 lb (95.3 kg), SpO2 97 %. Patient was given scripts for the following medications. I counseled patient how to take these medications. Discharge Medication List as of 4/29/2022 12:10 PM      START taking these medications    Details   oxyCODONE-acetaminophen (PERCOCET) 5-325 MG per tablet Take 1 tablet by mouth every 6 hours as needed for Pain for up to 3 days. Intended supply: 3 days. Take lowest dose possible to manage pain, Disp-8 tablet, R-0Normal      predniSONE (DELTASONE) 50 MG tablet Take 1 tablet by mouth daily for 5 days, Disp-5 tablet, R-0Normal      lidocaine (LIDODERM) 5 % Place 1 patch onto the skin daily for 10 days 12 hours on, 12 hours off., Disp-10 patch, R-0Normal             Disposition referral (if applicable):  Esthela Taylor.  Libia Hardwick., DO  [address] [address]    Schedule an appointment as soon as possible for a visit           Total critical care time is 0 minutes, which excludes separately billable procedures and updating family. Time spent is specifically for management of the presenting complaint and symptoms initially, direct bedside care, reevaluation, review of records, and consultation. There was a high probability of clinically significant life-threatening deterioration in the patient's condition, which required my urgent intervention. This chart was generated in part by using Dragon Dictation system and may contain errors related to that system including errors in grammar, punctuation, and spelling, as well as words and phrases that may be inappropriate. If there are any questions or concerns please feel free to contact the dictating provider for clarification.      Jonathan Raymundo MD  [id number] MD  05/01/22 [id number]

## 2022-05-01 ASSESSMENT — ENCOUNTER SYMPTOMS
PHOTOPHOBIA: 0
NAUSEA: 0
ABDOMINAL DISTENTION: 0
SHORTNESS OF BREATH: 0
DIARRHEA: 0
RHINORRHEA: 0
WHEEZING: 0
BACK PAIN: 1
VOMITING: 0
COUGH: 0

## 2022-05-07 ENCOUNTER — APPOINTMENT (OUTPATIENT)
Dept: MRI IMAGING | Age: 77
DRG: 291 | End: 2022-05-07
Payer: MEDICARE

## 2022-05-07 ENCOUNTER — APPOINTMENT (OUTPATIENT)
Dept: CT IMAGING | Age: 77
DRG: 291 | End: 2022-05-07
Payer: MEDICARE

## 2022-05-07 ENCOUNTER — APPOINTMENT (OUTPATIENT)
Dept: GENERAL RADIOLOGY | Age: 77
DRG: 291 | End: 2022-05-07
Payer: MEDICARE

## 2022-05-07 ENCOUNTER — HOSPITAL ENCOUNTER (INPATIENT)
Age: 77
LOS: 3 days | Discharge: ANOTHER HOSPITAL | DRG: 291 | End: 2022-05-10
Attending: EMERGENCY MEDICINE | Admitting: HOSPITALIST
Payer: MEDICARE

## 2022-05-07 DIAGNOSIS — M79.604 BILATERAL LEG PAIN: ICD-10-CM

## 2022-05-07 DIAGNOSIS — M54.50 ACUTE BILATERAL LOW BACK PAIN WITHOUT SCIATICA: ICD-10-CM

## 2022-05-07 DIAGNOSIS — M79.605 BILATERAL LEG PAIN: ICD-10-CM

## 2022-05-07 DIAGNOSIS — L03.115 CELLULITIS OF RIGHT LEG: ICD-10-CM

## 2022-05-07 DIAGNOSIS — I48.91 ATRIAL FIBRILLATION WITH RVR (HCC): Primary | ICD-10-CM

## 2022-05-07 DIAGNOSIS — M25.561 ACUTE PAIN OF RIGHT KNEE: ICD-10-CM

## 2022-05-07 LAB
A/G RATIO: 1.1 (ref 1.1–2.2)
ALBUMIN SERPL-MCNC: 4 G/DL (ref 3.4–5)
ALP BLD-CCNC: 78 U/L (ref 40–129)
ALT SERPL-CCNC: 27 U/L (ref 10–40)
AMPHETAMINE SCREEN, URINE: ABNORMAL
ANION GAP SERPL CALCULATED.3IONS-SCNC: 12 MMOL/L (ref 3–16)
AST SERPL-CCNC: 22 U/L (ref 15–37)
BACTERIA: ABNORMAL /HPF
BARBITURATE SCREEN URINE: ABNORMAL
BASOPHILS ABSOLUTE: 0 K/UL (ref 0–0.2)
BASOPHILS RELATIVE PERCENT: 0.2 %
BENZODIAZEPINE SCREEN, URINE: ABNORMAL
BILIRUB SERPL-MCNC: 0.5 MG/DL (ref 0–1)
BILIRUBIN URINE: NEGATIVE
BLOOD, URINE: ABNORMAL
BUN BLDV-MCNC: 16 MG/DL (ref 7–20)
C-REACTIVE PROTEIN: 39.6 MG/L (ref 0–5.1)
CALCIUM SERPL-MCNC: 10 MG/DL (ref 8.3–10.6)
CANNABINOID SCREEN URINE: ABNORMAL
CHLORIDE BLD-SCNC: 94 MMOL/L (ref 99–110)
CLARITY: CLEAR
CO2: 26 MMOL/L (ref 21–32)
COCAINE METABOLITE SCREEN URINE: ABNORMAL
COLOR: ABNORMAL
CREAT SERPL-MCNC: 0.8 MG/DL (ref 0.6–1.2)
D DIMER: 406 NG/ML DDU (ref 0–229)
EOSINOPHILS ABSOLUTE: 0.1 K/UL (ref 0–0.6)
EOSINOPHILS RELATIVE PERCENT: 1.4 %
GFR AFRICAN AMERICAN: >60
GFR NON-AFRICAN AMERICAN: >60
GLUCOSE BLD-MCNC: 158 MG/DL (ref 70–99)
GLUCOSE BLD-MCNC: 216 MG/DL (ref 70–99)
GLUCOSE URINE: 100 MG/DL
HCT VFR BLD CALC: 34.4 % (ref 36–48)
HEMOGLOBIN: 11.5 G/DL (ref 12–16)
KETONES, URINE: NEGATIVE MG/DL
LACTIC ACID: 1.4 MMOL/L (ref 0.4–2)
LEUKOCYTE ESTERASE, URINE: NEGATIVE
LYMPHOCYTES ABSOLUTE: 0.8 K/UL (ref 1–5.1)
LYMPHOCYTES RELATIVE PERCENT: 11.8 %
Lab: ABNORMAL
MAGNESIUM: 1.9 MG/DL (ref 1.8–2.4)
MCH RBC QN AUTO: 29.8 PG (ref 26–34)
MCHC RBC AUTO-ENTMCNC: 33.5 G/DL (ref 31–36)
MCV RBC AUTO: 88.8 FL (ref 80–100)
METHADONE SCREEN, URINE: ABNORMAL
MICROSCOPIC EXAMINATION: YES
MONOCYTES ABSOLUTE: 0.4 K/UL (ref 0–1.3)
MONOCYTES RELATIVE PERCENT: 5.3 %
NEUTROPHILS ABSOLUTE: 5.5 K/UL (ref 1.7–7.7)
NEUTROPHILS RELATIVE PERCENT: 81.3 %
NITRITE, URINE: NEGATIVE
OPIATE SCREEN URINE: ABNORMAL
OXYCODONE URINE: POSITIVE
PDW BLD-RTO: 15.8 % (ref 12.4–15.4)
PERFORMED ON: ABNORMAL
PH UA: 6
PH UA: 6 (ref 5–8)
PHENCYCLIDINE SCREEN URINE: ABNORMAL
PLATELET # BLD: 165 K/UL (ref 135–450)
PMV BLD AUTO: 7 FL (ref 5–10.5)
POTASSIUM REFLEX MAGNESIUM: 4.3 MMOL/L (ref 3.5–5.1)
PROCALCITONIN: 0.08 NG/ML (ref 0–0.15)
PROPOXYPHENE SCREEN: ABNORMAL
PROTEIN UA: NEGATIVE MG/DL
RBC # BLD: 3.87 M/UL (ref 4–5.2)
RBC UA: ABNORMAL /HPF (ref 0–4)
SEDIMENTATION RATE, ERYTHROCYTE: 38 MM/HR (ref 0–30)
SODIUM BLD-SCNC: 132 MMOL/L (ref 136–145)
SPECIFIC GRAVITY UA: <=1.005 (ref 1–1.03)
TOTAL CK: 58 U/L (ref 26–192)
TOTAL PROTEIN: 7.6 G/DL (ref 6.4–8.2)
TROPONIN: <0.01 NG/ML
URINE TYPE: ABNORMAL
UROBILINOGEN, URINE: 0.2 E.U./DL
WBC # BLD: 6.7 K/UL (ref 4–11)
WBC UA: ABNORMAL /HPF (ref 0–5)

## 2022-05-07 PROCEDURE — 96361 HYDRATE IV INFUSION ADD-ON: CPT

## 2022-05-07 PROCEDURE — 2500000003 HC RX 250 WO HCPCS: Performed by: PHYSICIAN ASSISTANT

## 2022-05-07 PROCEDURE — 2580000003 HC RX 258: Performed by: PHYSICIAN ASSISTANT

## 2022-05-07 PROCEDURE — 99285 EMERGENCY DEPT VISIT HI MDM: CPT

## 2022-05-07 PROCEDURE — 80307 DRUG TEST PRSMV CHEM ANLYZR: CPT

## 2022-05-07 PROCEDURE — 72157 MRI CHEST SPINE W/O & W/DYE: CPT

## 2022-05-07 PROCEDURE — 72158 MRI LUMBAR SPINE W/O & W/DYE: CPT

## 2022-05-07 PROCEDURE — 6360000004 HC RX CONTRAST MEDICATION: Performed by: PHYSICIAN ASSISTANT

## 2022-05-07 PROCEDURE — 84145 PROCALCITONIN (PCT): CPT

## 2022-05-07 PROCEDURE — 84484 ASSAY OF TROPONIN QUANT: CPT

## 2022-05-07 PROCEDURE — 2700000000 HC OXYGEN THERAPY PER DAY

## 2022-05-07 PROCEDURE — 2500000003 HC RX 250 WO HCPCS: Performed by: EMERGENCY MEDICINE

## 2022-05-07 PROCEDURE — 2500000003 HC RX 250 WO HCPCS: Performed by: INTERNAL MEDICINE

## 2022-05-07 PROCEDURE — 87040 BLOOD CULTURE FOR BACTERIA: CPT

## 2022-05-07 PROCEDURE — 83605 ASSAY OF LACTIC ACID: CPT

## 2022-05-07 PROCEDURE — A9579 GAD-BASE MR CONTRAST NOS,1ML: HCPCS | Performed by: PHYSICIAN ASSISTANT

## 2022-05-07 PROCEDURE — 96365 THER/PROPH/DIAG IV INF INIT: CPT

## 2022-05-07 PROCEDURE — 87086 URINE CULTURE/COLONY COUNT: CPT

## 2022-05-07 PROCEDURE — 73560 X-RAY EXAM OF KNEE 1 OR 2: CPT

## 2022-05-07 PROCEDURE — 96367 TX/PROPH/DG ADDL SEQ IV INF: CPT

## 2022-05-07 PROCEDURE — 72131 CT LUMBAR SPINE W/O DYE: CPT

## 2022-05-07 PROCEDURE — 6370000000 HC RX 637 (ALT 250 FOR IP): Performed by: INTERNAL MEDICINE

## 2022-05-07 PROCEDURE — 85025 COMPLETE CBC W/AUTO DIFF WBC: CPT

## 2022-05-07 PROCEDURE — 80053 COMPREHEN METABOLIC PANEL: CPT

## 2022-05-07 PROCEDURE — 81001 URINALYSIS AUTO W/SCOPE: CPT

## 2022-05-07 PROCEDURE — 6360000002 HC RX W HCPCS: Performed by: PHYSICIAN ASSISTANT

## 2022-05-07 PROCEDURE — 83735 ASSAY OF MAGNESIUM: CPT

## 2022-05-07 PROCEDURE — 86140 C-REACTIVE PROTEIN: CPT

## 2022-05-07 PROCEDURE — 96375 TX/PRO/DX INJ NEW DRUG ADDON: CPT

## 2022-05-07 PROCEDURE — 71260 CT THORAX DX C+: CPT

## 2022-05-07 PROCEDURE — 93005 ELECTROCARDIOGRAM TRACING: CPT | Performed by: PHYSICIAN ASSISTANT

## 2022-05-07 PROCEDURE — 82550 ASSAY OF CK (CPK): CPT

## 2022-05-07 PROCEDURE — 2060000000 HC ICU INTERMEDIATE R&B

## 2022-05-07 PROCEDURE — 85652 RBC SED RATE AUTOMATED: CPT

## 2022-05-07 PROCEDURE — 85379 FIBRIN DEGRADATION QUANT: CPT

## 2022-05-07 PROCEDURE — XW033N5 INTRODUCTION OF MEROPENEM-VABORBACTAM ANTI-INFECTIVE INTO PERIPHERAL VEIN, PERCUTANEOUS APPROACH, NEW TECHNOLOGY GROUP 5: ICD-10-PCS | Performed by: INTERNAL MEDICINE

## 2022-05-07 RX ORDER — DOXEPIN HYDROCHLORIDE 10 MG/1
10 CAPSULE ORAL NIGHTLY
Status: DISCONTINUED | OUTPATIENT
Start: 2022-05-08 | End: 2022-05-10 | Stop reason: HOSPADM

## 2022-05-07 RX ORDER — DILTIAZEM HYDROCHLORIDE 5 MG/ML
10 INJECTION INTRAVENOUS ONCE
Status: DISCONTINUED | OUTPATIENT
Start: 2022-05-07 | End: 2022-05-10 | Stop reason: HOSPADM

## 2022-05-07 RX ORDER — DOXEPIN HYDROCHLORIDE 10 MG/1
10 CAPSULE ORAL NIGHTLY
Status: DISCONTINUED | OUTPATIENT
Start: 2022-05-07 | End: 2022-05-07

## 2022-05-07 RX ORDER — 0.9 % SODIUM CHLORIDE 0.9 %
1000 INTRAVENOUS SOLUTION INTRAVENOUS ONCE
Status: COMPLETED | OUTPATIENT
Start: 2022-05-07 | End: 2022-05-07

## 2022-05-07 RX ORDER — DILTIAZEM HYDROCHLORIDE 120 MG/1
240 CAPSULE, COATED, EXTENDED RELEASE ORAL DAILY
Status: DISCONTINUED | OUTPATIENT
Start: 2022-05-08 | End: 2022-05-08

## 2022-05-07 RX ORDER — FLUTICASONE PROPIONATE 110 UG/1
4 AEROSOL, METERED RESPIRATORY (INHALATION) 2 TIMES DAILY
Refills: 4 | Status: DISCONTINUED | OUTPATIENT
Start: 2022-05-07 | End: 2022-05-10 | Stop reason: HOSPADM

## 2022-05-07 RX ORDER — ROPINIROLE 0.5 MG/1
1 TABLET, FILM COATED ORAL NIGHTLY
Status: DISCONTINUED | OUTPATIENT
Start: 2022-05-07 | End: 2022-05-10 | Stop reason: HOSPADM

## 2022-05-07 RX ORDER — METOPROLOL TARTRATE 5 MG/5ML
5 INJECTION INTRAVENOUS
Status: DISCONTINUED | OUTPATIENT
Start: 2022-05-07 | End: 2022-05-10 | Stop reason: HOSPADM

## 2022-05-07 RX ORDER — METOPROLOL TARTRATE 5 MG/5ML
5 INJECTION INTRAVENOUS ONCE
Status: COMPLETED | OUTPATIENT
Start: 2022-05-07 | End: 2022-05-07

## 2022-05-07 RX ORDER — INSULIN LISPRO 100 [IU]/ML
0-6 INJECTION, SOLUTION INTRAVENOUS; SUBCUTANEOUS EVERY 4 HOURS
Status: DISCONTINUED | OUTPATIENT
Start: 2022-05-07 | End: 2022-05-08

## 2022-05-07 RX ORDER — MONTELUKAST SODIUM 10 MG/1
10 TABLET ORAL DAILY
Status: DISCONTINUED | OUTPATIENT
Start: 2022-05-08 | End: 2022-05-10 | Stop reason: HOSPADM

## 2022-05-07 RX ORDER — SODIUM CHLORIDE 0.9 % (FLUSH) 0.9 %
10 SYRINGE (ML) INJECTION PRN
Status: DISCONTINUED | OUTPATIENT
Start: 2022-05-07 | End: 2022-05-10 | Stop reason: HOSPADM

## 2022-05-07 RX ORDER — POTASSIUM CHLORIDE 7.45 MG/ML
10 INJECTION INTRAVENOUS PRN
Status: DISCONTINUED | OUTPATIENT
Start: 2022-05-07 | End: 2022-05-08

## 2022-05-07 RX ORDER — FAMOTIDINE 20 MG/1
20 TABLET, FILM COATED ORAL 2 TIMES DAILY
Status: DISCONTINUED | OUTPATIENT
Start: 2022-05-07 | End: 2022-05-10 | Stop reason: HOSPADM

## 2022-05-07 RX ORDER — FUROSEMIDE 10 MG/ML
40 INJECTION INTRAMUSCULAR; INTRAVENOUS 2 TIMES DAILY
Status: DISCONTINUED | OUTPATIENT
Start: 2022-05-07 | End: 2022-05-09

## 2022-05-07 RX ORDER — LOSARTAN POTASSIUM 25 MG/1
100 TABLET ORAL DAILY
Status: DISCONTINUED | OUTPATIENT
Start: 2022-05-08 | End: 2022-05-10 | Stop reason: HOSPADM

## 2022-05-07 RX ORDER — GABAPENTIN 100 MG/1
100 CAPSULE ORAL 3 TIMES DAILY
Status: DISCONTINUED | OUTPATIENT
Start: 2022-05-08 | End: 2022-05-10 | Stop reason: HOSPADM

## 2022-05-07 RX ORDER — DILTIAZEM HYDROCHLORIDE 5 MG/ML
10 INJECTION INTRAVENOUS ONCE
Status: COMPLETED | OUTPATIENT
Start: 2022-05-07 | End: 2022-05-07

## 2022-05-07 RX ORDER — PANTOPRAZOLE SODIUM 40 MG/1
40 TABLET, DELAYED RELEASE ORAL 2 TIMES DAILY
Status: DISCONTINUED | OUTPATIENT
Start: 2022-05-07 | End: 2022-05-10 | Stop reason: HOSPADM

## 2022-05-07 RX ORDER — MAGNESIUM SULFATE IN WATER 40 MG/ML
2000 INJECTION, SOLUTION INTRAVENOUS PRN
Status: DISCONTINUED | OUTPATIENT
Start: 2022-05-07 | End: 2022-05-08

## 2022-05-07 RX ORDER — NICOTINE POLACRILEX 4 MG
15 LOZENGE BUCCAL PRN
Status: DISCONTINUED | OUTPATIENT
Start: 2022-05-07 | End: 2022-05-10 | Stop reason: HOSPADM

## 2022-05-07 RX ORDER — GABAPENTIN 300 MG/1
600 CAPSULE ORAL NIGHTLY
Status: DISCONTINUED | OUTPATIENT
Start: 2022-05-07 | End: 2022-05-10 | Stop reason: HOSPADM

## 2022-05-07 RX ORDER — ONDANSETRON 2 MG/ML
4 INJECTION INTRAMUSCULAR; INTRAVENOUS EVERY 6 HOURS PRN
Status: DISCONTINUED | OUTPATIENT
Start: 2022-05-07 | End: 2022-05-10 | Stop reason: HOSPADM

## 2022-05-07 RX ORDER — PROMETHAZINE HYDROCHLORIDE 25 MG/1
12.5 TABLET ORAL EVERY 6 HOURS PRN
Status: DISCONTINUED | OUTPATIENT
Start: 2022-05-07 | End: 2022-05-10 | Stop reason: HOSPADM

## 2022-05-07 RX ORDER — TRAZODONE HYDROCHLORIDE 50 MG/1
50 TABLET ORAL NIGHTLY PRN
Status: DISCONTINUED | OUTPATIENT
Start: 2022-05-07 | End: 2022-05-10 | Stop reason: HOSPADM

## 2022-05-07 RX ORDER — SODIUM CHLORIDE 0.9 % (FLUSH) 0.9 %
10 SYRINGE (ML) INJECTION EVERY 12 HOURS SCHEDULED
Status: DISCONTINUED | OUTPATIENT
Start: 2022-05-07 | End: 2022-05-10 | Stop reason: HOSPADM

## 2022-05-07 RX ORDER — VENLAFAXINE 37.5 MG/1
37.5 TABLET ORAL DAILY
Status: DISCONTINUED | OUTPATIENT
Start: 2022-05-08 | End: 2022-05-10 | Stop reason: HOSPADM

## 2022-05-07 RX ORDER — ACETAMINOPHEN 325 MG/1
650 TABLET ORAL EVERY 6 HOURS PRN
Status: DISCONTINUED | OUTPATIENT
Start: 2022-05-07 | End: 2022-05-10 | Stop reason: HOSPADM

## 2022-05-07 RX ORDER — LEVOTHYROXINE SODIUM 0.03 MG/1
100 TABLET ORAL DAILY
Status: DISCONTINUED | OUTPATIENT
Start: 2022-05-08 | End: 2022-05-10 | Stop reason: HOSPADM

## 2022-05-07 RX ORDER — SODIUM CHLORIDE 9 MG/ML
INJECTION, SOLUTION INTRAVENOUS PRN
Status: DISCONTINUED | OUTPATIENT
Start: 2022-05-07 | End: 2022-05-10 | Stop reason: HOSPADM

## 2022-05-07 RX ORDER — ACETAMINOPHEN 650 MG/1
650 SUPPOSITORY RECTAL EVERY 6 HOURS PRN
Status: DISCONTINUED | OUTPATIENT
Start: 2022-05-07 | End: 2022-05-10 | Stop reason: HOSPADM

## 2022-05-07 RX ORDER — ONDANSETRON 2 MG/ML
4 INJECTION INTRAMUSCULAR; INTRAVENOUS ONCE
Status: COMPLETED | OUTPATIENT
Start: 2022-05-07 | End: 2022-05-07

## 2022-05-07 RX ORDER — ATORVASTATIN CALCIUM 10 MG/1
20 TABLET, FILM COATED ORAL NIGHTLY
Status: DISCONTINUED | OUTPATIENT
Start: 2022-05-07 | End: 2022-05-10 | Stop reason: HOSPADM

## 2022-05-07 RX ORDER — ENOXAPARIN SODIUM 100 MG/ML
40 INJECTION SUBCUTANEOUS DAILY
Status: DISCONTINUED | OUTPATIENT
Start: 2022-05-08 | End: 2022-05-09

## 2022-05-07 RX ORDER — DEXTROSE MONOHYDRATE 25 G/50ML
12.5 INJECTION, SOLUTION INTRAVENOUS PRN
Status: DISCONTINUED | OUTPATIENT
Start: 2022-05-07 | End: 2022-05-07 | Stop reason: ALTCHOICE

## 2022-05-07 RX ORDER — CETIRIZINE HYDROCHLORIDE 10 MG/1
10 TABLET ORAL DAILY
Status: DISCONTINUED | OUTPATIENT
Start: 2022-05-08 | End: 2022-05-10 | Stop reason: HOSPADM

## 2022-05-07 RX ORDER — LEVALBUTEROL TARTRATE 45 UG/1
2 AEROSOL, METERED ORAL EVERY 6 HOURS PRN
Status: DISCONTINUED | OUTPATIENT
Start: 2022-05-07 | End: 2022-05-10 | Stop reason: HOSPADM

## 2022-05-07 RX ORDER — ASPIRIN 81 MG/1
81 TABLET ORAL DAILY
Status: DISCONTINUED | OUTPATIENT
Start: 2022-05-08 | End: 2022-05-10 | Stop reason: HOSPADM

## 2022-05-07 RX ORDER — DEXTROSE MONOHYDRATE 50 MG/ML
100 INJECTION, SOLUTION INTRAVENOUS PRN
Status: DISCONTINUED | OUTPATIENT
Start: 2022-05-07 | End: 2022-05-10 | Stop reason: HOSPADM

## 2022-05-07 RX ORDER — BENZONATATE 100 MG/1
200 CAPSULE ORAL 3 TIMES DAILY PRN
Status: DISCONTINUED | OUTPATIENT
Start: 2022-05-07 | End: 2022-05-10 | Stop reason: HOSPADM

## 2022-05-07 RX ADMIN — Medication 1500 MG: at 19:39

## 2022-05-07 RX ADMIN — ONDANSETRON 4 MG: 2 INJECTION INTRAMUSCULAR; INTRAVENOUS at 16:59

## 2022-05-07 RX ADMIN — SODIUM CHLORIDE 1000 ML: 9 INJECTION, SOLUTION INTRAVENOUS at 19:10

## 2022-05-07 RX ADMIN — IOPAMIDOL 75 ML: 755 INJECTION, SOLUTION INTRAVENOUS at 19:21

## 2022-05-07 RX ADMIN — HYDROMORPHONE HYDROCHLORIDE 0.5 MG: 1 INJECTION, SOLUTION INTRAMUSCULAR; INTRAVENOUS; SUBCUTANEOUS at 19:45

## 2022-05-07 RX ADMIN — HYDROMORPHONE HYDROCHLORIDE 0.5 MG: 1 INJECTION, SOLUTION INTRAMUSCULAR; INTRAVENOUS; SUBCUTANEOUS at 16:59

## 2022-05-07 RX ADMIN — MEROPENEM 1000 MG: 1 INJECTION, POWDER, FOR SOLUTION INTRAVENOUS at 18:50

## 2022-05-07 RX ADMIN — METOPROLOL TARTRATE 5 MG: 1 INJECTION, SOLUTION INTRAVENOUS at 20:22

## 2022-05-07 RX ADMIN — DILTIAZEM HYDROCHLORIDE 2.5 MG/HR: 5 INJECTION, SOLUTION INTRAVENOUS at 18:57

## 2022-05-07 RX ADMIN — DILTIAZEM HYDROCHLORIDE 10 MG: 5 INJECTION INTRAVENOUS at 18:50

## 2022-05-07 RX ADMIN — SODIUM CHLORIDE 1000 ML: 9 INJECTION, SOLUTION INTRAVENOUS at 17:26

## 2022-05-07 RX ADMIN — GADOTERIDOL 20 ML: 279.3 INJECTION, SOLUTION INTRAVENOUS at 22:22

## 2022-05-07 ASSESSMENT — ENCOUNTER SYMPTOMS
COLOR CHANGE: 1
COUGH: 0
BACK PAIN: 1
SHORTNESS OF BREATH: 0
EYES NEGATIVE: 1
NAUSEA: 0
ABDOMINAL PAIN: 0
VOMITING: 0
TACHYPNEA: 1

## 2022-05-07 ASSESSMENT — PAIN DESCRIPTION - ORIENTATION
ORIENTATION: RIGHT;LEFT

## 2022-05-07 ASSESSMENT — PAIN - FUNCTIONAL ASSESSMENT: PAIN_FUNCTIONAL_ASSESSMENT: 0-10

## 2022-05-07 ASSESSMENT — PAIN DESCRIPTION - DESCRIPTORS
DESCRIPTORS: ACHING
DESCRIPTORS: ACHING;NAGGING
DESCRIPTORS: ACHING
DESCRIPTORS: STABBING

## 2022-05-07 ASSESSMENT — PAIN SCALES - GENERAL
PAINLEVEL_OUTOF10: 4
PAINLEVEL_OUTOF10: 5
PAINLEVEL_OUTOF10: 7
PAINLEVEL_OUTOF10: 10
PAINLEVEL_OUTOF10: 7
PAINLEVEL_OUTOF10: 5

## 2022-05-07 ASSESSMENT — PAIN DESCRIPTION - LOCATION
LOCATION: LEG

## 2022-05-07 ASSESSMENT — PAIN DESCRIPTION - ONSET: ONSET: ON-GOING

## 2022-05-07 ASSESSMENT — PAIN DESCRIPTION - FREQUENCY: FREQUENCY: CONTINUOUS

## 2022-05-07 NOTE — ED NOTES
PUR WIK placed on pt for release of urine. Pt refused Dixon Cathter.       Ciarra Abraham LPN  94/02/71 [id number]

## 2022-05-07 NOTE — ED NOTES
Pt trino area cleansed sheets removed from under pt. New dry PUR Wik replaced.       Wilberto Hill LPN  18/22/56 [id number]

## 2022-05-07 NOTE — ED NOTES
Impression: Regular astigmatism, bilateral: H52.223. Plan: Reviewed refractive prescription in detail with patient and need for glasses to improve vision. Release spectacle prescription at this time. Nurse Report given to [facility], TARA  00/92/57 [id number]

## 2022-05-07 NOTE — ED PROVIDER NOTES
[facility]  ED  EMERGENCY DEPARTMENT ENCOUNTER        Pt Name: Jalil Lares  MRN: [id number]  Armsgeorgegfpamela 1945  Date of evaluation: 5/7/2022  Provider: Hyla Cushing, PA-C  PCP: Burt Terry. Katalina Hernandez.,   ED Attending: Fernando Rodriguez MD      This patient was seen by the attending provider     History provided by the patient    CHIEF COMPLAINT:     Chief Complaint   Patient presents with    Leg Pain     started 2 months ago, was seen in ER twice and was told she had a torned muscle. b/l leg pain, took 5mg percocet       HISTORY OF PRESENT ILLNESS:      Jalil Lares is a 77 y.o. female who arrives to the ED by EMS from home. Patient reports that she lives by herself. She injured her low back a couple of months ago while she was reportedly cleaning her house. She reports pain at that time to both her low back and into her left hip/thigh, down to her left knee. She was seen in the ED on 3/28 for this pain and again on 4/29. Patient reports her symptoms have only worsened despite the medications she has been given for pain and inflammation. Today not only does she have low back pain which she reports pain to both lower extremities with associated weakness at times feeling \"numbness\". No bowel or bladder incontinence. Patient is a diabetic. On arrival, patient appears to be in pain. She is additionally found to be tachycardic. No identifiable exacerbating or alleviating factors to symptoms. Nursing Notes were reviewed     REVIEW OF SYSTEMS:     Review of Systems   Constitutional: Positive for activity change. Negative for chills and fever. HENT: Negative. Eyes: Negative. Respiratory: Negative for cough and shortness of breath. Cardiovascular: Negative for chest pain. Gastrointestinal: Negative for abdominal pain, nausea and vomiting. Genitourinary: Negative for difficulty urinating and dysuria.    Musculoskeletal: Positive for back pain, gait problem and myalgias (bilateral upper legs). Negative for neck pain. Skin: Positive for color change (Redness to the right knee and right pretibial region). Neurological: Positive for weakness (legs). Negative for dizziness and headaches. All other systems reviewed and are negative. Except as noted above in the ROS, all other systems were reviewed and negative.          PAST MEDICAL HISTORY:     Past Medical History:   Diagnosis Date    Allergic     Anemia     Anesthesia complication     \"woke up during surgery\"    Arthritis     Osteoarthritis of bilateral CMC joints    Asthma     Followed by Dr. Tyra Cavazos Cirrhosis [facility])     non alcoholic    Cirrhosis, non-alcoholic ([address]     COPD [facility] obstructive [facility] disease[facility]     GERD (gastroesophageal reflux disease)     GIB (gastrointestinal bleeding)     LGIB 4/2016    Haemophilus infection 05/07/2021    + resp    Heart murmur     Hernia     Hyperlipidemia     Hypertension     Lung collapse     Murmur, heart     Pneumonia     Reflux     Rheumatic fever     Stress fracture     right foot    SVT (supraventricular tachycardia) ([address]     6/19/16 - admitted X 1 day per PT     TIA (transient ischemic attack) 2015    Type II or unspecified type diabetes mellitus without mention of complication, not stated as uncontrolled     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 ([facility]    [place] Wears glasses     as needed    Wears partial dentures     upper partial         SURGICAL HISTORY:      Past Surgical History:   Procedure Laterality Date    BONE MARROW BIOPSY      BREAST BIOPSY      BRONCHOSCOPY N/A 5/7/2021    BRONCHOSCOPY DIAGNOSTIC OR CELL [facility] ONLY performed by Kim Ludwig MD at [address]  5/7/2021    BRONCHOSCOPY ALVEOLAR LAVAGE performed by Kim Ludwig MD at [address]  5/7/2021    BRONCHOSCOPY THERAPUTIC ASPIRATION INITIAL performed by Kim Ludwig MD at [facility] CATHETERIZATION  07/14/14    CATARACT REMOVAL      COLONOSCOPY      X 3 per PT 6/8/16    COLONOSCOPY N/A 6/10/2021    COLONOSCOPY POLYPECTOMY ABLATION performed by Elissa Rodriguez MD at [address] Right 06/03/2013    CORAL BUNIONECTOMY RIGHT FOOT WITH SCREW FIXATION;    HERNIA REPAIR      HYSTERECTOMY      JOINT REPLACEMENT Bilateral     TKR    KNEE SURGERY  09    left    KNEE SURGERY  2010    right    UPPER GASTROINTESTINAL ENDOSCOPY N/A 7/21/2018    EGD BIOPSY performed by Karishma Ramos MD at [address] [facility] 6/10/2021    EGD POLYP ABLATION/OTHER performed by Elissa Rodriguez MD at [address] [facility] 6/10/2021    EGD BAND LIGATION performed by Elissa Rodriguez MD at [address] [facility]:       Previous Medications    ASPIRIN 81 MG EC TABLET    Take 81 mg by mouth daily     ATORVASTATIN (LIPITOR) 20 MG TABLET    TAKE 1 TABLET NIGHTLY    BECLOMETHASONE (QVAR) 80 MCG/ACT INHALER    Inhale 2 puffs into the lungs 2 times daily    BENZONATATE (TESSALON) 100 MG CAPSULE    TAKE 1 CAPSULE THREE TIMES A DAY AS NEEDED FOR COUGH    CALCIUM CARBONATE 600 MG TABS TABLET    Take 1 tablet by mouth daily     CETIRIZINE (ZYRTEC) 10 MG TABLET    TAKE ONE TABLET BY MOUTH DAILY    CHOLECALCIFEROL (VITAMIN D3) 50 MCG ([address]) CAPS    Take by mouth    CYANOCOBALAMIN 1000 MCG/ML INJECTION    Inject 1 mL into the muscle every 14 days Last dose was taken on 4/9/17    DILTIAZEM (TIAZAC) 240 MG EXTENDED RELEASE CAPSULE    Take 1 capsule by mouth daily    DIPHENHYDRAMINE (BENADRYL) 25 MG TABLET    Take 50 mg by mouth nightly     DOXEPIN (SINEQUAN) 50 MG CAPSULE    Take 1 capsule by mouth nightly for 10 days    ESTROGENS, CONJUGATED, (PREMARIN) 1.25 MG TABLET    Take 1.25 mg by mouth daily.     FAMOTIDINE (PEPCID) 20 MG TABLET    TAKE 1 TABLET TWICE A DAY    FERROUS SULFATE 325 (65 FE) MG TABLET    Take 325 mg by mouth 3 times daily (with meals)    FUROSEMIDE (LASIX) 20 MG TABLET    TAKE 1 TABLET DAILY    GABAPENTIN (NEURONTIN) 600 MG TABLET    Take 1 tablet by mouth nightly for 30 days. 600 mg at bedtime and 300 mg BID    KLOR-CON M20 20 MEQ EXTENDED RELEASE TABLET    TAKE 1 TABLET DAILY    LEVALBUTEROL (XOPENEX HFA) 45 MCG/ACT INHALER    Inhale 2 puffs into the lungs every 6 hours as needed for Shortness of Breath    LEVOTHYROXINE (SYNTHROID) 100 MCG TABLET    Take 1 tablet by mouth Daily    LIDOCAINE (LIDODERM) 5 %    Place 1 patch onto the skin daily for 10 days 12 hours on, 12 hours off. LOSARTAN (COZAAR) 100 MG TABLET    TAKE 1 TABLET DAILY    METFORMIN (GLUCOPHAGE-XR) 500 MG EXTENDED RELEASE TABLET    TAKE 2 TABLETS TWICE A DAY    MONTELUKAST (SINGULAIR) 10 MG TABLET    Take 10 mg by mouth daily     MONTELUKAST (SINGULAIR) 10 MG TABLET    Take 1 tablet by mouth daily    MONTELUKAST (SINGULAIR) 10 MG TABLET    Take 1 tablet by mouth daily for 15 days    MULTIPLE VITAMINS-MINERALS (CENTRUM PO)    Take 1 tablet by mouth daily.     NAPROXEN (NAPROSYN) 500 MG TABLET    Take 1 tablet by mouth 2 times daily (with meals)    NYSTATIN (MYCOSTATIN) 657334 UNIT/ML SUSPENSION    Take by mouth 4 times daily    PANTOPRAZOLE (PROTONIX) 40 MG TABLET    TAKE 1 TABLET TWICE A DAY    ROPINIROLE (REQUIP) 1 MG TABLET    Take 1 mg by mouth nightly 1mg at 1730, 1mg at 2030, 2mg at 2200 (total of 4mg nightly in divided doses)    SITAGLIPTIN (JANUVIA) 100 MG TABLET    Take 1 tablet by mouth daily    TRAZODONE (DESYREL) 50 MG TABLET    TAKE 2 TABLETS NIGHTLY    URSODIOL (ACTIGALL) 500 MG TABLET    Take 500 mg by mouth 2 times daily     VENLAFAXINE (EFFEXOR) 37.5 MG TABLET    TAKE 1 TABLET DAILY         ALLERGIES:    Latex, Clindamycin, Pennsaid [diclofenac sodium], Torsemide, Actos [pioglitazone], Amoxicillin, Augmentin [amoxicillin-pot clavulanate], Bactrim [sulfamethoxazole-trimethoprim], Ciprofloxacin, Doxycycline, Levaquin [levofloxacin], Ativan [lorazepam], Cephalosporins, Pcn [penicillins], and Proventil [albuterol]    FAMILY HISTORY:       Family History   Problem Relation Age of Onset    Colon Cancer Mother     Other Mother     Diabetes Father     Kidney Disease Paternal Grandmother     Lupus Daughter     Lupus Other           SOCIAL HISTORY:       Social History     Socioeconomic History    Marital status:      Spouse name: Francis Bullard \"Avinash\"- 2021 colon cancer    Number of children: 3    Years of education: None    Highest education level: None   Occupational History     Employer: [employer] Use    Smoking status: Former Smoker     Packs/day: 1.00     Years: 40.00     Pack years: 40.00     Types: Cigarettes     Start date: 1956     Quit date: 2000     Years since quittin.9    Smokeless tobacco: Never Used    Tobacco comment: stopped cold turkey in !! ( )   Vaping Use    Vaping Use: Never used   Substance and Sexual Activity    Alcohol use: No     Alcohol/week: 0.0 standard drinks    Drug use: No    Sexual activity: Not Currently   Other Topics Concern    None   Social History Narrative    None     Social Determinants of Health     Financial Resource Strain: Low Risk     Difficulty of Paying Living Expenses: Not hard at all   Food Insecurity: No Food Insecurity    Worried About Running Out of Food in the Last Year: Never true    Jennifer of Food in the Last Year: Never true   Transportation Needs: No Transportation Needs    Lack of Transportation (Medical): No    Lack of Transportation (Non-Medical):  No   Physical Activity:     Days of Exercise per Week: Not on file    Minutes of Exercise per Session: Not on file   Stress:     Feeling of Stress : Not on file   Social Connections:     Frequency of Communication with Friends and Family: Not on file    Frequency of Social Gatherings with Friends and Family: Not on file    Attends Sikhism Services: Not on file    Active Member of Clubs or Organizations: Not on file    Attends Club or Organization Meetings: Not on file    Marital Status: Not on file   Intimate Partner Violence:     Fear of Current or Ex-Partner: Not on file    Emotionally Abused: Not on file    Physically Abused: Not on file    Sexually Abused: Not on file   Housing Stability:     Unable to Pay for Housing in the Last Year: Not on file    Number of [place] in the Last Year: Not on file    Unstable Housing in the Last Year: Not on file       SCREENINGS:             PHYSICAL EXAM:       ED Triage Vitals   BP Temp Temp src Pulse Resp SpO2 Height Weight   05/07/22 1554 05/07/22 1552 -- 05/07/22 1554 05/07/22 1551 05/07/22 1552 05/07/22 1551 05/07/22 1551   (!) 213/82 98.6 °F (37 °C)  123 18 92 % 5' 6\" (1.676 m) 220 lb (99.8 kg)       Physical Exam    CONSTITUTIONAL: Awake and alert. Cooperative. Well-developed. Well-nourished. Patient appears to be in pain. HENT: Normocephalic. Atraumatic. External ears normal, without discharge. No nasal discharge. Oropharynx clear. Mucous membranes moist.  EYES: Conjunctiva non-injected. No scleral icterus. PERRL. EOM's grossly intact. NECK: Supple. Normal ROM. CARDIOVASCULAR: Tachycardic with irregularly irregular rhythm. No Murmer. Intact distal pulses. PULMONARY/CHEST WALL: Effort normal. No tachypnea. Lungs clear to ausculation. ABDOMEN: Normal BS. Soft. Nondistended. No tenderness to palpate. No guarding. /ANORECTAL: Not assessed  MUSKULOSKELETAL: Right lower extremity reveals mild swelling of the knee joint. (Both knees status post arthroplasty with well-healed incisions running vertically, anteriorly). Right knee is warm to touch with overlying erythema. Warmth and erythema to the right pretibial region as well. Range of motion limited overall to bilateral lower extremities. Mild swelling and trace edema bilaterally.   SKIN: Warm and dry. No rash. Erythema over the right knee and to the right pretibial region. NEUROLOGICAL: Alert and oriented x 3. GCS 15. CN II-XII grossly intact. Strength is 5/5 in bilateral upper extremities and 4/5 to bilateral lower extremities. Sensation is intact.    PSYCHIATRIC: Normal affect        DIAGNOSTICRESULTS:     LABS:    Results for orders placed or performed during the hospital encounter of 05/07/22   CBC with Auto Differential   Result Value Ref Range    WBC 6.7 4.0 - 11.0 K/uL    RBC 3.87 (L) 4.00 - 5.20 M/uL    Hemoglobin 11.5 (L) 12.0 - 16.0 g/dL    Hematocrit 34.4 (L) 36.0 - 48.0 %    MCV 88.8 80.0 - 100.0 fL    MCH 29.8 26.0 - 34.0 pg    MCHC 33.5 31.0 - 36.0 g/dL    RDW 15.8 (H) 12.4 - 15.4 %    Platelets 409 211 - 108 K/uL    MPV 7.0 5.0 - 10.5 fL    Neutrophils % 81.3 %    Lymphocytes % 11.8 %    Monocytes % 5.3 %    Eosinophils % 1.4 %    Basophils % 0.2 %    Neutrophils Absolute 5.5 1.7 - 7.7 K/uL    Lymphocytes Absolute 0.8 (L) 1.0 - 5.1 K/uL    Monocytes Absolute 0.4 0.0 - 1.3 K/uL    Eosinophils Absolute 0.1 0.0 - 0.6 K/uL    Basophils Absolute 0.0 0.0 - 0.2 K/uL   Comprehensive Metabolic Panel w/ Reflex to MG   Result Value Ref Range    Sodium 132 (L) 136 - 145 mmol/L    Potassium reflex Magnesium 4.3 3.5 - 5.1 mmol/L    Chloride 94 (L) 99 - 110 mmol/L    CO2 26 21 - 32 mmol/L    Anion Gap 12 3 - 16    Glucose 158 (H) 70 - 99 mg/dL    BUN 16 7 - 20 mg/dL    CREATININE 0.8 0.6 - 1.2 mg/dL    GFR Non-African American >60 >60    GFR African American >60 >60    Calcium 10.0 8.3 - 10.6 mg/dL    Total Protein 7.6 6.4 - 8.2 g/dL    Albumin 4.0 3.4 - 5.0 g/dL    Albumin/Globulin Ratio 1.1 1.1 - 2.2    Total Bilirubin 0.5 0.0 - 1.0 mg/dL    Alkaline Phosphatase 78 40 - 129 U/L    ALT 27 10 - 40 U/L    AST 22 15 - 37 U/L   Lactic Acid   Result Value Ref Range    Lactic Acid 1.4 0.4 - 2.0 mmol/L   Procalcitonin   Result Value Ref Range    Procalcitonin 0.08 0.00 - 0.15 ng/mL   Sedimentation Rate   Result Value Ref Range    Sed Rate 38 (H) 0 - 30 mm/Hr   CK   Result Value Ref Range    Total CK 58 26 - 192 U/L   C-Reactive Protein   Result Value Ref Range    CRP 39.6 (H) 0.0 - 5.1 mg/L   D-Dimer, Quantitative   Result Value Ref Range    D-Dimer, Quant 406 (H) 0 - 229 ng/mL DDU   Urine Drug Screen   Result Value Ref Range    Amphetamine Screen, Urine Neg Negative <1000ng/mL    Barbiturate Screen, Ur Neg Negative <200 ng/mL    Benzodiazepine Screen, Urine Neg Negative <200 ng/mL    Cannabinoid Scrn, Ur Neg Negative <50 ng/mL    Cocaine Metabolite Screen, Urine Neg Negative <300 ng/mL    Opiate Scrn, Ur Neg Negative <300 ng/mL    PCP Screen, Urine Neg Negative <25 ng/mL    Methadone Screen, Urine Neg Negative <300 ng/mL    Propoxyphene Scrn, Ur Neg Negative <300 ng/mL    Oxycodone Urine POSITIVE (A) Negative <100 ng/ml    pH, UA 6.0     Drug Screen Comment: see below    Troponin   Result Value Ref Range    Troponin <0.01 <0.01 ng/mL   Urinalysis   Result Value Ref Range    Color, UA Straw Straw/Yellow    Clarity, UA Clear Clear    Glucose, Ur 100 (A) Negative mg/dL    Bilirubin Urine Negative Negative    Ketones, Urine Negative Negative mg/dL    Specific Gravity, UA <=1.005 1.005 - 1.030    Blood, Urine TRACE-INTACT (A) Negative    pH, UA 6.0 5.0 - 8.0    Protein, UA Negative Negative mg/dL    Urobilinogen, Urine 0.2 <2.0 E.U./dL    Nitrite, Urine Negative Negative    Leukocyte Esterase, Urine Negative Negative    Microscopic Examination YES     Urine Type NotGiven    Microscopic Urinalysis   Result Value Ref Range    WBC, UA None seen 0 - 5 /HPF    RBC, UA 0-2 0 - 4 /HPF    Bacteria, UA 1+ (A) None Seen /HPF   Magnesium   Result Value Ref Range    Magnesium 1.90 1.80 - 2.40 mg/dL         RADIOLOGY:  All x-ray studies areviewed/reviewed by me.   Formal interpretations per the radiologist are as follows:    XR KNEE RIGHT (1-2 VIEWS)    Result Date: 5/7/2022  EXAMINATION: XRAY VIEWS OF THE RIGHT KNEE 5/7/2022 5:14 pm COMPARISON: None. HISTORY: ORDERING SYSTEM PROVIDED HISTORY: redness, warmth TECHNOLOGIST PROVIDED HISTORY: Reason for exam:->redness, warmth FINDINGS: No acute fracture or malalignment. Knee prosthesis intact. Small joint effusion Soft tissues unremarkable. 1. No acute fracture or malalignment. CT Lumbar Spine WO Contrast    Result Date: 5/7/2022  EXAMINATION: CT OF THE LUMBAR SPINE WITHOUT CONTRAST  5/7/2022 TECHNIQUE: CT of the lumbar spine was performed without the administration of intravenous contrast. Multiplanar reformatted images are provided for review. Adjustment of mA and/or kV according to patient size was utilized. Dose modulation, iterative reconstruction, and/or weight based adjustment of the mA/kV was utilized to reduce the radiation dose to as low as reasonably achievable. COMPARISON: CT scan of the abdomen pelvis dated July 20, 2018 HISTORY: ORDERING SYSTEM PROVIDED HISTORY: lower back pain TECHNOLOGIST PROVIDED HISTORY: Reason for exam:->lower back pain Decision Support Exception - unselect if not a suspected or confirmed emergency medical condition->Emergency Medical Condition (MA) Reason for Exam: low back pain with bilateral leg pain/numbness-back strain over 1 month ago-worsening pain FINDINGS: BONES/ALIGNMENT: Grade anterolisthesis is now present at the L4-L5 level, degenerative in nature. New focal areas of lucency are present within the L3, and L5 vertebral bodies, involving the superior and inferior endplate at L3, and superior endplate of L5. Ill-defined areas of sclerosis are present surrounding these areas of lucency. Focal areas of lucency are present involving the facets, at the L3-4, and L4-5 levels, and L5 spinous process. No evidence of an acute compression fracture is present. Multilevel degenerative changes are present. . DEGENERATIVE CHANGES: Vacuum disc phenomena is present at the L2-3, L4-5 and L5-S1 disc levels.  Facet arthropathy is present throughout the lumbar region. A diffuse disc protrusion, facet and ligamentous hypertrophy is present involving the L4-5 disc level, resulting in a moderate degree of acquired central spinal stenosis, bilateral neural foraminal narrowing. SOFT TISSUES/RETROPERITONEUM: No paraspinal mass is seen. Dependent changes are present within the lung bases. 1. Multiple areas of lucency, with surrounding sclerosis involving the lumbar spine, most prominent at the L3 and L5 levels. Changes are new since the prior study. Differential considerations would include multiple Schmorl nodes and discogenic degenerative changes, or potentially infection, or metastatic disease. MR imaging of the lumbar spine recommended to further evaluate these lesions. 2. Diffuse disc protrusion, facet and ligamentous hypertrophy, L4-L5 disc level, resulting in a moderate degree of acquired central spinal stenosis     CT CHEST PULMONARY EMBOLISM W CONTRAST    Result Date: 5/7/2022  EXAMINATION: CTA OF THE CHEST 5/7/2022 7:01 pm TECHNIQUE: CTA of the chest was performed after the administration of intravenous contrast.  Multiplanar reformatted images are provided for review. MIP images are provided for review. Dose modulation, iterative reconstruction, and/or weight based adjustment of the mA/kV was utilized to reduce the radiation dose to as low as reasonably achievable.  COMPARISON: CT chest dated 04/06/2022 HISTORY: ORDERING SYSTEM PROVIDED HISTORY: Tachy, new onset A. fib with RVR, bilateral leg pain, elevated D-dimer TECHNOLOGIST PROVIDED HISTORY: Reason for exam:->Tachy, new onset A. fib with RVR, bilateral leg pain, elevated D-dimer Decision Support Exception - unselect if not a suspected or confirmed emergency medical condition->Emergency Medical Condition (MA) Reason for Exam: chronic sob-new onset  A-cgi-wozkm-bilateral leg swelling Additional signs and symptoms: elev d-dimer Relevant Medical/Surgical History: no surg FINDINGS: Pulmonary Arteries: Pulmonary arteries are adequately opacified for evaluation. No evidence of intraluminal filling defect to suggest pulmonary embolism. Main pulmonary artery is normal in caliber. Mediastinum: No evidence of mediastinal lymphadenopathy. The heart and pericardium demonstrate no acute abnormality however cardiac enlargement four-chamber cardiomegaly without pericardial effusion. There is no acute abnormality of the thoracic aorta. Lungs/pleura: No focal consolidation however smooth interlobular septal thickening with mild mosaicism of an interstitial edema pattern. No evidence of pleural effusion or pneumothorax. Upper Abdomen: Limited images of the upper abdomen reveal nodular contours of the hepatic parenchyma concerning for underlying parenchymal disease process such as cirrhotic morphology without obvious perihepatic ascites Soft Tissues/Bones: No acute bone or soft tissue abnormality. No evidence of pulmonary embolism. Four-chamber cardiomegaly without pericardial effusion however smooth interlobular septal thickening with lower lobe predominance along with mild mosaicism concerning for interstitial pulmonary edema pattern without decompensation evidence as there is no pleural effusion evident. Limited images of the upper abdomen reveal nodular contours of the hepatic parenchyma concerning for underlying parenchymal disease process such as cirrhotic morphology without obvious perihepatic ascites         EKG: The Ekg interpreted by me in the absence of a cardiologist shows. atrial fibrillation with a rate of 128    See also interpretation by Stefany Aguirre MD.      PROCEDURES:   N/A    CRITICAL CARE TIME:       Due to the immediate potential for life-threatening deterioration due to back pain with lower extremity pain/weakness and ultimately concerns for potentially epidural abscess or even cauda equina prompting an urgent MRI, I spent 32 minutes providing critical care.   This time is excluding time spent performing procedures. CONSULTS:  IP CONSULT TO HOSPITALIST      EMERGENCY DEPARTMENT COURSE and DIFFERENTIAL DIAGNOSIS/MDM:   Vitals:    Vitals:    05/07/22 1930 05/07/22 1948 05/07/22 2000 05/07/22 2045   BP: 119/89 (!) 200/98 (!) 188/82 (!) 171/95   Pulse: 125 120 119 102   Resp: 28 18 29 22   Temp:    98.8 °F (37.1 °C)   TempSrc:    Oral   SpO2: 91% 96% 99% 93%   Weight:       Height:           Patient was given the following medications:  Medications   0.9 % sodium chloride bolus (1,000 mLs IntraVENous New Bag 5/7/22 1910)   dilTIAZem injection 10 mg (10 mg IntraVENous Not Given 5/7/22 1854)     Followed by   dilTIAZem 125 mg in dextrose 5 % 125 mL infusion (5 mg/hr IntraVENous Rate/Dose Change 5/7/22 1939)   vancomycin 1500 mg in dextrose 5% 300 mL IVPB (1,500 mg IntraVENous New Bag 5/7/22 1939)   0.9 % sodium chloride bolus (0 mLs IntraVENous Stopped 5/7/22 1905)   HYDROmorphone (DILAUDID) injection 0.5 mg (0.5 mg IntraVENous Given 5/7/22 1659)   ondansetron (ZOFRAN) injection 4 mg (4 mg IntraVENous Given 5/7/22 1659)   dilTIAZem injection 10 mg (10 mg IntraVENous Given 5/7/22 1850)   meropenem (MERREM) 1,000 mg in sodium chloride 0.9 % 100 mL IVPB (mini-bag) (0 mg IntraVENous Stopped 5/7/22 1936)   iopamidol (ISOVUE-370) 76 % injection 75 mL (75 mLs IntraVENous Given 5/7/22 1921)   HYDROmorphone (DILAUDID) injection 0.5 mg (0.5 mg IntraVENous Given 5/7/22 1945)         Patient was evaluated by both myself and Laila Keys MD.   Old records were reviewed. Patient arrives to the ED complaining chiefly of ongoing back pain but now also reports bilateral upper leg pain, difficulty with movement and getting around. On exam she is noted to have erythema and warmth to the right knee and anterior aspect of the right lower leg. In addition to these complaints patient is found to be hypertensive and tachycardic with an EKG ultimately confirming A. fib with RVR.   This is new for the patient. EKG ultimately shows a rate of 128  CBC white count 6.7 with H&H 11.5 and 34.4  CMP glucose 158, otherwise unremarkable  Lactate 1.4  Procalcitonin 0.08  Sed rate 38  CRP 39.6  Troponin negative  CK 58  D-dimer elevated at 406  Urinalysis unremarkable  Urine drug screen positive for oxycodone  X-ray right knee shows no acute fracture or malalignment  CT L-spine shows:   1. Multiple areas of lucency, with surrounding sclerosis involving the lumbar   spine, most prominent at the L3 and L5 levels.  Changes are new since the   prior study.  Differential considerations would include multiple Schmorl   nodes and discogenic degenerative changes, or potentially infection, or   metastatic disease.  MR imaging of the lumbar spine recommended to further   evaluate these lesions. 2. Diffuse disc protrusion, facet and ligamentous hypertrophy, L4-L5 disc   level, resulting in a moderate degree of acquired central spinal stenosis         Based on this MRI of the T and L-spine are ordered. CT chest shows:  No evidence of pulmonary embolism.  Four-chamber cardiomegaly without   pericardial effusion however smooth interlobular septal thickening with lower   lobe predominance along with mild mosaicism concerning for interstitial   pulmonary edema pattern without decompensation evidence as there is no   pleural effusion evident. Limited images of the upper abdomen reveal nodular contours of the hepatic   parenchyma concerning for underlying parenchymal disease process such as   cirrhotic morphology without obvious perihepatic ascites. This patient warrants hospitalization. Again she is in A. fib with RVR which is new for her. She is given a Cardizem bolus and ultimately requires infusion. She is given a normal saline bolus IV while here in the ED that this did not affect heart rate.   She is required 2 doses of Dilaudid, each 0.5 mg IV for pain while in the ED and was started on broad-spectrum antibiotics-vancomycin and Merrem (these chosen based on her drug allergies). This is to cover for the cellulitis noted in her right leg as well as potential infection to the spine which is being further evaluated with MRI at this time. I spoke with Dr. Sybil Lee. We thoroughly discussed the history, physical exam, laboratory and imaging studies, as well as, emergency department course. Based upon that discussion, we've decided to admit Yunior Davies for further observation and evaluation of Varun Bhardwaj's new onset A. fib along with right lower extremity cellulitis and back pain. As I have deemed necessary from their history, physical, and studies, I have considered and evaluated Yunior Davies for the following diagnoses:  SEPSIS, CELLULITIS, SEPTIC ARTHRITIS, ACUTE CORONARY SYNDROME, PERICARDIAL TAMPONADE, CHF, COPD EXACERBATION, PNEUMONIA, PULMONARY EMBOLISM, AORTIC DISSECTION, EPIDURAL ABSCESS, DISCITIS/OSTEOMYELITIS, CAUDA EQUINA. FINAL IMPRESSION:      1. Atrial fibrillation with RVR ([address]    2. Cellulitis of right leg    3. Bilateral leg pain    4.  Acute bilateral low back pain without sciatica          DISPOSITION/PLAN:   DISPOSITION Decision To Admit               (Please note thatportions of this note were completed with a voice recognition program.  Efforts were made to edit the dictations, but occasionally words are mis-transcribed.)    Weston Tomas PA-C (electronicallysigned)              [place], [id number]  05/07/22 [id number]

## 2022-05-08 LAB
A/G RATIO: 1.1 (ref 1.1–2.2)
ALBUMIN SERPL-MCNC: 3.7 G/DL (ref 3.4–5)
ALP BLD-CCNC: 73 U/L (ref 40–129)
ALT SERPL-CCNC: 24 U/L (ref 10–40)
ANION GAP SERPL CALCULATED.3IONS-SCNC: 10 MMOL/L (ref 3–16)
APPEARANCE FLUID: NORMAL
AST SERPL-CCNC: 20 U/L (ref 15–37)
BASOPHILS ABSOLUTE: 0 K/UL (ref 0–0.2)
BASOPHILS RELATIVE PERCENT: 0.4 %
BILIRUB SERPL-MCNC: 0.6 MG/DL (ref 0–1)
BUN BLDV-MCNC: 11 MG/DL (ref 7–20)
CALCIUM SERPL-MCNC: 9.5 MG/DL (ref 8.3–10.6)
CELL COUNT FLUID TYPE: NORMAL
CHLORIDE BLD-SCNC: 96 MMOL/L (ref 99–110)
CLOT EVALUATION: NORMAL
CO2: 30 MMOL/L (ref 21–32)
COLOR FLUID: NORMAL
CREAT SERPL-MCNC: 0.8 MG/DL (ref 0.6–1.2)
EKG ATRIAL RATE: 147 BPM
EKG DIAGNOSIS: NORMAL
EKG Q-T INTERVAL: 306 MS
EKG QRS DURATION: 86 MS
EKG QTC CALCULATION (BAZETT): 446 MS
EKG R AXIS: -58 DEGREES
EKG T AXIS: 91 DEGREES
EKG VENTRICULAR RATE: 128 BPM
EOSINOPHILS ABSOLUTE: 0.1 K/UL (ref 0–0.6)
EOSINOPHILS RELATIVE PERCENT: 1.4 %
GFR AFRICAN AMERICAN: >60
GFR NON-AFRICAN AMERICAN: >60
GLUCOSE BLD-MCNC: 145 MG/DL (ref 70–99)
GLUCOSE BLD-MCNC: 157 MG/DL (ref 70–99)
GLUCOSE BLD-MCNC: 169 MG/DL (ref 70–99)
GLUCOSE BLD-MCNC: 172 MG/DL (ref 70–99)
GLUCOSE BLD-MCNC: 205 MG/DL (ref 70–99)
HCT VFR BLD CALC: 35.4 % (ref 36–48)
HEMOGLOBIN: 11.8 G/DL (ref 12–16)
LYMPHOCYTES ABSOLUTE: 0.7 K/UL (ref 1–5.1)
LYMPHOCYTES RELATIVE PERCENT: 9.2 %
LYMPHOCYTES, BODY FLUID: 17 %
MCH RBC QN AUTO: 29.4 PG (ref 26–34)
MCHC RBC AUTO-ENTMCNC: 33.5 G/DL (ref 31–36)
MCV RBC AUTO: 87.8 FL (ref 80–100)
MONOCYTE, FLUID: 2 %
MONOCYTES ABSOLUTE: 0.4 K/UL (ref 0–1.3)
MONOCYTES RELATIVE PERCENT: 4.9 %
NEUTROPHIL, FLUID: 81 %
NEUTROPHILS ABSOLUTE: 6 K/UL (ref 1.7–7.7)
NEUTROPHILS RELATIVE PERCENT: 84.1 %
NUCLEATED CELLS FLUID: 380 /CUMM
PDW BLD-RTO: 15.7 % (ref 12.4–15.4)
PERFORMED ON: ABNORMAL
PLATELET # BLD: 160 K/UL (ref 135–450)
PMV BLD AUTO: 7.3 FL (ref 5–10.5)
POTASSIUM REFLEX MAGNESIUM: 4 MMOL/L (ref 3.5–5.1)
RBC # BLD: 4.03 M/UL (ref 4–5.2)
RBC FLUID: 8620 /CUMM
SODIUM BLD-SCNC: 136 MMOL/L (ref 136–145)
TOTAL PROTEIN: 7.2 G/DL (ref 6.4–8.2)
TSH REFLEX: 2.81 UIU/ML (ref 0.27–4.2)
URINE CULTURE, ROUTINE: NORMAL
WBC # BLD: 7.2 K/UL (ref 4–11)

## 2022-05-08 PROCEDURE — 6360000002 HC RX W HCPCS: Performed by: REGISTERED NURSE

## 2022-05-08 PROCEDURE — 94660 CPAP INITIATION&MGMT: CPT

## 2022-05-08 PROCEDURE — 2700000000 HC OXYGEN THERAPY PER DAY

## 2022-05-08 PROCEDURE — 93010 ELECTROCARDIOGRAM REPORT: CPT | Performed by: INTERNAL MEDICINE

## 2022-05-08 PROCEDURE — 2060000000 HC ICU INTERMEDIATE R&B

## 2022-05-08 PROCEDURE — 85025 COMPLETE CBC W/AUTO DIFF WBC: CPT

## 2022-05-08 PROCEDURE — 99223 1ST HOSP IP/OBS HIGH 75: CPT | Performed by: INTERNAL MEDICINE

## 2022-05-08 PROCEDURE — 2580000003 HC RX 258: Performed by: REGISTERED NURSE

## 2022-05-08 PROCEDURE — 89060 EXAM SYNOVIAL FLUID CRYSTALS: CPT

## 2022-05-08 PROCEDURE — 6370000000 HC RX 637 (ALT 250 FOR IP): Performed by: INTERNAL MEDICINE

## 2022-05-08 PROCEDURE — 20610 DRAIN/INJ JOINT/BURSA W/O US: CPT | Performed by: ORTHOPAEDIC SURGERY

## 2022-05-08 PROCEDURE — 87205 SMEAR GRAM STAIN: CPT

## 2022-05-08 PROCEDURE — 6360000002 HC RX W HCPCS: Performed by: NURSE PRACTITIONER

## 2022-05-08 PROCEDURE — 89050 BODY FLUID CELL COUNT: CPT

## 2022-05-08 PROCEDURE — 6370000000 HC RX 637 (ALT 250 FOR IP)

## 2022-05-08 PROCEDURE — 99221 1ST HOSP IP/OBS SF/LOW 40: CPT | Performed by: ORTHOPAEDIC SURGERY

## 2022-05-08 PROCEDURE — 84443 ASSAY THYROID STIM HORMONE: CPT

## 2022-05-08 PROCEDURE — 6360000002 HC RX W HCPCS: Performed by: INTERNAL MEDICINE

## 2022-05-08 PROCEDURE — 83036 HEMOGLOBIN GLYCOSYLATED A1C: CPT

## 2022-05-08 PROCEDURE — 36415 COLL VENOUS BLD VENIPUNCTURE: CPT

## 2022-05-08 PROCEDURE — 87070 CULTURE OTHR SPECIMN AEROBIC: CPT

## 2022-05-08 PROCEDURE — 6370000000 HC RX 637 (ALT 250 FOR IP): Performed by: REGISTERED NURSE

## 2022-05-08 PROCEDURE — 2580000003 HC RX 258: Performed by: INTERNAL MEDICINE

## 2022-05-08 PROCEDURE — 80053 COMPREHEN METABOLIC PANEL: CPT

## 2022-05-08 PROCEDURE — 94761 N-INVAS EAR/PLS OXIMETRY MLT: CPT

## 2022-05-08 PROCEDURE — 94640 AIRWAY INHALATION TREATMENT: CPT

## 2022-05-08 RX ORDER — INSULIN LISPRO 100 [IU]/ML
0-12 INJECTION, SOLUTION INTRAVENOUS; SUBCUTANEOUS
Status: DISCONTINUED | OUTPATIENT
Start: 2022-05-08 | End: 2022-05-10 | Stop reason: HOSPADM

## 2022-05-08 RX ORDER — METOPROLOL SUCCINATE 25 MG/1
25 TABLET, EXTENDED RELEASE ORAL 2 TIMES DAILY
Status: DISCONTINUED | OUTPATIENT
Start: 2022-05-08 | End: 2022-05-10 | Stop reason: HOSPADM

## 2022-05-08 RX ORDER — DILTIAZEM HYDROCHLORIDE 180 MG/1
360 CAPSULE, COATED, EXTENDED RELEASE ORAL DAILY
Status: DISCONTINUED | OUTPATIENT
Start: 2022-05-09 | End: 2022-05-10 | Stop reason: HOSPADM

## 2022-05-08 RX ORDER — HYDROMORPHONE HCL 110MG/55ML
0.5 PATIENT CONTROLLED ANALGESIA SYRINGE INTRAVENOUS ONCE
Status: COMPLETED | OUTPATIENT
Start: 2022-05-08 | End: 2022-05-08

## 2022-05-08 RX ORDER — LIDOCAINE 4 G/G
1 PATCH TOPICAL DAILY
Status: DISCONTINUED | OUTPATIENT
Start: 2022-05-08 | End: 2022-05-10 | Stop reason: HOSPADM

## 2022-05-08 RX ORDER — INSULIN LISPRO 100 [IU]/ML
0-6 INJECTION, SOLUTION INTRAVENOUS; SUBCUTANEOUS NIGHTLY
Status: DISCONTINUED | OUTPATIENT
Start: 2022-05-08 | End: 2022-05-10 | Stop reason: HOSPADM

## 2022-05-08 RX ORDER — DILTIAZEM HYDROCHLORIDE 120 MG/1
120 CAPSULE, COATED, EXTENDED RELEASE ORAL ONCE
Status: COMPLETED | OUTPATIENT
Start: 2022-05-08 | End: 2022-05-08

## 2022-05-08 RX ORDER — OXYCODONE HYDROCHLORIDE 5 MG/1
5 TABLET ORAL EVERY 4 HOURS PRN
Status: DISCONTINUED | OUTPATIENT
Start: 2022-05-08 | End: 2022-05-10 | Stop reason: HOSPADM

## 2022-05-08 RX ORDER — HYDROMORPHONE HCL 110MG/55ML
0.5 PATIENT CONTROLLED ANALGESIA SYRINGE INTRAVENOUS EVERY 4 HOURS PRN
Status: COMPLETED | OUTPATIENT
Start: 2022-05-08 | End: 2022-05-09

## 2022-05-08 RX ORDER — LABETALOL HYDROCHLORIDE 5 MG/ML
10 INJECTION, SOLUTION INTRAVENOUS EVERY 4 HOURS PRN
Status: DISCONTINUED | OUTPATIENT
Start: 2022-05-08 | End: 2022-05-10 | Stop reason: HOSPADM

## 2022-05-08 RX ADMIN — TRAZODONE HYDROCHLORIDE 50 MG: 50 TABLET ORAL at 00:11

## 2022-05-08 RX ADMIN — VENLAFAXINE 37.5 MG: 37.5 TABLET ORAL at 10:50

## 2022-05-08 RX ADMIN — Medication: at 22:25

## 2022-05-08 RX ADMIN — GABAPENTIN 100 MG: 100 CAPSULE ORAL at 10:50

## 2022-05-08 RX ADMIN — FAMOTIDINE 20 MG: 20 TABLET, FILM COATED ORAL at 10:49

## 2022-05-08 RX ADMIN — DILTIAZEM HYDROCHLORIDE 240 MG: 120 CAPSULE, COATED, EXTENDED RELEASE ORAL at 10:49

## 2022-05-08 RX ADMIN — OXYCODONE 5 MG: 5 TABLET ORAL at 18:39

## 2022-05-08 RX ADMIN — ENOXAPARIN SODIUM 40 MG: 100 INJECTION SUBCUTANEOUS at 10:50

## 2022-05-08 RX ADMIN — FAMOTIDINE 20 MG: 20 TABLET, FILM COATED ORAL at 21:26

## 2022-05-08 RX ADMIN — PANTOPRAZOLE SODIUM 40 MG: 40 TABLET, DELAYED RELEASE ORAL at 10:49

## 2022-05-08 RX ADMIN — GABAPENTIN 600 MG: 300 CAPSULE ORAL at 00:12

## 2022-05-08 RX ADMIN — INSULIN LISPRO 2 UNITS: 100 INJECTION, SOLUTION INTRAVENOUS; SUBCUTANEOUS at 17:19

## 2022-05-08 RX ADMIN — GABAPENTIN 100 MG: 100 CAPSULE ORAL at 13:48

## 2022-05-08 RX ADMIN — MONTELUKAST SODIUM 10 MG: 10 TABLET ORAL at 10:50

## 2022-05-08 RX ADMIN — LEVOTHYROXINE SODIUM 100 MCG: 0.03 TABLET ORAL at 06:35

## 2022-05-08 RX ADMIN — ATORVASTATIN CALCIUM 20 MG: 10 TABLET, FILM COATED ORAL at 00:12

## 2022-05-08 RX ADMIN — DILTIAZEM HYDROCHLORIDE 120 MG: 120 CAPSULE, COATED, EXTENDED RELEASE ORAL at 13:48

## 2022-05-08 RX ADMIN — HYDROMORPHONE HYDROCHLORIDE 0.5 MG: 2 INJECTION, SOLUTION INTRAMUSCULAR; INTRAVENOUS; SUBCUTANEOUS at 00:33

## 2022-05-08 RX ADMIN — ASPIRIN 81 MG: 81 TABLET, COATED ORAL at 10:49

## 2022-05-08 RX ADMIN — VANCOMYCIN HYDROCHLORIDE 1500 MG: 10 INJECTION, POWDER, LYOPHILIZED, FOR SOLUTION INTRAVENOUS at 17:30

## 2022-05-08 RX ADMIN — ROPINIROLE HYDROCHLORIDE 1 MG: 0.5 TABLET, FILM COATED ORAL at 00:11

## 2022-05-08 RX ADMIN — SODIUM CHLORIDE: 9 INJECTION, SOLUTION INTRAVENOUS at 21:43

## 2022-05-08 RX ADMIN — INSULIN LISPRO 2 UNITS: 100 INJECTION, SOLUTION INTRAVENOUS; SUBCUTANEOUS at 00:13

## 2022-05-08 RX ADMIN — FUROSEMIDE 40 MG: 10 INJECTION, SOLUTION INTRAMUSCULAR; INTRAVENOUS at 00:12

## 2022-05-08 RX ADMIN — OXYCODONE 5 MG: 5 TABLET ORAL at 22:44

## 2022-05-08 RX ADMIN — ROPINIROLE HYDROCHLORIDE 1 MG: 0.5 TABLET, FILM COATED ORAL at 21:25

## 2022-05-08 RX ADMIN — METOPROLOL SUCCINATE 25 MG: 25 TABLET, EXTENDED RELEASE ORAL at 13:48

## 2022-05-08 RX ADMIN — FUROSEMIDE 40 MG: 10 INJECTION, SOLUTION INTRAMUSCULAR; INTRAVENOUS at 17:19

## 2022-05-08 RX ADMIN — Medication 10 ML: at 21:25

## 2022-05-08 RX ADMIN — MEROPENEM 1000 MG: 1 INJECTION, POWDER, FOR SOLUTION INTRAVENOUS at 13:55

## 2022-05-08 RX ADMIN — PANTOPRAZOLE SODIUM 40 MG: 40 TABLET, DELAYED RELEASE ORAL at 21:26

## 2022-05-08 RX ADMIN — HYDROMORPHONE HYDROCHLORIDE 0.5 MG: 2 INJECTION, SOLUTION INTRAMUSCULAR; INTRAVENOUS; SUBCUTANEOUS at 21:21

## 2022-05-08 RX ADMIN — Medication 4 PUFF: at 07:37

## 2022-05-08 RX ADMIN — INSULIN LISPRO 2 UNITS: 100 INJECTION, SOLUTION INTRAVENOUS; SUBCUTANEOUS at 21:27

## 2022-05-08 RX ADMIN — Medication 4 PUFF: at 19:24

## 2022-05-08 RX ADMIN — BENZONATATE 200 MG: 100 CAPSULE ORAL at 10:49

## 2022-05-08 RX ADMIN — METOPROLOL SUCCINATE 25 MG: 25 TABLET, EXTENDED RELEASE ORAL at 21:26

## 2022-05-08 RX ADMIN — GABAPENTIN 600 MG: 300 CAPSULE ORAL at 21:26

## 2022-05-08 RX ADMIN — OXYCODONE 5 MG: 5 TABLET ORAL at 13:48

## 2022-05-08 RX ADMIN — LOSARTAN POTASSIUM 100 MG: 25 TABLET, FILM COATED ORAL at 10:49

## 2022-05-08 RX ADMIN — CETIRIZINE HYDROCHLORIDE 10 MG: 10 TABLET, FILM COATED ORAL at 10:50

## 2022-05-08 RX ADMIN — FUROSEMIDE 40 MG: 10 INJECTION, SOLUTION INTRAMUSCULAR; INTRAVENOUS at 10:50

## 2022-05-08 RX ADMIN — Medication 10 ML: at 00:13

## 2022-05-08 RX ADMIN — PANTOPRAZOLE SODIUM 40 MG: 40 TABLET, DELAYED RELEASE ORAL at 00:12

## 2022-05-08 RX ADMIN — MEROPENEM 1000 MG: 1 INJECTION, POWDER, FOR SOLUTION INTRAVENOUS at 21:44

## 2022-05-08 RX ADMIN — FAMOTIDINE 20 MG: 20 TABLET, FILM COATED ORAL at 00:12

## 2022-05-08 RX ADMIN — DOXEPIN HYDROCHLORIDE 10 MG: 10 CAPSULE ORAL at 21:25

## 2022-05-08 RX ADMIN — ATORVASTATIN CALCIUM 20 MG: 10 TABLET, FILM COATED ORAL at 21:25

## 2022-05-08 ASSESSMENT — PAIN SCALES - GENERAL
PAINLEVEL_OUTOF10: 7
PAINLEVEL_OUTOF10: 8
PAINLEVEL_OUTOF10: 10
PAINLEVEL_OUTOF10: 0
PAINLEVEL_OUTOF10: 0
PAINLEVEL_OUTOF10: 5

## 2022-05-08 ASSESSMENT — PAIN DESCRIPTION - LOCATION: LOCATION: BACK

## 2022-05-08 NOTE — FLOWSHEET NOTE
Pt talking about her children who said they do not want to talk to her anymore. Tearful. Sharing her grief of her , support from [place]. Pt said family and prayer have been sources of hope for her. Offered prayer for pt as requested at the end of visit.

## 2022-05-08 NOTE — ED NOTES
Patient enroute to A2. MRI called to state ready for patient. Patient re-routed to MRI. Tiffany on A2 updated.       Wallace Koroma RN  05/07/22 [id number]

## 2022-05-08 NOTE — PROGRESS NOTES
Dr. Adrian Llanes with Ortho Surgery added to treatment team. Electronically signed by Kapil Lackey RN on 5/8/22 at 8:53 AM EDT  Perfect serve sent to America Klein MD regarding new consult.

## 2022-05-08 NOTE — PROGRESS NOTES
05/08/22 0053   NIV Type   $NIV $Daily Charge   Skin Protection for O2 Device N/A   NIV Started/Stopped On   Equipment Type v60   Mode CPAP   Mask Type Nasal mask   Settings/Measurements   PIP Observed 9 cm H20  (ramp applied to airway )   CPAP/EPAP 11 cmH2O   Resp 22   FiO2  21 %   Vt Exhaled 518 mL   Minute Volume 11.5 Liters   Mask Leak (lpm) 19 lpm   Comfort Level Good   Using Accessory Muscles No   SpO2 98   Patient's Home Machine No   Patient Observation   Observations pt tolerating, states wife may bring in home unit for pt tomorrow    Alarm Settings   Alarms On Y   Low Pressure 6 cmH2O   High Pressure  30 cmH2O   Delay Alarm 20 sec(s)   RR Low  6   RR High 40 br/min

## 2022-05-08 NOTE — CONSULTS
Pharmacy Note  Vancomycin Consult    Diaomnd Blanco is a 77 y.o. female started on Vancomycin for Bone and Joint infection , consult received from Ms. Alejandra Escobar to manage therapy. Also receiving the following antibiotics:     - Merrem    Allergies:  Latex, Clindamycin, Pennsaid [diclofenac sodium], Torsemide, Actos [pioglitazone], Amoxicillin, Augmentin [amoxicillin-pot clavulanate], Bactrim [sulfamethoxazole-trimethoprim], Ciprofloxacin, Doxycycline, Levaquin [levofloxacin], Ativan [lorazepam], Cephalosporins, Pcn [penicillins], and Proventil [albuterol]     Tmax: 98.2    Recent Labs     05/07/22  1652 05/08/22  0707   CREATININE 0.8 0.8       Recent Labs     05/07/22  1652 05/08/22  0707   WBC 6.7 7.2       Estimated Creatinine Clearance: 72 mL/min (based on SCr of 0.8 mg/dL). Intake/Output Summary (Last 24 hours) at 5/8/2022 1720  Last data filed at 5/8/2022 1639  Gross per 24 hour   Intake 480 ml   Output 2300 ml   Net -1820 ml       Wt Readings from Last 1 Encounters:   05/08/22 219 lb (99.3 kg)         Body mass index is 35.35 kg/m². Culture Date      Source                       Results  5/7/22               Blood                       N/A    Loading dose (critically ill or in ICU, require dialysis or renal replacement therapy): Vancomycin 25 mg/kg IVPB x 1 (maximum 3000 mg). Maintenance dose: 15 mg/kg (maximum: 2000 mg/dose and 4500 mg/day) starting at the next dosing interval determined by renal function  Pulse dose: fluctuating renal function, YELENA, ESRD   Goal Vancomycin trough: 10-15 mcg/mL or 15-20 mcg/mL   Goal Vancomycin AUC: 400-600     Assessment/Plan:    Loading dose 1500 mg at 19:39 05/07/2022. Regimen 1500 mg IV every 18 hours.   Start time 18:00 on 05/08/2022  Predictions  AUC24,ss 521 mg/L.hr  Probability of AUC24 > 400 78 %  Ctrough,ss 15.8 mg/L  Probability of Ctrough,ss > 20 29 %  Probability of nephrotoxicity (Lodise KRISTY 2009) 11 %  - Will check level on 5/10 at 5am.  Thank you for the consult.

## 2022-05-08 NOTE — PROGRESS NOTES
Physical/Occupational Therapy    PT/OT attempted to evaluate this patient. However the patient's MRI of her lumbar spine revealed discitis/osteomyelitis at L4/L5. Neurosurgery was consulted. [facility] neurosurgeon Jyoti Rodriguez MD recommended that patient be transferred to [facility].. PT/OT will hold until neurosurgery provides updated mobility recommendations/restrictions for this patient. Thank you.      Kobi Porter PT  Allison Quinn OT

## 2022-05-08 NOTE — CONSULTS
CC: Bilateral knee pain    HPI: The patient is a 76 y/o diabetic woman, patient of Dr. Estee Grider, with a history of bilateral TKAs complaining of 3 days of atraumatic increased right knee pain and swelling. She denies recent fevers or chills. She reports chronic left knee pain which has not increased recently and she notes no increased swelling in her left knee. She presented to the ED with hypertension, tachycardia, and was also diagnosed with discitis/osteomyelitis. She was admitted to the cardiology service. Past Medical History:   has a past medical history of Allergic, Anemia, Anesthesia complication, Arthritis, Asthma, Cirrhosis ([address] Cirrhosis, non-alcoholic ([address] COPD (chronic obstructive pulmonary disease) ([address] GERD (gastroesophageal reflux disease), GIB (gastrointestinal bleeding), Haemophilus infection, Heart murmur, Hernia, Hyperlipidemia, Hypertension, Lung collapse, Murmur, heart, Pneumonia, Reflux, Rheumatic fever, Stress fracture, SVT (supraventricular tachycardia) ([address] TIA (transient ischemic attack), Type II or unspecified type diabetes mellitus without mention of complication, not stated as uncontrolled, 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     Surgical History:   has a past surgical history that includes hernia repair; Hysterectomy; joint replacement (Bilateral); Foot surgery (Right, 06/03/2013); knee surgery (09); knee surgery (2010); Cardiac catheterization (07/14/14); Cataract removal; bone marrow biopsy; Breast biopsy; fine needle aspiration; Colonoscopy; Upper gastrointestinal endoscopy (N/A, 7/21/2018); bronchoscopy (N/A, 5/7/2021); bronchoscopy (5/7/2021); bronchoscopy (5/7/2021); Colonoscopy (N/A, 6/10/2021); Upper gastrointestinal endoscopy (N/A, 6/10/2021); and Upper gastrointestinal endoscopy (N/A, 6/10/2021).    Social History:   reports that she quit smoking about 21 years ago.  Her smoking use included cigarettes. She started smoking about 66 years ago. She has a 40.00 pack-year smoking history. She has never used smokeless tobacco. She reports that she does not drink alcohol and does not use drugs.      Family History:  family history includes Colon Cancer in her mother; Diabetes in her father; Kidney Disease in her paternal grandmother; Lupus in her daughter and another family member; Other in her mother.        Home Medications:  Were reviewed and are listed in nursing record and/or below  Home Medications[]Expand by Default           Prior to Admission medications    Medication Sig Start Date End Date Taking? Authorizing Provider   lidocaine (LIDODERM) 5 % Place 1 patch onto the skin daily for 10 days 12 hours on, 12 hours off. 4/29/22 5/9/22   Xavi Garcia MD   montelukast (SINGULAIR) 10 MG tablet Take 1 tablet by mouth daily 4/19/22     Cristine Holliday MD   montelukast (SINGULAIR) 10 MG tablet Take 1 tablet by mouth daily for 15 days 4/19/22 5/4/22   Cristine Holliday MD   cyanocobalamin 1000 MCG/ML injection Inject 1 mL into the muscle every 14 days Last dose was taken on 4/9/17 3/31/22     Swathi Charles., DO   naproxen (NAPROSYN) 500 MG tablet Take 1 tablet by mouth 2 times daily (with meals)  Patient not taking: Reported on 5/7/2022 3/31/22     Swathi Lema, DO   cetirizine (ZYRTEC) 10 MG tablet TAKE ONE TABLET BY MOUTH DAILY 3/21/22     Trice Neves MD   doxepin [facility]) 50 MG capsule Take 1 capsule by mouth nightly for 10 days 3/6/22 3/16/22   Swathi Charles., DO   levothyroxine (SYNTHROID) 100 MCG tablet Take 1 tablet by mouth Daily 3/3/22     Alex Charles., DO   losartan (COZAAR) 100 MG tablet TAKE 1 TABLET DAILY 3/2/22     Trice Neves MD   pantoprazole (PROTONIX) 40 MG tablet TAKE 1 TABLET TWICE A DAY 3/2/22     Trice Neves MD   famotidine (PEPCID) 20 MG tablet TAKE 1 TABLET TWICE A DAY 3/2/22     Trice Neves MD   dilTIAZem [facility]) 240 MG extended release capsule Take 1 capsule by mouth daily 3/2/22     Sarah Newby MD   SITagliptin [facility]) 100 MG tablet Take 1 tablet by mouth daily 2/25/22     Francis Hernandez DO   nystatin (MYCOSTATIN) 852608 UNIT/ML suspension Take by mouth 4 times daily 2/14/22     Felipa Hodges MD   metFORMIN (GLUCOPHAGE-XR) 500 MG extended release tablet TAKE 2 TABLETS TWICE A DAY 1/3/22     Luis A Pastor MD   traZODone (DESYREL) 50 MG tablet TAKE 2 TABLETS NIGHTLY 12/3/21     Sarah Newby MD   KLOR-CON M20 20 MEQ extended release tablet TAKE 1 TABLET DAILY 11/4/21     Sarah Newby MD   benzonatate (TESSALON) 100 MG capsule TAKE 1 CAPSULE THREE TIMES A DAY AS NEEDED FOR COUGH 10/18/21     Africa Hardin MD   Cholecalciferol (VITAMIN D3) 50 MCG (2000 UT) CAPS Take by mouth       Historical Provider, MD   venlafaxine (EFFEXOR) 37.5 MG tablet TAKE 1 TABLET DAILY 9/16/21     Sarah Newby MD   atorvastatin (LIPITOR) 20 MG tablet TAKE 1 TABLET NIGHTLY 9/9/21     Sarah Newby MD   furosemide (LASIX) 20 MG tablet TAKE 1 TABLET DAILY 7/19/21     Sarah Newby MD   beclomethasone (QVAR) 80 MCG/ACT inhaler Inhale 2 puffs into the lungs 2 times daily 5/20/21     Erika Becerra MD   levalbuterol [facility] HFA) 45 MCG/ACT inhaler Inhale 2 puffs into the lungs every 6 hours as needed for Shortness of Breath 5/20/21     Erika Becerra MD   gabapentin (NEURONTIN) 600 MG tablet Take 1 tablet by mouth nightly for 30 days.  600 mg at bedtime and 300 mg BID 5/12/21 2/16/22   Jesus Pizano MD   ferrous sulfate 325 (65 Fe) MG tablet Take 325 mg by mouth 3 times daily (with meals)       Historical Provider, MD   diphenhydrAMINE (BENADRYL) 25 MG tablet Take 50 mg by mouth nightly        Historical Provider, MD   aspirin 81 MG EC tablet Take 81 mg by mouth daily        Historical Provider, MD   rOPINIRole (REQUIP) 1 MG tablet Take 2 mg by mouth nightly 1mg at 1730, 1mg at 2030, 2mg at 2200 (total of 4mg nightly in divided doses)     Patient takes 1 mg at 8 am. 1 mg at 12 pm. 1 mg at 5 pm. 2 mg at 10 pm. 3/9/17     Historical Provider, MD   ursodiol (ACTIGALL) 500 MG tablet Take 500 mg by mouth 2 times daily        Historical Provider, MD   calcium carbonate 600 MG TABS tablet Take 1 tablet by mouth daily        Historical Provider, MD   Multiple Vitamins-Minerals (CENTRUM PO) Take 1 tablet by mouth daily.       Historical Provider, MD   montelukast (SINGULAIR) 10 MG tablet Take 10 mg by mouth daily        Historical Provider, MD   estrogens, conjugated, (PREMARIN) 1.25 MG tablet Take 1.25 mg by mouth daily.       Historical Provider, MD            CURRENT Medications:    Current Meds Link used for Sign Out Report   labetalol (NORMODYNE;TRANDATE) injection 10 mg, Q4H PRN  meropenem (MERREM) 1,000 mg in sodium chloride 0.9 % 100 mL IVPB (mini-bag), Q8H  vancomycin 1500 mg in dextrose 5% 300 mL IVPB, Q24H  dilTIAZem injection 10 mg, Once   Followed by  dilTIAZem 125 mg in dextrose 5 % 125 mL infusion, Continuous  metoprolol (LOPRESSOR) injection 5 mg, Q1H PRN  sodium chloride flush 0.9 % injection 10 mL, 2 times per day  sodium chloride flush 0.9 % injection 10 mL, PRN  0.9 % sodium chloride infusion, PRN  enoxaparin (LOVENOX) injection 40 mg, Daily  promethazine (PHENERGAN) tablet 12.5 mg, Q6H PRN   Or  ondansetron (ZOFRAN) injection 4 mg, Q6H PRN  acetaminophen (TYLENOL) tablet 650 mg, Q6H PRN   Or  acetaminophen (TYLENOL) suppository 650 mg, Q6H PRN  aspirin EC tablet 81 mg, Daily  atorvastatin (LIPITOR) tablet 20 mg, Nightly  fluticasone (FLOVENT HFA) 110 MCG/ACT inhaler 4 puff, BID  benzonatate (TESSALON) capsule 200 mg, TID PRN  cetirizine (ZYRTEC) tablet 10 mg, Daily  dilTIAZem (CARDIZEM CD) extended release capsule 240 mg, Daily  famotidine (PEPCID) tablet 20 mg, BID  levalbuterol (XOPENEX HFA) inhaler 2 puff, Q6H PRN  levothyroxine (SYNTHROID) tablet 100 mcg, Daily  losartan (COZAAR) tablet 100 mg, Daily  montelukast (SINGULAIR) tablet 10 mg, Daily  pantoprazole (PROTONIX) tablet 40 mg, BID  rOPINIRole (REQUIP) tablet 1 mg, Nightly  venlafaxine (EFFEXOR) tablet 37.5 mg, Daily  traZODone (DESYREL) tablet 50 mg, Nightly PRN  furosemide (LASIX) injection 40 mg, BID  gabapentin (NEURONTIN) capsule 600 mg, Nightly  gabapentin (NEURONTIN) capsule 100 mg, TID  insulin lispro (HUMALOG) injection vial 0-6 Units, Q4H  glucose (GLUTOSE) 40 % oral gel 15 g, PRN  glucagon (rDNA) injection 1 mg, PRN  dextrose 5 % solution, PRN  doxepin (SINEQUAN) capsule 10 mg, Nightly  dextrose bolus 10% 125 mL, PRN   Or  dextrose bolus 10% 250 mL, PRN            Allergies:  Latex, Clindamycin, Pennsaid [diclofenac sodium], Torsemide, Actos [pioglitazone], Amoxicillin, Augmentin [amoxicillin-pot clavulanate], Bactrim [sulfamethoxazole-trimethoprim], Ciprofloxacin, Doxycycline, Levaquin [levofloxacin], Ativan [lorazepam], Cephalosporins, Pcn [penicillins], and Proventil [albuterol]      ROS: otherwise negative    PE: well appearing woman, no acute distress, pleasant and conversational    Right knee: well healed surgical scar, large effusion, no warmth or erythema, one call area of erythema distally over the tibia anteriorly. Her knee can be ranged 0-110 degrees without pain. Stable ligamentous exam.    Left knee: well healed surgical scar, no effusion, no warmth or erythema. Her knee can be ranged 0-110 degrees without pain. Stable ligamentous exam.    Radiology:    Right knee x-rays are without acute fracture or dislocation.  Stable total knee arthroplasty without evidence of loosening.       Labs   CBC:         Lab Results   Component Value Date     WBC 7.2 05/08/2022     RBC 4.03 05/08/2022     RBC 4.28 02/18/2016     HGB 11.8 05/08/2022     HCT 35.4 05/08/2022     MCV 87.8 05/08/2022     RDW 15.7 05/08/2022      05/08/2022      CMP:        Lab Results   Component Value Date      05/08/2022     K 4.0 05/08/2022     CL 96 05/08/2022     CO2 30 05/08/2022     BUN 11 05/08/2022     CREATININE 0.8 05/08/2022     GFRAA >60 05/08/2022     GFRAA >60 01/07/2010     AGRATIO 1.1 05/08/2022     LABGLOM >60 05/08/2022     GLUCOSE 172 05/08/2022     PROT 7.2 05/08/2022     CALCIUM 9.5 05/08/2022     BILITOT 0.6 05/08/2022     ALKPHOS 73 05/08/2022     AST 20 05/08/2022     ALT 24 05/08/2022      PT/INR:  No results found for: PTINR  HgBA1c:        Lab Results   Component Value Date     LABA1C 6.4 09/18/2021          A/P 76 y/o diabetic woman with increased right knee pain and swelling in the setting of discitis/osteomyelitis. Antibiotic treatment for her discitis/osteomyelitis, Meropenem/Vancomycin has been initiated. Urine and blood cultures are pending. [facility]    I am certainly concerned for the possibility of an infected right total knee arthroplasty. She does not appear acutely septic necessitating an urgent washout of her knee. Pending her transfer status, I do recommend an aspiration of her right knee to evaluate for the presence of infection. I will relay her information to Dr. Love Trinh and continue to follow. Please call with questions.

## 2022-05-08 NOTE — PROCEDURES
Alexx Galloway is a 77 y.o. female patient. 1. Atrial fibrillation with RVR ([facility]    2. Cellulitis of right leg    3. Bilateral leg pain    4. Acute bilateral low back pain without sciatica      Past Medical History:   Diagnosis Date    Allergic     Anemia     Anesthesia complication     \"woke up during surgery\"    Arthritis     Osteoarthritis of bilateral CMC joints    Asthma     Followed by Dr. Fabienne Baldwin Cirrhosis [facility])     non alcoholic    Cirrhosis, non-alcoholic ([facility]     COPD (chronic obstructive pulmonary disease) ([facility]     GERD (gastroesophageal reflux disease)     GIB (gastrointestinal bleeding)     LGIB 4/2016    Haemophilus infection 05/07/2021    + resp    Heart murmur     Hernia     Hyperlipidemia     Hypertension     Lung collapse     Murmur, heart     Pneumonia     Reflux     Rheumatic fever     Stress fracture     right foot    SVT (supraventricular tachycardia) ([facility]     6/19/16 - admitted X 1 day per PT     TIA (transient ischemic attack) 2015    Type II or unspecified type diabetes mellitus without mention of complication, not stated as uncontrolled     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 [facility]     Wears glasses     as needed    Wears partial dentures     upper partial     Blood pressure 130/76, pulse 103, temperature 98.5 °F (36.9 °C), temperature source Oral, resp. rate 18, height 5' 6\" (1.676 m), weight 219 lb (99.3 kg), SpO2 94 %. Arthrocentesis    Date/Time: 5/8/2022 4:21 PM  Performed by: Danie Ledesma MD  Authorized by: Danie Ledesma MD   Consent: Verbal consent obtained.   Risks and benefits: risks, benefits and alternatives were discussed  Consent given by: patient  Patient understanding: patient states understanding of the procedure being performed  Patient consent: the patient's understanding of the procedure matches consent given  Site marked: the operative site was marked  Imaging studies: imaging studies available  Patient identity confirmed: verbally with patient and arm band  Indications: joint swelling, pain, possible septic joint and diagnostic evaluation   Body area: knee  Joint: right knee  Local anesthesia used: no    Anesthesia:  Local anesthesia used: no    Sedation:  Patient sedated: no    Preparation: Patient was prepped and draped in the usual sterile fashion.   Needle size: 18 G  Ultrasound guidance: no  Approach: lateral  Aspirate: bloody  Aspirate amount: 70 mL  Patient tolerance: patient tolerated the procedure well with no immediate complications          Enrique Aguero MD  5/8/2022

## 2022-05-08 NOTE — PROGRESS NOTES
Patient's EF (Ejection Fraction) is greater than 40%    Heart Failure Medications:   Diuretics[de-identified] Furosemide     (One of the following REQUIRED for EF </= 40%/SYSTOLIC FAILURE but MAY be used in EF% >40%/DIASTOLIC FAILURE)        ACE[de-identified] None        ARB[de-identified] Losartan         ARNI[de-identified] None    (Beta Blockers)   NON- Evidenced Based Beta Blocker (for EF% >40%/DIASTOLIC FAILURE): None     Evidenced Based Beta Blocker::(REQUIRED for EF% <40%/SYSTOLIC FAILURE) Metoprolol SUCCinate- Toprol XL  . .................................................................................................................................................. Patient's weights and intake/output reviewed: Yes    Patient's Last Weight: 219 lbs obtained by Lift Scale. - admission weight       Intake/Output Summary (Last 24 hours) at 5/8/2022 1949  Last data filed at 5/8/2022 8332  Gross per 24 hour   Intake 720 ml   Output 2700 ml   Net -1980 ml       Comorbidities Reviewed Yes    Patient has a past medical history of Allergic, Anemia, Anesthesia complication, Arthritis, Asthma, Cirrhosis ([address] Cirrhosis, non-alcoholic ([address] COPD (chronic obstructive pulmonary disease) ([address] GERD (gastroesophageal reflux disease), GIB (gastrointestinal bleeding), Haemophilus infection, Heart murmur, Hernia, Hyperlipidemia, Hypertension, Lung collapse, Murmur, heart, Pneumonia, Reflux, Rheumatic fever, Stress fracture, SVT (supraventricular tachycardia) ([address] TIA (transient ischemic attack), Type II or unspecified type diabetes mellitus without mention of complication, not stated as uncontrolled, 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      >>For CHF and Comorbidity documentation on Education Time and Topics, please see Education Tab    Progressive Mobility Assessment:  What is this patient's Current Level of Mobility?: Requires Bed Rest  How was this patient Mobilized today?: Unable to Mobilize, ambulated 0 ft                 With Whom? Nurse                 Level of Difficulty/Assistance: 2x Assist     Pt resting in bed at this time on 3.5 L O2. Pt denies shortness of breath. Pt with pitting lower extremity edema.      Patient and/or Family's stated Goal of Care this Admission: increase activity tolerance, better understand heart failure and disease management, be more comfortable and reduce lower extremity edema prior to discharge        :

## 2022-05-08 NOTE — PROGRESS NOTES
Patient admitted to room 202 from ED. Patient oriented to room, call light, bed rails, phone, lights and bathroom. Patient instructed about the schedule of the day including: vital sign frequency, lab draws, possible tests, frequency of MD and staff rounds, including RN/MD rounding together at bedside, daily weights, and I &O's. Patient instructed about prescribed diet, how to use call light, and television. Bed alarm in place, patient aware of placement and reason. Telemetry box 100 in place, patient aware of placement and reason. Bed locked, in lowest position, side rails up 2/4, call light within reach. Will continue to monitor.

## 2022-05-08 NOTE — ED NOTES
2055- MRI Tech Meli Bedolla called stating she is ready for Patient to be brought down.      Anay Grant  05/07/22 2059

## 2022-05-08 NOTE — PROGRESS NOTES
Hospitalist Progress Note      PCP: Cathy Covarrubias. Jeannine Rust DO    Date of Admission: 5/7/2022    Chief Complaint: Leg pain     Hospital Course:   77 y.o. female with PMH of SNOW cirrhosis, HLD, HTN, SVT, DM2, rheumatic fever presented to [facility] ED with chief complaint of leg pain. Pt reported that 2 months ago she was cleaning and instead of bending down pt squatted down and then noticed a pop and started having some knee pain. The knee pain continued to progress and worsen on the left knee with edema/swelling and pain. Exacerbated on exertion, no alleviating factors. Pt reported that the pain would be up to 9/10. Pt noted to be in Afib with RVR in the ED. Subjective:   Pt is on 3.5 LPM. Afebrile. VSS. Pt denies dyspnea at rest. No chest pain. No N/V/D. No numbness/tingling. No bowel/bladder incontinence. +back pain and right knee pain. Very tearful/emotional this morning.        Medications:  Reviewed    Infusion Medications    dilTIAZem 5 mg/hr (05/07/22 1939)    sodium chloride      dextrose       Scheduled Medications    dilTIAZem  10 mg IntraVENous Once    sodium chloride flush  10 mL IntraVENous 2 times per day    enoxaparin  40 mg SubCUTAneous Daily    aspirin  81 mg Oral Daily    atorvastatin  20 mg Oral Nightly    fluticasone  4 puff Inhalation BID    cetirizine  10 mg Oral Daily    dilTIAZem  240 mg Oral Daily    famotidine  20 mg Oral BID    levothyroxine  100 mcg Oral Daily    losartan  100 mg Oral Daily    montelukast  10 mg Oral Daily    pantoprazole  40 mg Oral BID    rOPINIRole  1 mg Oral Nightly    venlafaxine  37.5 mg Oral Daily    furosemide  40 mg IntraVENous BID    gabapentin  600 mg Oral Nightly    gabapentin  100 mg Oral TID    insulin lispro  0-6 Units SubCUTAneous Q4H    doxepin  10 mg Oral Nightly     PRN Meds: labetalol, metoprolol, sodium chloride flush, sodium chloride, potassium chloride, magnesium sulfate, promethazine **OR** ondansetron, acetaminophen **OR** acetaminophen, benzonatate, levalbuterol, traZODone, glucose, glucagon (rDNA), dextrose, dextrose bolus **OR** dextrose bolus      Intake/Output Summary (Last 24 hours) at 5/8/2022 1001  Last data filed at 5/8/2022 0646  Gross per 24 hour   Intake --   Output 800 ml   Net -800 ml       Physical Exam Performed:    /85   Pulse 97   Temp 98 °F (36.7 °C) (Oral)   Resp 18   Ht 5' 6\" (1.676 m)   Wt 219 lb (99.3 kg)   SpO2 94%   BMI 35.35 kg/m²     General appearance: No apparent distress, appears stated age and cooperative. HEENT: Pupils equal, round, and reactive to light. Conjunctivae/corneas clear. Neck: Supple, with full range of motion. No jugular venous distention. Trachea midline. Respiratory:  Normal respiratory effort. Clear anterior breath sounds. Cardiovascular: Regular rate and rhythm with normal S1/S2 without murmurs, rubs or gallops. Abdomen: Soft, non-tender, non-distended with normal bowel sounds. Musculoskeletal: No clubbing, cyanosis. +BLE swelling. Right knee swelling. Skin: Skin color, texture, turgor normal.  No rashes or lesions. Neurologic:  Neurovascularly intact without any focal sensory/motor deficits.  Cranial nerves: II-XII intact, grossly non-focal.  Psychiatric: Alert and oriented, thought content appropriate, normal insight  Capillary Refill: Brisk,3 seconds, normal   Peripheral Pulses: +2 palpable, equal bilaterally       Labs:   Recent Labs     05/07/22 1652 05/08/22  0707   WBC 6.7 7.2   HGB 11.5* 11.8*   HCT 34.4* 35.4*    160     Recent Labs     05/07/22 1652 05/08/22  0707   * 136   K 4.3 4.0   CL 94* 96*   CO2 26 30   BUN 16 11   CREATININE 0.8 0.8   CALCIUM 10.0 9.5     Recent Labs     05/07/22 1652 05/08/22  0707   AST 22 20   ALT 27 24   BILITOT 0.5 0.6   ALKPHOS 78 73     Recent Labs     05/07/22 1652   CKTOTAL 58   TROPONINI <0.01       Urinalysis:      Lab Results   Component Value Date    NITRU Negative 05/07/2022 WBCUA None seen 05/07/2022    BACTERIA 1+ 05/07/2022    RBCUA 0-2 05/07/2022    BLOODU TRACE-INTACT 05/07/2022    SPECGRAV <=1.005 05/07/2022    GLUCOSEU 100 05/07/2022    GLUCOSEU NEGATIVE 12/30/2009       Radiology:  MRI THORACIC SPINE W WO CONTRAST   Preliminary Result   1. Mild multilevel thoracic spondylosis with slightly exaggerated kyphosis. No high-grade spinal canal stenosis or neural foraminal narrowing. 2. No abnormal spinal cord signal.         MRI LUMBAR SPINE W WO CONTRAST   Final Result   1. Edema and enhancement at L4-5, concerning for discitis/osteomyelitis. Differential would include Modic type 1 degenerative change. 2. Multilevel degenerative change with mild spinal canal narrowing at L3-4   and L4-5.   3. Multilevel neural foraminal narrowing as above. 4. Anterolisthesis at L4-5. CT CHEST PULMONARY EMBOLISM W CONTRAST   Final Result   No evidence of pulmonary embolism. Four-chamber cardiomegaly without   pericardial effusion however smooth interlobular septal thickening with lower   lobe predominance along with mild mosaicism concerning for interstitial   pulmonary edema pattern without decompensation evidence as there is no   pleural effusion evident. Limited images of the upper abdomen reveal nodular contours of the hepatic   parenchyma concerning for underlying parenchymal disease process such as   cirrhotic morphology without obvious perihepatic ascites         CT Lumbar Spine WO Contrast   Final Result   1. Multiple areas of lucency, with surrounding sclerosis involving the lumbar   spine, most prominent at the L3 and L5 levels. Changes are new since the   prior study. Differential considerations would include multiple Schmorl   nodes and discogenic degenerative changes, or potentially infection, or   metastatic disease. MR imaging of the lumbar spine recommended to further   evaluate these lesions.    2. Diffuse disc protrusion, facet and ligamentous hypertrophy, L4-L5 disc   level, resulting in a moderate degree of acquired central spinal stenosis         XR KNEE RIGHT (1-2 VIEWS)   Final Result   1. No acute fracture or malalignment. Assessment/Plan:    Active Hospital Problems    Diagnosis     A-fib [facility]) [I48.91]      Priority: Medium       New onset atrial fibrillation:  - Initiated on cardizem drip on admission. PO cardizem started, plan to up titrate per Cards with the addition of metoprolol.   - Cardiology consulted / following -- appreciate. - Obtain ECHO to evaluate cardiac structure and function. - Defer AC to Cardiology. Recommending Eliquis 5 mg PO BID when able. - Check TSH.     [id number] Score for Atrial Fibrillation Stroke Risk   Risk   Factors  Component Value   C CHF No 0   H HTN Yes 1   A2 Age >= 76 Yes,  (70 y.o.) 2   D DM Yes 1   S2 Prior Stroke/TIA Yes 2   V Vascular Disease No 0   A Age 74-69 No,  (70 y.o.) 0   Sc Sex female 1    [id number]  Score  7   Score last updated 5/8/22 10:05 AM EDT    Possible acute CHF:  - Unknown type, possibly tachycardia induced from Afib.  - CT chest with findings suggestive of CHF/pulmonary edema.   - ECHO from 8/2021 showed normal LVEF and diastolic fx. Obtain updated limited ECHO to evaluate cardiac structure and function.   - Pt started on lasix 40 mg IV BID.   - Maintain 2 liter daily fluid restriction and low sodium diet. - Monitor daily weights, close and I's and O's, daily BMP. Hypertensive urgency:  - /82 in the ED, ?exacerbated by pain. BP is improved now. - Continue her home losartan. - Monitor BP closely. Right knee swelling/pain, ?hemarthrosis:  - Orthopedic surgery consulted -- appreciate. - Xray showed no acute fracture. Acute back pain:  - MRI thoracic spine was negative for acute findings.   - MRI lumbar spine showed findings suggestive of discitis/osteomyelitis. - Currently has no neurological deficits and no incontinence.   - CRP and sed rate are elevated. Procal is normal. F/u blood cultures. - Recieved dose of Vancomycin and Merrem in the ED -- will continue (has PCN/cephasporin allergy). - ID consulted -- appreciate recommendations in advance. - Spine surgery consulted on 5/8, [facility] Neurosurgery requesting transfer to [facility] for further evaluation. Pt does not want to be transferred to [address] [place] [address]. If consultant does not see her by tomorrow then re-consult Neurosurgery. Hyperlipidemia:  - Continue statin. Diabetes mellitus type 2:  - Unknown control, check A1C.   - Hold her home regimen, SSI meanwhile. - Carb-control diet. Hypothyroidism:  - Continue her home dose of levothyroxine. Check TSH. Chronic hypoxic respiratory failure due to COPD / asthma:  - Stable. Continue home inhaler regimen and Singulair.   - She is on 3 LPM at baseline. Monitor closely. Weeks cirrhosis:  - Stable. Monitor. GERD:  - Stable. Continue PPI. Obesity:  -  With Body mass index is 35.35 kg/m². Complicating assessment and treatment. Placing patient at risk for multiple co-morbidities as well as early death and contributing to the patient's presentation. Counseled on weight loss.        DVT Prophylaxis: Lovenox   Diet: Diet NPO Exceptions are: Ice Chips, Sips of Water with Meds  Code Status: Full Code    PT/OT Eval Status: ordered    Dispo - ~3-4 days pending clinical course     Val Seo, ZIGGY - CNP

## 2022-05-08 NOTE — H&P
Hospital Medicine History & Physical      PCP: Kristeen Hashimoto Truman How., DO    Date of Admission: 5/7/2022    Date of Service: Pt seen/examined on 5/7/2022    Pt seen/examined face to face on and admitted as inpatient with expected LOS greater than two midnights due to medical therapy    Chief Complaint:    Chief Complaint   Patient presents with    Leg Pain     started 2 months ago, was seen in ER twice and was told she had a torned muscle. b/l leg pain, took 5mg percocet        History Of Present Illness:      77 y.o. female who presented to [facility] with past medical history of cirrhosis SNOW, hyperlipidemia, hypertension, SVT, type 2 diabetes non-insulin-dependent, rheumatic fever presented to the ED with chief complaint of leg pain. Patient reported that 2 months ago she was cleaning and then instead of bending down patient squatted down and then noticed a pop and started having some knee pain. The knee pain continued to progress and worsen on the left knee with edema swelling and pain. Exacerbated on exertion, no alleviating factor. Patient reported that the pain would be up to 9/10. Patient reported to smoke bite with limitation, no recent surgeries. Patient reported that she Schlup work with Dr. Jocelyn Maldonado. Patient reports no prior history of atrial fibrillation, strokes. Patient reported that no associate with fever chills nausea vomiting chest pain abdominal pain or dysuria.       Past Medical History:          Diagnosis Date    Allergic     Anemia     Anesthesia complication     \"woke up during surgery\"    Arthritis     Osteoarthritis of bilateral CMC joints    Asthma     Followed by Dr. Caden Guillermo Cirrhosis [facility])     non alcoholic    Cir[facility], [facility]-alcoholic [facility]     COPD (chronic obstructive pulmonary disease) [facility]     GERD (gastroesophageal reflux disease)     GIB (gastrointestinal bleeding)     LGIB 4/2016    Haemophilus infection 05/07/2021    + resp    Heart murmur     Hernia     Hyperlipidemia     Hypertension     Lung collapse     Murmur, heart     Pneumonia     Reflux     Rheumatic fever     Stress fracture     right foot    SVT (supraventricular tachycardia) ([facility]     6/19/16 - admitted X 1 day per PT     TIA (transient ischemic attack) 2015    Type II or unspecified type diabetes mellitus without mention of complication, not stated as uncontrolled     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 ([facility]     Wears glasses     as needed    Wears partial dentures     upper partial       Past Surgical History:          Procedure Laterality Date    BONE MARROW BIOPSY      BREAST BIOPSY      BRONCHOSCOPY N/A 5/7/2021    BRONCHOSCOPY DIAGNOSTIC OR CELL [address] ONLY performed by Gust Collet, MD at [address]  5/7/2021    BRONCHOSCOPY ALVEOLAR LAVAGE performed by Gust Collet, MD at [address]   5/7/2021    BRONCHOSCOPY THERAPUTIC ASPIRATION INITIAL performed by Gust Collet, MD at [place] [address]  07/14/14    CATARACT REMOVAL      COLONOSCOPY      X 3 per PT 6/8/16    COLONOSCOPY N/A 6/10/2021    COLONOSCOPY POLYPECTOMY ABLATION performed by Omari Thompson MD at [facility] Right 06/03/2013    CORAL BUNIONECTOMY RIGHT FOOT WITH SCREW FIXATION;    HERNIA REPAIR      HYSTERECTOMY      JOINT REPLACEMENT Bilateral     TKR    KNEE SURGERY  09    left    KNEE SURGERY  2010    right    UPPER GASTROINTESTINAL ENDOSCOPY N/A 7/21/2018    EGD BIOPSY performed by Endy Abreu MD at [facility] 6/10/2021    EGD POLYP ABLATION/OTHER performed by Omari Thompson MD at [address] [place] [address] 6/10/2021    EGD BAND LIGATION performed by Omari Thompson MD at [place] [address]       Medications Prior to Admission:      Prior to Admission medications Medication Sig Start Date End Date Taking? Authorizing Provider   lidocaine (LIDODERM) 5 % Place 1 patch onto the skin daily for 10 days 12 hours on, 12 hours off. 4/29/22 5/9/22  Jodee Balderrama MD   montelukast (SINGULAIR) 10 MG tablet Take 1 tablet by mouth daily 4/19/22   Madhuri Mayorga MD   montelukast (SINGULAIR) 10 MG tablet Take 1 tablet by mouth daily for 15 days 4/19/22 5/4/22  Madhuri Mayorga MD   cyanocobalamin 1000 MCG/ML injection Inject 1 mL into the muscle every 14 days Last dose was taken on 4/9/17 3/31/22   Chaffee Bodily. Mariam Colorado DO   naproxen (NAPROSYN) 500 MG tablet Take 1 tablet by mouth 2 times daily (with meals) 3/31/22   Chaffee Bodily. Mariam Colorado DO   cetirizine (ZYRTEC) 10 MG tablet TAKE ONE TABLET BY MOUTH DAILY 3/21/22   Stef Valencia MD   doxepin [facility]) 50 MG capsule Take 1 capsule by mouth nightly for 10 days 3/6/22 3/16/22  Jerel Bodily. Mariam Colorado DO   levothyroxine (SYNTHROID) 100 MCG tablet Take 1 tablet by mouth Daily 3/3/22   Jerel Bodily. Mariam Colorado DO   losartan (COZAAR) 100 MG tablet TAKE 1 TABLET DAILY 3/2/22   Stef Valencia MD   pantoprazole (PROTONIX) 40 MG tablet TAKE 1 TABLET TWICE A DAY 3/2/22   Stef Valencia MD   famotidine (PEPCID) 20 MG tablet TAKE 1 TABLET TWICE A DAY 3/2/22   Stef Valencia MD   dilTIAZem [facility]) 240 MG extended release capsule Take 1 capsule by mouth daily 3/2/22   Stef Valencia MD   SITagliptin (JANUVIA) 100 MG tablet Take 1 tablet by mouth daily 2/25/22   Chaffee Bodily.  Mariam Colorado DO   nystatin (MYCOSTATIN) 401014 UNIT/ML suspension Take by mouth 4 times daily 2/14/22   Robe Sanchez MD   metFORMIN (GLUCOPHAGE-XR) 500 MG extended release tablet TAKE 2 TABLETS TWICE A DAY 1/3/22   Hilda Garrido MD   traZODone (DESYREL) 50 MG tablet TAKE 2 TABLETS NIGHTLY 12/3/21   Stef Valencia MD   KLOR-CON M20 20 MEQ extended release tablet TAKE 1 TABLET DAILY 11/4/21   Stef Valencia MD   benzonatate (TESSALON) 100 MG capsule TAKE 1 CAPSULE THREE TIMES A DAY AS NEEDED FOR COUGH 10/18/21   Teja Lambert MD   Cholecalciferol (VITAMIN D3) 50 MCG (2000 UT) CAPS Take by mouth    Historical Provider, MD   venlafaxine (EFFEXOR) 37.5 MG tablet TAKE 1 TABLET DAILY 9/16/21   Alicia Pickett MD   atorvastatin (LIPITOR) 20 MG tablet TAKE 1 TABLET NIGHTLY 9/9/21   Alicia Pickett MD   furosemide (LASIX) 20 MG tablet TAKE 1 TABLET DAILY 7/19/21   Alicia Pickett MD   beclomethasone (QVAR) 80 MCG/ACT inhaler Inhale 2 puffs into the lungs 2 times daily 5/20/21   Devante Jaffe MD   levalbuterol Carola Donohue HFA) 45 MCG/ACT inhaler Inhale 2 puffs into the lungs every 6 hours as needed for Shortness of Breath 5/20/21   Devante Jaffe MD   gabapentin (NEURONTIN) 600 MG tablet Take 1 tablet by mouth nightly for 30 days. 600 mg at bedtime and 300 mg BID 5/12/21 2/16/22  Mary Bashir MD   ferrous sulfate 325 (65 Fe) MG tablet Take 325 mg by mouth 3 times daily (with meals)    Historical Provider, MD   diphenhydrAMINE (BENADRYL) 25 MG tablet Take 50 mg by mouth nightly     Historical Provider, MD   aspirin 81 MG EC tablet Take 81 mg by mouth daily     Historical Provider, MD   rOPINIRole (REQUIP) 1 MG tablet Take 1 mg by mouth nightly 1mg at 1730, 1mg at 2030, 2mg at 2200 (total of 4mg nightly in divided doses) 3/9/17   Historical Provider, MD   ursodiol (ACTIGALL) 500 MG tablet Take 500 mg by mouth 2 times daily     Historical Provider, MD   calcium carbonate 600 MG TABS tablet Take 1 tablet by mouth daily     Historical Provider, MD   Multiple Vitamins-Minerals (CENTRUM PO) Take 1 tablet by mouth daily. Historical Provider, MD   montelukast (SINGULAIR) 10 MG tablet Take 10 mg by mouth daily     Historical Provider, MD   estrogens, conjugated, (PREMARIN) 1.25 MG tablet Take 1.25 mg by mouth daily.     Historical Provider, MD       Allergies:  Latex, Clindamycin, Pennsaid [diclofenac sodium], Torsemide, Actos [pioglitazone], Amoxicillin, Augmentin [amoxicillin-pot clavulanate], Bactrim [sulfamethoxazole-trimethoprim], Ciprofloxacin, Doxycycline, Levaquin [levofloxacin], Ativan [lorazepam], Cephalosporins, Pcn [penicillins], and Proventil [albuterol]    Social History:          TOBACCO:   reports that she quit smoking about 21 years ago. Her smoking use included cigarettes. She started smoking about 66 years ago. She has a 40.00 pack-year smoking history. She has never used smokeless tobacco.  ETOH:   reports no history of alcohol use. E-Cigarettes/Vaping Use     Questions Responses    E-Cigarette/Vaping Use Never User    Start Date     Passive Exposure     Quit Date     Counseling Given     Comments             Family History:      Reviewed in detail, and noncontributory        Problem Relation Age of Onset    Colon Cancer Mother     Other Mother     Diabetes Father     Kidney Disease Paternal Grandmother     Lupus Daughter     Lupus Other        REVIEW OF SYSTEMS:     Constitutional:  No Fever, No Chills, No Night Sweats  ENT/Mouth:  No Nasal Congestion,  No Hoarseness, No new mouth lesion  Eyes:  No Eye Pain, No Redness, No Discharge  Cardiovascular:  No Chest Pain, No Orthopnea, No Palpitations  Respiratory:  No Cough, No Sputum, No Dyspnea  Gastrointestinal: No Vomiting, No Diarrhea, No abdominal pain  Genitourinary: No Urinary Frequency, No Hematuria, No Urinary pain  Musculoskeletal: + Worsening Arthralgias, No worsening Myalgias  Skin:  No new Skin Lesions, No new skin rash  Neuro:  No new weakness, No new numbness. Psych:  No suicial ideation, No Violence ideation    PHYSICAL EXAM PERFORMED:    BP (!) 171/95   Pulse 102   Temp 98.8 °F (37.1 °C) (Oral)   Resp 22   Ht 5' 6\" (1.676 m)   Wt 220 lb (99.8 kg)   SpO2 93%   BMI 35.51 kg/m²     General appearance:  mild acute distress, appears older than stated age  HEENT:   atraumatic, sclera anicteric, Conjunctivae clear.   Neck: Supple,Trachea midline, no goiter  Respiratory:minimal accessory muscle usage, Normal respiratory effort. Clear to auscultation, bilaterally without wheezing  Cardiovascular: Irregularly irregular, capillary refill 2 seconds  Abdomen: Soft, non-tender, non-distended with normal bowel sounds. Musculoskeletal:  No clubbing, cyanosis. 1+ edema LE bilaterally. ,  Muscle strength equal bilaterally however right limited due to knee pain  Skin: turgor normal.  No new rashes or lesions. Right knee joint edema without significant tenderness or warmth  Neurologic: Alert and oriented x4, no new focal sensory/motor deficits. Labs:     Recent Labs     05/07/22  1652   WBC 6.7   HGB 11.5*   HCT 34.4*        Recent Labs     05/07/22 1652   *   K 4.3   CL 94*   CO2 26   BUN 16   CREATININE 0.8   CALCIUM 10.0     Recent Labs     05/07/22 1652   AST 22   ALT 27   BILITOT 0.5   ALKPHOS 78     No results for input(s): INR in the last 72 hours. Recent Labs     05/07/22 1652   CKTOTAL 58   TROPONINI <0.01       Urinalysis:      Lab Results   Component Value Date    NITRU Negative 05/07/2022    WBCUA None seen 05/07/2022    BACTERIA 1+ 05/07/2022    RBCUA 0-2 05/07/2022    BLOODU TRACE-INTACT 05/07/2022    SPECGRAV <=1.005 05/07/2022    GLUCOSEU 100 05/07/2022    GLUCOSEU NEGATIVE 12/30/2009       Radiology:   EKG:  I have reviewed the EKG with the following interpretation:   Atrial fibrillation  CT CHEST PULMONARY EMBOLISM W CONTRAST   Final Result   No evidence of pulmonary embolism. Four-chamber cardiomegaly without   pericardial effusion however smooth interlobular septal thickening with lower   lobe predominance along with mild mosaicism concerning for interstitial   pulmonary edema pattern without decompensation evidence as there is no   pleural effusion evident.       Limited images of the upper abdomen reveal nodular contours of the hepatic   parenchyma concerning for underlying parenchymal disease process such as   cirrhotic morphology without obvious perihepatic ascites         CT Lumbar Spine WO Contrast   Final Result   1. Multiple areas of lucency, with surrounding sclerosis involving the lumbar   spine, most prominent at the L3 and L5 levels. Changes are new since the   prior study. Differential considerations would include multiple Schmorl   nodes and discogenic degenerative changes, or potentially infection, or   metastatic disease. MR imaging of the lumbar spine recommended to further   evaluate these lesions. 2. Diffuse disc protrusion, facet and ligamentous hypertrophy, L4-L5 disc   level, resulting in a moderate degree of acquired central spinal stenosis         XR KNEE RIGHT (1-2 VIEWS)   Final Result   1. No acute fracture or malalignment. MRI THORACIC SPINE W WO CONTRAST    (Results Pending)   MRI LUMBAR SPINE W WO CONTRAST    (Results Pending)     [id number] Score for Atrial Fibrillation Stroke Risk   Risk   Factors  Component Value   C CHF No 0   H HTN Yes 1   A2 Age >= 76 Yes,  (70 y.o.) 2   D DM Yes 1   S2 Prior Stroke/TIA Yes 2   V Vascular Disease No 0   A Age 74-69 No,  (70 y.o.) 0   Sc Sex female 1    [id number]  Score  7   Score last updated 5/7/22 47:57 PM EDT    Click here for a link to the UpToDate guideline \"Atrial Fibrillation: Anticoagulation therapy to prevent embolization    Disclaimer: Risk Score calculation is dependent on accuracy of patient problem list and past encounter diagnosis.   ASSESSMENT AND PLAN:    Active Hospital Problems    Diagnosis Date Noted   [facility]) [I48.91] 05/07/2022     Priority: Medium     New onset atrial fibrillation  No excessive caffeine intake, no sleep study done prior  [id number]  Echocardiogram pending  Cardiology consulted, much appreciated     Right knee swelling,  Suspected hemarthrosis  Empirically started on Vanco meropenem  Orthopedic consulted, much appreciated    Acute back pain:  MRI thoracic and lumbar ordered in the ED due to lower extremity weakness  PT OT    Suspected acute HFpEF  Orthopnea, dyspnea on exertion, worsening lower extremity edema  Strict I's and O's, Daily weights  Lasix 40 IV twice daily  Continued home medications  Heart failure protocol set ordered  Continue to check response    Hypertensive urgency:  Restart home medication  IV Lasix, IV. Chronic medical conditions:    GERD: Continue home medication  Hyperlipidemia: Continue home medication  Type 2 Diabetes: Insulin sliding scale  Hypothyroidism: Continue home medication  RLS: Continue medication  Asthma: Not in exacerbation, continue medication  Class II obesity:Complicating assessment and treatment. Placing patient at risk for multiple co-morbidities as well as early death and contributing to the patient's presentation.  Education, and counseling    Diet: NPO except meds ordered    DVT Prophylaxis: lovenox    Dispo:   Expected LOS greater than two [facility], DO

## 2022-05-08 NOTE — PLAN OF CARE
[place] neuro[facility] note    Consulted for concern for discitis/osteomyelitis at L4/5 on MRI. Also has some enhancement in her canal.      Recommend:  1. Transfer to [facility] hospitalist service and neurosurgery will consult. 2. ESR/CRP   3. Blood cultures pending    KD Ravi MD

## 2022-05-08 NOTE — ED NOTES
2019- MRI questionnaire completed by ED RN Gabriela Lau. 2020- Called X ray, spoke to ORLÉANS and asked for MRI to be called in for Cauda Equina. 2025- MRI tech Karla Espinoza called to get report from ED RN Shonna Joy.      Ananth Grant  05/07/22 2027

## 2022-05-08 NOTE — PROGRESS NOTES
Spine consult noted. Elena Govea MD added to treatment team. Consult called to [address] [place] [address] Neurosurgery.  left with call back number provided.  Electronically signed by Michael Rodas RN on 5/8/22 at 10:33 AM EDT

## 2022-05-08 NOTE — CONSULTS
[facility]  [phone]      Attending Physician: Мария Lan MD  Reason for Consultation/Chief Complaint: Atrial fibrillation, HFpEF    Subjective   History of Present Illness: Luca De La Paz is a 77 y.o. with a history of paroxysmal SVT, SNOW cirrhosis, COPD, essential hypertension, hyperlipidemia, and type 2 DM who initially presented with left leg pain and weakness. The patient reports that about 1.5 months ago, she was cleaning her house and squatted to pick something up. Upon doing this, she felt a \"pop\" in her left upper thigh and has had progressive left leg pain since. Over the last several days, she has also developed lower extremity weakness and says that yesterday her weakness became more severe and she was not able to get in her car, so she was taken to the ED. She additionally reports that she has had chronic shortness of breath with exertion and lower extremity edema. She denies any chest pain. On presentation to the ED, the patient was tachycardic and hypertensive with oxygen saturation in the low to mid-90s on 3-3.5L of supplemental O2. Initial EKG showed atrial fibrillation with RVR in the 120s. Troponin T was negative x2. CTPA was obtained and was negative for evidence of PE, but showed findings suggestive of mild interstitial pulmonary edema. MRI of her lumbar spine was concerning for discitis/osteomyelitis at the L3-L4 and L4-L5 levels. There was apparently also concern for possible hemarthrosis by primary team and orthopaedic surgery was consulted.              Past Medical History:   has a past medical history of Allergic, Anemia, Anesthesia complication, Arthritis, Asthma, Cirrhosis ([address] Cirrhosis, non-alcoholic ([address] COPD (chronic obstructive pulmonary disease) ([address] GERD (gastroesophageal reflux disease), GIB (gastrointestinal bleeding), Haemophilus infection, Heart murmur, Hernia, Hyperlipidemia, Hypertension, Lung collapse, Murmur, heart, Pneumonia, Reflux, Rheumatic fever, Stress fracture, SVT (supraventricular tachycardia) ([facility] TIA (transient ischemic attack), Type II or unspecified type diabetes mellitus without mention of complication, not stated as uncontrolled, 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 Surgical History:   has a past surgical history that includes hernia repair; Hysterectomy; joint replacement (Bilateral); Foot surgery (Right, 06/03/2013); knee surgery (09); knee surgery (2010); Cardiac catheterization (07/14/14); Cataract removal; bone marrow biopsy; Breast biopsy; fine needle aspiration; Colonoscopy; Upper gastrointestinal endoscopy (N/A, 7/21/2018); bronchoscopy (N/A, 5/7/2021); bronchoscopy (5/7/2021); bronchoscopy (5/7/2021); Colonoscopy (N/A, 6/10/2021); Upper gastrointestinal endoscopy (N/A, 6/10/2021); and Upper gastrointestinal endoscopy (N/A, 6/10/2021). Social History:   reports that she quit smoking about 21 years ago. Her smoking use included cigarettes. She started smoking about 66 years ago. She has a 40.00 pack-year smoking history. She has never used smokeless tobacco. She reports that she does not drink alcohol and does not use drugs. Family History:  family history includes Colon Cancer in her mother; Diabetes in her father; Kidney Disease in her paternal grandmother; Lupus in her daughter and another family member; Other in her mother. Home Medications:  Were reviewed and are listed in nursing record and/or below  Prior to Admission medications    Medication Sig Start Date End Date Taking?  Authorizing Provider   lidocaine (LIDODERM) 5 % Place 1 patch onto the skin daily for 10 days 12 hours on, 12 hours off. 4/29/22 5/9/22  Xavi Garcia MD   montelukast (SINGULAIR) 10 MG tablet Take 1 tablet by mouth daily 4/19/22   Dalila Faith MD   montelukast (SINGULAIR) 10 MG tablet Take 1 tablet by mouth daily for 15 days 4/19/22 5/4/22  Teja Lambert MD   cyanocobalamin 1000 MCG/ML injection Inject 1 mL into the muscle every 14 days Last dose was taken on 4/9/17 3/31/22   [facility]. Zelda Diaz DO   naproxen (NAPROSYN) 500 MG tablet Take 1 tablet by mouth 2 times daily (with meals)  Patient not taking: Reported on 5/7/2022 3/31/22   [facility] Zelda Diaz DO   cetirizine (ZYRTEC) 10 MG tablet TAKE ONE TABLET BY MOUTH DAILY 3/21/22   Alicia Pickett MD   doxepin [facility] 50 MG capsule Take 1 capsule by mouth nightly for 10 days 3/6/22 3/16/22  [facility]. Zelda Diaz DO   levothyroxine (SYNTHROID) 100 MCG tablet Take 1 tablet by mouth Daily 3/3/22   [facility]. Zelda Diaz DO   losartan (COZAAR) 100 MG tablet TAKE 1 TABLET DAILY 3/2/22   Alicia Pickett MD   pantoprazole (PROTONIX) 40 MG tablet TAKE 1 TABLET TWICE A DAY 3/2/22   Alicia Pickett MD   famotidine (PEPCID) 20 MG tablet TAKE 1 TABLET TWICE A DAY 3/2/22   Alicia Pickett MD   dilTIAZem [facility]) 240 MG extended release capsule Take 1 capsule by mouth daily 3/2/22   Alicia Pickett MD   SITagliptin (JANUVIA) 100 MG tablet Take 1 tablet by mouth daily 2/25/22   [facility]  Zelda Diaz DO   nystatin (MYCOSTATIN) 687085 UNIT/ML suspension Take by mouth 4 times daily 2/14/22   Calvin Doímnguez MD   metFORMIN (GLUCOPHAGE-XR) 500 MG extended release tablet TAKE 2 TABLETS TWICE A DAY 1/3/22   Olimpia Velázquez MD   traZODone (DESYREL) 50 MG tablet TAKE 2 TABLETS NIGHTLY 12/3/21   Alicia Pickett MD   KLOR-CON M20 20 MEQ extended release tablet TAKE 1 TABLET DAILY 11/4/21   Alicia Pickett MD   benzonatate (TESSALON) 100 MG capsule TAKE 1 CAPSULE THREE TIMES A DAY AS NEEDED FOR COUGH 10/18/21   Teja Lambert MD   Cholecalciferol (VITAMIN D3) 50 MCG (2000 UT) CAPS Take by mouth    Historical Provider, MD   venlafaxine (EFFEXOR) 37.5 MG tablet TAKE 1 TABLET DAILY 9/16/21   Alicia Pickett MD   atorvastatin (LIPITOR) 20 MG tablet TAKE 1 TABLET NIGHTLY 9/9/21   Alicia Pickett MD   furosemide (LASIX) 20 MG tablet TAKE 1 TABLET DAILY 7/19/21   Dian Andre MD   beclomethasone (QVAR) 80 MCG/ACT inhaler Inhale 2 puffs into the lungs 2 times daily 5/20/21   Addison Dunaway MD   levalbuterol [facility]) 45 MCG/ACT inhaler Inhale 2 puffs into the lungs every 6 hours as needed for Shortness of Breath 5/20/21   Addison Dunaway MD   gabapentin (NEURONTIN) 600 MG tablet Take 1 tablet by mouth nightly for 30 days. 600 mg at bedtime and 300 mg BID 5/12/21 2/16/22  Sharon Richardson MD   ferrous sulfate 325 (65 Fe) MG tablet Take 325 mg by mouth 3 times daily (with meals)    Historical Provider, MD   diphenhydrAMINE (BENADRYL) 25 MG tablet Take 50 mg by mouth nightly     Historical Provider, MD   aspirin 81 MG EC tablet Take 81 mg by mouth daily     Historical Provider, MD   rOPINIRole (REQUIP) 1 MG tablet Take 2 mg by mouth nightly 1mg at 1730, 1mg at 2030, 2mg at 2200 (total of 4mg nightly in divided doses)    Patient takes 1 mg at 8 am. 1 mg at 12 pm. 1 mg at 5 pm. 2 mg at 10 pm. 3/9/17   Historical Provider, MD   ursodiol (ACTIGALL) 500 MG tablet Take 500 mg by mouth 2 times daily     Historical Provider, MD   calcium carbonate 600 MG TABS tablet Take 1 tablet by mouth daily     Historical Provider, MD   Multiple Vitamins-Minerals (CENTRUM PO) Take 1 tablet by mouth daily. Historical Provider, MD   montelukast (SINGULAIR) 10 MG tablet Take 10 mg by mouth daily     Historical Provider, MD   estrogens, conjugated, (PREMARIN) 1.25 MG tablet Take 1.25 mg by mouth daily.     Historical Provider, MD        CURRENT Medications:  labetalol (NORMODYNE;TRANDATE) injection 10 mg, Q4H PRN  meropenem (MERREM) 1,000 mg in sodium chloride 0.9 % 100 mL IVPB (mini-bag), Q8H  vancomycin 1500 mg in dextrose 5% 300 mL IVPB, Q24H  dilTIAZem injection 10 mg, Once   Followed by  dilTIAZem 125 mg in dextrose 5 % 125 mL infusion, Continuous  metoprolol (LOPRESSOR) injection 5 mg, Q1H PRN  sodium chloride flush 0.9 % injection 10 mL, 2 times per day  sodium chloride flush 0.9 % injection 10 mL, PRN  0.9 % sodium chloride infusion, PRN  enoxaparin (LOVENOX) injection 40 mg, Daily  promethazine (PHENERGAN) tablet 12.5 mg, Q6H PRN   Or  ondansetron (ZOFRAN) injection 4 mg, Q6H PRN  acetaminophen (TYLENOL) tablet 650 mg, Q6H PRN   Or  acetaminophen (TYLENOL) suppository 650 mg, Q6H PRN  aspirin EC tablet 81 mg, Daily  atorvastatin (LIPITOR) tablet 20 mg, Nightly  fluticasone (FLOVENT HFA) 110 MCG/ACT inhaler 4 puff, BID  benzonatate (TESSALON) capsule 200 mg, TID PRN  cetirizine (ZYRTEC) tablet 10 mg, Daily  dilTIAZem (CARDIZEM CD) extended release capsule 240 mg, Daily  famotidine (PEPCID) tablet 20 mg, BID  levalbuterol (XOPENEX HFA) inhaler 2 puff, Q6H PRN  levothyroxine (SYNTHROID) tablet 100 mcg, Daily  losartan (COZAAR) tablet 100 mg, Daily  montelukast (SINGULAIR) tablet 10 mg, Daily  pantoprazole (PROTONIX) tablet 40 mg, BID  rOPINIRole (REQUIP) tablet 1 mg, Nightly  venlafaxine (EFFEXOR) tablet 37.5 mg, Daily  traZODone (DESYREL) tablet 50 mg, Nightly PRN  furosemide (LASIX) injection 40 mg, BID  gabapentin (NEURONTIN) capsule 600 mg, Nightly  gabapentin (NEURONTIN) capsule 100 mg, TID  insulin lispro (HUMALOG) injection vial 0-6 Units, Q4H  glucose (GLUTOSE) 40 % oral gel 15 g, PRN  glucagon (rDNA) injection 1 mg, PRN  dextrose 5 % solution, PRN  doxepin (SINEQUAN) capsule 10 mg, Nightly  dextrose bolus 10% 125 mL, PRN   Or  dextrose bolus 10% 250 mL, PRN        Allergies:  Latex, Clindamycin, Pennsaid [diclofenac sodium], Torsemide, Actos [pioglitazone], Amoxicillin, Augmentin [amoxicillin-pot clavulanate], Bactrim [sulfamethoxazole-trimethoprim], Ciprofloxacin, Doxycycline, Levaquin [levofloxacin], Ativan [lorazepam], Cephalosporins, Pcn [penicillins], and Proventil [albuterol]     Review of Systems:   A 14 point review of symptoms was completed. Pertinent positives were identified in the HPI.   All other review of symptoms negative unless otherwise noted below. Objective   PHYSICAL EXAM:    Vitals:    05/08/22 1049   BP: (!) 188/74   Pulse: 97   Resp: 18   Temp: 98   SpO2: 94% on 3.5L supplemental O2    Weight: 219 lb (99.3 kg)       General: Elderly female lying in bed in no acute distress. Pleasant and interactive on exam.  HEENT: Normocephalic, atraumatic, non-icteric, hearing intact, nares normal, mucous membranes moist.  Neck: Supple, trachea midline. No adenopathy. No thyromegaly. No appreciable JVD. Heart: Irregular rate and rhythm. Normal S1 and S2. Grade II/VI holosystolic murmur. No rubs or gallops. Lungs: Normal respiratory effort. Mild bilateral crackles. Abdomen: Soft, non-tender. Normoactive bowel sounds. No masses or organomegaly. Skin: No rashes, wounds, or lesions. Pulses: 2+ and symmetric. Extremities: 1+ bilateral LE edema. Musculoskeletal: Bilateral knee joints are swollen. Psych: Normal mood and affect. Neuro: Alert and oriented to person, place, and time.       Labs   CBC:   Lab Results   Component Value Date    WBC 7.2 05/08/2022    RBC 4.03 05/08/2022    RBC 4.28 02/18/2016    HGB 11.8 05/08/2022    HCT 35.4 05/08/2022    MCV 87.8 05/08/2022    RDW 15.7 05/08/2022     05/08/2022     CMP:  Lab Results   Component Value Date     05/08/2022    K 4.0 05/08/2022    CL 96 05/08/2022    CO2 30 05/08/2022    BUN 11 05/08/2022    CREATININE 0.8 05/08/2022    GFRAA >60 05/08/2022    GFRAA >60 01/07/2010    AGRATIO 1.1 05/08/2022    LABGLOM >60 05/08/2022    GLUCOSE 172 05/08/2022    PROT 7.2 05/08/2022    CALCIUM 9.5 05/08/2022    BILITOT 0.6 05/08/2022    ALKPHOS 73 05/08/2022    AST 20 05/08/2022    ALT 24 05/08/2022     PT/INR:  No results found for: PTINR  HgBA1c:  Lab Results   Component Value Date    LABA1C 6.4 09/18/2021     Lab Results   Component Value Date    CKTOTAL 58 05/07/2022    TROPONINI <0.01 05/07/2022         Cardiac Data     Last EKG: Atrial fibrillation with RVR, left axis deviation, slow R wave progression in anterior leads. Echo:  TTE 8/31/21:  Conclusions   Summary   Left ventricular systolic function is hyperdynamic with an estimated   ejection fraction of >= 65%. The left ventricle is normal in size with mild concentric hypertrophy. No obvious regional wall motion abnormalities noted. Normal left ventricular diastolic function. Mild mitral and aortic regurgitation. Mild mitral stenosis   Systolic pulmonary artery pressure (SPAP) is normal and estimated at 32 mmHg   (right atrial pressure 3 mmHg). Stress Test: N/A    Cath: N/A    Other Studies:   CTPA 5/7/22:  No evidence of pulmonary embolism.  Four-chamber cardiomegaly without   pericardial effusion however smooth interlobular septal thickening with lower   lobe predominance along with mild mosaicism concerning for interstitial   pulmonary edema pattern without decompensation evidence as there is no   pleural effusion evident.       Limited images of the upper abdomen reveal nodular contours of the hepatic   parenchyma concerning for underlying parenchymal disease process such as   cirrhotic morphology without obvious perihepatic ascites     MRI Lumbar spine 5/7/22:  1. Edema and enhancement at L4-5, concerning for discitis/osteomyelitis. Differential would include Modic type 1 degenerative change. 2. Multilevel degenerative change with mild spinal canal narrowing at L3-4   and L4-5.   3. Multilevel neural foraminal narrowing as above. 4. Anterolisthesis at L4-5. Assessment and Plan      1. Atrial fibrillation with RVR - Newly diagnosed. May have been precipitated by underlying infection.   Ventricular rates were in 120s on admission and are currently in 90-100s on IV dlitiazem infusion at 5 mg/hr.  -Will increase diltiazem ER to 360 mg daily, start metoprolol succinate 25 mg BID, and wean off diltiazem infusion  -[id number] is >2 - Primary team was apparently concerned for possible hemarthrosis involving knee joint and orthopaedics has been consulted. If no evidence of hemarthrosis on ortho evaluation and no additional invasive procedures are planned, would plan to start Eliquis 5 mg BID  -Continue to monitor on telemetry  -Maintain K>4 and Mg>2  -Pending clinical course and resolution of other below issues, if remains in a-fib and/or ventricular rates become difficult to control, may benefit from eventual restoration of sinus rhythm with RUDY/DCCV, but     2. Mild HFpEF - May have been exacerbated by a-fib with RVR. Biventricular systolic function was preserved on last TTE from 8/2021.  -Agree with diuresis with IV lasix 40 mg BID  -Monitor I/Os, obtain daily weights  -Trend BMP and magnesium levels and replete electrolytes as needed for goal K>4 and Mg>2  -2L per day fluid restriction, low sodium diet  -Obtain repeat limited TTE to re-evaluate cardiac function    3. Discitis/osteomyelitis  -Started on broad spectrum IV antibiotics  -Neurosurgery consulted and have recommended transfer to [facility]. for further management    4. Lower extremity weakness  -Likely secondary to above  -Primary team also concerned for possible hemarthrosis and orthopaedics has been consulted    5. Essential hypertension  -BP was markedly elevated on admission which may have been exacerbated by pain  -Increase diltiazem ER to 360 mg daily and starting metoprolol succinate 25 mg BID  -Continue losartan 100 mg daily    6. Hyperlipidemia  -Continue statin    7. Type 2 DM  -Further management per primary team      Thank you for allowing us to participate in the care of [facility]. Please call me with any questions [phone]. Ricky Castano, DO [facility]   [phone] [facility]  [phone] [address] [place] [address]  5/8/2022 11:08 AM      I will address the patient's cardiac risk factors and adjusted pharmacologic treatment as needed.  In addition, I have reinforced the need for patient directed risk factor modification. All questions and concerns were addressed to the patient/family. Alternatives to my treatment were discussed. The note was completed using EMR. Every effort was made to ensure accuracy; however, inadvertent computerized transcription errors may be present.

## 2022-05-08 NOTE — ED PROVIDER NOTES
I independently performed a history and physical on [facility]. All diagnostic, treatment, and disposition decisions were made by myself in conjunction with the advanced practice provider. EKG: Atrial fibrillation with RVR, rate 128, leftward axis, QTC within normal limits, no acute ST or T wave abnormalities. Compared to prior from May 9, 2021 atrial fibrillation appears new. Patient with multiple complaints. She describes bilateral leg pain with numbness and tingling, right knee redness with warmth and tenderness. Symptoms have been ongoing for roughly 2 months. Right knee redness is somewhat narrow. On arrival patient found to be tachycardic and EKG demonstrated new onset atrial fibrillation with RVR. CTA was negative for PE. She was placed on a diltiazem infusion after bolus failed to improve her heart rate. Patient will also be covered with IV antibiotics due to concern for right knee infection with underlying hardware. Lastly, due to the bilateral nature of her leg complaints we obtained a CT scan of her back with findings worrisome for disc bulge and lytic lesions from either metastatic disease or infection. Patient will be admitted to the hospitalist service and we will also call in MRI. Critical Care Time: 30min. Extensive labs and imaging, management of cardiac arrhythmia, evaluation of potential spinal cord impingement or infection. For further details of [facility] emergency department encounter, please see Geneva ARAUZ's documentation.              Sybil Georges MD  05/07/22 2047

## 2022-05-08 NOTE — PROGRESS NOTES
4 Eyes Skin Assessment     The patient is being assess for  Admission    I agree that 2 RN's have performed a thorough Head to Toe Skin Assessment on the patient. ALL assessment sites listed below have been assessed. Areas assessed by both nurses: Alfrieda Pillar and   [x]   Head, Face, and Ears   [x]   Shoulders, Back, and Chest  [x]   Arms, Elbows, and Hands   [x]   Coccyx, Sacrum, and Ischum  [x]   Legs, Feet, and Heels        Does the Patient have Skin Breakdown?   No         Jack Prevention initiated:  Yes   Wound Care Orders initiated:  No      [facility] nurse consulted for Pressure Injury (Stage 3,4, Unstageable, DTI, NWPT, and Complex wounds):  No      Nurse 1 eSignature: Electronically signed by Tammy Dowell RN on 5/8/22 at 2:37 AM EDT    **SHARE this note so that the co-signing nurse is able to place an eSignature**    Nurse 2 eSignature: Electronically signed by Heavenly Ludwig RN on 5/8/22 at 2:37 AM EDT

## 2022-05-09 VITALS
DIASTOLIC BLOOD PRESSURE: 58 MMHG | TEMPERATURE: 98.1 F | BODY MASS INDEX: 36.1 KG/M2 | WEIGHT: 224.65 LBS | HEART RATE: 81 BPM | SYSTOLIC BLOOD PRESSURE: 111 MMHG | RESPIRATION RATE: 18 BRPM | HEIGHT: 66 IN | OXYGEN SATURATION: 95 %

## 2022-05-09 LAB
ANION GAP SERPL CALCULATED.3IONS-SCNC: 7 MMOL/L (ref 3–16)
BUN BLDV-MCNC: 14 MG/DL (ref 7–20)
CALCIUM SERPL-MCNC: 9.5 MG/DL (ref 8.3–10.6)
CHLORIDE BLD-SCNC: 91 MMOL/L (ref 99–110)
CO2: 33 MMOL/L (ref 21–32)
CREAT SERPL-MCNC: 0.9 MG/DL (ref 0.6–1.2)
CRYSTALS, FLUID: NORMAL
ESTIMATED AVERAGE GLUCOSE: 137 MG/DL
GFR AFRICAN AMERICAN: >60
GFR NON-AFRICAN AMERICAN: >60
GLUCOSE BLD-MCNC: 148 MG/DL (ref 70–99)
GLUCOSE BLD-MCNC: 163 MG/DL (ref 70–99)
GLUCOSE BLD-MCNC: 188 MG/DL (ref 70–99)
GLUCOSE BLD-MCNC: 191 MG/DL (ref 70–99)
GLUCOSE BLD-MCNC: 239 MG/DL (ref 70–99)
HBA1C MFR BLD: 6.4 %
HCT VFR BLD CALC: 33.7 % (ref 36–48)
HEMOGLOBIN: 11.1 G/DL (ref 12–16)
MCH RBC QN AUTO: 29.7 PG (ref 26–34)
MCHC RBC AUTO-ENTMCNC: 33 G/DL (ref 31–36)
MCV RBC AUTO: 90 FL (ref 80–100)
PATH CONSULT FLUID: NORMAL
PATH CONSULT FLUID: YES
PDW BLD-RTO: 15.6 % (ref 12.4–15.4)
PERFORMED ON: ABNORMAL
PLATELET # BLD: 177 K/UL (ref 135–450)
PMV BLD AUTO: 7.4 FL (ref 5–10.5)
POTASSIUM REFLEX MAGNESIUM: 4 MMOL/L (ref 3.5–5.1)
RBC # BLD: 3.75 M/UL (ref 4–5.2)
SODIUM BLD-SCNC: 131 MMOL/L (ref 136–145)
SOURCE BODY FLUID: NORMAL
WBC # BLD: 9 K/UL (ref 4–11)

## 2022-05-09 PROCEDURE — 6370000000 HC RX 637 (ALT 250 FOR IP): Performed by: INTERNAL MEDICINE

## 2022-05-09 PROCEDURE — 2700000000 HC OXYGEN THERAPY PER DAY

## 2022-05-09 PROCEDURE — 2060000000 HC ICU INTERMEDIATE R&B

## 2022-05-09 PROCEDURE — 99233 SBSQ HOSP IP/OBS HIGH 50: CPT | Performed by: NURSE PRACTITIONER

## 2022-05-09 PROCEDURE — 2580000003 HC RX 258: Performed by: INTERNAL MEDICINE

## 2022-05-09 PROCEDURE — 94761 N-INVAS EAR/PLS OXIMETRY MLT: CPT

## 2022-05-09 PROCEDURE — 85027 COMPLETE CBC AUTOMATED: CPT

## 2022-05-09 PROCEDURE — 2580000003 HC RX 258: Performed by: REGISTERED NURSE

## 2022-05-09 PROCEDURE — 80048 BASIC METABOLIC PNL TOTAL CA: CPT

## 2022-05-09 PROCEDURE — C8923 2D TTE W OR W/O FOL W/CON,CO: HCPCS

## 2022-05-09 PROCEDURE — 6370000000 HC RX 637 (ALT 250 FOR IP): Performed by: REGISTERED NURSE

## 2022-05-09 PROCEDURE — 6360000002 HC RX W HCPCS: Performed by: INTERNAL MEDICINE

## 2022-05-09 PROCEDURE — 94640 AIRWAY INHALATION TREATMENT: CPT

## 2022-05-09 PROCEDURE — 6360000002 HC RX W HCPCS: Performed by: NURSE PRACTITIONER

## 2022-05-09 PROCEDURE — 99232 SBSQ HOSP IP/OBS MODERATE 35: CPT | Performed by: INTERNAL MEDICINE

## 2022-05-09 PROCEDURE — 6370000000 HC RX 637 (ALT 250 FOR IP): Performed by: PHYSICIAN ASSISTANT

## 2022-05-09 PROCEDURE — 36415 COLL VENOUS BLD VENIPUNCTURE: CPT

## 2022-05-09 PROCEDURE — 6360000002 HC RX W HCPCS: Performed by: REGISTERED NURSE

## 2022-05-09 RX ORDER — ENOXAPARIN SODIUM 100 MG/ML
1 INJECTION SUBCUTANEOUS 2 TIMES DAILY
Status: DISCONTINUED | OUTPATIENT
Start: 2022-05-10 | End: 2022-05-10 | Stop reason: HOSPADM

## 2022-05-09 RX ORDER — FUROSEMIDE 20 MG/1
20 TABLET ORAL DAILY
Status: DISCONTINUED | OUTPATIENT
Start: 2022-05-10 | End: 2022-05-10 | Stop reason: HOSPADM

## 2022-05-09 RX ORDER — SENNA PLUS 8.6 MG/1
1 TABLET ORAL 2 TIMES DAILY
Status: DISCONTINUED | OUTPATIENT
Start: 2022-05-09 | End: 2022-05-10 | Stop reason: HOSPADM

## 2022-05-09 RX ORDER — BISACODYL 10 MG
10 SUPPOSITORY, RECTAL RECTAL DAILY
Status: DISCONTINUED | OUTPATIENT
Start: 2022-05-09 | End: 2022-05-10 | Stop reason: HOSPADM

## 2022-05-09 RX ADMIN — DILTIAZEM HYDROCHLORIDE 360 MG: 180 CAPSULE, COATED, EXTENDED RELEASE ORAL at 08:14

## 2022-05-09 RX ADMIN — OXYCODONE 5 MG: 5 TABLET ORAL at 04:46

## 2022-05-09 RX ADMIN — GABAPENTIN 100 MG: 100 CAPSULE ORAL at 13:10

## 2022-05-09 RX ADMIN — FUROSEMIDE 40 MG: 10 INJECTION, SOLUTION INTRAMUSCULAR; INTRAVENOUS at 17:24

## 2022-05-09 RX ADMIN — Medication 4 PUFF: at 08:11

## 2022-05-09 RX ADMIN — DOXEPIN HYDROCHLORIDE 10 MG: 10 CAPSULE ORAL at 22:03

## 2022-05-09 RX ADMIN — INSULIN LISPRO 1 UNITS: 100 INJECTION, SOLUTION INTRAVENOUS; SUBCUTANEOUS at 22:05

## 2022-05-09 RX ADMIN — MEROPENEM 1000 MG: 1 INJECTION, POWDER, FOR SOLUTION INTRAVENOUS at 22:17

## 2022-05-09 RX ADMIN — SODIUM CHLORIDE: 9 INJECTION, SOLUTION INTRAVENOUS at 13:15

## 2022-05-09 RX ADMIN — LOSARTAN POTASSIUM 100 MG: 25 TABLET, FILM COATED ORAL at 08:14

## 2022-05-09 RX ADMIN — Medication 10 ML: at 22:07

## 2022-05-09 RX ADMIN — ENOXAPARIN SODIUM 40 MG: 100 INJECTION SUBCUTANEOUS at 08:24

## 2022-05-09 RX ADMIN — METOPROLOL SUCCINATE 25 MG: 25 TABLET, EXTENDED RELEASE ORAL at 22:04

## 2022-05-09 RX ADMIN — SENNOSIDES 8.6 MG: 8.6 TABLET, COATED ORAL at 22:04

## 2022-05-09 RX ADMIN — SODIUM CHLORIDE: 9 INJECTION, SOLUTION INTRAVENOUS at 04:49

## 2022-05-09 RX ADMIN — PANTOPRAZOLE SODIUM 40 MG: 40 TABLET, DELAYED RELEASE ORAL at 22:05

## 2022-05-09 RX ADMIN — BISACODYL 10 MG: 10 SUPPOSITORY RECTAL at 17:25

## 2022-05-09 RX ADMIN — LEVOTHYROXINE SODIUM 100 MCG: 0.03 TABLET ORAL at 08:14

## 2022-05-09 RX ADMIN — HYDROMORPHONE HYDROCHLORIDE 0.5 MG: 2 INJECTION, SOLUTION INTRAMUSCULAR; INTRAVENOUS; SUBCUTANEOUS at 01:28

## 2022-05-09 RX ADMIN — OXYCODONE 5 MG: 5 TABLET ORAL at 14:28

## 2022-05-09 RX ADMIN — OXYCODONE 5 MG: 5 TABLET ORAL at 08:14

## 2022-05-09 RX ADMIN — METOPROLOL SUCCINATE 25 MG: 25 TABLET, EXTENDED RELEASE ORAL at 08:14

## 2022-05-09 RX ADMIN — GABAPENTIN 600 MG: 300 CAPSULE ORAL at 22:04

## 2022-05-09 RX ADMIN — HYDROMORPHONE HYDROCHLORIDE 0.5 MG: 2 INJECTION, SOLUTION INTRAMUSCULAR; INTRAVENOUS; SUBCUTANEOUS at 22:05

## 2022-05-09 RX ADMIN — MEROPENEM 1000 MG: 1 INJECTION, POWDER, FOR SOLUTION INTRAVENOUS at 13:17

## 2022-05-09 RX ADMIN — VANCOMYCIN HYDROCHLORIDE 1500 MG: 10 INJECTION, POWDER, LYOPHILIZED, FOR SOLUTION INTRAVENOUS at 14:28

## 2022-05-09 RX ADMIN — FAMOTIDINE 20 MG: 20 TABLET, FILM COATED ORAL at 08:13

## 2022-05-09 RX ADMIN — ROPINIROLE HYDROCHLORIDE 1 MG: 0.5 TABLET, FILM COATED ORAL at 22:04

## 2022-05-09 RX ADMIN — GABAPENTIN 100 MG: 100 CAPSULE ORAL at 08:13

## 2022-05-09 RX ADMIN — FAMOTIDINE 20 MG: 20 TABLET, FILM COATED ORAL at 22:09

## 2022-05-09 RX ADMIN — SODIUM CHLORIDE: 9 INJECTION, SOLUTION INTRAVENOUS at 22:16

## 2022-05-09 RX ADMIN — VENLAFAXINE 37.5 MG: 37.5 TABLET ORAL at 08:13

## 2022-05-09 RX ADMIN — ASPIRIN 81 MG: 81 TABLET, COATED ORAL at 08:14

## 2022-05-09 RX ADMIN — ATORVASTATIN CALCIUM 20 MG: 10 TABLET, FILM COATED ORAL at 22:05

## 2022-05-09 RX ADMIN — CETIRIZINE HYDROCHLORIDE 10 MG: 10 TABLET, FILM COATED ORAL at 08:14

## 2022-05-09 RX ADMIN — Medication 10 ML: at 08:31

## 2022-05-09 RX ADMIN — INSULIN LISPRO 4 UNITS: 100 INJECTION, SOLUTION INTRAVENOUS; SUBCUTANEOUS at 17:29

## 2022-05-09 RX ADMIN — FUROSEMIDE 40 MG: 10 INJECTION, SOLUTION INTRAMUSCULAR; INTRAVENOUS at 08:14

## 2022-05-09 RX ADMIN — INSULIN LISPRO 1 UNITS: 100 INJECTION, SOLUTION INTRAVENOUS; SUBCUTANEOUS at 08:16

## 2022-05-09 RX ADMIN — MONTELUKAST SODIUM 10 MG: 10 TABLET ORAL at 08:24

## 2022-05-09 RX ADMIN — MEROPENEM 1000 MG: 1 INJECTION, POWDER, FOR SOLUTION INTRAVENOUS at 04:52

## 2022-05-09 RX ADMIN — PANTOPRAZOLE SODIUM 40 MG: 40 TABLET, DELAYED RELEASE ORAL at 08:14

## 2022-05-09 RX ADMIN — INSULIN LISPRO 2 UNITS: 100 INJECTION, SOLUTION INTRAVENOUS; SUBCUTANEOUS at 13:17

## 2022-05-09 RX ADMIN — Medication 4 PUFF: at 19:42

## 2022-05-09 ASSESSMENT — PAIN SCALES - GENERAL
PAINLEVEL_OUTOF10: 10
PAINLEVEL_OUTOF10: 8
PAINLEVEL_OUTOF10: 9

## 2022-05-09 ASSESSMENT — PAIN DESCRIPTION - LOCATION: LOCATION: BACK

## 2022-05-09 ASSESSMENT — PAIN DESCRIPTION - ONSET: ONSET: ON-GOING

## 2022-05-09 ASSESSMENT — PAIN DESCRIPTION - FREQUENCY: FREQUENCY: CONTINUOUS

## 2022-05-09 NOTE — PROGRESS NOTES
Sent the following to [facility] through a secure message: The patient is c/o 10/10 pain r/t possible osteomyelitis in her lower back radiating to her legs. She has Maria Teresa q 4, but it is not relieving her pain. She had a 0.5 mg dose of IV Dilaudid last night which worked well. Can she have anything else for breakthrough pain? Will look for orders. Thank you.

## 2022-05-09 NOTE — PLAN OF CARE
Patient's EF (Ejection Fraction) is greater than 40%    Heart Failure Medications:   Diuretics[de-identified] Furosemide     (One of the following REQUIRED for EF </= 40%/SYSTOLIC FAILURE but MAY be used in EF% >40%/DIASTOLIC FAILURE)        ACE[de-identified] None        ARB[de-identified] Losartan         ARNI[de-identified] None    (Beta Blockers)   NON- Evidenced Based Beta Blocker (for EF% >40%/DIASTOLIC FAILURE): None     Evidenced Based Beta Blocker::(REQUIRED for EF% <40%/SYSTOLIC FAILURE) Metoprolol SUCCinate- Toprol XL  . .................................................................................................................................................. Patient's weights and intake/output reviewed: Yes    Patient's Last Weight: 224   lbs obtained by bed scale. Difference of 5 lbs more than last documented weight. Intake/Output Summary (Last 24 hours) at 5/9/2022 1200  Last data filed at 5/9/2022 6753  Gross per 24 hour   Intake 820 ml   Output 2800 ml   Net -1980 ml       Comorbidities Reviewed Yes    Patient has a past medical history of Allergic, Anemia, Anesthesia complication, Arthritis, Asthma, Cirrhosis ([address] Cirrhosis, non-alcoholic ([address] COPD (chronic obstructive pulmonary disease) ([address] GERD (gastroesophageal reflux disease), GIB (gastrointestinal bleeding), Haemophilus infection, Heart murmur, Hernia, Hyperlipidemia, Hypertension, Lung collapse, Murmur, heart, Pneumonia, Reflux, Rheumatic fever, Stress fracture, SVT (supraventricular tachycardia) ([address] TIA (transient ischemic attack), Type II or unspecified type diabetes mellitus without mention of complication, not stated as uncontrolled, 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      >>For CHF and Comorbidity documentation on Education Time and Topics, please see Education Tab    Progressive Mobility Assessment:  What is this patient's Current Level of Mobility?: Requires Bed Rest  How was this patient Mobilized today?: Unable to Mobilize and Patient Refuses to Mobilize, ambulated 0   ft                 With Whom? Nurse, PCA and Self                 Level of Difficulty/Assistance: 2x Assist     Pt resting in bed at this time on 3.5 L O2. Pt denies shortness of breath. Pt with pitting lower extremity edema.      Patient and/or Family's stated Goal of Care this Admission: increase activity tolerance, better understand heart failure and disease management, be more comfortable and reduce lower extremity edema prior to discharge        :

## 2022-05-09 NOTE — PROGRESS NOTES
Occupational  & Physical Therapy    Chart reviewed, attempted to see pt for OT & PT eval  this date;  Per RN hold this am until neuro-spinal consult.     Milana Brody [place]

## 2022-05-09 NOTE — DISCHARGE INSTR - COC
Continuity of Care Form    Patient Name: Luca De La Paz   :  1945  MRN:  [id number]    Admit date:  2022  Discharge date:  05/10/2022    Code Status Order: DNR-CCA   Advance Directives:      Admitting Physician:  Мария Lan MD  PCP: Francis Liu.  Salvador Hernandez, DO    Discharging Nurse: [facility] Unit/Room#: 0202/0202-02  Discharging Unit Phone Number: ***    Emergency Contact:   Extended Emergency Contact Information  Primary Emergency Contact: Rahul Yo, [address] [address] Phone: [phone]  Mobile Phone: [phone]  Relation: Child  Secondary Emergency Contact: Orville Archer  Home Phone: [phone]  Mobile Phone: [phone]  Relation: Child    Past Surgical History:  Past Surgical History:   Procedure Laterality Date    BONE MARROW BIOPSY      BREAST BIOPSY      BRONCHOSCOPY N/A 2021    BRONCHOSCOPY DIAGNOSTIC OR CELL [address] ONLY performed by Trenton Titus MD at [address] [place] [address]  2021    BRONCHOSCOPY ALVEOLAR LAVAGE performed by Trenton Titus MD at [address] [place] [address]  2021    BRONCHOSCOPY THERAPUTIC ASPIRATION INITIAL performed by Trenton Titus MD at [facility]  14    CATARACT REMOVAL      COLONOSCOPY      X 3 per PT 16    COLONOSCOPY N/A 6/10/2021    COLONOSCOPY POLYPECTOMY ABLATION performed by Jhonathan Torres MD at [address] [facility] 2013    CORAL BUNIONECTOMY RIGHT FOOT WITH SCREW FIXATION;    HERNIA REPAIR      HYSTERECTOMY      JOINT REPLACEMENT Bilateral     TKR    KNEE SURGERY  09    left    KNEE SURGERY  2010    right    UPPER GASTROINTESTINAL ENDOSCOPY N/A 2018    EGD BIOPSY performed by Paige Casanova MD at [place] [address] 6/10/2021    EGD POLYP ABLATION/OTHER performed by Jhonathan Torres MD at [address] [facility] 6/10/2021    EGD BAND LIGATION performed by Justin Garcia MD at [facility] ENDOSCOPY       Immunization History:   Immunization History   Administered Date(s) Administered    COVID-19, J&J, PF, 0.5 mL 03/08/2021, 12/30/2021    Pneumococcal Conjugate 13-valent (Bayyuww17) 06/25/2013    Pneumococcal Polysaccharide (Mfdexbhws98) 01/01/2005       Active Problems:  Patient Active Problem List   Diagnosis Code    Hyperlipidemia E78.5    S/P knee replacement Z96.659    SVT (supraventricular tachycardia) ([facility]) I47.1    GERD (gastroesophageal reflux disease) K21.9    COPD (chronic obstructive pulmonary disease) ([facility]) J44.9    Pernicious anemia D51.0    Primary osteoarthritis involving multiple joints M89.49    Vitamin D deficiency E55.9    Esophageal varices without bleeding ([facility]) I85.00    Restless leg syndrome G25.81    Pulmonary infiltrates R91.8    3-vessel coronary artery disease I25.10    Essential hypertension, benign I10    Hypothyroidism due to acquired atrophy of thyroid E03.4    Insomnia G47.00    Osteoporosis M81.0    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 ([facility] K75.81, K74.60    Obesity E66.9    Type 2 diabetes mellitus with diabetic polyneuropathy, without long-term current use of insulin ([facility]) E11.42    A-fib ([facility]) I48.91    Atrial fibrillation with RVR ([facility]) I48.91       Isolation/Infection:   Isolation            No Isolation          Patient Infection Status       Infection Onset Added Last Indicated Last Indicated By Review Planned Expiration Resolved Resolved By    None active    Resolved    COVID-19 (Rule Out) 05/12/21 05/12/21 05/12/21 COVID-19, Rapid (Ordered)   05/12/21 Rule-Out Test Resulted    COVID-19 (Rule Out) 05/06/21 05/06/21 05/06/21 SARS-CoV-2 NAAT (Rapid) (Ordered)   05/06/21 Rule-Out Test Resulted            Nurse Assessment:  Last Vital Signs: /64   Pulse 70   Temp 98.1 °F (36.7 °C) (Oral)   Resp 18   Ht 5' 6\" (1.676 m)   Wt 224 lb 10.4 oz (101.9 kg)   SpO2 96%   BMI 36.26 kg/m²     Last documented pain score (0-10 scale): Pain Level: 8  Last Weight:   Wt Readings from Last 1 Encounters:   05/09/22 224 lb 10.4 oz (101.9 kg)     Mental Status:  {IP PT MENTAL STATUS:20030}    IV Access:  - Peripheral IV - site  R forearm, insertion date: 05/08/2022    Nursing Mobility/ADLs:  Walking   Dependent  Transfer  Dependent  Bathing  Dependent  Dressing  Dependent  Toileting  Dependent  Feeding  Independent  Med Admin  Assisted  Med Delivery   whole    Wound Care Documentation and Therapy:        Elimination:  Continence: Bowel: Yes  Bladder: Yes  Urinary Catheter: None   Colostomy/Ileostomy/Ileal Conduit: No       Date of Last BM: 05/10/2022    Intake/Output Summary (Last 24 hours) at 5/9/2022 1916  Last data filed at 5/9/2022 1807  Gross per 24 hour   Intake 540 ml   Output 1550 ml   Net -1010 ml     I/O last 3 completed shifts: In: 1260 [P.O.:1260]  Out: [address] [Urine:3450]    Safety Concerns: At Risk for Falls    Impairments/Disabilities:      None    Nutrition Therapy:  Current Nutrition Therapy:   - Oral Diet:  General    Routes of Feeding: Oral  Liquids: No Restrictions  Daily Fluid Restriction: no  Last Modified Barium Swallow with Video (Video Swallowing Test): not done    Treatments at the Time of Hospital Discharge:   Respiratory Treatments: 3  Oxygen Therapy:  is on oxygen at 3.5 L/min per nasal cannula.   Ventilator:    - No ventilator support    Rehab Therapies: Physical Therapy  Weight Bearing Status/Restrictions: No weight bearing restrictions  Other Medical Equipment (for information only, NOT a DME order):  walker  Other Treatments: ***    Patient's personal belongings (please select all that are sent with patient):  Melo Turk RN SIGNATURE:  Electronically signed by Jaimee Beltran RN on 5/10/22 at 3:24 AM EDT    CASE MANAGEMENT/SOCIAL WORK SECTION    Inpatient Status Date: 05/07/2022    Readmission Risk Assessment Score:  Readmission Risk              Risk of Unplanned Readmission:  21 Discharging to Facility/ Agency   Name:   Address:  Phone:  Fax:    Dialysis Facility (if applicable)   Name:  Address:  Dialysis Schedule:  Phone:  Fax:    / signature: {Esignature:[id number]}    PHYSICIAN SECTION    Prognosis: {Prognosis:2638853821}    Condition at Discharge: Walter Church Patient Condition:211924879}    Rehab Potential (if transferring to Rehab): {Prognosis:8580474467}    Recommended Labs or Other Treatments After Discharge: ***    Physician Certification: I certify the above information and transfer of Mitchell Costello  is necessary for the continuing treatment of the diagnosis listed and that she requires {Admit to Appropriate Level of Care:63215} for {GREATER/LESS:980406027} 30 days.      Update Admission H&P: {CHP DME Changes in ANFWO:188719825}    PHYSICIAN SIGNATURE:  {Esignature:[id number]}

## 2022-05-09 NOTE — CONSULTS
Nutrition Note    Consulted for CHF nutrition education. Pt declined diet education needs at this time. Pt reports good appetite and PO intakes now and PTA. Will continue to monitor.      Electronically signed by Jessie Dawson MS, RD, LD on 5/9/22 at 3:23 PM EDT    Contact: [phone]

## 2022-05-09 NOTE — CARE COORDINATION
CASE MANAGEMENT INITIAL ASSESSMENT      Reviewed chart and completed assessment with patient:at bedside  Family present: no  Explained Case Management role/services. yes    Primary contact information:see below    Health Care Decision Maker :   Primary Decision Maker (Active): Sushant Vale - [phone]    Secondary Decision Maker: Mary Ramirez - [phone]          Can this person be reached and be able to respond quickly, such as within a few minutes or hours? Yes  Admit date/status:05/07/222  Diagnosis:a-fib   Is this a Readmission?:  No      Insurance:[employer]na medicare   Precert required for SNF: Yes       3 night stay required: No    Living arrangements, Adls, care needs, prior to admission:Pt lives alone in a bilevel home w/a chair lift. PT drives, IPTA    Durable Medical Equipment at home:  Walker_x_Cane_x_RTS__x BSC__Shower Chair__  02__ HHN__ CPAP__  BiPap__  Hospital Bed__ W/C___ Other_chair lift, grab bars____    Services in the home and/or outpatient, prior to admission:none    Current PCP:Dawit Méndez., DO                 Medications:no concerns Prescription coverage? Yes Will pt require financial assistance with medications No     Transportation needs: ambulance transfer to [facility]     PT/OT recs:on hold    AndresLeslie Odell 47 Notification (HEN):not initiated    Barriers to discharge:none    Plan/comments:Pt w/neurospine consult, from home, IPTA, c/o pain that started when she squatted approx 2 months ago. Pt now w/transfer orders to [facility]. MD initiated [id number];  CM checked and they are awaiting bed and transfer set up. CM will continue to follow for possible needs.   Jackeline Wells RN      ECOC on chart for MD signature

## 2022-05-09 NOTE — PROGRESS NOTES
Hospitalist Progress Note      PCP: Maia Angela. Fuentes Orozco DO    Date of Admission: 5/7/2022    Chief Complaint: Leg pain     Hospital Course:   77 y. o. female with PMH of SNOW cirrhosis, HLD, HTN, SVT, DM2, rheumatic fever presented to [address] [place] [address] ED with chief complaint of leg pain. Pt reported that 2 months ago she was cleaning and instead of bending down pt squatted down and then noticed a pop and started having some knee pain. The knee pain continued to progress and worsen on the left knee with edema/swelling and pain. Exacerbated on exertion, no alleviating factors. Pt reported that the pain would be up to 9/10. Pt noted to be in Afib with RVR in the ED. Subjective:   Patient seen resting in bed, still reporting low back pain, no new complaints at this time. Denies chest pain or shortness of breath.         Medications:  Reviewed    Infusion Medications    dilTIAZem Stopped (05/08/22 1728)    sodium chloride 10 mL/hr at 05/09/22 1315    dextrose       Scheduled Medications    meropenem  1,000 mg IntraVENous Q8H    lidocaine  1 patch TransDERmal Daily    lidocaine  1 patch TransDERmal Daily    dilTIAZem  360 mg Oral Daily    metoprolol succinate  25 mg Oral BID    insulin lispro  0-12 Units SubCUTAneous TID WC    insulin lispro  0-6 Units SubCUTAneous Nightly    vancomycin  1,500 mg IntraVENous Q18H    dilTIAZem  10 mg IntraVENous Once    sodium chloride flush  10 mL IntraVENous 2 times per day    enoxaparin  40 mg SubCUTAneous Daily    aspirin  81 mg Oral Daily    atorvastatin  20 mg Oral Nightly    fluticasone  4 puff Inhalation BID    cetirizine  10 mg Oral Daily    famotidine  20 mg Oral BID    levothyroxine  100 mcg Oral Daily    losartan  100 mg Oral Daily    montelukast  10 mg Oral Daily    pantoprazole  40 mg Oral BID    rOPINIRole  1 mg Oral Nightly    venlafaxine  37.5 mg Oral Daily    furosemide  40 mg IntraVENous BID    gabapentin  600 mg Oral Nightly    gabapentin  100 mg Oral TID    doxepin  10 mg Oral Nightly     PRN Meds: labetalol, oxyCODONE, HYDROmorphone, metoprolol, sodium chloride flush, sodium chloride, promethazine **OR** ondansetron, acetaminophen **OR** acetaminophen, benzonatate, levalbuterol, traZODone, glucose, glucagon (rDNA), dextrose, dextrose bolus **OR** dextrose bolus      Intake/Output Summary (Last 24 hours) at 5/9/2022 1457  Last data filed at 5/9/2022 1356  Gross per 24 hour   Intake 580 ml   Output 2800 ml   Net -2220 ml       Physical Exam Performed:    BP (!) 153/79   Pulse 61   Temp 97.8 °F (36.6 °C)   Resp 18   Ht 5' 6\" (1.676 m)   Wt 224 lb 10.4 oz (101.9 kg)   SpO2 96%   BMI 36.26 kg/m²     General appearance: No apparent distress, appears stated age and cooperative. HEENT: Pupils equal, round, and reactive to light. Conjunctivae/corneas clear. Neck: Supple, with full range of motion. No jugular venous distention. Trachea midline. Respiratory:  Normal respiratory effort. Clear to auscultation, bilaterally without Rales/Wheezes/Rhonchi. Cardiovascular: Regular rate and rhythm with normal S1/S2 without murmurs, rubs or gallops. Abdomen: Soft, non-tender, non-distended with normal bowel sounds. Musculoskeletal: No clubbing, cyanosis or edema bilaterally. Full range of motion without deformity. Skin: Skin color, texture, turgor normal.  No rashes or lesions. Neurologic:  Neurovascularly intact without any focal sensory/motor deficits.  Cranial nerves: II-XII intact, grossly non-focal.  Psychiatric: Alert and oriented, thought content appropriate, normal insight  Capillary Refill: Brisk,3 seconds, normal   Peripheral Pulses: +2 palpable, equal bilaterally       Labs:   Recent Labs     05/07/22  1652 05/08/22  0707 05/09/22  0646   WBC 6.7 7.2 9.0   HGB 11.5* 11.8* 11.1*   HCT 34.4* 35.4* 33.7*    160 177     Recent Labs     05/07/22  1652 05/08/22  0707 05/09/22  0646   * 136 131*   K 4.3 4.0 4.0   CL 94* 96* 91*   CO2 26 30 33*   BUN 16 11 14   CREATININE 0.8 0.8 0.9   CALCIUM 10.0 9.5 9.5     Recent Labs     05/07/22  1652 05/08/22  0707   AST 22 20   ALT 27 24   BILITOT 0.5 0.6   ALKPHOS 78 73     No results for input(s): INR in the last 72 hours. Recent Labs     05/07/22 1652   CKTOTAL 62   TROPONINI <0.01       Urinalysis:      Lab Results   Component Value Date    NITRU Negative 05/07/2022    WBCUA None seen 05/07/2022    BACTERIA 1+ 05/07/2022    RBCUA 0-2 05/07/2022    BLOODU TRACE-INTACT 05/07/2022    SPECGRAV <=1.005 05/07/2022    GLUCOSEU 100 05/07/2022    GLUCOSEU NEGATIVE 12/30/2009       Radiology:  MRI THORACIC SPINE W WO CONTRAST   Final Result   1. Mild multilevel thoracic spondylosis with slightly exaggerated kyphosis. No high-grade spinal canal stenosis or neural foraminal narrowing. 2. No abnormal spinal cord signal.  No convincing abnormal enhancement within   limitations of motion degradation. MRI LUMBAR SPINE W WO CONTRAST   Final Result   1. Edema and enhancement at L4-5, concerning for discitis/osteomyelitis. Differential would include Modic type 1 degenerative change. 2. Multilevel degenerative change with mild spinal canal narrowing at L3-4   and L4-5.   3. Multilevel neural foraminal narrowing as above. 4. Anterolisthesis at L4-5. CT CHEST PULMONARY EMBOLISM W CONTRAST   Final Result   No evidence of pulmonary embolism. Four-chamber cardiomegaly without   pericardial effusion however smooth interlobular septal thickening with lower   lobe predominance along with mild mosaicism concerning for interstitial   pulmonary edema pattern without decompensation evidence as there is no   pleural effusion evident.       Limited images of the upper abdomen reveal nodular contours of the hepatic   parenchyma concerning for underlying parenchymal disease process such as   cirrhotic morphology without obvious perihepatic ascites         CT Lumbar Spine WO Contrast   Final Result 1. Multiple areas of lucency, with surrounding sclerosis involving the lumbar   spine, most prominent at the L3 and L5 levels. Changes are new since the   prior study. Differential considerations would include multiple Schmorl   nodes and discogenic degenerative changes, or potentially infection, or   metastatic disease. MR imaging of the lumbar spine recommended to further   evaluate these lesions. 2. Diffuse disc protrusion, facet and ligamentous hypertrophy, L4-L5 disc   level, resulting in a moderate degree of acquired central spinal stenosis         XR KNEE RIGHT (1-2 VIEWS)   Final Result   1. No acute fracture or malalignment. Consults  IP CONSULT TO HOSPITALIST  IP CONSULT TO HEART FAILURE NURSE/COORDINATOR  IP CONSULT TO DIETITIAN  IP CONSULT TO CARDIOLOGY  IP CONSULT TO ORTHOPEDIC SURGERY  IP CONSULT TO SPIRITUAL SERVICES  IP CONSULT TO INFECTIOUS DISEASES  IP CONSULT TO SPINE  IP CONSULT TO PHARMACY      Assessment/Plan:    Active Hospital Problems    Diagnosis     Atrial fibrillation with RVR ([address] [I48.91]      Priority: Medium    A-fib ([address] [I48.91]      Priority: Medium        New onset atrial fibrillation:  - Initiated on cardizem drip on admission. PO cardizem started, plan to up titrate per Cards with the addition of metoprolol.   - Cardiology consulted / following -- appreciate. - Obtain ECHO to evaluate cardiac structure and function. - Defer AC to Cardiology. Recommending Eliquis 5 mg PO BID when able.    - Check TSH.      [id number] Score for Atrial Fibrillation Stroke Risk    Risk   Factors   Component Value   C CHF No 0   H HTN Yes 1   A2 Age >= 76 Yes,  (70 y.o.) 2   D DM Yes 1   S2 Prior Stroke/TIA Yes 2   V Vascular Disease No 0   A Age 74-69 No,  (70 y.o.) 0   Sc Sex female 1     [id number]  Score   7   Score last updated 5/8/22 10:05 AM EDT     Possible acute CHF:  - Unknown type, possibly tachycardia induced from Afib.  - CT chest with findings suggestive of CHF/pulmonary edema.   - ECHO from 8/2021 showed normal LVEF and diastolic fx. Obtain updated limited ECHO to evaluate cardiac structure and function.   - Pt started on lasix 40 mg IV BID.   - Maintain 2 liter daily fluid restriction and low sodium diet. - Monitor daily weights, close and I's and O's, daily BMP.      Hypertensive urgency:  - /82 in the ED, ?exacerbated by pain. BP is improved now. - Continue her home losartan. - Monitor BP closely.      Right knee swelling/pain, ?hemarthrosis:  - Orthopedic surgery consulted -- appreciate. - Xray showed no acute fracture.      Acute back pain secondary to possible lumbar discitis/osteomyelitis  - MRI thoracic spine was negative for acute findings.   - MRI lumbar spine showed findings suggestive of discitis/osteomyelitis. - Currently has no neurological deficits and no incontinence. - CRP and sed rate are elevated. Procal is normal. F/u blood cultures. - Recieved dose of Vancomycin and Merrem in the ED -- will continue (has PCN/cephasporin allergy). - ID consulted -- appreciate recommendations in advance. - Spine surgery consulted on 5/8, 45 [facility]surgery requesting transfer to [facility] for further evaluation. Transfer initiated and patient has been accepted to [facility] pending bed opening.      Hyperlipidemia:  - Continue statin.      Diabetes mellitus type 2:  - Unknown control, check A1C.   - Hold her home regimen, SSI meanwhile. - Carb-control diet.      Hypothyroidism:  - Continue her home dose of levothyroxine. Check TSH.      Chronic hypoxic respiratory failure due to COPD / asthma:  - Stable. Continue home inhaler regimen and Singulair.   - She is on 3 LPM at baseline. Monitor closely.     Weeks cirrhosis:  - Stable. Monitor.      GERD:  - Stable. Continue PPI.     Obesity:  -  With Body mass index is 35.35 kg/m². Complicating assessment and treatment.  Placing patient at risk for multiple co-morbidities as well as early death and contributing to the patient's presentation. Counseled on weight loss. DVT Prophylaxis: Lovenox   Diet: ADULT DIET; Regular; 5 carb choices (75 gm/meal);  Low Sodium (2 gm)  Code Status: Full Code    PT/OT Eval Status: Ordered     Dispo - Transfer to [facility] later this afternoon/evening     DAVONTE Ludwig

## 2022-05-09 NOTE — FLOWSHEET NOTE
05/09/22 1528   Encounter Summary   Encounter Overview/Reason  Spiritual/Emotional Needs   Service Provided For: Patient   Referral/Consult From: Other    Support System Children;Friends/neighbors   Last Encounter  05/09/22  (Listening, support, prayer)   Complexity of Encounter Moderate   Begin Time 1145   End Time  1245   Total Time Calculated 60 min   Encounter    Type Follow up   Spiritual/Emotional needs   Type Emotional Distress   Assessment/Intervention/Outcome   Assessment Despair; Interrupted family processes; Powerlessness   Intervention Active listening;Discussed belief system/Confucianism practices/kay;Discussed illness injury and its impact;Grief Care;Life review/[place];Prayer (assurance of)/[place]   Outcome Engaged in conversation;Expressed feelings, needs, and concerns;Expressed Gratitude;Grieving;Receptive      provided listening, support, and prayer for patient who states that her  of 50+ years passed away in 2021 and she is really missing him. Patient engaged in extensive life review, and states that she is struggling to understand why/how she became ill so quickly.  explored the blessings in pt's life with her.

## 2022-05-09 NOTE — PLAN OF CARE
Ortho Plan of Care    Knee work-up results reviewed, crystals and cell count negative, culture pending. Ortho will sign off at this time, please have pt follow up with Dr Marilynn Burkitt.     Lab Results  Crystals, Body Fluid [[id number]]    Collected: 05/08/22 1830    Updated: 05/09/22 0651     Source Body Fluid Knee    Crystals, Fluid None Seen    Path Consult Fluid Yes     Cell Count with Differential, Body Fluid [[id number]]    Collected: 05/08/22 1830    Updated: 05/08/22 2007     Cell Count Fluid Type Knee    Color, Fluid Red    Appearance, Fluid Bloody    Clot Eval. see below    Comment: No Clots Seen       Nucl Cell, Fluid 380 /cumm    RBC, Fluid 8,620 /cumm    Neutrophil Count, Fluid 81 %    Lymphocytes, Body Fluid 17 %    Monocyte Count, Fluid 2 %     Please call with any questions    Esther Jeffrey PA-C

## 2022-05-09 NOTE — PROGRESS NOTES
[facility]   Daily Progress Note    Admit Date:  5/7/2022  HPI:    Chief Complaint   Patient presents with    Leg Pain     started 2 months ago, was seen in ER twice and was told she had a torned muscle. b/l leg pain, took 5mg percocet        Interval history: Wilfredo Roth is being followed for afib. Hx of Chronic hypoxemic respiratory failure    Subjective:  Ms. Sarah Rg denies any chest pain. Shortness of breath is at baseline. Daughter at the bedside. Concerned about distended abd. Patient denies any abd pain. Bowels haven't moved in 2 days.    Discussed code status- wants DNR-CCA; nursing has paperwork     Objective:   /82   Pulse 85   Temp 97.9 °F (36.6 °C)   Resp 20   Ht 5' 6\" (1.676 m)   Wt 224 lb 10.4 oz (101.9 kg)   SpO2 94%   BMI 36.26 kg/m²       Intake/Output Summary (Last 24 hours) at 5/9/2022 1007  Last data filed at 5/9/2022 9702  Gross per 24 hour   Intake 820 ml   Output 3100 ml   Net -2280 ml       NYHA: III    Physical Exam:  General:  Awake, alert, NAD, laying relatively flat in the bed   Skin:  Warm and dry  Neck:  JVD<8  Chest:  Clear to auscultation, no wheezes/rhonchi/rales  Telemetry: afib with conrolled rates  Cardiovascular:  Irregular S1S2, no m/r/g   Abdomen:  Soft, nontender, +bowel sounds  Extremities:  Trace  bilateral lower extremity edema    Medications:    meropenem  1,000 mg IntraVENous Q8H    lidocaine  1 patch TransDERmal Daily    lidocaine  1 patch TransDERmal Daily    dilTIAZem  360 mg Oral Daily    metoprolol succinate  25 mg Oral BID    insulin lispro  0-12 Units SubCUTAneous TID WC    insulin lispro  0-6 Units SubCUTAneous Nightly    vancomycin  1,500 mg IntraVENous Q18H    dilTIAZem  10 mg IntraVENous Once    sodium chloride flush  10 mL IntraVENous 2 times per day    enoxaparin  40 mg SubCUTAneous Daily    aspirin  81 mg Oral Daily    atorvastatin  20 mg Oral Nightly    fluticasone  4 puff Inhalation BID    cetirizine  10 mg Oral Daily    famotidine  20 mg Oral BID    levothyroxine  100 mcg Oral Daily    losartan  100 mg Oral Daily    montelukast  10 mg Oral Daily    pantoprazole  40 mg Oral BID    rOPINIRole  1 mg Oral Nightly    venlafaxine  37.5 mg Oral Daily    furosemide  40 mg IntraVENous BID    gabapentin  600 mg Oral Nightly    gabapentin  100 mg Oral TID    doxepin  10 mg Oral Nightly      dilTIAZem Stopped (05/08/22 1728)    sodium chloride 15 mL/hr at 05/09/22 0449    dextrose         Lab Data:  CBC:   Recent Labs     05/07/22  1652 05/08/22  0707 05/09/22  0646   WBC 6.7 7.2 9.0   HGB 11.5* 11.8* 11.1*    160 177     BMP:    Recent Labs     05/07/22  1652 05/08/22  0707 05/09/22  0646   * 136 131*   K 4.3 4.0 4.0   CO2 26 30 33*   BUN 16 11 14   CREATININE 0.8 0.8 0.9     INR:  No results for input(s): INR in the last 72 hours. BNP:  No results for input(s): PROBNP in the last 72 hours. Lab Results   Component Value Date    LVEF 65 08/31/2021       Testing:  TTE 8/31/21:  Conclusions   Summary   Left ventricular systolic function is hyperdynamic with an estimated   ejection fraction of >= 65%.   The left ventricle is normal in size with mild concentric hypertrophy.   No obvious regional wall motion abnormalities noted.   Normal left ventricular diastolic function.   Mild mitral and aortic regurgitation.   Mild mitral stenosis   Systolic pulmonary artery pressure (SPAP) is normal and estimated at 32 mmHg   (right atrial pressure 3 mmHg).      Stress Test: N/A     Cath: N/A     Other Studies:   CTPA 5/7/22:  No evidence of pulmonary embolism.  Four-chamber cardiomegaly without   pericardial effusion however smooth interlobular septal thickening with lower   lobe predominance along with mild mosaicism concerning for interstitial   pulmonary edema pattern without decompensation evidence as there is no   pleural effusion evident.       Limited images of the upper abdomen reveal nodular contours of the hepatic   parenchyma concerning for underlying parenchymal disease process such as   cirrhotic morphology without obvious perihepatic ascites      MRI Lumbar spine 5/7/22:  1. Edema and enhancement at L4-5, concerning for discitis/osteomyelitis. Differential would include Modic type 1 degenerative change. 2. Multilevel degenerative change with mild spinal canal narrowing at L3-4   and L4-5.   3. Multilevel neural foraminal narrowing as above. 4. Anterolisthesis at L4-5.      Echo 5/9/22    Summary   Limited only f/u for LVEF. Normal left ventricular systolic function with ejection fraction of 55-60%. No regional wall motion abnormalites are seen. Compared to previous study from 8- no changes noted in left   ventricular function. Principal Problem:    A-fib ([address]  Active Problems:    Atrial fibrillation with RVR (HCC)  Resolved Problems:    * No resolved hospital problems. *      Assessment:  Atrial fibrillation with RVR-new onset of unknown duration and with improve control over the last 24 hours   HFpEF with volume overload- mild   Discitis/osteomyelitis  Hypertension  Hyperlipidemia  Diabetes  Hx of pernicious anemia       Plan:  Continue daily weights  Continue diltiazem 360mg daily   Continue toprol 25mg BID  Off of cardizem infusion   Recommend lovenox 1mg/kg for anticoagulation until no more planned procedures and then change to DOAC at discharge  D/c iv lasix and change to Lasix 20mg daily (home dose)   Patient is a DNR-CCA   Will notify the office regarding patient admission and plan to move out her upcoming appt with DR. Nicolle Simental out. Education provided today Disease process and medications discussed. Questions answered fully.   Time spent educating today 10 minutes    ZIGGY Levine - CNP,  5/9/2022, 10:07 AM

## 2022-05-09 NOTE — DISCHARGE SUMMARY
Hospital Medicine Discharge Summary    Patient ID: Alexx Galloway      Patient's PCP: Shereen Terry. Ever Ojeda., DO    Admit Date: 5/7/2022     Discharge Date: 5/10/2022      Admitting Provider: Pardepe Lara MD     Discharge Provider: DAVONTE Hernandez     Discharge Diagnoses: Active Hospital Problems    Diagnosis     Atrial fibrillation with RVR (HCC) [I48.91]      Priority: Medium    A-fib [facility]) [I48.91]      Priority: Medium       The patient was seen and examined on day of discharge and this discharge summary is in conjunction with any daily progress note from day of discharge. Hospital Course:      77 y.o. female who presented to [facility] with past medical history of cirrhosis SNOW, hyperlipidemia, hypertension, SVT, type 2 diabetes non-insulin-dependent, rheumatic fever presented to the ED with chief complaint of leg pain.     Patient reported that 2 months ago she was cleaning and then instead of bending down patient squatted down and then noticed a pop and started having some knee pain. The knee pain continued to progress and worsen on the left knee with edema swelling and pain. Exacerbated on exertion, no alleviating factor. Patient reported that the pain would be up to 9/10. Patient reported to smoke bite with limitation, no recent surgeries. Patient reported that she Schlup work with Dr. Adele Rivera. Patient reports no prior history of atrial fibrillation, strokes. Patient reported that no associate with fever chills nausea vomiting chest pain abdominal pain or dysuria. Patient transferred to [facility] for Neurosurgery evaluation for possible discitis/osteomyelitis     New onset atrial fibrillation  - Initiated on cardizem drip on admission. PO cardizem started, plan to up titrate per Cards with the addition of metoprolol.   - Cardiology consulted / following -- appreciate.    -ECHO 5/9/2022 demonstrated LVEF 55-60%, no wall motion abnormalities   - Defer AC to Cardiology. Recommending Eliquis 5 mg PO BID when able. - TSH WNL      Possible acute CHF:  - Unknown type, possibly tachycardia induced from Afib.  - CT chest with findings suggestive of CHF/pulmonary edema.   -Echo normal   - Maintain 2 liter daily fluid restriction and low sodium diet.   - Monitor daily weights, close and I's and O's, daily BMP.      Hypertensive urgency:  - /82 in the ED, ?exacerbated by pain. BP improved/stable before transfer  - Continue her home losartan. - Monitor BP closely.      Right knee swelling/pain, ?hemarthrosis:  - Orthopedic surgery consulted -- appreciate. - Xray showed no acute fracture. -Orthopedics signed off      Acute back pain secondary to possible lumbar discitis/osteomyelitis  - MRI thoracic spine was negative for acute findings.   - MRI lumbar spine showed findings suggestive of discitis/osteomyelitis.   - Currently has no neurological deficits and no incontinence. - CRP and sed rate are elevated. Procal is normal. F/u blood cultures. - Recieved dose of Vancomycin and Merrem in the ED -- will continue (has PCN/cephasporin allergy). - ID consulted -- appreciate recommendations in advance. - Spine surgery consulted on 5/8, CHEY MERCEDES - [facility] requesting transfer to [facility]. for further evaluation. Transfer initiated and patient has been accepted to [facility] pending bed opening.      Hyperlipidemia:  - Continue statin.      Diabetes mellitus type 2:  - A1c pending time of transfer   - Hold her home regimen, SSI meanwhile. - Carb-control diet.      Hypothyroidism:  - Continue her home dose of levothyroxine. Check TSH.      Chronic hypoxic respiratory failure due to COPD / asthma:  - Stable. Continue home inhaler regimen and Singulair.   - She is on 3 LPM at baseline. Monitor closely.     Weeks cirrhosis:  - Stable. Monitor.      GERD:  - Stable.  Continue PPI.     Obesity:  -  With Body mass index is 68.33 kg/m². Complicating assessment and treatment. Placing patient at risk for multiple co-morbidities as well as early death and contributing to the patient's presentation. Counseled on weight loss. Physical Exam Performed:     BP (!) 111/58   Pulse 81   Temp 98.1 °F (36.7 °C) (Oral)   Resp 18   Ht 5' 6\" (1.676 m)   Wt 224 lb 10.4 oz (101.9 kg)   SpO2 95%   BMI 36.26 kg/m²       General appearance:  No apparent distress, appears stated age and cooperative. HEENT:  Normal cephalic, atraumatic without obvious deformity. Pupils equal, round, and reactive to light. Extra ocular muscles intact. Conjunctivae/corneas clear. Neck: Supple, with full range of motion. No jugular venous distention. Trachea midline. Respiratory:  Normal respiratory effort. Clear to auscultation, bilaterally without Rales/Wheezes/Rhonchi. Cardiovascular:  Regular rate and rhythm with normal S1/S2 without murmurs, rubs or gallops. Abdomen: Soft, non-tender, non-distended with normal bowel sounds. Musculoskeletal:  No clubbing, cyanosis or edema bilaterally. Full range of motion without deformity. Skin: Skin color, texture, turgor normal.  No rashes or lesions. Neurologic:  Neurovascularly intact without any focal sensory/motor deficits. Cranial nerves: II-XII intact, grossly non-focal.  Psychiatric:  Alert and oriented, thought content appropriate, normal insight  Capillary Refill: Brisk,< 3 seconds   Peripheral Pulses: +2 palpable, equal bilaterally       Labs:  For convenience and continuity at follow-up the following most recent labs are provided:      CBC:    Lab Results   Component Value Date    WBC 4.7 05/13/2022    HGB 11.1 05/13/2022    HCT 32.7 05/13/2022     05/13/2022       Renal:    Lab Results   Component Value Date     05/13/2022    K 3.6 05/13/2022    CL 85 05/13/2022    CO2 33 05/13/2022    BUN 9 05/13/2022    CREATININE 0.7 05/13/2022    CALCIUM 9.2 05/13/2022    PHOS 3.3 05/12/2021         Significant Diagnostic Studies    Radiology:   MRI THORACIC SPINE W WO CONTRAST   Final Result   1. Mild multilevel thoracic spondylosis with slightly exaggerated kyphosis. No high-grade spinal canal stenosis or neural foraminal narrowing. 2. No abnormal spinal cord signal.  No convincing abnormal enhancement within   limitations of motion degradation. MRI LUMBAR SPINE W WO CONTRAST   Final Result   1. Edema and enhancement at L4-5, concerning for discitis/osteomyelitis. Differential would include Modic type 1 degenerative change. 2. Multilevel degenerative change with mild spinal canal narrowing at L3-4   and L4-5.   3. Multilevel neural foraminal narrowing as above. 4. Anterolisthesis at L4-5. CT CHEST PULMONARY EMBOLISM W CONTRAST   Final Result   No evidence of pulmonary embolism. Four-chamber cardiomegaly without   pericardial effusion however smooth interlobular septal thickening with lower   lobe predominance along with mild mosaicism concerning for interstitial   pulmonary edema pattern without decompensation evidence as there is no   pleural effusion evident. Limited images of the upper abdomen reveal nodular contours of the hepatic   parenchyma concerning for underlying parenchymal disease process such as   cirrhotic morphology without obvious perihepatic ascites         CT Lumbar Spine WO Contrast   Final Result   1. Multiple areas of lucency, with surrounding sclerosis involving the lumbar   spine, most prominent at the L3 and L5 levels. Changes are new since the   prior study. Differential considerations would include multiple Schmorl   nodes and discogenic degenerative changes, or potentially infection, or   metastatic disease. MR imaging of the lumbar spine recommended to further   evaluate these lesions.    2. Diffuse disc protrusion, facet and ligamentous hypertrophy, L4-L5 disc   level, resulting in a moderate degree of acquired central spinal stenosis         XR KNEE RIGHT (1-2 VIEWS) Final Result   1. No acute fracture or malalignment.                 Consults:     IP CONSULT TO HOSPITALIST  IP CONSULT TO HEART FAILURE NURSE/COORDINATOR  IP CONSULT TO DIETITIAN  IP CONSULT TO CARDIOLOGY  IP CONSULT TO ORTHOPEDIC SURGERY  IP CONSULT TO SPIRITUAL SERVICES  IP CONSULT TO INFECTIOUS DISEASES  IP CONSULT TO SPINE  IP CONSULT TO PHARMACY    Disposition:  Transferred [place] [facility] [place] for Neurosurgical evaluation      Condition at Discharge: Stable    Discharge Instructions/Follow-up:    Transferred to the [facility] [place] 2]     Code Status:  Prior FULL    Activity: activity as tolerated    Diet: regular diet      Discharge Medications:     Discharge Medication List as of 5/10/2022  3:25 AM           Details   !! montelukast (SINGULAIR) 10 MG tablet Take 1 tablet by mouth daily, Disp-90 tablet, R-3Normal      cyanocobalamin 1000 MCG/ML injection Inject 1 mL into the muscle every 14 days Last dose was taken on 4/9/17, Disp-6 each, R-0Normal      naproxen (NAPROSYN) 500 MG tablet Take 1 tablet by mouth 2 times daily (with meals), Disp-28 tablet, R-0Normal      cetirizine (ZYRTEC) 10 MG tablet TAKE ONE TABLET BY MOUTH DAILY, Disp-90 tablet, R-2Normal      doxepin (SINEQUAN) 50 MG capsule Take 1 capsule by mouth nightly for 10 days, Disp-10 capsule, R-0Normal      levothyroxine (SYNTHROID) 100 MCG tablet Take 1 tablet by mouth Daily, Disp-90 tablet, R-1Normal      losartan (COZAAR) 100 MG tablet TAKE 1 TABLET DAILY, Disp-90 tablet, R-0Normal      pantoprazole (PROTONIX) 40 MG tablet TAKE 1 TABLET TWICE A DAY, Disp-180 tablet, R-0Normal      famotidine (PEPCID) 20 MG tablet TAKE 1 TABLET TWICE A DAY, Disp-180 tablet, R-0Normal      dilTIAZem (TIAZAC) 240 MG extended release capsule Take 1 capsule by mouth daily, Disp-90 capsule, R-0Normal      SITagliptin (JANUVIA) 100 MG tablet Take 1 tablet by mouth daily, Disp-90 tablet, R-3Normal      nystatin (MYCOSTATIN) 636735 UNIT/ML suspension Take by mouth 4 times daily, Oral, 4 TIMES DAILY Starting Mon 2/14/2022, Disp-1 each, R-0, Normal      metFORMIN (GLUCOPHAGE-XR) 500 MG extended release tablet TAKE 2 TABLETS TWICE A DAY, Disp-360 tablet, R-3Normal      traZODone (DESYREL) 50 MG tablet TAKE 2 TABLETS NIGHTLY, Disp-180 tablet, R-3Normal      KLOR-CON M20 20 MEQ extended release tablet TAKE 1 TABLET DAILY, Disp-90 tablet, R-3Normal      benzonatate (TESSALON) 100 MG capsule TAKE 1 CAPSULE THREE TIMES A DAY AS NEEDED FOR COUGH, Disp-270 capsule, R-1Normal      Cholecalciferol (VITAMIN D3) 50 MCG (2000 UT) CAPS Take by mouthHistorical Med      venlafaxine (EFFEXOR) 37.5 MG tablet TAKE 1 TABLET DAILY, Disp-90 tablet, R-3Normal      atorvastatin (LIPITOR) 20 MG tablet TAKE 1 TABLET NIGHTLY, Disp-90 tablet, R-3Normal      furosemide (LASIX) 20 MG tablet TAKE 1 TABLET DAILY, Disp-90 tablet, R-3Normal      beclomethasone (QVAR) 80 MCG/ACT inhaler Inhale 2 puffs into the lungs 2 times daily, Disp-3 Inhaler, R-4Normal      levalbuterol (XOPENEX HFA) 45 MCG/ACT inhaler Inhale 2 puffs into the lungs every 6 hours as needed for Shortness of Breath, Disp-3 Inhaler, R-4Normal      gabapentin (NEURONTIN) 600 MG tablet Take 1 tablet by mouth nightly for 30 days.  600 mg at bedtime and 300 mg BID, Disp-30 tablet, R-0Print      ferrous sulfate 325 (65 Fe) MG tablet Take 325 mg by mouth 3 times daily (with meals)Historical Med      diphenhydrAMINE (BENADRYL) 25 MG tablet Take 50 mg by mouth nightly Historical Med      aspirin 81 MG EC tablet Take 81 mg by mouth daily Historical Med      rOPINIRole (REQUIP) 1 MG tablet Take 2 mg by mouth nightly 1mg at 1730, 1mg at 2030, 2mg at 2200 (total of 4mg nightly in divided doses)    Patient takes 1 mg at 8 am. 1 mg at 12 pm. 1 mg at 5 pm. 2 mg at 10 pm.Historical Med      ursodiol (ACTIGALL) 500 MG tablet Take 500 mg by mouth 2 times daily Historical Med      calcium carbonate 600 MG TABS tablet Take 1 tablet by mouth daily Historical Med Multiple Vitamins-Minerals (CENTRUM PO) Take 1 tablet by mouth daily. estrogens, conjugated, (PREMARIN) 1.25 MG tablet Take 1.25 mg by mouth daily. !! montelukast (SINGULAIR) 10 MG tablet Take 10 mg by mouth daily Historical Med       !! - Potential duplicate medications found. Please discuss with provider. Time Spent on discharge is more than 30 minutes in the examination, evaluation, counseling and review of medications and discharge plan. Signed:    DAVONTE Mary   5/24/2022      Thank you Margarite Lanes. Rodgers Bali., DO for the opportunity to be involved in this patient's care. If you have any questions or concerns, please feel free to contact me at [phone].

## 2022-05-09 NOTE — PLAN OF CARE
Problem: Pain  Goal: Verbalizes/displays adequate comfort level or baseline comfort level  Outcome: Progressing  Note: Pt will be satisfied with pain control. Pt uses numeric pain rating scale with reassessments after pain med administration. Will continue to monitor progression throughout shift. Problem: Safety - Adult  Goal: Free from fall injury  Outcome: Progressing  Note: Pt will remain free from falls throughout hospital stay. Fall precautions in place, bed alarm on, bed in lowest position with wheels locked and side rails 2/4 up. Room door open and hourly rounding completed. Will continue to monitor throughout shift. Problem: Skin/Tissue Integrity  Goal: Absence of new skin breakdown  Description: 1. Monitor for areas of redness and/or skin breakdown  2. Assess vascular access sites hourly  3. Every 4-6 hours minimum:  Change oxygen saturation probe site  4. Every 4-6 hours:  If on nasal continuous positive airway pressure, respiratory therapy assess nares and determine need for appliance change or resting period. Outcome: Progressing  Note: Pt is at risk for skin breakdown. Pt will have skin assessments every shift, Q2 turns, heels elevated off of the bed, and friction and shear prevented when possible. Will continue to monitor for signs of skin breakdown and enforce prevention measures.

## 2022-05-09 NOTE — PROGRESS NOTES
Infectious Disease Follow up Notes    CC :  Possible discitis      Antibiotics:   Meropenem 1g q8  vanc 1500 q18     Admit Date:   5/7/2022  Hospital Day: 3    Subjective:   She remains afebrile   Continued low back and ishan LE pain, though she doesn't describe radiating pain. Hurts to sit upright.     Abd is more distended than usual.      Objective:     Patient Vitals for the past 8 hrs:   BP Temp Temp src Pulse Resp SpO2   05/09/22 1138 (!) 153/79 97.8 °F (36.6 °C) -- 61 18 96 %   05/09/22 0844 -- -- -- -- 16 --   05/09/22 0814 -- -- -- -- -- 94 %   05/09/22 0755 126/82 97.9 °F (36.6 °C) Oral 85 20 93 %       EXAM:  General:   Alert, oriented, NAD    HEENT:   NCAT, PERRL, sclera anicteric  NECK:    supple  LUNGS:   Non-labored breathing  Upper lobes clear ishan    CV:   RRR   ABD: Marked distention, soft, NT . BS present   Low spine very TTP    EXT: +LE edema  No focal rash         LINE:    PIV in place        Scheduled Meds:   meropenem  1,000 mg IntraVENous Q8H    lidocaine  1 patch TransDERmal Daily    lidocaine  1 patch TransDERmal Daily    dilTIAZem  360 mg Oral Daily    metoprolol succinate  25 mg Oral BID    insulin lispro  0-12 Units SubCUTAneous TID WC    insulin lispro  0-6 Units SubCUTAneous Nightly    vancomycin  1,500 mg IntraVENous Q18H    dilTIAZem  10 mg IntraVENous Once    sodium chloride flush  10 mL IntraVENous 2 times per day    enoxaparin  40 mg SubCUTAneous Daily    aspirin  81 mg Oral Daily    atorvastatin  20 mg Oral Nightly    fluticasone  4 puff Inhalation BID    cetirizine  10 mg Oral Daily    famotidine  20 mg Oral BID    levothyroxine  100 mcg Oral Daily    losartan  100 mg Oral Daily    montelukast  10 mg Oral Daily    pantoprazole  40 mg Oral BID    rOPINIRole  1 mg Oral Nightly    venlafaxine  37.5 mg Oral Daily    furosemide  40 mg IntraVENous BID    gabapentin  600 mg Oral Nightly  gabapentin  100 mg Oral TID    doxepin  10 mg Oral Nightly       Continuous Infusions:   dilTIAZem Stopped (22 1728)    sodium chloride 15 mL/hr at 22 0449    dextrose            Data Review:    Lab Results   Component Value Date    WBC 9.0 2022    HGB 11.1 (L) 2022    HCT 33.7 (L) 2022    MCV 90.0 2022     2022     Lab Results   Component Value Date    CREATININE 0.9 2022    BUN 14 2022     (L) 2022    K 4.0 2022    CL 91 (L) 2022    CO2 33 (H) 2022       Hepatic Function Panel:   Lab Results   Component Value Date    ALKPHOS 73 2022    ALT 24 2022    AST 20 2022    PROT 7.2 2022    BILITOT 0.6 2022    BILIDIR <0.2 04/10/2016    IBILI see below 04/10/2016    LABALBU 3.7 2022       Sed Rate   Date Value Ref Range Status   2022 38 (H) 0 - 30 mm/Hr Final   2016 30 0 - 30 mm/Hr Final     CRP   Date Value Ref Range Status   2022 39.6 (H) 0.0 - 5.1 mg/L Final     Comment:     WR-CRP Reference range:  30D-199Y    <5.1  <30D        Not established    CRP is used in the detection and evaluation of infection,  tissue injury, inflammatory disorders, and associated  disease. Increases in CRP values are non-specific and  should not be interpreted without a complete clinical  evaluation.  reference ranges have not been  established and values should be interpreted within clinical  context and with serial measurements, if clinically  appropriate. 2016 5.9 (H) 0.0 - 5.1 mg/L Final     Comment:     WR-CRP Reference Range:  0D-29D      <0.6  30D-199Y    <5.1    CRP is used in the detection and evaluation of infection, tissue injury,  inflammatory disorders and associated diseases. Increases in CRP values  are non-specific and should not be interpreted without a complete  clinical evaluation.            MICRO:   BC x2 NGTD   UC NGTD   SF culture NGTD, GS no organisms, SF       IMAGING:  MRI T/L spine w/wo js  Impression   1. Mild multilevel thoracic spondylosis with slightly exaggerated kyphosis. No high-grade spinal canal stenosis or neural foraminal narrowing. 2. No abnormal spinal cord signal.  No convincing abnormal enhancement within   limitations of motion degradation. Impression   1. Edema and enhancement at L4-5, concerning for discitis/osteomyelitis. Differential would include Modic type 1 degenerative change. 2. Multilevel degenerative change with mild spinal canal narrowing at L3-4   and L4-5.   3. Multilevel neural foraminal narrowing as above. 4. Anterolisthesis at L4-5.         CT chest 5/7/22  Impression   No evidence of pulmonary embolism.  Four-chamber cardiomegaly without   pericardial effusion however smooth interlobular septal thickening with lower   lobe predominance along with mild mosaicism concerning for interstitial   pulmonary edema pattern without decompensation evidence as there is no   pleural effusion evident.       Limited images of the upper abdomen reveal nodular contours of the hepatic   parenchyma concerning for underlying parenchymal disease process such as   cirrhotic morphology without obvious perihepatic ascites     TTE 5/9/22   Summary   Limited only f/u for LVEF. Normal left ventricular systolic function with ejection fraction of 55-60%. No regional wall motion abnormalites are seen. Compared to previous study from 8- no changes noted in left   ventricular function.     Assessment:     Patient Active Problem List    Diagnosis Date Noted    Atrial fibrillation with RVR ([address]      Priority: Medium    A-fib ([address] 05/07/2022     Priority: Medium    Pulmonary infiltrates 05/06/2021    Esophageal varices without bleeding ([address] 03/04/2021    Restless leg syndrome 03/04/2021    Primary osteoarthritis involving multiple joints 02/22/2019    Vitamin D deficiency 02/22/2019    Pernicious anemia 02/07/2019    SVT (supraventricular tachycardia) (HCC) 06/19/2016    GERD (gastroesophageal reflux disease)     COPD (chronic obstructive pulmonary disease) (HCC)     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 ([facility] 03/10/2016    Hypothyroidism due to acquired atrophy of thyroid 12/07/2015    Type 2 diabetes mellitus with diabetic polyneuropathy, without long-term current use of insulin ([facility] 02/23/2015    Essential hypertension, benign 02/02/2015    3-vessel coronary artery disease 07/30/2014    S/P knee replacement 01/09/2014    Hyperlipidemia 11/14/2013    Obesity 11/05/2013    Insomnia 01/14/2013    Osteoporosis 09/26/2012       History of cirrhosis from SNOW, DM, HTN, HLD, rheumatic fever    ED 5/7/22 - progressive back pain and acute L knee pain     Low back pain  Suspected lumbar osteo-discitis  Inflammatory markers are not significantly elevated  MRI abnormal   BCx are running negative to date     L knee pain  XR was negative  SF analysis is inconsistent with infection, with WBC count 380    Listed allergy to clinda, amox, Bactrim, cipro, doxy, cephalosporins  Intolerant of levofloxacin   She seems to be tolerating the carbapenem     Plan:     Plan for transfer to [facility] for [facility] evaluation noted  Continue empiric vanc and meropenem for now     The SF analysis of the R knee is not consistent with infection     She appears to have large ascites though it does not seem to be compromising respiratory capacity and there is no abd tenderness or signs of infection       Discussed with patient/family, all questions answered        Masoud Cole MD  Phone: [phone]   Fax : [phone]

## 2022-05-10 ENCOUNTER — HOSPITAL ENCOUNTER (INPATIENT)
Age: 77
LOS: 3 days | Discharge: NURSING, REHAB OR LONG-TERM CARE | DRG: 552 | End: 2022-05-13
Attending: INTERNAL MEDICINE | Admitting: INTERNAL MEDICINE
Payer: MEDICARE

## 2022-05-10 ENCOUNTER — PATIENT MESSAGE (OUTPATIENT)
Dept: CARDIOLOGY CLINIC | Age: 77
End: 2022-05-10

## 2022-05-10 PROBLEM — M46.40 DISCITIS: Status: ACTIVE | Noted: 2022-05-10

## 2022-05-10 LAB
CREAT SERPL-MCNC: 0.9 MG/DL (ref 0.6–1.2)
GFR AFRICAN AMERICAN: >60
GFR NON-AFRICAN AMERICAN: >60
GLUCOSE BLD-MCNC: 166 MG/DL (ref 70–99)
GLUCOSE BLD-MCNC: 169 MG/DL (ref 70–99)
GLUCOSE BLD-MCNC: 183 MG/DL (ref 70–99)
GLUCOSE BLD-MCNC: 186 MG/DL (ref 70–99)
INR BLD: 1.14 (ref 0.88–1.12)
PERFORMED ON: ABNORMAL
PROTHROMBIN TIME: 12.9 SEC (ref 9.9–12.7)
VANCOMYCIN TROUGH: 12.1 UG/ML (ref 10–20)

## 2022-05-10 PROCEDURE — 6370000000 HC RX 637 (ALT 250 FOR IP): Performed by: REGISTERED NURSE

## 2022-05-10 PROCEDURE — 82565 ASSAY OF CREATININE: CPT

## 2022-05-10 PROCEDURE — 6370000000 HC RX 637 (ALT 250 FOR IP): Performed by: INTERNAL MEDICINE

## 2022-05-10 PROCEDURE — 2700000000 HC OXYGEN THERAPY PER DAY

## 2022-05-10 PROCEDURE — 99223 1ST HOSP IP/OBS HIGH 75: CPT | Performed by: INTERNAL MEDICINE

## 2022-05-10 PROCEDURE — 94640 AIRWAY INHALATION TREATMENT: CPT

## 2022-05-10 PROCEDURE — 6360000002 HC RX W HCPCS: Performed by: INTERNAL MEDICINE

## 2022-05-10 PROCEDURE — 2060000000 HC ICU INTERMEDIATE R&B

## 2022-05-10 PROCEDURE — 94761 N-INVAS EAR/PLS OXIMETRY MLT: CPT

## 2022-05-10 PROCEDURE — 36415 COLL VENOUS BLD VENIPUNCTURE: CPT

## 2022-05-10 PROCEDURE — 80202 ASSAY OF VANCOMYCIN: CPT

## 2022-05-10 PROCEDURE — 2580000003 HC RX 258: Performed by: INTERNAL MEDICINE

## 2022-05-10 PROCEDURE — 85610 PROTHROMBIN TIME: CPT

## 2022-05-10 RX ORDER — FAMOTIDINE 20 MG/1
20 TABLET, FILM COATED ORAL 2 TIMES DAILY
Status: DISCONTINUED | OUTPATIENT
Start: 2022-05-10 | End: 2022-05-10

## 2022-05-10 RX ORDER — METOPROLOL SUCCINATE 25 MG/1
25 TABLET, EXTENDED RELEASE ORAL 2 TIMES DAILY
Status: DISCONTINUED | OUTPATIENT
Start: 2022-05-10 | End: 2022-05-13 | Stop reason: HOSPADM

## 2022-05-10 RX ORDER — METOPROLOL TARTRATE 5 MG/5ML
5 INJECTION INTRAVENOUS
Status: DISCONTINUED | OUTPATIENT
Start: 2022-05-10 | End: 2022-05-10

## 2022-05-10 RX ORDER — ONDANSETRON 2 MG/ML
4 INJECTION INTRAMUSCULAR; INTRAVENOUS EVERY 6 HOURS PRN
Status: DISCONTINUED | OUTPATIENT
Start: 2022-05-10 | End: 2022-05-13 | Stop reason: HOSPADM

## 2022-05-10 RX ORDER — INSULIN LISPRO 100 [IU]/ML
0-18 INJECTION, SOLUTION INTRAVENOUS; SUBCUTANEOUS
Status: DISCONTINUED | OUTPATIENT
Start: 2022-05-10 | End: 2022-05-13 | Stop reason: HOSPADM

## 2022-05-10 RX ORDER — FLUTICASONE PROPIONATE 110 UG/1
4 AEROSOL, METERED RESPIRATORY (INHALATION) 2 TIMES DAILY
Status: DISCONTINUED | OUTPATIENT
Start: 2022-05-10 | End: 2022-05-10 | Stop reason: ALTCHOICE

## 2022-05-10 RX ORDER — METOPROLOL SUCCINATE 25 MG/1
25 TABLET, EXTENDED RELEASE ORAL 2 TIMES DAILY
Status: DISCONTINUED | OUTPATIENT
Start: 2022-05-10 | End: 2022-05-10 | Stop reason: SDUPTHER

## 2022-05-10 RX ORDER — LEVALBUTEROL TARTRATE 45 UG/1
2 AEROSOL, METERED ORAL EVERY 6 HOURS PRN
Status: DISCONTINUED | OUTPATIENT
Start: 2022-05-10 | End: 2022-05-10 | Stop reason: ALTCHOICE

## 2022-05-10 RX ORDER — SENNA PLUS 8.6 MG/1
1 TABLET ORAL 2 TIMES DAILY
Status: DISCONTINUED | OUTPATIENT
Start: 2022-05-10 | End: 2022-05-13 | Stop reason: HOSPADM

## 2022-05-10 RX ORDER — MONTELUKAST SODIUM 10 MG/1
10 TABLET ORAL DAILY
Status: DISCONTINUED | OUTPATIENT
Start: 2022-05-10 | End: 2022-05-13 | Stop reason: HOSPADM

## 2022-05-10 RX ORDER — SODIUM CHLORIDE 0.9 % (FLUSH) 0.9 %
10 SYRINGE (ML) INJECTION PRN
Status: DISCONTINUED | OUTPATIENT
Start: 2022-05-10 | End: 2022-05-13 | Stop reason: HOSPADM

## 2022-05-10 RX ORDER — SODIUM CHLORIDE 0.9 % (FLUSH) 0.9 %
10 SYRINGE (ML) INJECTION PRN
Status: DISCONTINUED | OUTPATIENT
Start: 2022-05-10 | End: 2022-05-10 | Stop reason: SDUPTHER

## 2022-05-10 RX ORDER — MAGNESIUM SULFATE IN WATER 40 MG/ML
2000 INJECTION, SOLUTION INTRAVENOUS PRN
Status: DISCONTINUED | OUTPATIENT
Start: 2022-05-10 | End: 2022-05-13 | Stop reason: HOSPADM

## 2022-05-10 RX ORDER — POTASSIUM CHLORIDE 7.45 MG/ML
10 INJECTION INTRAVENOUS PRN
Status: DISCONTINUED | OUTPATIENT
Start: 2022-05-10 | End: 2022-05-13 | Stop reason: HOSPADM

## 2022-05-10 RX ORDER — ACETAMINOPHEN 325 MG/1
650 TABLET ORAL EVERY 6 HOURS PRN
Status: DISCONTINUED | OUTPATIENT
Start: 2022-05-10 | End: 2022-05-13 | Stop reason: HOSPADM

## 2022-05-10 RX ORDER — OXYCODONE HYDROCHLORIDE 5 MG/1
5 TABLET ORAL EVERY 4 HOURS PRN
Status: DISCONTINUED | OUTPATIENT
Start: 2022-05-10 | End: 2022-05-10 | Stop reason: SDUPTHER

## 2022-05-10 RX ORDER — ATORVASTATIN CALCIUM 20 MG/1
20 TABLET, FILM COATED ORAL NIGHTLY
Status: DISCONTINUED | OUTPATIENT
Start: 2022-05-10 | End: 2022-05-13 | Stop reason: HOSPADM

## 2022-05-10 RX ORDER — PROMETHAZINE HYDROCHLORIDE 12.5 MG/1
12.5 TABLET ORAL EVERY 6 HOURS PRN
Status: DISCONTINUED | OUTPATIENT
Start: 2022-05-10 | End: 2022-05-13 | Stop reason: HOSPADM

## 2022-05-10 RX ORDER — TRAZODONE HYDROCHLORIDE 50 MG/1
50 TABLET ORAL NIGHTLY PRN
Status: DISCONTINUED | OUTPATIENT
Start: 2022-05-10 | End: 2022-05-12

## 2022-05-10 RX ORDER — SODIUM CHLORIDE 0.9 % (FLUSH) 0.9 %
10 SYRINGE (ML) INJECTION EVERY 12 HOURS SCHEDULED
Status: DISCONTINUED | OUTPATIENT
Start: 2022-05-10 | End: 2022-05-10 | Stop reason: SDUPTHER

## 2022-05-10 RX ORDER — LORAZEPAM 2 MG/ML
1 INJECTION INTRAMUSCULAR ONCE
Status: DISCONTINUED | OUTPATIENT
Start: 2022-05-10 | End: 2022-05-10

## 2022-05-10 RX ORDER — FUROSEMIDE 20 MG/1
20 TABLET ORAL DAILY
Status: DISCONTINUED | OUTPATIENT
Start: 2022-05-10 | End: 2022-05-13 | Stop reason: HOSPADM

## 2022-05-10 RX ORDER — DOXEPIN HYDROCHLORIDE 10 MG/1
10 CAPSULE ORAL NIGHTLY
Status: DISCONTINUED | OUTPATIENT
Start: 2022-05-10 | End: 2022-05-13 | Stop reason: HOSPADM

## 2022-05-10 RX ORDER — DILTIAZEM HYDROCHLORIDE 180 MG/1
360 CAPSULE, COATED, EXTENDED RELEASE ORAL DAILY
Status: DISCONTINUED | OUTPATIENT
Start: 2022-05-10 | End: 2022-05-13 | Stop reason: HOSPADM

## 2022-05-10 RX ORDER — BUDESONIDE 0.5 MG/2ML
0.5 INHALANT ORAL 2 TIMES DAILY
Status: DISCONTINUED | OUTPATIENT
Start: 2022-05-10 | End: 2022-05-13 | Stop reason: HOSPADM

## 2022-05-10 RX ORDER — DILTIAZEM HYDROCHLORIDE 180 MG/1
360 CAPSULE, COATED, EXTENDED RELEASE ORAL DAILY
Status: DISCONTINUED | OUTPATIENT
Start: 2022-05-10 | End: 2022-05-10 | Stop reason: SDUPTHER

## 2022-05-10 RX ORDER — SODIUM CHLORIDE 0.9 % (FLUSH) 0.9 %
10 SYRINGE (ML) INJECTION EVERY 12 HOURS SCHEDULED
Status: DISCONTINUED | OUTPATIENT
Start: 2022-05-10 | End: 2022-05-13 | Stop reason: HOSPADM

## 2022-05-10 RX ORDER — ACETAMINOPHEN 325 MG/1
650 TABLET ORAL EVERY 6 HOURS PRN
Status: DISCONTINUED | OUTPATIENT
Start: 2022-05-10 | End: 2022-05-10 | Stop reason: SDUPTHER

## 2022-05-10 RX ORDER — NICOTINE POLACRILEX 4 MG
15 LOZENGE BUCCAL PRN
Status: DISCONTINUED | OUTPATIENT
Start: 2022-05-10 | End: 2022-05-10 | Stop reason: ALTCHOICE

## 2022-05-10 RX ORDER — INSULIN LISPRO 100 [IU]/ML
0-9 INJECTION, SOLUTION INTRAVENOUS; SUBCUTANEOUS NIGHTLY
Status: DISCONTINUED | OUTPATIENT
Start: 2022-05-10 | End: 2022-05-13 | Stop reason: HOSPADM

## 2022-05-10 RX ORDER — GABAPENTIN 100 MG/1
100 CAPSULE ORAL 3 TIMES DAILY
Status: DISCONTINUED | OUTPATIENT
Start: 2022-05-10 | End: 2022-05-13 | Stop reason: HOSPADM

## 2022-05-10 RX ORDER — DEXTROSE MONOHYDRATE 50 MG/ML
100 INJECTION, SOLUTION INTRAVENOUS PRN
Status: DISCONTINUED | OUTPATIENT
Start: 2022-05-10 | End: 2022-05-13 | Stop reason: HOSPADM

## 2022-05-10 RX ORDER — LOSARTAN POTASSIUM 25 MG/1
100 TABLET ORAL DAILY
Status: DISCONTINUED | OUTPATIENT
Start: 2022-05-10 | End: 2022-05-13 | Stop reason: HOSPADM

## 2022-05-10 RX ORDER — ACETAMINOPHEN 650 MG/1
650 SUPPOSITORY RECTAL EVERY 6 HOURS PRN
Status: DISCONTINUED | OUTPATIENT
Start: 2022-05-10 | End: 2022-05-10 | Stop reason: SDUPTHER

## 2022-05-10 RX ORDER — LABETALOL 20 MG/4 ML (5 MG/ML) INTRAVENOUS SYRINGE
10 EVERY 4 HOURS PRN
Status: DISCONTINUED | OUTPATIENT
Start: 2022-05-10 | End: 2022-05-13 | Stop reason: HOSPADM

## 2022-05-10 RX ORDER — ONDANSETRON 2 MG/ML
4 INJECTION INTRAMUSCULAR; INTRAVENOUS EVERY 6 HOURS PRN
Status: DISCONTINUED | OUTPATIENT
Start: 2022-05-10 | End: 2022-05-10 | Stop reason: SDUPTHER

## 2022-05-10 RX ORDER — PROMETHAZINE HYDROCHLORIDE 12.5 MG/1
12.5 TABLET ORAL EVERY 6 HOURS PRN
Status: DISCONTINUED | OUTPATIENT
Start: 2022-05-10 | End: 2022-05-10 | Stop reason: SDUPTHER

## 2022-05-10 RX ORDER — LIDOCAINE 4 G/G
1 PATCH TOPICAL DAILY
Status: DISCONTINUED | OUTPATIENT
Start: 2022-05-10 | End: 2022-05-13 | Stop reason: HOSPADM

## 2022-05-10 RX ORDER — ROPINIROLE 1 MG/1
1 TABLET, FILM COATED ORAL NIGHTLY
Status: DISCONTINUED | OUTPATIENT
Start: 2022-05-10 | End: 2022-05-13 | Stop reason: HOSPADM

## 2022-05-10 RX ORDER — SODIUM CHLORIDE 9 MG/ML
INJECTION, SOLUTION INTRAVENOUS PRN
Status: DISCONTINUED | OUTPATIENT
Start: 2022-05-10 | End: 2022-05-13 | Stop reason: HOSPADM

## 2022-05-10 RX ORDER — BENZONATATE 100 MG/1
200 CAPSULE ORAL 3 TIMES DAILY PRN
Status: DISCONTINUED | OUTPATIENT
Start: 2022-05-10 | End: 2022-05-13 | Stop reason: HOSPADM

## 2022-05-10 RX ORDER — OXYCODONE HYDROCHLORIDE 5 MG/1
10 TABLET ORAL EVERY 4 HOURS PRN
Status: DISCONTINUED | OUTPATIENT
Start: 2022-05-10 | End: 2022-05-10 | Stop reason: SDUPTHER

## 2022-05-10 RX ORDER — LEVOTHYROXINE SODIUM 0.1 MG/1
100 TABLET ORAL DAILY
Status: DISCONTINUED | OUTPATIENT
Start: 2022-05-10 | End: 2022-05-13 | Stop reason: HOSPADM

## 2022-05-10 RX ORDER — PANTOPRAZOLE SODIUM 40 MG/1
40 TABLET, DELAYED RELEASE ORAL 2 TIMES DAILY
Status: DISCONTINUED | OUTPATIENT
Start: 2022-05-10 | End: 2022-05-13 | Stop reason: HOSPADM

## 2022-05-10 RX ORDER — OXYCODONE HYDROCHLORIDE 5 MG/1
5 TABLET ORAL EVERY 4 HOURS PRN
Status: DISCONTINUED | OUTPATIENT
Start: 2022-05-10 | End: 2022-05-13

## 2022-05-10 RX ORDER — VENLAFAXINE 75 MG/1
37.5 TABLET ORAL DAILY
Status: DISCONTINUED | OUTPATIENT
Start: 2022-05-10 | End: 2022-05-13 | Stop reason: HOSPADM

## 2022-05-10 RX ORDER — LEVALBUTEROL 1.25 MG/.5ML
1.25 SOLUTION, CONCENTRATE RESPIRATORY (INHALATION) 2 TIMES DAILY
Status: DISCONTINUED | OUTPATIENT
Start: 2022-05-10 | End: 2022-05-13 | Stop reason: HOSPADM

## 2022-05-10 RX ORDER — ACETAMINOPHEN 650 MG/1
650 SUPPOSITORY RECTAL EVERY 6 HOURS PRN
Status: DISCONTINUED | OUTPATIENT
Start: 2022-05-10 | End: 2022-05-13 | Stop reason: HOSPADM

## 2022-05-10 RX ORDER — CETIRIZINE HYDROCHLORIDE 10 MG/1
10 TABLET ORAL DAILY
Status: DISCONTINUED | OUTPATIENT
Start: 2022-05-10 | End: 2022-05-13 | Stop reason: HOSPADM

## 2022-05-10 RX ORDER — LEVALBUTEROL 1.25 MG/.5ML
1.25 SOLUTION, CONCENTRATE RESPIRATORY (INHALATION) EVERY 6 HOURS PRN
Status: DISCONTINUED | OUTPATIENT
Start: 2022-05-10 | End: 2022-05-13 | Stop reason: HOSPADM

## 2022-05-10 RX ADMIN — OXYCODONE 10 MG: 5 TABLET ORAL at 06:42

## 2022-05-10 RX ADMIN — LOSARTAN POTASSIUM 100 MG: 25 TABLET, FILM COATED ORAL at 09:47

## 2022-05-10 RX ADMIN — SODIUM CHLORIDE, PRESERVATIVE FREE 10 ML: 5 INJECTION INTRAVENOUS at 09:51

## 2022-05-10 RX ADMIN — BUDESONIDE 500 MCG: 0.5 SUSPENSION RESPIRATORY (INHALATION) at 11:55

## 2022-05-10 RX ADMIN — OXYCODONE 2.5 MG: 5 TABLET ORAL at 18:53

## 2022-05-10 RX ADMIN — GABAPENTIN 100 MG: 100 CAPSULE ORAL at 20:42

## 2022-05-10 RX ADMIN — PANTOPRAZOLE SODIUM 40 MG: 40 TABLET, DELAYED RELEASE ORAL at 20:43

## 2022-05-10 RX ADMIN — PANTOPRAZOLE SODIUM 40 MG: 40 TABLET, DELAYED RELEASE ORAL at 09:47

## 2022-05-10 RX ADMIN — OXYCODONE 5 MG: 5 TABLET ORAL at 02:16

## 2022-05-10 RX ADMIN — DILTIAZEM HYDROCHLORIDE 360 MG: 180 CAPSULE, COATED, EXTENDED RELEASE ORAL at 18:41

## 2022-05-10 RX ADMIN — METOPROLOL SUCCINATE 25 MG: 25 TABLET, EXTENDED RELEASE ORAL at 20:43

## 2022-05-10 RX ADMIN — GABAPENTIN 100 MG: 100 CAPSULE ORAL at 14:29

## 2022-05-10 RX ADMIN — ATORVASTATIN CALCIUM 20 MG: 20 TABLET, FILM COATED ORAL at 20:42

## 2022-05-10 RX ADMIN — LEVALBUTEROL 1.25 MG: 1.25 SOLUTION, CONCENTRATE RESPIRATORY (INHALATION) at 21:09

## 2022-05-10 RX ADMIN — GABAPENTIN 100 MG: 100 CAPSULE ORAL at 09:47

## 2022-05-10 RX ADMIN — BUDESONIDE 500 MCG: 0.5 SUSPENSION RESPIRATORY (INHALATION) at 21:09

## 2022-05-10 RX ADMIN — SENNOSIDES 8.6 MG: 8.6 TABLET, COATED ORAL at 09:47

## 2022-05-10 RX ADMIN — MEROPENEM 1000 MG: 1 INJECTION, POWDER, FOR SOLUTION INTRAVENOUS at 09:58

## 2022-05-10 RX ADMIN — MONTELUKAST 10 MG: 10 TABLET, FILM COATED ORAL at 09:47

## 2022-05-10 RX ADMIN — VENLAFAXINE 37.5 MG: 75 TABLET ORAL at 09:47

## 2022-05-10 RX ADMIN — SENNOSIDES 8.6 MG: 8.6 TABLET, COATED ORAL at 20:43

## 2022-05-10 RX ADMIN — SODIUM CHLORIDE, PRESERVATIVE FREE 10 ML: 5 INJECTION INTRAVENOUS at 20:44

## 2022-05-10 RX ADMIN — INSULIN LISPRO 2 UNITS: 100 INJECTION, SOLUTION INTRAVENOUS; SUBCUTANEOUS at 20:46

## 2022-05-10 RX ADMIN — ROPINIROLE HYDROCHLORIDE 1 MG: 1 TABLET, FILM COATED ORAL at 20:42

## 2022-05-10 RX ADMIN — CETIRIZINE HYDROCHLORIDE 10 MG: 10 TABLET, FILM COATED ORAL at 09:47

## 2022-05-10 RX ADMIN — INSULIN LISPRO 3 UNITS: 100 INJECTION, SOLUTION INTRAVENOUS; SUBCUTANEOUS at 17:41

## 2022-05-10 RX ADMIN — FUROSEMIDE 20 MG: 20 TABLET ORAL at 09:47

## 2022-05-10 ASSESSMENT — PAIN DESCRIPTION - LOCATION: LOCATION: BACK

## 2022-05-10 ASSESSMENT — PAIN SCALES - GENERAL
PAINLEVEL_OUTOF10: 8
PAINLEVEL_OUTOF10: 0
PAINLEVEL_OUTOF10: 7
PAINLEVEL_OUTOF10: 8

## 2022-05-10 ASSESSMENT — PAIN DESCRIPTION - DESCRIPTORS: DESCRIPTORS: ACHING;DISCOMFORT

## 2022-05-10 NOTE — CONSULTS
NEUROSURGERY CONSULT  [facility]  [id number]   1945   5/10/2022    Requesting physician: Noé Gomez DO    Reason for consultation: discitis     History of present illness: 77 y.o. female w/ PMH of cirrhosis SNOW, hyperlipidemia, hypertension, SVT, type 2 diabetes non-insulin-dependent, rheumatic fever presented to the ED with chief complaint of back pain and knee pain.      Patient reported that 2 months ago she was cleaning and then instead of bending down patient squatted down and then noticed a pop and started having some right knee pain. She also reports acute onset low back pain at this time. She does have a history of bilateral TKA. She had increasing right knee pain and swelling and worsening back pain, she was unable to get into her tall SUV after ambulating normally in the grocery store, this is what brought her to the ED. Her right knee pain/swelling was evaluated by orthopedic surgery who performed an arthrocentesis on 5/8, this did not show evidence of infection.     Her lower extremity pain is not radicular pain in nature, she reports pain is in her knees only. She has low midline back pain with movement. MRI lumbar spine w wo contrast demonstrates edema and enhancement at L4-5, concerning for discitis/osteomyelitis. Sed rate was 38, CRP 39.6, WBC 6.7. Blood cultures with no growth to date, UA negative for infection. She was started on broad spectrum ABX at outside hospital by infectious disease. Patient reported that no associated fever, chills, weight loss. No bowel/bladder incontinence. She was transferred to [facility] for evaluation of her lumbar discitis. ROS:   GENERAL:  Denies fever or recent illness.  Denies weight changes   EYES:  Denies vision change or diplopia  EARS:  Denies hearing loss  CARDIAC:  Denies chest pain  RESPIRATORY:  Denies shortness of breath  SKIN:  Denies rash or lesions   HEM:  Denies excessive bruising  PSYCH:  +anxiety  NEURO: + weakness, denies numbness or tingling    :  Denies urinary difficulty  GI: Denies nausea, vomiting, diarrhea or constipation  MUSCULOSKELETAL:  +back pain, + right knee pain     Allergies   Allergen Reactions    Latex Swelling and Rash    Clindamycin Itching    Pennsaid [Diclofenac Sodium] Itching and Rash    Torsemide Itching    Actos [Pioglitazone] Diarrhea    Amoxicillin Other (See Comments)    Augmentin [Amoxicillin-Pot Clavulanate] Other (See Comments)    Bactrim [Sulfamethoxazole-Trimethoprim] Other (See Comments)    Ciprofloxacin Other (See Comments)     Makes her weird  Makes anxious and she has weird dreams    Doxycycline Other (See Comments)     Feels like she is smothering, chest tightness    Levaquin [Levofloxacin]      Body aches    Ativan [Lorazepam] Other (See Comments) and Anxiety     And confusion  Had weird dreams and hallucinations    Cephalosporins Rash    Pcn [Penicillins] Rash and Itching    Proventil [Albuterol] Anxiety       Past Medical History:   Diagnosis Date    Allergic     Anemia     Anesthesia complication     \"woke up during surgery\"    Arthritis     Osteoarthritis of bilateral [facility]MC joints    Asthma     Followed by Dr. Donald Tuttle Cirrhosis [facility])     non alcoholic    Cir[facility], non-alcoholic [facility]     COPD (chronic obstructive pulmonary disease) ([facility]     GERD (gastroesophageal reflux disease)     GIB (gastrointestinal bleeding)     LGIB 4/2016    Haemophilus infection 05/07/2021    + resp    Heart murmur     Hernia     Hyperlipidemia     Hypertension     Lung collapse     Murmur, heart     Pneumonia     Reflux     Rheumatic fever     Stress fracture     right foot    SVT (supraventricular tachycardia) ([facility]     6/19/16 - admitted X 1 day per PT     TIA (transient ischemic attack) 2015    Type II or unspecified type diabetes mellitus without mention of complication, not stated as uncontrolled     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 ([facility]     Wears glasses     as needed    Wears partial dentures     upper partial        Past Surgical History:   Procedure Laterality Date    BONE MARROW BIOPSY      BREAST BIOPSY      BRONCHOSCOPY N/A 2021    BRONCHOSCOPY DIAGNOSTIC OR CELL [address] ONLY performed by Bernice Garza MD at  [facility]  2021    BRONCHOSCOPY ALVEOLAR LAVAGE performed by Bernice Garza MD at  [facility]  2021    BRONCHOSCOPY THERAPUTIC ASPIRATION INITIAL performed by Bernice Garza MD at [address]    CATARACT REMOVAL      COLONOSCOPY      X 3 per PT 16    COLONOSCOPY N/A 6/10/2021    COLONOSCOPY POLYPECTOMY ABLATION performed by Mihaela Baker MD at [address] Right 2013    CORAL BUNIONECTOMY RIGHT FOOT WITH SCREW FIXATION;    HERNIA REPAIR      HYSTERECTOMY      JOINT REPLACEMENT Bilateral     TKR    KNEE SURGERY  09    left    KNEE SURGERY  2010    right    UPPER GASTROINTESTINAL ENDOSCOPY N/A 2018    EGD BIOPSY performed by Stefani Taylor MD at [address] 6/10/2021    EGD POLYP ABLATION/OTHER performed by Mihaela Baker MD at [address] [facility] 6/10/2021    EGD BAND LIGATION performed by Mihaela Baker MD at [address] [facility] History     Occupational History     Employer: [employer] Smoking status: Former Smoker     Packs/day: 1.00     Years: 40.00     Pack years: 40.00     Types: Cigarettes     Start date: 1956     Quit date: 2000     Years since quittin.9    Smokeless tobacco: Never Used    Tobacco comment: stopped cold turkey in !! ( )   Vaping Use    Vaping Use: Never used   Substance and Sexual Activity    Alcohol use: No     Alcohol/week: 0.0 standard drinks    Drug use: No    Sexual activity: Not Currently        Family History   Problem Relation Age of Onset    Colon Cancer Mother     Other Mother     Diabetes Father     Kidney Disease Paternal Grandmother     Lupus Daughter     Lupus Other         No outpatient medications have been marked as taking for the 5/10/22 encounter [facility] Encounter).       Current Facility-Administered Medications   Medication Dose Route Frequency Provider Last Rate Last Admin    0.9 % sodium chloride infusion   IntraVENous PRN Bin Pecrys A Benita, DO        potassium chloride 10 mEq/100 mL IVPB (Peripheral Line)  10 mEq IntraVENous PRN Hoa Harpreet, DO        magnesium sulfate 2000 mg in 50 mL IVPB premix  2,000 mg IntraVENous PRN Bin Peyer A Benita, DO        atorvastatin (LIPITOR) tablet 20 mg  20 mg Oral Nightly Geovanni Hardy MD        cetirizine (ZYRTEC) tablet 10 mg  10 mg Oral Daily Geovanni Hardy MD        dilTIAZem (CARDIZEM CD) extended release capsule 360 mg  360 mg Oral Daily Geovanni Hardy MD        doxepin (SINEQUAN) capsule 10 mg  10 mg Oral Nightly Geovanni Hardy MD        fluticasone (FLOVENT HFA) 110 MCG/ACT inhaler 4 puff  4 puff Inhalation BID Geovanni Hardy MD        furosemide (LASIX) tablet 20 mg  20 mg Oral Daily Geovanni Hardy MD        gabapentin (NEURONTIN) capsule 100 mg  100 mg Oral TID Geovanni Hardy MD        levothyroxine (SYNTHROID) tablet 100 mcg  100 mcg Oral Daily Geovanni Hardy MD        lidocaine 4 % external patch 1 patch  1 patch TransDERmal Daily Geovanni Hardy MD        losartan (COZAAR) tablet 100 mg  100 mg Oral Daily Geovanni Hardy MD        metoprolol succinate (TOPROL XL) extended release tablet 25 mg  25 mg Oral BID Geovanni Hardy MD        montelukast (SINGULAIR) tablet 10 mg  10 mg Oral Daily Geovanni Hardy MD        pantoprazole (PROTONIX) tablet 40 mg  40 mg Oral BID Geovanni Hardy MD        rOPINIRole (REQUIP) tablet 1 mg  1 mg Oral Nightly Geovanni Hardy MD        [facility]) tablet 8.6 mg  1 tablet Oral BID Karen Lauren MD        sodium chloride flush 0.9 % injection 10 mL  10 mL IntraVENous 2 times per day Karen Lauren MD        vancomycin (VANCOCIN) 1,500 mg in dextrose 5 % 250 mL IVPB  1,500 mg IntraVENous Q18H Karen Lauren MD        [facility]) tablet 37.5 mg  37.5 mg Oral Daily Karen Lauren MD        0.9 % sodium chloride infusion   IntraVENous PRN Karen Lauren MD        acetaminophen (TYLENOL) tablet 650 mg  650 mg Oral Q6H PRN Karen Lauren MD        Or    acetaminophen (TYLENOL) suppository 650 mg  650 mg Rectal Q6H PRN Karen Lauren MD        benzonatate (TESSALON) capsule 200 mg  200 mg Oral TID PRN Karen Lauren MD        dextrose 5 % solution  100 mL/hr IntraVENous PRN Karen Lauren MD        dextrose bolus 10% 125 mL  125 mL IntraVENous PRN Karen Lauren MD        Or    dextrose bolus 10% 250 mL  250 mL IntraVENous PRN Karen Lauren MD        glucagon (rDNA) injection 1 mg  1 mg IntraMUSCular PRN Karen Lauren MD        labetalol (NORMODYNE;TRANDATE) injection syringe 10 mg  10 mg IntraVENous Q4H PRN Karen Lauren MD        levalbuterol Arlina Asa HFA) inhaler 2 puff  2 puff Inhalation Q6H PRN Karen Lauren MD        oxyCODONE (ROXICODONE) immediate release tablet 5 mg  5 mg Oral Q4H PRN Karen Lauren MD        promethazine (PHENERGAN) tablet 12.5 mg  12.5 mg Oral Q6H PRN Karen Lauren MD        Or    ondansetron [facility]) injection 4 mg  4 mg IntraVENous Q6H PRN Karen Lauren MD        sodium chloride flush 0.9 % injection 10 mL  10 mL IntraVENous PRN Karen Lauren MD        traZODone (DESYREL) tablet 50 mg  50 mg Oral Nightly PRN Karen Lauren MD        insulin lispro (1 Unit Dial) 0-18 Units  0-18 Units SubCUTAneous TID WC Karen Lauren MD        insulin lispro (1 Unit Dial) 0-9 Units  0-9 Units SubCUTAneous Nightly Karen Lauren MD        meropenem (MERREM) 1,000 mg in sodium chloride 0.9 % 100 mL IVPB (mini-bag)  1,000 mg IntraVENous Q8H Karen Lauren MD       [facility] glucose chewable tablet 16 g  16 g Oral PRN Elida Chinchilla MD          Objective:  /69   Pulse 79   Temp 97.9 °F (36.6 °C) (Oral)   Resp 18   Ht 5' 5.98\" (1.676 m)   Wt 232 lb 5.8 oz (105.4 kg)   SpO2 95%   BMI 37.52 kg/m²     Physical Exam:  Patient seen and examined   General: Alert and cooperative in no acute distress. HENT: atraumatic, neck supple  Eyes: Optic discs: Not tested  Pulmonary: unlabored respiratory effort  Cardiovascular:  Warm well perfused. Mild peripheral edema   GI: abdomen largely distended    Neurologic Exam:  Neurological:  Mental Status: Awake, alert, oriented x 4   Attention: Intact  Sensation: Intact to all extremities to light touch     DTRs:    Right  Left    ankle clonus  - -   toes (babinski)  down down     Musculoskeletal:   Gait: Not tested   Assist devices: None   Tone: normal   Motor strength:    Right  Left    Right  Left    Deltoid  5 5  Hip Flex  4- 4   Biceps  5 5  Knee Extensors  4- 4   Triceps  5 5  Knee Flexors  4- 4   Wrist Ext  5 5  Ankle Dorsiflex. 5 5   Wrist Flex  5 5  Ankle Plantarflex. 5 5   Handgrip  5 5  Ext Jean Paul Longus  5 5   Thumb Ext  5 5         BLE motor exam limited 2/2 pain     Radiological Findings:  MRI lumbar spine w wo contrast  5/7/2022 2229  Impression 1. Edema and enhancement at L4-5, concerning for discitis/osteomyelitis. Differential would include Modic type 1 degenerative change. 2. Multilevel degenerative change with mild spinal canal narrowing at L3-4 and L4-5. 3. Multilevel neural foraminal narrowing as above. 4. Anterolisthesis at L4-5. MRI thoracic spine w wo contrast  5/7/2022 2233  Impression 1. Mild multilevel thoracic spondylosis with slightly exaggerated kyphosis. No high-grade spinal canal stenosis or neural foraminal narrowing. 2. No abnormal spinal cord signal.  No convincing abnormal enhancement within limitations of motion degradation.      IR BIOPSY DEEP BONE    (Results Pending)         Labs  Recent Labs 05/07/22  1652 05/08/22  0707 05/09/22  0646   * 136 131*   CL 94* 96* 91*   CO2 26 30 33*   BUN 16 11 14   CREATININE 0.8 0.8 0.9   GLUCOSE 158* 172* 188*   MG 1.90  --   --      Recent Labs     05/07/22  1652 05/08/22  0707 05/09/22  0646 05/10/22  0840   WBC 6.7 7.2 9.0  --    RBC 3.87* 4.03 3.75*  --    INR  --   --   --  1.14*       Patient Active Problem List    Diagnosis Date Noted    Discitis 05/10/2022    Atrial fibrillation with RVR (HCC)     A-fib ([facility] 05/07/2022    Pulmonary infiltrates 05/06/2021    Esophageal varices without bleeding ([facility] 03/04/2021    Restless leg syndrome 03/04/2021    Primary osteoarthritis involving multiple joints 02/22/2019    Vitamin D deficiency 02/22/2019    Pernicious anemia 02/07/2019    SVT (supraventricular tachycardia) (HCC) 06/19/2016    GERD (gastroesophageal reflux disease)     COPD (chronic obstructive pulmonary disease) (HCC)     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 MRI lumbar spine with edema and enhancement at L4-5, concerning for discitis/osteomyelitis. Plan:  1. No neurosurgical intervention indicated at this time   2. q 4 hour neuro checks  3. IR consult for biopsy of disc space  4. ID consult for management of antibiotics  5. PT/OT, activity as tolerated  6. Diet per primary team  7. Pain control per primary team  8. Thank you for consult, will follow.  Please call with questions/change in neurologic exam DISPO-inpatient, final dispo timing up to primary team when medically stable for discharge     ZIGGY Stern-CNP  Neurosurgery Nurse Practitioner  [phone]    Patient was seen and examined with Dr. Fiona Warren who agrees with above assessment and plan. Electronically signed by:  ZIGGY Dixon - VIRAL, 5/10/2022 9:11 AM

## 2022-05-10 NOTE — H&P
Hospital Medicine History & Physical      PCP: Ginny Bruner DO    Date of Admission: 5/10/2022    Date of Service: Pt seen/examined on 5/10/2022 and Admitted to Inpatient with expected LOS greater than two midnights due to medical therapy. Chief Complaint:  Discitis       History Of Present Illness:  Patient is 56-year-old female with history of Lanice Parsley cirrhosis, chronic hypoxic respiratory failure on 3 L home oxygen hypertension, hyperlipidemia, type 2 diabetes mellitus, rheumatic fever presented to ED with a complaint of leg pain and back pain. On arrival to ER patient was noted to be in A. fib with RVR. MRI lower spine concerning for discitis/osteomyelitis. Patient was transferred to [facility] for neurosurgery evaluation. Patient was noted to have right knee effusion s/p bedside knee aspiration by orthopedic. On my evaluation patient is complaining of pain in her right knee. She is still complaining of back pain. Denies any nausea, vomiting, fever, chills, chest pain, shortness of breath.       Past Medical History:          Diagnosis Date    Allergic     Anemia     Anesthesia complication     \"woke up during surgery\"    Arthritis     Osteoarthritis of bilateral CMC joints    Asthma     Followed by Dr. Leonor Babb Cirrhosis [facility])     non alcoholic    Cirrhosis, non-alcoholic ([address]     COPD (chronic obstructive pulmonary disease) ([address]     GERD (gastroesophageal reflux disease)     GIB (gastrointestinal bleeding)     LGIB 4/2016    Haemophilus infection 05/07/2021    + resp    Heart murmur     Hernia     Hyperlipidemia     Hypertension     Lung collapse     Murmur, heart     Pneumonia     Reflux     Rheumatic fever     Stress fracture     right foot    SVT (supraventricular tachycardia) ([address]     6/19/16 - admitted X 1 day per PT     TIA (transient ischemic attack) 2015    Type II or unspecified type diabetes mellitus without mention of complication, not stated as uncontrolled     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 [facility]     Wears glasses     as needed    Wears partial dentures     upper partial       Past Surgical History:          Procedure Laterality Date    BONE MARROW BIOPSY      BREAST BIOPSY      BRONCHOSCOPY N/A 5/7/2021    BRONCHOSCOPY DIAGNOSTIC OR CELL [address] ONLY performed by Gust Collet, MD at [address]  5/7/2021    BRONCHOSCOPY ALVEOLAR LAVAGE performed by Gust Collet, MD at [address]  5/7/2021    BRONCHOSCOPY THERAPUTIC ASPIRATION INITIAL performed by Gust Collet, MD at [address]  07/14/14    CATARACT REMOVAL      COLONOSCOPY      X 3 per PT 6/8/16    COLONOSCOPY N/A 6/10/2021    COLONOSCOPY POLYPECTOMY ABLATION performed by Omari Thompson MD at [address] Right 06/03/2013    CORAL BUNIONECTOMY RIGHT FOOT WITH SCREW FIXATION;    HERNIA REPAIR      HYSTERECTOMY      JOINT REPLACEMENT Bilateral     TKR    KNEE SURGERY  09    left    KNEE SURGERY  2010    right    UPPER GASTROINTESTINAL ENDOSCOPY N/A 7/21/2018    EGD BIOPSY performed by Endy Abreu MD at [address] 6/10/2021    EGD POLYP ABLATION/OTHER performed by Omari Thompson MD at [address] [facility] 6/10/2021    EGD BAND LIGATION performed by Omari Thompson MD at [place] [address]       Medications Prior to Admission:      Prior to Admission medications    Medication Sig Start Date End Date Taking?  Authorizing Provider   montelukast (SINGULAIR) 10 MG tablet Take 1 tablet by mouth daily 4/19/22   Dalila Faith MD   montelukast (SINGULAIR) 10 MG tablet Take 1 tablet by mouth daily for 15 days 4/19/22 5/4/22  Dalila Faith MD   cyanocobalamin 1000 MCG/ML injection Inject 1 mL into the muscle every 14 days Last dose was taken on 4/9/17 3/31/22   [facility]. Zelda Diaz, DO   naproxen (NAPROSYN) 500 MG tablet Take 1 tablet by mouth 2 times daily (with meals)  Patient not taking: Reported on 5/7/2022 3/31/22   [facility]. Zelda Diaz, DO   cetirizine (ZYRTEC) 10 MG tablet TAKE ONE TABLET BY MOUTH DAILY 3/21/22   Alicia Pickett MD   doxepin [facility]) 50 MG capsule Take 1 capsule by mouth nightly for 10 days 3/6/22 3/16/22  [facility]. Zelda Diaz, DO   levothyroxine (SYNTHROID) 100 MCG tablet Take 1 tablet by mouth Daily 3/3/22   [facility]. Zelda Diaz, DO   losartan (COZAAR) 100 MG tablet TAKE 1 TABLET DAILY 3/2/22   Alicia Pickett MD   pantoprazole (PROTONIX) 40 MG tablet TAKE 1 TABLET TWICE A DAY 3/2/22   Alicia Pickett MD   famotidine (PEPCID) 20 MG tablet TAKE 1 TABLET TWICE A DAY 3/2/22   Alicia Pickett MD   dilTIAZem [facility]) 240 MG extended release capsule Take 1 capsule by mouth daily 3/2/22   Alicia Pickett MD   SITagliptin (JANUVIA) 100 MG tablet Take 1 tablet by mouth daily 2/25/22   [facility].  Zelda Diaz DO   nystatin (MYCOSTATIN) 693390 UNIT/ML suspension Take by mouth 4 times daily 2/14/22   Calvin Domínguez MD   metFORMIN (GLUCOPHAGE-XR) 500 MG extended release tablet TAKE 2 TABLETS TWICE A DAY 1/3/22   Olimpia Velázquez MD   traZODone (DESYREL) 50 MG tablet TAKE 2 TABLETS NIGHTLY 12/3/21   Alicia Pickett MD   KLOR-CON M20 20 MEQ extended release tablet TAKE 1 TABLET DAILY 11/4/21   Alicia Pickett MD   benzonatate (TESSALON) 100 MG capsule TAKE 1 CAPSULE THREE TIMES A DAY AS NEEDED FOR COUGH 10/18/21   Teja Lambert MD   Cholecalciferol (VITAMIN D3) 50 MCG ([address]) CAPS Take by mouth    Historical Provider, MD   venlafaxine (EFFEXOR) 37.5 MG tablet TAKE 1 TABLET DAILY 9/16/21   Alicia Pickett MD   atorvastatin (LIPITOR) 20 MG tablet TAKE 1 TABLET NIGHTLY 9/9/21   Alicia Pickett MD   furosemide (LASIX) 20 MG tablet TAKE 1 TABLET DAILY 7/19/21   Alicia Pickett MD   beclomethasone (QVAR) 80 MCG/ACT inhaler Inhale 2 puffs into the lungs 2 times daily 5/20/21   Darian Hebert MD   levalbuterol [facility] HFA) 45 MCG/ACT inhaler Inhale 2 puffs into the lungs every 6 hours as needed for Shortness of Breath 5/20/21   Darian Hebert MD   gabapentin (NEURONTIN) 600 MG tablet Take 1 tablet by mouth nightly for 30 days. 600 mg at bedtime and 300 mg BID 5/12/21 2/16/22  Julee Garcia MD   ferrous sulfate 325 (65 Fe) MG tablet Take 325 mg by mouth 3 times daily (with meals)    Historical Provider, MD   diphenhydrAMINE (BENADRYL) 25 MG tablet Take 50 mg by mouth nightly     Historical Provider, MD   aspirin 81 MG EC tablet Take 81 mg by mouth daily     Historical Provider, MD   rOPINIRole (REQUIP) 1 MG tablet Take 2 mg by mouth nightly 1mg at 1730, 1mg at 2030, 2mg at 2200 (total of 4mg nightly in divided doses)    Patient takes 1 mg at 8 am. 1 mg at 12 pm. 1 mg at 5 pm. 2 mg at 10 pm. 3/9/17   Historical Provider, MD   ursodiol (ACTIGALL) 500 MG tablet Take 500 mg by mouth 2 times daily     Historical Provider, MD   calcium carbonate 600 MG TABS tablet Take 1 tablet by mouth daily     Historical Provider, MD   Multiple Vitamins-Minerals (CENTRUM PO) Take 1 tablet by mouth daily. Historical Provider, MD   montelukast (SINGULAIR) 10 MG tablet Take 10 mg by mouth daily     Historical Provider, MD   estrogens, conjugated, (PREMARIN) 1.25 MG tablet Take 1.25 mg by mouth daily. Historical Provider, MD       Allergies:  Latex, Clindamycin, Pennsaid [diclofenac sodium], Torsemide, Actos [pioglitazone], Amoxicillin, Augmentin [amoxicillin-pot clavulanate], Bactrim [sulfamethoxazole-trimethoprim], Ciprofloxacin, Doxycycline, Levaquin [levofloxacin], Ativan [lorazepam], Cephalosporins, Pcn [penicillins], and Proventil [albuterol]    Social History:      The patient currently lives home    TOBACCO:   reports that she quit smoking about 21 years ago. Her smoking use included cigarettes. She started smoking about 66 years ago. She has a 40.00 pack-year smoking history. She has never used smokeless tobacco.  ETOH:   reports no history of alcohol use. Family History:       Reviewed in detail and  Positive as follows:        Problem Relation Age of Onset    Colon Cancer Mother     Other Mother     Diabetes Father     Kidney Disease Paternal Grandmother     Lupus Daughter     Lupus Other        REVIEW OF SYSTEMS:   Pertinent positives as noted in the HPI. All other systems reviewed and negative. PHYSICAL EXAM PERFORMED:    /69   Pulse 79   Temp 97.9 °F (36.6 °C) (Oral)   Resp 18   Ht 5' 5.98\" (1.676 m)   Wt 232 lb 5.8 oz (105.4 kg)   SpO2 95%   BMI 37.52 kg/m²     General appearance:  No apparent distress, appears stated age and cooperative. HEENT:  Normal cephalic, atraumatic without obvious deformity. Pupils equal, round, and reactive to light. Extra ocular muscles intact. Conjunctivae/corneas clear. Neck: Supple, with full range of motion. No jugular venous distention. Trachea midline. Respiratory:  Normal respiratory effort. Clear to auscultation, bilaterally without Rales/Wheezes/Rhonchi. Cardiovascular:  Irregular rhythm with normal S1/S2 without murmurs, rubs or gallops. Abdomen: Soft, non-tender, non-distended with normal bowel sounds. Musculoskeletal:  No clubbing, cyanosis or edema bilaterally. Right knee swollen. +SLR on right leg  Skin: Skin color, texture, turgor normal.  No rashes or lesions. Neurologic:  Neurovascularly intact without any focal sensory/motor deficits.  Cranial nerves: II-XII intact, grossly non-focal.  Psychiatric:  Alert and oriented, thought content appropriate, normal insight  Capillary Refill: Brisk,< 3 seconds   Peripheral Pulses: +2 palpable, equal bilaterally       Labs:     Recent Labs     05/07/22  1652 05/08/22  0707 05/09/22  0646   WBC 6.7 7.2 9.0   HGB 11.5* 11.8* 11.1*   HCT 34.4* 35.4* 33.7*    160 177     Recent Labs     05/07/22  1652 05/08/22  0707 05/09/22  0646   * 136 131*   K 4.3 4.0 4.0   CL 94* 96* 91*   CO2 26 30 33*   BUN 16 11 14   CREATININE 0.8 0.8 0.9   CALCIUM 10.0 9.5 9.5     Recent Labs     05/07/22  1652 05/08/22  0707   AST 22 20   ALT 27 24   BILITOT 0.5 0.6   ALKPHOS 78 73     No results for input(s): INR in the last 72 hours. Recent Labs     05/07/22 1652   CKTOTAL 58   TROPONINI <0.01       Urinalysis:      Lab Results   Component Value Date    NITRU Negative 05/07/2022    WBCUA None seen 05/07/2022    BACTERIA 1+ 05/07/2022    RBCUA 0-2 05/07/2022    BLOODU TRACE-INTACT 05/07/2022    SPECGRAV <=1.005 05/07/2022    GLUCOSEU 100 05/07/2022    GLUCOSEU NEGATIVE 12/30/2009       Radiology:     CXR: I have reviewed the CXR with the following interpretation: N/A  EKG:  I have reviewed the EKG with the following interpretation: Afib with RVR on EKG done on 5/7/2022    No orders to display       ASSESSMENT:    Active Hospital Problems    Diagnosis Date Noted    Discitis [M46.40] 05/10/2022     Priority: Medium         #L4-L5 concerning for discitis/osteomyelitis. Continue antibiotics with vancomycin, meropenem. ID consulted. Neurosurgery on board recommended IR guided biopsy. Blood cultures from 5/7 negative to date. Pain control with oxycodone. #Right knee effusion s/p bedside aspiration  Body fluid cultures negative to date. #A. fib new onset rate controlled  Continue Cardizem 360 mg daily along with metoprolol XL 25 mg twice daily. [id number] greater than 2. Was evaluated by cardiology at [address] recommended anticoagulation will hold it until patient gets biopsy by IR. Next echo 5/9/2022 EF 55 to 60%. No regional wall motion abnormity. TSH 2.81. Will have cardiology evaluation. #Heart failure with preserved EF not in exacerbation. Continue Lasix 20 mg daily. #Hypertension  Continue losartan    #Chronic hypoxic respiratory failure on 3 L home oxygen. #Diabetes mellitus type 2  On Januvia and metformin at home which are on hold.   Continue insulin sliding scale  Hemoglobin A1c 6.4%. DVT Prophylaxis: SCD  Diet: Diet NPO Exceptions are: Ice Chips, Sips of Water with Meds  Code Status: DNR-CCA    PT/OT Eval Status: after biopsy    Dispo - inpatient. D/C in 2-3 days       Megan Tierney MD    Thank you Alix Mccain. Kimberly Sterling DO for the opportunity to be involved in this patient's care. If you have any questions or concerns please feel free to contact me at [phone].     This chart was likely completed using voice recognition technology and may contain unintended grammatical , phraseology,and/or punctuation errors

## 2022-05-10 NOTE — PROGRESS NOTES
ADDENDUM: Therapy discontinued. Will sign off of consult. Please consider re-consulting Pharmacy is therapy is restarted. Thank you for allowing us to participate in the care of this patient. Please reach out with any questions. Jack Mendez PharmD, BCPS  5/10/2022  Wireless: [phone]      Clinical Pharmacy Progress Note    Vancomycin - Management by Pharmacy    Consult Date(s): 5/10/22  Consulting Provider(s): Dr. Shayne Lambert / Plan    Possible Discitis - Vancomycin   Concurrent Antimicrobials: Meropenem   Day of Vanc Therapy: #4   Current Dosing Method: Bayesian-Guided AUC Dosing   Therapeutic Goal: 400-600 mg/L*hr   Current Dose / Frequency: 1500 mg IV Q18h   Plan / Rationale:  o Pt started on above regimen at [facility] on 5/7.  o Trough level this morning back at 12.1 mg/L (collected 18 hours after prior dose). Above regimen predicted to achieve an AUC(ss) of 536 mg/L*hr with an associated trough of ~ 17 mg/L.  o Will continue current regimen in the interim.  Will continue to monitor clinical condition and make adjustments to regimen as appropriate. Thank you for allowing us to participate in the care of this patient. Please reach out with any questions. Jack Mendez PharmD, BCPS  5/10/2022  Wireless: [phone]      Subjective/Objective: Ms. Khushi Gale is a 77 y.o. female with a PMHx significant for SNOW cirrhosis, HLD, HTN, SVT, T2DM, and rheumatic fever who presented to [facility] on 5/7 with c/o leg and back pain. MRI lumbar spine on revealed edema and enhancement at L4/L5 concerning for discitis / osteomyelitis. Pt started on vanc and meropenem for treatment of a discitis, and is now transferred to [facility]. for Neurosurgical evaluation. Pharmacy has been consulted to dose vancomycin.     Height:   Ht Readings from Last 1 Encounters:   05/10/22 5' 5.98\" (1.676 m)     Weight:   Wt Readings from Last 1 Encounters:   05/10/22 232 lb (105.2

## 2022-05-10 NOTE — CONSULTS
right foot    SVT (supraventricular tachycardia) ([facility]     6/19/16 - admitted X 1 day per PT     TIA (transient ischemic attack) 2015    Type II or unspecified type diabetes mellitus without mention of complication, not stated as uncontrolled     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 ([facility]     Wears glasses     as needed    Wears partial dentures     upper partial     Past Surgical History:   Procedure Laterality Date    BONE MARROW BIOPSY      BREAST BIOPSY      BRONCHOSCOPY N/A 5/7/2021    BRONCHOSCOPY DIAGNOSTIC OR CELL [address] ONLY performed by Abiola Flores MD at [address]  5/7/2021    BRONCHOSCOPY ALVEOLAR LAVAGE performed by Abiola Flores MD at [address]  5/7/2021    BRONCHOSCOPY THERAPUTIC ASPIRATION INITIAL performed by Abiola Flores MD at [place] [address]  07/14/14    CATARACT REMOVAL      COLONOSCOPY      X 3 per PT 6/8/16    COLONOSCOPY N/A 6/10/2021    COLONOSCOPY POLYPECTOMY ABLATION performed by Silvana Man MD at [address] [facility] 06/03/2013    CORAL BUNIONECTOMY RIGHT FOOT WITH SCREW FIXATION;    HERNIA REPAIR      HYSTERECTOMY      JOINT REPLACEMENT Bilateral     TKR    KNEE SURGERY  09    left    KNEE SURGERY  2010    right    UPPER GASTROINTESTINAL ENDOSCOPY N/A 7/21/2018    EGD BIOPSY performed by Reinaldo Carlton MD at [address] 6/10/2021    EGD POLYP ABLATION/OTHER performed by Silvana Man MD at [address] [facility] 6/10/2021    EGD BAND LIGATION performed by Silvana Man MD at [address] [facility] History:  Smoking: reports that she quit smoking about 21 years ago. Her smoking use included cigarettes. She started smoking about 66 years ago. She has a 40.00 pack-year smoking history.  She has never used smokeless tobacco.    EtOH: Reports no history of alcohol use.     Illicit Drug Use:  Living:  Diet:    Family History:      Problem Relation Age of Onset    Colon Cancer Mother     Other Mother     Diabetes Father     Kidney Disease Paternal Grandmother     Lupus Daughter     Lupus Other        Other significant clinical information:  Allergies   Allergen Reactions    Latex Swelling and Rash    Clindamycin Itching    Pennsaid [Diclofenac Sodium] Itching and Rash    Torsemide Itching    Actos [Pioglitazone] Diarrhea    Amoxicillin Other (See Comments)    Augmentin [Amoxicillin-Pot Clavulanate] Other (See Comments)    Bactrim [Sulfamethoxazole-Trimethoprim] Other (See Comments)    Ciprofloxacin Other (See Comments)     Makes her weird  Makes anxious and she has weird dreams    Doxycycline Other (See Comments)     Feels like she is smothering, chest tightness    Levaquin [Levofloxacin]      Body aches    Ativan [Lorazepam] Other (See Comments) and Anxiety     And confusion  Had weird dreams and hallucinations    Cephalosporins Rash    Pcn [Penicillins] Rash and Itching    Proventil [Albuterol] Anxiety        atorvastatin  20 mg Oral Nightly    cetirizine  10 mg Oral Daily    dilTIAZem  360 mg Oral Daily    doxepin  10 mg Oral Nightly    furosemide  20 mg Oral Daily    gabapentin  100 mg Oral TID    levothyroxine  100 mcg Oral Daily    lidocaine  1 patch TransDERmal Daily    losartan  100 mg Oral Daily    metoprolol succinate  25 mg Oral BID    montelukast  10 mg Oral Daily    pantoprazole  40 mg Oral BID    rOPINIRole  1 mg Oral Nightly    senna  1 tablet Oral BID    sodium chloride flush  10 mL IntraVENous 2 times per day    venlafaxine  37.5 mg Oral Daily    insulin lispro  0-18 Units SubCUTAneous TID WC    insulin lispro  0-9 Units SubCUTAneous Nightly    meropenem  1,000 mg IntraVENous Q8H    budesonide  0.5 mg Nebulization BID    vancomycin  1,500 mg IntraVENous Q18H    levalbuterol  1.25 mg Nebulization BID       Review of Systems  -Negative except HPI    Vitals:    05/10/22 0712 05/10/22 1130 05/10/22 1159 05/10/22 1428   BP:  119/75  130/73   Pulse:  80  80   Resp: 18 18  18   Temp:  97.8 °F (36.6 °C)  97.7 °F (36.5 °C)   TempSrc:  Oral  Oral   SpO2:  95% 97% 94%   Weight:       Height:           Physical Exam:   Vitals:  T-max:  Patient Vitals for the past 8 hrs:   BP Temp Temp src Pulse Resp SpO2   05/10/22 1428 130/73 97.7 °F (36.5 °C) Oral 80 18 94 %   05/10/22 1159 -- -- -- -- -- 97 %   05/10/22 1130 119/75 97.8 °F (36.6 °C) Oral 80 18 95 %   05/10/22 0712 -- -- -- -- 18 --     No intake or output data in the 24 hours ending 05/10/22 1508    Physical Exam    Gen: No Acute Distress, pleasant patient answering questions appropriately    HEENT: pupils equally round and reactive to light and accomodation, extra ocular motor intact, normocephalic/atraumatic    Neck:  No jvd appreciated, no lymphadenopathy, no thyromegaly, no carotid bruits    CVS: Regular rate and rhythm, no mummurs/rubs/gallops appreciated     CHEST: Breath sounds are equal to auscultation bilaterally, no wheezes/rales/rhonchi appreciated    ABD: Soft, nontender, nondistended, + bowel sounds appreciated, no organomegaly,  hepatosplenomegaly, no rebound/guarding    EXT: No peripheral Edema, No cyanosis or clubbing     Musculoskeletal: Normal joints, muscle and no evidence of swelling    Pulses: 2+ pulses R/L DP / PT; 2+ pulses R/L radial Artery     NEURO: Awake, Alert and oriented x 3 (person/place/time)    Skin: No rash noted, no bruising    Cardiographics  EKG:   Echocardiogram:     Imaging  Chest x-ray:     Labs:    CBC:   Recent Labs     05/07/22  1652 05/08/22  0707 05/09/22  0646   WBC 6.7 7.2 9.0   HGB 11.5* 11.8* 11.1*   HCT 34.4* 35.4* 33.7*    160 177   MCV 88.8 87.8 90.0     Renal:    Recent Labs     05/07/22  1652 05/07/22  1652 05/08/22  0707 05/09/22  0646 05/10/22  0840   *  --  136 131*  --    K 4.3  --  4.0 4.0  --    CL 94*  --  96* 91*  --    CO2 26  --  30 33*  --    BUN 16  --  11 14  --    CREATININE 0.8   < > 0.8 0.9 0.9   GLUCOSE 158*  --  172* 188*  --    CALCIUM 10.0  --  9.5 9.5  --    MG 1.90  --   --   --   --    ANIONGAP 12  --  10 7  --     < > = values in this interval not displayed. Hepatic:   Recent Labs     05/07/22  1652 05/08/22  0707   AST 22 20   ALT 27 24   BILITOT 0.5 0.6   PROT 7.6 7.2   LABALBU 4.0 3.7   ALKPHOS 78 73     Troponin:   Recent Labs     05/07/22 1652   TROPONINI <0.01     BNP: No results for input(s): BNP in the last 72 hours. Lipids: No results for input(s): CHOL, HDL in the last 72 hours. Invalid input(s): LDLCALCU, TRIGLYCERIDE  ABGs:  No results for input(s): PHART, NOE3SWR, PO2ART, TXD2ATC, BEART, THGBART, O4WBFUXF, VEM9HXX in the last 72 hours. INR:   Recent Labs     05/10/22  0840   INR 1.14*     Lactate: No results for input(s): LACTATE in the last 72 hours. Cultures:  -----------------------------------------------------------------  RAD:   IR BIOPSY DEEP BONE    (Results Pending)       Assessment:   Diamond Blanco is a 77 y.o. female with a history of SNOW cirrhosis, chronic hypoxic respiratory failure on 3 L home oxygen hypertension, hyperlipidemia, type 2 diabetes mellitus, rheumatic fever who was admitted for discitis/possible osteomyelitis and was found to be in new onset atrial fibrillation with RVR    1. New onset A. fib with RVR  Likely secondary to infection  Patient is currently on Dilt 360 mg daily and metoprolol 25 mg twice daily. Patient is completely asymptomatic from her atrial fibrillation standpoint and denies any dizziness, chest pain, shortness of breath or palpitations. [id number] Score = 2+    2. Rheumatic fever  Patient endorses having rheumatic fever at age 11. Patient's echo from 8/2021 did show mild to moderately thickened/calcified with mild MS (posterior leaflet is restricted)    3. HTN  Patient takes losartan 100 mg daily at home    4.   Discitis/osteomyelitis  Patient with back pain. MRI showed edema enhancement at L4-5 concerning for discitis/osteomyelitis  -ID consulted     Plan:     The patient new onset atrial fibrillation could likely be secondary to her osteomyelitis. Patient was on IV Dilts and has been transitioned to an increased amount of her p.o. dose she takes for her SVT. After patient's bone biopsy, patient will be started on anticoagulation for A. fib. Her recent echo which is limited did not show any regional wall motion abnormalities to suggest ischemia as a cause of the new onset A. fib. I believe with the treatment and infection the A. fib may likely resolve. Patient is currently well rate controlled with the Dilt and the metoprolol.     This patient will be discussed with attending, Dr. Latesha Flores MD PGY-2

## 2022-05-10 NOTE — PLAN OF CARE
Problem: Safety - Adult  Goal: Free from fall injury  5/10/2022 1144 by Ran Balbuena RN  Outcome: Progressing  Flowsheets (Taken 5/10/2022 1143)  Free From Fall Injury:   Marilou Holding family/caregiver on patient safety   Based on caregiver fall risk screen, instruct family/caregiver to ask for assistance with transferring infant if caregiver noted to have fall risk factors    Problem: ABCDS Injury Assessment  Goal: Absence of physical injury  5/10/2022 1144 by Ran Balbuena RN  Outcome: Progressing  Flowsheets (Taken 5/10/2022 1143)  Absence of Physical Injury: Implement safety measures based on patient assessment

## 2022-05-10 NOTE — PROGRESS NOTES
Patient transferred to [facility] for Neurosurgery consult. Patient sent with all belongings. Daughter notified. Report given to [facility]. Tele-box removed. [address] notified.

## 2022-05-10 NOTE — PROGRESS NOTES
Patient arrived from [facility]. Patient transferred to the bed from the cart with assist of 4. Patient alert and oriented. Patient oriented to room. Fall precautions in place and call light within reach.

## 2022-05-10 NOTE — CARE COORDINATION
Pt is a transfer from [employer] - see prior CM initial note. SW met w/Pt at bedside. Pt is from home alone, her  recently passed, but her 4 children help her out. Pt has a chair lift at home. Pt also has a RW and cane. Pt has [facility] O2, 3L. Pt would like to DC home and her children can transport her. Pt may need IV abx at DC, SW will set up [address] and infusion accordingly. KAPIL sent referral to Murrell Najjar, she is following for [facility] [place]. SW following.     Electronically signed by LUISA Coello, LSW on 5/10/2022 at 12:00 PM  [phone]

## 2022-05-10 NOTE — RT PROTOCOL NOTE
RT Inhaler-Nebulizer Bronchodilator Protocol Note    There is a bronchodilator order in the chart from a provider indicating to follow the RT Bronchodilator Protocol and there is an Initiate RT Inhaler-Nebulizer Bronchodilator Protocol order as well (see protocol at bottom of note). CXR Findings:  No results found. The findings from the last RT Protocol Assessment were as follows:   History Pulmonary Disease: Chronic pulmonary disease  Respiratory Pattern: Regular pattern and RR 12-20 bpm  Breath Sounds: Slightly diminished and/or crackles  Cough: Strong, spontaneous, non-productive  Indication for Bronchodilator Therapy: On home bronchodilators  Bronchodilator Assessment Score: 4    Aerosolized bronchodilator medication orders have been revised according to the RT Inhaler-Nebulizer Bronchodilator Protocol below. Respiratory Therapist to perform RT Therapy Protocol Assessment initially then follow the protocol. Repeat RT Therapy Protocol Assessment PRN for score 0-3 or on second treatment, BID, and PRN for scores above 3. No Indications - adjust the frequency to every 6 hours PRN wheezing or bronchospasm, if no treatments needed after 48 hours then discontinue using Per Protocol order mode. If indication present, adjust the RT bronchodilator orders based on the Bronchodilator Assessment Score as indicated below. Use Inhaler orders unless patient has one or more of the following: on home nebulizer, not able to hold breath for 10 seconds, is not alert and oriented, cannot activate and use MDI correctly, or respiratory rate 25 breaths per minute or more, then use the equivalent nebulizer order(s) with same Frequency and PRN reasons based on the score. If a patient is on this medication at home then do not decrease Frequency below that used at home.     0-3 - enter or revise RT bronchodilator order(s) to equivalent RT Bronchodilator order with Frequency of every 4 hours PRN for wheezing or increased

## 2022-05-10 NOTE — CONSULTS
Infectious Diseases Inpatient Consult Note    Reason for Consult:   Lumbar osteo-diskitis  Requesting Physician:   Dr Lida Pitts  Primary Care Physician:  Justino Cervantes. Astrid Trevino.,   History Obtained From:   Pt, daughter, EPIC    Admit Date: 5/10/2022  Hospital Day: 1    CHIEF COMPLAINT:     Back infection    HISTORY OF PRESENT ILLNESS:      78 yo woman with hx obesity (BMI 37), DM, COPD, HTN, asthma, cirrhosis, OA, home oxygen, CAD, HL  Hx b/l TKA  Multiple medication / antibiotic 'allergies' - amoxicillin, cephalosporins, bactrim, cipro, doxycycline, clindamycin listed    Approximately 2 mo ago, pt was doing house work and developed L leg pain  She has had ongoing and worsening LE weakness over last 2 mo  No back pain, no fever / chills / sweats. On 5/7, she was more weak, had difficulty lifting self into car (has SUV Expedition)    In ED 5/7, afeb, WBC 6.7, procalcitoin 0.08, ESR 38, CRP 39  Noted to be in AF in ED  Lumbar CT - 'multiple areas of lucency'  Lumbar MRI done - 'edema and enhancement at L4-5, concerning for discitis/osteomyelitis'  Admit Junaid Gather, started on Vancomycin + Meropenem  Seen by Ortho, aspirated L knee,  - 81%PMN, no crystals, GS neg / cult neg    Transfer / admit [facility] 5/10 for Neurosurg eval  Seen by Neurosurg - no Neurosurg intervention, IR biopsy recommended.       Past Medical History:    Past Medical History:   Diagnosis Date    Allergic     Anemia     Anesthesia complication     \"woke up during surgery\"    Arthritis     Osteoarthritis of bilateral CMC joints    Asthma     Followed by Dr. Aide Conti Cirrhosis [facility])     non alcoholic    Cirrhosis, non-alcoholic ([facility]     COPD (chronic obstructive pulmonary disease) ([facility]     GERD (gastroesophageal reflux disease)     GIB (gastrointestinal bleeding)     LGIB 4/2016    Haemophilus infection 05/07/2021    + resp    Heart murmur     Hernia     Hyperlipidemia     Hypertension     Lung collapse     Murmur, heart     Pneumonia  Reflux     Rheumatic fever     Stress fracture     right foot    SVT ([facility]raventricular tachycardia) [facility]     6/19/16 - admitted X 1 day per PT     TIA (transient ischemic attack) 2015    Type II or unspecified type diabetes mellitus without mention of complication, not stated as uncontrolled     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 ([facility]     Wears glasses     as needed    Wears partial dentures     upper partial       Past Surgical History:    Past Surgical History:   Procedure Laterality Date    BONE MARROW BIOPSY      BREAST BIOPSY      BRONCHOSCOPY N/A 5/7/2021    BRONCHOSCOPY DIAGNOSTIC OR CELL [address] ONLY performed by Lenard Yi MD at [address] [place] [address]  5/7/2021    BRONCHOSCOPY ALVEOLAR LAVAGE performed by Lenard Yi MD at [address] [place] [address]  5/7/2021    BRONCHOSCOPY THERAPUTIC ASPIRATION INITIAL performed by Lenard Yi MD at [place] [address]  07/14/14    CATARACT REMOVAL      COLONOSCOPY      X 3 per PT 6/8/16    COLONOSCOPY N/A 6/10/2021    COLONOSCOPY POLYPECTOMY ABLATION performed by Katherine Perry MD at [facility] 06/03/2013    CORAL BUNIONECTOMY RIGHT FOOT WITH SCREW FIXATION;    HERNIA REPAIR      HYSTERECTOMY      JOINT REPLACEMENT Bilateral     TKR    KNEE SURGERY  09    left    KNEE SURGERY  2010    right    UPPER GASTROINTESTINAL ENDOSCOPY N/A 7/21/2018    EGD BIOPSY performed by Jacquelyn Kimble MD at [address] 6/10/2021    EGD POLYP ABLATION/OTHER performed by Katherine Perry MD at [address] [facility] 6/10/2021    EGD BAND LIGATION performed by Katherine Perry MD at [place] [address]       Current Medications:     atorvastatin  20 mg Oral Nightly    cetirizine  10 mg Oral Daily    dilTIAZem  360 mg Oral Daily    doxepin  10 mg Oral Nightly    furosemide  20 mg Oral Daily    gabapentin  100 mg Oral TID    levothyroxine  100 mcg Oral Daily    lidocaine  1 patch TransDERmal Daily    losartan  100 mg Oral Daily    metoprolol succinate  25 mg Oral BID    montelukast  10 mg Oral Daily    pantoprazole  40 mg Oral BID    rOPINIRole  1 mg Oral Nightly    senna  1 tablet Oral BID    sodium chloride flush  10 mL IntraVENous 2 times per day    venlafaxine  37.5 mg Oral Daily    insulin lispro  0-18 Units SubCUTAneous TID WC    insulin lispro  0-9 Units SubCUTAneous Nightly    meropenem  1,000 mg IntraVENous Q8H    budesonide  0.5 mg Nebulization BID    vancomycin  1,500 mg IntraVENous Q18H    levalbuterol  1.25 mg Nebulization BID       Allergies:  Latex, Clindamycin, Pennsaid [diclofenac sodium], Torsemide, Actos [pioglitazone], Amoxicillin, Augmentin [amoxicillin-pot clavulanate], Bactrim [sulfamethoxazole-trimethoprim], Ciprofloxacin, Doxycycline, Levaquin [levofloxacin], Ativan [lorazepam], Cephalosporins, Pcn [penicillins], and Proventil [albuterol]    Social History:    TOBACCO:    None - past cig use  ETOH:    None   DRUGS:   None   MARITAL STATUS:      OCCUPATION:   None       Family History:   No immunodeficiency    REVIEW OF SYSTEMS:    No fever / chills / sweats. No weight loss. No visual change, eye pain, eye discharge. No oral lesion, sore throat, dysphagia. Denies cough / sputum. Denies chest pain, palpitations. Denies n / v / abd pain. No diarrhea. Denies dysuria or change in urinary function. Denies joint swelling or pain. No myalgia, arthralgia.   Denies skin changes, itching  Denies focal weakness, sensory change or other neurologic symptom    Denies new / worse depression, psychiatric symptoms    PHYSICAL EXAM:      Vitals:    /73   Pulse 80   Temp 97.7 °F (36.5 °C) (Oral)   Resp 18   Ht 5' 5.98\" (1.676 m)   Wt 232 lb (105.2 kg)   SpO2 94%   BMI 37.46 kg/m²     GENERAL: No apparent distress. 3.5 L  HEENT: Membranes moist, no oral lesion  NECK:  Supple, no lymphadenopathy  LUNGS: Clear b/l, no rales, no dullness  CARDIAC: RRR, no murmur appreciated  ABD:  + BS, soft / NT  EXT:  No rash, no lesions, + LE edema   NEURO: No focal neurologic findings  PSYCH: Orientation, sensorium, mood normal  LINES:  Peripheral iv    DATA:    Lab Results   Component Value Date    WBC 9.0 05/09/2022    HGB 11.1 (L) 05/09/2022    HCT 33.7 (L) 05/09/2022    MCV 90.0 05/09/2022     05/09/2022     Lab Results   Component Value Date    CREATININE 0.9 05/10/2022    BUN 14 05/09/2022     (L) 05/09/2022    K 4.0 05/09/2022    CL 91 (L) 05/09/2022    CO2 33 (H) 05/09/2022       Hepatic Function Panel:   Lab Results   Component Value Date    ALKPHOS 73 05/08/2022    ALT 24 05/08/2022    AST 20 05/08/2022    PROT 7.2 05/08/2022    BILITOT 0.6 05/08/2022    BILIDIR <0.2 04/10/2016    IBILI see below 04/10/2016    LABALBU 3.7 05/08/2022       5/10 ESR 38, CRP 39.6    Micro:  5/7 BC x 2 - no growth to date  5/7 Urine cult <10k mixed taylor  5/8 L knee fluid - no growth to date    Imaging:     MRI THORACIC SPINE W WO CONTRAST   Preliminary Result   1. Mild multilevel thoracic spondylosis with slightly exaggerated kyphosis. No high-grade spinal canal stenosis or neural foraminal narrowing. 2. No abnormal spinal cord signal.       MRI LUMBAR SPINE W WO CONTRAST   Final Result   1. Edema and enhancement at L4-5, concerning for discitis/osteomyelitis. Differential would include Modic type 1 degenerative change. 2. Multilevel degenerative change with mild spinal canal narrowing at L3-4   and L4-5.   3. Multilevel neural foraminal narrowing as above. 4. Anterolisthesis at L4-5.       CT CHEST PULMONARY EMBOLISM W CONTRAST   Final Result   No evidence of pulmonary embolism.   Four-chamber cardiomegaly without   pericardial effusion however smooth interlobular septal thickening with lower   lobe predominance along with mild mosaicism concerning for interstitial   pulmonary edema pattern without decompensation evidence as there is no   pleural effusion evident.       Limited images of the upper abdomen reveal nodular contours of the hepatic   parenchyma concerning for underlying parenchymal disease process such as   cirrhotic morphology without obvious perihepatic ascites       CT Lumbar Spine WO Contrast   Final Result   1. Multiple areas of lucency, with surrounding sclerosis involving the lumbar   spine, most prominent at the L3 and L5 levels. Changes are new since the   prior study. Differential considerations would include multiple Schmorl   nodes and discogenic degenerative changes, or potentially infection, or   metastatic disease. MR imaging of the lumbar spine recommended to further   evaluate these lesions.    2. Diffuse disc protrusion, facet and ligamentous hypertrophy, L4-L5 disc   level, resulting in a moderate degree of acquired central spinal stenosis       IMPRESSION:      Patient Active Problem List   Diagnosis    Hyperlipidemia    S/P knee replacement    SVT (supraventricular tachycardia) (HCC)    GERD (gastroesophageal reflux disease)    COPD (chronic obstructive pulmonary disease) (HCC)    Pernicious anemia    Primary osteoarthritis involving multiple joints    Vitamin D deficiency    Esophageal varices without bleeding ([address]    Restless leg syndrome    Pulmonary infiltrates    3-vessel coronary artery disease    Essential hypertension, benign    Hypothyroidism due to acquired atrophy of thyroid    Insomnia    Osteoporosis    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 ([address]    Obesity    Type 2 diabetes mellitus with diabetic polyneuropathy, without long-term current use of insulin (HCC)    A-fib (HCC)    Atrial fibrillation with RVR (HCC)    Discitis       Hx obesity (BMI 37), DM, COPD, HTN, asthma, cirrhosis, OA, home oxygen, CAD, HL  Hx b/l TKA    Multiple medication / antibiotic 'allergies' - amoxicillin, cephalosporins, bactrim, cipro, doxycycline, clindamycin listed    Back pain for only 3 days  Abnormal lumbar MRI with diffuse degenerative changers, L 4/5 vertebrae edema, vacuum phenomenon in disk, ligamentous hypertrophy  Normal Procalcitonin, mild ESR elevation    I do not belief pt has lumbar osteo-diskitis. RECOMMENDATIONS:    D/c Vancomycin + Meropenem    - would want diagnostic aspiration / biopsy OFF antibiotic. Would have been better if antibiotics were not started or stopped in 1st 24 hr. Now pt has been on broad spectrum antibiotics for over 72 hr.  It is not clear if biopsy will be worthwhile, may be very low yield after 3 days treatment. - believe imaging changes more likely from degenerative changes (Mobic type 1) than infection given hx, labs (normal procal, mild elevation ESR / CRP)    Would repeat imaging / MRI and labs in 3-4 weeks       Medical Decision Making: The following items were considered in medical decision making:  Discussion of patient care with other providers  Reviewed clinical lab tests  Reviewed radiology tests  Reviewed other diagnostic tests/interventions  Independent review of radiologic images - reviewed MRI with Radiologist  Microbiology cultures and other micro tests reviewed      Risk of Complications/Morbidity: High   Illness(es)/ Infection present that pose threat to bodily function. There is potential for severe exacerbation of infection/side effects of treatment.   Therapy requires intensive monitoring for antimicrobial agent toxicity    Discussed with pt, daughter  Franc Hutson MD

## 2022-05-11 LAB
A/G RATIO: 0.9 (ref 1.1–2.2)
ALBUMIN SERPL-MCNC: 3.2 G/DL (ref 3.4–5)
ALP BLD-CCNC: 76 U/L (ref 40–129)
ALT SERPL-CCNC: 16 U/L (ref 10–40)
ANION GAP SERPL CALCULATED.3IONS-SCNC: 12 MMOL/L (ref 3–16)
AST SERPL-CCNC: 16 U/L (ref 15–37)
BASOPHILS ABSOLUTE: 0.1 K/UL (ref 0–0.2)
BASOPHILS RELATIVE PERCENT: 0.6 %
BILIRUB SERPL-MCNC: 0.7 MG/DL (ref 0–1)
BLOOD CULTURE, ROUTINE: NORMAL
BUN BLDV-MCNC: 24 MG/DL (ref 7–20)
CALCIUM IONIZED: 1.13 MMOL/L (ref 1.12–1.32)
CALCIUM SERPL-MCNC: 8.7 MG/DL (ref 8.3–10.6)
CHLORIDE BLD-SCNC: 85 MMOL/L (ref 99–110)
CO2: 25 MMOL/L (ref 21–32)
CREAT SERPL-MCNC: 1 MG/DL (ref 0.6–1.2)
CULTURE, BLOOD 2: NORMAL
EOSINOPHILS ABSOLUTE: 0.2 K/UL (ref 0–0.6)
EOSINOPHILS RELATIVE PERCENT: 1.8 %
GFR AFRICAN AMERICAN: >60
GFR NON-AFRICAN AMERICAN: 54
GLUCOSE BLD-MCNC: 146 MG/DL (ref 70–99)
GLUCOSE BLD-MCNC: 151 MG/DL (ref 70–99)
GLUCOSE BLD-MCNC: 164 MG/DL (ref 70–99)
GLUCOSE BLD-MCNC: 209 MG/DL (ref 70–99)
GLUCOSE BLD-MCNC: 263 MG/DL (ref 70–99)
HCT VFR BLD CALC: 32.3 % (ref 36–48)
HEMOGLOBIN: 11.1 G/DL (ref 12–16)
LYMPHOCYTES ABSOLUTE: 0.9 K/UL (ref 1–5.1)
LYMPHOCYTES RELATIVE PERCENT: 8.2 %
MCH RBC QN AUTO: 29.8 PG (ref 26–34)
MCHC RBC AUTO-ENTMCNC: 34.5 G/DL (ref 31–36)
MCV RBC AUTO: 86.4 FL (ref 80–100)
MONOCYTES ABSOLUTE: 1 K/UL (ref 0–1.3)
MONOCYTES RELATIVE PERCENT: 9.5 %
NEUTROPHILS ABSOLUTE: 8.4 K/UL (ref 1.7–7.7)
NEUTROPHILS RELATIVE PERCENT: 79.9 %
OSMOLALITY: 270 MOSM/KG (ref 278–305)
PDW BLD-RTO: 15.2 % (ref 12.4–15.4)
PERFORMED ON: ABNORMAL
PH VENOUS: 7.37 (ref 7.35–7.45)
PLATELET # BLD: 182 K/UL (ref 135–450)
PMV BLD AUTO: 7.6 FL (ref 5–10.5)
POTASSIUM REFLEX MAGNESIUM: 4.4 MMOL/L (ref 3.5–5.1)
RBC # BLD: 3.73 M/UL (ref 4–5.2)
SODIUM BLD-SCNC: 122 MMOL/L (ref 136–145)
TOTAL PROTEIN: 6.7 G/DL (ref 6.4–8.2)
WBC # BLD: 10.5 K/UL (ref 4–11)

## 2022-05-11 PROCEDURE — 6360000002 HC RX W HCPCS: Performed by: INTERNAL MEDICINE

## 2022-05-11 PROCEDURE — 83930 ASSAY OF BLOOD OSMOLALITY: CPT

## 2022-05-11 PROCEDURE — 85025 COMPLETE CBC W/AUTO DIFF WBC: CPT

## 2022-05-11 PROCEDURE — 36415 COLL VENOUS BLD VENIPUNCTURE: CPT

## 2022-05-11 PROCEDURE — 97166 OT EVAL MOD COMPLEX 45 MIN: CPT

## 2022-05-11 PROCEDURE — 51701 INSERT BLADDER CATHETER: CPT

## 2022-05-11 PROCEDURE — 97530 THERAPEUTIC ACTIVITIES: CPT

## 2022-05-11 PROCEDURE — 80053 COMPREHEN METABOLIC PANEL: CPT

## 2022-05-11 PROCEDURE — 2580000003 HC RX 258: Performed by: INTERNAL MEDICINE

## 2022-05-11 PROCEDURE — 51798 US URINE CAPACITY MEASURE: CPT

## 2022-05-11 PROCEDURE — 94640 AIRWAY INHALATION TREATMENT: CPT

## 2022-05-11 PROCEDURE — 99233 SBSQ HOSP IP/OBS HIGH 50: CPT | Performed by: NURSE PRACTITIONER

## 2022-05-11 PROCEDURE — 2060000000 HC ICU INTERMEDIATE R&B

## 2022-05-11 PROCEDURE — 97535 SELF CARE MNGMENT TRAINING: CPT

## 2022-05-11 PROCEDURE — 97163 PT EVAL HIGH COMPLEX 45 MIN: CPT

## 2022-05-11 PROCEDURE — 2700000000 HC OXYGEN THERAPY PER DAY

## 2022-05-11 PROCEDURE — 97116 GAIT TRAINING THERAPY: CPT

## 2022-05-11 PROCEDURE — 94761 N-INVAS EAR/PLS OXIMETRY MLT: CPT

## 2022-05-11 PROCEDURE — 6370000000 HC RX 637 (ALT 250 FOR IP): Performed by: INTERNAL MEDICINE

## 2022-05-11 PROCEDURE — 99232 SBSQ HOSP IP/OBS MODERATE 35: CPT | Performed by: INTERNAL MEDICINE

## 2022-05-11 RX ORDER — ENOXAPARIN SODIUM 100 MG/ML
1 INJECTION SUBCUTANEOUS 2 TIMES DAILY
Status: DISCONTINUED | OUTPATIENT
Start: 2022-05-11 | End: 2022-05-12

## 2022-05-11 RX ADMIN — ATORVASTATIN CALCIUM 20 MG: 20 TABLET, FILM COATED ORAL at 20:45

## 2022-05-11 RX ADMIN — LOSARTAN POTASSIUM 100 MG: 25 TABLET, FILM COATED ORAL at 08:35

## 2022-05-11 RX ADMIN — VENLAFAXINE 37.5 MG: 75 TABLET ORAL at 08:34

## 2022-05-11 RX ADMIN — BUDESONIDE 500 MCG: 0.5 SUSPENSION RESPIRATORY (INHALATION) at 20:10

## 2022-05-11 RX ADMIN — ENOXAPARIN SODIUM 100 MG: 100 INJECTION SUBCUTANEOUS at 16:19

## 2022-05-11 RX ADMIN — CETIRIZINE HYDROCHLORIDE 10 MG: 10 TABLET, FILM COATED ORAL at 08:38

## 2022-05-11 RX ADMIN — LEVOTHYROXINE SODIUM 100 MCG: 0.1 TABLET ORAL at 06:30

## 2022-05-11 RX ADMIN — INSULIN LISPRO 6 UNITS: 100 INJECTION, SOLUTION INTRAVENOUS; SUBCUTANEOUS at 12:43

## 2022-05-11 RX ADMIN — BUDESONIDE 500 MCG: 0.5 SUSPENSION RESPIRATORY (INHALATION) at 08:50

## 2022-05-11 RX ADMIN — INSULIN LISPRO 9 UNITS: 100 INJECTION, SOLUTION INTRAVENOUS; SUBCUTANEOUS at 08:35

## 2022-05-11 RX ADMIN — DILTIAZEM HYDROCHLORIDE 360 MG: 180 CAPSULE, COATED, EXTENDED RELEASE ORAL at 20:45

## 2022-05-11 RX ADMIN — PANTOPRAZOLE SODIUM 40 MG: 40 TABLET, DELAYED RELEASE ORAL at 20:45

## 2022-05-11 RX ADMIN — GABAPENTIN 100 MG: 100 CAPSULE ORAL at 08:34

## 2022-05-11 RX ADMIN — MONTELUKAST 10 MG: 10 TABLET, FILM COATED ORAL at 08:38

## 2022-05-11 RX ADMIN — FUROSEMIDE 20 MG: 20 TABLET ORAL at 08:34

## 2022-05-11 RX ADMIN — INSULIN LISPRO 3 UNITS: 100 INJECTION, SOLUTION INTRAVENOUS; SUBCUTANEOUS at 17:59

## 2022-05-11 RX ADMIN — METOPROLOL SUCCINATE 25 MG: 25 TABLET, EXTENDED RELEASE ORAL at 08:38

## 2022-05-11 RX ADMIN — GABAPENTIN 100 MG: 100 CAPSULE ORAL at 20:45

## 2022-05-11 RX ADMIN — METOPROLOL SUCCINATE 25 MG: 25 TABLET, EXTENDED RELEASE ORAL at 20:45

## 2022-05-11 RX ADMIN — ROPINIROLE HYDROCHLORIDE 1 MG: 1 TABLET, FILM COATED ORAL at 20:45

## 2022-05-11 RX ADMIN — LEVALBUTEROL 1.25 MG: 1.25 SOLUTION, CONCENTRATE RESPIRATORY (INHALATION) at 08:50

## 2022-05-11 RX ADMIN — PANTOPRAZOLE SODIUM 40 MG: 40 TABLET, DELAYED RELEASE ORAL at 08:38

## 2022-05-11 RX ADMIN — ACETAMINOPHEN 650 MG: 325 TABLET ORAL at 03:56

## 2022-05-11 RX ADMIN — SODIUM CHLORIDE, PRESERVATIVE FREE 10 ML: 5 INJECTION INTRAVENOUS at 08:39

## 2022-05-11 RX ADMIN — INSULIN LISPRO 2 UNITS: 100 INJECTION, SOLUTION INTRAVENOUS; SUBCUTANEOUS at 21:34

## 2022-05-11 RX ADMIN — DOXEPIN HYDROCHLORIDE 10 MG: 10 CAPSULE ORAL at 20:45

## 2022-05-11 RX ADMIN — LEVALBUTEROL 1.25 MG: 1.25 SOLUTION, CONCENTRATE RESPIRATORY (INHALATION) at 20:14

## 2022-05-11 RX ADMIN — SENNOSIDES 8.6 MG: 8.6 TABLET, COATED ORAL at 20:45

## 2022-05-11 RX ADMIN — GABAPENTIN 100 MG: 100 CAPSULE ORAL at 16:21

## 2022-05-11 RX ADMIN — SODIUM CHLORIDE, PRESERVATIVE FREE 10 ML: 5 INJECTION INTRAVENOUS at 21:07

## 2022-05-11 RX ADMIN — SENNOSIDES 8.6 MG: 8.6 TABLET, COATED ORAL at 08:35

## 2022-05-11 ASSESSMENT — PAIN SCALES - GENERAL
PAINLEVEL_OUTOF10: 0
PAINLEVEL_OUTOF10: 3
PAINLEVEL_OUTOF10: 3

## 2022-05-11 ASSESSMENT — PAIN DESCRIPTION - ORIENTATION
ORIENTATION: LOWER
ORIENTATION: LOWER

## 2022-05-11 ASSESSMENT — PAIN DESCRIPTION - ONSET
ONSET: ON-GOING
ONSET: ON-GOING

## 2022-05-11 ASSESSMENT — PAIN DESCRIPTION - LOCATION
LOCATION: LEG
LOCATION: BACK

## 2022-05-11 ASSESSMENT — PAIN DESCRIPTION - FREQUENCY
FREQUENCY: CONTINUOUS
FREQUENCY: CONTINUOUS

## 2022-05-11 ASSESSMENT — PAIN DESCRIPTION - DESCRIPTORS
DESCRIPTORS: ACHING
DESCRIPTORS: ACHING

## 2022-05-11 NOTE — PROGRESS NOTES
Hospitalist Progress Note      PCP: Mckenna Pimentel. Aydin Covarrubias,     Date of Admission: 5/10/2022    Chief Complaint: Back pain concern for discitis. Hospital Course: Charl Remedies 77 y.o. female history of Monda Mass cirrhosis, chronic hypoxic respiratory failure on 3 L home oxygen hypertension, hyperlipidemia, type 2 diabetes mellitus, rheumatic fever presented to ED with a complaint of leg pain and back pain. On arrival to ER patient was noted to be in A. fib with RVR. MRI lower spine concerning for discitis/osteomyelitis. Patient was transferred to [facility] for neurosurgery evaluation. Patient was noted to have right knee effusion s/p bedside knee aspiration by orthopedic. Cultures no growth to date. Subjective: Patient seen and examined today. Having some back pain. Denies any nausea, vomiting, fever, chills.   No acute event reported overnight      Medications:  Reviewed    Infusion Medications    sodium chloride      sodium chloride      dextrose       Scheduled Medications    enoxaparin  1 mg/kg SubCUTAneous BID    atorvastatin  20 mg Oral Nightly    cetirizine  10 mg Oral Daily    doxepin  10 mg Oral Nightly    furosemide  20 mg Oral Daily    gabapentin  100 mg Oral TID    levothyroxine  100 mcg Oral Daily    lidocaine  1 patch TransDERmal Daily    losartan  100 mg Oral Daily    montelukast  10 mg Oral Daily    pantoprazole  40 mg Oral BID    rOPINIRole  1 mg Oral Nightly    senna  1 tablet Oral BID    sodium chloride flush  10 mL IntraVENous 2 times per day    venlafaxine  37.5 mg Oral Daily    insulin lispro  0-18 Units SubCUTAneous TID WC    insulin lispro  0-9 Units SubCUTAneous Nightly    budesonide  0.5 mg Nebulization BID    levalbuterol  1.25 mg Nebulization BID    dilTIAZem  360 mg Oral Daily    metoprolol succinate  25 mg Oral BID     PRN Meds: sodium chloride, potassium chloride, magnesium sulfate, sodium chloride, acetaminophen **OR** acetaminophen, benzonatate, dextrose, dextrose bolus **OR** dextrose bolus, glucagon (rDNA), labetalol, oxyCODONE, promethazine **OR** ondansetron, sodium chloride flush, traZODone, glucose, levalbuterol      Intake/Output Summary (Last 24 hours) at 5/11/2022 1219  Last data filed at 5/11/2022 4144  Gross per 24 hour   Intake 120 ml   Output 900 ml   Net -780 ml       Physical Exam Performed:    BP (!) 142/64   Pulse 72   Temp 97.9 °F (36.6 °C) (Oral)   Resp 16   Ht 5' 5.98\" (1.676 m)   Wt 224 lb (101.6 kg)   SpO2 97% Comment: cont 3l as @ home  BMI 36.17 kg/m²     General appearance: No apparent distress, appears stated age and cooperative. HEENT: Pupils equal, round, and reactive to light. Conjunctivae/corneas clear. Neck: Supple, with full range of motion. No jugular venous distention. Trachea midline. Respiratory:  Normal respiratory effort. Clear to auscultation, bilaterally without Rales/Wheezes/Rhonchi. Cardiovascular: Irregular rhythm with normal S1/S2 without murmurs, rubs or gallops. Abdomen: Soft, non-tender, distended with normal bowel sounds. Musculoskeletal: No clubbing, cyanosis or edema bilaterally. Skin: Skin color, texture, turgor normal.  No rashes or lesions. Neurologic:  Neurovascularly intact without any focal sensory/motor deficits.  Cranial nerves: II-XII intact, grossly non-focal.  Psychiatric: Alert and oriented, thought content appropriate, normal insight  Capillary Refill: Brisk,< 3 seconds   Peripheral Pulses: +2 palpable, equal bilaterally       Labs:   Recent Labs     05/09/22  0646 05/11/22  0557   WBC 9.0 10.5   HGB 11.1* 11.1*   HCT 33.7* 32.3*    182     Recent Labs     05/09/22  0646 05/10/22  0840 05/11/22  0557   *  --  122*   K 4.0  --  4.4   CL 91*  --  85*   CO2 33*  --  25   BUN 14  --  24*   CREATININE 0.9 0.9 1.0   CALCIUM 9.5  --  8.7     Recent Labs     05/11/22  0557   AST 16   ALT 16   BILITOT 0.7   ALKPHOS 76     Recent Labs     05/10/22  0840   INR 1.14*     No results for input(s): Mark Matthews in the last 72 hours. Urinalysis:      Lab Results   Component Value Date    NITRU Negative 05/07/2022    WBCUA None seen 05/07/2022    BACTERIA 1+ 05/07/2022    RBCUA 0-2 05/07/2022    BLOODU TRACE-INTACT 05/07/2022    SPECGRAV <=1.005 05/07/2022    GLUCOSEU 100 05/07/2022    GLUCOSEU NEGATIVE 12/30/2009       Radiology:  No orders to display           Assessment/Plan:    Active Hospital Problems    Diagnosis Date Noted    Discitis [M46.40] 05/10/2022     Priority: Medium     #L4-L5 concerning for discitis/osteomyelitis. Evaluated by ID recommended patient did receive antibiotics for 72 hours of biopsy will not be of yield. We will monitor without antibiotic. Patient will need repeat MRI in 3 to 4 weeks. Changes on MRI could be related to degenerative disease history of normal Pro-Fernando, mild elevation of ESR/CRP. Neurosurgery signed off. PT/OT eval.    #Right knee effusion s/p bedside aspiration cultures negative to date. #A. fib new onset. Continue Cardizem along with metoprolol XL. [id number] greater than 2. Will start on Lovenox 1 MCG per KG twice daily. Will transition to [address] at time of discharge. Echo 5/9/2022 EF 55 to 60%. No regional wall motion noted. #SNOW cirrhosis  Continue Lasix 20 mg daily. #Heart failure with preserved EF not in exacerbation. Continue Lasix 20 mg daily.     #Hypertension  Continue losartan     #Chronic hypoxic respiratory failure on 3 L home oxygen.     #Diabetes mellitus type 2  On Januvia and metformin at home which are on hold. Continue insulin sliding scale    #Hyponatremia  Will check serum osmolality. Continue to monitor repeat  Hemoglobin A1c 6.4%. DVT Prophylaxis: lovenox  Diet: ADULT DIET; Regular; 4 carb choices (60 gm/meal); Low Fat/Low Chol/High Fiber/MASON  Code Status: DNR-CCA    PT/OT Eval Status: Consulted.     Dispo -inpatient likely discharge in 24 to 48 hours pending clinical course.     Jamaica Freeman MD     This chart was likely completed using voice recognition technology and may contain unintended grammatical , phraseology,and/or punctuation errors

## 2022-05-11 NOTE — PROGRESS NOTES
Electrophysiology - PROGRESS NOTE    Admit Date: 5/10/2022     Chief Complaint: new onset AF     Interval History:   Patient seen and examined and notes reviewed. Patient is being followed for new onset AF. Pt admitted with back and knee pain. MRI lumbar spine concerning for discitis/ osteomyelitis. Found to be in AF/RVR, new for patient, placed on dilt gtt. Now on po dilt, metoprolol with controlled rates. Pt denies palpitations, heart racing, dizziness, lightheadedness, CP. She states she wants to know if she is getting biopsy today or not.      In: 120 [P.O.:120]  Out: 900    Wt Readings from Last 2 Encounters:   05/10/22 232 lb (105.2 kg)   05/09/22 224 lb 10.4 oz (101.9 kg)         Data:   Scheduled Meds:   Scheduled Meds:   atorvastatin  20 mg Oral Nightly    cetirizine  10 mg Oral Daily    doxepin  10 mg Oral Nightly    furosemide  20 mg Oral Daily    gabapentin  100 mg Oral TID    levothyroxine  100 mcg Oral Daily    lidocaine  1 patch TransDERmal Daily    losartan  100 mg Oral Daily    montelukast  10 mg Oral Daily    pantoprazole  40 mg Oral BID    rOPINIRole  1 mg Oral Nightly    senna  1 tablet Oral BID    sodium chloride flush  10 mL IntraVENous 2 times per day    venlafaxine  37.5 mg Oral Daily    insulin lispro  0-18 Units SubCUTAneous TID WC    insulin lispro  0-9 Units SubCUTAneous Nightly    budesonide  0.5 mg Nebulization BID    levalbuterol  1.25 mg Nebulization BID    dilTIAZem  360 mg Oral Daily    metoprolol succinate  25 mg Oral BID     Continuous Infusions:   sodium chloride      sodium chloride      dextrose       PRN Meds:.sodium chloride, potassium chloride, magnesium sulfate, sodium chloride, acetaminophen **OR** acetaminophen, benzonatate, dextrose, dextrose bolus **OR** dextrose bolus, glucagon (rDNA), labetalol, oxyCODONE, promethazine **OR** ondansetron, sodium chloride flush, traZODone, glucose, levalbuterol  Continuous Infusions:   sodium chloride      sodium chloride      dextrose         Intake/Output Summary (Last 24 hours) at 5/11/2022 1052  Last data filed at 5/11/2022 1038  Gross per 24 hour   Intake 120 ml   Output 900 ml   Net -780 ml       CBC:   Lab Results   Component Value Date    WBC 10.5 05/11/2022    HGB 11.1 05/11/2022     05/11/2022     BMP:  Lab Results   Component Value Date     05/11/2022    K 4.4 05/11/2022    CL 85 05/11/2022    CO2 25 05/11/2022    BUN 24 05/11/2022    CREATININE 1.0 05/11/2022    GLUCOSE 151 05/11/2022     INR:   Lab Results   Component Value Date    INR 1.14 05/10/2022    INR 1.13 07/20/2018    INR 1.30 02/19/2018        CARDIAC LABS  ENZYMES:No results for input(s): CKMB, CKMBINDEX, TROPONINI in the last 72 hours. Invalid input(s): CKTOTAL;3  FASTING LIPID PANEL:  Lab Results   Component Value Date    HDL 73 09/18/2021    LDLCALC 63 09/18/2021    TRIG 105 09/18/2021     LIVER PROFILE:  Lab Results   Component Value Date    AST 16 05/11/2022    AST 20 05/08/2022    ALT 16 05/11/2022    ALT 24 05/08/2022       -----------------------------------------------------------------  Telemetry: Personally reviewed  AF, rates controlled    Objective:   Vitals: BP (!) 142/64   Pulse 72   Temp 97.9 °F (36.6 °C) (Oral)   Resp 16   Ht 5' 5.98\" (1.676 m)   Wt 232 lb (105.2 kg)   SpO2 97% Comment: cont 3l as @ home  BMI 37.46 kg/m²   General appearance: alert, appears stated age and cooperative, No acute distress   Skin: Skin color, texture, turgor normal. No rashes or ecchymosis.   Neck: no JVD, supple, symmetrical, trachea midline   Lungs: , no accessory muscle use, no respiratory distress  Heart: irregularly irregular, rate stable  Extremities: No edema, DP +  Psychiatric: normal insight and affect    Patient Active Problem List:     Hyperlipidemia     S/P knee replacement     SVT (supraventricular tachycardia) (HCC)     GERD (gastroesophageal reflux disease)     COPD (chronic obstructive pulmonary disease) (HCC)     Pernicious anemia     Primary osteoarthritis involving multiple joints     Vitamin D deficiency     Esophageal varices without bleeding (HCC)     Restless leg syndrome     Pulmonary infiltrates     3-vessel coronary artery disease     Essential hypertension, benign     Hypothyroidism due to acquired atrophy of thyroid     Insomnia     Osteoporosis     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 New onset AF  2. Discitis  3. Osteomyelitis    Jack Scott Is a 77 y.o. woman w/ HTN, HLD, DM, rheumatic fever, TIA, SNOW cirrhosis, SVT who p/w back and knee pain, found to be in AF/RVR, placed on dilt gtt    New onset AF  - In AF, rates controlled  - CHADSVASc at least 7  - No OAC at this time d/t expected bone marrow biopsy, would start [facility] when able  - On Toprol 25 mg BID, dilt 260 mg QD  - Plan for rate control therapy at this time  - Keep on tele  - Keep K >4.0, Mg >2.0  - Reviewed risk factor modification for AF with patient including HTN/ DM control, ISABELLE management, stress reduction, minimal alcohol intake, tobacco cessation, regular exercise, diet      Electronically signed by ZIGGY Michael CNP on 5/11/22 at 10:52 AM YULI[address]    I spent a total of 45 minutes and greater than 50% of the time was spent counseling with Jack Scott and coordinating care regarding her diagnosis, treatments and plan of care.

## 2022-05-11 NOTE — PROGRESS NOTES
ID Follow-up NOTE    CC:   LBP  Antibiotics: None     Admit Date: 5/10/2022  Hospital Day: 2    Subjective:     Patient c/o 'feels hot'  No specific complaint       Objective:     Patient Vitals for the past 8 hrs:   BP Temp Temp src Pulse Resp SpO2 Weight   05/11/22 1618 -- -- -- -- -- 95 % --   05/11/22 1615 (!) 165/74 97.4 °F (36.3 °C) Oral 80 18 93 % --   05/11/22 1229 135/71 -- -- -- -- 98 % --   05/11/22 1135 -- -- -- -- -- -- 224 lb (101.6 kg)     I/O last 3 completed shifts: In: 120 [P.O.:120]  Out: 900 [Urine:900]  I/O this shift:  In: 600 [P.O.:600]  Out: 3497 [KSURB:0160]    EXAM:  GENERAL: No apparent distress. RA  HEENT: Membranes moist, no oral lesion  NECK:  Supple, no lymphadenopathy  LUNGS: Clear b/l, no rales, no dullness  CARDIAC: RRR, no murmur appreciated  ABD:  + BS, soft / NT  EXT:  No rash, no edema, no lesions  NEURO: No focal neurologic findings  PSYCH: Orientation, sensorium, mood normal  LINES:  Peripheral iv       Data Review:  Lab Results   Component Value Date    WBC 10.5 05/11/2022    HGB 11.1 (L) 05/11/2022    HCT 32.3 (L) 05/11/2022    MCV 86.4 05/11/2022     05/11/2022     Lab Results   Component Value Date    CREATININE 1.0 05/11/2022    BUN 24 (H) 05/11/2022     (L) 05/11/2022    K 4.4 05/11/2022    CL 85 (L) 05/11/2022    CO2 25 05/11/2022       Hepatic Function Panel:   Lab Results   Component Value Date    ALKPHOS 76 05/11/2022    ALT 16 05/11/2022    AST 16 05/11/2022    PROT 6.7 05/11/2022    BILITOT 0.7 05/11/2022    BILIDIR <0.2 04/10/2016    IBILI see below 04/10/2016    LABALBU 3.2 05/11/2022       5/10 ESR 38, CRP 39.6     Micro:  5/7 BC x 2 - no growth to date  5/7 Urine cult <10k mixed taylor  5/8 L knee fluid - no growth to date     Imaging:      MRI THORACIC SPINE W WO CONTRAST   Preliminary Result   1. Mild multilevel thoracic spondylosis with slightly exaggerated kyphosis. No high-grade spinal canal stenosis or neural foraminal narrowing.    2. No abnormal spinal cord signal.       MRI LUMBAR SPINE W WO CONTRAST   Final Result   1. Edema and enhancement at L4-5, concerning for discitis/osteomyelitis. Differential would include Modic type 1 degenerative change. 2. Multilevel degenerative change with mild spinal canal narrowing at L3-4   and L4-5.   3. Multilevel neural foraminal narrowing as above. 4. Anterolisthesis at L4-5.       CT CHEST PULMONARY EMBOLISM W CONTRAST   Final Result   No evidence of pulmonary embolism.  Four-chamber cardiomegaly without   pericardial effusion however smooth interlobular septal thickening with lower   lobe predominance along with mild mosaicism concerning for interstitial   pulmonary edema pattern without decompensation evidence as there is no   pleural effusion evident.       Limited images of the upper abdomen reveal nodular contours of the hepatic   parenchyma concerning for underlying parenchymal disease process such as   cirrhotic morphology without obvious perihepatic ascites       CT Lumbar Spine WO Contrast   Final Result   1. Multiple areas of lucency, with surrounding sclerosis involving the lumbar   spine, most prominent at the L3 and L5 levels.  Changes are new since the   prior study.  Differential considerations would include multiple Schmorl   nodes and discogenic degenerative changes, or potentially infection, or   metastatic disease.  MR imaging of the lumbar spine recommended to further   evaluate these lesions.    2. Diffuse disc protrusion, facet and ligamentous hypertrophy, L4-L5 disc   level, resulting in a moderate degree of acquired central spinal stenosis       Scheduled Meds:   enoxaparin  1 mg/kg SubCUTAneous BID    atorvastatin  20 mg Oral Nightly    cetirizine  10 mg Oral Daily    doxepin  10 mg Oral Nightly    furosemide  20 mg Oral Daily    gabapentin  100 mg Oral TID    levothyroxine  100 mcg Oral Daily    lidocaine  1 patch TransDERmal Daily    losartan  100 mg Oral Daily    montelukast  10 mg Oral Daily    pantoprazole  40 mg Oral BID    rOPINIRole  1 mg Oral Nightly    senna  1 tablet Oral BID    sodium chloride flush  10 mL IntraVENous 2 times per day    venlafaxine  37.5 mg Oral Daily    insulin lispro  0-18 Units SubCUTAneous TID WC    insulin lispro  0-9 Units SubCUTAneous Nightly    budesonide  0.5 mg Nebulization BID    levalbuterol  1.25 mg Nebulization BID    dilTIAZem  360 mg Oral Daily    metoprolol succinate  25 mg Oral BID       Continuous Infusions:   sodium chloride      sodium chloride      dextrose         PRN Meds:  sodium chloride, potassium chloride, magnesium sulfate, sodium chloride, acetaminophen **OR** acetaminophen, benzonatate, dextrose, dextrose bolus **OR** dextrose bolus, glucagon (rDNA), labetalol, oxyCODONE, promethazine **OR** ondansetron, sodium chloride flush, traZODone, glucose, levalbuterol      Assessment:     Hx obesity (BMI 37), DM, COPD, HTN, asthma, cirrhosis, OA, home oxygen, CAD, HL  Hx b/l TKA     Multiple medication / antibiotic 'allergies' - amoxicillin, cephalosporins, bactrim, cipro, doxycycline, clindamycin listed     Back pain for only 3 days  Abnormal lumbar MRI with diffuse degenerative changers, L 4/5 vertebrae edema, vacuum phenomenon in disk, ligamentous hypertrophy  Normal Procalcitonin, mild ESR elevation     I do not belief pt has lumbar osteo-diskitis. Plan:     Cont off antibiotics - stopped 5/10   - believe imaging changes more likely from degenerative changes (Mobic type 1) than infection given hx, labs (normal procal, mild elevation ESR / CRP)     Would repeat imaging / MRI and labs in 3-4 weeks     Medical Decision Making:   The following items were considered in medical decision making:  Discussion of patient care with other providers  Reviewed clinical lab tests  Reviewed radiology tests  Reviewed other diagnostic tests/interventions  Independent review of radiologic images - reviewed imaging with Radiologist on 5/10  Microbiology cultures and other micro tests reviewed      Discussed with pt, joe Silveira MD

## 2022-05-11 NOTE — PROGRESS NOTES
Physical Therapy  [facility]  Physical Therapy Initial Assessment/Treatment    Name: Pee Matias  : 1945  MRN: [id number]  Date of Service: 2022    Discharge Recommendations: Pee Matias scored a  on the AM-PAC short mobility form. Current research shows that an AM-PAC score of 17 or less is typically not associated with a discharge to the patient's home setting. Based on the patient's AM-PAC score and their current functional mobility deficits, it is recommended that the patient have 3-5 sessions per week of Physical Therapy at d/c to increase the patient's independence. Please see assessment section for further patient specific details. If patient discharges prior to next session this note will serve as a discharge summary. Please see below for the latest assessment towards goals. PT Equipment Recommendations  Equipment Needed: No (defer to next level of care)      Patient Diagnosis(es): There were no encounter diagnoses. Past Medical History:  has a past medical history of Allergic, Anemia, Anesthesia complication, Arthritis, Asthma, Cirrhosis ([address] Cirrhosis, non-alcoholic ([address] COPD (chronic obstructive pulmonary disease) ([address] GERD (gastroesophageal reflux disease), GIB (gastrointestinal bleeding), Haemophilus infection, Heart murmur, Hernia, Hyperlipidemia, Hypertension, Lung collapse, Murmur, heart, Pneumonia, Reflux, Rheumatic fever, Stress fracture, SVT (supraventricular tachycardia) ([address] TIA (transient ischemic attack), Type II or unspecified type diabetes mellitus without mention of complication, not stated as uncontrolled, 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 Past Surgical History:  has a past surgical history that includes hernia repair; Hysterectomy; joint replacement (Bilateral); Foot surgery (Right, 2013); knee surgery (); knee surgery ();  Cardiac catheterization (07/14/14); Cataract removal; bone marrow biopsy; Breast biopsy; fine needle aspiration; Colonoscopy; Upper gastrointestinal endoscopy (N/A, 7/21/2018); bronchoscopy (N/A, 5/7/2021); bronchoscopy (5/7/2021); bronchoscopy (5/7/2021); Colonoscopy (N/A, 6/10/2021); Upper gastrointestinal endoscopy (N/A, 6/10/2021); and Upper gastrointestinal endoscopy (N/A, 6/10/2021). Assessment   Body Structures, Functions, Activity Limitations Requiring Skilled Therapeutic Intervention: Decreased functional mobility ; Decreased ADL status; Decreased ROM; Decreased body mechanics; Decreased strength;Decreased endurance;Decreased balance;Decreased posture  Assessment: Pt is a 76 yo female admitted 5/10/22 for suspected discitis. Pt's reported baseline is independent with ADLs, homemaking, and ambulation using 4WW. Pt presenting below baseline today, requiring assist x1 for bed mobility and assist x2 for transfers and gait with RW. Pt expressed fear of attempting standing or gait, requiring max education on benefit of functional mobility and sequencing with RW. Pt falling asleep mid conversation post treatment with vitals remaining stable, RN notified. Recommended ramy chacon for transfers for nursing. Pt hopeful to return home but admits not having 24H assist and being far below baseline. Pt would benefit from continued skilled IP PT to increase functional independence prior to returning home. Will follow.   Treatment Diagnosis: decreased functional mobility  Therapy Prognosis: Fair (comorbidities and functional mobility deficits lowering prognosis)  Decision Making: Medium Complexity  Requires PT Follow-Up: Yes  Activity Tolerance  Activity Tolerance: Patient limited by fatigue;Patient limited by endurance     Plan   Plan  Plan:  (2-5)  Current Treatment Recommendations: Strengthening,ROM,Balance training,Functional mobility training,Transfer training,Endurance training,Gait training  Plan Comment: 2-5  Safety Devices  Type of Devices: Chair alarm in place,Gait belt,Left in chair,Nurse notified,Call light within reach  Restraints  Restraints Initially in Place: No     Restrictions  Restrictions/Precautions  Restrictions/Precautions: Fall Risk,General Precautions  Position Activity Restriction  Other position/activity restrictions: Up with assist.     Subjective   Pain: denies  General  Chart Reviewed: Yes  Patient assessed for rehabilitation services?: Yes  Additional Pertinent Hx: HPI per chart, \"66 y.o. female who presented to [facility] with past medical history of cirrhosis SNOW, hyperlipidemia, hypertension, SVT, type 2 diabetes non-insulin-dependent, rheumatic fever presented to the ED with chief complaint of leg pain. Patient reported that 2 months ago she was cleaning and then instead of bending down patient squatted down and then noticed a pop and started having some knee pain. Orthopedics consulted. XR Right knee: 1. No acute fracture or malalignment. \"  Response To Previous Treatment: Not applicable  Family / Caregiver Present: No  Referring Practitioner: Pardeep Hawk DO  Referral Date : 05/07/22  Follows Commands: Within Functional Limits  General Comment  Comments: Pt weight obtained standing at scale for nursing, reported 224 lbs. Pt on 3L O2 upon entry. Pt cleaned and changed after obtaining weight due to urinating on herself while standing to obtain weight  Subjective  Subjective: Pt supine in bed upon arrival. She was alert, agreeable to therapy, and alone in the room.          Social/Functional History  Social/Functional History  Lives With: Alone  Type of Home: House  Home Layout: Two level  Home Access: Level entry (small threshold at door)  Bathroom Shower/Tub: Walk-in shower  Bathroom Toilet:  (toilet raiser with arms)  Bathroom Equipment: Shower chair,Grab bars in [address]: Latoya Frey, 4 wheeled,Walker, standard,Cane,Hospital bed,Reacher,Oxygen,Wheelchair-manual  Has the patient had two or more falls in the past year or any fall with injury in the past year?: No  ADL Assistance: Independent (prior to hospital admission, needs assistance since)  Homemaking Assistance: Independent (indep prior to hospitalization, unable since)  Ambulation Assistance: Independent  Transfer Assistance: Independent  Active : Yes  Mode of Transportation: Car  Occupation: Retired  Type of Occupation: medical assistant  Additional Comments: lives alone, reports neighbor checks on her but does not currently have 24H assist     Vision/Hearing  Vision Exceptions:  (for close distances)  Hearing: Within functional limits      Cognition   Orientation  Overall Orientation Status: Within Normal Limits  Orientation Level: Oriented X4  Cognition  Overall Cognitive Status: WNL     Objective   BP: 135/71 (post treatment)  SpO2: 98 % (98% pre ambulation at EOB, 92% post session)  Comment: all vitals obtained sitting on 3L O2     Observation/Palpation  Posture: Good  Observation: 3L O2        AROM RLE (degrees)  RLE AROM: WNL  AROM LLE (degrees)  LLE AROM : WNL     Strength RLE  Strength RLE: WFL  Comment: approx 3+/5 grossly  Strength LLE  Strength LLE: WFL  Comment: approx 3+/5 grossly           Bed mobility  Rolling to Left: Minimal assistance (HOB elevated, UE assist from therapist)  Supine to Sit: Maximum assistance (@ trunk, HOB elevated, effortful, cues for sequencing)  Scooting: Minimal assistance (@ EOB and in chair)     Transfers  Sit to Stand: Maximum Assistance;2 Person Assistance; Moderate Assistance (2x from EOB, mod A x2 1st attempt with ishan UE assist on bedside scale, mod + max A 2nd attempt, cues for sequencing)  Bed to Chair: Moderate assistance;2 Person Assistance (cues for sequencing with RW, effortful)     Ambulation  Surface: level tile  Device: Rolling Walker  Other Apparatus: O2 (3L)  Assistance: 2 Person assistance; Moderate assistance  Quality of Gait: shuffling, fearful, unsteady  Gait Deviations: Shuffles;Decreased step length; Slow Yvonne  Distance: 4' laterally to L to chair  Comments: max cues for sequencing, pt fearful of falling  More Ambulation?: No  Stairs/Curb  Stairs? :  (pt unable)     Balance  Posture: Good  Sitting - Static: Good (sat EOB SBA without LOB)  Sitting - Dynamic: Good (sat EOB SBA without LOB)  Standing - Static: Fair (ishan UE assist on bedside scale, posterior lean, min A to maintain balance)  Standing - Dynamic: Fair (ishan UE assist on bedside scale, posterior lean, min A to maintain balance)  A/AROM Exercises: standing marching at EOB in RW, mod A progressing to min A                                                      AM-PAC Score  AM-PAC Inpatient Mobility Raw Score : 12 (05/11/22 1230)  AM-PAC Inpatient T-Scale Score : 35.33 (05/11/22 1230)  Mobility Inpatient CMS 0-100% Score: 68.66 (05/11/22 1230)  Mobility Inpatient CMS G-Code Modifier : CL (05/11/22 1230)          Goals  Short Term Goals  Time Frame for Short term goals: by d/c  Short term goal 1: Pt will perform all bed mobility CGA  Short term goal 2: Pt will perform transfers min A from all surfaces  Short term goal 3: Pt will ambulate 20 ft with CGA with LRAD  Patient Goals   Patient goals : to return home       Education  Patient Education  Education Given To: Patient  Education Provided: Role of Therapy;IADL Safety  Education Provided Comments: Pt educated on safety concerns if home without assist, on benefit of continued IP PT to increase functional independence, on use of RW for gait, and on sequencing while using RW  Education Method: Verbal  Barriers to Learning: None  Education Outcome: Verbalized understanding      Therapy Time   Individual Concurrent Group Co-treatment   Time In 1114         Time Out 1207         Minutes 53         Timed Code Treatment Minutes: 38 Minutes     Total treatment time: 53 minutes  TAINA Wise    Therapist was present, directed the patient's care, made skilled judgement, and was responsible for assessment and treatment of the patient.   Bijal Grider, PT

## 2022-05-11 NOTE — PLAN OF CARE
Problem: ABCDS Injury Assessment  Goal: Absence of physical injury  5/11/2022 1601 by Red Mondragon, RN  Note: Alert , anxious , resting in bed , pure wick in place draining , alicia urine , abdomin slight ly distended , drinking , water frequently , hx CHF , up to scale and weight done , up in chair with pt/ot , incontinent of urine ,   5/11/2022 0338 by Sudarshan Bolanos RN  Outcome: Progressing

## 2022-05-11 NOTE — PROGRESS NOTES
Pt alert and oriented x4, VSS, IV ns locked in left arm. Moderate dorsi/plantar flexion. Pt has BLE weakness. LLE has +2 pitting edema and RLE has +1 pitting edema. Legs elevated. Bed alarm on, bedside table and call light in reach.

## 2022-05-11 NOTE — PLAN OF CARE
Problem: Safety - Adult  Goal: Free from fall injury  5/11/2022 0338 by Hazel Mccabe RN  Outcome: Progressing  Pt has been free from falls this shift, bed alarm on, bed in lowest position, 2/4 side rails up, nonskid socks on, wheels locked, bedside table and call light in reach. Encouraged pt to call out if needed anything. Problem: Pain  Goal: Verbalizes/displays adequate comfort level or baseline comfort level  5/11/2022 0338 by Hazel Mccabe RN  Outcome: Progressing   Pt c/o moderate pain in lower back and bilateral legs. Medicated per mar with prn oxycodone. Pt verbalized relief and is now resting in bed. Will continue to assess pain level.

## 2022-05-11 NOTE — PROGRESS NOTES
Pt bladder scanned for >813 mls. Straight cath'd pt per MD order for 2,875 mls of clear, alicia urine. Pt tolerated well and now resting in bed. All fall precautions in place. Pt denies any further needs at this time.

## 2022-05-11 NOTE — PROGRESS NOTES
Physical Therapy  Facility/Department: [facility]  Physical Therapy Initial Assessment    Name: Luca De La Paz  : 1945  MRN: [id number]  Date of Service: 2022    Discharge Recommendations: Luca De La Paz scored a  on the AM-PAC short mobility form. Current research shows that an AM-PAC score of 17 or less is typically not associated with a discharge to the patient's home setting. Based on the patient's AM-PAC score and their current functional mobility deficits, it is recommended that the patient have 3-5 sessions per week of Physical Therapy at d/c to increase the patient's independence. Please see assessment section for further patient specific details. If patient discharges prior to next session this note will serve as a discharge summary. Please see below for the latest assessment towards goals. PT Equipment Recommendations  Equipment Needed: No (defer to next level of care)      Patient Diagnosis(es): There were no encounter diagnoses. Past Medical History:  has a past medical history of Allergic, Anemia, Anesthesia complication, Arthritis, Asthma, Cirrhosis ([address] Cirrhosis, non-alcoholic ([address] COPD (chronic obstructive pulmonary disease) ([address] GERD (gastroesophageal reflux disease), GIB (gastrointestinal bleeding), Haemophilus infection, Heart murmur, Hernia, Hyperlipidemia, Hypertension, Lung collapse, Murmur, heart, Pneumonia, Reflux, Rheumatic fever, Stress fracture, SVT (supraventricular tachycardia) ([address] TIA (transient ischemic attack), Type II or unspecified type diabetes mellitus without mention of complication, not stated as uncontrolled, 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 Past Surgical History:  has a past surgical history that includes hernia repair; Hysterectomy; joint replacement (Bilateral); Foot surgery (Right, 2013); knee surgery (); knee surgery ();  Cardiac catheterization (07/14/14); Cataract removal; bone marrow biopsy; Breast biopsy; fine needle aspiration; Colonoscopy; Upper gastrointestinal endoscopy (N/A, 7/21/2018); bronchoscopy (N/A, 5/7/2021); bronchoscopy (5/7/2021); bronchoscopy (5/7/2021); Colonoscopy (N/A, 6/10/2021); Upper gastrointestinal endoscopy (N/A, 6/10/2021); and Upper gastrointestinal endoscopy (N/A, 6/10/2021). Assessment   Body Structures, Functions, Activity Limitations Requiring Skilled Therapeutic Intervention: Decreased functional mobility ; Decreased ADL status; Decreased ROM; Decreased body mechanics; Decreased strength;Decreased endurance;Decreased balance;Decreased posture  Assessment: Pt is a 76 yo female admitted 5/10/22 for suspected discitis. Pt's reported baseline is independent with ADLs, homemaking, and ambulation using 4WW. Pt presenting below baseline today, requiring assist x1 for bed mobility and assist x2 for transfers and gait with RW. Pt expressed fear of attempting standing or gait, requiring max education on benefit of functional mobility and sequencing with RW. Pt falling asleep mid conversation post treatment with vitals remaining stable, RN notified. Recommended ramy chacon for transfers for nursing. Pt hopeful to return home but admits not having 24H assist and being far below baseline. Pt would benefit from continued skilled IP PT to increase functional independence prior to returning home. Will follow.   Treatment Diagnosis: decreased functional mobility  Therapy Prognosis: Fair (comorbidities and functional mobility deficits lowering prognosis)  Decision Making: Medium Complexity  Requires PT Follow-Up: Yes  Activity Tolerance  Activity Tolerance: Patient limited by fatigue;Patient limited by endurance     Plan   Plan  Plan:  (2-5)  Current Treatment Recommendations: Strengthening,ROM,Balance training,Functional mobility training,Transfer training,Endurance training,Gait training  Plan Comment: 2-5  Safety Devices  Type of Devices: Chair alarm in place,Gait belt,Left in chair,Nurse notified,Call light within reach  Restraints  Restraints Initially in Place: No     Restrictions  Restrictions/Precautions  Restrictions/Precautions: Fall Risk,General Precautions  Position Activity Restriction  Other position/activity restrictions: Up with assist.     Subjective   Pain: denies  General  Chart Reviewed: Yes  Patient assessed for rehabilitation services?: Yes  Additional Pertinent Hx: Pt is a 76 yo female admitted 5/10/22 for discitis; neurosurgery consult; IR consult; ID consult; cardiology consult; MRI TS: (+) spondylosis; MRI LS: concern for L4-L5 discitis/osteomyelitis, anterolisthesis L4-L5; chest CT: (+) interstitial pulmonary edema, concern for cirrhotic morphology; lumbar CT: (-), knee XR: (-); R knee arthrocentesis 5/8 (-) for infection; PMH: a fib, COPD, HTN, T2DM, SVT, lung collapse, rheumatic fever  Response To Previous Treatment: Not applicable    Family / Caregiver Present: No  Referring Practitioner: Morena Shannon DO  Referral Date : 05/07/22  Follows Commands: Within Functional Limits  General Comment  Comments: Pt weight obtained standing at scale for nursing, reported 224 lbs. Pt on 3L O2 upon entry. Pt cleaned and changed after obtaining weight due to urinating on herself while standing to obtain weight  Subjective  Subjective: Pt supine in bed upon arrival. She was alert, agreeable to therapy, and alone in the room.          Social/Functional History  Social/Functional History  Lives With: Alone  Type of Home: House  Home Layout: Two level  Home Access: Level entry (small threshold at door)  Bathroom Shower/Tub: Walk-in shower  Bathroom Toilet:  (toilet raiser with arms)  Bathroom Equipment: Shower chair,Grab bars in [address]: Mardee Shallow, 4 wheeled,Walker, standard,Cane,Hospital bed,Reacher,Oxygen,Wheelchair-manual  Has the patient had two or more falls in the past year or any fall with injury in the past year?: No  ADL Assistance: Independent (prior to hospital admission, needs assistance since)  Homemaking Assistance: Independent (indep prior to hospitalization, unable since)  Ambulation Assistance: Independent  Transfer Assistance: Independent  Active : Yes  Mode of Transportation: Car  Occupation: Retired  Type of Occupation: medical assistant  Additional Comments: lives alone, reports neighbor checks on her but does not currently have 24H assist     Vision/Hearing  Vision Exceptions:  (for close distances)  Hearing: Within functional limits      Cognition   Orientation  Overall Orientation Status: Within Normal Limits  Orientation Level: Oriented X4  Cognition  Overall Cognitive Status: WNL     Objective   BP: 135/71 (post treatment)  SpO2: 98 % (98% pre ambulation at EOB, 92% post session)  Comment: all vitals obtained sitting on 3L O2     Observation/Palpation  Posture: Good          AROM RLE (degrees)  RLE AROM: WNL  AROM LLE (degrees)  LLE AROM : WNL     Strength RLE  Strength RLE: WFL  Comment: approx 3+/5 grossly  Strength LLE  Strength LLE: WFL  Comment: approx 3+/5 grossly           Bed mobility  Rolling to Left: Minimal assistance (HOB elevated, UE assist from therapist)  Supine to Sit: Maximum assistance (@ trunk, HOB elevated, effortful, cues for sequencing)  Scooting: Minimal assistance (@ EOB and in chair)     Transfers  Sit to Stand: Maximum Assistance;2 Person Assistance; Moderate Assistance (2x from EOB, mod A x2 1st attempt with ishan UE assist on bedside scale, mod + max A 2nd attempt, cues for sequencing)  Bed to Chair: Moderate assistance;2 Person Assistance (cues for sequencing with RW, effortful)     Ambulation  Surface: level tile  Device: Rolling Walker  Other Apparatus: O2 (3L)  Assistance: 2 Person assistance; Moderate assistance  Quality of Gait: shuffling, fearful, unsteady  Gait Deviations: Shuffles;Decreased step length; Slow Yvonne  Distance: 4' laterally to L to chair  Comments: max cues for sequencing, pt fearful of falling  More Ambulation?: No  Stairs/Curb  Stairs? :  (pt unable)     Balance  Posture: Good  Sitting - Static: Good (sat EOB SBA without LOB)  Sitting - Dynamic: Good (sat EOB SBA without LOB)  Standing - Static: Fair (ishan UE assist on bedside scale, posterior lean, min A to maintain balance)  Standing - Dynamic: Fair (ishan UE assist on bedside scale, posterior lean, min A to maintain balance)  A/AROM Exercises: standing marching at EOB in RW, mod A progressing to min A                                                        AM-PAC Score  AM-PAC Inpatient Mobility Raw Score : 12 (05/11/22 1230)  AM-PAC Inpatient T-Scale Score : 35.33 (05/11/22 1230)  Mobility Inpatient CMS 0-100% Score: 68.66 (05/11/22 1230)  Mobility Inpatient CMS G-Code Modifier : CL (05/11/22 1230)          Goals  Short Term Goals  Time Frame for Short term goals: by d/c  Short term goal 1: Pt will perform all bed mobility CGA  Short term goal 2: Pt will perform transfers min A from all surfaces  Short term goal 3: Pt will ambulate 20 ft  CGA with LRAD  Patient Goals   Patient goals : to return home       Education  Patient Education  Education Given To: Patient  Education Provided: Role of Therapy;IADL Safety  Education Provided Comments: Pt educated on safety concerns if home without assist, on benefit of continued IP PT to increase functional independence, on use of RW for gait, and on sequencing while using RW  Education Method: Verbal  Barriers to Learning: None  Education Outcome: Verbalized understanding      Therapy Time   Individual Concurrent Group Co-treatment   Time In 1114         Time Out 1207         Minutes 53         Timed Code Treatment Minutes: 38 Minutes     Total treatment time: 53 minutes  TAINA Mcgill    Therapist was present, directed the patient's care, made skilled judgement, and was responsible for assessment and treatment of the patient.    Luz Rowan, PT

## 2022-05-11 NOTE — PROGRESS NOTES
Occupational Therapy  Facility/Department: [facility] [address]  Occupational Therapy Initial Assessment/tx    Name: Wilfredo Roth  : 1945  MRN: [id number]  Date of Service: 2022    Discharge Recommendations: Wilfredo Roth scored a 15/24 on the AM-PAC ADL Inpatient form. Current research shows that an AM-PAC score of 17 or less is typically not associated with a discharge to the patient's home setting. Based on the patient's AM-PAC score and their current ADL deficits, it is recommended that the patient have 3-5 sessions per week of Occupational Therapy at d/c to increase the patient's independence. Please see assessment section for further patient specific details. If patient discharges prior to next session this note will serve as a discharge summary. Please see below for the latest assessment towards goals. OT Equipment Recommendations  Other: defer to next level of care       Patient Diagnosis(es): There were no encounter diagnoses. Past Medical History:  has a past medical history of Allergic, Anemia, Anesthesia complication, Arthritis, Asthma, Cirrhosis ([address] Cirrhosis, non-alcoholic ([address] COPD (chronic obstructive pulmonary disease) ([address] GERD (gastroesophageal reflux disease), GIB (gastrointestinal bleeding), Haemophilus infection, Heart murmur, Hernia, Hyperlipidemia, Hypertension, Lung collapse, Murmur, heart, Pneumonia, Reflux, Rheumatic fever, Stress fracture, SVT (supraventricular tachycardia) ([address] TIA (transient ischemic attack), Type II or unspecified type diabetes mellitus without mention of complication, not stated as uncontrolled, 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 Past Surgical History:  has a past surgical history that includes hernia repair; Hysterectomy; joint replacement (Bilateral); Foot surgery (Right, 2013); knee surgery (); knee surgery ();  Cardiac catheterization (14); Cataract removal; bone marrow biopsy; Breast biopsy; fine needle aspiration; Colonoscopy; Upper gastrointestinal endoscopy (N/A, 7/21/2018); bronchoscopy (N/A, 5/7/2021); bronchoscopy (5/7/2021); bronchoscopy (5/7/2021); Colonoscopy (N/A, 6/10/2021); Upper gastrointestinal endoscopy (N/A, 6/10/2021); and Upper gastrointestinal endoscopy (N/A, 6/10/2021). Treatment Diagnosis: impaired ADLs and mobility      Assessment   Performance deficits / Impairments: Decreased functional mobility ; Decreased ADL status; Decreased endurance  Assessment: Pt admitted with new onset A fib,RVR, R knee swelling, discitis/osteomyelitis L4-5, CHF. She is functioning below baseline:  requiring A of 2 to transfer, dep with lower body ADLs and toileting, with limited activity tolerance. Pt resides alone and reports she was indep with all ADLs, mobility and IADLs. Recommend continued inpatient OT to maximize functional indep. Treatment Diagnosis: impaired ADLs and mobility  Decision Making: Medium Complexity  REQUIRES OT FOLLOW-UP: Yes  Activity Tolerance  Activity Tolerance: Patient limited by fatigue  Activity Tolerance Comments: toward end of session, pt falling asleep vitals stable ZG=595/71, on 3 liters O2, O2 sat initially 98%, end of kyussic89%-Rn informed        Plan   Plan  Times per Week: 2-5  Current Treatment Recommendations: ROM,Balance training,Functional mobility training,Endurance training,Safety education & training,Self-Care / ADL     Restrictions  Restrictions/Precautions  Restrictions/Precautions: Fall Risk,General Precautions  Position Activity Restriction  Other position/activity restrictions:  Up with assist.    Subjective   General  Chart Reviewed: Yes  Patient assessed for rehabilitation services?: Yes  Additional Pertinent Hx: PMH:  cirrhosis SNOW, HTN, hyperlipidemia, hernia reparin, diabetes, SVT, recent ED visits 3/28, 4/29 for low back pain-per chart injured back 2 mths ago while cleaning house  Referring Practitioner: Jamaica Freeman MD  Diagnosis: Pt transferred [facility] with new onset A fib,RVR, R knee swelling, discitis/osteomyelitis L4-5, CHF-on 5/8 arthrocentesis R knee-infectious dz consult , neurosurgery consultMRI thoracic spine=mild multilevel thoracic sponylosis, MRI lumbar s pine=L4-5 concerning discitis/osteomyelitis, chest CT=neg PE, CT lumbar spine=multiple areas lucency L3-L5, R knee XR=neg fx  Subjective  Subjective: Pt sitting on side of bed, Rn requesting pt be weighed.      Social/Functional History  Social/Functional History  Lives With: Alone  Type of Home: House  Home Layout: Two level  Home Access: Level entry (small threshold at door)  Bathroom Shower/Tub: Walk-in shower  Bathroom Toilet: Standard (+ toilet raiser with arms)  Bathroom Equipment: Shower chair,Grab bars in [address] [place]: Izell Judyks, 4 wheeled,Walker, standard,Cane,Hospital bed,Reacher,Oxygen,Wheelchair-manual  Has the patient had two or more falls in the past year or any fall with injury in the past year?: No  ADL Assistance: Independent  Homemaking Assistance: Independent  Ambulation Assistance: Independent  Transfer Assistance: Independent  Active : Yes  Mode of Transportation: Car  Occupation: Retired  Type of Occupation: medical assistant  Leisure & Hobbies: \"I don't have any hobbies right now\"  Additional Comments: lives alone, reports neighbor checks on her but does not currently have 24 hr assist       Objective             Observation/Palpation  Posture: Fair  Observation: 3L O2 via NC  Safety Devices  Type of Devices: Chair alarm in place;Gait belt;Left in chair;Nurse notified;Call light within reach (chair reclined)  Restraints  Restraints Initially in Place: No     Toilet Transfers  Toilet - Technique: Stand step  Equipment Used:  (simulated 3 in1 commode)  Toilet Transfer: 2 Person assistance (mod A of 2 with rolling walker, increased time/effort, cues for technique)     ADL  LE Dressing: Dependent/Total (donning both socks)  Toileting: Dependent/Total (incontinent of urine, dep doffing adult diaper and donning brief)     Activity Tolerance  Activity Tolerance: Patient limited by fatigue;Patient limited by endurance     Transfers  Sit to stand: 2 Person assistance (sit to stand from bed to scale with mod A of 2, 2 trials; sit to stand with walker with mod A of 1, max A of 1)  Stand to sit: 2 Person assistance (mod A of 2, cues for safe positioning and hand placement)     Cognition  Overall Cognitive Status: WNL                  Education Given To: Patient  Education Provided: Role of Therapy;Plan of Care;Transfer Training  Education Method: Demonstration;Verbal  Education Outcome: Verbalized understanding;Continued education needed  LUE AROM (degrees)  LUE AROM : WFL  Left Hand AROM (degrees)  Left Hand AROM: WNL  RUE AROM (degrees)  RUE AROM : WFL  Right Hand AROM (degrees)  Right Hand AROM: WNL        Hand Dominance  Hand Dominance: Right             Second Session ([id number]): Pt seated in recliner upon arrival, requesting to get back in bed.      Functional Transfers: Pt attempted sit > stand transfer from recliner w/ Max A x1 but unable to clear buttocks from chair. Pt completed sit > stand from recliner > ramy stedy w/ Max A + Mod A. Pt completed stand >< sit to/from ramy stedy paddles w/ Min A and stand > sit to EOB w/ Max A to control descent. Bed Mobility: Pt sit to supine w/ Mod A to raise BLEs onto bed, HOB slightly elevated, increased time and effort. Pt scooted self to center of bed and up in bed w/ Max A. Increased time spent comfortably positioning pt in bed.     ADLs: Pt independent for beverage mgmt. Pt incontinent of urine upon standing up from recliner. After transferring to bed, pt dependent to doff soiled depends. Pt attempted to complete front pericare but unable to reach d/t abdominal swelling. Pt dependent for pericare and donning clean depends.  New purewick applied.     Pt left semi-supine in bed at EOS w/ call light w/ in reach, bed alarm on, all needs met. RN notified and present to complete bladder scan.     G-Code     OutComes Score                                                  AM-PAC Score        AM-PAC Inpatient Daily Activity Raw Score: 15 (05/11/22 1514)  AM-PAC Inpatient ADL T-Scale Score : 34.69 (05/11/22 1514)  ADL Inpatient CMS 0-100% Score: 56.46 (05/11/22 1514)  ADL Inpatient CMS G-Code Modifier : CK (05/11/22 1514)    Goals  Short Term Goals  Time Frame for Short term goals: short term goals  Short Term Goal 1: pt to don brief with mod A  Short Term Goal 2: pt to transfer to 3 in1 commode with mod A  Short Term Goal 3: pt to stand for 2-4 minutes with [place] while doing simple ADLs  Short Term Goal 4: pt to tolerate 10 reps B UE AROM exerc to increase activity tolerance  Patient Goals   Patient goals : \"to get out of here and go home\"       Therapy Time   Individual Concurrent Group Co-treatment   Time In 1120         Time Out 1207         Minutes 47              Timed Code Treatment Minutes:  32    Total Treatment Minutes:  47    Second Session Therapy Time:   Individual Concurrent Group Co-treatment   Time In 1320         Time Out 1343         Minutes 23           Timed Code Treatment Minutes:  23        Total Minutes:  [address] [facility], OTR/L [id number] Harriett Mooney, OTR/L (Treatment and documentation of second session)

## 2022-05-11 NOTE — CARE COORDINATION
SW met w/Pt at bedside. Pt is wanting to go to [facility] and only there. SW spoke to admissions she will review and call back. SW following.   Electronically signed by LUISA Mcmillan, DONNA on 5/11/2022 at 5:04 PM  [phone]

## 2022-05-11 NOTE — PROGRESS NOTES
NEUROSURGERY PROGRESS NOTE    Vina Brunner   [id number]   1945 5/11/2022    Interval History:  Hospital Day #1    Subjective: patient very unhappy this morning because she feels her room is too hot. She has bilateral knee pain, no back pain while in bed. Objective:  BP (!) 142/64   Pulse 72   Temp 97.9 °F (36.6 °C) (Oral)   Resp 16   Ht 5' 5.98\" (1.676 m)   Wt 232 lb (105.2 kg)   SpO2 97% Comment: cont 3l as @ home  BMI 37.46 kg/m²     Labs:  Recent Labs     05/09/22  0646 05/10/22  0840 05/11/22  0557   *  --  122*   CL 91*  --  85*   CO2 33*  --  25   BUN 14  --  24*   CREATININE 0.9 0.9 1.0   GLUCOSE 188*  --  151*     Recent Labs     05/09/22  0646 05/10/22  0840 05/11/22  0557   WBC 9.0  --  10.5   RBC 3.75*  --  3.73*   INR  --  1.14*  --      Radiological Findings:  MRI lumbar spine w wo contrast  5/7/2022 2229  Impression 1. Edema and enhancement at L4-5, concerning for discitis/osteomyelitis. Differential would include Modic type 1 degenerative change. 2. Multilevel degenerative change with mild spinal canal narrowing at L3-4 and L4-5. 3. Multilevel neural foraminal narrowing as above. 4. Anterolisthesis at L4-5.      MRI thoracic spine w wo contrast  5/7/2022 2233  Impression 1. Mild multilevel thoracic spondylosis with slightly exaggerated kyphosis. No high-grade spinal canal stenosis or neural foraminal narrowing. 2. No abnormal spinal cord signal.  No convincing abnormal enhancement within limitations of motion degradation.      Neurologic Exam:  GCS:  4 - Opens eyes on own  5 - Alert and oriented  6 - Follows simple motor commands    Mental Status: Awake, alert, oriented x 4   Sensation: Intact to all extremities to light touch  Coordination: Intact    DTRs:    Right  Left    ankle clonus  neg neg   toes (babinski)  down down     Musculoskeletal:   Gait: Not tested   Tone: normal   Motor strength:    Right  Left    Right  Left    Deltoid  5 5  Hip Flex  4 4   Biceps  5 5 Knee Extensors  4 4   Triceps  5 5  Knee Flexors  4 4   Wrist Ext  5 5  Ankle Dorsiflex. 5 5   Wrist Flex  5 5  Ankle Plantarflex. 5 5   Handgrip  5 5  Ext Jean Paul Longus  5 5   Thumb Ext  5 5         BLE motor exam with limitations 2/2 knee pain     Assessment:  76 yo female admitted with midline back pain and right knee pain. MRI lumbar spine with edema and enhancement at L4-5, concerning for discitis/osteomyelitis vs degenerative changes. Plan:  1. No neurosurgical intervention indicated at this time   2. q 4 hour neuro checks  3. ID consult for management of antibiotics- no biopsy due to 72 hours of broad spectrum ABX, favoring degenerative changes based on labs and review with radiologist, planning for repeat MRI in 3-4 weeks to evaluate   4. PT/OT, activity as tolerated  5. Diet per primary team  6. Pain control per primary team  7. We will sign off at this time. Patient can follow up as outpatient after repeat MRI lumbar spine. Please call with questions. DISPO-Dispo timing to be determined by primary team once patient is medically stable for discharge. ZIGGY Cartagena-CNP  Neurosurgery Nurse Practitioner  [phone]  Patient was seen and examined with Dr. Haseeb Pop  who agrees with above assessment and plan. Electronically signed by:  ZIGGY Kimbrough NP, 5/11/2022 9:40 AM

## 2022-05-11 NOTE — PROGRESS NOTES
Occupational Therapy    Second Session ([id number]): Pt seated in recliner upon arrival, requesting to get back in bed. Functional Transfers: Pt attempted sit > stand transfer from recliner w/ Max A x1 but unable to clear buttocks from chair. Pt completed sit > stand from recliner > ramy stedy w/ Max A + Mod A. Pt completed stand >< sit to/from ramy stedy paddles w/ Min A and stand > sit to EOB w/ Max A to control descent. Bed Mobility: Pt sit to supine w/ Mod A to raise BLEs onto bed, HOB slightly elevated, increased time and effort. Pt scooted self to center of bed and up in bed w/ Max A. Increased time spent comfortably positioning pt in bed. ADLs: Pt independent for beverage mgmt. Pt incontinent of urine upon standing up from recliner. After transferring to bed, pt dependent to doff soiled depends. Pt attempted to complete front pericare but unable to reach d/t abdominal swelling. Pt dependent for pericare and donning clean depends. New purewick applied. Pt left semi-supine in bed at EOS w/ call light w/ in reach, bed alarm on, all needs met. RN notified and present to complete bladder scan.

## 2022-05-12 ENCOUNTER — NURSE ONLY (OUTPATIENT)
Dept: CARDIOLOGY | Age: 77
End: 2022-05-12

## 2022-05-12 LAB
A/G RATIO: 0.8 (ref 1.1–2.2)
ALBUMIN SERPL-MCNC: 2.7 G/DL (ref 3.4–5)
ALP BLD-CCNC: 68 U/L (ref 40–129)
ALT SERPL-CCNC: 15 U/L (ref 10–40)
ANION GAP SERPL CALCULATED.3IONS-SCNC: 8 MMOL/L (ref 3–16)
AST SERPL-CCNC: 17 U/L (ref 15–37)
BASOPHILS ABSOLUTE: 0 K/UL (ref 0–0.2)
BASOPHILS RELATIVE PERCENT: 0.2 %
BILIRUB SERPL-MCNC: 0.5 MG/DL (ref 0–1)
BUN BLDV-MCNC: 13 MG/DL (ref 7–20)
CALCIUM SERPL-MCNC: 8.8 MG/DL (ref 8.3–10.6)
CHLORIDE BLD-SCNC: 90 MMOL/L (ref 99–110)
CO2: 31 MMOL/L (ref 21–32)
CREAT SERPL-MCNC: 0.6 MG/DL (ref 0.6–1.2)
EOSINOPHILS ABSOLUTE: 0.1 K/UL (ref 0–0.6)
EOSINOPHILS RELATIVE PERCENT: 2.2 %
GFR AFRICAN AMERICAN: >60
GFR NON-AFRICAN AMERICAN: >60
GLUCOSE BLD-MCNC: 130 MG/DL (ref 70–99)
GLUCOSE BLD-MCNC: 138 MG/DL (ref 70–99)
GLUCOSE BLD-MCNC: 147 MG/DL (ref 70–99)
GLUCOSE BLD-MCNC: 160 MG/DL (ref 70–99)
GLUCOSE BLD-MCNC: 214 MG/DL (ref 70–99)
HCT VFR BLD CALC: 31.4 % (ref 36–48)
HEMOGLOBIN: 10.8 G/DL (ref 12–16)
LYMPHOCYTES ABSOLUTE: 0.8 K/UL (ref 1–5.1)
LYMPHOCYTES RELATIVE PERCENT: 11.8 %
MAGNESIUM: 1.7 MG/DL (ref 1.8–2.4)
MCH RBC QN AUTO: 29.6 PG (ref 26–34)
MCHC RBC AUTO-ENTMCNC: 34.5 G/DL (ref 31–36)
MCV RBC AUTO: 85.8 FL (ref 80–100)
MONOCYTES ABSOLUTE: 0.6 K/UL (ref 0–1.3)
MONOCYTES RELATIVE PERCENT: 8.9 %
NEUTROPHILS ABSOLUTE: 5.3 K/UL (ref 1.7–7.7)
NEUTROPHILS RELATIVE PERCENT: 76.9 %
PDW BLD-RTO: 15.3 % (ref 12.4–15.4)
PERFORMED ON: ABNORMAL
PLATELET # BLD: 161 K/UL (ref 135–450)
PMV BLD AUTO: 7.6 FL (ref 5–10.5)
POTASSIUM REFLEX MAGNESIUM: 3.5 MMOL/L (ref 3.5–5.1)
RBC # BLD: 3.66 M/UL (ref 4–5.2)
SODIUM BLD-SCNC: 129 MMOL/L (ref 136–145)
TOTAL PROTEIN: 6.1 G/DL (ref 6.4–8.2)
WBC # BLD: 6.9 K/UL (ref 4–11)

## 2022-05-12 PROCEDURE — 6360000002 HC RX W HCPCS: Performed by: INTERNAL MEDICINE

## 2022-05-12 PROCEDURE — 6370000000 HC RX 637 (ALT 250 FOR IP): Performed by: INTERNAL MEDICINE

## 2022-05-12 PROCEDURE — 83735 ASSAY OF MAGNESIUM: CPT

## 2022-05-12 PROCEDURE — 85025 COMPLETE CBC W/AUTO DIFF WBC: CPT

## 2022-05-12 PROCEDURE — 2700000000 HC OXYGEN THERAPY PER DAY

## 2022-05-12 PROCEDURE — 36415 COLL VENOUS BLD VENIPUNCTURE: CPT

## 2022-05-12 PROCEDURE — 2580000003 HC RX 258: Performed by: INTERNAL MEDICINE

## 2022-05-12 PROCEDURE — 2060000000 HC ICU INTERMEDIATE R&B

## 2022-05-12 PROCEDURE — 99233 SBSQ HOSP IP/OBS HIGH 50: CPT | Performed by: NURSE PRACTITIONER

## 2022-05-12 PROCEDURE — 93246 EXT ECG>7D<15D RECORDING: CPT | Performed by: INTERNAL MEDICINE

## 2022-05-12 PROCEDURE — 80053 COMPREHEN METABOLIC PANEL: CPT

## 2022-05-12 PROCEDURE — 94761 N-INVAS EAR/PLS OXIMETRY MLT: CPT

## 2022-05-12 PROCEDURE — 94640 AIRWAY INHALATION TREATMENT: CPT

## 2022-05-12 RX ORDER — METOPROLOL SUCCINATE 25 MG/1
25 TABLET, EXTENDED RELEASE ORAL 2 TIMES DAILY
Qty: 60 TABLET | Refills: 0 | Status: ON HOLD | OUTPATIENT
Start: 2022-05-12 | End: 2022-06-07 | Stop reason: HOSPADM

## 2022-05-12 RX ORDER — ALBUTEROL SULFATE 2.5 MG/3ML
2.5 SOLUTION RESPIRATORY (INHALATION) EVERY 6 HOURS PRN
Status: DISCONTINUED | OUTPATIENT
Start: 2022-05-12 | End: 2022-05-13 | Stop reason: HOSPADM

## 2022-05-12 RX ORDER — GABAPENTIN 600 MG/1
600 TABLET ORAL NIGHTLY
COMMUNITY
End: 2022-10-12 | Stop reason: SDUPTHER

## 2022-05-12 RX ORDER — GABAPENTIN 100 MG/1
100 CAPSULE ORAL EVERY MORNING
COMMUNITY
End: 2022-10-09 | Stop reason: SDUPTHER

## 2022-05-12 RX ORDER — TRAZODONE HYDROCHLORIDE 100 MG/1
100 TABLET ORAL NIGHTLY PRN
Status: DISCONTINUED | OUTPATIENT
Start: 2022-05-12 | End: 2022-05-13 | Stop reason: HOSPADM

## 2022-05-12 RX ORDER — ACLIDINIUM BROMIDE 400 UG/1
1 POWDER, METERED RESPIRATORY (INHALATION) 2 TIMES DAILY
COMMUNITY
End: 2022-06-30 | Stop reason: SDUPTHER

## 2022-05-12 RX ORDER — TAMSULOSIN HYDROCHLORIDE 0.4 MG/1
0.4 CAPSULE ORAL DAILY
Status: DISCONTINUED | OUTPATIENT
Start: 2022-05-12 | End: 2022-05-13 | Stop reason: HOSPADM

## 2022-05-12 RX ORDER — DILTIAZEM HYDROCHLORIDE 360 MG/1
360 CAPSULE, EXTENDED RELEASE ORAL DAILY
Qty: 30 CAPSULE | Refills: 0 | Status: SHIPPED | OUTPATIENT
Start: 2022-05-12 | End: 2022-05-31 | Stop reason: SDUPTHER

## 2022-05-12 RX ORDER — LIDOCAINE 4 G/G
1 PATCH TOPICAL DAILY
DISCHARGE
Start: 2022-05-13 | End: 2022-09-19

## 2022-05-12 RX ADMIN — ATORVASTATIN CALCIUM 20 MG: 20 TABLET, FILM COATED ORAL at 20:49

## 2022-05-12 RX ADMIN — ENOXAPARIN SODIUM 100 MG: 100 INJECTION SUBCUTANEOUS at 09:45

## 2022-05-12 RX ADMIN — INSULIN LISPRO 2 UNITS: 100 INJECTION, SOLUTION INTRAVENOUS; SUBCUTANEOUS at 21:00

## 2022-05-12 RX ADMIN — GABAPENTIN 100 MG: 100 CAPSULE ORAL at 15:18

## 2022-05-12 RX ADMIN — APIXABAN 5 MG: 5 TABLET, FILM COATED ORAL at 20:49

## 2022-05-12 RX ADMIN — DOXEPIN HYDROCHLORIDE 10 MG: 10 CAPSULE ORAL at 20:54

## 2022-05-12 RX ADMIN — LEVOTHYROXINE SODIUM 100 MCG: 0.1 TABLET ORAL at 09:45

## 2022-05-12 RX ADMIN — PANTOPRAZOLE SODIUM 40 MG: 40 TABLET, DELAYED RELEASE ORAL at 20:49

## 2022-05-12 RX ADMIN — MONTELUKAST 10 MG: 10 TABLET, FILM COATED ORAL at 09:39

## 2022-05-12 RX ADMIN — BUDESONIDE 500 MCG: 0.5 SUSPENSION RESPIRATORY (INHALATION) at 21:07

## 2022-05-12 RX ADMIN — FUROSEMIDE 20 MG: 20 TABLET ORAL at 09:39

## 2022-05-12 RX ADMIN — TRAZODONE HYDROCHLORIDE 50 MG: 50 TABLET ORAL at 03:30

## 2022-05-12 RX ADMIN — ROPINIROLE HYDROCHLORIDE 1 MG: 1 TABLET, FILM COATED ORAL at 20:49

## 2022-05-12 RX ADMIN — ACETAMINOPHEN 650 MG: 325 TABLET ORAL at 05:27

## 2022-05-12 RX ADMIN — TAMSULOSIN HYDROCHLORIDE 0.4 MG: 0.4 CAPSULE ORAL at 09:39

## 2022-05-12 RX ADMIN — GABAPENTIN 100 MG: 100 CAPSULE ORAL at 09:39

## 2022-05-12 RX ADMIN — SODIUM CHLORIDE, PRESERVATIVE FREE 10 ML: 5 INJECTION INTRAVENOUS at 21:00

## 2022-05-12 RX ADMIN — INSULIN LISPRO 3 UNITS: 100 INJECTION, SOLUTION INTRAVENOUS; SUBCUTANEOUS at 08:22

## 2022-05-12 RX ADMIN — PANTOPRAZOLE SODIUM 40 MG: 40 TABLET, DELAYED RELEASE ORAL at 09:39

## 2022-05-12 RX ADMIN — VENLAFAXINE 37.5 MG: 75 TABLET ORAL at 09:39

## 2022-05-12 RX ADMIN — GABAPENTIN 100 MG: 100 CAPSULE ORAL at 20:49

## 2022-05-12 RX ADMIN — METOPROLOL SUCCINATE 25 MG: 25 TABLET, EXTENDED RELEASE ORAL at 20:49

## 2022-05-12 RX ADMIN — CETIRIZINE HYDROCHLORIDE 10 MG: 10 TABLET, FILM COATED ORAL at 09:39

## 2022-05-12 RX ADMIN — LEVALBUTEROL 1.25 MG: 1.25 SOLUTION, CONCENTRATE RESPIRATORY (INHALATION) at 21:14

## 2022-05-12 RX ADMIN — SODIUM CHLORIDE, PRESERVATIVE FREE 10 ML: 5 INJECTION INTRAVENOUS at 09:45

## 2022-05-12 RX ADMIN — METOPROLOL SUCCINATE 25 MG: 25 TABLET, EXTENDED RELEASE ORAL at 09:45

## 2022-05-12 RX ADMIN — INSULIN LISPRO 6 UNITS: 100 INJECTION, SOLUTION INTRAVENOUS; SUBCUTANEOUS at 12:00

## 2022-05-12 RX ADMIN — BUDESONIDE 500 MCG: 0.5 SUSPENSION RESPIRATORY (INHALATION) at 11:11

## 2022-05-12 RX ADMIN — DILTIAZEM HYDROCHLORIDE 360 MG: 180 CAPSULE, COATED, EXTENDED RELEASE ORAL at 20:50

## 2022-05-12 RX ADMIN — ACETAMINOPHEN 650 MG: 325 TABLET ORAL at 22:42

## 2022-05-12 RX ADMIN — LOSARTAN POTASSIUM 100 MG: 25 TABLET, FILM COATED ORAL at 09:38

## 2022-05-12 ASSESSMENT — PAIN DESCRIPTION - LOCATION: LOCATION: LEG

## 2022-05-12 ASSESSMENT — PAIN SCALES - GENERAL
PAINLEVEL_OUTOF10: 6
PAINLEVEL_OUTOF10: 2

## 2022-05-12 NOTE — CONSULTS
List of Home Medications the patient is currently taking is complete. Home Medication list in EPIC updated to reflect changes noted below. Source of medications in list is [employer] prescription fill history and patient interview. Medications added:  Tanisha Sainzfarrukh    Medications removed:   None    Medications adjusted:   Gabapentin dose: 100 mg TID and 600 mg at HS   Ropinirole dose: 1 mg at 1200, 1700, 1900 and 2 mg at 2200      Please call with any questions.   Kami Canseco, PharmD, BCPS  Wireless: [id number]  Main pharmacy: [id number]  5/12/2022 12:39 PM      Current Outpatient Medications   Medication Instructions    aclidinium (TUDORZA PRESSAIR) 400 MCG/ACT AEPB inhaler 1 puff, Inhalation, 2 TIMES DAILY    aspirin 81 mg, Oral, DAILY    atorvastatin (LIPITOR) 20 MG tablet TAKE 1 TABLET NIGHTLY    beclomethasone (QVAR) 80 MCG/ACT inhaler 2 puffs, Inhalation, 2 TIMES DAILY    benzonatate (TESSALON) 100 MG capsule TAKE 1 CAPSULE THREE TIMES A DAY AS NEEDED FOR COUGH    calcium carbonate 600 MG TABS tablet 1 tablet, Oral, DAILY    cetirizine (ZYRTEC) 10 MG tablet TAKE ONE TABLET BY MOUTH DAILY    Cholecalciferol (VITAMIN D3) 50 MCG (2000 UT) CAPS Oral    cyanocobalamin 1,000 mcg, IntraMUSCular, EVERY 14 DAYS, Last dose was taken on 4/9/17    dilTIAZem (TIAZAC) 240 mg, Oral, DAILY    diphenhydrAMINE (BENADRYL) 50 mg, Oral, NIGHTLY    doxepin (SINEQUAN) 50 mg, Oral, NIGHTLY    estrogens (conjugated) (PREMARIN) 1.25 mg, DAILY    famotidine (PEPCID) 20 MG tablet TAKE 1 TABLET TWICE A DAY    ferrous sulfate (IRON 325) 325 mg, Oral, 3 TIMES DAILY WITH MEALS    furosemide (LASIX) 20 MG tablet TAKE 1 TABLET DAILY    gabapentin (NEURONTIN) 600 mg, Oral, NIGHTLY, 600 mg at bedtime and 300 mg BID    gabapentin (NEURONTIN) 100 mg, Oral, 3 TIMES DAILY    gabapentin (NEURONTIN) 600 mg, Oral, Nightly    KLOR-CON M20 20 MEQ extended release tablet TAKE 1 TABLET DAILY    levalbuterol (XOPENEX HFA) 45 MCG/ACT inhaler 2 puffs, Inhalation, EVERY 6 HOURS PRN    levothyroxine (SYNTHROID) 100 mcg, Oral, DAILY    losartan (COZAAR) 100 MG tablet TAKE 1 TABLET DAILY    metFORMIN (GLUCOPHAGE-XR) 500 MG extended release tablet TAKE 2 TABLETS TWICE A DAY    montelukast (SINGULAIR) 10 mg, Oral, DAILY    montelukast (SINGULAIR) 10 mg, Oral, DAILY    montelukast (SINGULAIR) 10 mg, Oral, DAILY    Multiple Vitamins-Minerals (CENTRUM PO) 1 tablet, DAILY    naproxen (NAPROSYN) 500 mg, Oral, 2 TIMES DAILY WITH MEALS    nystatin (MYCOSTATIN) 940326 UNIT/ML suspension Oral, 4 TIMES DAILY    pantoprazole (PROTONIX) 40 MG tablet TAKE 1 TABLET TWICE A DAY    rOPINIRole (REQUIP) 1 MG tablet Oral, 1 mg at 1200, 1mg at 1700, 1mg at  1900, 2mg at 2200 (total of 4mg nightly in divided doses)    SITagliptin (JANUVIA) 100 mg, Oral, DAILY    traZODone (DESYREL) 50 MG tablet TAKE 2 TABLETS NIGHTLY    ursodiol (ACTIGALL) 500 mg, Oral, 2 TIMES DAILY    venlafaxine (EFFEXOR) 37.5 MG tablet TAKE 1 TABLET DAILY

## 2022-05-12 NOTE — PROGRESS NOTES
2 week CAM monitor # [id number] applied per order Linda Almeida NP for AFib. Follow up appointment a [facility] with Dr Eris Clements NP. Instructions given and questions answered. Bedside nurse aware.

## 2022-05-12 NOTE — PROGRESS NOTES
chloride flush, traZODone, glucose, levalbuterol  Continuous Infusions:   sodium chloride      sodium chloride      dextrose         Intake/Output Summary (Last 24 hours) at 5/12/2022 1014  Last data filed at 5/12/2022 0338  Gross per 24 hour   Intake 600 ml   Output 7597 ml   Net -6997 ml       CBC:   Lab Results   Component Value Date    WBC 6.9 05/12/2022    HGB 10.8 05/12/2022     05/12/2022     BMP:  Lab Results   Component Value Date     05/12/2022    K 3.5 05/12/2022    CL 90 05/12/2022    CO2 31 05/12/2022    BUN 13 05/12/2022    CREATININE 0.6 05/12/2022    GLUCOSE 138 05/12/2022     INR:   Lab Results   Component Value Date    INR 1.14 05/10/2022    INR 1.13 07/20/2018    INR 1.30 02/19/2018        CARDIAC LABS  ENZYMES:No results for input(s): CKMB, CKMBINDEX, TROPONINI in the last 72 hours. Invalid input(s): CKTOTAL;3  FASTING LIPID PANEL:  Lab Results   Component Value Date    HDL 73 09/18/2021    LDLCALC 63 09/18/2021    TRIG 105 09/18/2021     LIVER PROFILE:  Lab Results   Component Value Date    AST 17 05/12/2022    AST 16 05/11/2022    ALT 15 05/12/2022    ALT 16 05/11/2022       -----------------------------------------------------------------  Telemetry: Personally reviewed  AF, rates controlled    Objective:   Vitals: BP (!) 158/82   Pulse 81   Temp 98 °F (36.7 °C) (Oral)   Resp 19   Ht 5' 5.98\" (1.676 m)   Wt 224 lb (101.6 kg)   SpO2 96%   BMI 36.17 kg/m²   General appearance: alert, appears stated age and cooperative, No acute distress   Skin: Skin color, texture, turgor normal. No rashes or ecchymosis.   Neck: no JVD, supple, symmetrical, trachea midline   Lungs: , no accessory muscle use, no respiratory distress  Heart: irregularly irregular, rate stable  Extremities: No edema, DP +  Psychiatric: normal insight and affect    Patient Active Problem List:     Hyperlipidemia     S/P knee replacement     SVT (supraventricular tachycardia) (HCC)     GERD (gastroesophageal reflux disease)     COPD (chronic obstructive pulmonary disease) (HCC)     Pernicious anemia     Primary osteoarthritis involving multiple joints     Vitamin D deficiency     Esophageal varices without bleeding (HCC)     Restless leg syndrome     Pulmonary infiltrates     3-vessel coronary artery disease     Essential hypertension, benign     Hypothyroidism due to acquired atrophy of thyroid     Insomnia     Osteoporosis     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 New onset AF  2. Discitis  3. Osteomyelitis    Anya Langston Is a 77 y.o. woman w/ HTN, HLD, DM, rheumatic fever, TIA, SNOW cirrhosis, SVT who p/w back and knee pain, found to be in AF/RVR, placed on dilt gtt    New onset AF  - In AF, rates controlled  - CHADSVASc at least 7  - On lovenox 100 mg BID, transition to [address] [place] [address] when able  - On Toprol 25 mg BID, dilt 360 mg QD  - Plan for rate control therapy at this time  - Keep on tele  - Keep K >4.0, Mg >2.0  - 2 week CAM at DC  - Pt follows with Dr. Georgi Hernandez outpatient  - Reviewed risk factor modification for AF with patient including HTN/ DM control, ISABELLE management, stress reduction, minimal alcohol intake, tobacco cessation, regular exercise, diet  - EP will sign off. Electronically signed by Geovany Nguyen    I spent a total of 35 minutes and greater than 50% of the time was spent counseling with Anya Langston and coordinating care regarding her diagnosis, treatments and plan of care.

## 2022-05-12 NOTE — CARE COORDINATION
ADDENDUM:  SW met w/Pt at bedside. Pt is wanting to go to where ever can get her out of the hospital faster. KAPIL explained Pt's two options but one was still pending. Pt would like to go to [facility]. KAPIL spoke to Filemon horta at [facility] [email or web address], she will submit pre-cert and try to get it approved asap. She will let SW know when it is approved. Electronically signed by LUSIA Norman LSW on 5/12/2022 at 11:51 AM        SW sent additional referral to [facility], since they are in network w/Pt's insurance. KAPIL spoke Filemon horta in admissions, she will review and call back. Electronically signed by LUISA Norman LSW on 5/12/2022 at 11:03 AM      [facility] is not in network. Electronically signed by LUISA Norman LSW on 5/12/2022 at 10:52 AM      KAPIL spoke to admissions at [facility], he will review and call SW back. KAPIL  LV for admissions Marylu at [facility], [phone]. KAPIL  Spoke to admissions Svitlana at [facility], [phone], she didn't receive referral. KAPIL re-faxed referral to [phone]    Electronically signed by LUISA Norman LSW on 5/12/2022 at 10:51 AM        SW received a call from Pt's daughter Zechariah Hallmark explained the availability at [facility]. Pt's daughter stated Pt will be willing to go to any SNF in [place] and informed SW to send additional referrals. Pt's daughter also stated that Pt typically experiences hospital delirium especially when she stops taking all her meds from home, she stated Pt typically clears up and her behaviors subside once she DC from the hospital. Pt's daughter Rosina Quinn is Pt's POA. SW following.     Electronically signed by LUISA Norman LSW on 5/12/2022 at 9:49 AM

## 2022-05-12 NOTE — PROGRESS NOTES
Bedside report was given. FRANKLYN Araujo informed this RN that the patient was bladder scanned at 1500 and patient appeared to have >800mL of urine. The patient was straight cathed and >2800mL or urine was expelled from the bladder. FRANKLYN Araujo also stated that the patients sodium was 122 this morning and that the patient had developed a new onset of LLE muscle twitching. MD had been informed and ordered an ionized calcium to be drawn. Upon assessment: PT VSS stable, A&Ox4, on 3L NC, patients LLE was making involuntary jerking motions to the whole extremity. Patient stated there was no pain associated with the movements at this time. Pt able to preform flexion and extension but was unable to control the jerking movements. Patients abdomen appeared to be very firm and distended, was on the external female catheter and had no made any urine since being straight cathed at 1500. Patient reports no N/V, pain, or needs at this time. This RN paged the on call hospitalist to inform of the patients current presentation and to obtain any further orders or recommendations. MD ordered for patient to again be bladder scanned and straight cathed if >400cc of urine was present. RN asked the MD if there were any recommendations or orders for the the current sodium level of 122, and jerking movements that were appearing to be demonstrated by all 4 extremities at this time. RN also suggested MD come to bedside to assess the patient as well. MD responded that the patients labs were ordered and no new recommendations or orders were placed at this time     2130: RN bladder scanned the patient and resulted with >1000 mL of urine appeared to be in the bladder. MD was paged to inform and RN suggested jackson insertion to avoid possible infection with repeated straight cath procedure. MD ordered jackson to be inserted. 2145: jackson was successfully placed by this RN. Initial output from jackson was 2,650mL.  Jcakson was secured to patients leg, no kinks present, and dependent drainage bag hooked to bed off the floor and draining appropriately. Pt still appears to be having involuntary jerking movements to all 4 extremities, vital signs remain stable, patient sleeping in bed, bed in low and locked position with bed alarm on. Telemetry monitor on patient, call light and belongings within reach. RN will continue to frequently round on patient, and page MD with any further changes or concerns in patient status.

## 2022-05-12 NOTE — PROGRESS NOTES
Hospitalist Progress Note      PCP: Alex Faulkner. Yuly Charles.,     Date of Admission: 5/10/2022    Chief Complaint: Back pain concern for discitis. Hospital Course: Yunior Davies 77 y.o. female history of [place] cirrhosis, chronic hypoxic respiratory failure on 3 L home oxygen hypertension, hyperlipidemia, type 2 diabetes mellitus, rheumatic fever presented to ED with a complaint of leg pain and back pain. On arrival to ER patient was noted to be in A. fib with RVR. MRI lower spine concerning for discitis/osteomyelitis. Patient was transferred to [facility] for neurosurgery evaluation. Patient was noted to have right knee effusion s/p bedside knee aspiration by orthopedic. Cultures no growth to date. Patient was evaluated by neurosurgery, no surgical intervention. ID was consulted recommended finding on MRI could be related to degenerative disease. As patient has a low sed rate and CRP recommended to repeat MRI in 3 to 4 weeks. Subjective: Patient was noticed to have urine retention overnight requiring straight cath times twice. Dixon catheter was placed. Patient seen and examined denies any nausea, vomiting, fever, chills. Denies any abdominal distention had 2 bowel movements. Patient was confused overnight. I spoke with patient's daughter Poly Mehta who is ICU RN reports that patient takes a lot of medications at home to sleep and usually she gets confused afterwards.       Medications:  Reviewed    Infusion Medications    sodium chloride      sodium chloride      dextrose       Scheduled Medications    tamsulosin  0.4 mg Oral Daily    apixaban  5 mg Oral BID    atorvastatin  20 mg Oral Nightly    cetirizine  10 mg Oral Daily    doxepin  10 mg Oral Nightly    furosemide  20 mg Oral Daily    gabapentin  100 mg Oral TID    levothyroxine  100 mcg Oral Daily    lidocaine  1 patch TransDERmal Daily    losartan  100 mg Oral Daily    montelukast  10 mg Oral Daily    pantoprazole  40 mg Oral BID    rOPINIRole  1 mg Oral Nightly    senna  1 tablet Oral BID    sodium chloride flush  10 mL IntraVENous 2 times per day    venlafaxine  37.5 mg Oral Daily    insulin lispro  0-18 Units SubCUTAneous TID WC    insulin lispro  0-9 Units SubCUTAneous Nightly    budesonide  0.5 mg Nebulization BID    levalbuterol  1.25 mg Nebulization BID    dilTIAZem  360 mg Oral Daily    metoprolol succinate  25 mg Oral BID     PRN Meds: albuterol, traZODone, sodium chloride, potassium chloride, magnesium sulfate, sodium chloride, acetaminophen **OR** acetaminophen, benzonatate, dextrose, dextrose bolus **OR** dextrose bolus, glucagon (rDNA), labetalol, oxyCODONE, promethazine **OR** ondansetron, sodium chloride flush, glucose, levalbuterol      Intake/Output Summary (Last 24 hours) at 5/12/2022 1424  Last data filed at 5/12/2022 1115  Gross per 24 hour   Intake 840 ml   Output 9522 ml   Net -8682 ml       Physical Exam Performed:    BP (!) 147/72   Pulse 84   Temp 97.5 °F (36.4 °C) (Oral)   Resp 16   Ht 5' 5.98\" (1.676 m)   Wt 224 lb (101.6 kg)   SpO2 96%   BMI 36.17 kg/m²     General appearance: NAD, appears stated age and cooperative. HEENT: Pupils equal, round, and reactive to light. Conjunctivae/corneas clear. Neck: Supple, with full range of motion. No jugular venous distention. Trachea midline. Respiratory:  Normal respiratory effort. Clear to auscultation, bilaterally without Rales/Wheezes/Rhonchi. Cardiovascular: Irregular rhythm with normal S1/S2 without murmurs, rubs or gallops. Abdomen: Soft, non-tender, distended with normal bowel sounds. Dixon catheter  Musculoskeletal: No clubbing, cyanosis or edema bilaterally. Skin: Skin color, texture, turgor normal.  No rashes or lesions. Neurologic:  Neurovascularly intact without any focal sensory/motor deficits.  Cranial nerves: II-XII intact, grossly non-focal.  Psychiatric: Alert and oriented, thought content appropriate, normal insight  Capillary Refill: Brisk,< 3 seconds   Peripheral Pulses: +2 palpable, equal bilaterally       Labs:   Recent Labs     05/11/22  0557 05/12/22  0642   WBC 10.5 6.9   HGB 11.1* 10.8*   HCT 32.3* 31.4*    161     Recent Labs     05/10/22  0840 05/11/22  0557 05/12/22  0642   NA  --  122* 129*   K  --  4.4 3.5   CL  --  85* 90*   CO2  --  25 31   BUN  --  24* 13   CREATININE 0.9 1.0 0.6   CALCIUM  --  8.7 8.8     Recent Labs     05/11/22  0557 05/12/22  0642   AST 16 17   ALT 16 15   BILITOT 0.7 0.5   ALKPHOS 76 68     Recent Labs     05/10/22  0840   INR 1.14*     No results for input(s): Jahaira Tommy in the last 72 hours. Urinalysis:      Lab Results   Component Value Date    NITRU Negative 05/07/2022    WBCUA None seen 05/07/2022    BACTERIA 1+ 05/07/2022    RBCUA 0-2 05/07/2022    BLOODU TRACE-INTACT 05/07/2022    SPECGRAV <=1.005 05/07/2022    GLUCOSEU 100 05/07/2022    GLUCOSEU NEGATIVE 12/30/2009       Radiology:  No orders to display           Assessment/Plan:    Active Hospital Problems    Diagnosis Date Noted    Discitis [M46.40] 05/10/2022     Priority: Medium     #L4-L5 concerning for discitis/osteomyelitis. Evaluated by ID recommended patient did receive antibiotics for 72 hours of biopsy will not be of yield. We will monitor without antibiotic. Patient will need repeat MRI in 3 to 4 weeks. Changes on MRI could be related to degenerative disease history of normal Pro-Fernando, mild elevation of ESR/CRP. Neurosurgery signed off. PT/OT eval.    #Right knee effusion s/p bedside aspiration cultures negative to date. #A. fib new onset. Continue Cardizem along with metoprolol XL. [id number] greater than 2. Will start on Eliquis 5 mg twice daily   Echo 5/9/2022 EF 55 to 60%. No regional wall motion noted. #SNOW cirrhosis  Continue Lasix 20 mg daily. #Heart failure with preserved EF not in exacerbation.   Continue Lasix 20 mg daily.     #Hypertension  Continue losartan     #Chronic hypoxic respiratory failure on 3 L home oxygen.     #Diabetes mellitus type 2  On Januvia and metformin at home which are on hold. Continue insulin sliding scale    #Hyponatremia  Stable. Suspected due to liver cirrhosis. #Diabetes mellitus type 2  Hemoglobin A1c 6.4%. Continue insulin sliding scale. #Urine retention s/p Dixon catheter placement on 5/11. Multifactorial pain medication, and mobility. Will start on Flomax. Voiding trial in 2 to 3 days. DVT Prophylaxis: lovenox  Diet: ADULT DIET; Regular; 4 carb choices (60 gm/meal); Low Fat/Low Chol/High Fiber/MASON  Code Status: DNR-CCA    PT/OT Eval Status: In progress    Dispo -medically stable to be discharged. Will need placement. Discussed discharge plan with patient's daughter Pennie Buerger over the phone.     Samir Ramirez MD     This chart was likely completed using voice recognition technology and may contain unintended grammatical , phraseology,and/or punctuation errors

## 2022-05-12 NOTE — PROGRESS NOTES
Mrs. Herlinda Davila declined sitting in the chair today but did stand at the bedside. She is also turning herself in bed frequently. Her Dixon is draining very large amounts; notified physician. Her VS have been stable and she hasn't reported any significant amount of pain or discomfort during the shift.

## 2022-05-12 NOTE — PLAN OF CARE
Discharge Planning  Goal: Discharge to home or other facility with appropriate resources  Outcome: Progressing    Skin/Tissue Integrity  Goal: Absence of new skin breakdown  Description: 1. Monitor for areas of redness and/or skin breakdown  2. Assess vascular access sites hourly  3. Every 4-6 hours minimum:  Change oxygen saturation probe site  4. Every 4-6 hours:  If on nasal continuous positive airway pressure, respiratory therapy assess nares and determine need for appliance change or resting period.   Outcome: Progressing     Safety - Adult  Goal: Free from fall injury  Outcome: Progressing

## 2022-05-13 VITALS
RESPIRATION RATE: 18 BRPM | BODY MASS INDEX: 34.4 KG/M2 | HEART RATE: 87 BPM | WEIGHT: 214.07 LBS | TEMPERATURE: 97.6 F | HEIGHT: 66 IN | DIASTOLIC BLOOD PRESSURE: 77 MMHG | SYSTOLIC BLOOD PRESSURE: 147 MMHG | OXYGEN SATURATION: 98 %

## 2022-05-13 LAB
ANION GAP SERPL CALCULATED.3IONS-SCNC: 9 MMOL/L (ref 3–16)
BUN BLDV-MCNC: 9 MG/DL (ref 7–20)
CALCIUM SERPL-MCNC: 9.2 MG/DL (ref 8.3–10.6)
CHLORIDE BLD-SCNC: 85 MMOL/L (ref 99–110)
CO2: 33 MMOL/L (ref 21–32)
CREAT SERPL-MCNC: 0.7 MG/DL (ref 0.6–1.2)
GFR AFRICAN AMERICAN: >60
GFR NON-AFRICAN AMERICAN: >60
GLUCOSE BLD-MCNC: 147 MG/DL (ref 70–99)
GLUCOSE BLD-MCNC: 158 MG/DL (ref 70–99)
GLUCOSE BLD-MCNC: 172 MG/DL (ref 70–99)
GLUCOSE BLD-MCNC: 230 MG/DL (ref 70–99)
HCT VFR BLD CALC: 32.7 % (ref 36–48)
HEMOGLOBIN: 11.1 G/DL (ref 12–16)
MCH RBC QN AUTO: 29.5 PG (ref 26–34)
MCHC RBC AUTO-ENTMCNC: 33.8 G/DL (ref 31–36)
MCV RBC AUTO: 87.3 FL (ref 80–100)
PDW BLD-RTO: 14.9 % (ref 12.4–15.4)
PERFORMED ON: ABNORMAL
PLATELET # BLD: 175 K/UL (ref 135–450)
PMV BLD AUTO: 7.8 FL (ref 5–10.5)
POTASSIUM REFLEX MAGNESIUM: 3.6 MMOL/L (ref 3.5–5.1)
RBC # BLD: 3.74 M/UL (ref 4–5.2)
SODIUM BLD-SCNC: 127 MMOL/L (ref 136–145)
WBC # BLD: 4.7 K/UL (ref 4–11)

## 2022-05-13 PROCEDURE — 94640 AIRWAY INHALATION TREATMENT: CPT

## 2022-05-13 PROCEDURE — 97530 THERAPEUTIC ACTIVITIES: CPT

## 2022-05-13 PROCEDURE — 2700000000 HC OXYGEN THERAPY PER DAY

## 2022-05-13 PROCEDURE — 2580000003 HC RX 258: Performed by: INTERNAL MEDICINE

## 2022-05-13 PROCEDURE — 99232 SBSQ HOSP IP/OBS MODERATE 35: CPT | Performed by: INTERNAL MEDICINE

## 2022-05-13 PROCEDURE — 97535 SELF CARE MNGMENT TRAINING: CPT

## 2022-05-13 PROCEDURE — 36415 COLL VENOUS BLD VENIPUNCTURE: CPT

## 2022-05-13 PROCEDURE — 80048 BASIC METABOLIC PNL TOTAL CA: CPT

## 2022-05-13 PROCEDURE — 6370000000 HC RX 637 (ALT 250 FOR IP): Performed by: INTERNAL MEDICINE

## 2022-05-13 PROCEDURE — 85027 COMPLETE CBC AUTOMATED: CPT

## 2022-05-13 PROCEDURE — 6360000002 HC RX W HCPCS: Performed by: INTERNAL MEDICINE

## 2022-05-13 PROCEDURE — 94761 N-INVAS EAR/PLS OXIMETRY MLT: CPT

## 2022-05-13 RX ORDER — TAMSULOSIN HYDROCHLORIDE 0.4 MG/1
0.4 CAPSULE ORAL DAILY
Qty: 30 CAPSULE | Refills: 0 | DISCHARGE
Start: 2022-05-14

## 2022-05-13 RX ADMIN — MONTELUKAST 10 MG: 10 TABLET, FILM COATED ORAL at 08:24

## 2022-05-13 RX ADMIN — INSULIN LISPRO 6 UNITS: 100 INJECTION, SOLUTION INTRAVENOUS; SUBCUTANEOUS at 11:45

## 2022-05-13 RX ADMIN — LEVOTHYROXINE SODIUM 100 MCG: 0.1 TABLET ORAL at 05:11

## 2022-05-13 RX ADMIN — INSULIN LISPRO 3 UNITS: 100 INJECTION, SOLUTION INTRAVENOUS; SUBCUTANEOUS at 17:17

## 2022-05-13 RX ADMIN — GABAPENTIN 100 MG: 100 CAPSULE ORAL at 08:26

## 2022-05-13 RX ADMIN — LOSARTAN POTASSIUM 100 MG: 25 TABLET, FILM COATED ORAL at 08:26

## 2022-05-13 RX ADMIN — VENLAFAXINE 37.5 MG: 75 TABLET ORAL at 08:24

## 2022-05-13 RX ADMIN — ACETAMINOPHEN 650 MG: 325 TABLET ORAL at 05:15

## 2022-05-13 RX ADMIN — METOPROLOL SUCCINATE 25 MG: 25 TABLET, EXTENDED RELEASE ORAL at 08:26

## 2022-05-13 RX ADMIN — BUDESONIDE 500 MCG: 0.5 SUSPENSION RESPIRATORY (INHALATION) at 07:47

## 2022-05-13 RX ADMIN — PANTOPRAZOLE SODIUM 40 MG: 40 TABLET, DELAYED RELEASE ORAL at 08:25

## 2022-05-13 RX ADMIN — SODIUM CHLORIDE, PRESERVATIVE FREE 10 ML: 5 INJECTION INTRAVENOUS at 08:26

## 2022-05-13 RX ADMIN — INSULIN LISPRO 3 UNITS: 100 INJECTION, SOLUTION INTRAVENOUS; SUBCUTANEOUS at 08:27

## 2022-05-13 RX ADMIN — LEVALBUTEROL 1.25 MG: 1.25 SOLUTION, CONCENTRATE RESPIRATORY (INHALATION) at 07:47

## 2022-05-13 RX ADMIN — FUROSEMIDE 20 MG: 20 TABLET ORAL at 08:24

## 2022-05-13 RX ADMIN — APIXABAN 5 MG: 5 TABLET, FILM COATED ORAL at 08:25

## 2022-05-13 RX ADMIN — GABAPENTIN 100 MG: 100 CAPSULE ORAL at 14:46

## 2022-05-13 RX ADMIN — TAMSULOSIN HYDROCHLORIDE 0.4 MG: 0.4 CAPSULE ORAL at 08:24

## 2022-05-13 RX ADMIN — CETIRIZINE HYDROCHLORIDE 10 MG: 10 TABLET, FILM COATED ORAL at 08:24

## 2022-05-13 RX ADMIN — ACETAMINOPHEN 650 MG: 325 TABLET ORAL at 15:23

## 2022-05-13 ASSESSMENT — PAIN DESCRIPTION - LOCATION: LOCATION: LEG

## 2022-05-13 ASSESSMENT — PAIN DESCRIPTION - ORIENTATION: ORIENTATION: LOWER

## 2022-05-13 ASSESSMENT — PAIN DESCRIPTION - ONSET: ONSET: ON-GOING

## 2022-05-13 ASSESSMENT — PAIN SCALES - GENERAL
PAINLEVEL_OUTOF10: 0
PAINLEVEL_OUTOF10: 3

## 2022-05-13 ASSESSMENT — PAIN DESCRIPTION - DESCRIPTORS: DESCRIPTORS: ACHING

## 2022-05-13 ASSESSMENT — PAIN DESCRIPTION - FREQUENCY: FREQUENCY: CONTINUOUS

## 2022-05-13 NOTE — PROGRESS NOTES
Patient discharged to [facility]. IV removed. Dixon remains in place. Called report to [employer].

## 2022-05-13 NOTE — CARE COORDINATION
Case Management Assessment            Discharge Note                    Date / Time of Note: 5/13/2022 11:34 AM                  Discharge Note Completed by: LUISA Jordan, LUDYW    Patient Name: Pee Matias   YOB: 1945  Diagnosis: Discitis [M46.40]   Date / Time: 5/10/2022  4:16 AM    Current PCP: Dallas Gaspar. Mandie Mcmillan, DO  Clinic patient: No    Hospitalization in the last 30 days: No    Advance Directives:  Code Status: DNR-CCA  [facility] DNR form completed and on chart: Yes    Financial:  Payor: Artis Binning / Plan: Drew Rodriguez PPO / Product Type: Medicare /      Pharmacy:    [facility], [address] [phone] - F [phone]  [address] [place] [address]  Phone: [phone] Fax: [phone]    [place] [id number] [facility] Jamia, Ximena [address] [facility] [phone] [facility] [phone]  . Stacy Schmid [address] [facility] [address]  Phone: [phone] Fax: [phone]      Assistance purchasing medications?:    Assistance provided by Case Management: None at this time    Does patient want to participate in local refill/ meds to beds program?: Yes    Meds To Beds General Rules:  1. Can ONLY be done Monday- Friday between 8:30am-5pm  2. Prescription(s) must be in pharmacy by 3pm to be filled same day  3. Copy of patient's insurance/ prescription drug card and patient face sheet must be sent along with the prescription(s)  4. Cost of Rx cannot be added to hospital bill. If financial assistance is needed, please contact unit  or ;  or  CANNOT provide pharmacy voucher for patients co-pays  5.  Patients can then  the prescription on their way out of the hospital at discharge, or pharmacy can deliver to the bedside if staff is available. (payment due at time of pick-up or delivery - cash, check, or card accepted)     Able to afford home medications/ co-pay costs: Yes    ADLS:  Current PT AM-PAC Score: 12 /24  Current OT AM-PAC Score: 15 /24      DISCHARGE Disposition: [facility] (SNF): [facility] Phone: [phone] Fax: [phone]    LOC at discharge: Skilled  [place] Completed: Yes    Notification completed in HENS/PAS?:  Yes : CM has completed HENS online through secure website for SNF admission at [facility]. Document ID #:  [id number]    IMM Completed:   Not Indicated    Transportation:  Transportation PLAN for discharge: EMS transportation   Mode of Transport: Ambulance stretcher - BLS  Reason for medical transport: Bed confined: Meets the following criteria 1) unable to get out of bed without assistance or ambulate, 2) unable to safely sit up in a wheelchair, 3) unable to maintain erect seating position in a chair for time needed for transport  Name of [address] [place] [address] [address]: [facility]  Phone: [phone]  Time of Transport: 6:30pm    Transport form completed: Yes    Home Care:  Home Care ordered at discharge: No    Durable Medical Equipment:  DME Provider: none  Equipment obtained during hospitalization: none    Home Oxygen and Respiratory Equipment:  Oxygen needed at discharge?: No    Dialysis:  Dialysis patient: No    Referrals made at [facility] for outpatient continued care:  Not Applicable    Additional CM Notes:     SW met w/Pt at bedside. Pre-cert was approved for [employer]. Pt is in agreement w/DCP. KAPIL scheduled transport via us ambulance at 6:30pm. Pt has no other needs at this time. KAPIL LVM for Pt's daughter, Abel Benjamin. KAPIL  Confirmed DCP w/Ilana in admissions.      Report:  [id number]    The Plan for Transition of Care is related to the following treatment goals of Discitis [M46.40]    The Patient and/or patient representative Dimitris Christianson and her family were provided with a choice of provider and agrees with the discharge plan Yes    Freedom of choice list was provided with basic dialogue that supports the patient's individualized plan of care/goals and shares the quality data associated with the providers.  Yes    Care Transitions patient: Yes    [facility] LUISA Lopez, [facility]  Case Management Department  Ph: [phone]

## 2022-05-13 NOTE — PROGRESS NOTES
Physical Therapy  Daily Treatment Note    Discharge Recommendations: Zackary Severin scored a 16/24 on the AM-PAC short mobility form. Current research shows that an AM-PAC score of 17 or less is typically not associated with a discharge to the patient's home setting. Based on the patient's AM-PAC score and their current functional mobility deficits, it is recommended that the patient have 3-5 sessions per week of Physical Therapy at d/c to increase the patient's independence. Please see assessment section for further patient specific details. Assessment:  Pt needing less assist for bed mobility and transfers this session. Needing assist x 1 person this session (as compared to Mod assist x 2 previously.) Good effort and participation--seems very motivated for rehabilitation. Pt limited by dizziness, weakness, fatigue. Lives alone in 2 story home. She would benefit from continued IP PT at D/C prior to returning home. Plan is for SNF. Equipment Needs: Defer to next level of care    Chart Reviewed: Yes   Restrictions/Precautions: Fall Risk,General Precautions Other position/activity restrictions: Up with assist.   Additional Pertinent Hx: Pt is a 76 yo female admitted 5/10/22 for discitis; neurosurgery consult; IR consult; ID consult; cardiology consult; MRI TS: (+) spondylosis; MRI LS: concern for L4-L5 discitis/osteomyelitis, anterolisthesis L4-L5; chest CT: (+) interstitial pulmonary edema, concern for cirrhotic morphology; lumbar CT: (-), knee XR: (-); R knee arthrocentesis 5/8 (-) for infection; PMH: a fib, COPD, HTN, T2DM, SVT, lung collapse, rheumatic fever       Diagnosis: Discitis  Treatment Diagnosis: decreased functional mobility    Subjective: Pt in bed initially. Agreeable to working with PT. \"I hope my blood pressure doesn't keep me here. I'll be so upset. \"    Pain: Denies at this time    Objective:    Bed mobility  Supine to sit: Min assist x 1 for trunk, HOB up partially with use of rail  Scooting: CGA to EOB  Other: Pt c/o feeling lightheaded with initial EOB. Gradually improved. Transfers  Sit to stand: Min assist x 1 from bed  Stand to sit: Min assist x 1 into chair (decreased eccentric control)  Bed > chair: Min assist with wheeled walker    Ambulation  Assistance Level: Min assist x 1  Assistive device: Wheeled walker  Distance: 2 ft (bed to chair)  Quality of gait: Weak; decreased pace; decreased step length    Balance  Sat EOB ~ 15 minutes with SBA. Mostly static, but also performing dressing tasks with OT. Vitals  BP while sitting EOB: 165/75  BP while seated in chair at end of session: 170/83  Other: RN updated. Patient Education  Role of PT. Calling for assist with needs. Expressed understanding. Safety Devices  Pt left with needs in reach. In chair with chair alarm on. RN present and updated. AM-PAC score  AM-PAC Inpatient Mobility Raw Score : 16  AM-PAC Inpatient T-Scale Score : 40.78  Mobility Inpatient CMS 0-100% Score: 54.16  Mobility Inpatient CMS G-Code Modifier : CK    Goals:  Time Frame for Short term goals: by d/c  Short term goal 1: Pt will perform all bed mobility CGA   Short term goal 2: Pt will perform transfers min A from all surfaces   Short term goal 3: Pt will ambulate 20 ft CGA with LRAD     Plan:  Plan:  (2-5x/week)  Current Treatment Recommendations: Strengthening,ROM,Balance training,Functional mobility training,Transfer training,Endurance training,Gait training    Therapy Time    Individual  Concurrent  Group  Co-treatment    Time In  1444            Time Out  1518            Minutes  34              Timed Code Treatment Minutes: 34  Total Treatment Minutes: 34    Will continue per plan of care if not D/demond. If patient is discharged prior to next treatment, this note will serve as the discharge summary.     [place], [place] #[id number]

## 2022-05-13 NOTE — PLAN OF CARE
Problem: Safety - Adult  Goal: Free from fall injury  5/13/2022 1404 by Tania Palma  Outcome: Progressing  Note: Patient free from falls/ injury. Fall precautions in place. Will continue to implement. Problem: Pain  Goal: Verbalizes/displays adequate comfort level or baseline comfort level  5/13/2022 1404 by Tania Palma  Outcome: Progressing  Note: No complaints of pain at this time. Will continue to monitor.

## 2022-05-13 NOTE — PROGRESS NOTES
Patient alert and oriented x4. VSS w/ exception to elevated bp. Pt up w/ walker. Dixon remains in place. Pt reported pain. Medicated w/ prn tylenol. Neuro checks wdl. Will continue to monitor pt.      Annamaria Lacy RN

## 2022-05-13 NOTE — CARE COORDINATION
SW spoke to [place] [id number], pre-cert is approved for [facility].      Electronically signed by LUISA Hooks, DONNA on 5/13/2022 at 11:24 AM  [phone]

## 2022-05-13 NOTE — PROGRESS NOTES
ID Follow-up NOTE    CC:   LBP  Antibiotics: None     Admit Date: 5/10/2022  Hospital Day: 4    Subjective:     Patient feels good and wants to go home  No specific complaint       Objective:     Patient Vitals for the past 8 hrs:   BP Temp Temp src Pulse Resp SpO2 Weight   05/13/22 0748 -- -- -- -- 16 94 % --   05/13/22 0649 (!) 167/76 97.7 °F (36.5 °C) Oral 78 16 97 % --   05/13/22 0500 -- -- -- -- -- -- 214 lb 1.1 oz (97.1 kg)   05/13/22 0222 133/66 97.6 °F (36.4 °C) Oral 70 16 94 % --     I/O last 3 completed shifts: In: 960 [P.O.:960]  Out: 8325 [Urine:8325]  I/O this shift: In: 480 [P.O.:480]  Out: -     EXAM:  GENERAL: No apparent distress. RA  HEENT: Membranes moist, no oral lesion  NECK:  Supple, no lymphadenopathy  LUNGS: Clear b/l, no rales, no dullness  CARDIAC: RRR, no murmur appreciated  ABD:  + BS, soft / NT  EXT:  No rash, no edema, no lesions  NEURO: No focal neurologic findings  PSYCH: Orientation, sensorium, mood normal  LINES:  Peripheral iv       Data Review:  Lab Results   Component Value Date    WBC 4.7 05/13/2022    HGB 11.1 (L) 05/13/2022    HCT 32.7 (L) 05/13/2022    MCV 87.3 05/13/2022     05/13/2022     Lab Results   Component Value Date    CREATININE 0.7 05/13/2022    BUN 9 05/13/2022     (L) 05/13/2022    K 3.6 05/13/2022    CL 85 (L) 05/13/2022    CO2 33 (H) 05/13/2022       Hepatic Function Panel:   Lab Results   Component Value Date    ALKPHOS 68 05/12/2022    ALT 15 05/12/2022    AST 17 05/12/2022    PROT 6.1 05/12/2022    BILITOT 0.5 05/12/2022    BILIDIR <0.2 04/10/2016    IBILI see below 04/10/2016    LABALBU 2.7 05/12/2022       5/10 ESR 38, CRP 39.6     Micro:  5/7 BC x 2 - no growth to date  5/7 Urine cult <10k mixed taylor  5/8 L knee fluid - no growth to date     Imaging:      MRI THORACIC SPINE W WO CONTRAST   Preliminary Result   1. Mild multilevel thoracic spondylosis with slightly exaggerated kyphosis.    No high-grade spinal canal stenosis or neural foraminal narrowing. 2. No abnormal spinal cord signal.       MRI LUMBAR SPINE W WO CONTRAST   Final Result   1. Edema and enhancement at L4-5, concerning for discitis/osteomyelitis. Differential would include Modic type 1 degenerative change. 2. Multilevel degenerative change with mild spinal canal narrowing at L3-4   and L4-5.   3. Multilevel neural foraminal narrowing as above. 4. Anterolisthesis at L4-5.       CT CHEST PULMONARY EMBOLISM W CONTRAST   Final Result   No evidence of pulmonary embolism.  Four-chamber cardiomegaly without   pericardial effusion however smooth interlobular septal thickening with lower   lobe predominance along with mild mosaicism concerning for interstitial   pulmonary edema pattern without decompensation evidence as there is no   pleural effusion evident.       Limited images of the upper abdomen reveal nodular contours of the hepatic   parenchyma concerning for underlying parenchymal disease process such as   cirrhotic morphology without obvious perihepatic ascites       CT Lumbar Spine WO Contrast   Final Result   1. Multiple areas of lucency, with surrounding sclerosis involving the lumbar   spine, most prominent at the L3 and L5 levels.  Changes are new since the   prior study.  Differential considerations would include multiple Schmorl   nodes and discogenic degenerative changes, or potentially infection, or   metastatic disease.  MR imaging of the lumbar spine recommended to further   evaluate these lesions.    2. Diffuse disc protrusion, facet and ligamentous hypertrophy, L4-L5 disc   level, resulting in a moderate degree of acquired central spinal stenosis       Scheduled Meds:   tamsulosin  0.4 mg Oral Daily    apixaban  5 mg Oral BID    atorvastatin  20 mg Oral Nightly    cetirizine  10 mg Oral Daily    doxepin  10 mg Oral Nightly    furosemide  20 mg Oral Daily    gabapentin  100 mg Oral TID    levothyroxine  100 mcg Oral Daily    lidocaine  1 patch TransDERmal Daily    losartan  100 mg Oral Daily    montelukast  10 mg Oral Daily    pantoprazole  40 mg Oral BID    rOPINIRole  1 mg Oral Nightly    senna  1 tablet Oral BID    sodium chloride flush  10 mL IntraVENous 2 times per day    venlafaxine  37.5 mg Oral Daily    insulin lispro  0-18 Units SubCUTAneous TID WC    insulin lispro  0-9 Units SubCUTAneous Nightly    budesonide  0.5 mg Nebulization BID    levalbuterol  1.25 mg Nebulization BID    dilTIAZem  360 mg Oral Daily    metoprolol succinate  25 mg Oral BID       Continuous Infusions:   sodium chloride      sodium chloride      dextrose         PRN Meds:  albuterol, traZODone, sodium chloride, potassium chloride, magnesium sulfate, sodium chloride, acetaminophen **OR** acetaminophen, benzonatate, dextrose, dextrose bolus **OR** dextrose bolus, glucagon (rDNA), labetalol, oxyCODONE, promethazine **OR** ondansetron, sodium chloride flush, glucose, levalbuterol      Assessment:     Hx obesity (BMI 37), DM, COPD, HTN, asthma, cirrhosis, OA, home oxygen, CAD, HL  Hx b/l TKA     Multiple medication / antibiotic 'allergies' - amoxicillin, cephalosporins, bactrim, cipro, doxycycline, clindamycin listed     Back pain for only 3 days  Abnormal lumbar MRI with diffuse degenerative changers, L 4/5 vertebrae edema, vacuum phenomenon in disk, ligamentous hypertrophy  Normal Procalcitonin, mild ESR elevation     I do not belief pt has lumbar osteo-diskitis. Plan:     Cont off antibiotics - stopped 5/10   - believe imaging changes more likely from degenerative changes (Mobic type 1) than infection given hx, labs (normal procal, mild elevation ESR / CRP)     Would repeat imaging / MRI and labs in 3-4 weeks     Medical Decision Making:   The following items were considered in medical decision making:  Discussion of patient care with other providers  Reviewed clinical lab tests  Reviewed radiology tests  Reviewed other diagnostic tests/interventions  Independent review of radiologic images - reviewed imaging with Radiologist on 5/10  Microbiology cultures and other micro tests reviewed      Discussed with pt  Jenna Liu MD

## 2022-05-13 NOTE — PROGRESS NOTES
Occupational Therapy  Facility/Department: [facility] 5T ORTHO/NEURO  Occupational Therapy Daily Treatment    Name: Kuhshi Gale  : 1945  MRN: [id number]  Date of Service: 2022    Discharge Recommendations: Khushi Gale scored a 15/24 on the AM-PAC ADL Inpatient form. Current research shows that an AM-PAC score of 17 or less is typically not associated with a discharge to the patient's home setting. Based on the patient's AM-PAC score and their current ADL deficits, it is recommended that the patient have 3-5 sessions per week of Occupational Therapy at d/c to increase the patient's independence. Please see assessment section for further patient specific details. If patient discharges prior to next session this note will serve as a discharge summary. Please see below for the latest assessment towards goals. OT Equipment Recommendations  Equipment Needed: No  Other: defer to next level of care       Patient Diagnosis(es): There were no encounter diagnoses. Past Medical History:  has a past medical history of Allergic, Anemia, Anesthesia complication, Arthritis, Asthma, Cirrhosis ([address] Cirrhosis, non-alcoholic ([address] COPD (chronic obstructive pulmonary disease) ([address] GERD (gastroesophageal reflux disease), GIB (gastrointestinal bleeding), Haemophilus infection, Heart murmur, Hernia, Hyperlipidemia, Hypertension, Lung collapse, Murmur, heart, Pneumonia, Reflux, Rheumatic fever, Stress fracture, SVT (supraventricular tachycardia) ([address] TIA (transient ischemic attack), Type II or unspecified type diabetes mellitus without mention of complication, not stated as uncontrolled, 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 Past Surgical History:  has a past surgical history that includes hernia repair; Hysterectomy; joint replacement (Bilateral); Foot surgery (Right, 2013); knee surgery (); knee surgery ();  Cardiac catheterization (07/14/14); Cataract removal; bone marrow biopsy; Breast biopsy; fine needle aspiration; Colonoscopy; Upper gastrointestinal endoscopy (N/A, 7/21/2018); bronchoscopy (N/A, 5/7/2021); bronchoscopy (5/7/2021); bronchoscopy (5/7/2021); Colonoscopy (N/A, 6/10/2021); Upper gastrointestinal endoscopy (N/A, 6/10/2021); and Upper gastrointestinal endoscopy (N/A, 6/10/2021). Treatment Diagnosis: impaired ADLs and mobility      Assessment   Performance deficits / Impairments: Decreased functional mobility ; Decreased ADL status; Decreased endurance    Assessment: Pt admitted with new onset A fib,RVR, R knee swelling, discitis/osteomyelitis L4-5, CHF. Pt cont to function below baseline, however demo significant improvement this date: pt completed sit<>stand transfers w/ Min A and functional mobility from EOb to recliner w/ Min A. Pt resides alone and reports she was indep with all ADLs, mobility and IADLs. Recommend continued inpatient OT to maximize functional indep. Treatment Diagnosis: impaired ADLs and mobility  REQUIRES OT FOLLOW-UP: Yes  Activity Tolerance  Activity Tolerance: Patient limited by fatigue;Patient Tolerated treatment well  Activity Tolerance Comments: Pt reports dizziness upon sitting EOB at beginning of session; BP= 165/72. pt reports dizziness decreased w/ increased time sitting. BP seated in recliner at end of session= 170/83. RN notified. Pt on 3L O2 and SpO2 100%. Plan   Plan  Times per Week: 2-5  Current Treatment Recommendations: ROM,Balance training,Functional mobility training,Endurance training,Safety education & training,Self-Care / ADL     Restrictions  Restrictions/Precautions  Restrictions/Precautions: Fall Risk,General Precautions  Position Activity Restriction  Other position/activity restrictions:  Up with assist.    Subjective   General  Chart Reviewed: Yes  Patient assessed for rehabilitation services?: Yes  Additional Pertinent Hx: PMH:  cirrhosis SNOW, HTN, hyperlipidemia, hernia reparin, diabetes, SVT, recent ED visits 3/28, 4/29 for low back pain-per chart injured back 2 mths ago while cleaning house  Family / Caregiver Present: No  Referring Practitioner: Alessio Gardner MD  Diagnosis: Pt transferred from [facility] with new onset A fib,RVR, R knee swelling, discitis/osteomyelitis L4-5, CHF-on 5/8 arthrocentesis R knee-infectious dz consult , neurosurgery consultMRI thoracic spine=mild multilevel thoracic sponylosis, MRI lumbar s pine=L4-5 concerning discitis/osteomyelitis, chest CT=neg PE, CT lumbar spine=multiple areas lucency L3-L5, R knee XR=neg fx  Subjective  Subjective: Pt supine in bed upon arrival and agreeable to OT session          Objective              Safety Devices  Type of Devices: Chair alarm in place;Gait belt;Left in chair;Nurse notified;Call light within reach  Balance  Sitting:  (SBA seated EOB for ~15min total)  Standing:  (Min A at [facility])  Gait  Overall Level of Assistance: Minimum assistance (Min Ax1 from EOB>recliner)  Assistive Device: Walker, rolling        ADL  UE Dressing: Setup;Stand by assistance (pt donned/doffed gown seated EOB w/ setup assist and SBA for sitting balance)  LE Dressing: Dependent/Total (to don socks seated EOB)        Bed mobility  Supine to Sit: Minimal assistance (HOB raised and use of GB)  Scooting: Minimal assistance (use of GB)  Bed Mobility Comments: pt required increased time to complete  Transfers  Sit to stand: Minimal assistance (EOB>RW)  Stand to sit:  (RW>recliner)     Cognition  Overall Cognitive Status: WNL                  Education Given To: Patient  Education Provided: Role of Therapy;Plan of Care;Transfer Training  Education Method: Demonstration;Verbal  Education Outcome: Verbalized understanding                        G-Code     OutComes Score                                                  AM-PAC Score        AM-PAC Inpatient Daily Activity Raw Score: 15 (05/13/22 8201)  AM-PAC Inpatient ADL T-Scale Score : 34.69 (05/13/22 1621)  ADL Inpatient CMS 0-100% Score: 56.46 (05/13/22 1621)  ADL Inpatient CMS G-Code Modifier : CK (05/13/22 1621)    Goals  Short Term Goals  Time Frame for Short term goals: by dc  Short Term Goal 1: pt to don brief with mod A - ongoing  Short Term Goal 2: pt to transfer to 3 in1 commode with mod A - ongoing  Short Term Goal 3: pt to stand for 2-4 minutes with [place] while doing simple ADLs - ongoing  Short Term Goal 4: pt to tolerate 10 reps B UE AROM exerc to increase activity tolerance - ongoing  Patient Goals   Patient goals : \"to get out of here and go home\"       Therapy Time   Individual Concurrent Group Co-treatment   Time In [facility] 42         Time Out [address] [place], [place]

## 2022-05-13 NOTE — DISCHARGE SUMMARY
Hospital Medicine Discharge Summary    Patient ID: Anna Mcknight      Patient's PCP: Maia Lainez. Zelda Knight,     Admit Date: 5/10/2022     Discharge Date:   5/13/2022    Admitting Physician: Lala Marc DO     Discharge Physician: Dwayne Morris MD     Discharge Diagnoses: Active Hospital Problems    Diagnosis Date Noted    Discitis [M46.40] 05/10/2022     Priority: Medium       The patient was seen and examined on day of discharge and this discharge summary is in conjunction with any daily progress note from day of discharge. Hospital Course: Anna Mcknight 77 y.o. female history of Jaleesa Lava cirrhosis, chronic hypoxic respiratory failure on 3 L home oxygen hypertension, hyperlipidemia, type 2 diabetes mellitus, rheumatic fever presented to ED with a complaint of leg pain and back pain.  On arrival to ER patient was noted to be in A. fib with RVR. MRI lower spine concerning for discitis/osteomyelitis.  Patient was transferred to [facility] for neurosurgery evaluation. Patient was noted to have right knee effusion s/p bedside knee aspiration by orthopedic. Cultures no growth to date.     Patient was evaluated by neurosurgery, no surgical intervention. ID was consulted recommended finding on MRI could be related to degenerative disease. As patient has a low sed rate and CRP recommended to repeat MRI in 3 to 4 weeks. Hospitalization patient was noted to have urinary retention. Dixon catheter was placed. Patient will need to do voiding trial when she is more mobile. Patient was seen and examined on day of discharge. Alert oriented x3. Denies any nausea, vomiting, fever, chills. Medically stable to be discharged to nursing home. Discharge instructions discussed with patient's daughter BODØ over the phone. Assessment and plan  #L4-L5 concerning for discitis/osteomyelitis.   Versus secondary to degenerative changes  Evaluated by ID recommended patient did receive Rales/Wheezes/Rhonchi. Cardiovascular: Irregular rhythm with normal S1/S2 without murmurs, rubs or gallops. Abdomen: Soft, non-tender, -distended with normal bowel sounds. Musculoskeletal:  No clubbing, cyanosis or edema bilaterally. Full range of motion without deformity. Skin: Skin color, texture, turgor normal.  No rashes or lesions. Neurologic:  Neurovascularly intact without any focal sensory/motor deficits. Cranial nerves: II-XII intact, grossly non-focal.  Psychiatric:  Alert and oriented, thought content appropriate, normal insight  Capillary Refill: Brisk,< 3 seconds   Peripheral Pulses: +2 palpable, equal bilaterally       Labs: For convenience and continuity at follow-up the following most recent labs are provided:      CBC:    Lab Results   Component Value Date    WBC 4.7 05/13/2022    HGB 11.1 05/13/2022    HCT 32.7 05/13/2022     05/13/2022       Renal:    Lab Results   Component Value Date     05/13/2022    K 3.6 05/13/2022    CL 85 05/13/2022    CO2 33 05/13/2022    BUN 9 05/13/2022    CREATININE 0.7 05/13/2022    CALCIUM 9.2 05/13/2022    PHOS 3.3 05/12/2021         Significant Diagnostic Studies    Radiology:   No orders to display          Consults:     IP CONSULT TO NEUROSURGERY  IP CONSULT TO INFECTIOUS DISEASES  IP CONSULT TO CARDIOLOGY  IP CONSULT TO PHARMACY    Disposition:  SNF    Condition at Discharge: Stable    Discharge Instructions/Follow-up: PCP, cardiology, neurosurgery.     Code Status:  DNR-CCA     Activity: activity as tolerated    Diet: cardiac diet      Discharge Medications:     Current Discharge Medication List           Details   tamsulosin (FLOMAX) 0.4 MG capsule Take 1 capsule by mouth daily  Qty: 30 capsule, Refills: 0      apixaban (ELIQUIS) 5 MG TABS tablet Take 1 tablet by mouth 2 times daily  Qty: 60 tablet, Refills: 0      metoprolol succinate (TOPROL XL) 25 MG extended release tablet Take 1 tablet by mouth in the morning and at bedtime  Qty: 60 tablet, Refills: 0      lidocaine 4 % external patch Place 1 patch onto the skin daily              Details   dilTIAZem (TIAZAC) 360 MG extended release capsule Take 1 capsule by mouth daily  Qty: 30 capsule, Refills: 0              Details   gabapentin (NEURONTIN) 100 MG capsule Take 100 mg by mouth 3 times daily. gabapentin (NEURONTIN) 600 MG tablet Take 600 mg by mouth at bedtime.       aclidinium (TUDORZA PRESSAIR) 400 MCG/ACT AEPB inhaler Inhale 1 puff into the lungs 2 times daily      montelukast (SINGULAIR) 10 MG tablet Take 1 tablet by mouth daily  Qty: 90 tablet, Refills: 3      cyanocobalamin 1000 MCG/ML injection Inject 1 mL into the muscle every 14 days Last dose was taken on 4/9/17  Qty: 6 each, Refills: 0      cetirizine (ZYRTEC) 10 MG tablet TAKE ONE TABLET BY MOUTH DAILY  Qty: 90 tablet, Refills: 2      doxepin (SINEQUAN) 50 MG capsule Take 1 capsule by mouth nightly for 10 days  Qty: 10 capsule, Refills: 0    Associated Diagnoses: Suspected UTI      levothyroxine (SYNTHROID) 100 MCG tablet Take 1 tablet by mouth Daily  Qty: 90 tablet, Refills: 1    Associated Diagnoses: Suspected UTI      losartan (COZAAR) 100 MG tablet TAKE 1 TABLET DAILY  Qty: 90 tablet, Refills: 0      pantoprazole (PROTONIX) 40 MG tablet TAKE 1 TABLET TWICE A DAY  Qty: 180 tablet, Refills: 0      SITagliptin (JANUVIA) 100 MG tablet Take 1 tablet by mouth daily  Qty: 90 tablet, Refills: 3    Associated Diagnoses: Type 2 diabetes mellitus with diabetic polyneuropathy, without long-term current use of insulin ([facility])      nystatin (MYCOSTATIN) 409747 UNIT/ML suspension Take by mouth 4 times daily  Qty: 1 each, Refills: 0      metFORMIN (GLUCOPHAGE-XR) 500 MG extended release tablet TAKE 2 TABLETS TWICE A DAY  Qty: 360 tablet, Refills: 3    Associated Diagnoses: Type 2 diabetes mellitus with diabetic polyneuropathy, without long-term current use of insulin ([facility])      traZODone (DESYREL) 50 MG tablet TAKE 2 TABLETS NIGHTLY  Qty: 180 tablet, Refills: 3      KLOR-CON M20 20 MEQ extended release tablet TAKE 1 TABLET DAILY  Qty: 90 tablet, Refills: 3      benzonatate (TESSALON) 100 MG capsule TAKE 1 CAPSULE THREE TIMES A DAY AS NEEDED FOR COUGH  Qty: 270 capsule, Refills: 1      Cholecalciferol (VITAMIN D3) 50 MCG (2000 UT) CAPS Take by mouth      venlafaxine (EFFEXOR) 37.5 MG tablet TAKE 1 TABLET DAILY  Qty: 90 tablet, Refills: 3      atorvastatin (LIPITOR) 20 MG tablet TAKE 1 TABLET NIGHTLY  Qty: 90 tablet, Refills: 3    Associated Diagnoses: Mixed hyperlipidemia      furosemide (LASIX) 20 MG tablet TAKE 1 TABLET DAILY  Qty: 90 tablet, Refills: 3      beclomethasone (QVAR) 80 MCG/ACT inhaler Inhale 2 puffs into the lungs 2 times daily  Qty: 3 Inhaler, Refills: 4    Associated Diagnoses: Simple chronic bronchitis (HCC)      levalbuterol (XOPENEX HFA) 45 MCG/ACT inhaler Inhale 2 puffs into the lungs every 6 hours as needed for Shortness of Breath  Qty: 3 Inhaler, Refills: 4    Associated Diagnoses: Simple chronic bronchitis (HCC)      ferrous sulfate 325 (65 Fe) MG tablet Take 325 mg by mouth 3 times daily (with meals)      diphenhydrAMINE (BENADRYL) 25 MG tablet Take 50 mg by mouth nightly       aspirin 81 MG EC tablet Take 81 mg by mouth daily       rOPINIRole (REQUIP) 1 MG tablet Take by mouth 1 mg at 1200, 1mg at 1700, 1mg at  1900, 2mg at 2200 (total of 4mg nightly in divided doses)      ursodiol (ACTIGALL) 500 MG tablet Take 500 mg by mouth 2 times daily       calcium carbonate 600 MG TABS tablet Take 1 tablet by mouth daily       Multiple Vitamins-Minerals (CENTRUM PO) Take 1 tablet by mouth daily. estrogens, conjugated, (PREMARIN) 1.25 MG tablet Take 1.25 mg by mouth daily. Time Spent on discharge is more than 30 minutes in the examination, evaluation, counseling and review of medications and discharge plan. Signed:    Judy Beatty MD   5/13/2022      Thank you Esthela Taylor.  Libia Chapman DO for the opportunity to be involved in this patient's care. If you have any questions or concerns please feel free to contact me at [phone].     This chart was likely completed using voice recognition technology and may contain unintended grammatical , phraseology,and/or punctuation errors

## 2022-05-13 NOTE — PROGRESS NOTES
Initial assessment completed, VSS exec to elevated BP, pt currently denies having pain. Pt a/o x4, has ascites but bowel sounds are audible. Pt has been having frequent BMs, utilizing the bedpan. Crackles auscultated to RLL, pt satting well on RA, does not appear to be in any type of respiratory distress, pt was encouraged to deep breathe and cough, pt demonstrated understanding. POC discussed with pt, questions/concerns addressed at this time, fall px in place.

## 2022-05-13 NOTE — DISCHARGE INSTR - COC
Continuity of Care Form    Patient Name: Agueda Olvera   :  1945  MRN:  [id number]    Admit date:  5/10/2022  Discharge date:  2022    Code Status Order: DNR-CCA   Advance Directives:      Admitting Physician:  Katrinka Frankel, DO  PCP: Nirmala Morgan., DO    Discharging Nurse: [facility] Unit/Room#: 1998/5104-89  Discharging Unit Phone Number: ***    Emergency Contact:   Extended Emergency Contact Information  Primary Emergency Contact: Duane Binning, [address] [address] Phone: [phone]  Mobile Phone: [phone]  Relation: Child  Secondary Emergency Contact: Светлана Salas  Home Phone: [phone]  Mobile Phone: [phone]  Relation: Child    Past Surgical History:  Past Surgical History:   Procedure Laterality Date    BONE MARROW BIOPSY      BREAST BIOPSY      BRONCHOSCOPY N/A 2021    BRONCHOSCOPY DIAGNOSTIC OR CELL [address] ONLY performed by Celena Alfonso MD at [facility]  2021    BRONCHOSCOPY ALVEOLAR LAVAGE performed by Celena Alfonso MD at [facility]  2021    BRONCHOSCOPY THERAPUTIC ASPIRATION INITIAL performed by Celena Alfonso MD at [place] [address]  14    CATARACT REMOVAL      COLONOSCOPY      X 3 per PT 17    COLONOSCOPY N/A 6/10/2021    COLONOSCOPY POLYPECTOMY ABLATION performed by Reinaldo Sotomayor MD at [facility] 2013    CORAL BUNIONECTOMY RIGHT FOOT WITH SCREW FIXATION;    HERNIA REPAIR      HYSTERECTOMY      JOINT REPLACEMENT Bilateral     TKR    KNEE SURGERY  09    left    KNEE SURGERY  2010    right    UPPER GASTROINTESTINAL ENDOSCOPY N/A 2018    EGD BIOPSY performed by Sidney Osborne MD at [facility] 6/10/2021    EGD POLYP ABLATION/OTHER performed by Reinaldo Sotomayor MD at [facility] 6/10/2021    EGD BAND LIGATION performed by Rodney Koo MD at [facility] ENDOSCOPY       Immunization History:   Immunization History   Administered Date(s) Administered    COVID-19, J&J, PF, 0.5 mL 03/08/2021, 12/30/2021    Pneumococcal Conjugate 13-valent ([id number]) 06/25/2013    Pneumococcal Polysaccharide (Rhuagzzjh32) 01/01/2005       Active Problems:  Patient Active Problem List   Diagnosis Code    Hyperlipidemia E78.5    S/P knee replacement Z96.659    SVT (supraventricular tachycardia) ([facility]) I47.1    GERD (gastroesophageal reflux disease) K21.9    COPD (chronic obstructive pulmonary disease) ([facility]) J44.9    Pernicious anemia D51.0    Primary osteoarthritis involving multiple joints M89.49    Vitamin D deficiency E55.9    Esophageal varices without bleeding ([facility]) I85.00    Restless leg syndrome G25.81    Pulmonary infiltrates R91.8    3-vessel coronary artery disease I25.10    Essential hypertension, benign I10    Hypothyroidism due to acquired atrophy of thyroid E03.4    Insomnia G47.00    Osteoporosis M81.0    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 ([facility] [address] K75.81, K74.60    Obesity E66.9    Type 2 diabetes mellitus with diabetic polyneuropathy, without long-term current use of insulin ([facility]) E11.42    A-fib ([facility]) I48.91    Atrial fibrillation with RVR ([facility]) I48.91    Discitis M46.40       Isolation/Infection:   Isolation            No Isolation          Patient Infection Status       Infection Onset Added Last Indicated Last Indicated By Review Planned Expiration Resolved Resolved By    None active    Resolved    COVID-19 (Rule Out) 05/12/21 05/12/21 05/12/21 COVID-19, Rapid (Ordered)   05/12/21 Rule-Out Test Resulted    COVID-19 (Rule Out) 05/06/21 05/06/21 05/06/21 SARS-CoV-2 NAAT (Rapid) (Ordered)   05/06/21 Rule-Out Test Resulted            Nurse Assessment:  Last Vital Signs: BP (!) 148/70   Pulse 76   Temp 97.8 °F (36.6 °C) (Oral)   Resp 18   Ht 5' 5.98\" (1.676 m)   Wt 214 lb 1.1 oz (97.1 kg)   SpO2 97%   BMI 34.57 kg/m² Last documented pain score (0-10 scale): Pain Level: 2  Last Weight:   Wt Readings from Last 1 Encounters:   05/13/22 214 lb 1.1 oz (97.1 kg)     Mental Status:  oriented    IV Access:  - None    Nursing Mobility/ADLs:  Walking   Assisted  Transfer  Assisted  Bathing  Assisted  Dressing  Assisted  Toileting  Assisted  Feeding  Independent  Med Admin  Assisted  Med Delivery   whole w/ water    Wound Care Documentation and Therapy:        Elimination:  Continence: Bowel: Yes  Bladder: No  Urinary Catheter: Insertion Date: 5/11    Colostomy/Ileostomy/Ileal Conduit: {YES / [id number]}       Date of Last BM: 5/13/2022    Intake/Output Summary (Last 24 hours) at 5/13/2022 1033  Last data filed at 5/13/2022 0937  Gross per 24 hour   Intake 1200 ml   Output 2650 ml   Net -1450 ml     I/O last 3 completed shifts: In: [address] [P.O.:960]  Out: 8325 [Urine:8325]    Safety Concerns: At Risk for Falls    Impairments/Disabilities:      None    Nutrition Therapy:  Current Nutrition Therapy:   - Oral Diet:  General    Routes of Feeding: Oral  Liquids: Thin Liquids  Daily Fluid Restriction: no  Last Modified Barium Swallow with Video (Video Swallowing Test): not done    Treatments at the Time of Hospital Discharge:   Respiratory Treatments: ***  Oxygen Therapy:  is on oxygen at 3 L/min per nasal cannula.   Ventilator:    - No ventilator support    Rehab Therapies: Physical Therapy and Occupational Therapy  Weight Bearing Status/Restrictions: No weight bearing restrictions  Other Medical Equipment (for information only, NOT a DME order):  walker  Other Treatments:     Patient's personal belongings (please select all that are sent with patient):  Glasses, dentures, clothing    RN SIGNATURE:  Electronically signed by Rayne Mcgowan on 5/13/22 at 3:58 PM EDT    CASE MANAGEMENT/SOCIAL WORK SECTION    Inpatient Status Date: 5/10/22    Readmission Risk Assessment Score:  Readmission Risk              Risk of Unplanned Readmission:  31 Discharging to Facility/ [address]          [address] [facility] , [address] [facility] [place] [address]       Phone: [phone]       Fax: [phone]          Dialysis Facility (if applicable)   Name:  Address:  Dialysis Schedule:  Phone:  Fax:    / signature: Electronically signed by LUISA Mora, LUDYW on 5/13/22 at 11:47 AM EDT    PHYSICIAN SECTION    Prognosis: Good    Condition at Discharge: Stable    Rehab Potential (if transferring to Rehab): Good    Recommended Labs or Other Treatments After Discharge: PT/OT  Dixon care voiding trial in 3-4 days if still retaining consult urology. Fluid restriction to 1500ml daily. Increase protein intake   Physician Certification: I certify the above information and transfer of Mitchell Costello  is necessary for the continuing treatment of the diagnosis listed and that she requires Geovany Gaxiola for less 30 days.      Update Admission H&P: No change in H&P    PHYSICIAN SIGNATURE:  Electronically signed by Isidro Peña MD on 5/13/22 at 10:34 AM EDT

## 2022-05-13 NOTE — PLAN OF CARE
Problem: Discharge Planning  Goal: Discharge to home or other facility with appropriate resources  5/13/2022 1558 by Daniel Bean  Outcome: Completed  5/13/2022 0415 by Jer Ndiaye RN  Outcome: Progressing     Problem: Skin/Tissue Integrity  Goal: Absence of new skin breakdown  Description: 1. Monitor for areas of redness and/or skin breakdown  2. Assess vascular access sites hourly  3. Every 4-6 hours minimum:  Change oxygen saturation probe site  4. Every 4-6 hours:  If on nasal continuous positive airway pressure, respiratory therapy assess nares and determine need for appliance change or resting period. 5/13/2022 1558 by Daniel Bean  Outcome: Completed  5/13/2022 0415 by Jer Ndiaye RN  Outcome: Progressing     Problem: Safety - Adult  Goal: Free from fall injury  5/13/2022 1558 by Daniel Bean  Outcome: Completed  5/13/2022 1404 by Daniel Bean  Outcome: Progressing  Note: Patient free from falls/ injury. Fall precautions in place. Will continue to implement. 5/13/2022 0415 by Jer Ndiaye RN  Outcome: Progressing     Problem: ABCDS Injury Assessment  Goal: Absence of physical injury  5/13/2022 1558 by Daniel Bean  Outcome: Completed  5/13/2022 0415 by Jer Ndiaye RN  Outcome: Progressing     Problem: Chronic Conditions and Co-morbidities  Goal: Patient's chronic conditions and co-morbidity symptoms are monitored and maintained or improved  5/13/2022 1558 by Daniel Bean  Outcome: Completed  5/13/2022 0415 by Jer Ndiaye RN  Outcome: Progressing     Problem: Pain  Goal: Verbalizes/displays adequate comfort level or baseline comfort level  5/13/2022 1558 by Daniel Bean  Outcome: Completed  5/13/2022 1404 by Daniel Bean  Outcome: Progressing  Note: No complaints of pain at this time. Will continue to monitor.    5/13/2022 0415 by Jer Ndiaye RN  Outcome: Progressing

## 2022-05-17 ENCOUNTER — SCHEDULED TELEPHONE ENCOUNTER (OUTPATIENT)
Dept: PULMONOLOGY | Age: 77
End: 2022-05-17

## 2022-05-17 ENCOUNTER — TELEPHONE (OUTPATIENT)
Dept: PULMONOLOGY | Age: 77
End: 2022-05-17

## 2022-05-17 ENCOUNTER — TELEMEDICINE (OUTPATIENT)
Dept: PULMONOLOGY | Age: 77
End: 2022-05-17
Payer: MEDICARE

## 2022-05-17 DIAGNOSIS — E66.9 OBESITY (BMI 30.0-34.9): ICD-10-CM

## 2022-05-17 DIAGNOSIS — R91.8 PULMONARY INFILTRATES: Primary | ICD-10-CM

## 2022-05-17 DIAGNOSIS — J98.4 RESTRICTIVE LUNG DISEASE: ICD-10-CM

## 2022-05-17 DIAGNOSIS — Z87.891 FORMER SMOKER: ICD-10-CM

## 2022-05-17 PROCEDURE — 99443 PR PHYS/QHP TELEPHONE EVALUATION 21-30 MIN: CPT | Performed by: INTERNAL MEDICINE

## 2022-05-17 ASSESSMENT — ENCOUNTER SYMPTOMS
COUGH: 0
BACK PAIN: 1
RHINORRHEA: 1
SHORTNESS OF BREATH: 0
WHEEZING: 0

## 2022-05-17 NOTE — PROGRESS NOTES
MA Communication: The following orders are received by verbal communication from Yvonne Mccain MD    Orders include:  FU 2 mo pt will call she does not have calender.

## 2022-05-17 NOTE — LETTER
[address] [facility]  [address] [place] [address]  Phone: [phone]  Fax: [phone]    Etta Don MD        June 28, 2022    [address]  [address] [facility] [address]      Dear Sherry Gaviria:    I am writing because my staff has left multiple messages asking that you call the office to discuss aspects of your care, but to date they have not heard from you. I want to stress to you how important it is that you return calls to the office so that my staff can explain to you aspects of your treatment and care. We want to make sure that we are able to answer any questions you have left for us in a timely manner. I hope you are doing well and urge you to call my office at your earliest convenience so that we can keep you informed on your care.     Sincerely,        Etta Don MD

## 2022-05-17 NOTE — TELEPHONE ENCOUNTER
Pt stated that from [facility], [facility]. she went to [facility]. Pt stated that the best number to reach her on is [phone]. Pt needs to schedule a hsfu with rkg but would like to speak with rkg prior to scheduling.

## 2022-05-17 NOTE — PROGRESS NOTES
Pulmonary Outpatient Note   Keke Flores MD       5/17/2022    1. Pulmonary infiltrates    2. Restrictive lung disease    3. Former smoker    4. Obesity (BMI 30.0-34. 9)          ASSESSMENT/PLAN:  Abnormal CT chest.  She is symptomatically better, will consider repeat CT at some point. The interstitial process is likely inflammatory. She has subpleural nodule, other smaller nodules. Likely benign  Restrictive lung disease. Reduced FVC and ERV. There is questionable scarring of the right upper lobe. There is likely an element of pulmonary edema. She is due to receive a call from cardiology for CHF, valvular heart disease  Former smoker. Nonspecific lung nodules, stable on repeat CT chest  Obesity. Should make attempts to lose weight, but presently recovering from recent illness as delineated below  Prophylaxis. She has received Pneumovax, Prevnar. She has received the J&J COVID-19 vaccine and a [place] Peter booster. Annual flu shot recommended    Follow-up in 2 months, call earlier if symptomatic. May consider repeat CT depending on symptoms, clinical findings suggest more pulmonary edema on her recent CT chest pulmonary embolism protocol on 5/7/2022    No orders of the defined types were placed in this encounter. No follow-ups on file. Chief Complaint:   No chief complaint on file. HPI: Evette Barraza is a 77y.o. year old female here for follow-up. This was a virtual visit via telephone at her request.  Her PCP is Dr. Joon Jeffery. Karol Renee. Shona Bess was last seen on 9/26/2021, had been having cough with yellow phlegm. She was given an antibiotic which helped. She had a chest x-ray and follow-up that had improved. Earlier imaging showed possible pulmonary edema. She had an abnormal CT chest on 5/11/2021 and 6/3/2021 which suggested cryptogenic organizing pneumonia. She was treated with prolonged prednisone.   She had nonspecific lung nodules, was due to get a repeat CT chest.  The patient was doing very well, was active in her home and moving around. She developed sudden weakness of her legs, was seen in the ER. She had been seen for possible hip bursitis. After being seen in the ER she was told she had new onset atrial fibrillation and had MRI of her spine that showed possible osteomyelitis/discitis. She was transferred to [facility] evaluated by ID and it was felt that she did not have discitis. She did have her right knee drained, there was no evidence of infection. For her atrial fibrillation she was seen by cardiology, treated. She was transferred to an ECF, presently is at [facility] undergoing therapy since 5/13/2022. Denies any difficulty breathing, has been using Qvar, [place]. She hardly has to use her rescue Xopenex. She has been using Zyrtec and Singulair. Echocardiogram had shown mild mitral stenosis, she has a history of rheumatic fever at age 11. She has multiple other medical problems including hyperlipidemia, CAD, GERD, low vitamin D levels, hypertension, hypothyroidism, osteoporosis, prediabetes, CAD, RLS. She has a good appetite, did have retention of urine. The catheter was removed and had to be replaced as she was unable to pass urine. She has no GI complaints. The rest of her ROS are negative. She is keen to leave her ECF and go home. CT chest 5/7/2022  No evidence of pulmonary embolism.  Four-chamber cardiomegaly without   pericardial effusion however smooth interlobular septal thickening with lower   lobe predominance along with mild mosaicism concerning for interstitial   pulmonary edema pattern without decompensation evidence as there is no   pleural effusion evident.        CT chest 4/6/2022  Mediastinum: Thyroid is homogeneous in appearance.  Small calcifications seen   in the left thyroid.  There are borderline enlarged lymph nodes identified in   the mediastinum.  These lymph nodes are stable and unchanged when compared to   the prior study.  The cardiac chambers are enlarged.  There is no pericardial   effusion.  Prominence of the hilar region stable and unchanged.       Lungs/pleura: There is improvement seen in the nodular focus previously noted   within the right upper lobe. [place] is some underlying ground-glass   nodularity interseptal thickening suggests progression of chronic   interstitial changes.  The markings previously seen in the superior segment   of left lower lobe have resolved.  Previously seen airspace disease in the   lingula is improving.  There is improvement seen in the aeration of the lower   lung fields.  No pulmonary mass or new nodular density is identified. [place]   is a stable 5 mm noncalcified nodule seen within the right lower lobe in the   periphery.  No pleural effusion or suspicious pleural thickening.       Upper Abdomen: The visualized upper abdomen is stable.  Mild enlargement of   the spleen with cirrhotic morphology of the liver.       Soft Tissues/Bones: Degenerative changes seen within the spine with no acute   chest wall abnormality. Previous notes reviewed and edited as necessary. Janie Vicente is a pleasant 78-year-old female who was followed by Dr. Tung Duran, last seen on 6/8/2021. She has COPD, was treated with a prolonged course of steroids for organizing pneumonia. CT had shown improvement. She was on oxygen at 3 LPM. At baseline she has multiple medical problems including hypertension, SVT, hyperlipidemia, diabetes mellitus with neuropathy, hypothyroidism, SNOW with cirrhosis, esophageal varices, GERD, diabetic neuropathy, osteoporosis, low vitamin D level, pernicious anemia, DJD. She had esophageal varices. She does have a heart murmur, had rheumatic fever in childhood. She is followed by Dr. Chery Paul. 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 She had smoked from age 9 for about 55 years, less than 1 pack/day. She smoked heavily since her 35s.   She has 2 sons and 2 daughters. The patient was first seen by Dr. Jeannie Sheikh during her hospitalization on 5/6/2021, underwent bronchoscopy on 5/7/2021. Cultures were negative. Cytology showed severe acute inflammation. Her CT chest had shown improvement. The patient has been on Julio Mussel as needed. The patient lives in a bilevel house, does have difficulty with climbing steps, in the store she uses a cart. She can walk less than half a block. She does have wheezing and cough at night, the symptoms appear to wake her up. She denies GE reflux. She has lost 40 pounds, but her appetite has not improved. She did golf. She denies any  complaints, she has leg edema. She has RLS, is on Requip. Echo 8/31/2021  Left ventricular systolic function is hyperdynamic with an estimated   ejection fraction of >= 65%. The left ventricle is normal in size with mild concentric hypertrophy. No obvious regional wall motion abnormalities noted. Normal left ventricular diastolic function. Mild mitral and aortic regurgitation. Mild mitral stenosis   Systolic pulmonary artery pressure (SPAP) is normal and estimated at 32 mmHg   (right atrial pressure 3 mmHg).     CT chest 6/3/2021  Mediastinum: Thyroid normal in size.  Coarse calcification noted inferiorly   in the left lobe unchanged.  Multiple scattered lymph nodes in the   mediastinum which have decreased in size since the prior study. Pamila Michael is   mildly enlarged.  Thoracic aorta normal in size.  No pericardial effusion.       Lungs/pleura: Slight emphysematous changes present.  Small amount of scarring   or postinflammatory change anteriorly in the right apex at the site of prior   acute airspace disease.  Small focal area of consolidation remains present   laterally in the superior lingula improved from the prior study.  Moderate   opacity also present anteriorly and medially in the right upper lobe with   some bronchiectasis.  Significant improvement has occurred in this area.   Slight bibasilar atelectasis or postinflammatory changes present   significantly improved from the prior study.  6.4 x 3.8 subpleural nodule   present laterally the lower right lower lobe appearing slightly smaller. Multiple scattered areas of stranding seen throughout the lungs in areas of   previous acute airspace disease.  No abnormal vascular congestion.  Airways   are clear.  No pleural effusion.       Upper Abdomen: Liver has a mildly nodular contour with enlargement the   lateral segment of the left lobe.  Spleen appears mildly enlarged.  No other   significant abnormality.       Soft Tissues/Bones: No acute abnormality. PFT 2/16/2016  1. Spirometry: The FEV-1 is 2.07 L, which is 83% predicted. The FEV-1/FVC ratio is normal.  Inhaled bronchodilators are given. There is no significant improvement. 2.  Lung volumes: Total lung capacity is markedly elevated as measured. 3.  Diffusion capacity:  DLCO is 18.09 ml/min/mmHg, which is 76% predicted.   4.  Flow volume loop is relatively normal.     Past Medical History:   Diagnosis Date    Allergic     Anemia     Anesthesia complication     \"woke up during surgery\"    Arthritis     Osteoarthritis of bilateral CMC joints    Asthma     Followed by Dr. Diego Calvo Atrial fibrillation ([address]     Cirrhosis ([address]     non alcoholic    Cirrhosis, non-alcoholic ([address]     COPD (chronic obstructive pulmonary disease) ([address]     GERD (gastroesophageal reflux disease)     GIB (gastrointestinal bleeding)     LGIB 4/2016    Haemophilus infection 05/07/2021    + resp    Heart murmur     Hernia     Hyperlipidemia     Hypertension     Lung collapse     Murmur, heart     Pneumonia     Reflux     Rheumatic fever     Stress fracture     right foot    SVT (supraventricular tachycardia) ([address]     6/19/16 - admitted X 1 day per PT     TIA (transient ischemic attack) 2015    Type II or unspecified type diabetes mellitus without mention of complication, not stated as uncontrolled     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 ([address]     Wears glasses     as needed    Wears partial dentures     upper partial       Past Surgical History:   Procedure Laterality Date    BONE MARROW BIOPSY      BREAST BIOPSY      BRONCHOSCOPY N/A 2021    BRONCHOSCOPY DIAGNOSTIC OR CELL [address] ONLY performed by Abiola Flores MD at  [facility]  2021    BRONCHOSCOPY ALVEOLAR LAVAGE performed by Abiola Flores MD at  [facility]  2021    BRONCHOSCOPY THERAPUTIC ASPIRATION INITIAL performed by Abiola Flores MD at [place] [address]  14    CATARACT REMOVAL      COLONOSCOPY      X 3 per PT 16    COLONOSCOPY N/A 6/10/2021    COLONOSCOPY POLYPECTOMY ABLATION performed by Silvana Man MD at [address] [facility] Right 2013    CORAL BUNIONECTOMY RIGHT FOOT WITH SCREW FIXATION;    HERNIA REPAIR      HYSTERECTOMY      JOINT REPLACEMENT Bilateral     TKR    KNEE SURGERY  09    left    KNEE SURGERY      right    UPPER GASTROINTESTINAL ENDOSCOPY N/A 2018    EGD BIOPSY performed by Reinaldo Carlton MD at [facility] 6/10/2021    EGD POLYP ABLATION/OTHER performed by Silvana Man MD at [facility] 6/10/2021    EGD BAND LIGATION performed by Silvana Man MD at [facility]    Smoking status: Former Smoker     Packs/day: 1.00     Years: 40.00     Pack years: 40.00     Types: Cigarettes     Start date: 1956     Quit date: 2000     Years since quittin.9    Smokeless tobacco: Never Used    Tobacco comment: stopped cold turkey in !! ( )   Substance Use Topics    Alcohol use: No     Alcohol/week: 0.0 standard drinks       Family History   Problem Relation Age of Onset    Colon Cancer Mother     Other Mother     Diabetes Father     Kidney Disease Paternal Grandmother     Lupus Daughter     Lupus Other          Current Outpatient Medications:     tamsulosin (FLOMAX) 0.4 MG capsule, Take 1 capsule by mouth daily, Disp: 30 capsule, Rfl: 0    gabapentin (NEURONTIN) 100 MG capsule, Take 100 mg by mouth 3 times daily. , Disp: , Rfl:     gabapentin (NEURONTIN) 600 MG tablet, Take 600 mg by mouth at bedtime. , Disp: , Rfl:     aclidinium (TUDORZA PRESSAIR) 400 MCG/ACT AEPB inhaler, Inhale 1 puff into the lungs 2 times daily, Disp: , Rfl:     apixaban (ELIQUIS) 5 MG TABS tablet, Take 1 tablet by mouth 2 times daily, Disp: 60 tablet, Rfl: 0    dilTIAZem (TIAZAC) 360 MG extended release capsule, Take 1 capsule by mouth daily, Disp: 30 capsule, Rfl: 0    metoprolol succinate (TOPROL XL) 25 MG extended release tablet, Take 1 tablet by mouth in the morning and at bedtime, Disp: 60 tablet, Rfl: 0    lidocaine 4 % external patch, Place 1 patch onto the skin daily, Disp: , Rfl:     montelukast (SINGULAIR) 10 MG tablet, Take 1 tablet by mouth daily, Disp: 90 tablet, Rfl: 3    cyanocobalamin 1000 MCG/ML injection, Inject 1 mL into the muscle every 14 days Last dose was taken on 4/9/17, Disp: 6 each, Rfl: 0    cetirizine (ZYRTEC) 10 MG tablet, TAKE ONE TABLET BY MOUTH DAILY, Disp: 90 tablet, Rfl: 2    doxepin (SINEQUAN) 50 MG capsule, Take 1 capsule by mouth nightly for 10 days, Disp: 10 capsule, Rfl: 0    levothyroxine (SYNTHROID) 100 MCG tablet, Take 1 tablet by mouth Daily, Disp: 90 tablet, Rfl: 1    losartan (COZAAR) 100 MG tablet, TAKE 1 TABLET DAILY, Disp: 90 tablet, Rfl: 0    pantoprazole (PROTONIX) 40 MG tablet, TAKE 1 TABLET TWICE A DAY, Disp: 180 tablet, Rfl: 0    SITagliptin (JANUVIA) 100 MG tablet, Take 1 tablet by mouth daily, Disp: 90 tablet, Rfl: 3    nystatin (MYCOSTATIN) 912174 UNIT/ML suspension, Take by mouth 4 times daily, Disp: 1 each, Rfl: 0    metFORMIN (GLUCOPHAGE-XR) 500 MG extended release tablet, TAKE 2 TABLETS TWICE A DAY, Disp: 360 tablet, Rfl: 3    traZODone (DESYREL) 50 MG tablet, TAKE 2 TABLETS NIGHTLY, Disp: 180 tablet, Rfl: 3    KLOR-CON M20 20 MEQ extended release tablet, TAKE 1 TABLET DAILY, Disp: 90 tablet, Rfl: 3    benzonatate (TESSALON) 100 MG capsule, TAKE 1 CAPSULE THREE TIMES A DAY AS NEEDED FOR COUGH, Disp: 270 capsule, Rfl: 1    Cholecalciferol (VITAMIN D3) 50 MCG (2000 UT) CAPS, Take by mouth, Disp: , Rfl:     venlafaxine (EFFEXOR) 37.5 MG tablet, TAKE 1 TABLET DAILY, Disp: 90 tablet, Rfl: 3    atorvastatin (LIPITOR) 20 MG tablet, TAKE 1 TABLET NIGHTLY, Disp: 90 tablet, Rfl: 3    furosemide (LASIX) 20 MG tablet, TAKE 1 TABLET DAILY, Disp: 90 tablet, Rfl: 3    beclomethasone (QVAR) 80 MCG/ACT inhaler, Inhale 2 puffs into the lungs 2 times daily, Disp: 3 Inhaler, Rfl: 4    levalbuterol (XOPENEX HFA) 45 MCG/ACT inhaler, Inhale 2 puffs into the lungs every 6 hours as needed for Shortness of Breath, Disp: 3 Inhaler, Rfl: 4    ferrous sulfate 325 (65 Fe) MG tablet, Take 325 mg by mouth 3 times daily (with meals), Disp: , Rfl:     diphenhydrAMINE (BENADRYL) 25 MG tablet, Take 50 mg by mouth nightly , Disp: , Rfl:     aspirin 81 MG EC tablet, Take 81 mg by mouth daily , Disp: , Rfl:     rOPINIRole (REQUIP) 1 MG tablet, Take by mouth 1 mg at 1200, 1mg at 1700, 1mg at  1900, 2mg at 2200 (total of 4mg nightly in divided doses), Disp: , Rfl:     ursodiol (ACTIGALL) 500 MG tablet, Take 500 mg by mouth 2 times daily , Disp: , Rfl:     calcium carbonate 600 MG TABS tablet, Take 1 tablet by mouth daily , Disp: , Rfl:     Multiple Vitamins-Minerals (CENTRUM PO), Take 1 tablet by mouth daily. , Disp: , Rfl:     estrogens, conjugated, (PREMARIN) 1.25 MG tablet, Take 1.25 mg by mouth daily. , Disp: , Rfl:     Latex, Clindamycin, Pennsaid [diclofenac sodium], Torsemide, Actos [pioglitazone], Amoxicillin, Augmentin [amoxicillin-pot clavulanate], Bactrim [sulfamethoxazole-trimethoprim], Ciprofloxacin, Doxycycline, Levaquin [levofloxacin], Ativan [lorazepam], Cephalosporins, Pcn [penicillins], and Proventil [albuterol]    There were no vitals filed for this visit. Review of Systems   Constitutional: Negative for unexpected weight change. HENT: Positive for congestion and rhinorrhea. Respiratory: Negative for cough, shortness of breath and wheezing. Genitourinary:        Retention of urine   Musculoskeletal: Positive for arthralgias and back pain. All other systems reviewed and are negative. Physical examination:  Physical examination was not performed as this was a virtual visit via telephone. The patient was very pleasant, awake, alert and oriented. She was able to speak in full sentences and did not appear to have any shortness of breath or cough.

## 2022-05-17 NOTE — TELEPHONE ENCOUNTER
Within this Telehealth Consent, the terms you and yours refer to the person using the Telehealth Service (Service), or in the case of a use of the Service by or on behalf of a minor, you and yours refer to and include (i) the parent or legal guardian who provides consent to the use of the Service by such minor or uses the Service on behalf of such minor, and (ii) the minor for whom consent is being provided or on whose behalf the Service is being utilized. When using Service, you will be consulting with your health care providers via the use of Telehealth.   Telehealth involves the delivery of healthcare services using electronic communications, information technology or other means between a healthcare provider and a patient who are not in the same physical location. Telehealth may be used for diagnosis, treatment, follow-up and/or patient education, and may include, but is not limited to, one or more of the following:    Electronic transmission of medical records, photo images, personal health information or other data between a patient and a healthcare provider    Interactions between a patient and healthcare provider via audio, video and/or data communications    Use of output data from medical devices, sound and video files    Anticipated Benefits   The use of Telehealth by your Provider(s) through the Service may have the following possible benefits:    Making it easier and more efficient for you to access medical care and treatment for the conditions treated by such Provider(s) utilizing the Service    Allowing you to obtain medical care and treatment by Provider(s) at times that are convenient for you    Enabling you to interact with Provider(s) without the necessity of an in-office appointment     Possible Risks   While the use of Telehealth can provide potential benefits for you, there are also potential risks associated with the use of Telehealth.  These risks include, but may not be limited to the following:    Your Provider(s) may not able to provide medical treatment for your particular condition and you may be required to seek alternative healthcare or emergency care services.  The electronic systems or other security protocols or safeguards used in the Service could fail, causing a breach of privacy of your medical or other information.  Given regulatory requirements in certain jurisdictions, your Provider(s) diagnosis and/or treatment options, especially pertaining to certain prescriptions, may be limited. Acceptance   1. You understand that Services will be provided via Telehealth. This process involves the use of HIPAA compliant and secure, real-time audio-visual interfacing with a qualified and appropriately trained provider located at [facility]. 2. You understand that, under no circumstances, will this session be recorded. 3. You understand that the Provider(s) at [facility] and other clinical participants will be party to the information obtained during the Telehealth session in accordance with best medical practices. 4. You understand that the information obtained during the Telehealth session will be used to help determine the most appropriate treatment options. 5. You understand that You have the right to revoke this consent at any point in time. 6. You understand that Telehealth is voluntary, and that continued treatment is not dependent upon consent. 7. You understand that, in the event of non-consent to Telehealth services and/or technical difficulties, you will obtain services as typically provided in the absence of Telehealth technology. 8. You understand that this consent will be kept in Your medical record. 9. No potential benefits from the use of Telehealth or specific results can be guaranteed. Your condition may not be cured or improved and, in some cases, may get worse.    10. There are limitations in the provision of medical care and treatment via Telehealth and the Service and you may not be able to receive diagnosis and/or treatment through the Service for every condition for which you seek diagnosis and/or treatment. 11. There are potential risks to the use of Telehealth, including but not limited to the risks described in this Telehealth Consent. 12. Your Provider(s) have discussed the use of Telehealth and the Service with you, including the benefits and risks of such and you have provided oral consent to your Provider(s) for the use of Telehealth and the Service. 15. You understand that it is your duty to provide your Provider(s) truthful, accurate and complete information, including all relevant information regarding care that you may have received or may be receiving from other healthcare providers outside of the Service. 14. You understand that each of your Provider(s) may determine in his or sole discretion that your condition is not suitable for diagnosis and/or treatment using the Service, and that you may need to seek medical care and treatment a specialist or other healthcare provider, outside of the Service. 15. You understand that you are fully responsible for payment for all services provided by Provider(s) or through use of the Service and that you may not be able to use third-party insurance. 16. You represent that (a) you have read this Telehealth Consent carefully, (b) you understand the risks and benefits of the Service and the use of Telehealth in the medical care and treatment provided to you by Provider(s) using the Service, and (c) you have the legal capacity and authority to provide this consent for yourself and/or the minor for which you are consenting under applicable federal and state laws, including laws relating to the age of [de-identified] and/or parental/guardian consent.    17. You give your informed consent to the use of Telehealth by Provider(s) using the Service under the terms described in the Terms of Service and this Telehealth Consent. The patient was read the following statement and has consented to the visit as of 5/17/22. The patient has been scheduled for their first telehealth visit on 5/17/22 with Dr Wiley Mendez.

## 2022-05-17 NOTE — PATIENT INSTRUCTIONS
Remember to bring a list of pulmonary medications and any CPAP or BiPAP machines to your next appointment with the office. Please keep all of your future appointments scheduled by [address], [facility]. Out of respect for other patients and providers, you may be asked to reschedule your appointment if you arrive later than your scheduled appointment time. Appointments cancelled less than 24hrs in advance will be considered a no show. Patients with three missed appointments within 1 year or four missed appointments within 2 years can be dismissed from the practice. Please be aware that our physicians are required to work in the Intensive Care Unit at [facility].  Your appointment may need to be rescheduled if they are designated to work during your appointment time. You may receive a survey regarding the care you received during your visit. Your input is valuable to us. We encourage you to complete and return your survey. We hope you will choose us in the future for your healthcare needs. Pt instructed of all future appointment dates & times, including radiology, labs, procedures & referrals. If procedures were scheduled preparation instructions provided. Instructions on future appointments with [facility] were given.

## 2022-05-18 NOTE — TELEPHONE ENCOUNTER
Pt is insistent on speaking with rkg following her hospital stay. Pt would like to discuss with rkg what has been going on and if she needs a follow up appt in office. Pt was offered to schedule an ov with rkg and would like to speak with him first. Pt stated that today around 1 would work for her if rkg is available.  Pt can be reached at [phone]

## 2022-05-30 LAB
BODY FLUID CULTURE, STERILE: NORMAL
GRAM STAIN RESULT: NORMAL

## 2022-05-31 ENCOUNTER — TELEPHONE (OUTPATIENT)
Dept: PRIMARY CARE CLINIC | Age: 77
End: 2022-05-31

## 2022-05-31 RX ORDER — DILTIAZEM HYDROCHLORIDE 240 MG/1
CAPSULE, EXTENDED RELEASE ORAL
Qty: 90 CAPSULE | Refills: 0 | OUTPATIENT
Start: 2022-05-31

## 2022-05-31 RX ORDER — DILTIAZEM HYDROCHLORIDE 240 MG/1
240 CAPSULE, EXTENDED RELEASE ORAL DAILY
Qty: 90 CAPSULE | Refills: 0 | Status: SHIPPED | OUTPATIENT
Start: 2022-05-31 | End: 2022-08-31

## 2022-05-31 NOTE — TELEPHONE ENCOUNTER
----- Message from [facility] sent at 5/31/2022 12:49 PM EDT -----  Subject: Appointment Request    Reason for Call: Urgent (Patient Request) Hospital Follow Up    QUESTIONS  Type of Appointment? Established Patient  Reason for appointment request? No appointments available during search  Additional Information for Provider? pt needs a follow up appt, needs for   this Thursday if possible. hospital follow up is having swelling in legs   and is having a lot of bowel movements. ---------------------------------------------------------------------------  --------------  [employer]  What is the best way for the office to contact you? OK to leave message on   voicemail  Preferred Call Back Phone Number? [phone]  ---------------------------------------------------------------------------  --------------  SCRIPT ANSWERS  Relationship to Patient? Other  Representative Name? Matt Chandra  Additional information verified (besides Name and Date of Birth)? Address  (Patient requests to see provider urgently. )? Yes  Have you been diagnosed with, awaiting test results for, or told that you   are suspected of having COVID-19 (Coronavirus)? (If patient has tested   negative or was tested as a requirement for work, school, or travel and   not based on symptoms, answer no)? No  Within the past 10 days have you developed any of the following symptoms   (answer no if symptoms have been present longer than 10 days or began   more than 10 days ago)? Fever or Chills, Cough, Shortness of breath or   difficulty breathing, Loss of taste or smell, Sore throat, Nasal   congestion, Sneezing or runny nose, Fatigue or generalized body aches   (answer no if pain is specific to a body part e.g. back pain), Diarrhea,   Headache? No  Have you had close contact with someone with COVID-19 in the last 7 days? No  (Service Expert  click yes below to proceed with [employer] As Usual   Scheduling)?  Yes

## 2022-06-02 ENCOUNTER — TELEPHONE (OUTPATIENT)
Dept: PRIMARY CARE CLINIC | Age: 77
End: 2022-06-02

## 2022-06-02 DIAGNOSIS — R11.0 NAUSEA: Primary | ICD-10-CM

## 2022-06-02 RX ORDER — ONDANSETRON 4 MG/1
4 TABLET, FILM COATED ORAL 3 TIMES DAILY PRN
Qty: 30 TABLET | Refills: 0 | Status: SHIPPED | OUTPATIENT
Start: 2022-06-02 | End: 2022-07-18

## 2022-06-02 NOTE — TELEPHONE ENCOUNTER
Please review thanks     Pt is calling because she has been nauseous and is wondering if the  Could call something in for her.     Pt would prefer [facility]        Pharmacy:  [facility] [id number] - [place] [address] [address] [facility] [phone] - F [phone]           Also the therapist who was on the manjula with her would like to inform Dr. Anthony Lee that they will be seeing her in home.          Documentation

## 2022-06-02 NOTE — PROGRESS NOTES
Script called in    1. Nausea  - ondansetron (ZOFRAN) 4 MG tablet; Take 1 tablet by mouth 3 times daily as needed for Nausea or Vomiting  Dispense: 30 tablet;  Refill: 0

## 2022-06-02 NOTE — TELEPHONE ENCOUNTER
Pt is calling because she has been nauseous and is wondering if the  Could call something in for her. Pt would prefer [facility]      Pharmacy:  [facility] [id number] - [id number], [place] [address] [facility] -  [phone]        Also the therapist who was on the manjula with her would like to inform Dr. Jackeline Balbuena that they will be seeing her in home.

## 2022-06-03 ENCOUNTER — HOSPITAL ENCOUNTER (INPATIENT)
Age: 77
LOS: 4 days | Discharge: HOME HEALTH | DRG: 432 | End: 2022-06-07
Attending: EMERGENCY MEDICINE | Admitting: INTERNAL MEDICINE
Payer: MEDICARE

## 2022-06-03 ENCOUNTER — TELEPHONE (OUTPATIENT)
Dept: PRIMARY CARE CLINIC | Age: 77
End: 2022-06-03

## 2022-06-03 DIAGNOSIS — R19.5 OCCULT BLOOD POSITIVE STOOL: ICD-10-CM

## 2022-06-03 DIAGNOSIS — D64.9 ANEMIA, UNSPECIFIED TYPE: Primary | ICD-10-CM

## 2022-06-03 PROBLEM — E03.9 HYPOTHYROID: Status: ACTIVE | Noted: 2022-06-03

## 2022-06-03 LAB
A/G RATIO: 1.5 (ref 1.1–2.2)
ABO/RH: NORMAL
ALBUMIN SERPL-MCNC: 4 G/DL (ref 3.4–5)
ALP BLD-CCNC: 59 U/L (ref 40–129)
ALT SERPL-CCNC: 18 U/L (ref 10–40)
ANION GAP SERPL CALCULATED.3IONS-SCNC: 8 MMOL/L (ref 3–16)
ANTIBODY SCREEN: NORMAL
AST SERPL-CCNC: 47 U/L (ref 15–37)
BACTERIA: ABNORMAL /HPF
BASOPHILS ABSOLUTE: 0 K/UL (ref 0–0.2)
BASOPHILS RELATIVE PERCENT: 0.4 %
BILIRUB SERPL-MCNC: 0.4 MG/DL (ref 0–1)
BILIRUBIN URINE: NEGATIVE
BLOOD, URINE: ABNORMAL
BUN BLDV-MCNC: 11 MG/DL (ref 7–20)
CALCIUM SERPL-MCNC: 8.7 MG/DL (ref 8.3–10.6)
CHLORIDE BLD-SCNC: 89 MMOL/L (ref 99–110)
CLARITY: ABNORMAL
CO2: 28 MMOL/L (ref 21–32)
COLOR: YELLOW
CREAT SERPL-MCNC: 0.7 MG/DL (ref 0.6–1.2)
EOSINOPHILS ABSOLUTE: 0.1 K/UL (ref 0–0.6)
EOSINOPHILS RELATIVE PERCENT: 2 %
EPITHELIAL CELLS, UA: ABNORMAL /HPF (ref 0–5)
GFR AFRICAN AMERICAN: >60
GFR NON-AFRICAN AMERICAN: >60
GLUCOSE BLD-MCNC: 106 MG/DL (ref 70–99)
GLUCOSE BLD-MCNC: 168 MG/DL (ref 70–99)
GLUCOSE URINE: NEGATIVE MG/DL
HCT VFR BLD CALC: 24.5 % (ref 36–48)
HEMOGLOBIN: 8.3 G/DL (ref 12–16)
KETONES, URINE: NEGATIVE MG/DL
LEUKOCYTE ESTERASE, URINE: ABNORMAL
LYMPHOCYTES ABSOLUTE: 0.4 K/UL (ref 1–5.1)
LYMPHOCYTES RELATIVE PERCENT: 5.5 %
MAGNESIUM: 2 MG/DL (ref 1.8–2.4)
MCH RBC QN AUTO: 29.8 PG (ref 26–34)
MCHC RBC AUTO-ENTMCNC: 34.1 G/DL (ref 31–36)
MCV RBC AUTO: 87.3 FL (ref 80–100)
MICROSCOPIC EXAMINATION: YES
MONOCYTES ABSOLUTE: 0.5 K/UL (ref 0–1.3)
MONOCYTES RELATIVE PERCENT: 7.1 %
MUCUS: ABNORMAL /LPF
NEUTROPHILS ABSOLUTE: 5.5 K/UL (ref 1.7–7.7)
NEUTROPHILS RELATIVE PERCENT: 85 %
NITRITE, URINE: NEGATIVE
OCCULT BLOOD SCREENING: ABNORMAL
PDW BLD-RTO: 15.6 % (ref 12.4–15.4)
PERFORMED ON: ABNORMAL
PH UA: 6 (ref 5–8)
PLATELET # BLD: 186 K/UL (ref 135–450)
PMV BLD AUTO: 7.3 FL (ref 5–10.5)
POTASSIUM SERPL-SCNC: 4.7 MMOL/L (ref 3.5–5.1)
POTASSIUM SERPL-SCNC: 5.3 MMOL/L (ref 3.5–5.1)
PROTEIN UA: NEGATIVE MG/DL
RBC # BLD: 2.8 M/UL (ref 4–5.2)
RBC UA: ABNORMAL /HPF (ref 0–4)
SARS-COV-2, NAAT: NOT DETECTED
SODIUM BLD-SCNC: 125 MMOL/L (ref 136–145)
SPECIFIC GRAVITY UA: <=1.005 (ref 1–1.03)
TOTAL PROTEIN: 6.7 G/DL (ref 6.4–8.2)
URINE REFLEX TO CULTURE: YES
URINE TYPE: ABNORMAL
UROBILINOGEN, URINE: 0.2 E.U./DL
WBC # BLD: 6.4 K/UL (ref 4–11)
WBC UA: ABNORMAL /HPF (ref 0–5)

## 2022-06-03 PROCEDURE — 94761 N-INVAS EAR/PLS OXIMETRY MLT: CPT

## 2022-06-03 PROCEDURE — 1200000000 HC SEMI PRIVATE

## 2022-06-03 PROCEDURE — 87635 SARS-COV-2 COVID-19 AMP PRB: CPT

## 2022-06-03 PROCEDURE — 2580000003 HC RX 258: Performed by: PHYSICIAN ASSISTANT

## 2022-06-03 PROCEDURE — 2700000000 HC OXYGEN THERAPY PER DAY

## 2022-06-03 PROCEDURE — 6370000000 HC RX 637 (ALT 250 FOR IP): Performed by: INTERNAL MEDICINE

## 2022-06-03 PROCEDURE — 84132 ASSAY OF SERUM POTASSIUM: CPT

## 2022-06-03 PROCEDURE — 2580000003 HC RX 258: Performed by: INTERNAL MEDICINE

## 2022-06-03 PROCEDURE — 86901 BLOOD TYPING SEROLOGIC RH(D): CPT

## 2022-06-03 PROCEDURE — 86850 RBC ANTIBODY SCREEN: CPT

## 2022-06-03 PROCEDURE — 85025 COMPLETE CBC W/AUTO DIFF WBC: CPT

## 2022-06-03 PROCEDURE — 99285 EMERGENCY DEPT VISIT HI MDM: CPT

## 2022-06-03 PROCEDURE — 83036 HEMOGLOBIN GLYCOSYLATED A1C: CPT

## 2022-06-03 PROCEDURE — 96361 HYDRATE IV INFUSION ADD-ON: CPT

## 2022-06-03 PROCEDURE — 82270 OCCULT BLOOD FECES: CPT

## 2022-06-03 PROCEDURE — 94640 AIRWAY INHALATION TREATMENT: CPT

## 2022-06-03 PROCEDURE — 81001 URINALYSIS AUTO W/SCOPE: CPT

## 2022-06-03 PROCEDURE — 6370000000 HC RX 637 (ALT 250 FOR IP): Performed by: NURSE PRACTITIONER

## 2022-06-03 PROCEDURE — 6370000000 HC RX 637 (ALT 250 FOR IP): Performed by: PHYSICIAN ASSISTANT

## 2022-06-03 PROCEDURE — 93005 ELECTROCARDIOGRAM TRACING: CPT | Performed by: PHYSICIAN ASSISTANT

## 2022-06-03 PROCEDURE — 80053 COMPREHEN METABOLIC PANEL: CPT

## 2022-06-03 PROCEDURE — 96360 HYDRATION IV INFUSION INIT: CPT

## 2022-06-03 PROCEDURE — 83735 ASSAY OF MAGNESIUM: CPT

## 2022-06-03 PROCEDURE — 86900 BLOOD TYPING SEROLOGIC ABO: CPT

## 2022-06-03 RX ORDER — SODIUM CHLORIDE 0.9 % (FLUSH) 0.9 %
5-40 SYRINGE (ML) INJECTION EVERY 12 HOURS SCHEDULED
Status: DISCONTINUED | OUTPATIENT
Start: 2022-06-03 | End: 2022-06-07 | Stop reason: HOSPADM

## 2022-06-03 RX ORDER — ONDANSETRON 2 MG/ML
4 INJECTION INTRAMUSCULAR; INTRAVENOUS EVERY 6 HOURS PRN
Status: DISCONTINUED | OUTPATIENT
Start: 2022-06-03 | End: 2022-06-07 | Stop reason: HOSPADM

## 2022-06-03 RX ORDER — SODIUM CHLORIDE 0.9 % (FLUSH) 0.9 %
5-40 SYRINGE (ML) INJECTION PRN
Status: DISCONTINUED | OUTPATIENT
Start: 2022-06-03 | End: 2022-06-07 | Stop reason: HOSPADM

## 2022-06-03 RX ORDER — LEVALBUTEROL 1.25 MG/.5ML
0.63 SOLUTION, CONCENTRATE RESPIRATORY (INHALATION) EVERY 6 HOURS PRN
Status: DISCONTINUED | OUTPATIENT
Start: 2022-06-03 | End: 2022-06-07 | Stop reason: HOSPADM

## 2022-06-03 RX ORDER — FUROSEMIDE 20 MG/1
20 TABLET ORAL DAILY
Status: DISCONTINUED | OUTPATIENT
Start: 2022-06-04 | End: 2022-06-07 | Stop reason: HOSPADM

## 2022-06-03 RX ORDER — HYDROXYZINE PAMOATE 25 MG/1
50 CAPSULE ORAL ONCE
Status: COMPLETED | OUTPATIENT
Start: 2022-06-03 | End: 2022-06-03

## 2022-06-03 RX ORDER — ACETAMINOPHEN 650 MG/1
650 SUPPOSITORY RECTAL EVERY 6 HOURS PRN
Status: DISCONTINUED | OUTPATIENT
Start: 2022-06-03 | End: 2022-06-07 | Stop reason: HOSPADM

## 2022-06-03 RX ORDER — CETIRIZINE HYDROCHLORIDE 10 MG/1
10 TABLET ORAL DAILY
Status: DISCONTINUED | OUTPATIENT
Start: 2022-06-03 | End: 2022-06-07 | Stop reason: HOSPADM

## 2022-06-03 RX ORDER — LEVALBUTEROL TARTRATE 45 UG/1
2 AEROSOL, METERED ORAL EVERY 6 HOURS PRN
Status: DISCONTINUED | OUTPATIENT
Start: 2022-06-03 | End: 2022-06-03 | Stop reason: CLARIF

## 2022-06-03 RX ORDER — POLYETHYLENE GLYCOL 3350 17 G/17G
17 POWDER, FOR SOLUTION ORAL DAILY PRN
Status: DISCONTINUED | OUTPATIENT
Start: 2022-06-03 | End: 2022-06-07 | Stop reason: HOSPADM

## 2022-06-03 RX ORDER — TRAZODONE HYDROCHLORIDE 50 MG/1
100 TABLET ORAL NIGHTLY
Status: DISCONTINUED | OUTPATIENT
Start: 2022-06-03 | End: 2022-06-07 | Stop reason: HOSPADM

## 2022-06-03 RX ORDER — FERROUS SULFATE 325(65) MG
325 TABLET ORAL
Status: DISCONTINUED | OUTPATIENT
Start: 2022-06-03 | End: 2022-06-07 | Stop reason: HOSPADM

## 2022-06-03 RX ORDER — GABAPENTIN 300 MG/1
600 CAPSULE ORAL NIGHTLY
Status: DISCONTINUED | OUTPATIENT
Start: 2022-06-03 | End: 2022-06-07 | Stop reason: HOSPADM

## 2022-06-03 RX ORDER — DILTIAZEM HYDROCHLORIDE 240 MG/1
240 CAPSULE, COATED, EXTENDED RELEASE ORAL DAILY
Status: DISCONTINUED | OUTPATIENT
Start: 2022-06-04 | End: 2022-06-07 | Stop reason: HOSPADM

## 2022-06-03 RX ORDER — POTASSIUM CHLORIDE 20 MEQ/1
20 TABLET, EXTENDED RELEASE ORAL
Status: DISCONTINUED | OUTPATIENT
Start: 2022-06-04 | End: 2022-06-07 | Stop reason: HOSPADM

## 2022-06-03 RX ORDER — TRAMADOL HYDROCHLORIDE 50 MG/1
50 TABLET ORAL EVERY 6 HOURS PRN
Status: DISCONTINUED | OUTPATIENT
Start: 2022-06-03 | End: 2022-06-07 | Stop reason: HOSPADM

## 2022-06-03 RX ORDER — URSODIOL 300 MG/1
600 CAPSULE ORAL 2 TIMES DAILY
Status: DISCONTINUED | OUTPATIENT
Start: 2022-06-03 | End: 2022-06-07 | Stop reason: HOSPADM

## 2022-06-03 RX ORDER — ASPIRIN 81 MG/1
81 TABLET ORAL DAILY
Status: DISCONTINUED | OUTPATIENT
Start: 2022-06-03 | End: 2022-06-07 | Stop reason: HOSPADM

## 2022-06-03 RX ORDER — CALCIUM CARBONATE 500(1250)
500 TABLET ORAL DAILY
Status: DISCONTINUED | OUTPATIENT
Start: 2022-06-03 | End: 2022-06-03

## 2022-06-03 RX ORDER — ROPINIROLE 0.5 MG/1
1 TABLET, FILM COATED ORAL 3 TIMES DAILY
Status: DISCONTINUED | OUTPATIENT
Start: 2022-06-03 | End: 2022-06-07 | Stop reason: HOSPADM

## 2022-06-03 RX ORDER — GABAPENTIN 100 MG/1
100 CAPSULE ORAL 3 TIMES DAILY
Status: DISCONTINUED | OUTPATIENT
Start: 2022-06-03 | End: 2022-06-07 | Stop reason: HOSPADM

## 2022-06-03 RX ORDER — 0.9 % SODIUM CHLORIDE 0.9 %
1000 INTRAVENOUS SOLUTION INTRAVENOUS ONCE
Status: COMPLETED | OUTPATIENT
Start: 2022-06-03 | End: 2022-06-03

## 2022-06-03 RX ORDER — INSULIN LISPRO 100 [IU]/ML
0-3 INJECTION, SOLUTION INTRAVENOUS; SUBCUTANEOUS NIGHTLY
Status: DISCONTINUED | OUTPATIENT
Start: 2022-06-03 | End: 2022-06-07 | Stop reason: HOSPADM

## 2022-06-03 RX ORDER — VENLAFAXINE 37.5 MG/1
37.5 TABLET ORAL 2 TIMES DAILY WITH MEALS
Status: DISCONTINUED | OUTPATIENT
Start: 2022-06-04 | End: 2022-06-07 | Stop reason: HOSPADM

## 2022-06-03 RX ORDER — METOPROLOL SUCCINATE 50 MG/1
25 TABLET, EXTENDED RELEASE ORAL 2 TIMES DAILY
Status: DISCONTINUED | OUTPATIENT
Start: 2022-06-03 | End: 2022-06-07

## 2022-06-03 RX ORDER — DEXTROSE MONOHYDRATE 50 MG/ML
100 INJECTION, SOLUTION INTRAVENOUS PRN
Status: DISCONTINUED | OUTPATIENT
Start: 2022-06-03 | End: 2022-06-07 | Stop reason: HOSPADM

## 2022-06-03 RX ORDER — MONTELUKAST SODIUM 10 MG/1
10 TABLET ORAL DAILY
Status: DISCONTINUED | OUTPATIENT
Start: 2022-06-04 | End: 2022-06-07 | Stop reason: HOSPADM

## 2022-06-03 RX ORDER — ACETAMINOPHEN 325 MG/1
650 TABLET ORAL EVERY 6 HOURS PRN
Status: DISCONTINUED | OUTPATIENT
Start: 2022-06-03 | End: 2022-06-07 | Stop reason: HOSPADM

## 2022-06-03 RX ORDER — PANTOPRAZOLE SODIUM 40 MG/1
40 TABLET, DELAYED RELEASE ORAL
Status: DISCONTINUED | OUTPATIENT
Start: 2022-06-04 | End: 2022-06-04

## 2022-06-03 RX ORDER — DIPHENHYDRAMINE HCL 25 MG
50 TABLET ORAL NIGHTLY
Status: DISCONTINUED | OUTPATIENT
Start: 2022-06-03 | End: 2022-06-07 | Stop reason: HOSPADM

## 2022-06-03 RX ORDER — INSULIN LISPRO 100 [IU]/ML
0-6 INJECTION, SOLUTION INTRAVENOUS; SUBCUTANEOUS
Status: DISCONTINUED | OUTPATIENT
Start: 2022-06-03 | End: 2022-06-07 | Stop reason: HOSPADM

## 2022-06-03 RX ORDER — ENOXAPARIN SODIUM 100 MG/ML
40 INJECTION SUBCUTANEOUS DAILY
Status: DISCONTINUED | OUTPATIENT
Start: 2022-06-03 | End: 2022-06-07 | Stop reason: HOSPADM

## 2022-06-03 RX ORDER — TAMSULOSIN HYDROCHLORIDE 0.4 MG/1
0.4 CAPSULE ORAL DAILY
Status: DISCONTINUED | OUTPATIENT
Start: 2022-06-03 | End: 2022-06-07 | Stop reason: HOSPADM

## 2022-06-03 RX ORDER — SODIUM CHLORIDE 9 MG/ML
INJECTION, SOLUTION INTRAVENOUS PRN
Status: DISCONTINUED | OUTPATIENT
Start: 2022-06-03 | End: 2022-06-07 | Stop reason: HOSPADM

## 2022-06-03 RX ORDER — ATORVASTATIN CALCIUM 10 MG/1
20 TABLET, FILM COATED ORAL NIGHTLY
Status: DISCONTINUED | OUTPATIENT
Start: 2022-06-03 | End: 2022-06-07 | Stop reason: HOSPADM

## 2022-06-03 RX ORDER — LOSARTAN POTASSIUM 25 MG/1
50 TABLET ORAL DAILY
Status: DISCONTINUED | OUTPATIENT
Start: 2022-06-03 | End: 2022-06-07 | Stop reason: HOSPADM

## 2022-06-03 RX ORDER — FLUTICASONE PROPIONATE 110 UG/1
2 AEROSOL, METERED RESPIRATORY (INHALATION) 2 TIMES DAILY
Status: DISCONTINUED | OUTPATIENT
Start: 2022-06-03 | End: 2022-06-07 | Stop reason: HOSPADM

## 2022-06-03 RX ORDER — CALCIUM CARBONATE 500(1250)
500 TABLET ORAL DAILY
Status: DISCONTINUED | OUTPATIENT
Start: 2022-06-04 | End: 2022-06-07 | Stop reason: HOSPADM

## 2022-06-03 RX ORDER — LEVOTHYROXINE SODIUM 0.1 MG/1
100 TABLET ORAL DAILY
Status: DISCONTINUED | OUTPATIENT
Start: 2022-06-04 | End: 2022-06-07 | Stop reason: HOSPADM

## 2022-06-03 RX ORDER — ONDANSETRON 4 MG/1
4 TABLET, ORALLY DISINTEGRATING ORAL EVERY 8 HOURS PRN
Status: DISCONTINUED | OUTPATIENT
Start: 2022-06-03 | End: 2022-06-07 | Stop reason: HOSPADM

## 2022-06-03 RX ADMIN — TRAZODONE HYDROCHLORIDE 100 MG: 50 TABLET ORAL at 22:01

## 2022-06-03 RX ADMIN — INSULIN LISPRO 1 UNITS: 100 INJECTION, SOLUTION INTRAVENOUS; SUBCUTANEOUS at 22:06

## 2022-06-03 RX ADMIN — GABAPENTIN 100 MG: 100 CAPSULE ORAL at 22:05

## 2022-06-03 RX ADMIN — Medication 10 ML: at 22:05

## 2022-06-03 RX ADMIN — METOPROLOL SUCCINATE 25 MG: 50 TABLET, EXTENDED RELEASE ORAL at 22:03

## 2022-06-03 RX ADMIN — SODIUM CHLORIDE 1000 ML: 9 INJECTION, SOLUTION INTRAVENOUS at 11:00

## 2022-06-03 RX ADMIN — DIPHENHYDRAMINE HCL 50 MG: 25 TABLET ORAL at 22:02

## 2022-06-03 RX ADMIN — Medication 2 PUFF: at 19:32

## 2022-06-03 RX ADMIN — ATORVASTATIN CALCIUM 20 MG: 10 TABLET, FILM COATED ORAL at 22:01

## 2022-06-03 RX ADMIN — ROPINIROLE HYDROCHLORIDE 1 MG: 0.5 TABLET, FILM COATED ORAL at 22:01

## 2022-06-03 RX ADMIN — GABAPENTIN 600 MG: 300 CAPSULE ORAL at 22:02

## 2022-06-03 RX ADMIN — HYDROXYZINE PAMOATE 50 MG: 25 CAPSULE ORAL at 22:43

## 2022-06-03 RX ADMIN — URSODIOL 600 MG: 300 CAPSULE ORAL at 22:43

## 2022-06-03 RX ADMIN — HYDROXYZINE PAMOATE 50 MG: 25 CAPSULE ORAL at 14:43

## 2022-06-03 ASSESSMENT — ENCOUNTER SYMPTOMS
VOMITING: 0
DIARRHEA: 0
ABDOMINAL PAIN: 0
NAUSEA: 1
COUGH: 0
BACK PAIN: 0
CHEST TIGHTNESS: 0
SHORTNESS OF BREATH: 0

## 2022-06-03 ASSESSMENT — PAIN DESCRIPTION - FREQUENCY: FREQUENCY: CONTINUOUS

## 2022-06-03 ASSESSMENT — PAIN SCALES - GENERAL
PAINLEVEL_OUTOF10: 4
PAINLEVEL_OUTOF10: 3
PAINLEVEL_OUTOF10: 6

## 2022-06-03 ASSESSMENT — PAIN DESCRIPTION - ORIENTATION: ORIENTATION: RIGHT;LEFT

## 2022-06-03 ASSESSMENT — PAIN - FUNCTIONAL ASSESSMENT: PAIN_FUNCTIONAL_ASSESSMENT: NONE - DENIES PAIN

## 2022-06-03 ASSESSMENT — PAIN DESCRIPTION - DESCRIPTORS: DESCRIPTORS: STABBING

## 2022-06-03 ASSESSMENT — PAIN DESCRIPTION - LOCATION: LOCATION: LEG

## 2022-06-03 NOTE — CARE COORDINATION
CASE MANAGEMENT INITIAL ASSESSMENT      Reviewed chart and completed assessment with patient:yes  Family present: NA  Explained Case Management role/services. yes    Primary contact information:daughters    Health Care Decision Maker :   Primary Decision Maker (Active): Christel Pinon - Child - [phone]    Primary Decision Maker: Neri Pearl - Child - [phone]    Secondary Decision Maker: Britton Riojas - Child - [phone]          Can this person be reached and be able to respond quickly, such as within a few minutes or hours? Yes    Admit date/status:6/3/22  Diagnosis:rectal bleed   Is this a Readmission?:  Yes      [address] required for SNF: Yes       3 night stay required: No    Living arrangements, Adls, care needs, prior to admission:[facility], report she is independent in ADLs    Durable Medical Equipment at home:  Walker_x_Cane_x_RTS_x_ BSC__Shower Chair__  02_[employer]_ HHN__ CPAP__  BiPap__  Hospital Bed__ W/C___ Other__lift chair___    Services in the home and/or outpatient, prior to [facility]    Current PCP:Dr. fernandez                                Medications:yes Prescription coverage? Yes Will pt require financial assistance with medications No     Transportation needs: car     Dialysis Facility (if applicable)   · Name:  · Address:  · Dialysis Schedule:  · Phone:  · Fax:    PT/OT recs:    Hospital Exemption Notification (HEN):    Barriers to discharge:medical complications    Plan/comments:referred to patient for d/c planning. Spoke to patient for d/c planning. Patient is a 69year old female admitted for rectal bleed. Patient usually lives at home alone. Patient just d/[facility]'d from [facility] last week. Patient reports she is independent in ADLs. Reports neighbor has been assisting her at home. Patient is current with [facility]. Children also assist.  Patient plans to return home on d/c.   Electronically signed by Radha Hart LUISA Clarke on 6/3/2022 at 4:38 PM     [facility] on chart for MD signature

## 2022-06-03 NOTE — ED PROVIDER NOTES
Osteoarthritis of bilateral CMC joints    Asthma     Followed by Dr. Gustavo Lemus Atrial fibrillation ([facility]     Cirrhosis ([facility]     non alcoholic    Cirrhosis, non-alcoholic ([address]     COPD (chronic obstructive pulmonary disease) ([facility]     GERD (gastroesophageal reflux disease)     GIB (gastrointestinal bleeding)     LGIB 4/2016    Haemophilus infection 05/07/2021    + resp    Heart murmur     Hernia     Hyperlipidemia     Hypertension     Lung collapse     Murmur, heart     Pneumonia     Reflux     Rheumatic fever     Stress fracture     right foot    SVT (supraventricular tachycardia) ([facility]     6/19/16 - admitted X 1 day per PT     TIA (transient ischemic attack) 2015    Type II or unspecified type diabetes mellitus without mention of complication, not stated as uncontrolled     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 ([facility]     Wears glasses     as needed    Wears partial dentures     upper partial         Procedure Laterality Date    BONE MARROW BIOPSY      BREAST BIOPSY      BRONCHOSCOPY N/A 5/7/2021    BRONCHOSCOPY DIAGNOSTIC OR CELL [address] ONLY performed by Verónica Youngblood MD at [address]  5/7/2021    BRONCHOSCOPY ALVEOLAR LAVAGE performed by Verónica Youngblood MD at [address]   5/7/2021    BRONCHOSCOPY THERAPUTIC ASPIRATION INITIAL performed by Verónica Youngblood MD at [address]  07/14/14    CATARACT REMOVAL      COLONOSCOPY      X 3 per PT 6/8/16    COLONOSCOPY N/A 6/10/2021    COLONOSCOPY POLYPECTOMY ABLATION performed by Alanna Myrick MD at [address] [facility] [x+1] [employer] 06/03/2013    CORAL BUNIONECTOMY RIGHT FOOT WITH SCREW FIXATION;    HERNIA REPAIR      HYSTERECTOMY      JOINT REPLACEMENT Bilateral     TKR    KNEE SURGERY  09    left    KNEE SURGERY  2010    right    UPPER GASTROINTESTINAL ENDOSCOPY N/A 7/21/2018    EGD BIOPSY performed by Steffen Greenwood Darline Machuca MD at [address] [facility] ENDOSCOPY N/A 6/10/2021    EGD POLYP ABLATION/OTHER performed by Joel Knowles MD at [address] [facility] 6/10/2021    EGD BAND LIGATION performed by Joel Knowles MD at [facility] ENDOSCOPY       Medications:  Previous Medications    ACLIDINIUM (TUDORZA PRESSAIR) 400 MCG/ACT AEPB INHALER    Inhale 1 puff into the lungs 2 times daily    APIXABAN (ELIQUIS) 5 MG TABS TABLET    Take 1 tablet by mouth 2 times daily    ASPIRIN 81 MG EC TABLET    Take 81 mg by mouth daily     ATORVASTATIN (LIPITOR) 20 MG TABLET    TAKE 1 TABLET NIGHTLY    BECLOMETHASONE (QVAR) 80 MCG/ACT INHALER    Inhale 2 puffs into the lungs 2 times daily    BENZONATATE (TESSALON) 100 MG CAPSULE    TAKE 1 CAPSULE THREE TIMES A DAY AS NEEDED FOR COUGH    CALCIUM CARBONATE 600 MG TABS TABLET    Take 1 tablet by mouth daily     CETIRIZINE (ZYRTEC) 10 MG TABLET    TAKE ONE TABLET BY MOUTH DAILY    CHOLECALCIFEROL (VITAMIN D3) 50 MCG ([address]) CAPS    Take by mouth    CYANOCOBALAMIN 1000 MCG/ML INJECTION    Inject 1 mL into the muscle every 14 days Last dose was taken on 4/9/17    DILTIAZEM (TIAZAC) 240 MG EXTENDED RELEASE CAPSULE    Take 1 capsule by mouth daily    DIPHENHYDRAMINE (BENADRYL) 25 MG TABLET    Take 50 mg by mouth nightly     DOXEPIN (SINEQUAN) 50 MG CAPSULE    Take 1 capsule by mouth nightly for 10 days    ESTROGENS, CONJUGATED, (PREMARIN) 1.25 MG TABLET    Take 1.25 mg by mouth daily. FERROUS SULFATE 325 (65 FE) MG TABLET    Take 325 mg by mouth 3 times daily (with meals)    FUROSEMIDE (LASIX) 20 MG TABLET    TAKE 1 TABLET DAILY    GABAPENTIN (NEURONTIN) 100 MG CAPSULE    Take 100 mg by mouth 3 times daily. GABAPENTIN (NEURONTIN) 600 MG TABLET    Take 600 mg by mouth at bedtime.     KLOR-CON M20 20 MEQ EXTENDED RELEASE TABLET    TAKE 1 TABLET DAILY    LEVALBUTEROL (XOPENEX HFA) 45 MCG/ACT INHALER    Inhale 2 puffs into the lungs every 6 Neurological: Negative for dizziness, weakness, light-headedness and headaches. Physical Exam:  Physical Exam  Vitals and nursing note reviewed. Constitutional:       Appearance: Normal appearance. She is not diaphoretic. HENT:      Head: Normocephalic and atraumatic. Nose: Nose normal.      Mouth/Throat:      Mouth: Mucous membranes are moist.   Eyes:      General:         Right eye: No discharge. Left eye: No discharge. Extraocular Movements: Extraocular movements intact. Pupils: Pupils are equal, round, and reactive to light. Cardiovascular:      Rate and Rhythm: Normal rate and regular rhythm. Pulses: Normal pulses. Heart sounds: Normal heart sounds. No murmur heard. No friction rub. No gallop. Pulmonary:      Effort: Pulmonary effort is normal. No respiratory distress. Breath sounds: Normal breath sounds. No stridor. No wheezing, rhonchi or rales. Abdominal:      General: Abdomen is flat. Palpations: Abdomen is soft. Tenderness: There is no abdominal tenderness. There is no guarding or rebound. Musculoskeletal:         General: Normal range of motion. Cervical back: Normal range of motion and neck supple. Skin:     General: Skin is warm and dry. Coloration: Skin is not pale. Neurological:      Mental Status: She is alert and oriented to person, place, and time.    Psychiatric:         Mood and Affect: Mood normal.         Behavior: Behavior normal.         MEDICAL DECISION MAKING    Vitals:    Vitals:    06/03/22 1332 06/03/22 1430 06/03/22 1445 06/03/22 1500   BP:       Pulse: (!) 107 (!) 108 (!) 111 (!) 109   Resp: 12 21 16 17   Temp:       TempSrc:       SpO2: 100% 98% 100% 100%       LABS:  Labs Reviewed   CBC WITH AUTO DIFFERENTIAL - Abnormal; Notable for the following components:       Result Value    RBC 2.80 (*)     Hemoglobin 8.3 (*)     Hematocrit 24.5 (*)     RDW 15.6 (*)     Lymphocytes Absolute 0.4 (*)     All other components within normal limits   COMPREHENSIVE METABOLIC PANEL - Abnormal; Notable for the following components:    Sodium 125 (*)     Potassium 5.3 (*)     Chloride 89 (*)     Glucose 106 (*)     AST 47 (*)     All other components within normal limits   URINALYSIS WITH REFLEX TO CULTURE - Abnormal; Notable for the following components:    Clarity, UA SL CLOUDY (*)     Blood, Urine TRACE-INTACT (*)     Leukocyte Esterase, Urine LARGE (*)     All other components within normal limits   BLOOD OCCULT STOOL SCREEN #1 - Abnormal; Notable for the following components:    Occult Blood Screening   (*)     Value: Result: POSITIVE  Normal range: Negative      All other components within normal limits    Narrative:     ORDER#: [id number]                          ORDERED BY: Vaishali Lan  SOURCE: Stool                              COLLECTED:  06/03/22 11:50  ANTIBIOTICS AT TORO.:                      RECEIVED :  06/03/22 12:25   MICROSCOPIC URINALYSIS - Abnormal; Notable for the following components:    Mucus, UA 1+ (*)     WBC, UA 10-20 (*)     RBC, UA 5-10 (*)     Bacteria, UA 3+ (*)     All other components within normal limits   CULTURE, URINE   MAGNESIUM   POTASSIUM   TYPE AND SCREEN        Remainder of labs reviewed and were negative at this time or not returned at the time of this note.     RADIOLOGY:   Non-plain film images such as CT, Ultrasound and MRI are read by the radiologist. DAVONTE Hall have directly visualized the radiologic plain film image(s) with the below findings:      Interpretation per the Radiologist below, if available at the time of this note:    No orders to display        Echo Limited    Result Date: 5/9/2022  Transthoracic Echocardiography Report (TTE)  Demographics   Patient Name       [facility]   Date of Study      05/09/2022         Gender              Female   Patient Number     [id number]         Date of Birth       1945   Visit Number       [id number] Age                 77 year(s)   Accession Number   [id number]         Room Number         0202   Corporate ID       [id number]            Sonographer         Lusia Rowe, RT   Ordering Physician Florence Gaviria.,  Interpreting        [address] [facility]                 Physician           Indigo Levine MD, [facility]  Procedure Type of Study   TTE procedure:ECHOCARDIOGRAM LIMITED. Procedure Date Date: 05/09/2022 Start: 08:38 AM Study Location: [address] [place] [address] - Echo Lab Technical Quality: Adequate visualization Indications:Atrial fibrillation. Patient Status: Routine Contrast Medium: Definity. Height: 66 inches Weight: 220 pounds BSA: 2.08 m2 BMI: 35.51 kg/m2 BP: 126/82 mmHg  Conclusions   Summary  Limited only f/u for LVEF. Normal left ventricular systolic function with ejection fraction of 55-60%. No regional wall motion abnormalites are seen. Compared to previous study from 8- no changes noted in left  ventricular function. Signature   ------------------------------------------------------------------  Electronically signed by Indigo Levine MD, [facility]  (Interpreting physician) on 05/09/2022 at 11:18 AM  ------------------------------------------------------------------   Findings   Left Ventricle  Normal left ventricular systolic function with ejection fraction of 55-60%. No regional wall motion abnormalites are seen. Compared to previous study from 8- no changes noted in left  ventricular function. Miscellaneous  Limited only f/u for LVEF. Doppler Measurements   TR Velocity:176 cm/s  TR Gradient:12.39 mmHg  Estimated RAP:3 mmHg  Estimated RVSP: 17 mmHg      XR KNEE RIGHT (1-2 VIEWS)    Result Date: 5/7/2022  EXAMINATION: XRAY VIEWS OF THE RIGHT KNEE 5/7/2022 5:14 pm COMPARISON: None.  HISTORY: ORDERING SYSTEM PROVIDED HISTORY: redness, warmth TECHNOLOGIST PROVIDED HISTORY: Reason for exam:->redness, warmth FINDINGS: No acute fracture or malalignment. Knee prosthesis intact. Small joint effusion Soft tissues unremarkable. 1. No acute fracture or malalignment. CT Lumbar Spine WO Contrast    Result Date: 5/7/2022  EXAMINATION: CT OF THE LUMBAR SPINE WITHOUT CONTRAST  5/7/2022 TECHNIQUE: CT of the lumbar spine was performed without the administration of intravenous contrast. Multiplanar reformatted images are provided for review. Adjustment of mA and/or kV according to patient size was utilized. Dose modulation, iterative reconstruction, and/or weight based adjustment of the mA/kV was utilized to reduce the radiation dose to as low as reasonably achievable. COMPARISON: CT scan of the abdomen pelvis dated July 20, 2018 HISTORY: ORDERING SYSTEM PROVIDED HISTORY: lower back pain TECHNOLOGIST PROVIDED HISTORY: Reason for exam:->lower back pain Decision Support Exception - unselect if not a suspected or confirmed emergency medical condition->Emergency Medical Condition (MA) Reason for Exam: low back pain with bilateral leg pain/numbness-back strain over 1 month ago-worsening pain FINDINGS: BONES/ALIGNMENT: Grade anterolisthesis is now present at the L4-L5 level, degenerative in nature. New focal areas of lucency are present within the L3, and L5 vertebral bodies, involving the superior and inferior endplate at L3, and superior endplate of L5. Ill-defined areas of sclerosis are present surrounding these areas of lucency. Focal areas of lucency are present involving the facets, at the L3-4, and L4-5 levels, and L5 spinous process. No evidence of an acute compression fracture is present. Multilevel degenerative changes are present. . DEGENERATIVE CHANGES: Vacuum disc phenomena is present at the L2-3, L4-5 and L5-S1 disc levels. Facet arthropathy is present throughout the lumbar region.  A diffuse disc protrusion, facet and ligamentous hypertrophy is present involving the L4-5 disc level, resulting in a moderate degree of acquired central spinal stenosis, bilateral neural foraminal narrowing. SOFT TISSUES/RETROPERITONEUM: No paraspinal mass is seen. Dependent changes are present within the lung bases. 1. Multiple areas of lucency, with surrounding sclerosis involving the lumbar spine, most prominent at the L3 and L5 levels. Changes are new since the prior study. Differential considerations would include multiple Schmorl nodes and discogenic degenerative changes, or potentially infection, or metastatic disease. MR imaging of the lumbar spine recommended to further evaluate these lesions. 2. Diffuse disc protrusion, facet and ligamentous hypertrophy, L4-L5 disc level, resulting in a moderate degree of acquired central spinal stenosis     MRI THORACIC SPINE W WO CONTRAST    Result Date: 5/8/2022  EXAMINATION: MRI OF THE THORACIC SPINE WITHOUT AND WITH CONTRAST  5/7/2022 9:58 pm TECHNIQUE: Multiplanar multisequence MRI of the thoracic spine was performed without and with the administration of intravenous contrast. COMPARISON: No MRI comparison available. HISTORY: ORDERING SYSTEM PROVIDED HISTORY: back pain with bilateral leg weakness, abnormal CT TECHNOLOGIST PROVIDED HISTORY: Reason for exam:->back pain with bilateral leg weakness, abnormal CT Decision Support Exception - unselect if not a suspected or confirmed emergency medical condition->Emergency Medical Condition (MA) FINDINGS: Suboptimal evaluation secondary to motion degradation. Postcontrast sequences are especially motion degraded. BONES/ALIGNMENT: Slightly exaggerated kyphosis of the thoracic spine curvature. No significant spondylolisthesis. Vertebral body heights appear preserved. Marrow signal appears within normal limits. No evidence of acute fracture or aggressive appearing osseous lesions.   Multilevel intervertebral disc space narrowing, most notable T8-T9 and to lesser degree T9-T10 where there is associated Modic type 2 degenerative endplate changes. SPINAL CORD: No abnormal cord signal is seen. SOFT TISSUES:  No abnormal enhancement of the thoracic spine. No paraspinal mass identified. DEGENERATIVE CHANGES: No significant spinal canal stenosis or neural foraminal narrowing of the thoracic spine. 1. Mild multilevel thoracic spondylosis with slightly exaggerated kyphosis. No high-grade spinal canal stenosis or neural foraminal narrowing. 2. No abnormal spinal cord signal.  No convincing abnormal enhancement within limitations of motion degradation. MRI LUMBAR SPINE W WO CONTRAST    Result Date: 5/7/2022  EXAMINATION: MRI OF THE LUMBAR SPINE WITHOUT AND WITH CONTRAST  5/7/2022 9:57 pm TECHNIQUE: Multiplanar multisequence MRI of the lumbar spine was performed without and with the administration of intravenous contrast. COMPARISON: Same-day CT HISTORY: ORDERING SYSTEM PROVIDED HISTORY: low back pain with bilateral leg pain/weakness, abnormal CT TECHNOLOGIST PROVIDED HISTORY: Reason for exam:->low back pain with bilateral leg pain/weakness, abnormal CT Decision Support Exception - unselect if not a suspected or confirmed emergency medical condition->Emergency Medical Condition (MA) FINDINGS: BONES/ALIGNMENT: There are 5 lumbar vertebral bodies. There is anterolisthesis at L4-5. Vertebral body heights are maintained. There is edema and enhancement at L4 and L5. Prominent Schmorl's node is noted at L3 with some associated edema. SPINAL CORD:  The conus terminates normally. SOFT TISSUES: No paraspinal mass identified. There is minimal solid enhancement in the anterior epidural space and posterior epidural space at L4-5. L1-L2: There is no significant disc protrusion, spinal canal stenosis or neural foraminal narrowing. L2-L3: There is no significant disc protrusion, spinal canal stenosis or neural foraminal narrowing. L3-L4: Disc protrusion, facet DJD, and ligamentum flavum hypertrophy with minimal spinal canal stenosis.   Mild right neural foraminal narrowing without significant left neural foraminal stenosis. L4-L5: Disc protrusion, facet DJD, and ligamentum flavum hypertrophy with mild spinal canal stenosis. There is mild narrowing of the left lateral recess. Moderate left neural foraminal narrowing without significant right neural foraminal stenosis. L5-S1: No significant spinal canal stenosis or left neural foraminal narrowing. Mild right neural foraminal narrowing. 1. Edema and enhancement at L4-5, concerning for discitis/osteomyelitis. Differential would include Modic type 1 degenerative change. 2. Multilevel degenerative change with mild spinal canal narrowing at L3-4 and L4-5. 3. Multilevel neural foraminal narrowing as above. 4. Anterolisthesis at L4-5. CT CHEST PULMONARY EMBOLISM W CONTRAST    Result Date: 5/7/2022  EXAMINATION: CTA OF THE CHEST 5/7/2022 7:01 pm TECHNIQUE: CTA of the chest was performed after the administration of intravenous contrast.  Multiplanar reformatted images are provided for review. MIP images are provided for review. Dose modulation, iterative reconstruction, and/or weight based adjustment of the mA/kV was utilized to reduce the radiation dose to as low as reasonably achievable. COMPARISON: CT chest dated 04/06/2022 HISTORY: ORDERING SYSTEM PROVIDED HISTORY: Tachy, new onset A. fib with RVR, bilateral leg pain, elevated D-dimer TECHNOLOGIST PROVIDED HISTORY: Reason for exam:->Tachy, new onset A. fib with RVR, bilateral leg pain, elevated D-dimer Decision Support Exception - unselect if not a suspected or confirmed emergency medical condition->Emergency Medical Condition (MA) Reason for Exam: chronic sob-new onset  M-dlh-ldfwa-bilateral leg swelling Additional signs and symptoms: elev d-dimer Relevant Medical/Surgical History: no surg FINDINGS: Pulmonary Arteries: Pulmonary arteries are adequately opacified for evaluation. No evidence of intraluminal filling defect to suggest pulmonary embolism.   Main pulmonary artery is normal in caliber. Mediastinum: No evidence of mediastinal lymphadenopathy. The heart and pericardium demonstrate no acute abnormality however cardiac enlargement four-chamber cardiomegaly without pericardial effusion. There is no acute abnormality of the thoracic aorta. Lungs/pleura: No focal consolidation however smooth interlobular septal thickening with mild mosaicism of an interstitial edema pattern. No evidence of pleural effusion or pneumothorax. Upper Abdomen: Limited images of the upper abdomen reveal nodular contours of the hepatic parenchyma concerning for underlying parenchymal disease process such as cirrhotic morphology without obvious perihepatic ascites Soft Tissues/Bones: No acute bone or soft tissue abnormality. No evidence of pulmonary embolism. Four-chamber cardiomegaly without pericardial effusion however smooth interlobular septal thickening with lower lobe predominance along with mild mosaicism concerning for interstitial pulmonary edema pattern without decompensation evidence as there is no pleural effusion evident. Limited images of the upper abdomen reveal nodular contours of the hepatic parenchyma concerning for underlying parenchymal disease process such as cirrhotic morphology without obvious perihepatic ascites          MEDICAL DECISION MAKING / ED COURSE:      Patient was evaluated in the emergency department today for abnormal labs, and outpatient. She is hemodynamically stable at triage. She is well-appearing on exam.    Work-up results include:  CBC without evidence of leukocytosis. Hemoglobin is noted to be 8.3 which is a three-point drop from most recent lab work about 3 weeks ago. CMP with sodium of 125, potassium was hemolyzed. No evidence of anion gap. Renal function well-maintained. No evidence of transaminitis. EKG was interpreted by attending physician.     Given the dramatic drop in hemoglobin we did obtain a stool occult which was found to be positive. A discussion was held with the patient and I spoke with her POA over the phone. We discussed all lab and imaging results. Given the patient's three-point drop of hemoglobin and positive stool occult we do have some concern for GI bleed, she does have a history of varices requiring multiple banding's. Therefore we are planning admission for further evaluation and treatment. The patient and POA are in agreement treatment plan. The POA made aware that the patient is a DNR CCA. Hospitalist is consulted at this time. Patient was given:  Medications   0.9 % sodium chloride bolus (1,000 mLs IntraVENous New Bag 6/3/22 1100)   hydrOXYzine pamoate (VISTARIL) capsule 50 mg (50 mg Oral Given 6/3/22 1443)         The patient tolerated their visit well. I evaluated the patient. The physician was available for consultation as needed. The patient and / or the family were informed of the results of any tests, a time was given to answer questions, a plan was proposed and they agreed with plan. CLINICAL IMPRESSION:  1. Anemia, unspecified type    2.  Occult blood positive stool      Results for orders placed or performed during the hospital encounter of 06/03/22   CBC with Auto Differential   Result Value Ref Range    WBC 6.4 4.0 - 11.0 K/uL    RBC 2.80 (L) 4.00 - 5.20 M/uL    Hemoglobin 8.3 (L) 12.0 - 16.0 g/dL    Hematocrit 24.5 (L) 36.0 - 48.0 %    MCV 87.3 80.0 - 100.0 fL    MCH 29.8 26.0 - 34.0 pg    MCHC 34.1 31.0 - 36.0 g/dL    RDW 15.6 (H) 12.4 - 15.4 %    Platelets 598 420 - 245 K/uL    MPV 7.3 5.0 - 10.5 fL    Neutrophils % 85.0 %    Lymphocytes % 5.5 %    Monocytes % 7.1 %    Eosinophils % 2.0 %    Basophils % 0.4 %    Neutrophils Absolute 5.5 1.7 - 7.7 K/uL    Lymphocytes Absolute 0.4 (L) 1.0 - 5.1 K/uL    Monocytes Absolute 0.5 0.0 - 1.3 K/uL    Eosinophils Absolute 0.1 0.0 - 0.6 K/uL    Basophils Absolute 0.0 0.0 - 0.2 K/uL   Comprehensive Metabolic Panel   Result Value Ref Range Sodium 125 (L) 136 - 145 mmol/L    Potassium 5.3 (H) 3.5 - 5.1 mmol/L    Chloride 89 (L) 99 - 110 mmol/L    CO2 28 21 - 32 mmol/L    Anion Gap 8 3 - 16    Glucose 106 (H) 70 - 99 mg/dL    BUN 11 7 - 20 mg/dL    CREATININE 0.7 0.6 - 1.2 mg/dL    GFR Non-African American >60 >60    GFR African American >60 >60    Calcium 8.7 8.3 - 10.6 mg/dL    Total Protein 6.7 6.4 - 8.2 g/dL    Albumin 4.0 3.4 - 5.0 g/dL    Albumin/Globulin Ratio 1.5 1.1 - 2.2    Total Bilirubin 0.4 0.0 - 1.0 mg/dL    Alkaline Phosphatase 59 40 - 129 U/L    ALT 18 10 - 40 U/L    AST 47 (H) 15 - 37 U/L   Magnesium   Result Value Ref Range    Magnesium 2.00 1.80 - 2.40 mg/dL   Urinalysis with Reflex to Culture    Specimen: Urine   Result Value Ref Range    Color, UA Yellow Straw/Yellow    Clarity, UA SL CLOUDY (A) Clear    Glucose, Ur Negative Negative mg/dL    Bilirubin Urine Negative Negative    Ketones, Urine Negative Negative mg/dL    Specific Gravity, UA <=1.005 1.005 - 1.030    Blood, Urine TRACE-INTACT (A) Negative    pH, UA 6.0 5.0 - 8.0    Protein, UA Negative Negative mg/dL    Urobilinogen, Urine 0.2 <2.0 E.U./dL    Nitrite, Urine Negative Negative    Leukocyte Esterase, Urine LARGE (A) Negative    Microscopic Examination YES     Urine Type NotGiven     Urine Reflex to Culture Yes    Potassium   Result Value Ref Range    Potassium 4.7 3.5 - 5.1 mmol/L   Blood Occult Stool Screen #1   Result Value Ref Range    Occult Blood Screening Result: POSITIVE  Normal range: Negative   (A)    Microscopic Urinalysis   Result Value Ref Range    Mucus, UA 1+ (A) None Seen /LPF    WBC, UA 10-20 (A) 0 - 5 /HPF    RBC, UA 5-10 (A) 0 - 4 /HPF    Epithelial Cells, UA 2-5 0 - 5 /HPF    Bacteria, UA 3+ (A) None Seen /HPF   EKG 12 Lead   Result Value Ref Range    Ventricular Rate 111 BPM    Atrial Rate 111 BPM    P-R Interval 280 ms    QRS Duration 92 ms    Q-T Interval 298 ms    QTc Calculation (Bazett) 405 ms    R Axis -47 degrees    T Axis 84 degrees Diagnosis        Poor data quality, interpretation may be adversely affectedSinus tachycardia with 1st degree A-V blockLeft anterior fascicular blockCannot rule out Anterior infarct (cited on or before 09-MAY-2021)Abnormal ECGWhen compared with ECG of 07-MAY-2022 16:40,Sinus rhythm has replaced Atrial fibrillation   TYPE AND SCREEN   Result Value Ref Range    ABO/Rh A POS     Antibody Screen NEG        I spoke with  [facility] hospitalist. We discussed the history, physical exam, diagnostics, as well as their emergency department course. Based upon that discussion, we've decided to admit [facility] for further observation and evaluation of Gene Bhardwja's   1. Anemia, unspecified type    2. Occult blood positive stool    . DISPOSITION Decision To Admit 06/03/2022 03:25:45 PM      PATIENT REFERRED TO:  No follow-up provider specified.     DISCHARGE MEDICATIONS:  New Prescriptions    No medications on file       DISCONTINUED MEDICATIONS:  Discontinued Medications    No medications on file              (Please note the MDM and HPI sections of this note were completed with a voice recognition program.  Efforts were made to edit the dictations but occasionally words are mis-transcribed.)    Electronically signed, Bj Pickard,           Bj Pickard  06/03/22 9727

## 2022-06-03 NOTE — ED NOTES
Assisted Michelle johnson with rectal exam, specimen sent to lab. Patient cleaned up and new pull-up applied.       FRANKLYN Davis  06/03/22 7649

## 2022-06-03 NOTE — CARE COORDINATION
[facility]  Patient is active with [facility], Will follow for discharge to home with orders to resume care.     [phone] bernardo daughter would like a call with update  Er abigail bojorquez notified      Saman Walker RN, BSN CTN  [facility] [phone]

## 2022-06-03 NOTE — ACP (ADVANCE CARE PLANNING)
Advance Care Planning     General Advance Care Planning (ACP) Conversation    Date of Conversation: 6/3/2022  Conducted with: Patient with Decision Making Capacity    Healthcare Decision Maker:    Primary Decision Maker (Active): Sushant Caitperla - [phone]    Secondary Decision Maker: Mary Ramirez - [phone]  Click here to complete Healthcare Decision Makers including selection of the Healthcare Decision Maker Relationship (ie \"Primary\"). Today we documented Decision Maker(s) consistent with Legal Next of Kin hierarchy.     Content/Action Overview:  Has NO ACP documents/care preferences - information provided, considering goals and options  Reviewed DNR/DNI and patient elects Full Code (Attempt Resuscitation)        Length of Voluntary ACP Conversation in minutes:  <16 minutes (Non-Billable)    LUISA Camejo

## 2022-06-03 NOTE — H&P
Hospital Medicine History & Physical      PCP: Ledy Drummond.,     Date of Admission: 6/3/2022    Date of Service: Pt seen/examined on 3 Carla and Admitted to Inpatient with expected LOS greater than two midnights due to medical therapy. Chief Complaint:  fatigue      History Of Present Illness:       69 y.o. female w/ hx Afib on Eliquis as well as SNOW cirrhosis and O2 dependent COPD who presented to [facility] with abnormal labs found to be significantly anemic compared to prior. She was recently discharged from SNF for rehab and reports ongoing fatigue but denies huy bleeding. The patient denies any fever/chills or other signs/sxs of systemic illness or identifiable aggravating/alleviating factors.       Past Medical History:          Diagnosis Date    Allergic     Anemia     Anesthesia complication     \"woke up during surgery\"    Arthritis     Osteoarthritis of bilateral CMC joints    Asthma     Followed by Dr. Cristian Ramirez Atrial fibrillation ([address]     Cirrhosis ([address]     non alcoholic    Cirrhosis, non-alcoholic ([address]     COPD (chronic obstructive pulmonary disease) ([address]     GERD (gastroesophageal reflux disease)     GIB (gastrointestinal bleeding)     LGIB 4/2016    Haemophilus infection 05/07/2021    + resp    Heart murmur     Hernia     Hyperlipidemia     Hypertension     Lung collapse     Murmur, heart     Pneumonia     Reflux     Rheumatic fever     Stress fracture     right foot    SVT (supraventricular tachycardia) ([address]     6/19/16 - admitted X 1 day per PT     TIA (transient ischemic attack) 2015    Type II or unspecified type diabetes mellitus without mention of complication, not stated as uncontrolled     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 ([address]     Wears glasses     as needed    Wears partial dentures     upper partial       Past Surgical History:          Procedure Laterality Date    BONE MARROW BIOPSY      BREAST BIOPSY      BRONCHOSCOPY N/A 5/7/2021    BRONCHOSCOPY DIAGNOSTIC OR CELL [address] ONLY performed by Babs William MD at [address]  5/7/2021    BRONCHOSCOPY ALVEOLAR LAVAGE performed by Babs William MD at [address] [facility]  5/7/2021    BRONCHOSCOPY THERAPUTIC ASPIRATION INITIAL performed by Babs William MD at [address]  07/14/14    CATARACT REMOVAL      COLONOSCOPY      X 3 per PT 6/8/16    COLONOSCOPY N/A 6/10/2021    COLONOSCOPY POLYPECTOMY ABLATION performed by Frida Hanson MD at [address] Right 06/03/2013    CORAL BUNIONECTOMY RIGHT FOOT WITH SCREW FIXATION;    HERNIA REPAIR      HYSTERECTOMY      JOINT REPLACEMENT Bilateral     TKR    KNEE SURGERY  09    left    KNEE SURGERY  2010    right    UPPER GASTROINTESTINAL ENDOSCOPY N/A 7/21/2018    EGD BIOPSY performed by Mitchell Aguilar MD at [address] 6/10/2021    EGD POLYP ABLATION/OTHER performed by Frida Hanson MD at [address] 6/10/2021    EGD BAND LIGATION performed by Frida Hanson MD at [place] [address]       Medications Prior to Admission:      Prior to Admission medications    Medication Sig Start Date End Date Taking? Authorizing Provider   ondansetron (ZOFRAN) 4 MG tablet Take 1 tablet by mouth 3 times daily as needed for Nausea or Vomiting 6/2/22  Yes Blanka Gale. Hiren Arroyo, DO   dilTIAZem [facility]) 240 MG extended release capsule Take 1 capsule by mouth daily 5/31/22  Yes Devante Wong MD   tamsulosin [facility]) 0.4 MG capsule Take 1 capsule by mouth daily 5/14/22  Yes Adonis Zurita MD   gabapentin (NEURONTIN) 100 MG capsule Take 100 mg by mouth 3 times daily. Yes Historical Provider, MD   gabapentin (NEURONTIN) 600 MG tablet Take 600 mg by mouth at bedtime.    Yes Historical Provider, MD   aclidinium (Hollie Solis PRESSAIR) 400 MCG/ACT AEPB inhaler Inhale 1 puff into the lungs 2 times daily   Yes Day Provider, MD   apixaban (ELIQUIS) 5 MG TABS tablet Take 1 tablet by mouth 2 times daily 5/12/22  Yes Juan Markham MD   metoprolol succinate (TOPROL XL) 25 MG extended release tablet Take 1 tablet by mouth in the morning and at bedtime 5/12/22  Yes Juan Markham MD   lidocaine 4 % external patch Place 1 patch onto the skin daily 5/13/22  Yes Juan Markham MD   montelukast (SINGULAIR) 10 MG tablet Take 1 tablet by mouth daily 4/19/22  Yes Africa Hardin MD   cyanocobalamin 1000 MCG/ML injection Inject 1 mL into the muscle every 14 days Last dose was taken on 4/9/17 3/31/22  Yes Saadia Hernandez, DO   cetirizine (ZYRTEC) 10 MG tablet TAKE ONE TABLET BY MOUTH DAILY 3/21/22  Yes Sarah Newby MD   levothyroxine (SYNTHROID) 100 MCG tablet Take 1 tablet by mouth Daily 3/3/22  Yes Francis Hernandez, DO   losartan (COZAAR) 100 MG tablet TAKE 1 TABLET DAILY 3/2/22  Yes Sarah Newby MD   pantoprazole (PROTONIX) 40 MG tablet TAKE 1 TABLET TWICE A DAY 3/2/22  Yes Sarah Newby MD   SITagliptin (JANUVIA) 100 MG tablet Take 1 tablet by mouth daily 2/25/22  Yes Francis Hernandez, DO   nystatin (MYCOSTATIN) 089508 UNIT/ML suspension Take by mouth 4 times daily 2/14/22  Yes Arnie Romero MD   metFORMIN (GLUCOPHAGE-XR) 500 MG extended release tablet TAKE 2 TABLETS TWICE A DAY 1/3/22  Yes Luis A Pastor MD   traZODone (DESYREL) 50 MG tablet TAKE 2 TABLETS NIGHTLY 12/3/21  Yes Sarah Newby MD   KLOR-CON M20 20 MEQ extended release tablet TAKE 1 TABLET DAILY 11/4/21  Yes Sarah Newby MD   benzonatate (TESSALON) 100 MG capsule TAKE 1 CAPSULE THREE TIMES A DAY AS NEEDED FOR COUGH 10/18/21  Yes Africa Hardin MD   Cholecalciferol (VITAMIN D3) 50 MCG (2000 UT) CAPS Take by mouth   Yes Historical Provider, MD   venlafaxine (EFFEXOR) 37.5 MG tablet TAKE 1 TABLET DAILY 9/16/21  Yes Sarah Newby MD   atorvastatin (LIPITOR) 20 MG tablet TAKE 1 TABLET NIGHTLY 9/9/21  Yes Alicia Pickett MD   furosemide (LASIX) 20 MG tablet TAKE 1 TABLET DAILY 7/19/21  Yes Alicia Pickett MD   beclomethasone (QVAR) 80 MCG/ACT inhaler Inhale 2 puffs into the lungs 2 times daily 5/20/21  Yes Devante Jaffe MD   levalbuterol [facility]) 45 MCG/ACT inhaler Inhale 2 puffs into the lungs every 6 hours as needed for Shortness of Breath 5/20/21  Yes Devante Jaffe MD   ferrous sulfate 325 (65 Fe) MG tablet Take 325 mg by mouth 3 times daily (with meals)   Yes Historical Provider, MD   diphenhydrAMINE (BENADRYL) 25 MG tablet Take 50 mg by mouth nightly    Yes Historical Provider, MD   aspirin 81 MG EC tablet Take 81 mg by mouth daily    Yes Historical Provider, MD   rOPINIRole (REQUIP) 1 MG tablet Take by mouth 1 mg at 1200, 1mg at 1700, 1mg at  1900, 2mg at 2200 (total of 4mg nightly in divided doses) 3/9/17  Yes Historical Provider, MD   ursodiol (ACTIGALL) 500 MG tablet Take 500 mg by mouth 2 times daily    Yes Historical Provider, MD   calcium carbonate 600 MG TABS tablet Take 1 tablet by mouth daily    Yes Historical Provider, MD   Multiple Vitamins-Minerals (CENTRUM PO) Take 1 tablet by mouth daily. Yes Historical Provider, MD   estrogens, conjugated, (PREMARIN) 1.25 MG tablet Take 1.25 mg by mouth daily. Yes Historical Provider, MD   doxepin (SINEQUAN) 50 MG capsule Take 1 capsule by mouth nightly for 10 days 3/6/22 5/17/22  [facility]. Zelda Elms., DO       Allergies:  Latex, Clindamycin, Pennsaid [diclofenac sodium], Torsemide, Actos [pioglitazone], Amoxicillin, Augmentin [amoxicillin-pot clavulanate], Bactrim [sulfamethoxazole-trimethoprim], Ciprofloxacin, Doxycycline, Levaquin [levofloxacin], Ativan [lorazepam], Cephalosporins, Pcn [penicillins], and Proventil [albuterol]    Social History:      The patient currently lives independently    TOBACCO:   reports that she quit smoking about 22 years ago. Her smoking use included cigarettes.  She started smoking about 66 years ago. She has a 40.00 pack-year smoking history. She has never used smokeless tobacco.  ETOH:   reports no history of alcohol use. Family History:      Reviewed in detail and negative for CAD, CVA. Positive as follows:        Problem Relation Age of Onset    Colon Cancer Mother     Other Mother     Diabetes Father     Kidney Disease Paternal Grandmother     Lupus Daughter     Lupus Other        REVIEW OF SYSTEMS:   Pertinent positives as noted in the HPI. All other systems reviewed and negative. PHYSICAL EXAM:    BP (!) 156/78   Pulse (!) 113   Temp 98.5 °F (36.9 °C) (Oral)   Resp 18   SpO2 99%     General appearance:  No apparent distress, appears stated age and cooperative. HEENT:  Normal cephalic, atraumatic without obvious deformity. Pupils equal, round, and reactive to light. Extra ocular muscles intact. Conjunctivae/corneas clear. Neck: Supple, with full range of motion. No jugular venous distention. Trachea midline. Respiratory:  Normal respiratory effort. Clear to auscultation, bilaterally without Rales/Wheezes/Rhonchi. Cardiovascular:  Regular rate and rhythm with normal S1/S2 without murmurs, rubs or gallops. Abdomen: Soft, non-tender, non-distended with normal bowel sounds. Musculoskeletal:  No clubbing, cyanosis or edema bilaterally. Full range of motion without deformity. Skin: Skin color, texture, turgor normal.  No rashes or lesions. Neurologic:  Neurovascularly intact without any focal sensory/motor deficits. Cranial nerves: II-XII intact, grossly non-focal.  Psychiatric:  Alert and oriented, thought content appropriate, normal insight  Capillary Refill: Brisk,< 3 seconds   Peripheral Pulses: +2 palpable, equal bilaterally       CXR:  I have reviewed the CXR with the following interpretation: N/A  EKG:  I have reviewed the EKG with the following interpretation: Sinus Tachy w/out acute ischemic changes.      Labs:     Recent Labs baseline home O2 at 4L. Controlled on home medication regimen - continued. Afib - chronic paroxysmal of unspecified and clinically unable to determine etiology. Normally rate controlled on BBlocker - continued. Anticoagulated at baseline on home Eliquis - held. Monitored on tele. Cirrhosis - 2nd to SNOW. Continue supportive care. HypoNatremia - etiology clinically unable to determine but likely 2nd to cirrhosis. Follow serial labs off IVF. Reviewed and documented as above. HypoThyroid - clinically euthyroid on oral replacement therapy. Continue, w/ outpt monitoring as previously arranged. DM2 - controlled on home oral antiGlycemics - held. Follow FSBS/SSI low regimen. Last HbA1c 6.4% dated May 2022. Anticipate resuming/continuing home regimen at discharge. Obesity -  With There is no height or weight on file to calculate BMI. Complicating assessment and treatment. Placing patient at risk for multiple co-morbidities as well as early death and contributing to the patient's presentation. Counseled on weight loss. DVT Prophylaxis: IPC  Diet: Regular, NPO after Mn  Code Status: Full Code      PT/OT Eval Status: not yet ordered. Dispo - Patient is likely to remain in-house at least until Sunday/Monday 5/6 June pending clinical course and subspecialty recs. Christel Cota MD    Thank you Kalli Medrano. Chandu Bruner DO for the opportunity to be involved in this patient's care. If you have any questions or concerns please feel free to contact me at [phone].

## 2022-06-03 NOTE — CARE COORDINATION
Daughter, Duane Just called and updated per patient request.  Electronically signed by LUISA Ba on 6/3/2022 at 10:13 AM

## 2022-06-03 NOTE — ED PROVIDER NOTES
I independently performed a history and physical on [facility]. All diagnostic, treatment, and disposition decisions were made by myself in conjunction with the advanced practice provider/resident. For further details of [address] [place] [address] emergency department encounter, please see the MEENA/resident's documentation. I personally saw the patient and performed a substantive portion of the visit including all aspects of the medical decision making. Briefly, this is a 78-year-old female presenting for evaluation of abnormal lab. She has history of A. fib, GERD, COPD on 4 L oxygen, CAD, Weeks liver cirrhosis. She had recent hospitalization for A. fib. While at outside labs, she was noted to have a 3 g hemoglobin drop. She does report history of anemia. She does not recall prior episodes of GI bleed. She denies any abdominal pain or chest pain. She is short of breath but states that she is short of breath always. On exam, she is hypertensive, mildly tachycardic. Fecal occult blood testing is positive. Because of 3 g hemoglobin drop, evidence of GI bleed, we have discussed with her POA who is agreeable with admission for continued management of suspected GI bleed. EKG  The Ekg interpreted by myself in the emergency department in the absence of a cardiologist.  sinus tachycardia, affz=101 with a rate of 111  Axis is   Normal  QTc is  within an acceptable range  Intervals and Durations are unremarkable. No specific ST-T wave changes appreciated. No evidence of acute ischemia. No significant change from prior EKG dated May 7, 2022      Comment: Please note this report has been produced using speech recognition software and may contain errors related to that system including errors in grammar, punctuation, and spelling, as well as words and phrases that may be inappropriate. If there are any questions or concerns please feel free to contact the dictating provider for clarification. Suni Wilson MD  06/03/22 Nahum aMc MD  06/03/22 9882

## 2022-06-04 LAB
ALBUMIN SERPL-MCNC: 3.7 G/DL (ref 3.4–5)
ANION GAP SERPL CALCULATED.3IONS-SCNC: 8 MMOL/L (ref 3–16)
BUN BLDV-MCNC: 10 MG/DL (ref 7–20)
CALCIUM SERPL-MCNC: 8.4 MG/DL (ref 8.3–10.6)
CHLORIDE BLD-SCNC: 95 MMOL/L (ref 99–110)
CO2: 29 MMOL/L (ref 21–32)
CREAT SERPL-MCNC: 0.8 MG/DL (ref 0.6–1.2)
EKG ATRIAL RATE: 111 BPM
EKG DIAGNOSIS: NORMAL
EKG P-R INTERVAL: 280 MS
EKG Q-T INTERVAL: 298 MS
EKG QRS DURATION: 92 MS
EKG QTC CALCULATION (BAZETT): 405 MS
EKG R AXIS: -47 DEGREES
EKG T AXIS: 84 DEGREES
EKG VENTRICULAR RATE: 111 BPM
ESTIMATED AVERAGE GLUCOSE: 125.5 MG/DL
GFR AFRICAN AMERICAN: >60
GFR NON-AFRICAN AMERICAN: >60
GLUCOSE BLD-MCNC: 102 MG/DL (ref 70–99)
GLUCOSE BLD-MCNC: 179 MG/DL (ref 70–99)
GLUCOSE BLD-MCNC: 212 MG/DL (ref 70–99)
GLUCOSE BLD-MCNC: 92 MG/DL (ref 70–99)
GLUCOSE BLD-MCNC: 96 MG/DL (ref 70–99)
HBA1C MFR BLD: 6 %
HCT VFR BLD CALC: 22.4 % (ref 36–48)
HCT VFR BLD CALC: 23.2 % (ref 36–48)
HEMOGLOBIN: 7.6 G/DL (ref 12–16)
HEMOGLOBIN: 7.8 G/DL (ref 12–16)
INR BLD: 1.32 (ref 0.87–1.14)
MAGNESIUM: 1.8 MG/DL (ref 1.8–2.4)
MCH RBC QN AUTO: 29.6 PG (ref 26–34)
MCHC RBC AUTO-ENTMCNC: 34 G/DL (ref 31–36)
MCV RBC AUTO: 87 FL (ref 80–100)
PDW BLD-RTO: 15.7 % (ref 12.4–15.4)
PERFORMED ON: ABNORMAL
PERFORMED ON: NORMAL
PHOSPHORUS: 4.1 MG/DL (ref 2.5–4.9)
PLATELET # BLD: 176 K/UL (ref 135–450)
PMV BLD AUTO: 8.2 FL (ref 5–10.5)
POTASSIUM SERPL-SCNC: 4.9 MMOL/L (ref 3.5–5.1)
PROTHROMBIN TIME: 16.2 SEC (ref 11.7–14.5)
RBC # BLD: 2.58 M/UL (ref 4–5.2)
SODIUM BLD-SCNC: 132 MMOL/L (ref 136–145)
WBC # BLD: 6.4 K/UL (ref 4–11)

## 2022-06-04 PROCEDURE — 6370000000 HC RX 637 (ALT 250 FOR IP): Performed by: INTERNAL MEDICINE

## 2022-06-04 PROCEDURE — 1200000000 HC SEMI PRIVATE

## 2022-06-04 PROCEDURE — 94640 AIRWAY INHALATION TREATMENT: CPT

## 2022-06-04 PROCEDURE — 94761 N-INVAS EAR/PLS OXIMETRY MLT: CPT

## 2022-06-04 PROCEDURE — 93010 ELECTROCARDIOGRAM REPORT: CPT | Performed by: INTERNAL MEDICINE

## 2022-06-04 PROCEDURE — 36415 COLL VENOUS BLD VENIPUNCTURE: CPT

## 2022-06-04 PROCEDURE — 80069 RENAL FUNCTION PANEL: CPT

## 2022-06-04 PROCEDURE — 6360000002 HC RX W HCPCS: Performed by: INTERNAL MEDICINE

## 2022-06-04 PROCEDURE — 85610 PROTHROMBIN TIME: CPT

## 2022-06-04 PROCEDURE — 83735 ASSAY OF MAGNESIUM: CPT

## 2022-06-04 PROCEDURE — 85027 COMPLETE CBC AUTOMATED: CPT

## 2022-06-04 PROCEDURE — 87086 URINE CULTURE/COLONY COUNT: CPT

## 2022-06-04 PROCEDURE — 6370000000 HC RX 637 (ALT 250 FOR IP): Performed by: HOSPITALIST

## 2022-06-04 PROCEDURE — 87077 CULTURE AEROBIC IDENTIFY: CPT

## 2022-06-04 PROCEDURE — C9113 INJ PANTOPRAZOLE SODIUM, VIA: HCPCS | Performed by: INTERNAL MEDICINE

## 2022-06-04 PROCEDURE — 2580000003 HC RX 258: Performed by: INTERNAL MEDICINE

## 2022-06-04 PROCEDURE — 2700000000 HC OXYGEN THERAPY PER DAY

## 2022-06-04 PROCEDURE — 87186 SC STD MICRODIL/AGAR DIL: CPT

## 2022-06-04 PROCEDURE — 6370000000 HC RX 637 (ALT 250 FOR IP): Performed by: NURSE PRACTITIONER

## 2022-06-04 PROCEDURE — 85014 HEMATOCRIT: CPT

## 2022-06-04 PROCEDURE — 85018 HEMOGLOBIN: CPT

## 2022-06-04 RX ORDER — PANTOPRAZOLE SODIUM 40 MG/10ML
40 INJECTION, POWDER, LYOPHILIZED, FOR SOLUTION INTRAVENOUS 2 TIMES DAILY
Status: DISCONTINUED | OUTPATIENT
Start: 2022-06-04 | End: 2022-06-04 | Stop reason: SDUPTHER

## 2022-06-04 RX ORDER — PANTOPRAZOLE SODIUM 40 MG/10ML
40 INJECTION, POWDER, LYOPHILIZED, FOR SOLUTION INTRAVENOUS 2 TIMES DAILY
Status: DISCONTINUED | OUTPATIENT
Start: 2022-06-04 | End: 2022-06-07 | Stop reason: HOSPADM

## 2022-06-04 RX ADMIN — ESTROGENS, CONJUGATED 1.25 MG: 0.62 TABLET, FILM COATED ORAL at 07:48

## 2022-06-04 RX ADMIN — POTASSIUM CHLORIDE 20 MEQ: 20 TABLET, EXTENDED RELEASE ORAL at 07:50

## 2022-06-04 RX ADMIN — ROPINIROLE HYDROCHLORIDE 1 MG: 0.5 TABLET, FILM COATED ORAL at 13:25

## 2022-06-04 RX ADMIN — TAMSULOSIN HYDROCHLORIDE 0.4 MG: 0.4 CAPSULE ORAL at 07:49

## 2022-06-04 RX ADMIN — Medication 10 ML: at 07:50

## 2022-06-04 RX ADMIN — ROPINIROLE HYDROCHLORIDE 1 MG: 0.5 TABLET, FILM COATED ORAL at 07:49

## 2022-06-04 RX ADMIN — INSULIN LISPRO 1 UNITS: 100 INJECTION, SOLUTION INTRAVENOUS; SUBCUTANEOUS at 21:23

## 2022-06-04 RX ADMIN — Medication 10 ML: at 20:19

## 2022-06-04 RX ADMIN — Medication: at 10:30

## 2022-06-04 RX ADMIN — Medication 10 ML: at 17:08

## 2022-06-04 RX ADMIN — PANTOPRAZOLE SODIUM 40 MG: 40 TABLET, DELAYED RELEASE ORAL at 06:31

## 2022-06-04 RX ADMIN — CETIRIZINE HYDROCHLORIDE 10 MG: 10 TABLET, FILM COATED ORAL at 07:49

## 2022-06-04 RX ADMIN — PANTOPRAZOLE SODIUM 40 MG: 40 INJECTION, POWDER, FOR SOLUTION INTRAVENOUS at 17:08

## 2022-06-04 RX ADMIN — CALCIUM 500 MG: 500 TABLET ORAL at 07:50

## 2022-06-04 RX ADMIN — GABAPENTIN 100 MG: 100 CAPSULE ORAL at 07:50

## 2022-06-04 RX ADMIN — TRAMADOL HYDROCHLORIDE 50 MG: 50 TABLET, COATED ORAL at 22:16

## 2022-06-04 RX ADMIN — URSODIOL 600 MG: 300 CAPSULE ORAL at 07:48

## 2022-06-04 RX ADMIN — MONTELUKAST SODIUM 10 MG: 10 TABLET ORAL at 07:50

## 2022-06-04 RX ADMIN — ROPINIROLE HYDROCHLORIDE 1 MG: 0.5 TABLET, FILM COATED ORAL at 21:03

## 2022-06-04 RX ADMIN — OCTREOTIDE ACETATE 50 MCG/HR: 500 INJECTION, SOLUTION INTRAVENOUS; SUBCUTANEOUS at 12:49

## 2022-06-04 RX ADMIN — Medication 2 PUFF: at 08:09

## 2022-06-04 RX ADMIN — TRAMADOL HYDROCHLORIDE 50 MG: 50 TABLET, COATED ORAL at 00:19

## 2022-06-04 RX ADMIN — FERROUS SULFATE TAB 325 MG (65 MG ELEMENTAL FE) 325 MG: 325 (65 FE) TAB at 07:49

## 2022-06-04 RX ADMIN — Medication 2 PUFF: at 20:48

## 2022-06-04 RX ADMIN — VENLAFAXINE 37.5 MG: 37.5 TABLET ORAL at 07:50

## 2022-06-04 RX ADMIN — FERROUS SULFATE TAB 325 MG (65 MG ELEMENTAL FE) 325 MG: 325 (65 FE) TAB at 11:30

## 2022-06-04 RX ADMIN — URSODIOL 600 MG: 300 CAPSULE ORAL at 21:03

## 2022-06-04 RX ADMIN — METOPROLOL SUCCINATE 25 MG: 50 TABLET, EXTENDED RELEASE ORAL at 07:48

## 2022-06-04 RX ADMIN — LEVOTHYROXINE SODIUM 100 MCG: 0.1 TABLET ORAL at 06:31

## 2022-06-04 RX ADMIN — GABAPENTIN 600 MG: 300 CAPSULE ORAL at 21:05

## 2022-06-04 RX ADMIN — TRAZODONE HYDROCHLORIDE 100 MG: 50 TABLET ORAL at 21:08

## 2022-06-04 RX ADMIN — METOPROLOL SUCCINATE 25 MG: 50 TABLET, EXTENDED RELEASE ORAL at 21:07

## 2022-06-04 RX ADMIN — GABAPENTIN 100 MG: 100 CAPSULE ORAL at 13:25

## 2022-06-04 RX ADMIN — VENLAFAXINE 37.5 MG: 37.5 TABLET ORAL at 17:10

## 2022-06-04 RX ADMIN — FERROUS SULFATE TAB 325 MG (65 MG ELEMENTAL FE) 325 MG: 325 (65 FE) TAB at 17:10

## 2022-06-04 RX ADMIN — DIPHENHYDRAMINE HCL 50 MG: 25 TABLET ORAL at 21:03

## 2022-06-04 RX ADMIN — INSULIN LISPRO 2 UNITS: 100 INJECTION, SOLUTION INTRAVENOUS; SUBCUTANEOUS at 17:44

## 2022-06-04 RX ADMIN — FUROSEMIDE 20 MG: 20 TABLET ORAL at 11:26

## 2022-06-04 RX ADMIN — TIOTROPIUM BROMIDE INHALATION SPRAY 2 PUFF: 3.12 SPRAY, METERED RESPIRATORY (INHALATION) at 08:09

## 2022-06-04 RX ADMIN — DILTIAZEM HYDROCHLORIDE 240 MG: 240 CAPSULE, COATED, EXTENDED RELEASE ORAL at 07:50

## 2022-06-04 RX ADMIN — GABAPENTIN 100 MG: 100 CAPSULE ORAL at 21:07

## 2022-06-04 RX ADMIN — ATORVASTATIN CALCIUM 20 MG: 10 TABLET, FILM COATED ORAL at 21:05

## 2022-06-04 RX ADMIN — OCTREOTIDE ACETATE 50 MCG/HR: 500 INJECTION, SOLUTION INTRAVENOUS; SUBCUTANEOUS at 23:20

## 2022-06-04 ASSESSMENT — PAIN SCALES - GENERAL
PAINLEVEL_OUTOF10: 0
PAINLEVEL_OUTOF10: 6
PAINLEVEL_OUTOF10: 0
PAINLEVEL_OUTOF10: 0
PAINLEVEL_OUTOF10: 7
PAINLEVEL_OUTOF10: 0

## 2022-06-04 ASSESSMENT — PAIN DESCRIPTION - LOCATION: LOCATION: GENERALIZED

## 2022-06-04 ASSESSMENT — PAIN DESCRIPTION - ORIENTATION: ORIENTATION: OTHER (COMMENT)

## 2022-06-04 ASSESSMENT — PAIN DESCRIPTION - DESCRIPTORS: DESCRIPTORS: ACHING

## 2022-06-04 NOTE — CONSULTS
Consult placed    [facility], [facility],  Time:7:47 AM        Electronically signed by Yue De Leon on 6/4/2022 at 7:47 AM

## 2022-06-04 NOTE — PROGRESS NOTES
Hospitalist Progress Note      PCP: Margarite Lanes. Janice Lora,     Date of Admission: 6/3/2022    Chief Complaint: fatigue    Hospital Course: This is a 73-year-old female with a history of atrial fibrillation on Eliquis as well as SNOW cirrhosis, O2 dependent COPD recently discharged from [facility] for rehab admitted with ongoing fatigue and abdominal labs. Subjective: Patient denies any chest pain or shortness of breath no nausea vomiting or bleeding complains of a dry mouth at home she is on a 3-1/2 L oxygen continuous.       Medications:  Reviewed    Infusion Medications    sodium chloride      dextrose       Scheduled Medications    tiotropium  2 puff Inhalation Daily    aspirin  81 mg Oral Daily    atorvastatin  20 mg Oral Nightly    fluticasone  2 puff Inhalation BID    cetirizine  10 mg Oral Daily    dilTIAZem  240 mg Oral Daily    diphenhydrAMINE  50 mg Oral Nightly    estrogens (conjugated)  1.25 mg Oral Daily    ferrous sulfate  325 mg Oral TID WC    furosemide  20 mg Oral Daily    gabapentin  100 mg Oral TID    gabapentin  600 mg Oral Nightly    potassium chloride  20 mEq Oral Daily with breakfast    levothyroxine  100 mcg Oral Daily    losartan  50 mg Oral Daily    metoprolol succinate  25 mg Oral BID    montelukast  10 mg Oral Daily    nystatin  1 mL Oral 4x Daily    pantoprazole  40 mg Oral QAM AC    rOPINIRole  1 mg Oral TID    tamsulosin  0.4 mg Oral Daily    traZODone  100 mg Oral Nightly    ursodiol  600 mg Oral BID    venlafaxine  37.5 mg Oral BID WC    sodium chloride flush  5-40 mL IntraVENous 2 times per day    enoxaparin  40 mg SubCUTAneous Daily    insulin lispro  0-6 Units SubCUTAneous TID WC    insulin lispro  0-3 Units SubCUTAneous Nightly    calcium elemental  500 mg Oral Daily     PRN Meds: sodium chloride flush, sodium chloride, ondansetron **OR** ondansetron, polyethylene glycol, acetaminophen **OR** acetaminophen, glucose, dextrose bolus **OR** dextrose bolus, glucagon (rDNA), dextrose, levalbuterol, diphenhydrAMINE-zinc acetate, traMADol      Intake/Output Summary (Last 24 hours) at 6/4/2022 0829  Last data filed at 6/4/2022 0248  Gross per 24 hour   Intake --   Output 1300 ml   Net -1300 ml       Physical Exam Performed:    BP (!) 113/56   Pulse 88   Temp 97.7 °F (36.5 °C) (Oral)   Resp 18   Wt 220 lb (99.8 kg)   SpO2 99%   BMI 35.53 kg/m²     General appearance: No apparent distress, appears stated age and cooperative. HEENT: Pupils equal, round, and reactive to light. Conjunctivae/corneas clear. Neck: Supple, with full range of motion. No jugular venous distention. Trachea midline. Respiratory:  Normal respiratory effort. Clear to auscultation, bilaterally without Rales/Wheezes/Rhonchi. Cardiovascular: Regular rate and rhythm with normal S1/S2 without murmurs, rubs or gallops. Abdomen: Soft, non-tender, non-distended with normal bowel sounds. Musculoskeletal: No clubbing, cyanosis or edema bilaterally. Full range of motion without deformity. Skin: Skin color, texture, turgor normal.  No rashes or lesions. Neurologic:  Neurovascularly intact without any focal sensory/motor deficits. Cranial nerves: II-XII intact, grossly non-focal.  Psychiatric: Alert and oriented, thought content appropriate, normal insight  Capillary Refill: Brisk,3 seconds, normal   Peripheral Pulses: +2 palpable, equal bilaterally       Labs:   Recent Labs     06/03/22  1007 06/04/22  0652   WBC 6.4 6.4   HGB 8.3* 7.6*   HCT 24.5* 22.4*    176     Recent Labs     06/03/22  1007 06/03/22  1056   *  --    K 5.3* 4.7   CL 89*  --    CO2 28  --    BUN 11  --    CREATININE 0.7  --    CALCIUM 8.7  --      Recent Labs     06/03/22  1007   AST 47*   ALT 18   BILITOT 0.4   ALKPHOS 59     No results for input(s): INR in the last 72 hours. No results for input(s): Ulysses Altes in the last 72 hours.     Urinalysis:      Lab Results   Component Value Date    NITRU Negative 06/03/2022    WBCUA 10-20 06/03/2022    BACTERIA 3+ 06/03/2022    RBCUA 5-10 06/03/2022    BLOODU TRACE-INTACT 06/03/2022    SPECGRAV <=1.005 06/03/2022    GLUCOSEU Negative 06/03/2022    GLUCOSEU NEGATIVE 12/30/2009       Radiology:  No orders to display           Assessment/Plan:    Active Hospital Problems    Diagnosis     Hypothyroid [E03.9]      Priority: Medium    A-fib ([facility] [I48.91]      Priority: Medium    Hyponatremia [E87.1]     Anemia [D64.9]     GERD (gastroesophageal reflux disease) [K21.9]     COPD (chronic obstructive pulmonary disease) (HCC) [J44.9]     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 ([facility] [K75.81, K74.60]     Essential hypertension, benign [I10]     CAD (coronary artery disease) [I25.10]     Hyperlipidemia [E78.5]      1. This 71-year-old female with cirrhosis of liver/SNOW admitted with anemia positive Hemoccult GI consult appreciated patient is scheduled for EGD on Monday. 2.  Anemia continue to monitor closely type and cross transfuse for hemoglobin less than 7.  3.  Atrial fibrillation on Eliquis currently on hold. 4.  Hypertension continue with home medication. 5.  Coronary artery disease continue with home medication. 6.  Hyperlipidemia on a statin. 7.  COPD with chronic respiratory failure at home 3-1/2 to 4 L oxygen continue with inhalers and supplemental oxygen. 8.  Hyponatremia secondary to cirrhosis of the liver, follow. 9.  Hypothyroidism continue with the Synthroid. 10.  Diabetes mellitus type 2 continue with the current care hemoglobin A1c= 6.0 on 6/3/2022    DVT Prophylaxis: SCDs Eliquis on hold due to GI bleed.   Diet: Diet NPO Exceptions are: Sips of Water with Meds  Code Status: Full Code    PT/OT Eval Status:     Isaias Mosley MD

## 2022-06-04 NOTE — CONSULTS
Gastroenterology H&P/Consult Note    Patient: Lorna Beckman CSN: [id number]     YOB: 1945  Age: 69 y.o. Sex: female    Unit: [place] [address] MED SURG Room/Bed: 0362/0362-01 Location: [address]     Admitting Physician: Nunu Hurley    Date of  Admission: 6/3/2022   Admission type: Emergency  Primary Care Physician: Rachael Kirk. Augustina Santillan DO         Referring Physician: Lis LOVETT     Chief Complaint: abnormal labs. Consult Date: 6/4/2022     Subjective:     History of Present Illness: Lorna Beckman is a 69 y.o. female who is seen at the request of CASSIA Victoria for anemia. Patient with medical history of atrial fibrillation on Eliquis, GERD, hyperlipidemia, diabetes mellitus type 2, hypothyroidism, obesity (BMI 35.5), SNOW cirrhosis, esophageal varices status post banding 6/10/2021 and COPD on chronic 4L oxygen dependence admitted for abnormal labs. Patient reports being told to present to the ED for abnormal labs. Denies abdominal pain, nausea, vomiting, fever, chills, unintentional weight loss, constipation, diarrhea, hematochezia or melenic stools. Last bowel movement was 2 days ago with brown stools. Labs on presentation 6/3/2022 reviewed with normocytic anemia, hemoglobin 8.3 g/dL, hematocrit 24.5%, MCV 87.3, platelets 661 and WBC 6.4. Baseline hemoglobin 10-11 g/dL on 5/9/2022. Unremarkable liver test except mild elevation in AST 47. Abnormal BMP with hyponatremia, sodium 125, creatinine 5.3. Stool occult blood positive. Last Encounter Reviewed:   Pertinent PMH, FH, SH is reviewed below. Last EGD 6/10/21 Dr. Francisco Coleman: Multiple ulcerated polyps in the gastric body, several removed by hot snare (path- hyperplastic gastric polyps with mild to moderate chronic active gastritis and focal intestinal metaplasia). Medium sized esophageal varices status post 3 bands placed.   Last Colonoscopy 6/10/21 Dr. Francisco Coleman: 2 6010 mm ascending colon polyp status post hot snare (path- tubular adenoma). A 6 mm polyp in the descending colon status post hot snare (path- hyperplastic polyp). Rectal varices. Repeat colonoscopy in 3 years.     Past Medical History:   Diagnosis Date    Allergic     Anemia     Anesthesia complication     \"woke up during surgery\"    Arthritis     Osteoarthritis of bilateral CMC joints    Asthma     Followed by Dr. Caden Guillermo Atrial fibrillation ([facility] [address]     Cirrhosis ([address]     non alcoholic    Cirrhosis, non-alcoholic ([address]     COPD (chronic obstructive pulmonary disease) ([address]     GERD (gastroesophageal reflux disease)     GIB (gastrointestinal bleeding)     LGIB 4/2016    Haemophilus infection 05/07/2021    + resp    Heart murmur     Hernia     Hyperlipidemia     Hypertension     Lung collapse     Murmur, heart     Pneumonia     Reflux     Rheumatic fever     Stress fracture     right foot    SVT (supraventricular tachycardia) ([facility] [address]     6/19/16 - admitted X 1 day per PT     TIA (transient ischemic attack) 2015    Type II or unspecified type diabetes mellitus without mention of complication, not stated as uncontrolled     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 ([facility] [address]     Wears glasses     as needed    Wears partial dentures     upper partial     Past Surgical History:   Procedure Laterality Date    BONE MARROW BIOPSY      BREAST BIOPSY      BRONCHOSCOPY N/A 5/7/2021    BRONCHOSCOPY DIAGNOSTIC OR CELL [address] ONLY performed by Miranda Ma MD at [address]  5/7/2021    BRONCHOSCOPY ALVEOLAR LAVAGE performed by Miranda Ma MD at [address]  5/7/2021    BRONCHOSCOPY THERAPUTIC ASPIRATION INITIAL performed by Miranda Ma MD at [facility]  07/14/14    CATARACT REMOVAL      COLONOSCOPY      X 3 per PT 6/8/16    COLONOSCOPY N/A 6/10/2021    COLONOSCOPY POLYPECTOMY ABLATION performed by Aden Will MD at [facility] FINE NEEDLE ASPIRATION      FOOT SURGERY Right 2013    CORAL BUNIONECTOMY RIGHT FOOT WITH SCREW FIXATION;    HERNIA REPAIR      HYSTERECTOMY      JOINT REPLACEMENT Bilateral     TKR    KNEE SURGERY  09    left    KNEE SURGERY  2010    right    UPPER GASTROINTESTINAL ENDOSCOPY N/A 2018    EGD BIOPSY performed by Jamar Flores MD at [address] [facility] ENDOSCOPY N/A 6/10/2021    EGD POLYP ABLATION/OTHER performed by Mariana Macdonald MD at [address] [facility] N/A 6/10/2021    EGD BAND LIGATION performed by Mariana Macdonald MD at [address] [place] [address] History   Problem Relation Age of Onset    Colon Cancer Mother     Other Mother     Diabetes Father     Kidney Disease Paternal Grandmother     Lupus Daughter     Lupus Other      Social History     Tobacco Use    Smoking status: Former Smoker     Packs/day: 1.00     Years: 40.00     Pack years: 40.00     Types: Cigarettes     Start date: 1956     Quit date: 2000     Years since quittin.0    Smokeless tobacco: Never Used    Tobacco comment: stopped cold turkey in !! ( )   Substance Use Topics    Alcohol use: No     Alcohol/week: 0.0 standard drinks      Allergies   Allergen Reactions    Latex Swelling and Rash    Clindamycin Itching    Pennsaid [Diclofenac Sodium] Itching and Rash    Torsemide Itching    Actos [Pioglitazone] Diarrhea    Amoxicillin Other (See Comments)    Augmentin [Amoxicillin-Pot Clavulanate] Other (See Comments)    Bactrim [Sulfamethoxazole-Trimethoprim] Other (See Comments)     Stomach ache     Ciprofloxacin Other (See Comments)     Makes her weird  Makes anxious and she has weird dreams    Doxycycline Other (See Comments)     Feels like she is smothering, chest tightness    Levaquin [Levofloxacin]      Body aches    Ativan [Lorazepam] Other (See Comments) and Anxiety     And confusion  Had weird dreams and hallucinations    Cephalosporins Rash    Pcn [Penicillins] Rash and Itching    Proventil [Albuterol] Anxiety     Prior to Admission medications    Medication Sig Start Date End Date Taking? Authorizing Provider   ondansetron (ZOFRAN) 4 MG tablet Take 1 tablet by mouth 3 times daily as needed for Nausea or Vomiting 6/2/22  Yes Eileen Prajapati, DO   dilTIAZem ROBERTS [facility]) 240 MG extended release capsule Take 1 capsule by mouth daily 5/31/22  Yes Nathalia Rojas MD   tamsulosin [facility]) 0.4 MG capsule Take 1 capsule by mouth daily 5/14/22  Yes Adalberto Barron MD   gabapentin (NEURONTIN) 100 MG capsule Take 100 mg by mouth 3 times daily. Yes Historical Provider, MD   gabapentin (NEURONTIN) 600 MG tablet Take 600 mg by mouth at bedtime. Yes Historical Provider, MD   aclidinium (TUDORZA PRESSAIR) 400 MCG/ACT AEPB inhaler Inhale 1 puff into the lungs 2 times daily   Yes Historical Provider, MD   apixaban (ELIQUIS) 5 MG TABS tablet Take 1 tablet by mouth 2 times daily 5/12/22  Yes Adalberto Barron MD   metoprolol succinate (TOPROL XL) 25 MG extended release tablet Take 1 tablet by mouth in the morning and at bedtime 5/12/22  Yes Adalberto Barron MD   lidocaine 4 % external patch Place 1 patch onto the skin daily 5/13/22  Yes Adalberto Barron MD   montelukast (SINGULAIR) 10 MG tablet Take 1 tablet by mouth daily 4/19/22  Yes Eduar Leblanc MD   cyanocobalamin 1000 MCG/ML injection Inject 1 mL into the muscle every 14 days Last dose was taken on 4/9/17 3/31/22  Yes Osmani Prajapati,    cetirizine (ZYRTEC) 10 MG tablet TAKE ONE TABLET BY MOUTH DAILY 3/21/22  Yes Nathalia Rojas MD   levothyroxine (SYNTHROID) 100 MCG tablet Take 1 tablet by mouth Daily 3/3/22  Yes Eileen Prajapati,    losartan (COZAAR) 100 MG tablet TAKE 1 TABLET DAILY 3/2/22  Yes Nathalia Rojas MD   pantoprazole (PROTONIX) 40 MG tablet TAKE 1 TABLET TWICE A DAY 3/2/22  Yes Nathalia Rojas MD   SITagliptin (JANUVIA) 100 MG tablet Take 1 tablet by mouth daily 2/25/22  Yes Lv Arroyo,    nystatin (MYCOSTATIN) 095044 UNIT/ML suspension Take by mouth 4 times daily 2/14/22  Yes Swapnil Church MD   metFORMIN (GLUCOPHAGE-XR) 500 MG extended release tablet TAKE 2 TABLETS TWICE A DAY 1/3/22  Yes Marilou Antoine MD   traZODone (DESYREL) 50 MG tablet TAKE 2 TABLETS NIGHTLY 12/3/21  Yes Devante Wong MD   KLOR-CON M20 20 MEQ extended release tablet TAKE 1 TABLET DAILY 11/4/21  Yes Devante Wong MD   benzonatate (TESSALON) 100 MG capsule TAKE 1 CAPSULE THREE TIMES A DAY AS NEEDED FOR COUGH 10/18/21  Yes Vandana Nolan MD   Cholecalciferol (VITAMIN D3) 50 MCG (2000 UT) CAPS Take by mouth   Yes Historical Provider, MD   venlafaxine (EFFEXOR) 37.5 MG tablet TAKE 1 TABLET DAILY 9/16/21  Yes Devante Wong MD   atorvastatin (LIPITOR) 20 MG tablet TAKE 1 TABLET NIGHTLY 9/9/21  Yes Devante Wong MD   furosemide (LASIX) 20 MG tablet TAKE 1 TABLET DAILY 7/19/21  Yes Devante Wong MD   beclomethasone (QVAR) 80 MCG/ACT inhaler Inhale 2 puffs into the lungs 2 times daily 5/20/21  Yes Teresa Pires MD   levalbuterol Alray Brigette HFA) 45 MCG/ACT inhaler Inhale 2 puffs into the lungs every 6 hours as needed for Shortness of Breath 5/20/21  Yes Teresa Pires MD   ferrous sulfate 325 (65 Fe) MG tablet Take 325 mg by mouth 3 times daily (with meals)   Yes Historical Provider, MD   diphenhydrAMINE (BENADRYL) 25 MG tablet Take 50 mg by mouth nightly    Yes Historical Provider, MD   aspirin 81 MG EC tablet Take 81 mg by mouth daily    Yes Historical Provider, MD   rOPINIRole (REQUIP) 1 MG tablet Take by mouth 1 mg at 1200, 1mg at 1700, 1mg at  1900, 2mg at 2200 (total of 4mg nightly in divided doses) 3/9/17  Yes Historical Provider, MD   ursodiol (ACTIGALL) 500 MG tablet Take 500 mg by mouth 2 times daily    Yes Historical Provider, MD   calcium carbonate 600 MG TABS tablet Take 1 tablet by mouth daily    Yes Historical Provider, MD   Multiple Vitamins-Minerals (CENTRUM PO) Take 1 tablet by mouth daily. Yes Historical Provider, MD   estrogens, conjugated, (PREMARIN) 1.25 MG tablet Take 1.25 mg by mouth daily. Yes Historical Provider, MD   doxepin (SINEQUAN) 50 MG capsule Take 1 capsule by mouth nightly for 10 days 3/6/22 5/17/22  Shereen Harrison. Ever Ojeda., DO        Review of Systems:    See HPI for further details and pertinent postiives. Negative for the following:  Constitutional: Negative for weight change. Negative for appetite change and fatigue. HENT: Negative for nosebleeds, sore throat, mouth sores, trouble swallowing and voice change. Respiratory: Negative for cough, choking and chest tightness. Cardiovascular: Negative for chest pain   Gastrointestinal: See HPI  Musculoskeletal: Negative for arthralgias. Skin: Negative for pallor. Neurological: Negative for weakness and light-headedness. Hematological: Negative for adenopathy. Does not bruise/bleed easily. Psychiatric/Behavioral: Negative for suicidal ideas. Objective:     Physical Examination:  VITAL SIGNS: BP (!) 113/56   Pulse 88   Temp 97.7 °F (36.5 °C) (Oral)   Resp 18   Wt 220 lb (99.8 kg)   SpO2 99%   BMI 35.53 kg/m²   With regards to weight, she reports stable / unchanged. Review of available records reveals:   Wt Readings from Last 50 Encounters:   06/03/22 220 lb (99.8 kg)   05/13/22 214 lb 1.1 oz (97.1 kg)   05/09/22 224 lb 10.4 oz (101.9 kg)   04/29/22 210 lb (95.3 kg)   03/28/22 211 lb (95.7 kg)   02/25/22 221 lb (100.2 kg)   02/16/22 219 lb (99.3 kg)   11/19/21 224 lb (101.6 kg)   09/23/21 221 lb 6.4 oz (100.4 kg)   09/09/21 221 lb (100.2 kg)   08/03/21 220 lb (99.8 kg)   07/02/21 220 lb 12.8 oz (100.2 kg)   06/10/21 211 lb (95.7 kg)   06/08/21 212 lb (96.2 kg)   05/20/21 211 lb (95.7 kg)   05/06/21 210 lb (95.3 kg)   04/21/21 221 lb (100.2 kg)   04/12/21 221 lb (100.2 kg)   03/04/21 227 lb 9.6 oz (103.2 kg)   11/18/20 226 lb (102.5 kg)   11/18/20 227 lb 9.6 oz (103.2 kg)   06/15/20 227 lb (103 kg)   01/29/20 228 lb (103.4 kg)   12/13/19 228 lb 2.8 oz (103.5 kg)   12/03/19 228 lb 3.2 oz (103.5 kg)   03/26/19 223 lb (101.2 kg)   02/22/19 217 lb (98.4 kg)   02/07/19 215 lb (97.5 kg)   01/25/19 215 lb 6.4 oz (97.7 kg)   10/10/18 224 lb (101.6 kg)   08/20/18 224 lb (101.6 kg)   07/20/18 215 lb 9.6 oz (97.8 kg)   05/25/18 216 lb 4.8 oz (98.1 kg)   05/16/18 229 lb (103.9 kg)   04/26/18 229 lb 15 oz (104.3 kg)   03/05/18 230 lb (104.3 kg)   02/25/18 219 lb 6.4 oz (99.5 kg)   02/15/18 222 lb 0.1 oz (100.7 kg)   01/18/18 222 lb (100.7 kg)   08/31/17 235 lb (106.6 kg)   07/13/17 233 lb (105.7 kg)   06/26/17 228 lb 13.4 oz (103.8 kg)   06/22/17 235 lb 3.7 oz (106.7 kg)   04/16/17 235 lb 2 oz (106.7 kg)   03/31/17 226 lb (102.5 kg)   02/06/17 214 lb (97.1 kg)   01/26/17 224 lb (101.6 kg)   01/20/17 211 lb (95.7 kg)   06/24/16 211 lb (95.7 kg)   06/20/16 211 lb 1.9 oz (95.8 kg)     Constitutional: Obese female. Well developed, Well nourished, No acute distress on 4L oxygen via NC, Non-toxic appearance. HENT: Normocephalic, Atraumatic, Bilateral external ears normal, Oropharynx moist, No oral exudates, Nose normal.   Eyes: Conjunctiva normal, No discharge. Neck: Normal range of motion, No tenderness, Supple  Cardiovascular: Normal heart rate, Normal rhythm, No murmurs, No rubs, No gallops. Thorax & Lungs: Normal breath sounds, No respiratory distress, No wheezing, No chest tenderness. Abdomen: Pannus abdomen, normal bowel sounds, soft, non tender, no hernias,  Rectal:  Deferred. Skin: Warm, Dry, No erythema, No rash. No bruising. Lower Extremities: Intact distal pulses, No edema, No tenderness  Neurologic: Alert & oriented x 3, Normal motor function, Normal sensory function, No focal deficits noted. No asterixis.     Scheduled Meds:   Lip Balm   Topical Daily    tiotropium  2 puff Inhalation Daily    aspirin  81 mg Oral Daily    atorvastatin  20 mg Oral Nightly    fluticasone  2 puff Inhalation BID    cetirizine  10 mg Oral Daily    dilTIAZem  240 mg Oral Daily    diphenhydrAMINE  50 mg Oral Nightly    estrogens (conjugated)  1.25 mg Oral Daily    ferrous sulfate  325 mg Oral TID WC    furosemide  20 mg Oral Daily    gabapentin  100 mg Oral TID    gabapentin  600 mg Oral Nightly    potassium chloride  20 mEq Oral Daily with breakfast    levothyroxine  100 mcg Oral Daily    losartan  50 mg Oral Daily    metoprolol succinate  25 mg Oral BID    montelukast  10 mg Oral Daily    nystatin  1 mL Oral 4x Daily    pantoprazole  40 mg Oral QAM AC    rOPINIRole  1 mg Oral TID    tamsulosin  0.4 mg Oral Daily    traZODone  100 mg Oral Nightly    ursodiol  600 mg Oral BID    venlafaxine  37.5 mg Oral BID WC    sodium chloride flush  5-40 mL IntraVENous 2 times per day    enoxaparin  40 mg SubCUTAneous Daily    insulin lispro  0-6 Units SubCUTAneous TID WC    insulin lispro  0-3 Units SubCUTAneous Nightly    calcium elemental  500 mg Oral Daily     Continuous Infusions:   sodium chloride      dextrose       Imaging:  Echo Limited    Result Date: 5/9/2022  Transthoracic Echocardiography Report (TTE)  Demographics   Patient Name       [facility] THIEF R[facility]   Date of Study      05/09/2022         Gender              Female   Patient Number     [id number]         Date of Birth       1945   Visit Number       [id number]          Age                 77 year(s)   Accession Number   [id number]         Room Number         0202   Corporate ID       [id number]            Sonographer         Mendoza Catalan, RT   Ordering Physician Caroline Deras.,  Interpreting        [address]                 Physician           Cornelius Ramos MD, [facility]  Procedure Type of Study   TTE procedure:ECHOCARDIOGRAM LIMITED.   Procedure Date Date: 05/09/2022 Start: 08:38 AM Study Location: [address] [place] [address] - Echo Lab Technical Quality: adjustment of the mA/kV was utilized to reduce the radiation dose to as low as reasonably achievable. COMPARISON: CT scan of the abdomen pelvis dated July 20, 2018 HISTORY: ORDERING SYSTEM PROVIDED HISTORY: lower back pain TECHNOLOGIST PROVIDED HISTORY: Reason for exam:->lower back pain Decision Support Exception - unselect if not a suspected or confirmed emergency medical condition->Emergency Medical Condition (MA) Reason for Exam: low back pain with bilateral leg pain/numbness-back strain over 1 month ago-worsening pain FINDINGS: BONES/ALIGNMENT: Grade anterolisthesis is now present at the L4-L5 level, degenerative in nature. New focal areas of lucency are present within the L3, and L5 vertebral bodies, involving the superior and inferior endplate at L3, and superior endplate of L5. Ill-defined areas of sclerosis are present surrounding these areas of lucency. Focal areas of lucency are present involving the facets, at the L3-4, and L4-5 levels, and L5 spinous process. No evidence of an acute compression fracture is present. Multilevel degenerative changes are present. . DEGENERATIVE CHANGES: Vacuum disc phenomena is present at the L2-3, L4-5 and L5-S1 disc levels. Facet arthropathy is present throughout the lumbar region. A diffuse disc protrusion, facet and ligamentous hypertrophy is present involving the L4-5 disc level, resulting in a moderate degree of acquired central spinal stenosis, bilateral neural foraminal narrowing. SOFT TISSUES/RETROPERITONEUM: No paraspinal mass is seen. Dependent changes are present within the lung bases. 1. Multiple areas of lucency, with surrounding sclerosis involving the lumbar spine, most prominent at the L3 and L5 levels. Changes are new since the prior study. Differential considerations would include multiple Schmorl nodes and discogenic degenerative changes, or potentially infection, or metastatic disease.   MR imaging of the lumbar spine recommended to further evaluate these lesions. 2. Diffuse disc protrusion, facet and ligamentous hypertrophy, L4-L5 disc level, resulting in a moderate degree of acquired central spinal stenosis     MRI THORACIC SPINE W WO CONTRAST    Result Date: 5/8/2022  EXAMINATION: MRI OF THE THORACIC SPINE WITHOUT AND WITH CONTRAST  5/7/2022 9:58 pm TECHNIQUE: Multiplanar multisequence MRI of the thoracic spine was performed without and with the administration of intravenous contrast. COMPARISON: No MRI comparison available. HISTORY: ORDERING SYSTEM PROVIDED HISTORY: back pain with bilateral leg weakness, abnormal CT TECHNOLOGIST PROVIDED HISTORY: Reason for exam:->back pain with bilateral leg weakness, abnormal CT Decision Support Exception - unselect if not a suspected or confirmed emergency medical condition->Emergency Medical Condition (MA) FINDINGS: Suboptimal evaluation secondary to motion degradation. Postcontrast sequences are especially motion degraded. BONES/ALIGNMENT: Slightly exaggerated kyphosis of the thoracic spine curvature. No significant spondylolisthesis. Vertebral body heights appear preserved. Marrow signal appears within normal limits. No evidence of acute fracture or aggressive appearing osseous lesions. Multilevel intervertebral disc space narrowing, most notable T8-T9 and to lesser degree T9-T10 where there is associated Modic type 2 degenerative endplate changes. SPINAL CORD: No abnormal cord signal is seen. SOFT TISSUES:  No abnormal enhancement of the thoracic spine. No paraspinal mass identified. DEGENERATIVE CHANGES: No significant spinal canal stenosis or neural foraminal narrowing of the thoracic spine. 1. Mild multilevel thoracic spondylosis with slightly exaggerated kyphosis. No high-grade spinal canal stenosis or neural foraminal narrowing. 2. No abnormal spinal cord signal.  No convincing abnormal enhancement within limitations of motion degradation.      MRI LUMBAR SPINE W WO CONTRAST    Result Date: 5/7/2022  EXAMINATION: MRI OF THE LUMBAR SPINE WITHOUT AND WITH CONTRAST  5/7/2022 9:57 pm TECHNIQUE: Multiplanar multisequence MRI of the lumbar spine was performed without and with the administration of intravenous contrast. COMPARISON: Same-day CT HISTORY: ORDERING SYSTEM PROVIDED HISTORY: low back pain with bilateral leg pain/weakness, abnormal CT TECHNOLOGIST PROVIDED HISTORY: Reason for exam:->low back pain with bilateral leg pain/weakness, abnormal CT Decision Support Exception - unselect if not a suspected or confirmed emergency medical condition->Emergency Medical Condition (MA) FINDINGS: BONES/ALIGNMENT: There are 5 lumbar vertebral bodies. There is anterolisthesis at L4-5. Vertebral body heights are maintained. There is edema and enhancement at L4 and L5. Prominent Schmorl's node is noted at L3 with some associated edema. SPINAL CORD:  The conus terminates normally. SOFT TISSUES: No paraspinal mass identified. There is minimal solid enhancement in the anterior epidural space and posterior epidural space at L4-5. L1-L2: There is no significant disc protrusion, spinal canal stenosis or neural foraminal narrowing. L2-L3: There is no significant disc protrusion, spinal canal stenosis or neural foraminal narrowing. L3-L4: Disc protrusion, facet DJD, and ligamentum flavum hypertrophy with minimal spinal canal stenosis. Mild right neural foraminal narrowing without significant left neural foraminal stenosis. L4-L5: Disc protrusion, facet DJD, and ligamentum flavum hypertrophy with mild spinal canal stenosis. There is mild narrowing of the left lateral recess. Moderate left neural foraminal narrowing without significant right neural foraminal stenosis. L5-S1: No significant spinal canal stenosis or left neural foraminal narrowing. Mild right neural foraminal narrowing. 1. Edema and enhancement at L4-5, concerning for discitis/osteomyelitis. Differential would include Modic type 1 degenerative change. 2. Multilevel degenerative change with mild spinal canal narrowing at L3-4 and L4-5. 3. Multilevel neural foraminal narrowing as above. 4. Anterolisthesis at L4-5. CT CHEST PULMONARY EMBOLISM W CONTRAST    Result Date: 5/7/2022  EXAMINATION: CTA OF THE CHEST 5/7/2022 7:01 pm TECHNIQUE: CTA of the chest was performed after the administration of intravenous contrast.  Multiplanar reformatted images are provided for review. MIP images are provided for review. Dose modulation, iterative reconstruction, and/or weight based adjustment of the mA/kV was utilized to reduce the radiation dose to as low as reasonably achievable. COMPARISON: CT chest dated 04/06/2022 HISTORY: ORDERING SYSTEM PROVIDED HISTORY: Tachy, new onset A. fib with RVR, bilateral leg pain, elevated D-dimer TECHNOLOGIST PROVIDED HISTORY: Reason for exam:->Tachy, new onset A. fib with RVR, bilateral leg pain, elevated D-dimer Decision Support Exception - unselect if not a suspected or confirmed emergency medical condition->Emergency Medical Condition (MA) Reason for Exam: chronic sob-new onset  S-mxr-brabv-bilateral leg swelling Additional signs and symptoms: elev d-dimer Relevant Medical/Surgical History: no surg FINDINGS: Pulmonary Arteries: Pulmonary arteries are adequately opacified for evaluation. No evidence of intraluminal filling defect to suggest pulmonary embolism. Main pulmonary artery is normal in caliber. Mediastinum: No evidence of mediastinal lymphadenopathy. The heart and pericardium demonstrate no acute abnormality however cardiac enlargement four-chamber cardiomegaly without pericardial effusion. There is no acute abnormality of the thoracic aorta. Lungs/pleura: No focal consolidation however smooth interlobular septal thickening with mild mosaicism of an interstitial edema pattern. No evidence of pleural effusion or pneumothorax.  Upper Abdomen: Limited images of the upper abdomen reveal nodular contours of the hepatic parenchyma concerning for underlying parenchymal disease process such as cirrhotic morphology without obvious perihepatic ascites Soft Tissues/Bones: No acute bone or soft tissue abnormality. No evidence of pulmonary embolism. Four-chamber cardiomegaly without pericardial effusion however smooth interlobular septal thickening with lower lobe predominance along with mild mosaicism concerning for interstitial pulmonary edema pattern without decompensation evidence as there is no pleural effusion evident. Limited images of the upper abdomen reveal nodular contours of the hepatic parenchyma concerning for underlying parenchymal disease process such as cirrhotic morphology without obvious perihepatic ascites       Labs Reviewed:   Recent Labs     06/03/22  1007 06/04/22  0652   WBC 6.4 6.4   HGB 8.3* 7.6*   HCT 24.5* 22.4*    176       Recent Labs     06/03/22  1007 06/03/22  1056 06/04/22 0652   *  --  132*   K 5.3* 4.7 4.9   CL 89*  --  95*   CO2 28  --  29   BUN 11  --  10       Recent Labs     06/03/22  1007   ALT 18               Assessment/Plan:     69 y.o. female with medical history of atrial fibrillation on Eliquis, GERD, hyperlipidemia, diabetes mellitus type 2, hypothyroidism, obesity (BMI 35.5), SNOW cirrhosis, esophageal varices status post banding 6/10/2021 and COPD on chronic 4L oxygen dependence admitted for fatigue and anemia with occult blood positive stool and hyponatremia. Decompensated SNOW cirrhosis with anemia in setting of history of medium-sized esophageal varices status post banding in 06//2021 and anticoagulation with Elliqus. MELD Na = 16. Plan:   Hemoglobin and hematocrit remains stable. Hb 7.6 g/dL. No evidence of overt acute GI bleeding. Keep NPO with IV fluids.    Monitor and transfuse to keep Hgb > 7 g/dL; avoiding overtransfusion   Continue Protonix 40 mg IV BID  Monitor PT/INR and correct coagulopathy  Octreotide 50 mcg IV for 72 hours  PCN and quinolone allergy;  Plan for EGD on Monday 6/6; urgently if acute overt bleeding   Hold anticoagulation. Monitor electrolytes and supplement as needed  GI to follow. Signed By: Karly Hendricks MD     June 4, 2022      [address]  [phone]. Also available via Perfect Serve    Please note that some or all of this record was generated using voice recognition software. If there are any questions about the content of this document, please contact the author as some errors in translation may have occurred.

## 2022-06-05 LAB
ANION GAP SERPL CALCULATED.3IONS-SCNC: 9 MMOL/L (ref 3–16)
BUN BLDV-MCNC: 12 MG/DL (ref 7–20)
CALCIUM SERPL-MCNC: 8 MG/DL (ref 8.3–10.6)
CHLORIDE BLD-SCNC: 88 MMOL/L (ref 99–110)
CO2: 30 MMOL/L (ref 21–32)
CREAT SERPL-MCNC: 0.9 MG/DL (ref 0.6–1.2)
EKG ATRIAL RATE: 375 BPM
EKG DIAGNOSIS: NORMAL
EKG Q-T INTERVAL: 386 MS
EKG QRS DURATION: 94 MS
EKG QTC CALCULATION (BAZETT): 422 MS
EKG R AXIS: -45 DEGREES
EKG T AXIS: 16 DEGREES
EKG VENTRICULAR RATE: 72 BPM
GFR AFRICAN AMERICAN: >60
GFR NON-AFRICAN AMERICAN: >60
GLUCOSE BLD-MCNC: 137 MG/DL (ref 70–99)
GLUCOSE BLD-MCNC: 193 MG/DL (ref 70–99)
GLUCOSE BLD-MCNC: 242 MG/DL (ref 70–99)
GLUCOSE BLD-MCNC: 252 MG/DL (ref 70–99)
GLUCOSE BLD-MCNC: 89 MG/DL (ref 70–99)
HCT VFR BLD CALC: 24.3 % (ref 36–48)
HEMOGLOBIN: 8.1 G/DL (ref 12–16)
MCH RBC QN AUTO: 29.5 PG (ref 26–34)
MCHC RBC AUTO-ENTMCNC: 33.5 G/DL (ref 31–36)
MCV RBC AUTO: 88.2 FL (ref 80–100)
PDW BLD-RTO: 15.6 % (ref 12.4–15.4)
PERFORMED ON: ABNORMAL
PERFORMED ON: NORMAL
PLATELET # BLD: 189 K/UL (ref 135–450)
PMV BLD AUTO: 7.5 FL (ref 5–10.5)
POTASSIUM REFLEX MAGNESIUM: 4.8 MMOL/L (ref 3.5–5.1)
RBC # BLD: 2.76 M/UL (ref 4–5.2)
SODIUM BLD-SCNC: 127 MMOL/L (ref 136–145)
WBC # BLD: 7.8 K/UL (ref 4–11)

## 2022-06-05 PROCEDURE — 97166 OT EVAL MOD COMPLEX 45 MIN: CPT

## 2022-06-05 PROCEDURE — 6360000002 HC RX W HCPCS: Performed by: HOSPITALIST

## 2022-06-05 PROCEDURE — 36415 COLL VENOUS BLD VENIPUNCTURE: CPT

## 2022-06-05 PROCEDURE — 97530 THERAPEUTIC ACTIVITIES: CPT

## 2022-06-05 PROCEDURE — C9113 INJ PANTOPRAZOLE SODIUM, VIA: HCPCS | Performed by: INTERNAL MEDICINE

## 2022-06-05 PROCEDURE — 6370000000 HC RX 637 (ALT 250 FOR IP): Performed by: NURSE PRACTITIONER

## 2022-06-05 PROCEDURE — 93005 ELECTROCARDIOGRAM TRACING: CPT | Performed by: HOSPITALIST

## 2022-06-05 PROCEDURE — 94761 N-INVAS EAR/PLS OXIMETRY MLT: CPT

## 2022-06-05 PROCEDURE — 2700000000 HC OXYGEN THERAPY PER DAY

## 2022-06-05 PROCEDURE — 80048 BASIC METABOLIC PNL TOTAL CA: CPT

## 2022-06-05 PROCEDURE — 6370000000 HC RX 637 (ALT 250 FOR IP): Performed by: INTERNAL MEDICINE

## 2022-06-05 PROCEDURE — 85027 COMPLETE CBC AUTOMATED: CPT

## 2022-06-05 PROCEDURE — 2580000003 HC RX 258: Performed by: INTERNAL MEDICINE

## 2022-06-05 PROCEDURE — 97116 GAIT TRAINING THERAPY: CPT

## 2022-06-05 PROCEDURE — 93010 ELECTROCARDIOGRAM REPORT: CPT | Performed by: INTERNAL MEDICINE

## 2022-06-05 PROCEDURE — 1200000000 HC SEMI PRIVATE

## 2022-06-05 PROCEDURE — 6360000002 HC RX W HCPCS: Performed by: INTERNAL MEDICINE

## 2022-06-05 PROCEDURE — 97162 PT EVAL MOD COMPLEX 30 MIN: CPT

## 2022-06-05 PROCEDURE — 97110 THERAPEUTIC EXERCISES: CPT

## 2022-06-05 PROCEDURE — 94640 AIRWAY INHALATION TREATMENT: CPT

## 2022-06-05 RX ORDER — CYANOCOBALAMIN 1000 UG/ML
1000 INJECTION INTRAMUSCULAR; SUBCUTANEOUS ONCE
Status: COMPLETED | OUTPATIENT
Start: 2022-06-05 | End: 2022-06-05

## 2022-06-05 RX ADMIN — Medication: at 08:06

## 2022-06-05 RX ADMIN — GABAPENTIN 600 MG: 300 CAPSULE ORAL at 20:39

## 2022-06-05 RX ADMIN — FERROUS SULFATE TAB 325 MG (65 MG ELEMENTAL FE) 325 MG: 325 (65 FE) TAB at 16:47

## 2022-06-05 RX ADMIN — VENLAFAXINE 37.5 MG: 37.5 TABLET ORAL at 16:48

## 2022-06-05 RX ADMIN — TAMSULOSIN HYDROCHLORIDE 0.4 MG: 0.4 CAPSULE ORAL at 08:01

## 2022-06-05 RX ADMIN — GABAPENTIN 100 MG: 100 CAPSULE ORAL at 13:36

## 2022-06-05 RX ADMIN — ENOXAPARIN SODIUM 40 MG: 100 INJECTION SUBCUTANEOUS at 08:01

## 2022-06-05 RX ADMIN — URSODIOL 600 MG: 300 CAPSULE ORAL at 20:40

## 2022-06-05 RX ADMIN — LEVOTHYROXINE SODIUM 100 MCG: 0.1 TABLET ORAL at 06:03

## 2022-06-05 RX ADMIN — TRAZODONE HYDROCHLORIDE 100 MG: 50 TABLET ORAL at 20:39

## 2022-06-05 RX ADMIN — ATORVASTATIN CALCIUM 20 MG: 10 TABLET, FILM COATED ORAL at 20:39

## 2022-06-05 RX ADMIN — DIPHENHYDRAMINE HCL 50 MG: 25 TABLET ORAL at 20:39

## 2022-06-05 RX ADMIN — URSODIOL 600 MG: 300 CAPSULE ORAL at 11:09

## 2022-06-05 RX ADMIN — POTASSIUM CHLORIDE 20 MEQ: 20 TABLET, EXTENDED RELEASE ORAL at 08:02

## 2022-06-05 RX ADMIN — CYANOCOBALAMIN 1000 MCG: 1000 INJECTION, SOLUTION INTRAMUSCULAR; SUBCUTANEOUS at 18:10

## 2022-06-05 RX ADMIN — Medication: at 20:40

## 2022-06-05 RX ADMIN — TRAMADOL HYDROCHLORIDE 50 MG: 50 TABLET, COATED ORAL at 14:10

## 2022-06-05 RX ADMIN — LOSARTAN POTASSIUM 50 MG: 25 TABLET, FILM COATED ORAL at 08:02

## 2022-06-05 RX ADMIN — GABAPENTIN 100 MG: 100 CAPSULE ORAL at 08:02

## 2022-06-05 RX ADMIN — DILTIAZEM HYDROCHLORIDE 240 MG: 240 CAPSULE, COATED, EXTENDED RELEASE ORAL at 08:02

## 2022-06-05 RX ADMIN — Medication 2 PUFF: at 07:16

## 2022-06-05 RX ADMIN — ROPINIROLE HYDROCHLORIDE 1 MG: 0.5 TABLET, FILM COATED ORAL at 20:38

## 2022-06-05 RX ADMIN — MONTELUKAST SODIUM 10 MG: 10 TABLET ORAL at 08:00

## 2022-06-05 RX ADMIN — ROPINIROLE HYDROCHLORIDE 1 MG: 0.5 TABLET, FILM COATED ORAL at 08:00

## 2022-06-05 RX ADMIN — METOPROLOL SUCCINATE 25 MG: 50 TABLET, EXTENDED RELEASE ORAL at 20:39

## 2022-06-05 RX ADMIN — OCTREOTIDE ACETATE 50 MCG/HR: 500 INJECTION, SOLUTION INTRAVENOUS; SUBCUTANEOUS at 12:13

## 2022-06-05 RX ADMIN — VENLAFAXINE 37.5 MG: 37.5 TABLET ORAL at 08:01

## 2022-06-05 RX ADMIN — PANTOPRAZOLE SODIUM 40 MG: 40 INJECTION, POWDER, FOR SOLUTION INTRAVENOUS at 20:39

## 2022-06-05 RX ADMIN — CALCIUM 500 MG: 500 TABLET ORAL at 08:02

## 2022-06-05 RX ADMIN — INSULIN LISPRO 1 UNITS: 100 INJECTION, SOLUTION INTRAVENOUS; SUBCUTANEOUS at 20:41

## 2022-06-05 RX ADMIN — INSULIN LISPRO 3 UNITS: 100 INJECTION, SOLUTION INTRAVENOUS; SUBCUTANEOUS at 12:57

## 2022-06-05 RX ADMIN — CETIRIZINE HYDROCHLORIDE 10 MG: 10 TABLET, FILM COATED ORAL at 08:02

## 2022-06-05 RX ADMIN — FERROUS SULFATE TAB 325 MG (65 MG ELEMENTAL FE) 325 MG: 325 (65 FE) TAB at 08:02

## 2022-06-05 RX ADMIN — ESTROGENS, CONJUGATED 1.25 MG: 0.62 TABLET, FILM COATED ORAL at 08:00

## 2022-06-05 RX ADMIN — ROPINIROLE HYDROCHLORIDE 1 MG: 0.5 TABLET, FILM COATED ORAL at 13:36

## 2022-06-05 RX ADMIN — ASPIRIN 81 MG: 81 TABLET, COATED ORAL at 08:01

## 2022-06-05 RX ADMIN — GABAPENTIN 100 MG: 100 CAPSULE ORAL at 20:39

## 2022-06-05 RX ADMIN — TRAMADOL HYDROCHLORIDE 50 MG: 50 TABLET, COATED ORAL at 22:26

## 2022-06-05 RX ADMIN — FERROUS SULFATE TAB 325 MG (65 MG ELEMENTAL FE) 325 MG: 325 (65 FE) TAB at 11:09

## 2022-06-05 RX ADMIN — Medication 10 ML: at 07:59

## 2022-06-05 RX ADMIN — FUROSEMIDE 20 MG: 20 TABLET ORAL at 08:00

## 2022-06-05 RX ADMIN — METOPROLOL SUCCINATE 25 MG: 50 TABLET, EXTENDED RELEASE ORAL at 08:00

## 2022-06-05 RX ADMIN — Medication 2 PUFF: at 19:42

## 2022-06-05 RX ADMIN — Medication 10 ML: at 20:40

## 2022-06-05 RX ADMIN — TIOTROPIUM BROMIDE INHALATION SPRAY 2 PUFF: 3.12 SPRAY, METERED RESPIRATORY (INHALATION) at 07:17

## 2022-06-05 RX ADMIN — PANTOPRAZOLE SODIUM 40 MG: 40 INJECTION, POWDER, FOR SOLUTION INTRAVENOUS at 07:59

## 2022-06-05 ASSESSMENT — PAIN SCALES - GENERAL
PAINLEVEL_OUTOF10: 7
PAINLEVEL_OUTOF10: 0

## 2022-06-05 NOTE — PROGRESS NOTES
Assessment complete. VSS. Denies pain. Call light in reach. Encouraged pt to allow staff to get her to the chair today.

## 2022-06-05 NOTE — PROGRESS NOTES
Hospitalist Progress Note      PCP: Ronald Sánchez. Luis Molina., DO    Date of Admission: 6/3/2022    Chief Complaint: fatigue    Hospital Course: This is a 56-year-old female with a history of atrial fibrillation on Eliquis as well as SNWO cirrhosis, O2 dependent COPD recently discharged from [facility] for rehab admitted with ongoing fatigue and abdominal labs. Subjective: Patient is lying in the bed being changed per nursing staff black stool this morning patient denies any nausea vomiting no abdominal pain.       Medications:  Reviewed    Infusion Medications    octreotide (SandoSTATIN) infusion 50 mcg/hr (06/04/22 6750)    sodium chloride      dextrose       Scheduled Medications    Lip Balm   Topical Daily    pantoprazole  40 mg IntraVENous BID    tiotropium  2 puff Inhalation Daily    aspirin  81 mg Oral Daily    atorvastatin  20 mg Oral Nightly    fluticasone  2 puff Inhalation BID    cetirizine  10 mg Oral Daily    dilTIAZem  240 mg Oral Daily    diphenhydrAMINE  50 mg Oral Nightly    estrogens (conjugated)  1.25 mg Oral Daily    ferrous sulfate  325 mg Oral TID WC    furosemide  20 mg Oral Daily    gabapentin  100 mg Oral TID    gabapentin  600 mg Oral Nightly    potassium chloride  20 mEq Oral Daily with breakfast    levothyroxine  100 mcg Oral Daily    losartan  50 mg Oral Daily    metoprolol succinate  25 mg Oral BID    montelukast  10 mg Oral Daily    nystatin  1 mL Oral 4x Daily    rOPINIRole  1 mg Oral TID    tamsulosin  0.4 mg Oral Daily    traZODone  100 mg Oral Nightly    ursodiol  600 mg Oral BID    venlafaxine  37.5 mg Oral BID WC    sodium chloride flush  5-40 mL IntraVENous 2 times per day    enoxaparin  40 mg SubCUTAneous Daily    insulin lispro  0-6 Units SubCUTAneous TID WC    insulin lispro  0-3 Units SubCUTAneous Nightly    calcium elemental  500 mg Oral Daily     PRN Meds: sodium chloride flush, sodium chloride, ondansetron **OR** ondansetron, polyethylene glycol, acetaminophen **OR** acetaminophen, glucose, dextrose bolus **OR** dextrose bolus, glucagon (rDNA), dextrose, levalbuterol, diphenhydrAMINE-zinc acetate, traMADol      Intake/Output Summary (Last 24 hours) at 6/5/2022 0928  Last data filed at 6/5/2022 0756  Gross per 24 hour   Intake 920 ml   Output 2250 ml   Net -1330 ml       Physical Exam Performed:    /60   Pulse 76   Temp 97.9 °F (36.6 °C) (Oral)   Resp 16   Wt 220 lb (99.8 kg)   SpO2 96%   BMI 35.53 kg/m²     General appearance: No apparent distress, appears stated age and cooperative. HEENT: Pupils equal, round, and reactive to light. Conjunctivae/corneas clear. Neck: Supple, with full range of motion. No jugular venous distention. Trachea midline. Respiratory:  Normal respiratory effort. Clear to auscultation, bilaterally without Rales/Wheezes/Rhonchi. Cardiovascular: Regular rate and rhythm with normal S1/S2 without murmurs, rubs or gallops. Abdomen: Soft, non-tender, non-distended with normal bowel sounds. Musculoskeletal: No clubbing, cyanosis or edema bilaterally. Full range of motion without deformity. Skin: Skin color, texture, turgor normal.  No rashes or lesions. Neurologic:  Neurovascularly intact without any focal sensory/motor deficits.  Cranial nerves: II-XII intact, grossly non-focal.  Psychiatric: Alert and oriented, thought content appropriate, normal insight  Capillary Refill: Brisk,3 seconds, normal   Peripheral Pulses: +2 palpable, equal bilaterally       Labs:   Recent Labs     06/03/22  1007 06/04/22  0652 06/04/22  1235   WBC 6.4 6.4  --    HGB 8.3* 7.6* 7.8*   HCT 24.5* 22.4* 23.2*    176  --      Recent Labs     06/03/22  1007 06/03/22  1056 06/04/22  0652   *  --  132*   K 5.3* 4.7 4.9   CL 89*  --  95*   CO2 28  --  29   BUN 11  --  10   CREATININE 0.7  --  0.8   CALCIUM 8.7  --  8.4   PHOS  --   --  4.1     Recent Labs     06/03/22  1007   AST 47*   ALT 18   BILITOT 0.4   ALKPHOS 59 Recent Labs     06/04/22  0836   INR 1.32*     No results for input(s): Eliel Pérez in the last 72 hours. Urinalysis:      Lab Results   Component Value Date    NITRU Negative 06/03/2022    WBCUA 10-20 06/03/2022    BACTERIA 3+ 06/03/2022    RBCUA 5-10 06/03/2022    BLOODU TRACE-INTACT 06/03/2022    SPECGRAV <=1.005 06/03/2022    GLUCOSEU Negative 06/03/2022    GLUCOSEU NEGATIVE 12/30/2009       Radiology:  No orders to display           Assessment/Plan:    Active Hospital Problems    Diagnosis     Hypothyroid [E03.9]      Priority: Medium    A-fib ([facility] [I48.91]      Priority: Medium    Hyponatremia [E87.1]     Anemia [D64.9]     GERD (gastroesophageal reflux disease) [K21.9]     COPD (chronic obstructive pulmonary disease) (HCC) [J44.9]     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 ([facility] [K75.81, K74.60]     Essential hypertension, benign [I10]     CAD (coronary artery disease) [I25.10]     Hyperlipidemia [E78.5]      1. This 52-year-old female with cirrhosis of liver/SNOW history of esophageal varices status post banding in 6/10/2021 admitted with anemia positive Hemoccult GI consult appreciated patient is scheduled for EGD on 6/6/2022. Continue with IV Protonix twice daily, octreotide drip was started on 6/4/2022 by GI for 72 hours. 2.  Anemia continue to monitor closely type and cross transfuse for hemoglobin less than 7. So far hemoglobin has remained stable. 3.  Atrial fibrillation on Eliquis currently on hold. 4.  Hypertension continue with home medication. 5.  Coronary artery disease continue with home medication. 6.  Hyperlipidemia on statin. 7.  COPD with chronic respiratory failure at home 3-1/2 to 4 L oxygen continue with inhalers and supplemental oxygen. 8.  Hyponatremia secondary to cirrhosis of the liver, follow. 9.  Hypothyroidism continue with the Synthroid.   10.  Diabetes mellitus type 2 continue with the current care hemoglobin A1c= 6.0 on 6/3/2022    DVT Prophylaxis: SCD, lovenox sq. Eliquis on hold. Diet: ADULT DIET;  Regular; 5 carb choices (75 gm/meal)  Code Status: Full Code    PT/OT Eval Status: Consulted    Dispo -pending clearance from GI scheduled for EGD on 6/6/2022    Lucas Martinez MD

## 2022-06-05 NOTE — PROGRESS NOTES
Occupational Therapy  Facility/Department: [facility]  Occupational Therapy Initial Assessment    Name: Rosalio Burger  : 1945  MRN: [id number]  Date of Service: 2022    Discharge Recommendations:  [address] (pending safe bathroom mobility/LE self care;)          Patient Diagnosis(es): The primary encounter diagnosis was Anemia, unspecified type. A diagnosis of Occult blood positive stool was also pertinent to this visit. Past Medical History:  has a past medical history of Allergic, Anemia, Anesthesia complication, Arthritis, Asthma, Atrial fibrillation ([address] Cirrhosis ([address] Cirrhosis, non-alcoholic ([address] COPD (chronic obstructive pulmonary disease) ([address] GERD (gastroesophageal reflux disease), GIB (gastrointestinal bleeding), Haemophilus infection, Heart murmur, Hernia, Hyperlipidemia, Hypertension, Lung collapse, Murmur, heart, Pneumonia, Reflux, Rheumatic fever, Stress fracture, SVT (supraventricular tachycardia) ([address] TIA (transient ischemic attack), Type II or unspecified type diabetes mellitus without mention of complication, not stated as uncontrolled, 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 Past Surgical History:  has a past surgical history that includes hernia repair; Hysterectomy; joint replacement (Bilateral); Foot surgery (Right, 2013); knee surgery (); knee surgery (); Cardiac catheterization (14); Cataract removal; bone marrow biopsy; Breast biopsy; fine needle aspiration; Colonoscopy; Upper gastrointestinal endoscopy (N/A, 2018); bronchoscopy (N/A, 2021); bronchoscopy (2021); bronchoscopy (2021); Colonoscopy (N/A, 6/10/2021); Upper gastrointestinal endoscopy (N/A, 6/10/2021); and Upper gastrointestinal endoscopy (N/A, 6/10/2021). Assessment   Performance deficits / Impairments: Decreased ADL status; Decreased functional mobility ; Decreased endurance;Decreased strength;Decreased high-level IADLs;Decreased safe awareness  Assessment: pt reports normally independent with BADL's & functional mobility with 4 WW; pt admitted with anemia, weakness, now requiring CGA for transfers, max A/dependent with LE dressing/toileting with increased Left LE pain with standing; pt cooperative & motivated to get stronger & to benefit from skilled OT services  REQUIRES OT FOLLOW-UP: Yes  Activity Tolerance  Activity Tolerance: Patient limited by fatigue;Patient limited by pain  Activity Tolerance Comments: sittin fin chair on 4 L O 2:  98 % O 2 sats, BP = 90/47; sitting in chair after sit-->stand: BP = 101/59;        Plan   Plan  Times per Week: 3-5x/ week     Restrictions  Restrictions/Precautions  Restrictions/Precautions: Fall Risk,General Precautions,Up as Tolerated  Position Activity Restriction  Other position/activity restrictions: IV, purwick, 3.5 L of O2 via NC    Subjective   General  Chart Reviewed: Yes  Patient assessed for rehabilitation services?: Yes  Family / Caregiver Present: No  Referring Practitioner: Dr. Rodney Harman  Diagnosis: anemia  Subjective  Subjective: c/o Left groin pain at rest in chair &  increased with standing, RN called to provide pain meds  General Comment  Comments: RN cleared pt for OT eval; pt sitting up in chair after eating lunch, agreeable to therapy  Pain: Patient denies pain at rest. She did have some pain in her left knee with mobility. She did not rate her pain. Patient provided with assistive devices and was repositioned. Patient satisfied. Social/Functional History  Social/Functional History  Lives With: Alone  Type of Home: House  Home Layout: Two level  Home Access: Stairs to enter with rails  Entrance Stairs - Number of Steps: stair lift from 1st floor to second floor. no steps to enter the house.   Bathroom Shower/Tub: Walk-in shower  Bathroom Toilet: Handicap height  Bathroom Equipment: Toilet raiser,Shower chair,Grab bars in shower  Home Equipment: [place] [place], 4 wheeled,Cane,Hospital bed,Reacher,Wheelchair-manual,Oxygen (wears 3.5L at home)  ADL Assistance: Independent  Homemaking Assistance: Independent  Homemaking Responsibilities: Yes  Meal Prep Responsibility: Primary  Laundry Responsibility: Primary  Cleaning Responsibility: Primary  Shopping Responsibility: Primary  Ambulation Assistance: Independent (patient able to ambulate household distances with rollator walker with mod I)  Transfer Assistance: Independent  Active : Yes  Occupation: Retired  Type of Occupation: medical assistant       Objective             Observation/Palpation  Posture: Fair  Safety Devices  Type of Devices: All chetan prominences offloaded;Bed alarm in place;Call light within reach;Nurse notified; Left in bed        Strength:  Within functional limits  Coordination: Within functional limits  Tone: Normal  ADL  Feeding: Independent  Grooming: Setup (seated in chair , increased pain Left groin with standing, unable to walk into bathroom)  Toileting: Dependent/Total (purewick)     Activity Tolerance  Activity Tolerance: Patient limited by fatigue;Patient limited by endurance, Left LE pain;     Bed mobility  Supine to Sit: Unable to assess (already up in chair)  Sit to Supine: Contact guard assistance  Scooting: Modified independent  Transfers  Sit to stand: Contact guard assistance  Stand to sit: Contact guard assistance  Transfer Comments: min cues for safe hand placement     Vision - Basic Assessment  Prior Vision: Wears glasses only for reading     Cognition  Overall Cognitive Status: WNL                  Education Given To: Patient  Education Provided: Role of Therapy;Plan of Care;Precautions  Education Provided Comments: disease specific: importance of OOB to chair for meals, bathroom/toileting with assist due to multiple lines; use of RED/nurse call light for Assist with transfers  Education Method: Verbal  Barriers to Learning: None  Education

## 2022-06-05 NOTE — PROGRESS NOTES
(06/04/22 2320)    sodium chloride      dextrose       Current Hospital Prn Meds  sodium chloride flush, sodium chloride, ondansetron **OR** ondansetron, polyethylene glycol, acetaminophen **OR** acetaminophen, glucose, dextrose bolus **OR** dextrose bolus, glucagon (rDNA), dextrose, levalbuterol, diphenhydrAMINE-zinc acetate, traMADol    I/O last 3 completed shifts: In: 995 [P.O.:995]  Out: 3350 [Urine:3350]    Physical Exam:  VITAL SIGNS: BP (!) 118/56   Pulse 79   Temp 98.7 °F (37.1 °C) (Oral)   Resp 16   Wt 220 lb (99.8 kg)   SpO2 96%   BMI 35.53 kg/m²   Wt Readings from Last 3 Encounters:   06/03/22 220 lb (99.8 kg)   05/13/22 214 lb 1.1 oz (97.1 kg)   05/09/22 224 lb 10.4 oz (101.9 kg)     Constitutional: Obese female, well developed, Well nourished, No acute distress, Non-toxic appearance. HENT: Normocephalic, Atraumatic, Bilateral external ears normal, Nose normal.   Eyes: Conjunctiva normal, No discharge. Neck: Normal range of motion, No tenderness, Supple, No stridor. Cardiovascular: Normal heart rate, Normal rhythm, No murmurs, No rubs, No gallops. Thorax & Lungs: Normal breath sounds, No respiratory distress, No wheezing, No chest tenderness. Abdomen: Pannus abdomen, normal bowel sounds, soft, non tender, non distended, no hernias  Rectal:  Deferred.   Skin: Warm, Dry, No erythema, No rash  Lower Extremities: Intact distal pulses, No edema  Neurologic: Alert & oriented x 3      Labs:  Recent Labs     06/03/22  1007 06/04/22  0652 06/04/22  1235   WBC 6.4 6.4  --    HGB 8.3* 7.6* 7.8*   HCT 24.5* 22.4* 23.2*    176  --        Recent Labs     06/03/22  1007 06/03/22  1056 06/04/22  0652   *  --  132*   K 5.3* 4.7 4.9   CL 89*  --  95*   CO2 28  --  29   BUN 11  --  10       Lab Results   Component Value Date    LABALBU 3.7 06/04/2022    ALKPHOS 59 06/03/2022    ALT 18 06/03/2022    AST 47 06/03/2022    BILITOT 0.4 06/03/2022    BILIDIR <0.2 04/10/2016       Lab Results Component Value Date    LIPASE 28.0 07/20/2018    LIPASE 27.0 02/19/2018     No results found for: AMYLASE    Lab Results   Component Value Date    INR 1.32 (H) 06/04/2022    INR 1.14 (H) 05/10/2022    INR 1.13 07/20/2018    PROTIME 16.2 (H) 06/04/2022    PROTIME 12.9 (H) 05/10/2022    PROTIME 12.9 07/20/2018       No results found for: AFP, AFPTM  No results found for: CEA      Assessment and Plan:  69 y.o. female with medical history of atrial fibrillation on Eliquis, GERD, hyperlipidemia, diabetes mellitus type 2, hypothyroidism, obesity (BMI 35.5), SNOW cirrhosis, esophageal varices status post banding 6/10/2021 and COPD on chronic 4L oxygen dependence admitted for fatigue and anemia with occult blood positive stool and hyponatremia.      Impression: Decompensated SNOW cirrhosis with anemia in setting of history of medium-sized esophageal varices status post banding in 06/2021 and anticoagulation with Elliqus. MELD Na = 16.      Plan:   Hemoglobin and hematocrit remains stable. Hb 7.8 g/dL. No evidence of overt acute GI bleeding. Continue regular diet. Keep n.p.o. with IV fluids after midnight tonight. Monitor and transfuse to keep Hgb > 7 g/dL  Continue Protonix 40 mg IV BID  Monitor PT/INR and correct coagulopathy  Octreotide 50 mcg IV for 72 hours  PCN and quinolone allergy;  Plan for EGD on tomorrow 6/6/; urgently if acute overt bleeding   Hold anticoagulation. Monitor electrolytes and supplement as needed  GI to follow    Emelina Ayala MD  [facility]  6/5/2022    [phone]. Also available via Perfect Serve    Please note that some or all of this record was generated using voice recognition software. If there are any questions about the content of this document, please contact the author as some errors in translation may have occurred.

## 2022-06-05 NOTE — PROGRESS NOTES
EKG ordered to confirm rhythm because tele strip was AF. Per [facility] pt went into AF around 0916 today. MD made aware.

## 2022-06-05 NOTE — PROGRESS NOTES
Physical Therapy  Facility/Department: [address] SURG  Physical Therapy Initial Assessment and Treatment    Name: Luca De La Paz  : 1945  MRN: [id number]  Date of Service: 2022    Discharge Recommendations:  Subacute/Skilled Nursing Facility   PT Equipment Recommendations  Equipment Needed: No  Other: patient said that she already has a walker at home      Patient Diagnosis(es): The primary encounter diagnosis was Anemia, unspecified type. A diagnosis of Occult blood positive stool was also pertinent to this visit. Past Medical History:  has a past medical history of Allergic, Anemia, Anesthesia complication, Arthritis, Asthma, Atrial fibrillation ([address] Cirrhosis ([address] Cirrhosis, non-alcoholic ([address] COPD (chronic obstructive pulmonary disease) ([address] GERD (gastroesophageal reflux disease), GIB (gastrointestinal bleeding), Haemophilus infection, Heart murmur, Hernia, Hyperlipidemia, Hypertension, Lung collapse, Murmur, heart, Pneumonia, Reflux, Rheumatic fever, Stress fracture, SVT (supraventricular tachycardia) ([address] TIA (transient ischemic attack), Type II or unspecified type diabetes mellitus without mention of complication, not stated as uncontrolled, 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 Past Surgical History:  has a past surgical history that includes hernia repair; Hysterectomy; joint replacement (Bilateral); Foot surgery (Right, 2013); knee surgery (); knee surgery (); Cardiac catheterization (14); Cataract removal; bone marrow biopsy; Breast biopsy; fine needle aspiration; Colonoscopy; Upper gastrointestinal endoscopy (N/A, 2018); bronchoscopy (N/A, 2021); bronchoscopy (2021); bronchoscopy (2021); Colonoscopy (N/A, 6/10/2021); Upper gastrointestinal endoscopy (N/A, 6/10/2021); and Upper gastrointestinal endoscopy (N/A, 6/10/2021).   If pt is unable to be seen after this session, please let this note serve as discharge summary. Please see case management note for discharge disposition. Thank you. Barriers to home discharge:   [x] Reported available assist at home upon discharge limited: Patient lives at home alone. Assessment   Body Structures, Functions, Activity Limitations Requiring Skilled Therapeutic Intervention: Decreased functional mobility ; Decreased strength;Decreased endurance;Decreased posture  Assessment: Patient is a 69year old female who was admitted to [facility] on 6/3/22 with anemia. Patient reports that she is normally able to ambulate household distances with her rollator walker and modified independence. Today the patient completed bed mobility with SBA and transfers with CGA. She also ambulated up to 8 feet with a rolling walker and minimum assistance. Patient functioning below baseline and would benefit from skilled therapy to address current deficits mentioned above. PT recommends that this patient receive skilled PT in the SNF setting, when medically stable, in order to address her therapy deficits and to help her maximize her safety and independence with all functional mobility. PT to continue to follow. PT to continue to follow. Treatment Diagnosis: Decreased independence with functional mobility  Specific Instructions for Next Treatment: progress mobility as tolerated  Therapy Prognosis: Good  Decision Making: Medium Complexity  Barriers to Learning: none  Requires PT Follow-Up: Yes  Activity Tolerance  Activity Tolerance: Patient limited by fatigue;Patient limited by endurance  Activity Tolerance Comments: Vitals: 96% on room air. 71 BPM 94/50 seated: 99/51 72 BPM 97% on room air.      Plan   Plan  Plan: 3-5 times per week  Plan weeks: 1 week 6/12/22  Specific Instructions for Next Treatment: progress mobility as tolerated  Current Treatment Recommendations: Strengthening,Balance training,Functional mobility training,Transfer training,Endurance training,Gait training,Safety Equipment: Walker, 4 wheeled,Cane,Hospital bed,Reacher,Wheelchair-manual,Oxygen (wears 3.5L at home)  ADL Assistance: Independent  Homemaking Assistance: Independent  Homemaking Responsibilities: Yes  Meal Prep Responsibility: Primary  Laundry Responsibility: Primary  Cleaning Responsibility: Primary  Shopping Responsibility: Primary  Ambulation Assistance: Independent (patient able to ambulate household distances with rollator walker with mod I)  Transfer Assistance: Independent  Active : Yes  Occupation: Retired  Type of Occupation: medical assistant  Vision/Hearing  Vision  Vision: Impaired  Vision Exceptions: Wears glasses for reading  Hearing  Hearing: Within functional limits    Cognition   Orientation  Overall Orientation Status: Within Normal Limits  Cognition  Overall Cognitive Status: WNL     Objective   Heart Rate: 81  Heart Rate Source: Monitor  BP: (!) 107/57  BP Location: Left upper arm  BP Method: Automatic  Patient Position: Sitting  MAP (Calculated): 73.67  Resp: 18  SpO2: 96 %  O2 Device: Nasal cannula     Observation/Palpation  Posture: Fair  Gross Assessment  Sensation: Intact                    Bed mobility  Supine to Sit: Stand by assistance  Sit to Supine: Unable to assess (patient in chair at end of the PT evaluation)  Bed Mobility Comments: head of bed elevated  Transfers  Sit to Stand: Contact guard assistance  Stand to sit: Contact guard assistance  Bed to Chair: Contact guard assistance  Comment: with RW  Ambulation  Surface: level tile  Device: Rolling Walker  Assistance: Minimal assistance  Quality of Gait: forward flexed posture, cueing to correct. cueing to maintain base of support within rolling walker. 1 loss of balance min assist to correct. Gait Deviations: Slow Yvonne;Decreased step length;Decreased step height  Distance: 3 feet. 5 feet. 8 feet. (total distance limited by patient's purwick tubing, decreased endurance). Comments: No complaints of chest pain.  She did have shortness of breath on exertion but her O2 saturation was 95% on 3.5 L. she also initially mentioned that she had some dizziness with mobility. her dizziness improved after sitting for a few minutes. patient's RN made aware. More Ambulation?: No  Stairs/Curb  Stairs?: No (patient said that she had a stair lift at home)     Balance  Posture: Fair  Exercise Treatment: 1 x 10 AROM bilateral: quad sets, ankle pumps, heel slides. seated exercises: long arc quads, hip flexion, heel raises. AM-PAC Score     AM-PAC Inpatient Mobility without Stair Climbing Raw Score : 15 (06/05/22 1320)  AM-PAC Inpatient without Stair Climbing T-Scale Score : 43.03 (06/05/22 1320)  Mobility Inpatient CMS 0-100% Score: 47.43 (06/05/22 1320)  Mobility Inpatient without Stair CMS G-Code Modifier : CK (06/05/22 1320)       Goals  Short Term Goals  Time Frame for Short term goals: 1 week 6/12/22  Short term goal 1: Supine <> sit with modified independence. Short term goal 2: Sit <> stand with supervision. Short term goal 3: Bed <> chair with least restrictive assistive device and supervision  Short term goal 4: Ambulate 50 feet with LRAD and supervision. Short term goal 5: By 6/8/22 patient will tolerate 12-15 reps of her exercises to maximize her endurance and strength. Patient Goals   Patient goals : To go home. Education  Patient Education  Education Given To: Patient  Education Provided: Role of Therapy;Precautions;Plan of Care;Equipment; Fall Prevention Strategies;Transfer Training;Home Exercise Program  Education Provided Comments: Disease Specific Education: Patient educated on importance of OOB mobility, prevention of complications of bedrest, safety with rolling walker and general safety during hospitalization.  Patient verbalized understanding  Education Method: Demonstration;Verbal  Education Outcome: Verbalized understanding      Therapy Time   Individual Concurrent Group Co-treatment   Time In 1217         Time Out Dana 48         Timed Code Treatment Minutes: 33 Minutes (10 minute evaluation)       Jorge Duvall, PT

## 2022-06-06 ENCOUNTER — ANESTHESIA EVENT (OUTPATIENT)
Dept: ENDOSCOPY | Age: 77
DRG: 432 | End: 2022-06-06
Payer: MEDICARE

## 2022-06-06 ENCOUNTER — ANESTHESIA (OUTPATIENT)
Dept: ENDOSCOPY | Age: 77
DRG: 432 | End: 2022-06-06
Payer: MEDICARE

## 2022-06-06 DIAGNOSIS — I48.91 ATRIAL FIBRILLATION WITH RVR (HCC): ICD-10-CM

## 2022-06-06 LAB
ANION GAP SERPL CALCULATED.3IONS-SCNC: 7 MMOL/L (ref 3–16)
BUN BLDV-MCNC: 14 MG/DL (ref 7–20)
CALCIUM SERPL-MCNC: 8.2 MG/DL (ref 8.3–10.6)
CHLORIDE BLD-SCNC: 91 MMOL/L (ref 99–110)
CO2: 32 MMOL/L (ref 21–32)
CREAT SERPL-MCNC: 0.8 MG/DL (ref 0.6–1.2)
GFR AFRICAN AMERICAN: >60
GFR NON-AFRICAN AMERICAN: >60
GLUCOSE BLD-MCNC: 140 MG/DL (ref 70–99)
GLUCOSE BLD-MCNC: 141 MG/DL (ref 70–99)
GLUCOSE BLD-MCNC: 147 MG/DL (ref 70–99)
GLUCOSE BLD-MCNC: 147 MG/DL (ref 70–99)
GLUCOSE BLD-MCNC: 154 MG/DL (ref 70–99)
GLUCOSE BLD-MCNC: 219 MG/DL (ref 70–99)
HCT VFR BLD CALC: 22.5 % (ref 36–48)
HCT VFR BLD CALC: 24.2 % (ref 36–48)
HEMOGLOBIN: 7.6 G/DL (ref 12–16)
HEMOGLOBIN: 8.2 G/DL (ref 12–16)
ORGANISM: ABNORMAL
PERFORMED ON: ABNORMAL
POTASSIUM REFLEX MAGNESIUM: 4.6 MMOL/L (ref 3.5–5.1)
SODIUM BLD-SCNC: 130 MMOL/L (ref 136–145)
URINE CULTURE, ROUTINE: ABNORMAL

## 2022-06-06 PROCEDURE — 2580000003 HC RX 258: Performed by: INTERNAL MEDICINE

## 2022-06-06 PROCEDURE — 2709999900 HC NON-CHARGEABLE SUPPLY: Performed by: INTERNAL MEDICINE

## 2022-06-06 PROCEDURE — 2500000003 HC RX 250 WO HCPCS: Performed by: NURSE ANESTHETIST, CERTIFIED REGISTERED

## 2022-06-06 PROCEDURE — 0DJ08ZZ INSPECTION OF UPPER INTESTINAL TRACT, VIA NATURAL OR ARTIFICIAL OPENING ENDOSCOPIC: ICD-10-PCS | Performed by: INTERNAL MEDICINE

## 2022-06-06 PROCEDURE — 36415 COLL VENOUS BLD VENIPUNCTURE: CPT

## 2022-06-06 PROCEDURE — C9113 INJ PANTOPRAZOLE SODIUM, VIA: HCPCS | Performed by: INTERNAL MEDICINE

## 2022-06-06 PROCEDURE — 2580000003 HC RX 258: Performed by: NURSE ANESTHETIST, CERTIFIED REGISTERED

## 2022-06-06 PROCEDURE — 94640 AIRWAY INHALATION TREATMENT: CPT

## 2022-06-06 PROCEDURE — 3700000000 HC ANESTHESIA ATTENDED CARE: Performed by: INTERNAL MEDICINE

## 2022-06-06 PROCEDURE — 85018 HEMOGLOBIN: CPT

## 2022-06-06 PROCEDURE — 6370000000 HC RX 637 (ALT 250 FOR IP): Performed by: INTERNAL MEDICINE

## 2022-06-06 PROCEDURE — 6370000000 HC RX 637 (ALT 250 FOR IP): Performed by: HOSPITALIST

## 2022-06-06 PROCEDURE — 6360000002 HC RX W HCPCS: Performed by: NURSE ANESTHETIST, CERTIFIED REGISTERED

## 2022-06-06 PROCEDURE — 94761 N-INVAS EAR/PLS OXIMETRY MLT: CPT

## 2022-06-06 PROCEDURE — 1200000000 HC SEMI PRIVATE

## 2022-06-06 PROCEDURE — 3700000001 HC ADD 15 MINUTES (ANESTHESIA): Performed by: INTERNAL MEDICINE

## 2022-06-06 PROCEDURE — 80048 BASIC METABOLIC PNL TOTAL CA: CPT

## 2022-06-06 PROCEDURE — 85014 HEMATOCRIT: CPT

## 2022-06-06 PROCEDURE — 2700000000 HC OXYGEN THERAPY PER DAY

## 2022-06-06 PROCEDURE — 7100000011 HC PHASE II RECOVERY - ADDTL 15 MIN: Performed by: INTERNAL MEDICINE

## 2022-06-06 PROCEDURE — 6360000002 HC RX W HCPCS: Performed by: INTERNAL MEDICINE

## 2022-06-06 PROCEDURE — 7100000010 HC PHASE II RECOVERY - FIRST 15 MIN: Performed by: INTERNAL MEDICINE

## 2022-06-06 PROCEDURE — 3609017100 HC EGD: Performed by: INTERNAL MEDICINE

## 2022-06-06 PROCEDURE — 6370000000 HC RX 637 (ALT 250 FOR IP): Performed by: NURSE PRACTITIONER

## 2022-06-06 PROCEDURE — 93248 EXT ECG>7D<15D REV&INTERPJ: CPT | Performed by: INTERNAL MEDICINE

## 2022-06-06 RX ORDER — PROPOFOL 10 MG/ML
INJECTION, EMULSION INTRAVENOUS PRN
Status: DISCONTINUED | OUTPATIENT
Start: 2022-06-06 | End: 2022-06-06 | Stop reason: SDUPTHER

## 2022-06-06 RX ORDER — SODIUM CHLORIDE, SODIUM LACTATE, POTASSIUM CHLORIDE, CALCIUM CHLORIDE 600; 310; 30; 20 MG/100ML; MG/100ML; MG/100ML; MG/100ML
INJECTION, SOLUTION INTRAVENOUS CONTINUOUS PRN
Status: DISCONTINUED | OUTPATIENT
Start: 2022-06-06 | End: 2022-06-06 | Stop reason: SDUPTHER

## 2022-06-06 RX ADMIN — CALCIUM 500 MG: 500 TABLET ORAL at 13:21

## 2022-06-06 RX ADMIN — SODIUM CHLORIDE, SODIUM LACTATE, POTASSIUM CHLORIDE, AND CALCIUM CHLORIDE: .6; .31; .03; .02 INJECTION, SOLUTION INTRAVENOUS at 10:15

## 2022-06-06 RX ADMIN — URSODIOL 600 MG: 300 CAPSULE ORAL at 13:32

## 2022-06-06 RX ADMIN — Medication: at 23:12

## 2022-06-06 RX ADMIN — URSODIOL 600 MG: 300 CAPSULE ORAL at 20:17

## 2022-06-06 RX ADMIN — CETIRIZINE HYDROCHLORIDE 10 MG: 10 TABLET, FILM COATED ORAL at 13:19

## 2022-06-06 RX ADMIN — GABAPENTIN 100 MG: 100 CAPSULE ORAL at 20:17

## 2022-06-06 RX ADMIN — FERROUS SULFATE TAB 325 MG (65 MG ELEMENTAL FE) 325 MG: 325 (65 FE) TAB at 18:27

## 2022-06-06 RX ADMIN — ROPINIROLE HYDROCHLORIDE 1 MG: 0.5 TABLET, FILM COATED ORAL at 13:26

## 2022-06-06 RX ADMIN — ACETAMINOPHEN 325MG 650 MG: 325 TABLET ORAL at 19:35

## 2022-06-06 RX ADMIN — ROPINIROLE HYDROCHLORIDE 1 MG: 0.5 TABLET, FILM COATED ORAL at 20:17

## 2022-06-06 RX ADMIN — TRAMADOL HYDROCHLORIDE 50 MG: 50 TABLET, COATED ORAL at 23:12

## 2022-06-06 RX ADMIN — Medication: at 08:52

## 2022-06-06 RX ADMIN — DILTIAZEM HYDROCHLORIDE 240 MG: 240 CAPSULE, COATED, EXTENDED RELEASE ORAL at 13:20

## 2022-06-06 RX ADMIN — INSULIN LISPRO 1 UNITS: 100 INJECTION, SOLUTION INTRAVENOUS; SUBCUTANEOUS at 18:29

## 2022-06-06 RX ADMIN — METOPROLOL SUCCINATE 25 MG: 50 TABLET, EXTENDED RELEASE ORAL at 20:17

## 2022-06-06 RX ADMIN — VENLAFAXINE 37.5 MG: 37.5 TABLET ORAL at 18:27

## 2022-06-06 RX ADMIN — Medication 2 PUFF: at 20:47

## 2022-06-06 RX ADMIN — INSULIN LISPRO 1 UNITS: 100 INJECTION, SOLUTION INTRAVENOUS; SUBCUTANEOUS at 20:16

## 2022-06-06 RX ADMIN — Medication 10 ML: at 08:49

## 2022-06-06 RX ADMIN — MONTELUKAST SODIUM 10 MG: 10 TABLET ORAL at 13:19

## 2022-06-06 RX ADMIN — Medication 10 ML: at 20:18

## 2022-06-06 RX ADMIN — PANTOPRAZOLE SODIUM 40 MG: 40 INJECTION, POWDER, FOR SOLUTION INTRAVENOUS at 20:18

## 2022-06-06 RX ADMIN — LIDOCAINE HYDROCHLORIDE 50 MG: 10 INJECTION, SOLUTION INFILTRATION; PERINEURAL at 10:25

## 2022-06-06 RX ADMIN — LOSARTAN POTASSIUM 50 MG: 25 TABLET, FILM COATED ORAL at 13:21

## 2022-06-06 RX ADMIN — FUROSEMIDE 20 MG: 20 TABLET ORAL at 13:21

## 2022-06-06 RX ADMIN — ATORVASTATIN CALCIUM 20 MG: 10 TABLET, FILM COATED ORAL at 20:16

## 2022-06-06 RX ADMIN — METOPROLOL SUCCINATE 25 MG: 50 TABLET, EXTENDED RELEASE ORAL at 13:18

## 2022-06-06 RX ADMIN — GABAPENTIN 100 MG: 100 CAPSULE ORAL at 13:20

## 2022-06-06 RX ADMIN — DIPHENHYDRAMINE HCL 50 MG: 25 TABLET ORAL at 20:18

## 2022-06-06 RX ADMIN — PANTOPRAZOLE SODIUM 40 MG: 40 INJECTION, POWDER, FOR SOLUTION INTRAVENOUS at 08:48

## 2022-06-06 RX ADMIN — ESTROGENS, CONJUGATED 1.25 MG: 0.62 TABLET, FILM COATED ORAL at 13:32

## 2022-06-06 RX ADMIN — PROPOFOL 10 MG: 10 INJECTION, EMULSION INTRAVENOUS at 10:28

## 2022-06-06 RX ADMIN — PROPOFOL 60 MG: 10 INJECTION, EMULSION INTRAVENOUS at 10:25

## 2022-06-06 RX ADMIN — GABAPENTIN 600 MG: 300 CAPSULE ORAL at 20:22

## 2022-06-06 RX ADMIN — FERROUS SULFATE TAB 325 MG (65 MG ELEMENTAL FE) 325 MG: 325 (65 FE) TAB at 13:19

## 2022-06-06 RX ADMIN — Medication 2 PUFF: at 07:47

## 2022-06-06 RX ADMIN — TAMSULOSIN HYDROCHLORIDE 0.4 MG: 0.4 CAPSULE ORAL at 13:18

## 2022-06-06 RX ADMIN — TIOTROPIUM BROMIDE INHALATION SPRAY 2 PUFF: 3.12 SPRAY, METERED RESPIRATORY (INHALATION) at 07:47

## 2022-06-06 RX ADMIN — LEVOTHYROXINE SODIUM 100 MCG: 0.1 TABLET ORAL at 13:32

## 2022-06-06 RX ADMIN — POTASSIUM CHLORIDE 20 MEQ: 20 TABLET, EXTENDED RELEASE ORAL at 13:21

## 2022-06-06 RX ADMIN — VENLAFAXINE 37.5 MG: 37.5 TABLET ORAL at 13:19

## 2022-06-06 RX ADMIN — TRAZODONE HYDROCHLORIDE 100 MG: 50 TABLET ORAL at 20:17

## 2022-06-06 ASSESSMENT — PAIN SCALES - GENERAL
PAINLEVEL_OUTOF10: 0
PAINLEVEL_OUTOF10: 7
PAINLEVEL_OUTOF10: 0
PAINLEVEL_OUTOF10: 8
PAINLEVEL_OUTOF10: 0

## 2022-06-06 ASSESSMENT — PAIN DESCRIPTION - ORIENTATION: ORIENTATION: LEFT

## 2022-06-06 ASSESSMENT — PAIN DESCRIPTION - LOCATION: LOCATION: LEG

## 2022-06-06 NOTE — PROGRESS NOTES
Occupational Therapy    OT tx session attempted. Pt currently off floor for EGD. Will follow up later as schedule permits. Electronically signed by MADDIE Orosco on 6/6/2022 at 10:29 AM     Praful Don PT, DPT      2nd Attempt: Pt promptly stating \" I am not doing therapy today, I just has a procedure. \" Despite education and encouragement, pt adamantly declined. Will follow up next date.        Electronically signed by MADDIE Orosco on 6/6/2022 at 2:32 PM

## 2022-06-06 NOTE — PROGRESS NOTES
Hospitalist Progress Note      PCP: Maia Lainez. Zelda Castanon.,     Date of Admission: 6/3/2022    Chief Complaint:   Fatigue     Hospital Course:   69 y.o. female w/ hx Afib on Eliquis as well as SNOW cirrhosis and O2 dependent COPD who presented to [facility] with abnormal labs found to be significantly anemic compared to prior.     She was recently discharged from SNF for rehab and reports ongoing fatigue but denies huy bleeding.      The patient denies any fever/chills or other signs/sxs of systemic illness or identifiable aggravating/alleviating factors. Subjective:   Seen resting in bed eating lunch, states she is still drowsy from EGD, no new complaints, VSS, afebrile.        Medications:  Reviewed    Infusion Medications    octreotide (SandoSTATIN) infusion 50 mcg/hr (06/05/22 1213)    sodium chloride      dextrose       Scheduled Medications    Lip Balm   Topical Daily    pantoprazole  40 mg IntraVENous BID    tiotropium  2 puff Inhalation Daily    aspirin  81 mg Oral Daily    atorvastatin  20 mg Oral Nightly    fluticasone  2 puff Inhalation BID    cetirizine  10 mg Oral Daily    dilTIAZem  240 mg Oral Daily    diphenhydrAMINE  50 mg Oral Nightly    estrogens (conjugated)  1.25 mg Oral Daily    ferrous sulfate  325 mg Oral TID WC    furosemide  20 mg Oral Daily    gabapentin  100 mg Oral TID    gabapentin  600 mg Oral Nightly    potassium chloride  20 mEq Oral Daily with breakfast    levothyroxine  100 mcg Oral Daily    losartan  50 mg Oral Daily    metoprolol succinate  25 mg Oral BID    montelukast  10 mg Oral Daily    nystatin  1 mL Oral 4x Daily    rOPINIRole  1 mg Oral TID    tamsulosin  0.4 mg Oral Daily    traZODone  100 mg Oral Nightly    ursodiol  600 mg Oral BID    venlafaxine  37.5 mg Oral BID WC    sodium chloride flush  5-40 mL IntraVENous 2 times per day    enoxaparin  40 mg SubCUTAneous Daily    insulin lispro  0-6 Units SubCUTAneous TID WC    insulin lispro  0-3 Units SubCUTAneous Nightly    calcium elemental  500 mg Oral Daily     PRN Meds: sodium chloride flush, sodium chloride, ondansetron **OR** ondansetron, polyethylene glycol, acetaminophen **OR** acetaminophen, glucose, dextrose bolus **OR** dextrose bolus, glucagon (rDNA), dextrose, levalbuterol, diphenhydrAMINE-zinc acetate, traMADol      Intake/Output Summary (Last 24 hours) at 6/6/2022 0828  Last data filed at 6/5/2022 2242  Gross per 24 hour   Intake 1700 ml   Output 1000 ml   Net 700 ml       Physical Exam Performed:    BP (!) 121/96   Pulse 93   Temp 97.5 °F (36.4 °C) (Oral)   Resp 18   Wt 220 lb (99.8 kg)   SpO2 97%   BMI 35.53 kg/m²     General appearance: No apparent distress, appears stated age and cooperative. HEENT: Pupils equal, round, and reactive to light. Conjunctivae/corneas clear. Neck: Supple, with full range of motion. No jugular venous distention. Trachea midline. Respiratory:  Normal respiratory effort. Clear to auscultation, bilaterally without Rales/Wheezes/Rhonchi. Cardiovascular: Regular rate and rhythm with normal S1/S2 without murmurs, rubs or gallops. Abdomen: Soft, non-tender, non-distended with normal bowel sounds. Musculoskeletal: No clubbing, cyanosis or edema bilaterally. Full range of motion without deformity. Skin: Skin color, texture, turgor normal.  No rashes or lesions. Neurologic:  Neurovascularly intact without any focal sensory/motor deficits.  Cranial nerves: II-XII intact, grossly non-focal.  Psychiatric: Alert and oriented, thought content appropriate, normal insight  Capillary Refill: Brisk,3 seconds, normal   Peripheral Pulses: +2 palpable, equal bilaterally       Labs:   Recent Labs     06/03/22  1007 06/03/22  1007 06/04/22  0652 06/04/22  0652 06/04/22  1235 06/05/22  1340 06/06/22  0023   WBC 6.4  --  6.4  --   --  7.8  --    HGB 8.3*   < > 7.6*   < > 7.8* 8.1* 7.6*   HCT 24.5*   < > 22.4*   < > 23.2* 24.3* 22.5*     --  176  -- --  189  --     < > = values in this interval not displayed. Recent Labs     06/03/22  1007 06/03/22  1007 06/03/22  1056 06/04/22  0652 06/05/22  1340   *  --   --  132* 127*   K 5.3*   < > 4.7 4.9 4.8   CL 89*  --   --  95* 88*   CO2 28  --   --  29 30   BUN 11  --   --  10 12   CREATININE 0.7  --   --  0.8 0.9   CALCIUM 8.7  --   --  8.4 8.0*   PHOS  --   --   --  4.1  --     < > = values in this interval not displayed. Recent Labs     06/03/22  1007   AST 47*   ALT 18   BILITOT 0.4   ALKPHOS 59     Recent Labs     06/04/22  0836   INR 1.32*     No results for input(s): CKTOTAL, TROPONINI in the last 72 hours. Urinalysis:      Lab Results   Component Value Date    NITRU Negative 06/03/2022    WBCUA 10-20 06/03/2022    BACTERIA 3+ 06/03/2022    RBCUA 5-10 06/03/2022    BLOODU TRACE-INTACT 06/03/2022    SPECGRAV <=1.005 06/03/2022    GLUCOSEU Negative 06/03/2022    GLUCOSEU NEGATIVE 12/30/2009       Radiology:  No orders to display       Consults  IP CONSULT TO GI      Assessment/Plan:    Active Hospital Problems    Diagnosis     Hypothyroid [E03.9]      Priority: Medium    A-fib ([facility] [I48.91]      Priority: Medium    Hyponatremia [E87.1]     Anemia [D64.9]     GERD (gastroesophageal reflux disease) [K21.9]     COPD (chronic obstructive pulmonary disease) ([facility] [J44.9]     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 ([facility] [K75.81, K65.59]     Essential hypertension, benign [I10]     CAD (coronary artery disease) [I25.10]     Hyperlipidemia [E78.5]      Cirrhosis of liver/SNOW history of esophageal varices status post banding in 6/10/2021 admitted with anemia positive Hemoccult GI consult appreciated patient is scheduled for EGD on 6/6/2022. No varices amenable to banding. 2 cm hiatal hernia. Continue with IV Protonix twice daily, octreotide drip was started on 6/4/2022 by GI for 72 hours. Continue     2.  Anemia continue to monitor closely type and cross transfuse for hemoglobin less than 7.   So far hemoglobin has remained stable. 3.  Atrial fibrillation on Eliquis currently on hold. 4.  Hypertension continue with home medication. 5.  Coronary artery disease continue with home medication. 6.  Hyperlipidemia on statin. 7.  COPD with chronic respiratory failure at home 3-1/2 to 4 L oxygen continue with inhalers and supplemental oxygen. 8.  Hyponatremia secondary to cirrhosis of the liver, follow. 9.  Hypothyroidism continue with the Synthroid.     10.  Diabetes mellitus type 2 continue with the current care hemoglobin A1c= 6.0 on 6/3/2022    DVT Prophylaxis: SCDs, Lovenox, Eliquis on hold   Diet: Diet NPO Exceptions are: Sips of Water with Meds  Code Status: Full Code    PT/OT Eval Status: pending     Dispo - Possible tomorrow, complete octreotide gtt, possible SNF placement pending PT/OT DAVONTE Vincent

## 2022-06-06 NOTE — H&P
Interval H&P    I have reviewed the history on 6/5/22 and examined the patient. I find no relevant changes. I have reviewed with the patient the EGD procedure. Esophagogastroduodenoscopy (EGD) with alternatives, rationale, benefits and risks, not limited to bleeding, infection, perforation, need of surgery, prolong hospital stay and anesthesia side effects were explained. Patient verbalized understanding and agrees to proceed with EGD.

## 2022-06-06 NOTE — OP NOTE
[address] [facility] [address],  [address], [address] [facility]  Phone: [phone]  [facility]  Phone: [phone]   QTQ:[phone]    EGD Procedure Note    Patient: Awais West  : 1945    Procedure: Esophagogastroduodenoscopy    Date:  2022     Endoscopist:  Graciela Lincoln MD    Referring Physician:  Nathaly Schwartz. Margarita Pascal DO    Preoperative Diagnosis:  Anemia [D64.9]  Occult blood positive stool [R19.5]  Anemia, unspecified type [D64.9]    Postoperative Diagnosis: Small esophageal varices, hiatal hernia, portal hypertensive gastropathy, gastric antral vascular ectasia. Anesthesia: Anesthesia: MAC  Sedation: Propofol per anesthesia  Start Time: 1027  Stop Time: 1030  ASA Class: 3  Mallampati: III (soft palate, base of uvula visible)    Indications: This is a 69 y. o. female with medical history of atrial fibrillation on Eliquis, GERD, hyperlipidemia, diabetes mellitus type 2, hypothyroidism, obesity (BMI 35.5), SNOW cirrhosis, esophageal varices status post banding 6/10/2021 and COPD on chronic 4L oxygen dependence admitted for fatigue and anemia with occult blood positive stool and hyponatremia.     Procedure Details  Informed consent was obtained for the procedure, including conscious sedation. Risks of pancreatitis, infection, perforation, hemorrhage, adverse drug reaction and aspiration were discussed. The patient was placed in the left lateral decubitus position. Based on the pre-procedure assessment, including review of the patient's medical history, medications, allergies, and review of systems, she had been deemed to be an appropriate candidate for the above IV sedation; she was therefore sedated with the medications listed above. She was monitored continuously with ECG tracing, pulse oximetry, blood pressure monitoring, and direct observation.  A gastroscope was inserted into the mouth and advanced under direct vision to third portion of the duodenum. A careful inspection was made as the gastroscope was withdrawn, including a retroflexed view of the proximal stomach including views of the incisura and cardia; findings and interventions are described below. Appropriate photodocumentation Was Obtained. If photos taken, they were ordered to be scanned into the medical record. Findings:  Two columns of small nonbleeding esophageal varices without stigmata of recent bleed; not amenable to banding due to small variceal size. Irregular Z-line at 40 cm from incisors  A 2 cm sliding hiatal hernia. Moderate erythematous mosaic appearing mucosa in the fundus suggestive of moderate portal hypertensive gastropathy. No evidence of gastric varices. Linear nodularity of antrum without erythema suggestive of gastric antral vascular ectasias. No bleeding or stigmata of recent bleed noted. Normal duodenal bulb, second and third portions of duodenum. Specimens: Was Not Obtained    Complications:   None; patient tolerated the procedure well. Disposition:   PACU - hemodynamically stable. Estimated Blood loss:  none    Impression:   Two columns of small nonbleeding esophageal varices without stigmata of recent bleed; not amenable to banding due to small variceal size. Irregular Z-line at 40 cm from incisors  A 2 cm sliding hiatal hernia. Moderate portal hypertensive gastropathy. No evidence of gastric varices. Linear nodularity of antrum without erythema suggestive of gastric antral vascular ectasias. No bleeding or stigmata of recent bleed noted. Normal duodenal bulb, second and third portions of duodenum. Recommendations:  -Continue Protonix 40 mg orally BID   - Hemoglobin and hematocrit remains stable. No evidence of overt acute GI bleeding.   - Regular diet. - Complete Octreotide gtt for total 72 hours  - May resume eliquis today.    - GI to follow             Roe Trujillo MD   [address]  6/6/22 10:31 AM EDT    Please note that some or all of this record was generated using voice recognition software. If there are any questions about the content of this document, please contact the author as some errors in translation may have occurred.

## 2022-06-06 NOTE — ANESTHESIA PRE PROCEDURE
TAKE 1 TABLET DAILY 3/2/22  Yes Von Looney MD   pantoprazole (PROTONIX) 40 MG tablet TAKE 1 TABLET TWICE A DAY 3/2/22  Yes Von Looney MD   SITagliptin (JANUVIA) 100 MG tablet Take 1 tablet by mouth daily 2/25/22  Yes William Owusu.  [employer]    nystatin (MYCOSTATIN) 140517 UNIT/ML suspension Take by mouth 4 times daily 2/14/22  Yes Marcos Montes MD   metFORMIN (GLUCOPHAGE-XR) 500 MG extended release tablet TAKE 2 TABLETS TWICE A DAY 1/3/22  Yes Virginie Estrella MD   traZODone (DESYREL) 50 MG tablet TAKE 2 TABLETS NIGHTLY 12/3/21  Yes Von Looney MD   KLOR-CON M20 20 MEQ extended release tablet TAKE 1 TABLET DAILY 11/4/21  Yes Von Looney MD   benzonatate (TESSALON) 100 MG capsule TAKE 1 CAPSULE THREE TIMES A DAY AS NEEDED FOR COUGH 10/18/21  Yes Dalila Faith MD   Cholecalciferol (VITAMIN D3) 50 MCG (2000 UT) CAPS Take by mouth   Yes Historical Provider, MD   venlafaxine (EFFEXOR) 37.5 MG tablet TAKE 1 TABLET DAILY 9/16/21  Yes Von Looney MD   atorvastatin (LIPITOR) 20 MG tablet TAKE 1 TABLET NIGHTLY 9/9/21  Yes Von Looney MD   furosemide (LASIX) 20 MG tablet TAKE 1 TABLET DAILY 7/19/21  Yes Von Looney MD   beclomethasone (QVAR) 80 MCG/ACT inhaler Inhale 2 puffs into the lungs 2 times daily 5/20/21  Yes Kathrine Kwon MD   levalbuterol [facility]FA) 45 MCG/ACT inhaler Inhale 2 puffs into the lungs every 6 hours as needed for Shortness of Breath 5/20/21  Yes Kathrine Kwon MD   ferrous sulfate 325 (65 Fe) MG tablet Take 325 mg by mouth 3 times daily (with meals)   Yes Historical Provider, MD   diphenhydrAMINE (BENADRYL) 25 MG tablet Take 50 mg by mouth nightly    Yes Historical Provider, MD   aspirin 81 MG EC tablet Take 81 mg by mouth daily    Yes Historical Provider, MD   rOPINIRole (REQUIP) 1 MG tablet Take by mouth 1 mg at 1200, 1mg at 1700, 1mg at  1900, 2mg at 2200 (total of 4mg nightly in divided doses) 3/9/17  Yes Historical Provider, MD   ursodiol (ACTIGALL) 500 MG tablet Take 500 mg by mouth 2 times daily    Yes Historical Provider, MD   calcium carbonate 600 MG TABS tablet Take 1 tablet by mouth daily    Yes Historical Provider, MD   Multiple Vitamins-Minerals (CENTRUM PO) Take 1 tablet by mouth daily. Yes Historical Provider, MD   estrogens, conjugated, (PREMARIN) 1.25 MG tablet Take 1.25 mg by mouth daily. Yes Historical Provider, MD   doxepin (SINEQUAN) 50 MG capsule Take 1 capsule by mouth nightly for 10 days 3/6/22 5/17/22  Joon Jeffery.  Yeny Woodruff,        Current medications:    Current Facility-Administered Medications   Medication Dose Route Frequency Provider Last Rate Last Admin    Lip Balm ointment   Topical Daily Opal Cardona MD   Given at 06/06/22 0852    octreotide (SANDOSTATIN) 500 mcg in sodium chloride 0.9 % 100 mL infusion  50 mcg/hr IntraVENous Continuous Bartolo Davis MD 10 mL/hr at 06/05/22 1213 50 mcg/hr at 06/05/22 1213    pantoprazole (PROTONIX) injection 40 mg  40 mg IntraVENous BID Bartolo Davis MD   40 mg at 06/06/22 0848    tiotropium (SPIRIVA RESPIMAT) 2.5 MCG/ACT inhaler 2 puff  2 puff Inhalation Daily Alexis Alicea MD   2 puff at 06/06/22 0747    aspirin EC tablet 81 mg  81 mg Oral Daily Alexis Alicea MD   81 mg at 06/05/22 0801    atorvastatin (LIPITOR) tablet 20 mg  20 mg Oral Nightly Alexis Alicea MD   20 mg at 06/05/22 2039    fluticasone (FLOVENT HFA) 110 MCG/ACT inhaler 2 puff  2 puff Inhalation BID Alexis Alicea MD   2 puff at 06/06/22 0747    cetirizine (ZYRTEC) tablet 10 mg  10 mg Oral Daily Alexis Alicea MD   10 mg at 06/05/22 0802    dilTIAZem (CARDIZEM CD) extended release capsule 240 mg  240 mg Oral Daily Alexis Alicea MD   240 mg at 06/05/22 0802    diphenhydrAMINE (BENADRYL) tablet 50 mg  50 mg Oral Nightly Alexis Alicea MD   50 mg at 06/05/22 2039    estrogens (conjugated) (PREMARIN) tablet 1.25 mg  1.25 mg Oral Daily Alexis Alicea MD   1.25 mg at 06/05/22 0800    ferrous sulfate (IRON 325) tablet 325 mg  325 mg Oral TID  David Sommer MD   325 mg at 06/05/22 1647    furosemide (LASIX) tablet 20 mg  20 mg Oral Daily David Sommer MD   20 mg at 06/05/22 0800    gabapentin (NEURONTIN) capsule 100 mg  100 mg Oral TID David Sommer MD   100 mg at 06/05/22 2039    gabapentin (NEURONTIN) capsule 600 mg  600 mg Oral Nightly David Sommer MD   600 mg at 06/05/22 2039    potassium chloride (KLOR-CON M) extended release tablet 20 mEq  20 mEq Oral Daily with breakfast David Sommer MD   20 mEq at 06/05/22 0802    levothyroxine (SYNTHROID) tablet 100 mcg  100 mcg Oral Daily David Sommer MD   100 mcg at 06/05/22 0603    losartan (COZAAR) tablet 50 mg  50 mg Oral Daily David Sommer MD   50 mg at 06/05/22 0802    metoprolol succinate (TOPROL XL) extended release tablet 25 mg  25 mg Oral BID David Sommer MD   25 mg at 06/06/22 0848    montelukast (SINGULAIR) tablet 10 mg  10 mg Oral Daily David Sommer MD   10 mg at 06/05/22 0800    nystatin (MYCOSTATIN) 321137 UNIT/ML suspension 100,000 Units  1 mL Oral 4x Daily David Sommer MD        rOPINIRole (REQUIP) tablet 1 mg  1 mg Oral TID David Sommer MD   1 mg at 06/05/22 2038    tamsulosin (FLOMAX) capsule 0.4 mg  0.4 mg Oral Daily David Sommer MD   0.4 mg at 06/05/22 0801    traZODone (DESYREL) tablet 100 mg  100 mg Oral Nightly David Sommer MD   100 mg at 06/05/22 2039    ursodiol (ACTIGALL) capsule 600 mg  600 mg Oral BID David Sommer MD   600 mg at 06/05/22 2040    venlafaxine (EFFEXOR) tablet 37.5 mg  37.5 mg Oral BID  David Sommer MD   37.5 mg at 06/05/22 1648    sodium chloride flush 0.9 % injection 5-40 mL  5-40 mL IntraVENous 2 times per day David Sommer MD   10 mL at 06/06/22 0849    sodium chloride flush 0.9 % injection 5-40 mL  5-40 mL IntraVENous PRN David Sommer MD   10 mL at 06/04/22 1708    0.9 % sodium chloride infusion   IntraVENous PRN David Sommer MD        ondansetron (ZOFRAN-ODT) disintegrating tablet 4 mg  4 mg Oral Q8H PRN Carlo Baig MD        Or    [facility]) injection 4 mg  4 mg IntraVENous Q6H PRN Carlo Baig MD        polyethylene glycol [facility]) packet 17 g  17 g Oral Daily PRN Carlo Baig MD        acetaminophen (TYLENOL) tablet 650 mg  650 mg Oral Q6H PRN Carlo Baig MD        Or    acetaminophen (TYLENOL) suppository 650 mg  650 mg Rectal Q6H PRN aCrlo Baig MD        enoxaparin (LOVENOX) injection 40 mg  40 mg SubCUTAneous Daily Carlo Baig MD   40 mg at 06/05/22 0801    glucose chewable tablet 16 g  4 tablet Oral PRN Carlo Baig MD        dextrose bolus 10% 125 mL  125 mL IntraVENous PRN Carlo Baig MD        Or    dextrose bolus 10% 250 mL  250 mL IntraVENous PRN Carlo Baig MD        glucagon (rDNA) injection 1 mg  1 mg IntraMUSCular PRN Carlo Baig MD        dextrose 5 % solution  100 mL/hr IntraVENous PRN Carlo Baig MD        insulin lispro (HUMALOG) injection vial 0-6 Units  0-6 Units SubCUTAneous TID WC Carlo Baig MD   3 Units at 06/05/22 1257    insulin lispro (HUMALOG) injection vial 0-3 Units  0-3 Units SubCUTAneous Nightly Carlo Baig MD   1 Units at 06/05/22 2041    levalbuterol ([facility] [place]) nebulizer solution 0.63 mg  0.63 mg Nebulization Q6H PRN Carlo Baig MD        calcium elemental (OSCAL) tablet 500 mg  500 mg Oral Daily Carlo Baig MD   500 mg at 06/05/22 0802    diphenhydrAMINE-zinc acetate (BENADRYL) cream   Topical TID PRN Benson Merna, APRN - CNP   Given at 06/05/22 2040    traMADol (ULTRAM) tablet 50 mg  50 mg Oral Q6H PRN Benson Merna, APRN - CNP   50 mg at 06/05/22 2226       Allergies:     Allergies   Allergen Reactions    Latex Swelling and Rash    Clindamycin Itching    Pennsaid [Diclofenac Sodium] Itching and Rash    Torsemide Itching    Actos [Pioglitazone] Diarrhea    Amoxicillin Other (See Comments)    Augmentin [Amoxicillin-Pot Clavulanate] Other (See Comments)    Bactrim [Sulfamethoxazole-Trimethoprim] Other (See Comments)     Stomach ache     Ciprofloxacin Other (See Comments)     Makes her weird  Makes anxious and she has weird dreams    Doxycycline Other (See Comments)     Feels like she is smothering, chest tightness    Levaquin [Levofloxacin]      Body aches    Ativan [Lorazepam] Other (See Comments) and Anxiety     And confusion  Had weird dreams and hallucinations    Cephalosporins Rash    Pcn [Penicillins] Rash and Itching    Proventil [Albuterol] Anxiety       Problem List:    Patient Active Problem List   Diagnosis Code    Hyperlipidemia E78.5    S/P knee replacement Z96.659    SVT [facility]supraventricular tachycardia) ([facility]) I47.1    GERD (gastroesophageal reflux disease) K21.9    COPD (chronic obstructive pulmonary disease) ([facility] J44.9    Anemia D64.9    Pernicious anemia D51.0    Primary osteoarthritis involving multiple joints M89.49    Vitamin D deficiency E55.9    Esophageal varices without bleeding ([facility]) I85.00    Restless leg syndrome G25.81    Hyponatremia E87.1    Pulmonary infiltrates R91.8    CAD (coronary artery disease) I25.10    Essential hypertension, benign I10    Hypothyroidism due to acquired atrophy of thyroid E03.4    Insomnia G47.00    Osteoporosis M81.0    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 ([facility] K75.81, K74.60    Obesity E66.9    Type 2 diabetes mellitus with diabetic polyneuropathy, without long-term current use of insulin ([facility]) E11.42    A-fib ([facility]) I48.91    Atrial fibrillation with RVR ([facility]) I48.91    Discitis M46.40    Hypothyroid E03.9       Past Medical History:        Diagnosis Date    Allergic     Anemia     Anesthesia complication     \"woke up during surgery\"    Arthritis     Osteoarthritis of bilateral [facility]MC joints    Asthma     Followed by Dr. Aide Conti Atrial fibrillation ([facility]     Cirrhosis ([facility]     non alcoholic    Cirrhosis, non-alcoholic ([facility]     COPD [facility]     GERD (gastroesophageal reflux disease)     GIB (gastrointestinal bleeding)     LGIB 4/2016    Haemophilus infection 05/07/2021    + resp    Heart murmur     Hernia     Hyperlipidemia     Hypertension     Lung collapse     Murmur, heart     Pneumonia     Reflux     Rheumatic fever     Stress fracture     right foot    SV[facility] tachycardia) [facility]     6/19/16 - admitted X 1 day per PT     TIA (transient ischemic attack) 2015    Type II or unspecified type diabetes mellitus without mention of complication, not stated as uncontrolled     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     Wears glasses     as needed    Wears partial dentures     upper partial       Past Surgical History:        Procedure Laterality Date    BONE MARROW BIOPSY      BREAST BIOPSY      BRONCHOSCOPY N/A 5/7/2021    BRONCHOSCOPY DIAGNOSTIC OR CELL [address] ONLY performed by Kim Ludwig MD at [address]  5/7/2021    BRONCHOSCOPY ALVEOLAR LAVAGE performed by Kim Ludwig MD at [address]  5/7/2021    BRONCHOSCOPY THERAPUTIC ASPIRATION INITIAL performed by Kim Ludwig MD at [address]  07/14/14    CATARACT REMOVAL      COLONOSCOPY      X 3 per PT 6/8/16    COLONOSCOPY N/A 6/10/2021    COLONOSCOPY POLYPECTOMY ABLATION performed by Arie Cochran MD at [address] [facility] Right 06/03/2013    CORAL BUNIONECTOMY RIGHT FOOT WITH SCREW FIXATION;    HERNIA REPAIR      HYSTERECTOMY      JOINT REPLACEMENT Bilateral     TKR    KNEE SURGERY  09    left    KNEE SURGERY  2010    right    UPPER GASTROINTESTINAL ENDOSCOPY N/A 7/21/2018    EGD BIOPSY performed by Fredo Villegas MD at [address] [facility] 6/10/2021    EGD POLYP ABLATION/OTHER performed by Arie Cochran MD at [facility] [address] GASTROINTESTINAL ENDOSCOPY N/A 6/10/2021    EGD BAND LIGATION performed by Angela Lares MD at [address] History:    Social History     Tobacco Use    Smoking status: Former Smoker     Packs/day: 1.00     Years: 40.00     Pack years: 40.00     Types: Cigarettes     Start date: 1956     Quit date: 2000     Years since quittin.0    Smokeless tobacco: Never Used    Tobacco comment: stopped cold turkey in !! ( )   Substance Use Topics    Alcohol use: No     Alcohol/week: 0.0 standard drinks                                Counseling given: Not Answered  Comment: stopped cold turkey in !! ( )      Vital Signs (Current):   Vitals:    22 2356 22 0530 22 0730 22 0747   BP: (!) 104/57 118/67 (!) 121/96    Pulse: 79 91 87 93   Resp: 18 16 18 18   Temp: 98.8 °F (37.1 °C) 97.9 °F (36.6 °C) 97.5 °F (36.4 °C)    TempSrc: Axillary Oral Oral    SpO2: 97% 94% 96% 97%   Weight:                                                  BP Readings from Last 3 Encounters:   22 (!) 121/96   22 (!) 147/77   22 (!) 111/58       NPO Status:                                                                                 BMI:   Wt Readings from Last 3 Encounters:   22 220 lb (99.8 kg)   22 214 lb 1.1 oz (97.1 kg)   22 224 lb 10.4 oz (101.9 kg)     Body mass index is 35.53 kg/m².     CBC:   Lab Results   Component Value Date    WBC 7.8 2022    RBC 2.76 2022    RBC 4.28 2016    HGB 7.6 2022    HCT 22.5 2022    MCV 88.2 2022    RDW 15.6 2022     2022       CMP:   Lab Results   Component Value Date     2022    K 4.8 2022    CL 88 2022    CO2 30 2022    BUN 12 2022    CREATININE 0.9 2022    GFRAA >60 2022    GFRAA >60 2010    AGRATIO 1.5 2022    LABGLOM >60 2022    GLUCOSE 193 2022    PROT 6.7 06/03/2022    CALCIUM 8.0 06/05/2022    BILITOT 0.4 06/03/2022    ALKPHOS 59 06/03/2022    AST 47 06/03/2022    ALT 18 06/03/2022       POC Tests:   Recent Labs     06/06/22  0755   POCGLU 140*       Coags:   Lab Results   Component Value Date    PROTIME 16.2 06/04/2022    PROTIME 11.3 12/30/2009    INR 1.32 06/04/2022    APTT 24.2 02/18/2016       HCG (If Applicable): No results found for: PREGTESTUR, PREGSERUM, HCG, HCGQUANT     ABGs:   Lab Results   Component Value Date    PHART 7.397 02/19/2018    PO2ART 65.7 02/19/2018    FUX6KSZ 34.4 02/19/2018    VUC1HAJ 20.7 02/19/2018    BEART -3.6 02/19/2018    G1WBJYDD 90.8 02/19/2018        Type & Screen (If Applicable):  Lab Results   Component Value Date    LABABO A 12/30/2009    LABRH Positive 12/30/2009       Drug/Infectious Status (If Applicable):  No results found for: HIV, HEPCAB    COVID-19 Screening (If Applicable):   Lab Results   Component Value Date    COVID19 Not Detected 06/03/2022           Anesthesia Evaluation  Patient summary reviewed and Nursing notes reviewed no history of anesthetic complications:   Airway: Mallampati: II     Neck ROM: full     Dental:          Pulmonary:   (+) pneumonia:  COPD:  asthma:                            Cardiovascular:    (+) hypertension:, CAD:,                   Neuro/Psych:   (+) neuromuscular disease:, TIA,             GI/Hepatic/Renal:   (+) GERD:, liver disease:,           Endo/Other:    (+) Diabetes, hypothyroidism::., .                 Abdominal:             Vascular: Other Findings:           Anesthesia Plan      MAC     ASA 3     (Medications & allergies reviewed  All available lab & EKG data reviewed)  Induction: intravenous. Anesthetic plan and risks discussed with patient. Plan discussed with CRNA.                     Kristopher Powers MD   6/6/2022

## 2022-06-06 NOTE — FLOWSHEET NOTE
06/06/22 1328   Encounter Summary   Encounter Overview/Reason  Initial Encounter   Service Provided For: Patient   Referral/Consult From: Patient   Support System Family members   Last Encounter  06/06/22  Lesa Mercersummer visiting for support)   Complexity of Encounter Moderate   Begin Time 1309   End Time  1329   Total Time Calculated 20 min   Encounter    Type Initial Screen/Assessment   Spiritual/Emotional needs   Type Spiritual Support   Grief, Loss, and Adjustments   Type Life Adjustments   Assessment/Intervention/Outcome   Assessment Impaired resilience; Impaired social interaction; Loneliness   Intervention Active listening;Explored/Affirmed feelings, thoughts, concerns   Outcome Engaged in conversation;Expressed feelings, needs, and concerns;Receptive;Venting emotion   Patient distressed being lonely, stating little contact with some family members. She states she has not been home most of the month. I offered listening presence and ongoing support prn.

## 2022-06-06 NOTE — CARE COORDINATION
CM following. Pt from home with [facility]. Pt recently discharged from [facility]. Pt had EGD today. Pt to work with PT/OT.

## 2022-06-06 NOTE — ANESTHESIA POSTPROCEDURE EVALUATION
Department of Anesthesiology  Postprocedure Note    Patient: Rosalio Burger  MRN: [id number]  YOB: 1945  Date of evaluation: 6/6/2022  Time:  12:42 PM     Procedure Summary     Date: 06/06/22 Room / Location: [address]    Anesthesia Start: 3093 Anesthesia Stop: 9867    Procedure: EGD DIAGNOSTIC ONLY (N/A ) Diagnosis:       Anemia, unspecified type      (Anemia)    Surgeons: Shivam Nichols MD Responsible Provider: Khadijah Merino MD    Anesthesia Type: MAC ASA Status: 3          Anesthesia Type: No value filed. Eleuterio Phase I: Eleuterio Score: 9    Eleuterio Phase II: Eleuterio Score: 10    Last vitals: Reviewed and per EMR flowsheets.        Anesthesia Post Evaluation    Comments: Postoperative Anesthesia Note    Name:    Rosalio Burger  MRN:      [id number]    Patient Vitals in the past 12 hrs:  06/06/22 1125, BP:113/63, Pulse:90, Resp:20, SpO2:97 %  06/06/22 1110, BP:108/69, Pulse:(!) 103, Resp:20, SpO2:95 %  06/06/22 1055, BP:108/69, Pulse:85, Resp:20, SpO2:95 %  06/06/22 1040, BP:(!) 122/54, Pulse:95, Resp:20, SpO2:94 %  06/06/22 0747, Pulse:93, Resp:18, SpO2:97 %  06/06/22 0730, BP:(!) 121/96, Temp:97.5 °F (36.4 °C), Temp src:Oral, Pulse:87, Resp:18, SpO2:96 %  06/06/22 0530, BP:118/67, Temp:97.9 °F (36.6 °C), Temp src:Oral, Pulse:91, Resp:16, SpO2:94 %     LABS:    CBC  Lab Results       Component                Value               Date/Time                  WBC                      7.8                 06/05/2022 01:40 PM        HGB                      7.6 (L)             06/06/2022 12:23 AM        HCT                      22.5 (L)            06/06/2022 12:23 AM        PLT                      189                 06/05/2022 01:40 PM   RENAL  Lab Results       Component                Value               Date/Time                  NA                       127 (L)             06/05/2022 01:40 PM        K                        4.8                 06/05/2022 01:40 PM        CL                       88 (L)              06/05/2022 01:40 PM        CO2                      30                  06/05/2022 01:40 PM        BUN                      12                  06/05/2022 01:40 PM        CREATININE               0.9                 06/05/2022 01:40 PM        GLUCOSE                  193 (H)             06/05/2022 01:40 PM   COAGS  Lab Results       Component                Value               Date/Time                  PROTIME                  16.2 (H)            06/04/2022 08:36 AM        PROTIME                  11.3                12/30/2009 10:50 AM        INR                      1.32 (H)            06/04/2022 08:36 AM        APTT                     24.2                02/18/2016 02:28 PM     Intake & Output: In: 1800 (P.O.:1700; I.V.:100)  Out: 1200 (Urine:1200)    Nausea & Vomiting:  No    Level of Consciousness:  Awake    Pain Assessment:  Adequate analgesia    Anesthesia Complications:  No apparent anesthetic complications    SUMMARY      Vital signs stable  OK to discharge from Stage I post anesthesia care.   Care transferred from Anesthesiology department on discharge from perioperative area

## 2022-06-07 VITALS
RESPIRATION RATE: 18 BRPM | HEART RATE: 78 BPM | WEIGHT: 220 LBS | DIASTOLIC BLOOD PRESSURE: 60 MMHG | OXYGEN SATURATION: 97 % | BODY MASS INDEX: 35.53 KG/M2 | SYSTOLIC BLOOD PRESSURE: 105 MMHG | TEMPERATURE: 98.2 F

## 2022-06-07 LAB
ANION GAP SERPL CALCULATED.3IONS-SCNC: 6 MMOL/L (ref 3–16)
BUN BLDV-MCNC: 15 MG/DL (ref 7–20)
CALCIUM SERPL-MCNC: 8.2 MG/DL (ref 8.3–10.6)
CHLORIDE BLD-SCNC: 90 MMOL/L (ref 99–110)
CO2: 33 MMOL/L (ref 21–32)
CREAT SERPL-MCNC: 0.8 MG/DL (ref 0.6–1.2)
GFR AFRICAN AMERICAN: >60
GFR NON-AFRICAN AMERICAN: >60
GLUCOSE BLD-MCNC: 142 MG/DL (ref 70–99)
GLUCOSE BLD-MCNC: 149 MG/DL (ref 70–99)
GLUCOSE BLD-MCNC: 171 MG/DL (ref 70–99)
GLUCOSE BLD-MCNC: 191 MG/DL (ref 70–99)
HCT VFR BLD CALC: 21.8 % (ref 36–48)
HCT VFR BLD CALC: 24 % (ref 36–48)
HCT VFR BLD CALC: 24 % (ref 36–48)
HEMOGLOBIN: 7.4 G/DL (ref 12–16)
HEMOGLOBIN: 8 G/DL (ref 12–16)
HEMOGLOBIN: 8.1 G/DL (ref 12–16)
MCH RBC QN AUTO: 29.4 PG (ref 26–34)
MCHC RBC AUTO-ENTMCNC: 33.6 G/DL (ref 31–36)
MCV RBC AUTO: 87.5 FL (ref 80–100)
PDW BLD-RTO: 16.1 % (ref 12.4–15.4)
PERFORMED ON: ABNORMAL
PLATELET # BLD: 185 K/UL (ref 135–450)
PMV BLD AUTO: 8.5 FL (ref 5–10.5)
POTASSIUM REFLEX MAGNESIUM: 4.6 MMOL/L (ref 3.5–5.1)
RBC # BLD: 2.74 M/UL (ref 4–5.2)
SODIUM BLD-SCNC: 129 MMOL/L (ref 136–145)
WBC # BLD: 5.8 K/UL (ref 4–11)

## 2022-06-07 PROCEDURE — C9113 INJ PANTOPRAZOLE SODIUM, VIA: HCPCS | Performed by: INTERNAL MEDICINE

## 2022-06-07 PROCEDURE — 80048 BASIC METABOLIC PNL TOTAL CA: CPT

## 2022-06-07 PROCEDURE — 2700000000 HC OXYGEN THERAPY PER DAY

## 2022-06-07 PROCEDURE — 2580000003 HC RX 258: Performed by: NURSE PRACTITIONER

## 2022-06-07 PROCEDURE — 85018 HEMOGLOBIN: CPT

## 2022-06-07 PROCEDURE — 85014 HEMATOCRIT: CPT

## 2022-06-07 PROCEDURE — 36415 COLL VENOUS BLD VENIPUNCTURE: CPT

## 2022-06-07 PROCEDURE — 2580000003 HC RX 258: Performed by: INTERNAL MEDICINE

## 2022-06-07 PROCEDURE — 6370000000 HC RX 637 (ALT 250 FOR IP): Performed by: INTERNAL MEDICINE

## 2022-06-07 PROCEDURE — 94640 AIRWAY INHALATION TREATMENT: CPT

## 2022-06-07 PROCEDURE — 6360000002 HC RX W HCPCS: Performed by: INTERNAL MEDICINE

## 2022-06-07 PROCEDURE — 85027 COMPLETE CBC AUTOMATED: CPT

## 2022-06-07 PROCEDURE — 6370000000 HC RX 637 (ALT 250 FOR IP): Performed by: PHYSICIAN ASSISTANT

## 2022-06-07 PROCEDURE — 97530 THERAPEUTIC ACTIVITIES: CPT

## 2022-06-07 PROCEDURE — 97110 THERAPEUTIC EXERCISES: CPT

## 2022-06-07 PROCEDURE — 97116 GAIT TRAINING THERAPY: CPT

## 2022-06-07 PROCEDURE — 94761 N-INVAS EAR/PLS OXIMETRY MLT: CPT

## 2022-06-07 RX ORDER — CARVEDILOL 6.25 MG/1
6.25 TABLET ORAL 2 TIMES DAILY WITH MEALS
Status: DISCONTINUED | OUTPATIENT
Start: 2022-06-07 | End: 2022-06-07 | Stop reason: HOSPADM

## 2022-06-07 RX ORDER — CARVEDILOL 6.25 MG/1
6.25 TABLET ORAL 2 TIMES DAILY WITH MEALS
Qty: 60 TABLET | Refills: 3 | Status: SHIPPED | OUTPATIENT
Start: 2022-06-07 | End: 2022-07-12 | Stop reason: SDUPTHER

## 2022-06-07 RX ORDER — 0.9 % SODIUM CHLORIDE 0.9 %
500 INTRAVENOUS SOLUTION INTRAVENOUS ONCE
Status: COMPLETED | OUTPATIENT
Start: 2022-06-07 | End: 2022-06-07

## 2022-06-07 RX ADMIN — INSULIN LISPRO 1 UNITS: 100 INJECTION, SOLUTION INTRAVENOUS; SUBCUTANEOUS at 12:26

## 2022-06-07 RX ADMIN — FERROUS SULFATE TAB 325 MG (65 MG ELEMENTAL FE) 325 MG: 325 (65 FE) TAB at 09:03

## 2022-06-07 RX ADMIN — DILTIAZEM HYDROCHLORIDE 240 MG: 240 CAPSULE, COATED, EXTENDED RELEASE ORAL at 09:03

## 2022-06-07 RX ADMIN — Medication 10 ML: at 09:04

## 2022-06-07 RX ADMIN — CALCIUM 500 MG: 500 TABLET ORAL at 09:02

## 2022-06-07 RX ADMIN — GABAPENTIN 100 MG: 100 CAPSULE ORAL at 14:46

## 2022-06-07 RX ADMIN — OCTREOTIDE ACETATE 50 MCG/HR: 500 INJECTION, SOLUTION INTRAVENOUS; SUBCUTANEOUS at 07:01

## 2022-06-07 RX ADMIN — URSODIOL 600 MG: 300 CAPSULE ORAL at 09:15

## 2022-06-07 RX ADMIN — FUROSEMIDE 20 MG: 20 TABLET ORAL at 09:02

## 2022-06-07 RX ADMIN — POTASSIUM CHLORIDE 20 MEQ: 20 TABLET, EXTENDED RELEASE ORAL at 09:03

## 2022-06-07 RX ADMIN — LEVOTHYROXINE SODIUM 100 MCG: 0.1 TABLET ORAL at 07:00

## 2022-06-07 RX ADMIN — METOPROLOL SUCCINATE 25 MG: 50 TABLET, EXTENDED RELEASE ORAL at 09:03

## 2022-06-07 RX ADMIN — FERROUS SULFATE TAB 325 MG (65 MG ELEMENTAL FE) 325 MG: 325 (65 FE) TAB at 12:24

## 2022-06-07 RX ADMIN — ACETAMINOPHEN 325MG 650 MG: 325 TABLET ORAL at 12:23

## 2022-06-07 RX ADMIN — MONTELUKAST SODIUM 10 MG: 10 TABLET ORAL at 09:02

## 2022-06-07 RX ADMIN — ESTROGENS, CONJUGATED 1.25 MG: 0.62 TABLET, FILM COATED ORAL at 09:15

## 2022-06-07 RX ADMIN — PANTOPRAZOLE SODIUM 40 MG: 40 INJECTION, POWDER, FOR SOLUTION INTRAVENOUS at 09:05

## 2022-06-07 RX ADMIN — TIOTROPIUM BROMIDE INHALATION SPRAY 2 PUFF: 3.12 SPRAY, METERED RESPIRATORY (INHALATION) at 08:28

## 2022-06-07 RX ADMIN — CARVEDILOL 6.25 MG: 6.25 TABLET, FILM COATED ORAL at 17:11

## 2022-06-07 RX ADMIN — FERROUS SULFATE TAB 325 MG (65 MG ELEMENTAL FE) 325 MG: 325 (65 FE) TAB at 17:11

## 2022-06-07 RX ADMIN — VENLAFAXINE 37.5 MG: 37.5 TABLET ORAL at 09:03

## 2022-06-07 RX ADMIN — TAMSULOSIN HYDROCHLORIDE 0.4 MG: 0.4 CAPSULE ORAL at 09:03

## 2022-06-07 RX ADMIN — ASPIRIN 81 MG: 81 TABLET, COATED ORAL at 09:02

## 2022-06-07 RX ADMIN — VENLAFAXINE 37.5 MG: 37.5 TABLET ORAL at 17:11

## 2022-06-07 RX ADMIN — GABAPENTIN 100 MG: 100 CAPSULE ORAL at 09:02

## 2022-06-07 RX ADMIN — ROPINIROLE HYDROCHLORIDE 1 MG: 0.5 TABLET, FILM COATED ORAL at 14:46

## 2022-06-07 RX ADMIN — ROPINIROLE HYDROCHLORIDE 1 MG: 0.5 TABLET, FILM COATED ORAL at 09:02

## 2022-06-07 RX ADMIN — Medication 2 PUFF: at 08:28

## 2022-06-07 RX ADMIN — Medication: at 09:12

## 2022-06-07 RX ADMIN — CETIRIZINE HYDROCHLORIDE 10 MG: 10 TABLET, FILM COATED ORAL at 09:03

## 2022-06-07 RX ADMIN — ENOXAPARIN SODIUM 40 MG: 100 INJECTION SUBCUTANEOUS at 09:04

## 2022-06-07 RX ADMIN — LOSARTAN POTASSIUM 50 MG: 25 TABLET, FILM COATED ORAL at 09:03

## 2022-06-07 RX ADMIN — SODIUM CHLORIDE 500 ML: 9 INJECTION, SOLUTION INTRAVENOUS at 01:15

## 2022-06-07 NOTE — PLAN OF CARE
Pt with some hypotension throughout night, BP restored to normal with 500mL bolus. Pt indicated that she slept well after receiving medication for sleep. Pt able to appropriately use call light to verbalize physical needs. Incontinent of bladder but will notify staff when wet. No BMs this shift. Rash to buttocks treated with benadryl cream, some pinkness noted to labia near purewick but no skin breakdown.

## 2022-06-07 NOTE — DISCHARGE INSTR - COC
GASTROINTESTINAL ENDOSCOPY N/A 7/21/2018    EGD BIOPSY performed by Diane Hammonds MD at [address] N/A 6/10/2021    EGD POLYP ABLATION/OTHER performed by Teetee Delcid MD at [facility] N/A 6/10/2021    EGD BAND LIGATION performed by Teetee Delcid MD at [facility] N/A 6/6/2022    EGD DIAGNOSTIC ONLY performed by Brea Horowitz MD at [address] [place] [address]       Immunization History:   Immunization History   Administered Date(s) Administered    COVID-19, J&J, PF, 0.5 mL 03/08/2021, 12/30/2021    Pneumococcal Conjugate 13-valent (Maxesck44) 06/25/2013    Pneumococcal Polysaccharide (Myaekidrn38) 01/01/2005       Active Problems:  Patient Active Problem List   Diagnosis Code    Hyperlipidemia E78.5    S/P knee replacement Z96.659    SVT (supraventricular tachycardia) ([facility]) I47.1    GERD (gastroesophageal reflux disease) K21.9    COPD (chronic obstructive pulmonary disease) ([facility]) J44.9    Anemia D64.9    Pernicious anemia D51.0    Primary osteoarthritis involving multiple joints M89.49    Vitamin D deficiency E55.9    Esophageal varices without bleeding ([facility]) I85.00    Restless leg syndrome G25.81    Hyponatremia E87.1    Pulmonary infiltrates R91.8    CAD (coronary artery disease) I25.10    Essential hypertension, benign I10    Hypothyroidism due to acquired atrophy of thyroid E03.4    Insomnia G47.00    Osteoporosis M81.0    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 ([facility] [address] K75.81, K74.60    Obesity E66.9    Type 2 diabetes mellitus with diabetic polyneuropathy, without long-term current use of insulin (HCC) E11.42    A-fib (HCC) I48.91    Atrial fibrillation with RVR ([facility]) I48.91    Discitis M46.40    Hypothyroid E03.9       Isolation/Infection:   Isolation            No Isolation          Patient Infection Status       Infection Onset Added Last Indicated Last Indicated By Review Planned Expiration Resolved Resolved By    None active    Resolved    COVID-19 (Rule Out) 06/03/22 06/03/22 06/03/22 COVID-19, Rapid (Ordered)   06/03/22 Rule-Out Test Resulted    COVID-19 (Rule Out) 05/12/21 05/12/21 05/12/21 COVID-19, Rapid (Ordered)   05/12/21 Rule-Out Test Resulted    COVID-19 (Rule Out) 05/06/21 05/06/21 05/06/21 SARS-CoV-2 NAAT (Rapid) (Ordered)   05/06/21 Rule-Out Test Resulted            Nurse Assessment:  Last Vital Signs: /60   Pulse 78   Temp 98.2 °F (36.8 °C)   Resp 18   Wt 220 lb (99.8 kg)   SpO2 97%   BMI 35.53 kg/m²     Last documented pain score (0-10 scale): Pain Level: 8  Last Weight:   Wt Readings from Last 1 Encounters:   06/03/22 220 lb (99.8 kg)     Mental Status:  {IP PT MENTAL STATUS:20030}    IV Access:  { [place] IV ACCESS:553720254}    Nursing Mobility/ADLs:  Walking   {CHP DME VQZH:285664566}  Transfer  {CHP DME NQFJ:989833706}  Bathing  {CHP DME PSXL:568223949}  Dressing  {CHP DME FBWW:608825438}  Toileting  {CHP DME LEDF:290625502}  Feeding  {P DME DAOE:569834754}  Med Admin  {P DME ARXK:163670926}  Med Delivery   { [place] MED Delivery:670314744}    Wound Care Documentation and Therapy:        Elimination:  Continence: Bowel: {YES / SR:60564}  Bladder: {YES / EK:04725}  Urinary Catheter: {Urinary Catheter:618430802}   Colostomy/Ileostomy/Ileal Conduit: {YES / TE:26762}       Date of Last BM: ***    Intake/Output Summary (Last 24 hours) at 6/7/2022 1316  Last data filed at 6/7/2022 0936  Gross per 24 hour   Intake 1140 ml   Output 2200 ml   Net -1060 ml     I/O last 3 completed shifts: In: 2040 [P.O.:1760;  I.V.:280]  Out: 2500 [Urine:2500]    Safety Concerns:     [address] [place] Safety Concerns:828873203}    Impairments/Disabilities:      [address] [place] Impairments/Disabilities:748599888}    Nutrition Therapy:  Current Nutrition Therapy:   [address] Diet List:653942098}    Routes of Feeding: {CHP DME Other Feedings:619710974}  Liquids: {Slp liquid thickness:79770}  Daily Fluid Restriction: {CHP DME Yes amt example:557364301}  Last Modified Barium Swallow with Video (Video Swallowing Test): {Done Not Done TQNI:546997913}    Treatments at the Time of Hospital Discharge:   Respiratory Treatments: ***  Oxygen Therapy:  {Therapy; copd oxygen:92801}  Ventilator:    {MH CC Vent MFRE:169417126}    Rehab Therapies: Physical Therapy, Occupational Therapy, and Nurse (HCV if patient agreeable)(therapy soc approved)  Weight Bearing Status/Restrictions: [facility] Weight Bearin}  Other Medical Equipment (for information only, NOT a DME order):  {EQUIPMENT:464672248}  Other Treatments: [facility]: LEVEL 3 [address] to establish plan of care for patient over 60 day period   Nursing  Initial home SN evaluation visit to occur within 24-48 hours for:  1)  medication management  2)  VS and clinical assessment  3)  S&S chronic disease exacerbation education + when to contact MD/NP  4)  care coordination  Medication Reconciliation during 1st SN visit  PT/OT/Speech   Evaluations in home within 24-48 hours of discharge to include DME and home safety   Frontload therapy 5 days, then 3x a week   OT to evaluate if patient has [address] needs for personal care    evaluation within 24-48 hours to evaluate resources & insurance for potential AL, [place], LTC, and Medicaid options   Palliative Care referral within 5 days of hospital discharge   PCP Visit scheduled within 3 - 7 days of hospital discharge    [place] care Vitals (If patient is agreeable and meets guidelines)      Patient's personal belongings (please select all that are sent with patient):  {CHP DME Belongings:705539203}    RN SIGNATURE:  {Esignature:229937899}    CASE MANAGEMENT/SOCIAL WORK SECTION    Inpatient Status Date: ***    Readmission Risk Assessment Score:  Readmission Risk              Risk of Unplanned Readmission:  39           Discharging to Facility/ Agency   Name:  [facility]    Address: [address] [place] [address], [address] [place] [address]  Phone: [phone]  Fax: [phone]      Dialysis Facility (if applicable)   Name:  Address:  Dialysis Schedule:  Phone:  Fax:    / signature: {Esignature:337858758}    PHYSICIAN SECTION    Prognosis: Good    Condition at Discharge: Stable    Rehab Potential (if transferring to Rehab): Good    Recommended Labs or Other Treatments After Discharge: Follow-up with PCP in 1 week  Follow-up with Gastroenterology, Dr. Rehana Hernandez MD in 4-6 weeks. Physician Certification: I certify the above information and transfer of Anna Mcknight  is necessary for the continuing treatment of the diagnosis listed and that she requires Home Care for less 30 days.      Update Admission H&P: No change in H&P    PHYSICIAN SIGNATURE:  Electronically signed by DAVONTE Ludwig on 6/7/22 at 1:22 PM EDT

## 2022-06-07 NOTE — PROGRESS NOTES
Progress Note    Patient Awais West  MRN: [id number]  YOB: 1945 Age: 69 y.o. Sex: female  Room: [address] [place] [address]       Admitting Physician: Maicol Robles MD   Date of Admission: 6/3/2022  9:16 AM   Primary Care Physician: Nathaly Schwartz. Margarita Pascal,      Subjective: Awais West was seen and examined. We are following for SNOW cirrhosis, GI bleed. -- No acute events overnight  -- Denies abdominal pain or nausea/vomiting  -- No overt bleeding reported. Normal BM this morning. Hgb stable. ROS:  Constitutional: Denies fever, no change in appetite  Respiratory: Denies cough or shortness of breath  Cardiovascular: Denies chest pain or edema    Objective:  Vital Signs:   Vitals:    06/07/22 1130   BP: 105/60   Pulse: 78   Resp:    Temp:    SpO2: 97%         Physical Exam:  Constitutional: Alert and oriented x 4. No acute distress. Respiratory: Respirations nonlabored, no crepitus  GI: Abdomen nondistended, soft, and nontender. Neurological: No focal deficits noted. No asterixis. Intake/Output:  No intake or output data in the 24 hours ending 06/10/22 1346     Current Medications:  No current facility-administered medications for this encounter. No current outpatient medications on file.      Facility-Administered Medications Ordered in Other Encounters   Medication Dose Route Frequency Provider Last Rate Last Admin    glucose chewable tablet 16 g  4 tablet Oral PRN Charles Flood, APRN - CNP        dextrose bolus 10% 125 mL  125 mL IntraVENous PRN Charles Flood, APRN - CNP        Or    dextrose bolus 10% 250 mL  250 mL IntraVENous PRN Charles Flood, APRN - CNP        glucagon (rDNA) injection 1 mg  1 mg IntraMUSCular PRN Charles Flood, APRN - CNP        dextrose 5 % solution  100 mL/hr IntraVENous PRN Charles Flood, APRN - CNP        insulin lispro (HUMALOG) injection vial 0-6 Units  0-6 Units SubCUTAneous TID WC Charles Flood, APRN - CNP        insulin lispro (HUMALOG) injection vial 0-3 Units  0-3 Units SubCUTAneous Nightly ZIGGY Morales CNP        cefTRIAXone (ROCEPHIN) 1000 mg IVPB in 50 mL D5W minibag  1,000 mg IntraVENous Q24H ZIGGY Morales  mL/hr at 06/10/22 1309 1,000 mg at 06/10/22 1309    tiotropium (SPIRIVA RESPIMAT) 2.5 MCG/ACT inhaler 2 puff  2 puff Inhalation Daily Fabiola Fry MD   2 puff at 06/10/22 0745    aspirin EC tablet 81 mg  81 mg Oral Daily Fabiola Fry MD   81 mg at 06/10/22 4306    atorvastatin (LIPITOR) tablet 20 mg  20 mg Oral Nightly Fabiola Fry MD   20 mg at 06/09/22 2219    fluticasone (FLOVENT HFA) 110 MCG/ACT inhaler 2 puff  2 puff Inhalation BID Fabiola Fry MD   2 puff at 06/10/22 0745    benzonatate (TESSALON) capsule 100 mg  100 mg Oral TID PRN Fabiola Fry MD        carvedilol (COREG) tablet 6.25 mg  6.25 mg Oral BID  Fabiola Fry MD   6.25 mg at 06/10/22 0946    cetirizine (ZYRTEC) tablet 10 mg  10 mg Oral Daily Fabiola Fry MD   10 mg at 06/10/22 7780    dilTIAZem (CARDIZEM CD) extended release capsule 240 mg  240 mg Oral Daily Fabiola Fry MD   240 mg at 06/10/22 0939    calcium elemental (OSCAL) tablet 500 mg  500 mg Oral Daily Fabiola Fry MD   500 mg at 06/10/22 2247    diphenhydrAMINE (BENADRYL) tablet 12.5 mg  12.5 mg Oral Q6H PRN Fabiola Fry MD   12.5 mg at 06/09/22 2227    ferrous sulfate (IRON 325) tablet 325 mg  325 mg Oral TID FRED Fry MD   325 mg at 06/10/22 1101    levalbuterol (XOPENEX) nebulizer solution 0.63 mg  0.63 mg Nebulization Q8H PRN Fabiola Fry MD        levothyroxine (SYNTHROID) tablet 100 mcg  100 mcg Oral Daily Fabiola Fry MD   100 mcg at 06/10/22 0519    montelukast (SINGULAIR) tablet 10 mg  10 mg Oral Daily Fabiola Fry MD   10 mg at 06/10/22 5453    losartan (COZAAR) tablet 100 mg  100 mg Oral Daily Fabiola Fry MD   100 mg at 06/10/22 1101    lidocaine 4 % external patch 1 patch  1 patch TransDERmal Daily Fabiola Fry MD 1 patch at 06/10/22 0955    gabapentin (NEURONTIN) capsule 100 mg  100 mg Oral Daily Sarah Frank MD   100 mg at 06/10/22 4007    gabapentin (NEURONTIN) capsule 600 mg  600 mg Oral Nightly Sarah Frank MD   600 mg at 06/09/22 2219    potassium chloride (KLOR-CON M) extended release tablet 20 mEq  20 mEq Oral Daily with breakfast Sarah Frank MD   20 mEq at 06/10/22 1101    furosemide (LASIX) tablet 20 mg  20 mg Oral Daily Sarah Frank MD   20 mg at 06/10/22 5070    therapeutic multivitamin-minerals 1 tablet  1 tablet Oral Daily Sarah Frank MD   1 tablet at 06/10/22 3017    pantoprazole (PROTONIX) tablet 40 mg  40 mg Oral BID Sarah Frank MD   40 mg at 06/10/22 0949    rOPINIRole (REQUIP) tablet 0.5 mg  0.5 mg Oral 4x Daily Sarah Frank MD   0.5 mg at 06/10/22 1305    tamsulosin (FLOMAX) capsule 0.4 mg  0.4 mg Oral Daily Sarah Frank MD   0.4 mg at 06/10/22 7173    ursodiol (ACTIGALL) tablet 500 mg  500 mg Oral BID Sarah Frank MD        [facility]) tablet 37.5 mg  37.5 mg Oral Daily Sarah Frank MD   37.5 mg at 06/10/22 3484    traZODone (DESYREL) tablet 50 mg  50 mg Oral Nightly Sarah Frank MD   50 mg at 06/09/22 2219    apixaban (ELIQUIS) tablet 2.5 mg  2.5 mg Oral BID Sarah Frank MD   2.5 mg at 06/10/22 8903    sodium chloride flush 0.9 % injection 10 mL  10 mL IntraVENous 2 times per day Sarah Frank MD   10 mL at 06/09/22 2220    sodium chloride flush 0.9 % injection 10 mL  10 mL IntraVENous PRN Sarah Frank MD        0.9 % sodium chloride infusion   IntraVENous PRN Sarah Frank MD        potassium chloride (KLOR-CON M) extended release tablet 40 mEq  40 mEq Oral PRN Sarah Frank MD        Or    potassium bicarb-citric acid (EFFER-K) effervescent tablet 40 mEq  40 mEq Oral PRN Sarah Frank MD        Or   [facility] potassium chloride 10 mEq/100 mL IVPB (Peripheral Line)  10 mEq IntraVENous PRN Sarah Frank MD       [facility]

## 2022-06-07 NOTE — CARE COORDINATION
CASE MANAGEMENT DISCHARGE SUMMARY      Discharge to:  Home with [facility] for RN, PT, OT   IMM given: 6/7/22     Transportation:    Family/car: DAUGHTER WILL TRANSPORT  (Ciarra Hamper) at 530PM    Confirmed discharge plan with:  Patient: yes   Family:  yes/no      Name Efren John (daughter)): Contact number: [phone]     Facility/Agency,  [facility] for RN/PT/OT       RN, name:  Jesus Cabral    Note: Discharging nurse to complete [place], reconcile AVS, and place final copy with patient's discharge packet. RN to ensure that written prescriptions for  Level II medications are sent with patient to the facility as per protocol    SNF has been strongly recommended by all providers, therapists and CM. Patient from [facility] for SNF. [facility] refuses SNF, and is determined to return home. She lives alone - has 4 children, who check on her daily by phone, but do not necessarily visit. [facility] will provide HHC (PT/OT/RN) - frontloaded. This writer called daughter Dana Moseley, who agrees [facility] should not be returning home, but understands that she is \"stubborn\", and is fully decisional. She has the right to make her own decisions. Dana Moseley will pick [facility] up at [address] in [address] [place] - she will contact nurses station - cannot come into hospotal due to her compromised immune system. FRANKYLN Rivas updated.     Soto Toscano RN

## 2022-06-07 NOTE — PROGRESS NOTES
Physical Therapy  Facility/Department: [facility] B3 - MED SURG  Daily Treatment Note  NAME: Marshall Razo  : 1945  MRN: [id number]    Date of Service: 2022    Discharge Recommendations:  Subacute/Skilled Nursing Facility   PT Equipment Recommendations  Equipment Needed: No  Other: patient said that she already has a walker at home    Patient Diagnosis(es): The primary encounter diagnosis was Anemia, unspecified type. A diagnosis of Occult blood positive stool was also pertinent to this visit. Assessment   Assessment: Pt seen this date for follow up PT tx. Pt agreeable and motivated to walk during this session. Pt tolerates 15 reps of seated exercises and perform transfers and ambulation with RW and SBA; increased time to complete all task and requires frequent seated rest break. Pt would benefit from continue skilled PT to address deficits listed above. Recommend SNF upon d/c; If pt declines SNF, Recommend home with 24 hr supervision and HHPT  Activity Tolerance: Patient limited by fatigue;Patient limited by endurance  Equipment Needed: No  Other: patient said that she already has a walker at home     Plan    Plan  Plan: 3-5 times per week  Plan weeks: 1 week 22  Specific Instructions for Next Treatment: progress mobility as tolerated  Current Treatment Recommendations: Strengthening;Balance training;Functional mobility training;Transfer training; Endurance training;Gait training; Safety education & training;Patient/Caregiver education & training; Therapeutic activities; Equipment evaluation, education, & procurement;Home exercise program     Restrictions  Restrictions/Precautions  Restrictions/Precautions: Fall Risk,General Precautions,Up as Tolerated  Position Activity Restriction  Other position/activity restrictions: IV, purwick, 4 L of O2 via NC     Subjective    Subjective  Subjective: Pt sitting up in chair upon arrival, agreeable to PT tx.   Pain: 7/10 L groin  Orientation  Overall Orientation Status: Within Normal Limits  Orientation Level: Oriented X4  Cognition  Overall Cognitive Status: WNL     Objective   Vitals  /57  HR 82 bpm  SpO2 96% on 4 L     Bed Mobility Training  Bed Mobility Training: Yes  Sit to Supine: Stand-by assistance; Additional time (HOB slightly elevated)  Balance  Sitting: Intact  Standing: Impaired (RW with SBA/CGA)  Standing - Static: Constant support  Standing - Dynamic: Constant support  Transfer Training  Transfer Training: Yes  Overall Level of Assistance: Stand-by assistance; Additional time; Adaptive equipment (with RW, initially required rocking/momentum, but improves with repetition)  Interventions: Verbal cues; Safety awareness training  Sit to Stand: Stand-by assistance; Additional time; Adaptive equipment  Stand to Sit: Stand-by assistance; Adaptive equipment; Additional time  Gait Training  Gait Training: Yes  Right Side Weight Bearing: Full  Left Side Weight Bearing: Full  Gait  Overall Level of Assistance: Stand-by assistance  Interventions: Verbal cues; Safety awareness training  Base of Support: Widened  Speed/Yvonne: Slow  Step Length: Right shortened;Left shortened  Distance (ft): 40 Feet (Pt reports distance limited by weakness, no SOB; SpO2 90% on 4 L with activity)  Assistive Device: Walker, rolling;Gait belt     PT Exercises  Exercise Treatment: 1 x 10 AROM bilateral: quad sets, ankle pumps, heel slides. seated exercises: long arc quads, hip flexion, heel raises. Safety Devices  Type of Devices: Bed alarm in place;Call light within reach;Nurse notified; Left in bed; All fall risk precautions in place; Patient at risk for falls;Gait belt       Goals  Short Term Goals  Time Frame for Short term goals: 1 week 6/12/22  Short term goal 1: Supine <> sit with modified independence. Short term goal 2: Sit <> stand with supervision.   Short term goal 3: Bed <> chair with least restrictive assistive device and supervision  Short term goal 4: Ambulate 50 feet with LRAD

## 2022-06-07 NOTE — PROGRESS NOTES
2300-pt IV infiltrated, side cool & blanchable. Pt removed IV herself after learning was infiltrated. Pt was agitated at needing new IV and told staff they had only 2 chances for IV. Education given for IV necessity. 22g IV to left hand obtained after 4 attempts. 0045-On call provider notified of pt BP. 500mL bolus ordered. OK received to pause octreotide gtt for bolus as pt not amicable to staff placing another IV. Per her, if current IV fails she will not allow placement of another until AM. Provider notified of H&H results.  No BMs this shift but roseanna RN reported 3 \"iron like\" BMs.

## 2022-06-07 NOTE — PROGRESS NOTES
Occupational Therapy  Facility/Department: [facility] B3 - MED SURG  Daily Treatment Note  NAME: Agueda Olvera  : 1945  MRN: [id number]    Date of Service: 2022    Discharge Recommendations:  Subacute/Skilled Nursing Facility,Home with Home health OT  OT Equipment Recommendations  Other: defer, if pt refusing SNF, recommend home with 24h/a and [address]  Patient Diagnosis(es): The primary encounter diagnosis was Anemia, unspecified type. A diagnosis of Occult blood positive stool was also pertinent to this visit. Assessment    Assessment: Pt wanting to get up for breakfast and sit in chair but not do extra work this day, \"I don't want to get tired for PT and Im hungry\". Pt SBA for bed mobility, min A for donning socks, and CGA for stand pivot to chair. Pt reports she is having trouble getting off of standard height toilet. OT provided 3 in 1 commode for raised toilet seat and educated pt on use of toilet. Pt sat EOB x 5 minutes with good static sitting. Continue POC. Activity Tolerance: Patient limited by fatigue;Patient limited by endurance  Discharge Recommendations: Subacute/Skilled Nursing Facility;Home with Home health OT  Other: defer, if pt refusing SNF, recommend home with 24h/a and [facility]  Times per Week: 3-5x/ week  Current Treatment Recommendations: Strengthening;Balance training;Functional mobility training; Endurance training;Self-Care / ADL; Patient/Caregiver education & training; Safety education & training     Restrictions  Restrictions/Precautions  Restrictions/Precautions: Fall Risk;General Precautions; Up as Tolerated  Position Activity Restriction  Other position/activity restrictions: IV, purwick, 4 L of O2 via NC    Subjective   Subjective  Subjective: Pt wanting to get up for breakfast, \" I don't want to do a lot before my breakfast comes\"  Pain: none reportedd  Orientation  Overall Orientation Status: Within Normal Limits  Orientation Level: Oriented X4  Pain: 7/10 L groin  Cognition  Overall Cognitive Status: WNL        Objective    Vitals     Bed Mobility Training  Bed Mobility Training: Yes  Overall Level of Assistance: Stand-by assistance  Sit to Supine: Stand-by assistance; Additional time (HOB slightly elevated)  Balance  Sitting: Intact  Standing: Impaired (RW with SBA/CGA)  Standing - Static:  (SBA)  Standing - Dynamic: Constant support  Transfer Training  Transfer Training: Yes  Overall Level of Assistance: Additional time; Adaptive equipment;Contact-guard assistance (RW)  Interventions: Verbal cues; Safety awareness training  Sit to Stand: Stand-by assistance; Additional time; Adaptive equipment  Stand to Sit: Stand-by assistance; Adaptive equipment; Additional time  Bed to Chair: Contact-guard assistance  Gait Training  Gait Training: Yes  Right Side Weight Bearing: Full  Left Side Weight Bearing: Full  Gait  Overall Level of Assistance: Stand-by assistance  Interventions: Verbal cues; Safety awareness training  Base of Support: Widened  Speed/Yvonne: Slow  Step Length: Right shortened;Left shortened  Distance (ft): 40 Feet (Pt reports distance limited by weakness, no SOB; SpO2 90% on 4 L with activity)  Assistive Device: Walker, rolling;Gait belt     ADL  Feeding: Independent  LE Dressing: Minimal assistance  LE Dressing Skilled Clinical Factors: don socks  Toileting Skilled Clinical Factors: provided 3 in 1 one commod for raised toilet seat with rails 2/2 pt reporting she cannot get off the regular height toilet. pt refusing to go, pt refusing to remove purewick. Safety Devices  Type of Devices: Call light within reach;Nurse notified; All fall risk precautions in place; Patient at risk for falls;Gait belt; Chair alarm in place; Left in chair     Patient Education  Education Given To: Patient  Education Provided: Role of Therapy;Plan of Care;Precautions  Education Provided Comments: disease specific: importance of OOB to chair for meals, use of raised toilet seat, importance of mobility, role of therapy  Education Method: Verbal  Barriers to Learning: None  Education Outcome: Verbalized understanding;Continued education needed    Goals  Short Term Goals  Time Frame for Short term goals: 1 week(6-12-22)  Short Term Goal 1: supervision with bathroom mobility with RW by 6-11-22  Short Term Goal 2: independent with LE self care with AE PRN by 6-11-22  Short Term Goal 3: tolerate standing ADL's for 3-5 minutes with Supervision by 6-12-22  Short Term Goal 4: tolerate 15 reps BUE AROM exercises  Patient Goals   Patient goals : \"go home\"       Therapy Time   Individual Concurrent Group Co-treatment   Time In [facility]         Time Out 0098         Minutes 23         Timed Code Treatment Minutes: Geovany Unger OT

## 2022-06-07 NOTE — DISCHARGE SUMMARY
Pt is A&Ox4, VSS, agreed with discharge plan and signed paperwork. Pt IV d'demond-tip intact/dressing applied, tele monitor removed. Pt received prescription meds from pharmacy, belongings given back and sent home via personal car (daughter).

## 2022-06-07 NOTE — CARE COORDINATION
[facility]    DC order noted, all docs needed have been faxed to [facility] for home care services.     Home care to see patient within 24-48 hrs    Celedonio Gosselin RN, BSN CTN  [facility] [phone]

## 2022-06-08 ENCOUNTER — CARE COORDINATION (OUTPATIENT)
Dept: CASE MANAGEMENT | Age: 77
End: 2022-06-08

## 2022-06-08 NOTE — CARE COORDINATION
[facility] Transitions Initial Follow Up Call    Call within 2 business days of discharge: Yes    Patient: Mansoor Lamb Patient : 1945   MRN: [id number]  Reason for Admission: anemia  Discharge Date: 22 RARS: Readmission Risk Score: 27.8 ( )      Last Discharge [facility]       Complaint Diagnosis Description Type Department Provider    6/3/22 Abnormal Lab Anemia, unspecified type . .. ED to Hosp-Admission (Discharged) (ADMITTED) [facility] B3 Santos Briscoe MD; Genesis Jacobsen .. Spoke with: Cee Jimenez at [address]: [facility]    Non-face-to-face services provided:  Obtained and reviewed discharge summary and/or continuity of care documents  Communication with home health agencies or other community services the patient is currently using-[facility]     Transitions of Care Initial Call  Challenges to be reviewed by the provider   Additional needs identified to be addressed with provider: No  none             Method of communication with provider : none    Advance Care Planning:   Does patient have an Advance Directive: reviewed and current, reviewed and needs to be updated, not on file; education provided, not on file, patient declined education, decision maker updated and referral to internal ACP facilitator. Care Transition Nurse contacted the patient by telephone to perform post hospital discharge assessment. Verified name and  with patient as identifiers. Provided introduction to self, and explanation of the CTN role. CTN reviewed discharge instructions, medical action plan and red flags with patient who verbalized understanding. Patient given an opportunity to ask questions and does not have any further questions or concerns at this time. Were discharge instructions available to patient? Yes. Reviewed appropriate site of care based on symptoms and resources available to patient including: PCP  Specialist  Home health.  The patient agrees to contact the PCP office for questions related to their healthcare. Medication reconciliation was not performed with patient, who verbalizes understanding of administration of home medications. Advised obtaining a 90-day supply of all daily and as-needed medications. 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 no  Patient answered call and verified . Patient pleasant and agreeable to transition call. Patient doing well and happy to be home. Patient continues to be fatigue and did not sleep well last night, but cleaning up kitchen this morning. Unable to hear patient on home number, but did answer mobile number. Patient stated that her home phone is effected by rainy weather and gets poor connection. Post d/c summary available and agreeable to discuss recent hospitalization. Declined full med requisition, but confirmed that she has all meds and taking as directed. New and stopped medications discussed. Has strong support from friends/neighbors and will have transportation to visits and will schedule herself. Declined assistance from CTN with scheduling. Spoke to [employer] and ABDOULAYE scheduled. Patient stated nurse is scheduled for visit today around 1pm.  Denied any acute needs at present time. Agreeable to f/u calls. Educated on the use of urgent care or physicians 24 hr access line if assistance is needed after hours. CTN provided contact information. Plan for follow-up call in 5-7 days based on severity of symptoms and risk factors. Plan for next call: follow up appointment-did patient get scheduled      Care Transitions 24 Hour Call    Care Transitions Interventions         Follow Up  Future Appointments   Date Time Provider Krzysztof Michele   2022 10:30 AM ZIGGY Singh - CNP [facility]   2022  2:00 PM Evonne Reyes MD [facility]   7/15/2022  1:00 PM Nathaly Schwartz.  Surya ,  [facility] AND RES YENNIFER   7/19/2022  1:00 PM MHC MRI RM 1 [facility] MRI [facility]   8/4/2022  2:00 PM MHA MAMMO RM 2 MHAZ WOMEN'S [facility]   8/25/2022  1:30 PM MD Stone Calhoun RN

## 2022-06-09 ENCOUNTER — APPOINTMENT (OUTPATIENT)
Dept: GENERAL RADIOLOGY | Age: 77
End: 2022-06-09
Payer: MEDICARE

## 2022-06-09 ENCOUNTER — APPOINTMENT (OUTPATIENT)
Dept: CT IMAGING | Age: 77
End: 2022-06-09
Payer: MEDICARE

## 2022-06-09 ENCOUNTER — HOSPITAL ENCOUNTER (OUTPATIENT)
Age: 77
Setting detail: OBSERVATION
Discharge: NURSING, REHAB OR LONG-TERM CARE | End: 2022-06-14
Attending: EMERGENCY MEDICINE
Payer: MEDICARE

## 2022-06-09 DIAGNOSIS — D64.9 ANEMIA, UNSPECIFIED TYPE: Primary | ICD-10-CM

## 2022-06-09 DIAGNOSIS — R26.2 DETERIORATION IN ABILITY TO WALK: ICD-10-CM

## 2022-06-09 DIAGNOSIS — S80.212A ABRASION OF LEFT KNEE, INITIAL ENCOUNTER: ICD-10-CM
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
## 2022-06-09 DIAGNOSIS — E87.1 HYPONATREMIA: ICD-10-CM

## 2022-06-09 DIAGNOSIS — N17.9 AKI (ACUTE KIDNEY INJURY) (HCC): ICD-10-CM

## 2022-06-09 PROBLEM — R62.7 FTT (FAILURE TO THRIVE) IN ADULT: Status: ACTIVE | Noted: 2022-06-09

## 2022-06-09 LAB
A/G RATIO: 1 (ref 1.1–2.2)
ALBUMIN SERPL-MCNC: 3.5 G/DL (ref 3.4–5)
ALP BLD-CCNC: 57 U/L (ref 40–129)
ALT SERPL-CCNC: 11 U/L (ref 10–40)
ANION GAP SERPL CALCULATED.3IONS-SCNC: 10 MMOL/L (ref 3–16)
AST SERPL-CCNC: 28 U/L (ref 15–37)
BASOPHILS ABSOLUTE: 0 K/UL (ref 0–0.2)
BASOPHILS RELATIVE PERCENT: 0.4 %
BILIRUB SERPL-MCNC: 0.6 MG/DL (ref 0–1)
BUN BLDV-MCNC: 20 MG/DL (ref 7–20)
CALCIUM SERPL-MCNC: 8.4 MG/DL (ref 8.3–10.6)
CHLORIDE BLD-SCNC: 86 MMOL/L (ref 99–110)
CO2: 25 MMOL/L (ref 21–32)
CREAT SERPL-MCNC: 1.4 MG/DL (ref 0.6–1.2)
EOSINOPHILS ABSOLUTE: 0.1 K/UL (ref 0–0.6)
EOSINOPHILS RELATIVE PERCENT: 0.7 %
GFR AFRICAN AMERICAN: 44
GFR NON-AFRICAN AMERICAN: 37
GLUCOSE BLD-MCNC: 114 MG/DL (ref 70–99)
GLUCOSE BLD-MCNC: 122 MG/DL (ref 70–99)
HCT VFR BLD CALC: 23.3 % (ref 36–48)
HEMOGLOBIN: 7.9 G/DL (ref 12–16)
LYMPHOCYTES ABSOLUTE: 0.8 K/UL (ref 1–5.1)
LYMPHOCYTES RELATIVE PERCENT: 7.6 %
MCH RBC QN AUTO: 29.2 PG (ref 26–34)
MCHC RBC AUTO-ENTMCNC: 34 G/DL (ref 31–36)
MCV RBC AUTO: 85.8 FL (ref 80–100)
MONOCYTES ABSOLUTE: 0.7 K/UL (ref 0–1.3)
MONOCYTES RELATIVE PERCENT: 7.2 %
NEUTROPHILS ABSOLUTE: 8.6 K/UL (ref 1.7–7.7)
NEUTROPHILS RELATIVE PERCENT: 84.1 %
PDW BLD-RTO: 15.7 % (ref 12.4–15.4)
PERFORMED ON: ABNORMAL
PLATELET # BLD: 201 K/UL (ref 135–450)
PMV BLD AUTO: 7.7 FL (ref 5–10.5)
POTASSIUM REFLEX MAGNESIUM: 5.4 MMOL/L (ref 3.5–5.1)
RBC # BLD: 2.71 M/UL (ref 4–5.2)
SODIUM BLD-SCNC: 121 MMOL/L (ref 136–145)
TOTAL PROTEIN: 7 G/DL (ref 6.4–8.2)
WBC # BLD: 10.3 K/UL (ref 4–11)

## 2022-06-09 PROCEDURE — 70450 CT HEAD/BRAIN W/O DYE: CPT

## 2022-06-09 PROCEDURE — 80053 COMPREHEN METABOLIC PANEL: CPT

## 2022-06-09 PROCEDURE — 2580000003 HC RX 258: Performed by: HOSPITALIST

## 2022-06-09 PROCEDURE — 73502 X-RAY EXAM HIP UNI 2-3 VIEWS: CPT

## 2022-06-09 PROCEDURE — 73552 X-RAY EXAM OF FEMUR 2/>: CPT

## 2022-06-09 PROCEDURE — 90471 IMMUNIZATION ADMIN: CPT | Performed by: PHYSICIAN ASSISTANT

## 2022-06-09 PROCEDURE — 2700000000 HC OXYGEN THERAPY PER DAY

## 2022-06-09 PROCEDURE — 73562 X-RAY EXAM OF KNEE 3: CPT

## 2022-06-09 PROCEDURE — G0378 HOSPITAL OBSERVATION PER HR: HCPCS

## 2022-06-09 PROCEDURE — 6370000000 HC RX 637 (ALT 250 FOR IP): Performed by: HOSPITALIST

## 2022-06-09 PROCEDURE — 71045 X-RAY EXAM CHEST 1 VIEW: CPT

## 2022-06-09 PROCEDURE — 2580000003 HC RX 258: Performed by: PHYSICIAN ASSISTANT

## 2022-06-09 PROCEDURE — 93005 ELECTROCARDIOGRAM TRACING: CPT | Performed by: PHYSICIAN ASSISTANT

## 2022-06-09 PROCEDURE — 96361 HYDRATE IV INFUSION ADD-ON: CPT

## 2022-06-09 PROCEDURE — 94761 N-INVAS EAR/PLS OXIMETRY MLT: CPT

## 2022-06-09 PROCEDURE — 90715 TDAP VACCINE 7 YRS/> IM: CPT | Performed by: PHYSICIAN ASSISTANT

## 2022-06-09 PROCEDURE — 94640 AIRWAY INHALATION TREATMENT: CPT

## 2022-06-09 PROCEDURE — 6360000002 HC RX W HCPCS: Performed by: PHYSICIAN ASSISTANT

## 2022-06-09 PROCEDURE — 1200000000 HC SEMI PRIVATE

## 2022-06-09 PROCEDURE — 96360 HYDRATION IV INFUSION INIT: CPT

## 2022-06-09 PROCEDURE — 85025 COMPLETE CBC W/AUTO DIFF WBC: CPT

## 2022-06-09 PROCEDURE — 99285 EMERGENCY DEPT VISIT HI MDM: CPT

## 2022-06-09 RX ORDER — SODIUM CHLORIDE 0.9 % (FLUSH) 0.9 %
10 SYRINGE (ML) INJECTION PRN
Status: DISCONTINUED | OUTPATIENT
Start: 2022-06-09 | End: 2022-06-14 | Stop reason: HOSPADM

## 2022-06-09 RX ORDER — GABAPENTIN 300 MG/1
600 CAPSULE ORAL NIGHTLY
Status: DISCONTINUED | OUTPATIENT
Start: 2022-06-09 | End: 2022-06-14 | Stop reason: HOSPADM

## 2022-06-09 RX ORDER — CALCIUM CARBONATE 500(1250)
500 TABLET ORAL DAILY
Status: DISCONTINUED | OUTPATIENT
Start: 2022-06-09 | End: 2022-06-14 | Stop reason: HOSPADM

## 2022-06-09 RX ORDER — ASPIRIN 81 MG/1
81 TABLET ORAL DAILY
Status: DISCONTINUED | OUTPATIENT
Start: 2022-06-09 | End: 2022-06-14 | Stop reason: HOSPADM

## 2022-06-09 RX ORDER — FLUTICASONE PROPIONATE 110 UG/1
2 AEROSOL, METERED RESPIRATORY (INHALATION) 2 TIMES DAILY
Status: DISCONTINUED | OUTPATIENT
Start: 2022-06-09 | End: 2022-06-14 | Stop reason: HOSPADM

## 2022-06-09 RX ORDER — ATORVASTATIN CALCIUM 10 MG/1
20 TABLET, FILM COATED ORAL NIGHTLY
Status: DISCONTINUED | OUTPATIENT
Start: 2022-06-09 | End: 2022-06-14 | Stop reason: HOSPADM

## 2022-06-09 RX ORDER — M-VIT,TX,IRON,MINS/CALC/FOLIC 27MG-0.4MG
1 TABLET ORAL DAILY
Status: DISCONTINUED | OUTPATIENT
Start: 2022-06-09 | End: 2022-06-14 | Stop reason: HOSPADM

## 2022-06-09 RX ORDER — TAMSULOSIN HYDROCHLORIDE 0.4 MG/1
0.4 CAPSULE ORAL DAILY
Status: DISCONTINUED | OUTPATIENT
Start: 2022-06-09 | End: 2022-06-14 | Stop reason: HOSPADM

## 2022-06-09 RX ORDER — POTASSIUM CHLORIDE 20 MEQ/1
20 TABLET, EXTENDED RELEASE ORAL
Status: DISCONTINUED | OUTPATIENT
Start: 2022-06-10 | End: 2022-06-14 | Stop reason: HOSPADM

## 2022-06-09 RX ORDER — ACETAMINOPHEN 325 MG/1
650 TABLET ORAL EVERY 6 HOURS PRN
Status: DISCONTINUED | OUTPATIENT
Start: 2022-06-09 | End: 2022-06-14 | Stop reason: HOSPADM

## 2022-06-09 RX ORDER — DILTIAZEM HYDROCHLORIDE 240 MG/1
240 CAPSULE, COATED, EXTENDED RELEASE ORAL DAILY
Status: DISCONTINUED | OUTPATIENT
Start: 2022-06-10 | End: 2022-06-14 | Stop reason: HOSPADM

## 2022-06-09 RX ORDER — DIPHENHYDRAMINE HCL 25 MG
12.5 TABLET ORAL EVERY 6 HOURS PRN
Status: DISCONTINUED | OUTPATIENT
Start: 2022-06-09 | End: 2022-06-14 | Stop reason: HOSPADM

## 2022-06-09 RX ORDER — FUROSEMIDE 20 MG/1
20 TABLET ORAL DAILY
Status: DISCONTINUED | OUTPATIENT
Start: 2022-06-10 | End: 2022-06-14 | Stop reason: HOSPADM

## 2022-06-09 RX ORDER — POTASSIUM CHLORIDE 7.45 MG/ML
10 INJECTION INTRAVENOUS PRN
Status: DISCONTINUED | OUTPATIENT
Start: 2022-06-09 | End: 2022-06-14 | Stop reason: HOSPADM

## 2022-06-09 RX ORDER — MAGNESIUM SULFATE IN WATER 40 MG/ML
2000 INJECTION, SOLUTION INTRAVENOUS PRN
Status: DISCONTINUED | OUTPATIENT
Start: 2022-06-09 | End: 2022-06-14 | Stop reason: HOSPADM

## 2022-06-09 RX ORDER — BENZONATATE 100 MG/1
100 CAPSULE ORAL 3 TIMES DAILY PRN
Status: DISCONTINUED | OUTPATIENT
Start: 2022-06-09 | End: 2022-06-14 | Stop reason: HOSPADM

## 2022-06-09 RX ORDER — SODIUM CHLORIDE 0.9 % (FLUSH) 0.9 %
10 SYRINGE (ML) INJECTION EVERY 12 HOURS SCHEDULED
Status: DISCONTINUED | OUTPATIENT
Start: 2022-06-09 | End: 2022-06-14 | Stop reason: HOSPADM

## 2022-06-09 RX ORDER — SENNA PLUS 8.6 MG/1
1 TABLET ORAL DAILY PRN
Status: DISCONTINUED | OUTPATIENT
Start: 2022-06-09 | End: 2022-06-14 | Stop reason: HOSPADM

## 2022-06-09 RX ORDER — CARVEDILOL 6.25 MG/1
6.25 TABLET ORAL 2 TIMES DAILY WITH MEALS
Status: DISCONTINUED | OUTPATIENT
Start: 2022-06-09 | End: 2022-06-14 | Stop reason: HOSPADM

## 2022-06-09 RX ORDER — CETIRIZINE HYDROCHLORIDE 10 MG/1
10 TABLET ORAL DAILY
Status: DISCONTINUED | OUTPATIENT
Start: 2022-06-09 | End: 2022-06-14 | Stop reason: HOSPADM

## 2022-06-09 RX ORDER — ROPINIROLE 0.5 MG/1
0.5 TABLET, FILM COATED ORAL 4 TIMES DAILY
Status: DISCONTINUED | OUTPATIENT
Start: 2022-06-09 | End: 2022-06-14 | Stop reason: HOSPADM

## 2022-06-09 RX ORDER — SODIUM CHLORIDE 9 MG/ML
INJECTION, SOLUTION INTRAVENOUS CONTINUOUS
Status: DISCONTINUED | OUTPATIENT
Start: 2022-06-09 | End: 2022-06-10

## 2022-06-09 RX ORDER — VENLAFAXINE 37.5 MG/1
37.5 TABLET ORAL DAILY
Status: DISCONTINUED | OUTPATIENT
Start: 2022-06-10 | End: 2022-06-14 | Stop reason: HOSPADM

## 2022-06-09 RX ORDER — LEVOTHYROXINE SODIUM 0.1 MG/1
100 TABLET ORAL DAILY
Status: DISCONTINUED | OUTPATIENT
Start: 2022-06-10 | End: 2022-06-14 | Stop reason: HOSPADM

## 2022-06-09 RX ORDER — LEVALBUTEROL 1.25 MG/.5ML
0.63 SOLUTION, CONCENTRATE RESPIRATORY (INHALATION) EVERY 8 HOURS PRN
Status: DISCONTINUED | OUTPATIENT
Start: 2022-06-09 | End: 2022-06-14 | Stop reason: HOSPADM

## 2022-06-09 RX ORDER — POTASSIUM CHLORIDE 20 MEQ/1
40 TABLET, EXTENDED RELEASE ORAL PRN
Status: DISCONTINUED | OUTPATIENT
Start: 2022-06-09 | End: 2022-06-14 | Stop reason: HOSPADM

## 2022-06-09 RX ORDER — MONTELUKAST SODIUM 10 MG/1
10 TABLET ORAL DAILY
Status: DISCONTINUED | OUTPATIENT
Start: 2022-06-09 | End: 2022-06-14 | Stop reason: HOSPADM

## 2022-06-09 RX ORDER — SODIUM CHLORIDE 9 MG/ML
INJECTION, SOLUTION INTRAVENOUS PRN
Status: DISCONTINUED | OUTPATIENT
Start: 2022-06-09 | End: 2022-06-14 | Stop reason: HOSPADM

## 2022-06-09 RX ORDER — GABAPENTIN 100 MG/1
100 CAPSULE ORAL DAILY
Status: DISCONTINUED | OUTPATIENT
Start: 2022-06-10 | End: 2022-06-14 | Stop reason: HOSPADM

## 2022-06-09 RX ORDER — ONDANSETRON 2 MG/ML
4 INJECTION INTRAMUSCULAR; INTRAVENOUS EVERY 6 HOURS PRN
Status: DISCONTINUED | OUTPATIENT
Start: 2022-06-09 | End: 2022-06-14 | Stop reason: HOSPADM

## 2022-06-09 RX ORDER — 0.9 % SODIUM CHLORIDE 0.9 %
500 INTRAVENOUS SOLUTION INTRAVENOUS ONCE
Status: COMPLETED | OUTPATIENT
Start: 2022-06-09 | End: 2022-06-09

## 2022-06-09 RX ORDER — FERROUS SULFATE 325(65) MG
325 TABLET ORAL
Status: DISCONTINUED | OUTPATIENT
Start: 2022-06-10 | End: 2022-06-14 | Stop reason: HOSPADM

## 2022-06-09 RX ORDER — LIDOCAINE 4 G/G
1 PATCH TOPICAL DAILY
Status: DISCONTINUED | OUTPATIENT
Start: 2022-06-09 | End: 2022-06-14 | Stop reason: HOSPADM

## 2022-06-09 RX ORDER — URSODIOL 500 MG/1
500 TABLET, FILM COATED ORAL 2 TIMES DAILY
Status: DISCONTINUED | OUTPATIENT
Start: 2022-06-09 | End: 2022-06-14 | Stop reason: HOSPADM

## 2022-06-09 RX ORDER — PANTOPRAZOLE SODIUM 40 MG/1
40 TABLET, DELAYED RELEASE ORAL 2 TIMES DAILY
Status: DISCONTINUED | OUTPATIENT
Start: 2022-06-09 | End: 2022-06-11

## 2022-06-09 RX ORDER — LOSARTAN POTASSIUM 100 MG/1
100 TABLET ORAL DAILY
Status: DISCONTINUED | OUTPATIENT
Start: 2022-06-10 | End: 2022-06-14 | Stop reason: HOSPADM

## 2022-06-09 RX ORDER — ACETAMINOPHEN 650 MG/1
650 SUPPOSITORY RECTAL EVERY 6 HOURS PRN
Status: DISCONTINUED | OUTPATIENT
Start: 2022-06-09 | End: 2022-06-14 | Stop reason: HOSPADM

## 2022-06-09 RX ORDER — TRAZODONE HYDROCHLORIDE 50 MG/1
50 TABLET ORAL NIGHTLY
Status: DISCONTINUED | OUTPATIENT
Start: 2022-06-09 | End: 2022-06-14 | Stop reason: HOSPADM

## 2022-06-09 RX ORDER — ONDANSETRON 4 MG/1
4 TABLET, ORALLY DISINTEGRATING ORAL EVERY 8 HOURS PRN
Status: DISCONTINUED | OUTPATIENT
Start: 2022-06-09 | End: 2022-06-14 | Stop reason: HOSPADM

## 2022-06-09 RX ADMIN — GABAPENTIN 600 MG: 300 CAPSULE ORAL at 22:19

## 2022-06-09 RX ADMIN — CARVEDILOL 6.25 MG: 6.25 TABLET, FILM COATED ORAL at 22:20

## 2022-06-09 RX ADMIN — DIPHENHYDRAMINE HCL 12.5 MG: 25 TABLET ORAL at 22:27

## 2022-06-09 RX ADMIN — Medication 10 ML: at 22:20

## 2022-06-09 RX ADMIN — SODIUM CHLORIDE: 9 INJECTION, SOLUTION INTRAVENOUS at 22:37

## 2022-06-09 RX ADMIN — ACETAMINOPHEN 650 MG: 325 TABLET ORAL at 22:27

## 2022-06-09 RX ADMIN — SODIUM CHLORIDE 500 ML: 9 INJECTION, SOLUTION INTRAVENOUS at 17:17

## 2022-06-09 RX ADMIN — APIXABAN 2.5 MG: 2.5 TABLET, FILM COATED ORAL at 22:20

## 2022-06-09 RX ADMIN — PANTOPRAZOLE SODIUM 40 MG: 40 TABLET, DELAYED RELEASE ORAL at 22:19

## 2022-06-09 RX ADMIN — Medication 2 PUFF: at 21:22

## 2022-06-09 RX ADMIN — TRAZODONE HYDROCHLORIDE 50 MG: 50 TABLET ORAL at 22:19

## 2022-06-09 RX ADMIN — TETANUS TOXOID, REDUCED DIPHTHERIA TOXOID AND ACELLULAR PERTUSSIS VACCINE, ADSORBED 0.5 ML: 5; 2.5; 8; 8; 2.5 SUSPENSION INTRAMUSCULAR at 19:45

## 2022-06-09 RX ADMIN — ATORVASTATIN CALCIUM 20 MG: 10 TABLET, FILM COATED ORAL at 22:19

## 2022-06-09 RX ADMIN — CALCIUM 500 MG: 500 TABLET ORAL at 22:20

## 2022-06-09 RX ADMIN — ROPINIROLE HYDROCHLORIDE 0.5 MG: 0.5 TABLET, FILM COATED ORAL at 22:19

## 2022-06-09 RX ADMIN — MONTELUKAST SODIUM 10 MG: 10 TABLET ORAL at 22:19

## 2022-06-09 ASSESSMENT — ENCOUNTER SYMPTOMS
EYE REDNESS: 0
VOMITING: 0
SINUS PAIN: 0
DIARRHEA: 0
EYE DISCHARGE: 0
COUGH: 0
ABDOMINAL PAIN: 0
NAUSEA: 0
CHEST TIGHTNESS: 0
SHORTNESS OF BREATH: 0
CONSTIPATION: 0
SINUS PRESSURE: 0
SORE THROAT: 0
RHINORRHEA: 0

## 2022-06-09 ASSESSMENT — PAIN - FUNCTIONAL ASSESSMENT
PAIN_FUNCTIONAL_ASSESSMENT: NONE - DENIES PAIN
PAIN_FUNCTIONAL_ASSESSMENT: ACTIVITIES ARE NOT PREVENTED
PAIN_FUNCTIONAL_ASSESSMENT: NONE - DENIES PAIN

## 2022-06-09 ASSESSMENT — LIFESTYLE VARIABLES
HOW MANY STANDARD DRINKS CONTAINING ALCOHOL DO YOU HAVE ON A TYPICAL DAY: PATIENT DECLINED
HOW OFTEN DO YOU HAVE A DRINK CONTAINING ALCOHOL: NEVER
HOW OFTEN DO YOU HAVE A DRINK CONTAINING ALCOHOL: NEVER

## 2022-06-09 ASSESSMENT — PAIN SCALES - GENERAL: PAINLEVEL_OUTOF10: 8

## 2022-06-09 ASSESSMENT — PAIN DESCRIPTION - LOCATION: LOCATION: LEG

## 2022-06-09 ASSESSMENT — PAIN DESCRIPTION - DESCRIPTORS: DESCRIPTORS: ACHING;DISCOMFORT

## 2022-06-09 ASSESSMENT — PAIN DESCRIPTION - ORIENTATION: ORIENTATION: LEFT

## 2022-06-09 NOTE — ACP (ADVANCE CARE PLANNING)
Advance Care Planning     General Advance Care Planning (ACP) Conversation    Date of Conversation: 6/9/2022  Conducted with: Patient with Decision Making Capacity    Healthcare Decision Maker:    Primary Decision Maker (Active): Syed Patel - [phone]    Primary Decision Maker: Jo Ann Rivero - Child - [phone]    Secondary Decision Maker: Shae Lopez - Child - [phone]  Click here to complete Healthcare Decision Makers including selection of the Healthcare Decision Maker Relationship (ie \"Primary\"). Today we documented Decision Maker(s) consistent with Legal Next of Kin hierarchy.       Length of Voluntary ACP Conversation in minutes:  <16 minutes (Non-Billable)    LUISA Patel

## 2022-06-09 NOTE — CARE COORDINATION
CASE MANAGEMENT INITIAL ASSESSMENT      Reviewed chart and completed assessment with patient and daughter Rohit Max   Family present: NA  Explained Case Management role/services. yes    Primary contact information:[place] Daughter    Health Care Decision Maker :   Primary Decision Maker (Active): Morales Salas - Child - [phone]    Primary Decision Maker: Joselin Alan - Child - [phone]    Secondary Decision Maker: Fatimah Humphrey - Child - [phone]          Can this person be reached and be able to respond quickly, such as within a few minutes or hours? Yes      Admit date/status:6/9/22  Diagnosis:s/p fall   Is this a Readmission?:  Yes      [address] required for SNF: Yes       3 night stay required: No    Living arrangements, Adls, care needs, prior to admission:[place] [place] [facility], patient needs assist with ADLs    Durable Medical Equipment at home:  Walker_x_Cane_x_RTS_x_ BSC__Shower Chair__  02_x  [employer] [facility]N__ CPAP__  BiPap__  Hospital Bed__ W/C___ Other_____    Services in the home and/or outpatient, prior to [facility]    Current PCP:Dr. Fátima Rainey                                Medications:yes Prescription coverage? Yes Will pt require financial assistance with medications No     Transportation needs: stretcher     Dialysis Facility (if applicable)   · Name:  · Address:  · Dialysis Schedule:  · Phone:  · Fax:    PT/OT recs:    Hospital Exemption Notification (HEN):    Barriers to discharge:medical complications    Plan/comments:Referred to patient for d/c planning. Patient known from previous admission. Patient is a 69year old female admitted for s/p fall. [facility] with [facility]. Patient is agreeable to SNF at [facility] where she has been recently. Spoke to daughter Rohit Max who is also agreeable.   Family is looking at options for private duty vs. Assisted Living which patient has been resistant to both in past.  Referral made to EGS. Case management to follow.  Electronically signed by LUISA Boyle LISW-S on 6/9/2022 at 3:53 PM      ECOC on chart for MD signature

## 2022-06-09 NOTE — ED PROVIDER NOTES
I independently performed a history and physical on [facility]. All diagnostic, treatment, and disposition decisions were made by myself in conjunction with the advanced practice provider. EKG: Atrial fibrillation, rate 63, leftward axis, QTC within normal limits, no acute ST or T wave changes from prior on June 5, 2022    Patient with ground-level fall and was too weak to get up off the floor. Lives home alone. Labs demonstrate YELENA and significant hyponatremia. Aside from an abrasion over the left patella she appears uninjured. Plan for hospitalist admission. For further details of [address] [place] [address] emergency department encounter, please see Priscilla ARAUZ's documentation.              Sybil Georges MD  06/09/22 0152

## 2022-06-09 NOTE — PROGRESS NOTES
Physical Therapy    PT order received, chart reviewed. Pt admitted with fall; imaging of L hip and knee currently pending. Will await final results of LLE imaging before initiating PT evaluation. Plan to see pt on 6/10/22 if imaging is clear. Thank you.     Claude Palacio  PT, DPT #[id number]

## 2022-06-09 NOTE — DISCHARGE INSTR - COC
Continuity of Care Form    Patient Name: Mitchell Costello   :  1945  MRN:  [id number]    Admit date:  2022  Discharge date:  ***    Code Status Order: Prior   Advance Directives:      Admitting Physician:  No admitting provider for patient encounter. PCP: Diane Kay.  Alyce Patel DO    Discharging Nurse: [facility] Unit/Room#:   Discharging Unit Phone Number: ***    Emergency Contact:   Extended Emergency Contact Information  Primary Emergency Contact: Pedrito Rincon, [address] [address] Phone: [phone]  Mobile Phone: [phone]  Relation: Child  Secondary Emergency Contact: Jerardo Moss Phone: [phone]  Mobile Phone: [phone]  Relation: Child    Past Surgical History:  Past Surgical History:   Procedure Laterality Date    BONE MARROW BIOPSY      BREAST BIOPSY      BRONCHOSCOPY N/A 2021    BRONCHOSCOPY DIAGNOSTIC OR CELL [address] ONLY performed by Nasrin Aparicio MD at [address] [place] [address]  2021    BRONCHOSCOPY ALVEOLAR LAVAGE performed by Nasrin Aparicio MD at [address] [place] [address]  2021    BRONCHOSCOPY THERAPUTIC ASPIRATION INITIAL performed by Nasrin Aparicio MD at [place] [address]  14    CATARACT REMOVAL      COLONOSCOPY      X 3 per PT 16    COLONOSCOPY N/A 6/10/2021    COLONOSCOPY POLYPECTOMY ABLATION performed by Justin Garcia MD at [address] [facility] 2013    CORAL BUNIONECTOMY RIGHT FOOT WITH SCREW FIXATION;    HERNIA REPAIR      HYSTERECTOMY (CERVIX STATUS UNKNOWN)      JOINT REPLACEMENT Bilateral     TKR    KNEE SURGERY  09    left    KNEE SURGERY  2010    right    UPPER GASTROINTESTINAL ENDOSCOPY N/A 2018    EGD BIOPSY performed by Luther Ramirez MD at [address] 6/10/2021    EGD POLYP ABLATION/OTHER performed by Justin Garcia MD at [facility] ENDOSCOPY N/A 6/10/2021    EGD BAND LIGATION performed by Katherine Perry MD at [facility] [facility] N/A 6/6/2022    EGD DIAGNOSTIC ONLY performed by King Daniela MD at [address] [place] [address]       Immunization History:   Immunization History   Administered Date(s) Administered    COVID-19, J&J, PF, 0.5 mL 03/08/2021, 12/30/2021    Pneumococcal Conjugate 13-valent ([id number]) 06/25/2013    Pneumococcal Polysaccharide (Laklynnab38) 01/01/2005       Active Problems:  Patient Active Problem List   Diagnosis Code    Hyperlipidemia E78.5    S/P knee replacement Z96.659    SVT (supraventricular tachycardia) ([facility]) I47.1    GERD (gastroesophageal reflux disease) K21.9    COPD (chronic obstructive pulmonary disease) ([facility]) J44.9    Anemia D64.9    Pernicious anemia D51.0    Primary osteoarthritis involving multiple joints M89.49    Vitamin D deficiency E55.9    Esophageal varices without bleeding ([facility]) I85.00    Restless leg syndrome G25.81    Hyponatremia E87.1    Pulmonary infiltrates R91.8    CAD (coronary artery disease) I25.10    Essential hypertension, benign I10    Hypothyroidism due to acquired atrophy of thyroid E03.4    Insomnia G47.00    Osteoporosis M81.0    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 ([facility] [address] K75.81, K74.60    Obesity E66.9    Type 2 diabetes mellitus with diabetic polyneuropathy, without long-term current use of insulin ([facility]) E11.42    A-fib ([facility]) I48.91    Atrial fibrillation with RVR ([facility]) I48.91    Discitis M46.40    Hypothyroid E03.9       Isolation/Infection:   Isolation            No Isolation          Patient Infection Status       Infection Onset Added Last Indicated Last Indicated By Review Planned Expiration Resolved Resolved By    None active    Resolved    COVID-19 (Rule Out) 06/03/22 06/03/22 06/03/22 COVID-19, Rapid (Ordered)   06/03/22 Rule-Out Test Resulted    COVID-19 (Rule Out) 05/12/21 05/12/21 05/12/21 COVID-19, Rapid (Ordered)   05/12/21 Rule-Out Test Resulted    COVID-19 (Rule Out) 05/06/21 05/06/21 05/06/21 SARS-CoV-2 NAAT (Rapid) (Ordered)   05/06/21 Rule-Out Test Resulted            Nurse Assessment:  Last Vital Signs: BP 99/65   Pulse 62   Temp 98.5 °F (36.9 °C) (Oral)   Resp 18   Wt 204 lb (92.5 kg)   SpO2 94%   BMI 32.94 kg/m²     Last documented pain score (0-10 scale):    Last Weight:   Wt Readings from Last 1 Encounters:   06/09/22 204 lb (92.5 kg)     Mental Status:  oriented and alert    IV Access:  - None    Nursing Mobility/ADLs:  Walking   Assisted  Transfer  Assisted  Bathing  Assisted  Dressing  Independent  Toileting  Independent  Feeding  [address]  Independent  Med Delivery   none    Wound Care Documentation and Therapy:        Elimination:  Continence: Bowel: No  Bladder: No  Urinary Catheter: None   Colostomy/Ileostomy/Ileal Conduit: No       Date of Last BM: 6/14/22  No intake or output data in the 24 hours ending 06/09/22 1546  No intake/output data recorded. Safety Concerns:     History of Falls (last 30 days) and At Risk for Falls    Impairments/Disabilities:      None    Nutrition Therapy:  Current Nutrition Therapy:   - Oral Diet:  Carb Control 5 carbs/meal (2000kcals/day)    Routes of Feeding: Oral  Liquids: No Restrictions  Daily Fluid Restriction: no  Last Modified Barium Swallow with Video (Video Swallowing Test): not done    Treatments at the Time of Hospital Discharge:   Respiratory Treatments: ***  Oxygen Therapy:  is on oxygen at 3.5 L/min per nasal cannula.   Ventilator:    - none                      Rehab Therapies: {THERAPEUTIC INTERVENTION:[id number]}  Weight Bearing Status/Restrictions: No weight bearing restrictions  Other Medical Equipment (for information only, NOT a DME order):  walker  Other Treatments: ***    Patient's personal belongings (please select all that are sent with patient):  {[facility] DME Belongings:[id number]}    RN SIGNATURE:  Electronically signed by Aide Arriaga RN on 6/14/22 at 6:39 PM EDT    CASE MANAGEMENT/SOCIAL WORK SECTION    Inpatient Status Date: 6/10/22 Observation    Readmission Risk Assessment Score:  Readmission Risk              Risk of Unplanned Readmission:  0           Discharging to Facility/ Agency:  [address] [facility]   [phone]     / signature: Electronically signed by DONNA Peraza on 6/14/22 at 12:37 PM EDT    PHYSICIAN SECTION    Prognosis: Fair    Condition at Discharge: Stable    Rehab Potential (if transferring to Rehab): Guarded    Recommended Labs or Other Treatments After Discharge: PT/OT/SN, Follow up with PCP/GI/Cardiology/Pulmonology     Physician Certification: I certify the above information and transfer of Anish Pedraza  is necessary for the continuing treatment of the diagnosis listed and that she requires [place] for less 30 days.      Update Admission H&P: No change in H&P    PHYSICIAN SIGNATURE:  Electronically signed by ZIGGY Taveras CNP on 6/14/22 at 11:28 AM EDT

## 2022-06-09 NOTE — H&P
HOSPITALISTS HISTORY AND PHYSICAL    6/9/2022 7:54 PM    Patient Information:  Alexa Oviedo is a 69 y.o. female [id number]  PCP:  Maia Angela. Fuentes DO Pam (Tel: [phone] )    Chief complaint:    Chief Complaint   Patient presents with    Fall     home health nurse found patient on floor, laid on floor for 15 min        History of Present Illness: Jeff Angeles is a 69 y.o. female who presented to the ED Jeff Angeles is a 69 y.o. female with a history of A. fib, anemia, CAD, COPD 4 L nasal cannula baseline, HTN, GERD, HLD, hypothyroid, SNOW with cirrhosis, DM2, presents from home. Patient was discharged from skilled nursing facility 5 days prior to arrival.  Patient was ambulating with a walker and advised that she slipped on her kitchen floor sliding down to the ground. Injured her left knee. Was unable to get back up. Patient denies hitting her head or any loss of consciousness. Upon arrival to the ED EKG was obtained revealing A. fib with LAFB. STAT head CT negative for acute intracranial abnormality. CXR with stable cardiomegaly and mild central pulmonary vascular congestion versus PAH. Mention made of RUL and bibasilar atelectasis versus infiltrates. Notable labs include: Acute hyponatremia 121, hyper kalemia 5.4, BUN/CR 20/1.4, and chronic anemia at baseline with H/H 7.9/23. 3. Hospitalist team consulted to admit this patient such that case management may arrange for permanent SNF placement to address her adult FTT with recurrent hospitalizations. History obtained from patient and review of [facility] chart    Old medical records show patient's most recent echo was obtained on 5/9/2020 with the following results:    Summary   Limited only f/u for LVEF. Normal left ventricular systolic function with ejection fraction of 55-60%. No regional wall motion abnormalites are seen. Compared to previous study from 8- no changes noted in left   ventricular function. REVIEW OF SYSTEMS:   Constitutional: Negative for fever,chills; positive generalized weakness  ENT: Negative for headache, rhinorrhea, and sore throat. Respiratory: Chronic hypoxic respiratory failure withshortness of breath, wheezing  Cardiovascular: Negative for chest pain, palpitations, peripheral edema, orthopnea or PND  Gastrointestinal: Negative for N/V/D and abdominal pain; no hematemesis, hematochezia, or melena; no anorexia  Genitourinary: Negative for dysuria, frequency, retention; no incontinence  Hematologic/Lymphatic: Negative for bleeding tendency/excessive bruising  Musculoskeletal: Positive recent mechanical falls with resultant myalgias and arthalgias; unable to ambulate without difficulty  Neurologic: Negative for LOC, seizure activity, paresthesias, dysarthria  Skin: Negative for itching,rash, decubitus  Psychiatric: Negative for depression,anxiety, and agitation; no hallucinations; denies SI/HI  Endocrine: Negative for polyuria/polydipsia/polyphagia; no heat/cold intolerance    Past Medical History:   has a past medical history of Allergic, Anemia, Anesthesia complication, Arthritis, Asthma, Atrial fibrillation ([address] Cirrhosis ([address] Cirrhosis, non-alcoholic ([address] COPD (chronic obstructive pulmonary disease) ([address] GERD (gastroesophageal reflux disease), GIB (gastrointestinal bleeding), Haemophilus infection, Heart murmur, Hernia, Hyperlipidemia, Hypertension, Lung collapse, Murmur, heart, Pneumonia, Reflux, Rheumatic fever, Stress fracture, SVT (supraventricular tachycardia) ([address] TIA (transient ischemic attack), Type II or unspecified type diabetes mellitus without mention of complication, not stated as uncontrolled, 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      Past Surgical History:   has a past surgical history that includes hernia repair; Hysterectomy; joint replacement (Bilateral); Foot surgery (Right, 06/03/2013); knee surgery (09); knee surgery (2010); Cardiac catheterization (07/14/14); Cataract removal; bone marrow biopsy; Breast biopsy; fine needle aspiration; Colonoscopy; Upper gastrointestinal endoscopy (N/A, 7/21/2018); bronchoscopy (N/A, 5/7/2021); bronchoscopy (5/7/2021); bronchoscopy (5/7/2021); Colonoscopy (N/A, 6/10/2021); Upper gastrointestinal endoscopy (N/A, 6/10/2021); Upper gastrointestinal endoscopy (N/A, 6/10/2021); and Upper gastrointestinal endoscopy (N/A, 6/6/2022). Medications:  No current facility-administered medications on file prior to encounter. Current Outpatient Medications on File Prior to Encounter   Medication Sig Dispense Refill    carvedilol (COREG) 6.25 MG tablet Take 1 tablet by mouth 2 times daily (with meals) 60 tablet 3    ondansetron (ZOFRAN) 4 MG tablet Take 1 tablet by mouth 3 times daily as needed for Nausea or Vomiting 30 tablet 0    dilTIAZem (TIAZAC) 240 MG extended release capsule Take 1 capsule by mouth daily 90 capsule 0    tamsulosin (FLOMAX) 0.4 MG capsule Take 1 capsule by mouth daily 30 capsule 0    gabapentin (NEURONTIN) 100 MG capsule Take 100 mg by mouth 3 times daily.  gabapentin (NEURONTIN) 600 MG tablet Take 600 mg by mouth at bedtime.       aclidinium (TUDORZA PRESSAIR) 400 MCG/ACT AEPB inhaler Inhale 1 puff into the lungs 2 times daily      apixaban (ELIQUIS) 5 MG TABS tablet Take 1 tablet by mouth 2 times daily 60 tablet 0    lidocaine 4 % external patch Place 1 patch onto the skin daily      montelukast (SINGULAIR) 10 MG tablet Take 1 tablet by mouth daily 90 tablet 3    cyanocobalamin 1000 MCG/ML injection Inject 1 mL into the muscle every 14 days Last dose was taken on 4/9/17 6 each 0    cetirizine (ZYRTEC) 10 MG tablet TAKE ONE TABLET BY MOUTH DAILY 90 tablet 2    doxepin (SINEQUAN) 50 MG capsule Take 1 capsule by mouth nightly for 10 days 10 capsule 0    levothyroxine (SYNTHROID) 100 MCG tablet Take 1 tablet by mouth Daily 90 tablet 1    losartan (COZAAR) 100 MG tablet TAKE 1 TABLET DAILY 90 tablet 0    pantoprazole (PROTONIX) 40 MG tablet TAKE 1 TABLET TWICE A  tablet 0    SITagliptin (JANUVIA) 100 MG tablet Take 1 tablet by mouth daily 90 tablet 3    nystatin (MYCOSTATIN) 301261 UNIT/ML suspension Take by mouth 4 times daily 1 each 0    metFORMIN (GLUCOPHAGE-XR) 500 MG extended release tablet TAKE 2 TABLETS TWICE A  tablet 3    traZODone (DESYREL) 50 MG tablet TAKE 2 TABLETS NIGHTLY 180 tablet 3    KLOR-CON M20 20 MEQ extended release tablet TAKE 1 TABLET DAILY 90 tablet 3    benzonatate (TESSALON) 100 MG capsule TAKE 1 CAPSULE THREE TIMES A DAY AS NEEDED FOR COUGH 270 capsule 1    Cholecalciferol (VITAMIN D3) 50 MCG (2000 UT) CAPS Take by mouth      venlafaxine (EFFEXOR) 37.5 MG tablet TAKE 1 TABLET DAILY 90 tablet 3    atorvastatin (LIPITOR) 20 MG tablet TAKE 1 TABLET NIGHTLY 90 tablet 3    furosemide (LASIX) 20 MG tablet TAKE 1 TABLET DAILY 90 tablet 3    beclomethasone (QVAR) 80 MCG/ACT inhaler Inhale 2 puffs into the lungs 2 times daily 3 Inhaler 4    levalbuterol (XOPENEX HFA) 45 MCG/ACT inhaler Inhale 2 puffs into the lungs every 6 hours as needed for Shortness of Breath 3 Inhaler 4    ferrous sulfate 325 (65 Fe) MG tablet Take 325 mg by mouth 3 times daily (with meals)      diphenhydrAMINE (BENADRYL) 25 MG tablet Take 50 mg by mouth nightly       aspirin 81 MG EC tablet Take 81 mg by mouth daily       rOPINIRole (REQUIP) 1 MG tablet Take by mouth 1 mg at 1200, 1mg at 1700, 1mg at  1900, 2mg at 2200 (total of 4mg nightly in divided doses)      ursodiol (ACTIGALL) 500 MG tablet Take 500 mg by mouth 2 times daily       calcium carbonate 600 MG TABS tablet Take 1 tablet by mouth daily       Multiple Vitamins-Minerals (CENTRUM PO) Take 1 tablet by mouth daily.       estrogens, conjugated, (PREMARIN) 1.25 MG tablet Take 1.25 mg by mouth daily. Allergies: Allergies   Allergen Reactions    Latex Swelling and Rash    Clindamycin Itching    Pennsaid [Diclofenac Sodium] Itching and Rash    Torsemide Itching    Actos [Pioglitazone] Diarrhea    Amoxicillin Other (See Comments)    Augmentin [Amoxicillin-Pot Clavulanate] Other (See Comments)    Bactrim [Sulfamethoxazole-Trimethoprim] Other (See Comments)     Stomach ache     Ciprofloxacin Other (See Comments)     Makes her weird  Makes anxious and she has weird dreams    Doxycycline Other (See Comments)     Feels like she is smothering, chest tightness    Levaquin [Levofloxacin]      Body aches    Ativan [Lorazepam] Other (See Comments) and Anxiety     And confusion  Had weird dreams and hallucinations    Cephalosporins Rash    Pcn [Penicillins] Rash and Itching    Proventil [Albuterol] Anxiety        Social History:   reports that she quit smoking about 22 years ago. Her smoking use included cigarettes. She started smoking about 66 years ago. She has a 40.00 pack-year smoking history. She has never used smokeless tobacco. She reports that she does not drink alcohol and does not use drugs. Family History:  family history includes Colon Cancer in her mother; Diabetes in her father; Kidney Disease in her paternal grandmother; Lupus in her daughter and another family member; Other in her mother.      Physical Exam:  BP (!) 118/94   Pulse 89   Temp 97.6 °F (36.4 °C) (Oral)   Resp 25   Wt 204 lb (92.5 kg)   SpO2 100%   BMI 32.94 kg/m²     General appearance: Obese elderly female resting in bed; appears chronically ill  eyes: Sclera clear without conjunctival injection; PERRLA; EOMI  ENT: Mucous membranes moist without thrush; normal dentition  Neck: Supple without meningismus; no goiter; no carotid bruit bilaterally  Cardiovascular: Irregularly irregular rhythm; normal S1-S2 with no murmurs; no peripheral edema; no JVD  Respiratory: No tachypnea; CTAB with diminished air exchange, no wheeze, rhonchi or rales; I:E intact  Gastrointestinal: Abdomen soft, non-tender, not distended; bowel sounds normal; no masses/organomegaly appreciated  Musculoskeletal: FROM spine and extremities x4; no gross deformity  Neurology: A&O x3; cranial nerves 2-12 grossly intact; motor 5/5  BUE/BLE; no seizure activity; finger-to-nose/heel-to-shin intact; no pronator drift  Psychiatry: Well-groomed with good eye contact; appropriate affect; no visual/auditory hallucination  Skin: Warm, dry, normal turgor; diffuse abrasions and ecchymosis s/p recurrent falls  PV: 2/4 radial and dorsalis pedis bilaterally; brisk capillary refill    Labs:  CBC:   Lab Results   Component Value Date    WBC 10.3 06/09/2022    RBC 2.71 06/09/2022    RBC 4.28 02/18/2016    HGB 7.9 06/09/2022    HCT 23.3 06/09/2022    MCV 85.8 06/09/2022    MCH 29.2 06/09/2022    MCHC 34.0 06/09/2022    RDW 15.7 06/09/2022     06/09/2022    MPV 7.7 06/09/2022     BMP:    Lab Results   Component Value Date     06/09/2022    K 5.4 06/09/2022    CL 86 06/09/2022    CO2 25 06/09/2022    BUN 20 06/09/2022    CREATININE 1.4 06/09/2022    CALCIUM 8.4 06/09/2022    GFRAA 44 06/09/2022    GFRAA >60 01/07/2010    LABGLOM 37 06/09/2022    GLUCOSE 122 06/09/2022     CT HEAD WO CONTRAST   Final Result   Stable exam without acute intracranial abnormality. XR FEMUR LEFT (MIN 2 VIEWS)   Final Result   Total left knee replacement with no acute bony abnormality. XR KNEE LEFT (3 VIEWS)   Final Result   Total left knee replacement which is unchanged with no acute bony abnormality. Mild edema or cellulitis in the soft tissues around the knee and a small   suprapatellar effusion. XR HIP 2-3 VW W PELVIS LEFT   Final Result   Mild osteoarthritic changes in both hips which is unchanged with no acute   abnormality seen.          XR CHEST PORTABLE   Final Result   Stable mild cardiomegaly with mild central pulmonary congestion or pulmonary   artery hypertension      Hazy bibasilar and right upper lobe atelectasis or early infiltrates which is   more apparent               EKG: Ventricular Rate 63 P BPM QTc Calculation (Bazett) 423 P ms   Atrial Rate 98 P BPM R Axis -45 P degrees   QRS Duration 96 P ms T Axis 23 P degrees   Q-T Interval 414 P ms Diagnosis Atrial fibrillationLeft anterior fascicular blockAbnormal        I visualized CXR images and EKG strips personally and agree with documented interpretation    Discussed case  with ED provider    Problem List:  Principal Problem:    FTT (failure to thrive) in adult  Active Problems:    Acute hyponatremia    COPD (chronic obstructive pulmonary disease) (HCC)    Pernicious anemia    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  *        Consults:  IP CONSULT TO SOCIAL WORK  IP CONSULT TO HOSPITALIST  IP CONSULT TO CASE MANAGEMENT      Assessment/Plan:     FTT  -Pt just discharged from SNF 5 days ago and has already decompensated to the extent of needing readmission  -Fall risk protocol in place with bed alarm activated  -TSH/ free T4, vitamin D, B12/folate, prealbumin and A1c lab work ordered with results currently pending  -EKG without evidence of acute ischemia; results of recent ECHO reviewed and documented above  -PT/OT consultation placed for evaluation and treatment  -Consultation placed to Case Management as patient requires permanent SNF placement    Acute on chronic hyponatremia  -Likely due to cirrhosis hypervolemia  -Patient admitted to telemetry floor with fall and seizure precautions in place; serial neuro checks overnight   -NS bolus administered in ED with gentle maintenance IVF to infuse overnight; strict I's and O's during stay  -Serial renal panels to ensure adequate sodium repletion (while avoiding overly aggressive correction with subsequent CPM)  -Pt on several medications with potential to worsen hyponatremia including: diuretics, NSAIDS, PPI, SSRI/SNRI, neuroleptics    Anemia with hx varices/SNOW  -Admit to telemetry unit with serial H/H scheduled  -Type and screen ordered with PRBC transfusion for hemoglobin less than 7  -Patient with known pernicious anemia and esophageal varices due to SNOW  -Continue twice daily Protonix  -FOBT daily x3    PAF  -Patient currently rate controlled on diltiazem and Coreg, therefore continue  -Continue oral iron supplementation  - Eliquis dosage decreased to 2.5 mg twice daily due to CrCl 39 cc/min  -Patient with chronic anemia, but H&H at baseline therefore anticoagulation continued at this time    Hypothyroidism  -TSH and free T4 pending  -Adjust levothyroxine replacement accordingly    DVT prophylaxis -Eliquis dose decreased to 2.5 mg twice daily  Code status-full code  Diet-cardiac MASON with 1500 cc volume restriction  IV access-PIV established in ED      Admit as inpatient. I anticipate hospitalization spanning more than two midnights for investigation and treatment of the above medically necessary diagnoses. Comment: Please note this report has been produced using speech recognition software and may contain errors related to that system including errors in grammar, punctuation, and spelling, as well as words and phrases that may be inappropriate. If there are any questions or concerns please feel free to contact the dictating provider for clarification.          Aliyn Newell MD    6/9/2022 7:54 PM

## 2022-06-09 NOTE — ED PROVIDER NOTES
[facility]  ED  EMERGENCY DEPARTMENT ENCOUNTER        Pt Name: Jess Espinoza  MRN: [id number]  [place] 1945  Date of evaluation: 6/9/2022  Provider: Benson Jimenez PA-C  PCP: Ankit Aldridge. Munira Taylor DO  ED Attending: Sienna Cartwright      This patient was seen and evaluated by the attending physician   I have not independently evaluated this patient. CHIEF COMPLAINT       Chief Complaint   Patient presents with    [facility]     home health nurse found patient on floor, laid on floor for 15 min       HISTORY OF PRESENT ILLNESS   (Location/Symptom, Timing/Onset, Context/Setting, Quality, Duration, Modifying Factors, Severity)  Note limiting factors. Jess Espinoza is a 69 y.o. female with a history of A. fib, anemia, CAD, COPD 4 L nasal cannula baseline, HTN, GERD, HLD, hypothyroid, SNOW, liver cirrhosis, DM2, presents from home. Patient was discharged from skilled nursing facility 5 days prior to arrival.  Patient was ambulating with a walker and advised that she slipped on her kitchen floor sliding down to the ground. Injured her left knee. Was unable to get back up. Patient denies hitting her head or any loss of consciousness. Abrasion to her left knee. Patient lives independently. Nursing Notes were all reviewed and agreed with or any disagreements were addressed  in the HPI. REVIEW OF SYSTEMS  (2-9 systems for level 4, 10 or more for level 5)     Review of Systems   Constitutional: Negative for chills and fever. HENT: Negative. Negative for congestion, rhinorrhea, sinus pressure, sinus pain and sore throat. Eyes: Negative for discharge, redness and visual disturbance. Respiratory: Negative for cough, chest tightness and shortness of breath. Cardiovascular: Negative for chest pain and palpitations. Gastrointestinal: Negative for abdominal pain, constipation, diarrhea, nausea and vomiting. Genitourinary: Negative for difficulty urinating, dysuria and frequency. Musculoskeletal: Positive for arthralgias and myalgias. Skin: Positive for wound. Neurological: Negative. Negative for dizziness, weakness, numbness and headaches. Psychiatric/Behavioral: Negative. All other systems reviewed and are negative. Positivesand Pertinent negatives as per HPI. Except as noted above in the ROS, all other systems were reviewed and negative.        PAST MEDICAL HISTORY     Past Medical History:   Diagnosis Date    Allergic     Anemia     Anesthesia complication     \"woke up during surgery\"    Arthritis     Osteoarthritis of bilateral CMC joints    Asthma     Followed by Dr. Briseyda Jimenes Atrial fibrillation ([facility]     Cirrhosis ([address]     non alcoholic    Cirrhosis, non-alcoholic ([address]     COPD (chronic obstructive pulmonary disease) ([address]     GERD (gastroesophageal reflux disease)     GIB (gastrointestinal bleeding)     LGIB 4/2016    Haemophilus infection 05/07/2021    + resp    Heart murmur     Hernia     Hyperlipidemia     Hypertension     Lung collapse     Murmur, heart     Pneumonia     Reflux     Rheumatic fever     Stress fracture     right foot    SVT (supraventricular tachycardia) ([address]     6/19/16 - admitted X 1 day per PT     TIA (transient ischemic attack) 2015    Type II or unspecified type diabetes mellitus without mention of complication, not stated as uncontrolled     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 ([address]     Wears glasses     as needed    Wears partial dentures     upper partial         SURGICAL HISTORY       Past Surgical History:   Procedure Laterality Date    BONE MARROW BIOPSY      BREAST BIOPSY      BRONCHOSCOPY N/A 5/7/2021    BRONCHOSCOPY DIAGNOSTIC OR CELL [address] ONLY performed by Albino Alexander MD at [address] [place] [address]  5/7/2021    BRONCHOSCOPY ALVEOLAR LAVAGE performed by Albino Alexander MD at [address] [place] [address]  5/7/2021    BRONCHOSCOPY THERAPUTIC ASPIRATION INITIAL performed by Margarita Jaimes MD at [facility]  07/14/14    CATARACT REMOVAL      COLONOSCOPY      X 3 per PT 6/8/16    COLONOSCOPY N/A 6/10/2021    COLONOSCOPY POLYPECTOMY ABLATION performed by Pj Jimenes MD at [address] Right 06/03/2013    CORAL BUNIONECTOMY RIGHT FOOT WITH SCREW FIXATION;    HERNIA REPAIR      HYSTERECTOMY (CERVIX STATUS UNKNOWN)      JOINT REPLACEMENT Bilateral     TKR    KNEE SURGERY  09    left    KNEE SURGERY  2010    right    UPPER GASTROINTESTINAL ENDOSCOPY N/A 7/21/2018    EGD BIOPSY performed by Michael Saldivar MD at [address] [facility] 6/10/2021    EGD POLYP ABLATION/OTHER performed by Pj Jimenes MD at [address] [employer] [facility] 6/10/2021    EGD BAND LIGATION performed by Pj Jimenes MD at [address] [employer] [facility] 6/6/2022    EGD DIAGNOSTIC ONLY performed by Shanita Peña MD at [address]       Previous Medications    ACLIDINIUM (TUDORZA PRESSAIR) 400 MCG/ACT AEPB INHALER    Inhale 1 puff into the lungs 2 times daily    APIXABAN (ELIQUIS) 5 MG TABS TABLET    Take 1 tablet by mouth 2 times daily    ASPIRIN 81 MG EC TABLET    Take 81 mg by mouth daily     ATORVASTATIN (LIPITOR) 20 MG TABLET    TAKE 1 TABLET NIGHTLY    BECLOMETHASONE (QVAR) 80 MCG/ACT INHALER    Inhale 2 puffs into the lungs 2 times daily    BENZONATATE (TESSALON) 100 MG CAPSULE    TAKE 1 CAPSULE THREE TIMES A DAY AS NEEDED FOR COUGH    CALCIUM CARBONATE 600 MG TABS TABLET    Take 1 tablet by mouth daily     CARVEDILOL (COREG) 6.25 MG TABLET    Take 1 tablet by mouth 2 times daily (with meals)    CETIRIZINE (ZYRTEC) 10 MG TABLET    TAKE ONE TABLET BY MOUTH DAILY    CHOLECALCIFEROL (VITAMIN D3) 50 MCG (2000 UT) CAPS    Take by mouth    CYANOCOBALAMIN 1000 MCG/ML INJECTION    Inject 1 mL into the muscle every 14 days Last dose was taken on 4/9/17    DILTIAZEM (TIAZAC) 240 MG EXTENDED RELEASE CAPSULE    Take 1 capsule by mouth daily    DIPHENHYDRAMINE (BENADRYL) 25 MG TABLET    Take 50 mg by mouth nightly     DOXEPIN (SINEQUAN) 50 MG CAPSULE    Take 1 capsule by mouth nightly for 10 days    ESTROGENS, CONJUGATED, (PREMARIN) 1.25 MG TABLET    Take 1.25 mg by mouth daily. FERROUS SULFATE 325 (65 FE) MG TABLET    Take 325 mg by mouth 3 times daily (with meals)    FUROSEMIDE (LASIX) 20 MG TABLET    TAKE 1 TABLET DAILY    GABAPENTIN (NEURONTIN) 100 MG CAPSULE    Take 100 mg by mouth 3 times daily. GABAPENTIN (NEURONTIN) 600 MG TABLET    Take 600 mg by mouth at bedtime. KLOR-CON M20 20 MEQ EXTENDED RELEASE TABLET    TAKE 1 TABLET DAILY    LEVALBUTEROL (XOPENEX HFA) 45 MCG/ACT INHALER    Inhale 2 puffs into the lungs every 6 hours as needed for Shortness of Breath    LEVOTHYROXINE (SYNTHROID) 100 MCG TABLET    Take 1 tablet by mouth Daily    LIDOCAINE 4 % EXTERNAL PATCH    Place 1 patch onto the skin daily    LOSARTAN (COZAAR) 100 MG TABLET    TAKE 1 TABLET DAILY    METFORMIN (GLUCOPHAGE-XR) 500 MG EXTENDED RELEASE TABLET    TAKE 2 TABLETS TWICE A DAY    MONTELUKAST (SINGULAIR) 10 MG TABLET    Take 1 tablet by mouth daily    MULTIPLE VITAMINS-MINERALS (CENTRUM PO)    Take 1 tablet by mouth daily.     NYSTATIN (MYCOSTATIN) 582611 UNIT/ML SUSPENSION    Take by mouth 4 times daily    ONDANSETRON (ZOFRAN) 4 MG TABLET    Take 1 tablet by mouth 3 times daily as needed for Nausea or Vomiting    PANTOPRAZOLE (PROTONIX) 40 MG TABLET    TAKE 1 TABLET TWICE A DAY    ROPINIROLE (REQUIP) 1 MG TABLET    Take by mouth 1 mg at 1200, 1mg at 1700, 1mg at  1900, 2mg at 2200 (total of 4mg nightly in divided doses)    SITAGLIPTIN (JANUVIA) 100 MG TABLET    Take 1 tablet by mouth daily    TAMSULOSIN (FLOMAX) 0.4 MG CAPSULE    Take 1 capsule by mouth daily    TRAZODONE (DESYREL) 50 MG TABLET    TAKE 2 TABLETS NIGHTLY URSODIOL (ACTIGALL) 500 MG TABLET    Take 500 mg by mouth 2 times daily     VENLAFAXINE (EFFEXOR) 37.5 MG TABLET    TAKE 1 TABLET DAILY         ALLERGIES     Latex, Clindamycin, Pennsaid [diclofenac sodium], Torsemide, Actos [pioglitazone], Amoxicillin, Augmentin [amoxicillin-pot clavulanate], Bactrim [sulfamethoxazole-trimethoprim], Ciprofloxacin, Doxycycline, Levaquin [levofloxacin], Ativan [lorazepam], Cephalosporins, Pcn [penicillins], and Proventil [albuterol]    FAMILY HISTORY       Family History   Problem Relation Age of Onset    Colon Cancer Mother     Other Mother     Diabetes Father     Kidney Disease Paternal Grandmother     Lupus Daughter     Lupus Other          SOCIAL HISTORY       Social History     Socioeconomic History    Marital status:      Spouse name: Damaris Sims \"Avinash\"- 2021 colon cancer    Number of children: 3    Years of education: None    Highest education level: None   Occupational History     Employer: [employer] Use    Smoking status: Former Smoker     Packs/day: 1.00     Years: 40.00     Pack years: 40.00     Types: Cigarettes     Start date: 1956     Quit date: 2000     Years since quittin.0    Smokeless tobacco: Never Used    Tobacco comment: stopped cold turkey in !! ( )   Vaping Use    Vaping Use: Never used   Substance and Sexual Activity    Alcohol use: No     Alcohol/week: 0.0 standard drinks    Drug use: No    Sexual activity: Not Currently   Other Topics Concern    None   Social History Narrative    None     Social Determinants of Health     Financial Resource Strain: Low Risk     Difficulty of Paying Living Expenses: Not hard at all   Food Insecurity: No Food Insecurity    Worried About [address] [employer] [facility] in the Last Year: Never true    Jennifer of Food in the Last Year: Never true   Transportation Needs: No Transportation Needs    Lack of Transportation (Medical):  No    Lack of Transportation (Non-Medical): No   Physical Activity:     Days of Exercise per Week: Not on file    Minutes of Exercise per Session: Not on file   Stress:     Feeling of Stress : Not on file   Social Connections:     Frequency of Communication with Friends and Family: Not on file    Frequency of Social Gatherings with Friends and Family: Not on file    Attends Sikhism Services: Not on file    Active Member of [address] [place] [address] or Organizations: Not on file    Attends Club or Organization Meetings: Not on file    Marital Status: Not on file   Intimate Partner Violence:     Fear of Current or Ex-Partner: Not on file    Emotionally Abused: Not on file    Physically Abused: Not on file    Sexually Abused: Not on file   Housing Stability:     Unable to Pay for Housing in the Last Year: Not on file    Number of [place] in the Last Year: Not on file    Unstable Housing in the Last Year: Not on file       SCREENINGS     NIH Score       [place] Coma Scale  Eye Opening: Spontaneous  Best Verbal Response: Oriented  Best Motor Response: Obeys commands  Michael Coma Scale Score: 15    [facility]     Heart Score         PHYSICAL EXAM    (up to 7 for level 4, 8 ormore for level 5)     ED Triage Vitals [06/09/22 1513]   BP Temp Temp Source Heart Rate Resp SpO2 Height Weight   99/65 98.5 °F (36.9 °C) Oral 62 18 94 % -- 204 lb (92.5 kg)       Physical Exam  Vitals and nursing note reviewed. Constitutional:       Appearance: She is well-developed. She is obese. She is not diaphoretic. Comments: Appears chronically ill   HENT:      Head: Normocephalic. Nose: Nose normal.      Mouth/Throat:      Pharynx: No oropharyngeal exudate. Eyes:      General:         Right eye: No discharge. Left eye: No discharge. Conjunctiva/sclera: Conjunctivae normal.      Pupils: Pupils are equal, round, and reactive to light. Cardiovascular:      Rate and Rhythm: Normal rate and regular rhythm.       Heart sounds: Normal heart sounds. No murmur heard. No friction rub. No gallop. Pulmonary:      Effort: Pulmonary effort is normal. No respiratory distress. Breath sounds: Wheezing present. Abdominal:      General: Bowel sounds are normal. There is no distension. Palpations: Abdomen is soft. Tenderness: There is no abdominal tenderness. Musculoskeletal:         General: Tenderness present. Normal range of motion. Cervical back: Normal range of motion. Comments: +left femur tenderness   Skin:     General: Skin is warm and dry. Comments: Skin tear to left knee. 3 cm x 2 cm   Neurological:      General: No focal deficit present. Mental Status: She is alert. Psychiatric:         Behavior: Behavior normal.         DIAGNOSTIC RESULTS   LABS:    Labs Reviewed   CBC WITH AUTO DIFFERENTIAL - Abnormal; Notable for the following components:       Result Value    RBC 2.71 (*)     Hemoglobin 7.9 (*)     Hematocrit 23.3 (*)     RDW 15.7 (*)     Neutrophils Absolute 8.6 (*)     Lymphocytes Absolute 0.8 (*)     All other components within normal limits   COMPREHENSIVE METABOLIC PANEL W/ REFLEX TO MG FOR LOW K - Abnormal; Notable for the following components:    Sodium 121 (*)     Potassium reflex Magnesium 5.4 (*)     Chloride 86 (*)     Glucose 122 (*)     CREATININE 1.4 (*)     GFR Non- 37 (*)     GFR African American 44 (*)     Albumin/Globulin Ratio 1.0 (*)     All other components within normal limits   URINALYSIS WITH REFLEX TO CULTURE       All other labs were within normal range or notreturned as of this dictation. EKG: All EKG's are interpreted by the Emergency Department Physician who either signs or Co-signs this chart in the absence of a cardiologist.  Please see their note for interpretation of EKG.       RADIOLOGY:   Interpretation per the Radiologist below, if available at the time of this note:    CT HEAD WO CONTRAST   Final Result   Stable exam without acute intracranial abnormality. XR FEMUR LEFT (MIN 2 VIEWS)   Final Result   Total left knee replacement with no acute bony abnormality. XR KNEE LEFT (3 VIEWS)   Final Result   Total left knee replacement which is unchanged with no acute bony abnormality. Mild edema or cellulitis in the soft tissues around the knee and a small   suprapatellar effusion. XR HIP 2-3 VW W PELVIS LEFT   Final Result   Mild osteoarthritic changes in both hips which is unchanged with no acute   abnormality seen. XR CHEST PORTABLE   Final Result   Stable mild cardiomegaly with mild central pulmonary congestion or pulmonary   artery hypertension      Hazy bibasilar and right upper lobe atelectasis or early infiltrates which is   more apparent                 CONSULTS:  [address]  Discussed with [place] ED  who patient is well-known to, she advised that the patient will require overnight stay PT OT evaluation before she can be placed back into skilled nursing facility. EMERGENCY DEPARTMENT COURSE and DIFFERENTIAL DIAGNOSIS/MDM:   Vitals:    Vitals:    06/09/22 1513 06/09/22 1746   BP: 99/65 121/66   Pulse: 62 70   Resp: 18 24   Temp: 98.5 °F (36.9 °C)    TempSrc: Oral    SpO2: 94% 93%   Weight: 204 lb (92.5 kg)        Patient was given the following medications:  Medications   0.9 % sodium chloride bolus (500 mLs IntraVENous New Bag 6/9/22 1717)     Cirrhosis of liver/SNOW history of esophageal varices status post banding in 6/10/2021   Admitted with anemia positive Hemoccult, GI consult underwent EGD on 6/6/2022, anticoagulated due to A. Fibb.     Afebrile, stable, patient presents to the ED for evaluation. Seen in conjunction with attending ED provider who agrees with assessment and plan. Chronically ill patient SPO2 of 94% on 4 L nasal cannula. Lung chronic hypoxemia.   Patient is alert and oriented x3 reports having a mechanical fall with injury to her left lower extremity patient uses a walker to ambulate at baseline. Other than pain in her left lower extremity patient has no complaints on arrival.  Patient lives alone. Patient advised that she feels safe living alone. However as patient is unable to ambulate feel that the likely needs higher level of care than can be provided in her home. Discussed possible admission with the patient patient is agreeable with admission and discharge to skilled nursing facility. Patient discusses her preference for where to be discharged to with social work. All questions are answered. The patient tolerated their visit well. They were seen and evaluated by the attendingphysician, No att. providers found who agreed with the assessment and plan. The patient and / or the family were informed of the results of any tests, a time was given to answer questions, a plan was proposed and they agreed [place]. FINAL IMPRESSION      1. Anemia, unspecified type    2. Fall, initial encounter    3. Abrasion of left knee, initial encounter    4. Deterioration in ability to walk    5. YELENA (acute kidney injury) [facility])          DISPOSITION/PLAN   DISPOSITION        PATIENT REFERRED TO:  [address] [address]  [phone]            DISCHARGE MEDICATIONS:  New Prescriptions    No medications on file       DISCONTINUED MEDICATIONS:  Discontinued Medications    No medications on file              Pt was seen during the Bernestine Cleo 19 pandemic. Appropriate PPE worn by ME during patient encounters. Pt seen during a time with constrained hospital bed capacity and other potential inpatient and outpatient resources were constrained due to the viral pandemic.    Please note that portions of this note were completed with a voice recognition program.  Efforts were made to edit the dictations but occasionally words are mis-transcribed.)    Gianni Bonds PA-C (electronically signed) Essence Hinojosa PA-C  06/10/22 0244

## 2022-06-10 LAB
A/G RATIO: 1 (ref 1.1–2.2)
ALBUMIN SERPL-MCNC: 3.1 G/DL (ref 3.4–5)
ALBUMIN SERPL-MCNC: 3.3 G/DL (ref 3.4–5)
ALBUMIN SERPL-MCNC: 3.3 G/DL (ref 3.4–5)
ALBUMIN SERPL-MCNC: 3.7 G/DL (ref 3.4–5)
ALP BLD-CCNC: 54 U/L (ref 40–129)
ALT SERPL-CCNC: 9 U/L (ref 10–40)
ANION GAP SERPL CALCULATED.3IONS-SCNC: 11 MMOL/L (ref 3–16)
ANION GAP SERPL CALCULATED.3IONS-SCNC: 11 MMOL/L (ref 3–16)
ANION GAP SERPL CALCULATED.3IONS-SCNC: 13 MMOL/L (ref 3–16)
ANION GAP SERPL CALCULATED.3IONS-SCNC: 8 MMOL/L (ref 3–16)
AST SERPL-CCNC: 16 U/L (ref 15–37)
BACTERIA: ABNORMAL /HPF
BASOPHILS ABSOLUTE: 0 K/UL (ref 0–0.2)
BASOPHILS RELATIVE PERCENT: 0.4 %
BILIRUB SERPL-MCNC: 0.4 MG/DL (ref 0–1)
BILIRUBIN URINE: NEGATIVE
BLOOD, URINE: ABNORMAL
BUN BLDV-MCNC: 14 MG/DL (ref 7–20)
BUN BLDV-MCNC: 16 MG/DL (ref 7–20)
CALCIUM SERPL-MCNC: 8 MG/DL (ref 8.3–10.6)
CALCIUM SERPL-MCNC: 8.1 MG/DL (ref 8.3–10.6)
CALCIUM SERPL-MCNC: 8.2 MG/DL (ref 8.3–10.6)
CALCIUM SERPL-MCNC: 8.3 MG/DL (ref 8.3–10.6)
CHLORIDE BLD-SCNC: 92 MMOL/L (ref 99–110)
CHLORIDE BLD-SCNC: 93 MMOL/L (ref 99–110)
CHLORIDE BLD-SCNC: 95 MMOL/L (ref 99–110)
CHLORIDE BLD-SCNC: 95 MMOL/L (ref 99–110)
CLARITY: ABNORMAL
CO2: 27 MMOL/L (ref 21–32)
CO2: 30 MMOL/L (ref 21–32)
COLOR: ABNORMAL
CREAT SERPL-MCNC: 1 MG/DL (ref 0.6–1.2)
CREAT SERPL-MCNC: 1.1 MG/DL (ref 0.6–1.2)
CREAT SERPL-MCNC: 1.1 MG/DL (ref 0.6–1.2)
CREAT SERPL-MCNC: 1.2 MG/DL (ref 0.6–1.2)
EKG ATRIAL RATE: 98 BPM
EKG DIAGNOSIS: NORMAL
EKG Q-T INTERVAL: 414 MS
EKG QRS DURATION: 96 MS
EKG QTC CALCULATION (BAZETT): 423 MS
EKG R AXIS: -45 DEGREES
EKG T AXIS: 23 DEGREES
EKG VENTRICULAR RATE: 63 BPM
EOSINOPHILS ABSOLUTE: 0.1 K/UL (ref 0–0.6)
EOSINOPHILS RELATIVE PERCENT: 1.6 %
EPITHELIAL CELLS, UA: ABNORMAL /HPF (ref 0–5)
GFR AFRICAN AMERICAN: 53
GFR AFRICAN AMERICAN: 58
GFR AFRICAN AMERICAN: 58
GFR AFRICAN AMERICAN: >60
GFR NON-AFRICAN AMERICAN: 44
GFR NON-AFRICAN AMERICAN: 48
GFR NON-AFRICAN AMERICAN: 48
GFR NON-AFRICAN AMERICAN: 54
GLUCOSE BLD-MCNC: 100 MG/DL (ref 70–99)
GLUCOSE BLD-MCNC: 109 MG/DL (ref 70–99)
GLUCOSE BLD-MCNC: 124 MG/DL (ref 70–99)
GLUCOSE BLD-MCNC: 138 MG/DL (ref 70–99)
GLUCOSE BLD-MCNC: 146 MG/DL (ref 70–99)
GLUCOSE BLD-MCNC: 163 MG/DL (ref 70–99)
GLUCOSE BLD-MCNC: 98 MG/DL (ref 70–99)
GLUCOSE URINE: NEGATIVE MG/DL
HCT VFR BLD CALC: 22.5 % (ref 36–48)
HEMOGLOBIN: 7.6 G/DL (ref 12–16)
KETONES, URINE: NEGATIVE MG/DL
LEUKOCYTE ESTERASE, URINE: ABNORMAL
LYMPHOCYTES ABSOLUTE: 0.7 K/UL (ref 1–5.1)
LYMPHOCYTES RELATIVE PERCENT: 10.9 %
MCH RBC QN AUTO: 29.6 PG (ref 26–34)
MCHC RBC AUTO-ENTMCNC: 33.8 G/DL (ref 31–36)
MCV RBC AUTO: 87.5 FL (ref 80–100)
MICROSCOPIC EXAMINATION: YES
MONOCYTES ABSOLUTE: 0.6 K/UL (ref 0–1.3)
MONOCYTES RELATIVE PERCENT: 10 %
NEUTROPHILS ABSOLUTE: 4.8 K/UL (ref 1.7–7.7)
NEUTROPHILS RELATIVE PERCENT: 77.1 %
NITRITE, URINE: POSITIVE
OCCULT BLOOD SCREENING: NORMAL
PDW BLD-RTO: 16 % (ref 12.4–15.4)
PERFORMED ON: ABNORMAL
PH UA: 6 (ref 5–8)
PHOSPHORUS: 3.5 MG/DL (ref 2.5–4.9)
PHOSPHORUS: 3.5 MG/DL (ref 2.5–4.9)
PHOSPHORUS: 4 MG/DL (ref 2.5–4.9)
PLATELET # BLD: 171 K/UL (ref 135–450)
PMV BLD AUTO: 7.4 FL (ref 5–10.5)
POTASSIUM REFLEX MAGNESIUM: 4.1 MMOL/L (ref 3.5–5.1)
POTASSIUM SERPL-SCNC: 4.1 MMOL/L (ref 3.5–5.1)
POTASSIUM SERPL-SCNC: 4.2 MMOL/L (ref 3.5–5.1)
POTASSIUM SERPL-SCNC: 4.3 MMOL/L (ref 3.5–5.1)
PROTEIN UA: ABNORMAL MG/DL
RBC # BLD: 2.57 M/UL (ref 4–5.2)
RBC UA: ABNORMAL /HPF (ref 0–4)
SODIUM BLD-SCNC: 130 MMOL/L (ref 136–145)
SODIUM BLD-SCNC: 133 MMOL/L (ref 136–145)
SPECIFIC GRAVITY UA: <=1.005 (ref 1–1.03)
TOTAL PROTEIN: 6.3 G/DL (ref 6.4–8.2)
URINE REFLEX TO CULTURE: YES
URINE TYPE: ABNORMAL
UROBILINOGEN, URINE: 0.2 E.U./DL
WBC # BLD: 6.3 K/UL (ref 4–11)
WBC UA: >100 /HPF (ref 0–5)

## 2022-06-10 PROCEDURE — 2700000000 HC OXYGEN THERAPY PER DAY

## 2022-06-10 PROCEDURE — 81001 URINALYSIS AUTO W/SCOPE: CPT

## 2022-06-10 PROCEDURE — 83036 HEMOGLOBIN GLYCOSYLATED A1C: CPT

## 2022-06-10 PROCEDURE — 80053 COMPREHEN METABOLIC PANEL: CPT

## 2022-06-10 PROCEDURE — 87086 URINE CULTURE/COLONY COUNT: CPT

## 2022-06-10 PROCEDURE — 87186 SC STD MICRODIL/AGAR DIL: CPT

## 2022-06-10 PROCEDURE — 6370000000 HC RX 637 (ALT 250 FOR IP): Performed by: HOSPITALIST

## 2022-06-10 PROCEDURE — 94761 N-INVAS EAR/PLS OXIMETRY MLT: CPT

## 2022-06-10 PROCEDURE — 97110 THERAPEUTIC EXERCISES: CPT

## 2022-06-10 PROCEDURE — 36415 COLL VENOUS BLD VENIPUNCTURE: CPT

## 2022-06-10 PROCEDURE — 94640 AIRWAY INHALATION TREATMENT: CPT

## 2022-06-10 PROCEDURE — 87077 CULTURE AEROBIC IDENTIFY: CPT

## 2022-06-10 PROCEDURE — 82270 OCCULT BLOOD FECES: CPT

## 2022-06-10 PROCEDURE — 2580000003 HC RX 258: Performed by: NURSE PRACTITIONER

## 2022-06-10 PROCEDURE — 6360000002 HC RX W HCPCS: Performed by: NURSE PRACTITIONER

## 2022-06-10 PROCEDURE — 85025 COMPLETE CBC W/AUTO DIFF WBC: CPT

## 2022-06-10 PROCEDURE — 96365 THER/PROPH/DIAG IV INF INIT: CPT

## 2022-06-10 PROCEDURE — 97116 GAIT TRAINING THERAPY: CPT

## 2022-06-10 PROCEDURE — 97530 THERAPEUTIC ACTIVITIES: CPT

## 2022-06-10 PROCEDURE — 6370000000 HC RX 637 (ALT 250 FOR IP)

## 2022-06-10 PROCEDURE — 97162 PT EVAL MOD COMPLEX 30 MIN: CPT

## 2022-06-10 PROCEDURE — G0378 HOSPITAL OBSERVATION PER HR: HCPCS

## 2022-06-10 PROCEDURE — 93010 ELECTROCARDIOGRAM REPORT: CPT | Performed by: INTERNAL MEDICINE

## 2022-06-10 RX ORDER — INSULIN LISPRO 100 [IU]/ML
0-6 INJECTION, SOLUTION INTRAVENOUS; SUBCUTANEOUS
Status: DISCONTINUED | OUTPATIENT
Start: 2022-06-10 | End: 2022-06-14 | Stop reason: HOSPADM

## 2022-06-10 RX ORDER — INSULIN LISPRO 100 [IU]/ML
0-3 INJECTION, SOLUTION INTRAVENOUS; SUBCUTANEOUS NIGHTLY
Status: DISCONTINUED | OUTPATIENT
Start: 2022-06-10 | End: 2022-06-14 | Stop reason: HOSPADM

## 2022-06-10 RX ORDER — DEXTROSE MONOHYDRATE 50 MG/ML
100 INJECTION, SOLUTION INTRAVENOUS PRN
Status: DISCONTINUED | OUTPATIENT
Start: 2022-06-10 | End: 2022-06-14 | Stop reason: HOSPADM

## 2022-06-10 RX ADMIN — CEFTRIAXONE SODIUM 1000 MG: 1 INJECTION, POWDER, FOR SOLUTION INTRAMUSCULAR; INTRAVENOUS at 13:09

## 2022-06-10 RX ADMIN — ACETAMINOPHEN 650 MG: 325 TABLET ORAL at 22:16

## 2022-06-10 RX ADMIN — ASPIRIN 81 MG: 81 TABLET, COATED ORAL at 09:37

## 2022-06-10 RX ADMIN — APIXABAN 2.5 MG: 2.5 TABLET, FILM COATED ORAL at 20:46

## 2022-06-10 RX ADMIN — POTASSIUM CHLORIDE 20 MEQ: 20 TABLET, EXTENDED RELEASE ORAL at 11:01

## 2022-06-10 RX ADMIN — APIXABAN 2.5 MG: 2.5 TABLET, FILM COATED ORAL at 09:37

## 2022-06-10 RX ADMIN — PANTOPRAZOLE SODIUM 40 MG: 40 TABLET, DELAYED RELEASE ORAL at 20:46

## 2022-06-10 RX ADMIN — ROPINIROLE HYDROCHLORIDE 0.5 MG: 0.5 TABLET, FILM COATED ORAL at 20:45

## 2022-06-10 RX ADMIN — TRAZODONE HYDROCHLORIDE 50 MG: 50 TABLET ORAL at 20:46

## 2022-06-10 RX ADMIN — CALCIUM 500 MG: 500 TABLET ORAL at 09:38

## 2022-06-10 RX ADMIN — DILTIAZEM HYDROCHLORIDE 240 MG: 240 CAPSULE, COATED, EXTENDED RELEASE ORAL at 09:39

## 2022-06-10 RX ADMIN — ROPINIROLE HYDROCHLORIDE 0.5 MG: 0.5 TABLET, FILM COATED ORAL at 13:05

## 2022-06-10 RX ADMIN — FERROUS SULFATE TAB 325 MG (65 MG ELEMENTAL FE) 325 MG: 325 (65 FE) TAB at 11:01

## 2022-06-10 RX ADMIN — LOSARTAN POTASSIUM 100 MG: 100 TABLET, FILM COATED ORAL at 11:01

## 2022-06-10 RX ADMIN — ACETAMINOPHEN 650 MG: 325 TABLET ORAL at 13:05

## 2022-06-10 RX ADMIN — ATORVASTATIN CALCIUM 20 MG: 10 TABLET, FILM COATED ORAL at 20:45

## 2022-06-10 RX ADMIN — FERROUS SULFATE TAB 325 MG (65 MG ELEMENTAL FE) 325 MG: 325 (65 FE) TAB at 17:17

## 2022-06-10 RX ADMIN — CETIRIZINE HYDROCHLORIDE 10 MG: 10 TABLET, FILM COATED ORAL at 09:38

## 2022-06-10 RX ADMIN — GABAPENTIN 600 MG: 300 CAPSULE ORAL at 20:45

## 2022-06-10 RX ADMIN — ROPINIROLE HYDROCHLORIDE 0.5 MG: 0.5 TABLET, FILM COATED ORAL at 17:17

## 2022-06-10 RX ADMIN — GABAPENTIN 100 MG: 100 CAPSULE ORAL at 09:38

## 2022-06-10 RX ADMIN — Medication 2 PUFF: at 20:53

## 2022-06-10 RX ADMIN — LEVOTHYROXINE SODIUM 100 MCG: 0.1 TABLET ORAL at 05:19

## 2022-06-10 RX ADMIN — PANTOPRAZOLE SODIUM 40 MG: 40 TABLET, DELAYED RELEASE ORAL at 09:37

## 2022-06-10 RX ADMIN — FERROUS SULFATE TAB 325 MG (65 MG ELEMENTAL FE) 325 MG: 325 (65 FE) TAB at 09:46

## 2022-06-10 RX ADMIN — CARVEDILOL 6.25 MG: 6.25 TABLET, FILM COATED ORAL at 17:17

## 2022-06-10 RX ADMIN — ROPINIROLE HYDROCHLORIDE 0.5 MG: 0.5 TABLET, FILM COATED ORAL at 09:38

## 2022-06-10 RX ADMIN — Medication 2 PUFF: at 07:45

## 2022-06-10 RX ADMIN — VENLAFAXINE 37.5 MG: 37.5 TABLET ORAL at 09:39

## 2022-06-10 RX ADMIN — TIOTROPIUM BROMIDE INHALATION SPRAY 2 PUFF: 3.12 SPRAY, METERED RESPIRATORY (INHALATION) at 07:45

## 2022-06-10 RX ADMIN — MONTELUKAST SODIUM 10 MG: 10 TABLET ORAL at 09:37

## 2022-06-10 RX ADMIN — TAMSULOSIN HYDROCHLORIDE 0.4 MG: 0.4 CAPSULE ORAL at 09:38

## 2022-06-10 RX ADMIN — CARVEDILOL 6.25 MG: 6.25 TABLET, FILM COATED ORAL at 09:46

## 2022-06-10 RX ADMIN — FUROSEMIDE 20 MG: 20 TABLET ORAL at 09:39

## 2022-06-10 RX ADMIN — Medication 1 TABLET: at 09:38

## 2022-06-10 RX ADMIN — Medication: at 09:40

## 2022-06-10 ASSESSMENT — PAIN SCALES - GENERAL
PAINLEVEL_OUTOF10: 0

## 2022-06-10 NOTE — PROGRESS NOTES
4 Eyes Skin Assessment     The patient is being assess for   Admission    I agree that 2 RN's have performed a thorough Head to Toe Skin Assessment on the patient. ALL assessment sites listed below have been assessed. Areas assessed for pressure by both nurses:   [x]   Head, Face, and Ears   [x]   Shoulders, Back, and Chest, Abdomen  [x]   Arms, Elbows, and Hands   [x]   Coccyx, Sacrum, and Ischium  [x]   Legs, Feet, and Heels        Skin Assessed Under all Medical Devices by both nurses:  n/a              All Mepilex Borders were peeled back and area peeked at by both nurses:  No: n/a  Please list where Mepilex Borders are located:  n/a             **SHARE this note so that the co-signing nurse is able to place an eSignature**    Co-signer eSignature: Electronically signed by Raphael Villasenor RN on 6/10/22 at 12:29 AM EDT    Does the Patient have Skin Breakdown related to pressure? No            Jack Prevention initiated:  Yes   Wound Care Orders initiated:  Yes    Patient has scabbing and bruising scattered amongst her body, but predominately on her buttocks. She also has a skin tear on her L knee and a mepilex was placed.       [id number] [facility] nurse consulted for Pressure Injury (Stage 3,4, Unstageable, DTI, NWPT, Complex wounds)and New or Established Ostomies:  No      Primary Nurse eSignature: Electronically signed by Von White on 6/10/22 at 12:20 AM EDT

## 2022-06-10 NOTE — PROGRESS NOTES
Physical Therapy  Facility/Department: [facility] C5 - MED SURG/ORTHO  Physical Therapy Initial Assessment    Name: Rosalio Patel  : 1945  MRN: [id number]  Date of Service: 6/10/2022    Discharge Recommendations:  [address]          Patient Diagnosis(es): The primary encounter diagnosis was Anemia, unspecified type. Diagnoses of Fall, initial encounter, Abrasion of left knee, initial encounter, Deterioration in ability to walk, YELENA (acute kidney injury) ([address] and Hyponatremia were also pertinent to this visit. Past Medical History:  has a past medical history of Allergic, Anemia, Anesthesia complication, Arthritis, Asthma, Atrial fibrillation ([address] Cirrhosis ([address] Cirrhosis, non-alcoholic ([address] COPD (chronic obstructive pulmonary disease) ([address] GERD (gastroesophageal reflux disease), GIB (gastrointestinal bleeding), Haemophilus infection, Heart murmur, Hernia, Hyperlipidemia, Hypertension, Lung collapse, Murmur, heart, Pneumonia, Reflux, Rheumatic fever, Stress fracture, SVT (supraventricular tachycardia) ([address] TIA (transient ischemic attack), Type II or unspecified type diabetes mellitus without mention of complication, not stated as uncontrolled, 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 Past Surgical History:  has a past surgical history that includes hernia repair; Hysterectomy; joint replacement (Bilateral); Foot surgery (Right, 2013); knee surgery (); knee surgery (); Cardiac catheterization (14); Cataract removal; bone marrow biopsy; Breast biopsy; fine needle aspiration; Colonoscopy; Upper gastrointestinal endoscopy (N/A, 2018); bronchoscopy (N/A, 2021); bronchoscopy (2021); bronchoscopy (2021); Colonoscopy (N/A, 6/10/2021); Upper gastrointestinal endoscopy (N/A, 6/10/2021);  Upper gastrointestinal endoscopy (N/A, 6/10/2021); and Upper gastrointestinal endoscopy (N/A, 6/6/2022). Assessment   Body Structures, Functions, Activity Limitations Requiring Skilled Therapeutic Intervention: Decreased functional mobility ; Decreased ADL status; Decreased ROM; Decreased balance;Decreased endurance;Decreased strength; Increased pain;Decreased posture;Decreased coordination  Assessment: Pt is 70 y/o F who presents w/ c/o LLE pain after fall in home. Pt was previously admitted to SNF and reports that PLOF after SNF required no assistance from another person but that she did require use of RW. Pt presents below baseline in that she requires grossly CGA for assistance w/ OOB mobility/sit to stand transfers and dec activity tolerance d/t pain and fatigue. Pt will benefit from skilled PT services to improve function towards goals. Pt will benefit from SNF upon d/c. Treatment Diagnosis: Decreased functional mobility  Therapy Prognosis: Good  Decision Making: Low Complexity  Requires PT Follow-Up: Yes  Activity Tolerance  Activity Tolerance: Patient tolerated evaluation without incident;Patient limited by fatigue;Patient limited by pain     Plan   Plan  Plan: 5-7 times per week  Current Treatment Recommendations: Strengthening,ROM,Balance training,Functional mobility training,Transfer training,ADL/Self-care training,IADL training,Neuromuscular re-education,Stair training,Gait training,Endurance training,Safety education & training,Patient/Caregiver education & training,Home exercise program,Pain management,Therapeutic activities  Safety Devices  Type of Devices:  All chetan prominences offloaded,All fall risk precautions in place,Bed alarm in place,Call light within reach,Left in bed,Gait belt,Nurse notified     Restrictions  Restrictions/Precautions  Restrictions/Precautions: General Precautions  Position Activity Restriction  Other position/activity restrictions: IV, PW     Subjective   Pain: Pt reports 7/10 in LLE  General  Chart Reviewed: Yes  Patient assessed for rehabilitation services?: Yes  Additional Pertinent Hx: Per H&P \":  Jalil Lares is a 69 y.o. female who presented to the ED Jalil Lares is a 69 y.o. female with a history of A. fib, anemia, CAD, COPD 4 L nasal cannula baseline, HTN, GERD, HLD, hypothyroid, SNOW with cirrhosis, DM2, presents from home. Patient was discharged from skilled nursing facility 5 days prior to arrival.  Patient was ambulating with a walker and advised that she slipped on her kitchen floor sliding down to the ground. Injured her left knee. Was unable to get back up. Patient denies hitting her head or any loss of consciousness. Upon arrival to the ED EKG was obtained revealing A. fib with LAFB. STAT head CT negative for acute intracranial abnormality. CXR with stable cardiomegaly and mild central pulmonary vascular congestion versus PAH. Mention made of RUL and bibasilar atelectasis versus infiltrates. Notable labs include: Acute hyponatremia 121, hyper kalemia 5.4, BUN/CR 20/1.4, and chronic anemia at baseline with H/H 7.9/23. 3. Hospitalist team consulted to admit this patient such that case management may arrange for permanent SNF placement to address her adult FTT with recurrent hospitalizations. \"  Response To Previous Treatment: Not applicable  Family / Caregiver Present: No  Referring Practitioner: Dr. Dayday Mills  Referral Date : 06/10/22  Diagnosis: Hyponatremia  Follows Commands: Within Functional Limits  General Comment  Comments: Pt cleared for PT eval by RN  Subjective  Subjective: Pt agreeable to PT eval         Social/Functional History  Social/Functional History  Lives With: Alone  Type of Home: House  Home Layout: Two level (Has chair lift)  Home Access: Level entry  Bathroom Shower/Tub: Walk-in shower  Bathroom Toilet: Handicap height  Bathroom Equipment: Grab bars in shower,Shower chair  Bathroom Accessibility: Accessible  Home Equipment: Rollator,Walker, rolling,Cane, quad  Has the patient had two or more falls in the past year or any fall with injury in the past year?: Yes  Receives Help From: Family  ADL Assistance: [address]: Independent  Homemaking Responsibilities: Yes  Ambulation Assistance: Independent  Transfer Assistance: Independent  Active : Yes  Mode of Transportation: Car  Occupation: Retired  Leisure & Hobbies: Shopping and watching TV  Vision/Hearing  Vision  Vision: Impaired  Vision Exceptions: Cataracts  Hearing  Hearing: Within functional limits    Cognition   Orientation  Overall Orientation Status: Within Normal Limits  Orientation Level: Oriented X4  Cognition  Overall Cognitive Status: WNL     Objective   Heart Rate: 92  Heart Rate Source: Monitor  BP: 130/63  BP Location: Right upper arm  MAP (Calculated): 85.33  Resp: 18  SpO2: 97 %  O2 Device: Nasal cannula (4L)     Observation/Palpation  Posture: Good  Gross Assessment  AROM: Generally decreased, functional  PROM: Generally decreased, functional  Strength: Generally decreased, functional  Coordination: Generally decreased, functional  Tone: Normal  Sensation: Intact                 Bed Mobility Training  Bed Mobility Training: Yes  Overall Level of Assistance: Stand-by assistance  Supine to Sit: Stand-by assistance; Additional time  Sit to Supine: Stand-by assistance; Additional time  Scooting: Stand-by assistance;Setup; Additional time  Balance  Sitting: Intact  Standing: Impaired  Standing - Static: Fair  Standing - Dynamic: Constant support  Transfer Training  Transfer Training: Yes (Assume NWB on LLE d/t level of pain and PT continuing to wait on results of imaging for LLE). Overall Level of Assistance: Additional time; Adaptive equipment;Contact-guard assistance  Interventions: Verbal cues; Safety awareness training  Sit to Stand: Stand-by assistance; Additional time; Adaptive equipment  Stand to Sit: Stand-by assistance; Adaptive equipment; Additional time   Gait Training  Gait Training: No    AM-PAC Score  AM-PAC Inpatient Mobility Raw Score : 12 (06/10/22 1216)  AM-PAC Inpatient T-Scale Score : 35.33 (06/10/22 1216)  Mobility Inpatient CMS 0-100% Score: 68.66 (06/10/22 1216)  Mobility Inpatient CMS G-Code Modifier : CL (06/10/22 1216)          Goals  Short Term Goals  Time Frame for Short term goals: 1 week, 6/17/22  Short term goal 1: Pt will perform bed mobility w/ Adrian  Short term goal 2: Pt will perform sit to stand w/ LRAD and SBA  Short term goal 3: Pt will perform 50 ft of amb w/ LRAD and CGA  Short term goal 4: Pt will complete 12-15 reps of BLE exercises to improve function towards goals by 6/13  Patient Goals   Patient goals : \"to go home\"       Education  Patient Education  Education Given To: Patient  Education Provided: Role of Therapy;Plan of Care;Home Exercise Program;Energy Conservation  Education Provided Comments: Pt educated on importance of OOB moblity for current deficits. Pt reports pain is limiting factor for LLE, pt educated on NWB for LLE for improved comfort and continued mobility. Pt demos understanding of instructions. Education Method: Demonstration;Verbal  Barriers to Learning: None  Education Outcome: Verbalized understanding;Demonstrated understanding      Therapy Time   Individual Concurrent Group Co-treatment   Time In 1015         Time Out 1048         Minutes 33         Timed Code Treatment Minutes: 23 Minutes plus 10 min jeremi Bertrand PT    If pt is unable to be seen after this session, please let this note serve as discharge summary. Please see case management note for discharge disposition. Thank you.

## 2022-06-10 NOTE — ACP (ADVANCE CARE PLANNING)
ADVANCED CARE PLANNING    Name:Flakita Bhardwaj       :  1945              MRN:  [id number]  Admission Date: 2022  3:11 PM  Date of Discussion:  6/10/2022      Purpose of Encounter: Advanced care planning in light of hospitalization. Parties in attendance: :ZIGGY Bennett CNP, Family members:  Decisional Capacity:Yes      Objective/Medical Story: Chivo Lawson a 69 y. o. female who presented to the ED Annteta Bhardwaj is a 69 y. o. female with a history of A. fib, anemia, CAD, COPD 4 L nasal cannula baseline, HTN, GERD, HLD, hypothyroid, SNOW with cirrhosis, DM2, presents from home.  Patient was discharged from skilled nursing facility 5 days prior to arrival.  Patient was ambulating with a walker and advised that she slipped on her kitchen floor sliding down to the ground.  Injured her left knee.  Was unable to get back up.  Patient denies hitting her head or any loss of consciousness.       Upon arrival to the ED EKG was obtained revealing A. fib with LAFB.  STAT head CT negative for acute intracranial abnormality.  CXR with stable cardiomegaly and mild central pulmonary vascular congestion versus PAH.  Mention made of RUL and bibasilar atelectasis versus infiltrates.  Notable labs include: Acute hyponatremia 121, hyper kalemia 5.4, BUN/CR 20/1.4, and chronic anemia at baseline with H/H 7.9/23. 3.  Hospitalist team consulted to admit this patient such that case management may arrange for permanent SNF placement to address her adult FTT with recurrent hospitalizations. .    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 Goals of Care Determinations: Patient wishes to focus on treatment and recovery. Code Status: Currently patient wishes to be Full Code         Time Spent on Advanced Planning Documents: 16 mins. The following items were considered in medical decision making:  Independent review of images , Review / order clinical lab tests. Review / order radiology tests, Decision to obtain old records. Advanced Care Planning Documents: Completed advances directives, advanced directives in chart. Electronically signed by ZGIGY Huitron CNP on 6/10/2022 at 2:35 PM    Thank you Burt Terry. [email or web address] [email or web address] for the opportunity to be involved in this patient's care. If you have any questions or concerns, please feel free to contact me at [phone].

## 2022-06-10 NOTE — PROGRESS NOTES
06/09/22 5326   RT Protocol   History Pulmonary Disease 1   Respiratory pattern 0   Breath sounds 2   Cough 0   Indications for Bronchodilator Therapy On home bronchodilators   Bronchodilator Assessment Score 3

## 2022-06-10 NOTE — CARE COORDINATION
CM met with pt at bedside with SNF list. Pt inquiring about the [facility], not in network. Pt now stating that she will return back to [facility]. Spoke to [facility] in admissions and she is starting precert on this day. No covid test required.  CM following-Herlinda Toney RN

## 2022-06-10 NOTE — PROGRESS NOTES
Hospitalist Progress Note      PCP: Kaylee Diaz,     Date of Admission: 6/9/2022    Chief Complaint: [facility] Course: Jose Wolfe is a 69 y.o. female who presented to the ED Antony Bhardwaj is a 69 y. o. female with a history of A. fib, anemia, CAD, COPD 4 L nasal cannula baseline, HTN, GERD, HLD, hypothyroid, SNOW with cirrhosis, DM2, presents from home.  Patient was discharged from skilled nursing facility 5 days prior to arrival.  Patient was ambulating with a walker and advised that she slipped on her kitchen floor sliding down to the ground.  Injured her left knee.  Was unable to get back up.  Patient denies hitting her head or any loss of consciousness.       Upon arrival to the ED EKG was obtained revealing A. fib with LAFB. STAT head CT negative for acute intracranial abnormality. CXR with stable cardiomegaly and mild central pulmonary vascular congestion versus PAH. Mention made of RUL and bibasilar atelectasis versus infiltrates. Notable labs include: Acute hyponatremia 121, hyper kalemia 5.4, BUN/CR 20/1.4, and chronic anemia at baseline with H/H 7.9/23. 3. Hospitalist team consulted to admit this patient such that case management may arrange for permanent SNF placement to address her adult FTT with recurrent hospitalizations. Subjective: NAD, c/o gen weakness, recent fall.       Medications:  Reviewed    Infusion Medications    dextrose      sodium chloride       Scheduled Medications    insulin lispro  0-6 Units SubCUTAneous TID WC    insulin lispro  0-3 Units SubCUTAneous Nightly    tiotropium  2 puff Inhalation Daily    aspirin  81 mg Oral Daily    atorvastatin  20 mg Oral Nightly    fluticasone  2 puff Inhalation BID    carvedilol  6.25 mg Oral BID WC    cetirizine  10 mg Oral Daily    dilTIAZem  240 mg Oral Daily    calcium elemental  500 mg Oral Daily    ferrous sulfate  325 mg Oral TID WC    levothyroxine  100 mcg Oral Daily    montelukast  10 mg Oral Daily    losartan  100 mg Oral Daily    lidocaine  1 patch TransDERmal Daily    gabapentin  100 mg Oral Daily    gabapentin  600 mg Oral Nightly    potassium chloride  20 mEq Oral Daily with breakfast    furosemide  20 mg Oral Daily    therapeutic multivitamin-minerals  1 tablet Oral Daily    pantoprazole  40 mg Oral BID    rOPINIRole  0.5 mg Oral 4x Daily    tamsulosin  0.4 mg Oral Daily    ursodiol  500 mg Oral BID    venlafaxine  37.5 mg Oral Daily    traZODone  50 mg Oral Nightly    apixaban  2.5 mg Oral BID    sodium chloride flush  10 mL IntraVENous 2 times per day     PRN Meds: glucose, dextrose bolus **OR** dextrose bolus, glucagon (rDNA), dextrose, benzonatate, diphenhydrAMINE, levalbuterol, sodium chloride flush, sodium chloride, potassium chloride **OR** potassium alternative oral replacement **OR** potassium chloride, magnesium sulfate, senna, acetaminophen **OR** acetaminophen, ondansetron **OR** ondansetron      Intake/Output Summary (Last 24 hours) at 6/10/2022 1132  Last data filed at 6/10/2022 0959  Gross per 24 hour   Intake 750 ml   Output 1500 ml   Net -750 ml       Physical Exam Performed:    /63   Pulse 92   Temp 98.1 °F (36.7 °C) (Oral)   Resp 18   Ht 5' 5\" (1.651 m)   Wt 225 lb 8.5 oz (102.3 kg)   SpO2 97%   BMI 37.53 kg/m²     General appearance:  Obese. No apparent distress, appears stated age and cooperative. HEENT: Pupils equal, round, and reactive to light. Conjunctivae/corneas clear. Neck: Supple, with full range of motion. No jugular venous distention. Trachea midline. Respiratory:  Normal respiratory effort. Diminished BS  Cardiovascular: Iregular rate and rhythm  without murmurs, rubs or gallops. Abdomen: Soft, non-tender, non-distended with normal bowel sounds. Musculoskeletal: No clubbing, cyanosis or edema bilaterally. Full range of motion without deformity. Skin: Skin color, texture, turgor normal.  No rashes or lesions.   Neurologic: Neurovascularly intact without any focal sensory/motor deficits. Cranial nerves: II-XII intact, grossly non-focal.  Psychiatric: Alert and oriented, thought content appropriate, normal insight  Capillary Refill: Brisk,3 seconds, normal   Peripheral Pulses: +2 palpable, equal bilaterally       Labs:   Recent Labs     06/07/22  1337 06/09/22  1654 06/10/22  0607   WBC  --  10.3 6.3   HGB 8.0* 7.9* 7.6*   HCT 24.0* 23.3* 22.5*   PLT  --  201 171     Recent Labs     06/09/22  1654 06/10/22  0114 06/10/22  0607   * 130* 133*  133*   K 5.4* 4.2 4.3  4.1   CL 86* 92* 95*  95*   CO2 25 30 27  27   BUN 20 16 16  16   CREATININE 1.4* 1.2 1.1  1.1   CALCIUM 8.4 8.1* 8.2*  8.0*   PHOS  --  3.5 4.0     Recent Labs     06/09/22  1654 06/10/22  0607   AST 28 16   ALT 11 9*   BILITOT 0.6 0.4   ALKPHOS 57 54     No results for input(s): INR in the last 72 hours. No results for input(s): Laureen Silvestre in the last 72 hours. Urinalysis:      Lab Results   Component Value Date    NITRU POSITIVE 06/10/2022    WBCUA >100 06/10/2022    BACTERIA 2+ 06/10/2022    RBCUA see below 06/10/2022    BLOODU SMALL 06/10/2022    SPECGRAV <=1.005 06/10/2022    GLUCOSEU Negative 06/10/2022    GLUCOSEU NEGATIVE 12/30/2009       Radiology:  CT HEAD WO CONTRAST   Final Result   Stable exam without acute intracranial abnormality. XR FEMUR LEFT (MIN 2 VIEWS)   Final Result   Total left knee replacement with no acute bony abnormality. XR KNEE LEFT (3 VIEWS)   Final Result   Total left knee replacement which is unchanged with no acute bony abnormality. Mild edema or cellulitis in the soft tissues around the knee and a small   suprapatellar effusion. XR HIP 2-3 VW W PELVIS LEFT   Final Result   Mild osteoarthritic changes in both hips which is unchanged with no acute   abnormality seen.          XR CHEST PORTABLE   Final Result   Stable mild cardiomegaly with mild central pulmonary congestion or pulmonary artery hypertension      Hazy bibasilar and right upper lobe atelectasis or early infiltrates which is   more apparent                 Assessment/Plan:    Active Hospital Problems    Diagnosis     FTT (failure to thrive) in adult [R62.7]      Priority: Medium    Hypothyroid [E03.9]      Priority: Medium    A-fib ([facility] [I48.91]      Priority: Medium    Acute hyponatremia [E87.1]     Pernicious anemia [D51.0]     COPD (chronic obstructive pulmonary disease) (HCC) [J44.9]     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 ([facility] [K75.81, K74.60]     Type 2 diabetes mellitus with diabetic polyneuropathy, without long-term current use of insulin (HCC) [E11.42]      FTT  -Pt just discharged from SNF 5 days ago and has already decompensated to the extent of needing readmission  -Fall risk protocol in place with bed alarm activated  -TSH 2.81, A1c 6.0  -EKG without evidence of acute ischemia; results of recent ECHO reviewed and documented above  -PT/OT consultation placed for evaluation and treatment  -Consultation placed to Case Management as patient requires permanent SNF placement     Abnormal UA, start empiric Rocephin-mult drug allergies reviewed with Pharmacy.  Ucx pending    Acute on chronic hyponatremia  -Likely due to cirrhosis, hypervolemia  -Pt on several medications with potential to worsen hyponatremia including: diuretics, NSAIDS, PPI, SSRI/SNRI, neuroleptics  -DC IVF     Anemia with hx varices/SNOW  -Admit to telemetry unit with serial H/H scheduled  -Type and screen ordered with PRBC transfusion for hemoglobin less than 7  -Patient with known pernicious anemia and esophageal varices due to SNOW  -Continue twice daily Protonix  -FOBT daily x3     PAF  -Patient currently rate controlled on diltiazem and Coreg, therefore continue  -Continue oral iron supplementation  - Eliquis dosage decreased to 2.5 mg twice daily due to CrCl 39 cc/min  -Patient with chronic anemia, but H&H at baseline therefore anticoagulation continued at this time     Hypothyroidism  -levothyroxine     DVT Prophylaxis: Eliquis  Diet: ADULT DIET; Easy to Chew; 3 carb choices (45 gm/meal);  Low Fat/Low Chol/High Fiber/MASON; 1500 ml  Code Status: Full Code    PT/OT Eval Status: consulted    Dispo - pending placement    ZIGGY Dick - CNP

## 2022-06-10 NOTE — CARE COORDINATION
[facility]  Patient is active with [facility], Will follow for discharge to home with orders to resume care.     David Segal RN, BSN CTN  [facility] [phone]

## 2022-06-11 LAB
ANION GAP SERPL CALCULATED.3IONS-SCNC: 8 MMOL/L (ref 3–16)
BASOPHILS ABSOLUTE: 0 K/UL (ref 0–0.2)
BASOPHILS RELATIVE PERCENT: 0.2 %
BUN BLDV-MCNC: 13 MG/DL (ref 7–20)
CALCIUM SERPL-MCNC: 8 MG/DL (ref 8.3–10.6)
CHLORIDE BLD-SCNC: 95 MMOL/L (ref 99–110)
CO2: 30 MMOL/L (ref 21–32)
CREAT SERPL-MCNC: 0.9 MG/DL (ref 0.6–1.2)
EOSINOPHILS ABSOLUTE: 0.1 K/UL (ref 0–0.6)
EOSINOPHILS RELATIVE PERCENT: 1.2 %
ESTIMATED AVERAGE GLUCOSE: 131.2 MG/DL
GFR AFRICAN AMERICAN: >60
GFR NON-AFRICAN AMERICAN: >60
GLUCOSE BLD-MCNC: 114 MG/DL (ref 70–99)
GLUCOSE BLD-MCNC: 118 MG/DL (ref 70–99)
GLUCOSE BLD-MCNC: 121 MG/DL (ref 70–99)
GLUCOSE BLD-MCNC: 128 MG/DL (ref 70–99)
GLUCOSE BLD-MCNC: 164 MG/DL (ref 70–99)
HBA1C MFR BLD: 6.2 %
HCT VFR BLD CALC: 21.7 % (ref 36–48)
HEMOGLOBIN: 7.4 G/DL (ref 12–16)
LYMPHOCYTES ABSOLUTE: 0.8 K/UL (ref 1–5.1)
LYMPHOCYTES RELATIVE PERCENT: 10.7 %
MCH RBC QN AUTO: 29.2 PG (ref 26–34)
MCHC RBC AUTO-ENTMCNC: 34.2 G/DL (ref 31–36)
MCV RBC AUTO: 85.4 FL (ref 80–100)
MONOCYTES ABSOLUTE: 0.6 K/UL (ref 0–1.3)
MONOCYTES RELATIVE PERCENT: 9 %
NEUTROPHILS ABSOLUTE: 5.7 K/UL (ref 1.7–7.7)
NEUTROPHILS RELATIVE PERCENT: 78.9 %
PDW BLD-RTO: 15.8 % (ref 12.4–15.4)
PERFORMED ON: ABNORMAL
PLATELET # BLD: 186 K/UL (ref 135–450)
PMV BLD AUTO: 7.5 FL (ref 5–10.5)
POTASSIUM REFLEX MAGNESIUM: 4.4 MMOL/L (ref 3.5–5.1)
RBC # BLD: 2.54 M/UL (ref 4–5.2)
SODIUM BLD-SCNC: 133 MMOL/L (ref 136–145)
WBC # BLD: 7.2 K/UL (ref 4–11)

## 2022-06-11 PROCEDURE — 85025 COMPLETE CBC W/AUTO DIFF WBC: CPT

## 2022-06-11 PROCEDURE — 6360000002 HC RX W HCPCS: Performed by: NURSE PRACTITIONER

## 2022-06-11 PROCEDURE — 6370000000 HC RX 637 (ALT 250 FOR IP): Performed by: HOSPITALIST

## 2022-06-11 PROCEDURE — 94640 AIRWAY INHALATION TREATMENT: CPT

## 2022-06-11 PROCEDURE — 6370000000 HC RX 637 (ALT 250 FOR IP): Performed by: NURSE PRACTITIONER

## 2022-06-11 PROCEDURE — G0378 HOSPITAL OBSERVATION PER HR: HCPCS

## 2022-06-11 PROCEDURE — 36415 COLL VENOUS BLD VENIPUNCTURE: CPT

## 2022-06-11 PROCEDURE — 96366 THER/PROPH/DIAG IV INF ADDON: CPT

## 2022-06-11 PROCEDURE — 80048 BASIC METABOLIC PNL TOTAL CA: CPT

## 2022-06-11 PROCEDURE — 2580000003 HC RX 258: Performed by: HOSPITALIST

## 2022-06-11 PROCEDURE — 2700000000 HC OXYGEN THERAPY PER DAY

## 2022-06-11 PROCEDURE — 94761 N-INVAS EAR/PLS OXIMETRY MLT: CPT

## 2022-06-11 PROCEDURE — 2580000003 HC RX 258: Performed by: NURSE PRACTITIONER

## 2022-06-11 RX ORDER — GUAIFENESIN 600 MG/1
600 TABLET, EXTENDED RELEASE ORAL 2 TIMES DAILY
Status: DISCONTINUED | OUTPATIENT
Start: 2022-06-11 | End: 2022-06-14 | Stop reason: HOSPADM

## 2022-06-11 RX ORDER — PANTOPRAZOLE SODIUM 40 MG/1
40 TABLET, DELAYED RELEASE ORAL
Status: DISCONTINUED | OUTPATIENT
Start: 2022-06-12 | End: 2022-06-14 | Stop reason: HOSPADM

## 2022-06-11 RX ORDER — TRAMADOL HYDROCHLORIDE 50 MG/1
50 TABLET ORAL EVERY 6 HOURS PRN
Status: DISCONTINUED | OUTPATIENT
Start: 2022-06-11 | End: 2022-06-14 | Stop reason: HOSPADM

## 2022-06-11 RX ADMIN — GABAPENTIN 100 MG: 100 CAPSULE ORAL at 10:25

## 2022-06-11 RX ADMIN — CARVEDILOL 6.25 MG: 6.25 TABLET, FILM COATED ORAL at 17:31

## 2022-06-11 RX ADMIN — Medication 2 PUFF: at 08:22

## 2022-06-11 RX ADMIN — POTASSIUM CHLORIDE 20 MEQ: 20 TABLET, EXTENDED RELEASE ORAL at 10:27

## 2022-06-11 RX ADMIN — FUROSEMIDE 20 MG: 20 TABLET ORAL at 10:27

## 2022-06-11 RX ADMIN — CEFTRIAXONE SODIUM 1000 MG: 1 INJECTION, POWDER, FOR SOLUTION INTRAMUSCULAR; INTRAVENOUS at 14:12

## 2022-06-11 RX ADMIN — TRAZODONE HYDROCHLORIDE 50 MG: 50 TABLET ORAL at 21:00

## 2022-06-11 RX ADMIN — MONTELUKAST SODIUM 10 MG: 10 TABLET ORAL at 10:26

## 2022-06-11 RX ADMIN — GUAIFENESIN 600 MG: 600 TABLET, EXTENDED RELEASE ORAL at 00:52

## 2022-06-11 RX ADMIN — Medication 10 ML: at 10:27

## 2022-06-11 RX ADMIN — URSODIOL 500 MG: 500 TABLET, FILM COATED ORAL at 12:30

## 2022-06-11 RX ADMIN — APIXABAN 2.5 MG: 2.5 TABLET, FILM COATED ORAL at 10:28

## 2022-06-11 RX ADMIN — ROPINIROLE HYDROCHLORIDE 0.5 MG: 0.5 TABLET, FILM COATED ORAL at 17:31

## 2022-06-11 RX ADMIN — GABAPENTIN 600 MG: 300 CAPSULE ORAL at 21:00

## 2022-06-11 RX ADMIN — ROPINIROLE HYDROCHLORIDE 0.5 MG: 0.5 TABLET, FILM COATED ORAL at 13:57

## 2022-06-11 RX ADMIN — URSODIOL 500 MG: 500 TABLET, FILM COATED ORAL at 21:00

## 2022-06-11 RX ADMIN — FERROUS SULFATE TAB 325 MG (65 MG ELEMENTAL FE) 325 MG: 325 (65 FE) TAB at 14:17

## 2022-06-11 RX ADMIN — CARVEDILOL 6.25 MG: 6.25 TABLET, FILM COATED ORAL at 10:25

## 2022-06-11 RX ADMIN — ROPINIROLE HYDROCHLORIDE 0.5 MG: 0.5 TABLET, FILM COATED ORAL at 10:26

## 2022-06-11 RX ADMIN — CALCIUM 500 MG: 500 TABLET ORAL at 10:27

## 2022-06-11 RX ADMIN — LOSARTAN POTASSIUM 100 MG: 100 TABLET, FILM COATED ORAL at 10:25

## 2022-06-11 RX ADMIN — GUAIFENESIN 600 MG: 600 TABLET, EXTENDED RELEASE ORAL at 10:26

## 2022-06-11 RX ADMIN — APIXABAN 2.5 MG: 2.5 TABLET, FILM COATED ORAL at 21:00

## 2022-06-11 RX ADMIN — VENLAFAXINE 37.5 MG: 37.5 TABLET ORAL at 10:27

## 2022-06-11 RX ADMIN — FERROUS SULFATE TAB 325 MG (65 MG ELEMENTAL FE) 325 MG: 325 (65 FE) TAB at 17:31

## 2022-06-11 RX ADMIN — ROPINIROLE HYDROCHLORIDE 0.5 MG: 0.5 TABLET, FILM COATED ORAL at 21:00

## 2022-06-11 RX ADMIN — FERROUS SULFATE TAB 325 MG (65 MG ELEMENTAL FE) 325 MG: 325 (65 FE) TAB at 10:25

## 2022-06-11 RX ADMIN — PANTOPRAZOLE SODIUM 40 MG: 40 TABLET, DELAYED RELEASE ORAL at 10:26

## 2022-06-11 RX ADMIN — TRAMADOL HYDROCHLORIDE 50 MG: 50 TABLET, COATED ORAL at 00:52

## 2022-06-11 RX ADMIN — ASPIRIN 81 MG: 81 TABLET, COATED ORAL at 10:26

## 2022-06-11 RX ADMIN — CETIRIZINE HYDROCHLORIDE 10 MG: 10 TABLET, FILM COATED ORAL at 10:25

## 2022-06-11 RX ADMIN — Medication 1 TABLET: at 10:26

## 2022-06-11 RX ADMIN — LEVOTHYROXINE SODIUM 100 MCG: 0.1 TABLET ORAL at 05:58

## 2022-06-11 RX ADMIN — INSULIN LISPRO 1 UNITS: 100 INJECTION, SOLUTION INTRAVENOUS; SUBCUTANEOUS at 17:47

## 2022-06-11 RX ADMIN — GUAIFENESIN 600 MG: 600 TABLET, EXTENDED RELEASE ORAL at 21:00

## 2022-06-11 RX ADMIN — DILTIAZEM HYDROCHLORIDE 240 MG: 240 CAPSULE, COATED, EXTENDED RELEASE ORAL at 10:27

## 2022-06-11 RX ADMIN — Medication 2 PUFF: at 21:18

## 2022-06-11 RX ADMIN — ACETAMINOPHEN 650 MG: 325 TABLET ORAL at 21:10

## 2022-06-11 RX ADMIN — TIOTROPIUM BROMIDE INHALATION SPRAY 2 PUFF: 3.12 SPRAY, METERED RESPIRATORY (INHALATION) at 08:21

## 2022-06-11 RX ADMIN — TAMSULOSIN HYDROCHLORIDE 0.4 MG: 0.4 CAPSULE ORAL at 10:27

## 2022-06-11 RX ADMIN — ATORVASTATIN CALCIUM 20 MG: 10 TABLET, FILM COATED ORAL at 21:00

## 2022-06-11 ASSESSMENT — PAIN SCALES - GENERAL: PAINLEVEL_OUTOF10: 10

## 2022-06-11 NOTE — PLAN OF CARE
Problem: Safety - Adult  Goal: Free from fall injury  Outcome: Progressing     Problem: ABCDS Injury Assessment  Goal: Absence of physical injury  Outcome: Progressing     Problem: Skin/Tissue Integrity  Goal: Absence of new skin breakdown  Description: 1. Monitor for areas of redness and/or skin breakdown  2. Assess vascular access sites hourly  3. Every 4-6 hours minimum:  Change oxygen saturation probe site  4. Every 4-6 hours:  If on nasal continuous positive airway pressure, respiratory therapy assess nares and determine need for appliance change or resting period.   Outcome: Progressing     Problem: Pain  Goal: Verbalizes/displays adequate comfort level or baseline comfort level  Outcome: Progressing

## 2022-06-11 NOTE — PROGRESS NOTES
Hospitalist Progress Note      PCP: Burt Terry. Katalina Hernandez.,     Date of Admission: 6/9/2022    Chief Complaint: [facility] Course: Jalil Lares is a 69 y.o. female who presented to the ED Annetta Bhardwaj is a 69 y. o. female with a history of A. fib, anemia, CAD, COPD 4 L nasal cannula baseline, HTN, GERD, HLD, hypothyroid, SNOW with cirrhosis, DM2, presents from home.  Patient was discharged from skilled nursing facility 5 days prior to arrival.  Patient was ambulating with a walker and advised that she slipped on her kitchen floor sliding down to the ground.  Injured her left knee.  Was unable to get back up.  Patient denies hitting her head or any loss of consciousness.       Upon arrival to the ED EKG was obtained revealing A. fib with LAFB. STAT head CT negative for acute intracranial abnormality. CXR with stable cardiomegaly and mild central pulmonary vascular congestion versus PAH. Mention made of RUL and bibasilar atelectasis versus infiltrates. Notable labs include: Acute hyponatremia 121, hyper kalemia 5.4, BUN/CR 20/1.4, and chronic anemia at baseline with H/H 7.9/23. 3. Hospitalist team consulted to admit this patient such that case management may arrange for permanent SNF placement to address her adult FTT with recurrent hospitalizations. Subjective: NAD, c/o gen weakness, left thigh sore,  recent fall. Patient updated on plan of care, all questions answered.        Medications:  Reviewed    Infusion Medications    dextrose      sodium chloride       Scheduled Medications    guaiFENesin  600 mg Oral BID    insulin lispro  0-6 Units SubCUTAneous TID WC    insulin lispro  0-3 Units SubCUTAneous Nightly    cefTRIAXone (ROCEPHIN) IV  1,000 mg IntraVENous Q24H    tiotropium  2 puff Inhalation Daily    aspirin  81 mg Oral Daily    atorvastatin  20 mg Oral Nightly    fluticasone  2 puff Inhalation BID    carvedilol  6.25 mg Oral BID WC    cetirizine  10 mg Oral Daily    dilTIAZem  240 mg Oral Daily    calcium elemental  500 mg Oral Daily    ferrous sulfate  325 mg Oral TID WC    levothyroxine  100 mcg Oral Daily    montelukast  10 mg Oral Daily    losartan  100 mg Oral Daily    lidocaine  1 patch TransDERmal Daily    gabapentin  100 mg Oral Daily    gabapentin  600 mg Oral Nightly    potassium chloride  20 mEq Oral Daily with breakfast    furosemide  20 mg Oral Daily    therapeutic multivitamin-minerals  1 tablet Oral Daily    pantoprazole  40 mg Oral BID    rOPINIRole  0.5 mg Oral 4x Daily    tamsulosin  0.4 mg Oral Daily    ursodiol  500 mg Oral BID    venlafaxine  37.5 mg Oral Daily    traZODone  50 mg Oral Nightly    apixaban  2.5 mg Oral BID    sodium chloride flush  10 mL IntraVENous 2 times per day     PRN Meds: traMADol, glucose, dextrose bolus **OR** dextrose bolus, glucagon (rDNA), dextrose, benzonatate, diphenhydrAMINE, levalbuterol, sodium chloride flush, sodium chloride, potassium chloride **OR** potassium alternative oral replacement **OR** potassium chloride, magnesium sulfate, senna, acetaminophen **OR** acetaminophen, ondansetron **OR** ondansetron      Intake/Output Summary (Last 24 hours) at 6/11/2022 1317  Last data filed at 6/11/2022 0624  Gross per 24 hour   Intake --   Output 2550 ml   Net -2550 ml       Physical Exam Performed:    BP (!) 143/86   Pulse 87   Temp 97.1 °F (36.2 °C) (Oral)   Resp 18   Ht 5' 5\" (1.651 m)   Wt 225 lb 8.5 oz (102.3 kg)   SpO2 95%   BMI 37.53 kg/m²     General appearance:  Obese. No apparent distress, appears stated age and cooperative. HEENT: Pupils equal, round, and reactive to light. Conjunctivae/corneas clear. Neck: Supple, with full range of motion. No jugular venous distention. Trachea midline. Respiratory:  Normal respiratory effort. Diminished BS  Cardiovascular: Iregular rate and rhythm  without murmurs, rubs or gallops.   Abdomen: Soft, non-tender, non-distended with normal bowel sounds. Musculoskeletal: No clubbing, cyanosis or edema bilaterally. Full range of motion without deformity. Skin: Skin color, texture, turgor normal.  No rashes or lesions. Neurologic:  Neurovascularly intact without any focal sensory/motor deficits. Cranial nerves: II-XII intact, grossly non-focal.  Psychiatric: Alert and oriented, thought content appropriate, normal insight  Capillary Refill: Brisk,3 seconds, normal   Peripheral Pulses: +2 palpable, equal bilaterally       Labs:   Recent Labs     06/09/22  1654 06/10/22  0607 06/11/22  0616   WBC 10.3 6.3 7.2   HGB 7.9* 7.6* 7.4*   HCT 23.3* 22.5* 21.7*    171 186     Recent Labs     06/09/22  1654 06/10/22  0114 06/10/22  0607 06/10/22  1256 06/11/22  0616   NA   < > 130* 133*  133* 133* 133*   K   < > 4.2 4.3  4.1 4.1 4.4   CL   < > 92* 95*  95* 93* 95*   CO2   < > 30 27  27 27 30   BUN   < > 16 16  16 14 13   CREATININE   < > 1.2 1.1  1.1 1.0 0.9   CALCIUM   < > 8.1* 8.2*  8.0* 8.3 8.0*   PHOS  --  3.5 4.0 3.5  --     < > = values in this interval not displayed. Recent Labs     06/09/22  1654 06/10/22  0607   AST 28 16   ALT 11 9*   BILITOT 0.6 0.4   ALKPHOS 57 54     No results for input(s): INR in the last 72 hours. No results for input(s): [place] in the last 72 hours. Urinalysis:      Lab Results   Component Value Date    NITRU POSITIVE 06/10/2022    WBCUA >100 06/10/2022    BACTERIA 2+ 06/10/2022    RBCUA see below 06/10/2022    BLOODU SMALL 06/10/2022    SPECGRAV <=1.005 06/10/2022    GLUCOSEU Negative 06/10/2022    GLUCOSEU NEGATIVE 12/30/2009       Radiology:  CT HEAD WO CONTRAST   Final Result   Stable exam without acute intracranial abnormality. XR FEMUR LEFT (MIN 2 VIEWS)   Final Result   Total left knee replacement with no acute bony abnormality. XR KNEE LEFT (3 VIEWS)   Final Result   Total left knee replacement which is unchanged with no acute bony abnormality.       Mild edema or cellulitis in the soft tissues around the knee and a small   suprapatellar effusion. XR HIP 2-3 VW W PELVIS LEFT   Final Result   Mild osteoarthritic changes in both hips which is unchanged with no acute   abnormality seen. XR CHEST PORTABLE   Final Result   Stable mild cardiomegaly with mild central pulmonary congestion or pulmonary   artery hypertension      Hazy bibasilar and right upper lobe atelectasis or early infiltrates which is   more apparent                 Assessment/Plan:    Active Hospital Problems    Diagnosis     FTT (failure to thrive) in adult [R62.7]      Priority: Medium    Hypothyroid [E03.9]      Priority: Medium    A-fib ([facility] [I48.91]      Priority: Medium    Acute hyponatremia [E87.1]     Pernicious anemia [D51.0]     COPD (chronic obstructive pulmonary disease) ([facility] [J44.9]     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 ([facility] [K75.81, K74.60]     Type 2 diabetes mellitus with diabetic polyneuropathy, without long-term current use of insulin (HCC) [E11.42]      FTT  -Pt just discharged from SNF 5 days ago and has already decompensated to the extent of needing readmission  -Fall risk protocol in place with bed alarm activated  -TSH 2.81, A1c 6.0  -EKG without evidence of acute ischemia; results of recent ECHO reviewed and documented above  -PT/OT consultation placed for evaluation and treatment  -Consultation placed to Case Management as patient requires permanent SNF placement     Abnormal UA, start empiric Rocephin-mult drug allergies reviewed with Pharmacy.  Ucx Klebsiella-sensitivities pending    Acute on chronic hyponatremia  -Likely due to cirrhosis, hypervolemia  -Pt on several medications with potential to worsen hyponatremia including: diuretics, NSAIDS, PPI, SSRI/SNRI, neuroleptics  -DC IVF     Anemia with hx varices/SNOW  -Admit to telemetry unit with serial H/H scheduled  -Type and screen ordered with PRBC transfusion for hemoglobin less than 7  -Patient with known pernicious anemia and esophageal varices due to SNOW  -PPI  -Iron supp     PAF  -Patient currently rate controlled on diltiazem and Coreg, therefore continue  -Continue oral iron supplementation  - Eliquis dosage decreased to 2.5 mg twice daily due to CrCl 39 cc/min  -Patient with chronic anemia, but H&H at baseline therefore anticoagulation continued at this time     Hypothyroidism  -levothyroxine     DVT Prophylaxis: Eliquis  Diet: ADULT DIET; Easy to Chew; 3 carb choices (45 gm/meal);  Low Fat/Low Chol/High Fiber/MASON; 1500 ml  Code Status: Full Code    PT/OT Eval Status: consulted    Dispo - pending precert, return to [address] APRN - CNP

## 2022-06-11 NOTE — PROGRESS NOTES
Pt assessment completed and charted. VSS. Pt a/o. Pt c/o R. Upper thigh pain. Scheduled lidocaine patch applied. Pt turns independently. Pure wick in place. Bed in lowest position and wheels locked. Call light within reach. Bedside table within reach. Non-skid footwear in place. Bed alarm in place. Pt denies any other needs at this time. Pt calls out appropriately. Will continue to monitor.

## 2022-06-11 NOTE — FLOWSHEET NOTE
06/11/22 1013   Encounter Summary   Encounter Overview/Reason  Spiritual/Emotional Needs   Service Provided For: Patient   Referral/Consult From: Patient   Last Encounter  06/11/22   Complexity of Encounter Moderate   Begin Time 0914   End Time  1014   Total Time Calculated 60 min   Encounter    Type Follow up   Spiritual/Emotional needs   Type Spiritual Support   Assessment/Intervention/Outcome   Assessment Concerns with suffering; Interrupted family processes;Stress overload   Intervention Active listening;Explored/Affirmed feelings, thoughts, concerns;Nurtured Hope;Prayer (assurance of)/Maynard   Outcome Engaged in conversation;Expressed feelings, needs, and concerns;Receptive    provided listening presence. Patient struggling with feelings of isolation and strained family dynamics. She states she does not want to go to rehab, but is scheduled to go for a month. We talked about what would give her independence and the possibility of a life line. Offered prayer and ongoing spiritual support PRN. She is appreciative of communion as well, which we will continue to provide as possible. Patient is alert and making her own decisions. Proxy decision maker should not be activated at this time.

## 2022-06-12 LAB
ANION GAP SERPL CALCULATED.3IONS-SCNC: 9 MMOL/L (ref 3–16)
BASOPHILS ABSOLUTE: 0 K/UL (ref 0–0.2)
BASOPHILS RELATIVE PERCENT: 0.2 %
BUN BLDV-MCNC: 11 MG/DL (ref 7–20)
CALCIUM SERPL-MCNC: 8.4 MG/DL (ref 8.3–10.6)
CHLORIDE BLD-SCNC: 94 MMOL/L (ref 99–110)
CO2: 31 MMOL/L (ref 21–32)
CREAT SERPL-MCNC: 0.8 MG/DL (ref 0.6–1.2)
EOSINOPHILS ABSOLUTE: 0.1 K/UL (ref 0–0.6)
EOSINOPHILS RELATIVE PERCENT: 1.6 %
GFR AFRICAN AMERICAN: >60
GFR NON-AFRICAN AMERICAN: >60
GLUCOSE BLD-MCNC: 116 MG/DL (ref 70–99)
GLUCOSE BLD-MCNC: 117 MG/DL (ref 70–99)
GLUCOSE BLD-MCNC: 129 MG/DL (ref 70–99)
GLUCOSE BLD-MCNC: 141 MG/DL (ref 70–99)
GLUCOSE BLD-MCNC: 161 MG/DL (ref 70–99)
HCT VFR BLD CALC: 22.6 % (ref 36–48)
HEMOGLOBIN: 7.7 G/DL (ref 12–16)
LYMPHOCYTES ABSOLUTE: 0.9 K/UL (ref 1–5.1)
LYMPHOCYTES RELATIVE PERCENT: 11.9 %
MCH RBC QN AUTO: 29.4 PG (ref 26–34)
MCHC RBC AUTO-ENTMCNC: 34.2 G/DL (ref 31–36)
MCV RBC AUTO: 86 FL (ref 80–100)
MONOCYTES ABSOLUTE: 0.5 K/UL (ref 0–1.3)
MONOCYTES RELATIVE PERCENT: 7.2 %
NEUTROPHILS ABSOLUTE: 5.9 K/UL (ref 1.7–7.7)
NEUTROPHILS RELATIVE PERCENT: 79.1 %
ORGANISM: ABNORMAL
PDW BLD-RTO: 16.3 % (ref 12.4–15.4)
PERFORMED ON: ABNORMAL
PLATELET # BLD: 185 K/UL (ref 135–450)
PMV BLD AUTO: 7.7 FL (ref 5–10.5)
POTASSIUM REFLEX MAGNESIUM: 4.6 MMOL/L (ref 3.5–5.1)
RBC # BLD: 2.63 M/UL (ref 4–5.2)
SODIUM BLD-SCNC: 134 MMOL/L (ref 136–145)
URINE CULTURE, ROUTINE: ABNORMAL
WBC # BLD: 7.5 K/UL (ref 4–11)

## 2022-06-12 PROCEDURE — 2580000003 HC RX 258: Performed by: NURSE PRACTITIONER

## 2022-06-12 PROCEDURE — 80048 BASIC METABOLIC PNL TOTAL CA: CPT

## 2022-06-12 PROCEDURE — 6370000000 HC RX 637 (ALT 250 FOR IP): Performed by: HOSPITALIST

## 2022-06-12 PROCEDURE — G0378 HOSPITAL OBSERVATION PER HR: HCPCS

## 2022-06-12 PROCEDURE — 2580000003 HC RX 258: Performed by: HOSPITALIST

## 2022-06-12 PROCEDURE — 96372 THER/PROPH/DIAG INJ SC/IM: CPT

## 2022-06-12 PROCEDURE — 6370000000 HC RX 637 (ALT 250 FOR IP): Performed by: NURSE PRACTITIONER

## 2022-06-12 PROCEDURE — 94761 N-INVAS EAR/PLS OXIMETRY MLT: CPT

## 2022-06-12 PROCEDURE — 94640 AIRWAY INHALATION TREATMENT: CPT

## 2022-06-12 PROCEDURE — 2700000000 HC OXYGEN THERAPY PER DAY

## 2022-06-12 PROCEDURE — 96376 TX/PRO/DX INJ SAME DRUG ADON: CPT

## 2022-06-12 PROCEDURE — 36415 COLL VENOUS BLD VENIPUNCTURE: CPT

## 2022-06-12 PROCEDURE — 6360000002 HC RX W HCPCS: Performed by: NURSE PRACTITIONER

## 2022-06-12 PROCEDURE — 85025 COMPLETE CBC W/AUTO DIFF WBC: CPT

## 2022-06-12 RX ORDER — CYANOCOBALAMIN 1000 UG/ML
1000 INJECTION INTRAMUSCULAR; SUBCUTANEOUS ONCE
Status: COMPLETED | OUTPATIENT
Start: 2022-06-12 | End: 2022-06-12

## 2022-06-12 RX ADMIN — ATORVASTATIN CALCIUM 20 MG: 10 TABLET, FILM COATED ORAL at 21:25

## 2022-06-12 RX ADMIN — GUAIFENESIN 600 MG: 600 TABLET, EXTENDED RELEASE ORAL at 21:25

## 2022-06-12 RX ADMIN — CARVEDILOL 6.25 MG: 6.25 TABLET, FILM COATED ORAL at 17:18

## 2022-06-12 RX ADMIN — MONTELUKAST SODIUM 10 MG: 10 TABLET ORAL at 09:56

## 2022-06-12 RX ADMIN — ASPIRIN 81 MG: 81 TABLET, COATED ORAL at 09:57

## 2022-06-12 RX ADMIN — FUROSEMIDE 20 MG: 20 TABLET ORAL at 09:58

## 2022-06-12 RX ADMIN — ROPINIROLE HYDROCHLORIDE 0.5 MG: 0.5 TABLET, FILM COATED ORAL at 12:21

## 2022-06-12 RX ADMIN — FERROUS SULFATE TAB 325 MG (65 MG ELEMENTAL FE) 325 MG: 325 (65 FE) TAB at 17:18

## 2022-06-12 RX ADMIN — DIPHENHYDRAMINE HCL 12.5 MG: 25 TABLET ORAL at 21:37

## 2022-06-12 RX ADMIN — TIOTROPIUM BROMIDE INHALATION SPRAY 2 PUFF: 3.12 SPRAY, METERED RESPIRATORY (INHALATION) at 07:49

## 2022-06-12 RX ADMIN — URSODIOL 500 MG: 500 TABLET, FILM COATED ORAL at 10:11

## 2022-06-12 RX ADMIN — FERROUS SULFATE TAB 325 MG (65 MG ELEMENTAL FE) 325 MG: 325 (65 FE) TAB at 09:56

## 2022-06-12 RX ADMIN — CALCIUM 500 MG: 500 TABLET ORAL at 09:57

## 2022-06-12 RX ADMIN — ROPINIROLE HYDROCHLORIDE 0.5 MG: 0.5 TABLET, FILM COATED ORAL at 21:25

## 2022-06-12 RX ADMIN — Medication 2 PUFF: at 07:49

## 2022-06-12 RX ADMIN — TRAZODONE HYDROCHLORIDE 50 MG: 50 TABLET ORAL at 21:25

## 2022-06-12 RX ADMIN — Medication 2 PUFF: at 19:22

## 2022-06-12 RX ADMIN — DILTIAZEM HYDROCHLORIDE 240 MG: 240 CAPSULE, COATED, EXTENDED RELEASE ORAL at 09:56

## 2022-06-12 RX ADMIN — CEFTRIAXONE SODIUM 1000 MG: 1 INJECTION, POWDER, FOR SOLUTION INTRAMUSCULAR; INTRAVENOUS at 12:23

## 2022-06-12 RX ADMIN — INSULIN LISPRO 1 UNITS: 100 INJECTION, SOLUTION INTRAVENOUS; SUBCUTANEOUS at 21:26

## 2022-06-12 RX ADMIN — Medication 10 ML: at 09:59

## 2022-06-12 RX ADMIN — LEVOTHYROXINE SODIUM 100 MCG: 0.1 TABLET ORAL at 05:33

## 2022-06-12 RX ADMIN — GABAPENTIN 600 MG: 300 CAPSULE ORAL at 21:25

## 2022-06-12 RX ADMIN — LOSARTAN POTASSIUM 100 MG: 100 TABLET, FILM COATED ORAL at 09:56

## 2022-06-12 RX ADMIN — POTASSIUM CHLORIDE 20 MEQ: 20 TABLET, EXTENDED RELEASE ORAL at 09:57

## 2022-06-12 RX ADMIN — ROPINIROLE HYDROCHLORIDE 0.5 MG: 0.5 TABLET, FILM COATED ORAL at 17:18

## 2022-06-12 RX ADMIN — GABAPENTIN 100 MG: 100 CAPSULE ORAL at 09:52

## 2022-06-12 RX ADMIN — FERROUS SULFATE TAB 325 MG (65 MG ELEMENTAL FE) 325 MG: 325 (65 FE) TAB at 12:21

## 2022-06-12 RX ADMIN — VENLAFAXINE 37.5 MG: 37.5 TABLET ORAL at 09:57

## 2022-06-12 RX ADMIN — CYANOCOBALAMIN 1000 MCG: 1000 INJECTION, SOLUTION INTRAMUSCULAR; SUBCUTANEOUS at 10:11

## 2022-06-12 RX ADMIN — Medication 1 TABLET: at 10:14

## 2022-06-12 RX ADMIN — APIXABAN 2.5 MG: 2.5 TABLET, FILM COATED ORAL at 21:25

## 2022-06-12 RX ADMIN — ACETAMINOPHEN 650 MG: 325 TABLET ORAL at 21:37

## 2022-06-12 RX ADMIN — URSODIOL 500 MG: 500 TABLET, FILM COATED ORAL at 21:27

## 2022-06-12 RX ADMIN — GUAIFENESIN 600 MG: 600 TABLET, EXTENDED RELEASE ORAL at 09:52

## 2022-06-12 RX ADMIN — TAMSULOSIN HYDROCHLORIDE 0.4 MG: 0.4 CAPSULE ORAL at 09:52

## 2022-06-12 RX ADMIN — PANTOPRAZOLE SODIUM 40 MG: 40 TABLET, DELAYED RELEASE ORAL at 05:33

## 2022-06-12 RX ADMIN — CETIRIZINE HYDROCHLORIDE 10 MG: 10 TABLET, FILM COATED ORAL at 09:58

## 2022-06-12 RX ADMIN — APIXABAN 2.5 MG: 2.5 TABLET, FILM COATED ORAL at 09:58

## 2022-06-12 RX ADMIN — CARVEDILOL 6.25 MG: 6.25 TABLET, FILM COATED ORAL at 09:58

## 2022-06-12 RX ADMIN — ROPINIROLE HYDROCHLORIDE 0.5 MG: 0.5 TABLET, FILM COATED ORAL at 09:57

## 2022-06-12 RX ADMIN — Medication 10 ML: at 21:25

## 2022-06-12 NOTE — PROGRESS NOTES
Hospitalist Progress Note      PCP: Rachael Kirk. Augustina Santillan,     Date of Admission: 6/9/2022    Chief Complaint: [facility] Course: Lorna Beckman is a 69 y.o. female who presented to the ED Hazel Bhardwaj is a 69 y. o. female with a history of A. fib, anemia, CAD, COPD 4 L nasal cannula baseline, HTN, GERD, HLD, hypothyroid, SNOW with cirrhosis, DM2, presents from home.  Patient was discharged from skilled nursing facility 5 days prior to arrival.  Patient was ambulating with a walker and advised that she slipped on her kitchen floor sliding down to the ground.  Injured her left knee.  Was unable to get back up.  Patient denies hitting her head or any loss of consciousness.       Upon arrival to the ED EKG was obtained revealing A. fib with LAFB. STAT head CT negative for acute intracranial abnormality. CXR with stable cardiomegaly and mild central pulmonary vascular congestion versus PAH. Mention made of RUL and bibasilar atelectasis versus infiltrates. Notable labs include: Acute hyponatremia 121, hyper kalemia 5.4, BUN/CR 20/1.4, and chronic anemia at baseline with H/H 7.9/23. 3. Hospitalist team consulted to admit this patient such that case management may arrange for permanent SNF placement to address her adult FTT with recurrent hospitalizations. Subjective: NAD, c/o gen weakness, left thigh sore,  recent fall. Patient updated on plan of care, all questions answered.        Medications:  Reviewed    Infusion Medications    dextrose      sodium chloride       Scheduled Medications    guaiFENesin  600 mg Oral BID    pantoprazole  40 mg Oral QAM AC    insulin lispro  0-6 Units SubCUTAneous TID WC    insulin lispro  0-3 Units SubCUTAneous Nightly    cefTRIAXone (ROCEPHIN) IV  1,000 mg IntraVENous Q24H    tiotropium  2 puff Inhalation Daily    aspirin  81 mg Oral Daily    atorvastatin  20 mg Oral Nightly    fluticasone  2 puff Inhalation BID    carvedilol  6.25 mg Oral BID WC    cetirizine  10 mg Oral Daily    dilTIAZem  240 mg Oral Daily    calcium elemental  500 mg Oral Daily    ferrous sulfate  325 mg Oral TID WC    levothyroxine  100 mcg Oral Daily    montelukast  10 mg Oral Daily    losartan  100 mg Oral Daily    lidocaine  1 patch TransDERmal Daily    gabapentin  100 mg Oral Daily    gabapentin  600 mg Oral Nightly    potassium chloride  20 mEq Oral Daily with breakfast    furosemide  20 mg Oral Daily    therapeutic multivitamin-minerals  1 tablet Oral Daily    rOPINIRole  0.5 mg Oral 4x Daily    tamsulosin  0.4 mg Oral Daily    ursodiol  500 mg Oral BID    venlafaxine  37.5 mg Oral Daily    traZODone  50 mg Oral Nightly    apixaban  2.5 mg Oral BID    sodium chloride flush  10 mL IntraVENous 2 times per day     PRN Meds: traMADol, glucose, dextrose bolus **OR** dextrose bolus, glucagon (rDNA), dextrose, benzonatate, diphenhydrAMINE, levalbuterol, sodium chloride flush, sodium chloride, potassium chloride **OR** potassium alternative oral replacement **OR** potassium chloride, magnesium sulfate, senna, acetaminophen **OR** acetaminophen, ondansetron **OR** ondansetron      Intake/Output Summary (Last 24 hours) at 6/12/2022 1220  Last data filed at 6/11/2022 2302  Gross per 24 hour   Intake 120 ml   Output 2000 ml   Net -1880 ml       Physical Exam Performed:    /70   Pulse 89   Temp 97.5 °F (36.4 °C) (Oral)   Resp 18   Ht 5' 5\" (1.651 m)   Wt 225 lb 8.5 oz (102.3 kg)   SpO2 98%   BMI 37.53 kg/m²     General appearance:  Obese. No apparent distress, appears stated age and cooperative. HEENT: Pupils equal, round, and reactive to light. Conjunctivae/corneas clear. Neck: Supple, with full range of motion. No jugular venous distention. Trachea midline. Respiratory:  Normal respiratory effort. Diminished BS  Cardiovascular: Iregular rate and rhythm  without murmurs, rubs or gallops.   Abdomen: Soft, non-tender, non-distended with normal bowel sounds. Musculoskeletal: No clubbing, cyanosis or edema bilaterally. LLE pain/tenderness  Skin: Skin color, texture, turgor normal.  No rashes or lesions. Neurologic:  Neurovascularly intact without any focal sensory/motor deficits. Cranial nerves: II-XII intact, grossly non-focal.  Psychiatric: Alert and oriented, thought content appropriate, normal insight  Capillary Refill: Brisk,3 seconds, normal   Peripheral Pulses: +2 palpable, equal bilaterally       Labs:   Recent Labs     06/10/22  0607 06/11/22  0616 06/12/22  0628   WBC 6.3 7.2 7.5   HGB 7.6* 7.4* 7.7*   HCT 22.5* 21.7* 22.6*    186 185     Recent Labs     06/09/22  1654 06/10/22  0114 06/10/22  0607 06/10/22  0607 06/10/22  1256 06/11/22  0616 06/12/22  0629   NA   < > 130* 133*  133*   < > 133* 133* 134*   K   < > 4.2 4.3  4.1   < > 4.1 4.4 4.6   CL   < > 92* 95*  95*   < > 93* 95* 94*   CO2   < > 30 27  27   < > 27 30 31   BUN   < > 16 16  16   < > 14 13 11   CREATININE   < > 1.2 1.1  1.1   < > 1.0 0.9 0.8   CALCIUM   < > 8.1* 8.2*  8.0*   < > 8.3 8.0* 8.4   PHOS  --  3.5 4.0  --  3.5  --   --     < > = values in this interval not displayed. Recent Labs     06/09/22  1654 06/10/22  0607   AST 28 16   ALT 11 9*   BILITOT 0.6 0.4   ALKPHOS 57 54     No results for input(s): INR in the last 72 hours. No results for input(s): Laureen Silvestre in the last 72 hours. Urinalysis:      Lab Results   Component Value Date    NITRU POSITIVE 06/10/2022    WBCUA >100 06/10/2022    BACTERIA 2+ 06/10/2022    RBCUA see below 06/10/2022    BLOODU SMALL 06/10/2022    SPECGRAV <=1.005 06/10/2022    GLUCOSEU Negative 06/10/2022    GLUCOSEU NEGATIVE 12/30/2009       Radiology:  CT HEAD WO CONTRAST   Final Result   Stable exam without acute intracranial abnormality. XR FEMUR LEFT (MIN 2 VIEWS)   Final Result   Total left knee replacement with no acute bony abnormality.          XR KNEE LEFT (3 VIEWS)   Final Result   Total left knee replacement which is unchanged with no acute bony abnormality. Mild edema or cellulitis in the soft tissues around the knee and a small   suprapatellar effusion. XR HIP 2-3 VW W PELVIS LEFT   Final Result   Mild osteoarthritic changes in both hips which is unchanged with no acute   abnormality seen. XR CHEST PORTABLE   Final Result   Stable mild cardiomegaly with mild central pulmonary congestion or pulmonary   artery hypertension      Hazy bibasilar and right upper lobe atelectasis or early infiltrates which is   more apparent                 Assessment/Plan:    Active Hospital Problems    Diagnosis     FTT (failure to thrive) in adult [R62.7]      Priority: Medium    Hypothyroid [E03.9]      Priority: Medium    [facility]fib ([facility] [I48.91]      Priority: Medium    Acute hyponatremia [E87.1]     Pernicious anemia [D51.0]     COPD (chronic obstructive pulmonary disease) ([facility] [J44.9]     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 ([facility] [K75.81, K74.60]     Type 2 diabetes mellitus with diabetic polyneuropathy, without long-term current use of insulin (HCC) [E11.42]      FTT  -Pt just discharged from SNF 5 days ago and has already decompensated to the extent of needing readmission  -Fall risk protocol in place with bed alarm activated  -TSH 2.81, A1c 6.0  -EKG without evidence of acute ischemia; results of recent ECHO reviewed and documented above  -PT/OT consultation placed for evaluation and treatment  -Consultation placed to Case Management as patient requires permanent SNF placement     Abnormal UA, start empiric Rocephin-mult drug allergies reviewed with Pharmacy.  Ucx Klebsiella-sensitivities reviewed    Acute on chronic hyponatremia  -Likely due to cirrhosis, hypervolemia  -Pt on several medications with potential to worsen hyponatremia including: diuretics, NSAIDS, PPI, SSRI/SNRI, neuroleptics  -DC IVF     Anemia with hx varices/SNOW  -Admit to telemetry unit with serial H/H scheduled  -Type and screen ordered with PRBC transfusion for hemoglobin less than 7  -Patient with known pernicious anemia and esophageal varices due to SNOW  -PPI  -Iron supp     PAF  -Patient currently rate controlled on diltiazem and Coreg, therefore continue  -Continue oral iron supplementation  - Eliquis dosage decreased to 2.5 mg twice daily due to CrCl 39 cc/min  -Patient with chronic anemia, but H&H at baseline therefore anticoagulation continued at this time     Hypothyroidism  -levothyroxine     DVT Prophylaxis: Eliquis  Diet: ADULT DIET; Easy to Chew; 3 carb choices (45 gm/meal);  Low Fat/Low Chol/High Fiber/MASON; 1500 ml  Code Status: Full Code    PT/OT Eval Status: consulted    Dispo - pending precert, return to [address] APRN - CNP

## 2022-06-12 NOTE — PLAN OF CARE
Problem: Discharge Planning  Goal: Discharge to home or other facility with appropriate resources  Outcome: Progressing     Problem: Safety - Adult  Goal: Free from fall injury  Outcome: Progressing     Problem: ABCDS Injury Assessment  Goal: Absence of physical injury  Outcome: Progressing     Problem: Skin/Tissue Integrity  Goal: Absence of new skin breakdown  Description: 1. Monitor for areas of redness and/or skin breakdown  2. Assess vascular access sites hourly  3. Every 4-6 hours minimum:  Change oxygen saturation probe site  4. Every 4-6 hours:  If on nasal continuous positive airway pressure, respiratory therapy assess nares and determine need for appliance change or resting period.   Outcome: Progressing     Problem: Chronic Conditions and Co-morbidities  Goal: Patient's chronic conditions and co-morbidity symptoms are monitored and maintained or improved  Outcome: Progressing     Problem: Pain  Goal: Verbalizes/displays adequate comfort level or baseline comfort level  Outcome: Progressing

## 2022-06-12 NOTE — PROGRESS NOTES
Physical Therapy  Chart reviewed, attempted PT treat, pt declined stating nephew just arrived for visit, will follow up as pt condition and schedule allow  No charge  Darline Clark, PT

## 2022-06-13 DIAGNOSIS — K21.9 GASTROESOPHAGEAL REFLUX DISEASE WITHOUT ESOPHAGITIS: ICD-10-CM

## 2022-06-13 LAB
ANION GAP SERPL CALCULATED.3IONS-SCNC: 7 MMOL/L (ref 3–16)
BUN BLDV-MCNC: 13 MG/DL (ref 7–20)
CALCIUM SERPL-MCNC: 8.6 MG/DL (ref 8.3–10.6)
CHLORIDE BLD-SCNC: 97 MMOL/L (ref 99–110)
CO2: 32 MMOL/L (ref 21–32)
CREAT SERPL-MCNC: 0.7 MG/DL (ref 0.6–1.2)
GFR AFRICAN AMERICAN: >60
GFR NON-AFRICAN AMERICAN: >60
GLUCOSE BLD-MCNC: 121 MG/DL (ref 70–99)
GLUCOSE BLD-MCNC: 126 MG/DL (ref 70–99)
GLUCOSE BLD-MCNC: 136 MG/DL (ref 70–99)
GLUCOSE BLD-MCNC: 170 MG/DL (ref 70–99)
GLUCOSE BLD-MCNC: 199 MG/DL (ref 70–99)
HCT VFR BLD CALC: 24.6 % (ref 36–48)
HEMOGLOBIN: 8.3 G/DL (ref 12–16)
MCH RBC QN AUTO: 29.5 PG (ref 26–34)
MCHC RBC AUTO-ENTMCNC: 33.6 G/DL (ref 31–36)
MCV RBC AUTO: 87.7 FL (ref 80–100)
PDW BLD-RTO: 15.9 % (ref 12.4–15.4)
PERFORMED ON: ABNORMAL
PLATELET # BLD: 219 K/UL (ref 135–450)
PMV BLD AUTO: 7.1 FL (ref 5–10.5)
POTASSIUM REFLEX MAGNESIUM: 4.5 MMOL/L (ref 3.5–5.1)
RBC # BLD: 2.81 M/UL (ref 4–5.2)
SODIUM BLD-SCNC: 136 MMOL/L (ref 136–145)
WBC # BLD: 6.8 K/UL (ref 4–11)

## 2022-06-13 PROCEDURE — 2580000003 HC RX 258: Performed by: HOSPITALIST

## 2022-06-13 PROCEDURE — 94640 AIRWAY INHALATION TREATMENT: CPT

## 2022-06-13 PROCEDURE — 97166 OT EVAL MOD COMPLEX 45 MIN: CPT

## 2022-06-13 PROCEDURE — G0378 HOSPITAL OBSERVATION PER HR: HCPCS

## 2022-06-13 PROCEDURE — 6370000000 HC RX 637 (ALT 250 FOR IP): Performed by: NURSE PRACTITIONER

## 2022-06-13 PROCEDURE — 85027 COMPLETE CBC AUTOMATED: CPT

## 2022-06-13 PROCEDURE — 80048 BASIC METABOLIC PNL TOTAL CA: CPT

## 2022-06-13 PROCEDURE — 97535 SELF CARE MNGMENT TRAINING: CPT

## 2022-06-13 PROCEDURE — 6370000000 HC RX 637 (ALT 250 FOR IP): Performed by: HOSPITALIST

## 2022-06-13 PROCEDURE — 97530 THERAPEUTIC ACTIVITIES: CPT

## 2022-06-13 PROCEDURE — 36415 COLL VENOUS BLD VENIPUNCTURE: CPT

## 2022-06-13 PROCEDURE — 97116 GAIT TRAINING THERAPY: CPT

## 2022-06-13 PROCEDURE — 97110 THERAPEUTIC EXERCISES: CPT

## 2022-06-13 PROCEDURE — 94761 N-INVAS EAR/PLS OXIMETRY MLT: CPT

## 2022-06-13 PROCEDURE — 2700000000 HC OXYGEN THERAPY PER DAY

## 2022-06-13 RX ORDER — FAMOTIDINE 20 MG/1
TABLET, FILM COATED ORAL
Qty: 180 TABLET | Refills: 3 | OUTPATIENT
Start: 2022-06-13

## 2022-06-13 RX ORDER — CEFDINIR 300 MG/1
300 CAPSULE ORAL EVERY 12 HOURS SCHEDULED
Status: DISCONTINUED | OUTPATIENT
Start: 2022-06-13 | End: 2022-06-14 | Stop reason: HOSPADM

## 2022-06-13 RX ADMIN — LOSARTAN POTASSIUM 100 MG: 100 TABLET, FILM COATED ORAL at 11:03

## 2022-06-13 RX ADMIN — CARVEDILOL 6.25 MG: 6.25 TABLET, FILM COATED ORAL at 11:01

## 2022-06-13 RX ADMIN — URSODIOL 500 MG: 500 TABLET, FILM COATED ORAL at 20:47

## 2022-06-13 RX ADMIN — ACETAMINOPHEN 650 MG: 325 TABLET ORAL at 21:16

## 2022-06-13 RX ADMIN — FERROUS SULFATE TAB 325 MG (65 MG ELEMENTAL FE) 325 MG: 325 (65 FE) TAB at 11:00

## 2022-06-13 RX ADMIN — Medication 2 PUFF: at 09:34

## 2022-06-13 RX ADMIN — TAMSULOSIN HYDROCHLORIDE 0.4 MG: 0.4 CAPSULE ORAL at 11:02

## 2022-06-13 RX ADMIN — ROPINIROLE HYDROCHLORIDE 0.5 MG: 0.5 TABLET, FILM COATED ORAL at 11:02

## 2022-06-13 RX ADMIN — Medication 2 PUFF: at 20:26

## 2022-06-13 RX ADMIN — TRAZODONE HYDROCHLORIDE 50 MG: 50 TABLET ORAL at 20:47

## 2022-06-13 RX ADMIN — APIXABAN 2.5 MG: 2.5 TABLET, FILM COATED ORAL at 20:47

## 2022-06-13 RX ADMIN — VENLAFAXINE 37.5 MG: 37.5 TABLET ORAL at 11:01

## 2022-06-13 RX ADMIN — INSULIN LISPRO 1 UNITS: 100 INJECTION, SOLUTION INTRAVENOUS; SUBCUTANEOUS at 20:49

## 2022-06-13 RX ADMIN — Medication 1 TABLET: at 11:00

## 2022-06-13 RX ADMIN — ROPINIROLE HYDROCHLORIDE 0.5 MG: 0.5 TABLET, FILM COATED ORAL at 13:28

## 2022-06-13 RX ADMIN — ONDANSETRON 4 MG: 4 TABLET, ORALLY DISINTEGRATING ORAL at 23:03

## 2022-06-13 RX ADMIN — GUAIFENESIN 600 MG: 600 TABLET, EXTENDED RELEASE ORAL at 11:02

## 2022-06-13 RX ADMIN — CETIRIZINE HYDROCHLORIDE 10 MG: 10 TABLET, FILM COATED ORAL at 11:01

## 2022-06-13 RX ADMIN — ATORVASTATIN CALCIUM 20 MG: 10 TABLET, FILM COATED ORAL at 20:47

## 2022-06-13 RX ADMIN — POTASSIUM CHLORIDE 20 MEQ: 20 TABLET, EXTENDED RELEASE ORAL at 11:02

## 2022-06-13 RX ADMIN — URSODIOL 500 MG: 500 TABLET, FILM COATED ORAL at 11:00

## 2022-06-13 RX ADMIN — ASPIRIN 81 MG: 81 TABLET, COATED ORAL at 11:00

## 2022-06-13 RX ADMIN — DILTIAZEM HYDROCHLORIDE 240 MG: 240 CAPSULE, COATED, EXTENDED RELEASE ORAL at 11:01

## 2022-06-13 RX ADMIN — CALCIUM 500 MG: 500 TABLET ORAL at 11:00

## 2022-06-13 RX ADMIN — CEFDINIR 300 MG: 300 CAPSULE ORAL at 20:47

## 2022-06-13 RX ADMIN — LEVOTHYROXINE SODIUM 100 MCG: 0.1 TABLET ORAL at 05:24

## 2022-06-13 RX ADMIN — FERROUS SULFATE TAB 325 MG (65 MG ELEMENTAL FE) 325 MG: 325 (65 FE) TAB at 18:20

## 2022-06-13 RX ADMIN — GABAPENTIN 100 MG: 100 CAPSULE ORAL at 11:00

## 2022-06-13 RX ADMIN — GABAPENTIN 600 MG: 300 CAPSULE ORAL at 20:47

## 2022-06-13 RX ADMIN — TIOTROPIUM BROMIDE INHALATION SPRAY 2 PUFF: 3.12 SPRAY, METERED RESPIRATORY (INHALATION) at 09:35

## 2022-06-13 RX ADMIN — MONTELUKAST SODIUM 10 MG: 10 TABLET ORAL at 11:00

## 2022-06-13 RX ADMIN — PANTOPRAZOLE SODIUM 40 MG: 40 TABLET, DELAYED RELEASE ORAL at 05:24

## 2022-06-13 RX ADMIN — ROPINIROLE HYDROCHLORIDE 0.5 MG: 0.5 TABLET, FILM COATED ORAL at 16:20

## 2022-06-13 RX ADMIN — APIXABAN 2.5 MG: 2.5 TABLET, FILM COATED ORAL at 11:02

## 2022-06-13 RX ADMIN — Medication 10 ML: at 11:07

## 2022-06-13 RX ADMIN — CARVEDILOL 6.25 MG: 6.25 TABLET, FILM COATED ORAL at 18:20

## 2022-06-13 RX ADMIN — FUROSEMIDE 20 MG: 20 TABLET ORAL at 11:03

## 2022-06-13 RX ADMIN — ROPINIROLE HYDROCHLORIDE 0.5 MG: 0.5 TABLET, FILM COATED ORAL at 20:47

## 2022-06-13 RX ADMIN — GUAIFENESIN 600 MG: 600 TABLET, EXTENDED RELEASE ORAL at 20:47

## 2022-06-13 ASSESSMENT — PAIN SCALES - GENERAL
PAINLEVEL_OUTOF10: 0

## 2022-06-13 NOTE — CARE COORDINATION
Writer spoke with [address] Engel remains pending transport set 1630. DONNA Bolanos  INS requested updated therapy notes PT note in place, canceled tentative transport. DONNA Bolanos

## 2022-06-13 NOTE — PROGRESS NOTES
Physical Therapy  Facility/Department: [facility] C5 - MED SURG/ORTHO  Daily Treatment Note  NAME: Wilfredo Roth  : 1945  MRN: [id number]    Date of Service: 2022    Discharge Recommendations:  [address]        Patient Diagnosis(es): The primary encounter diagnosis was Anemia, unspecified type. Diagnoses of Fall, initial encounter, Abrasion of left knee, initial encounter, Deterioration in ability to walk, YELENA (acute kidney injury) ([address] and Hyponatremia were also pertinent to this visit. Assessment   Assessment: Pt pleasant and agreeable, seems motivated. Pt came supine to sit with SBA and STS CGA/Hayes with RW. She ambulated 48' X 2 with RW and CGA and performed BLE exs 15 reps in the chair. Pt is recommended for con't skilled PT and SNF at D/C. Activity Tolerance: Patient tolerated evaluation without incident;Patient limited by fatigue;Patient limited by pain     Plan    Plan  Plan: 5-7 times per week  Current Treatment Recommendations: Strengthening;ROM;Balance training;Functional mobility training;Transfer training;ADL/Self-care training;IADL training;Neuromuscular re-education;Stair training;Gait training; Endurance training; Safety education & training;Patient/Caregiver education & training;Home exercise program;Pain management; Therapeutic activities     Restrictions  Restrictions/Precautions  Restrictions/Precautions: General Precautions  Position Activity Restriction  Other position/activity restrictions: IV, PW     Subjective    Subjective  Subjective: In bed and agreeable, \"I'm taking the bull by the horns\"  Pain: reports pain LLE, not rated  Orientation  Overall Orientation Status: Within Normal Limits  Orientation Level: Oriented X4  Cognition  Overall Cognitive Status: WNL     Objective   Vitals:   HR 99  O2 99% RA  /76     Bed Mobility Training  Bed Mobility Training: Yes  Supine to Sit: Stand-by assistance; Additional time (to R this hOB elevated and use of rails)  Balance  Sitting: Intact  Standing: Impaired  Standing - Static: Fair  Standing - Dynamic: Constant support  Transfer Training  Transfer Training: Yes  Interventions: Verbal cues; Safety awareness training  Sit to Stand: Contact-guard assistance;Minimum assistance; Additional time; Adaptive equipment  Stand to Sit: Contact-guard assistance;Minimum assistance; Additional time; Adaptive equipment  Gait Training  Gait Training: Yes  Gait  Overall Level of Assistance: Contact-guard assistance  Interventions: Verbal cues; Safety awareness training  Base of Support: Widened  Speed/Yvonne: Slow  Distance (ft): 50 Feet (X 2)  Assistive Device: Walker, rolling;Gait belt     PT Exercises  Exercise Treatment: BLE exs :  AP, LAQ, marches, GS X 15 BLE in  chair     Safety Devices  Type of Devices: All chetan prominences offloaded; All fall risk precautions in place; Bed alarm in place;Call light within reach; Left in bed;Gait belt;Nurse notified       Goals  Short Term Goals  Time Frame for Short term goals: 1 week, 6/17/22  Short term goal 1: Pt will perform bed mobility w/ Adrian  Short term goal 2: Pt will perform sit to stand w/ LRAD and SBA  Short term goal 3: Pt will perform 50 ft of amb w/ LRAD and CGA  Short term goal 4: Pt will complete 12-15 reps of BLE exercises to improve function towards goals by 6/13  Patient Goals   Patient goals : \"to go home\"    Education  Patient Education  Education Given To: Patient  Education Provided: Role of Therapy;Plan of Care;Home Exercise Program;Energy Conservation  Education Provided Comments: Pt educated on importance of OOB moblity for current deficits. Pt reports pain is limiting factor for LLE, pt educated on NWB for LLE for improved comfort and continued mobility. Pt demos understanding of instructions.   Education Method: Demonstration;Verbal  Barriers to Learning: None  Education Outcome: Verbalized understanding;Demonstrated understanding    Therapy Time   Individual Concurrent Group Co-treatment   Time In 1140         Time Out 1220         Minutes [facility], [address] [place] [address]

## 2022-06-13 NOTE — PROGRESS NOTES
Hospitalist Progress Note      PCP: Cathy Covarrubias. Jeannine Rust DO    Date of Admission: 6/9/2022    Chief Complaint: [facility] Course: Veronica Marr is a 69 y.o. female who presented to the ED Michael Bhardwaj is a 69 y. o. female with a history of A. fib, anemia, CAD, COPD 4 L nasal cannula baseline, HTN, GERD, HLD, hypothyroid, SNOW with cirrhosis, DM2, presents from home.  Patient was discharged from skilled nursing facility 5 days prior to arrival.  Patient was ambulating with a walker and advised that she slipped on her kitchen floor sliding down to the ground.  Injured her left knee.  Was unable to get back up.  Patient denies hitting her head or any loss of consciousness.       Upon arrival to the ED EKG was obtained revealing A. fib with LAFB. STAT head CT negative for acute intracranial abnormality. CXR with stable cardiomegaly and mild central pulmonary vascular congestion versus PAH. Mention made of RUL and bibasilar atelectasis versus infiltrates. Notable labs include: Acute hyponatremia 121, hyper kalemia 5.4, BUN/CR 20/1.4, and chronic anemia at baseline with H/H 7.9/23. 3. Hospitalist team consulted to admit this patient such that case management may arrange for permanent SNF placement to address her adult FTT with recurrent hospitalizations. Subjective: NAD, c/o gen weakness, left thigh sore,  recent fall. Patient updated on plan of care, all questions answered. Working with PT today.       Medications:  Reviewed    Infusion Medications    dextrose      sodium chloride       Scheduled Medications    guaiFENesin  600 mg Oral BID    pantoprazole  40 mg Oral QAM AC    insulin lispro  0-6 Units SubCUTAneous TID WC    insulin lispro  0-3 Units SubCUTAneous Nightly    cefTRIAXone (ROCEPHIN) IV  1,000 mg IntraVENous Q24H    tiotropium  2 puff Inhalation Daily    aspirin  81 mg Oral Daily    atorvastatin  20 mg Oral Nightly    fluticasone  2 puff Inhalation BID    carvedilol with normal bowel sounds. Musculoskeletal: No clubbing, cyanosis or edema bilaterally. LLE pain/tenderness  Skin: Skin color, texture, turgor normal.  No rashes or lesions. Neurologic:  Neurovascularly intact without any focal sensory/motor deficits. Cranial nerves: II-XII intact, grossly non-focal.  Psychiatric: Alert and oriented, thought content appropriate, normal insight  Capillary Refill: Brisk,3 seconds, normal   Peripheral Pulses: +2 palpable, equal bilaterally       Labs:   Recent Labs     06/11/22  0616 06/12/22  0628 06/13/22  0641   WBC 7.2 7.5 6.8   HGB 7.4* 7.7* 8.3*   HCT 21.7* 22.6* 24.6*    185 219     Recent Labs     06/11/22  0616 06/12/22  0629 06/13/22  0641   * 134* 136   K 4.4 4.6 4.5   CL 95* 94* 97*   CO2 30 31 32   BUN 13 11 13   CREATININE 0.9 0.8 0.7   CALCIUM 8.0* 8.4 8.6     No results for input(s): AST, ALT, BILIDIR, BILITOT, ALKPHOS in the last 72 hours. No results for input(s): INR in the last 72 hours. No results for input(s): Winston Sumit in the last 72 hours. Urinalysis:      Lab Results   Component Value Date    NITRU POSITIVE 06/10/2022    WBCUA >100 06/10/2022    BACTERIA 2+ 06/10/2022    RBCUA see below 06/10/2022    BLOODU SMALL 06/10/2022    SPECGRAV <=1.005 06/10/2022    GLUCOSEU Negative 06/10/2022    GLUCOSEU NEGATIVE 12/30/2009       Radiology:  CT HEAD WO CONTRAST   Final Result   Stable exam without acute intracranial abnormality. XR FEMUR LEFT (MIN 2 VIEWS)   Final Result   Total left knee replacement with no acute bony abnormality. XR KNEE LEFT (3 VIEWS)   Final Result   Total left knee replacement which is unchanged with no acute bony abnormality. Mild edema or cellulitis in the soft tissues around the knee and a small   suprapatellar effusion. XR HIP 2-3 VW W PELVIS LEFT   Final Result   Mild osteoarthritic changes in both hips which is unchanged with no acute   abnormality seen.          XR CHEST PORTABLE Final Result   Stable mild cardiomegaly with mild central pulmonary congestion or pulmonary   artery hypertension      Hazy bibasilar and right upper lobe atelectasis or early infiltrates which is   more apparent                 Assessment/Plan:    Active Hospital Problems    Diagnosis     FTT (failure to thrive) in adult [R62.7]      Priority: Medium    Hypothyroid [E03.9]      Priority: Medium    A-fib ([facility] [I48.91]      Priority: Medium    Acute hyponatremia [E87.1]     Pernicious anemia [D51.0]     COPD (chronic obstructive pulmonary disease) ([facility] [J44.9]     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 ([facility] [K75.81, K74.60]     Type 2 diabetes mellitus with diabetic polyneuropathy, without long-term current use of insulin (HCC) [E11.42]      FTT  -Pt just discharged from SNF 5 days ago and has already decompensated to the extent of needing readmission  -Fall risk protocol in place with bed alarm activated  -TSH 2.81, A1c 6.0  -EKG without evidence of acute ischemia; results of recent ECHO reviewed and documented above  -PT/OT consultation placed for evaluation and treatment  -Consultation placed to Case Management as patient requires permanent SNF placement     Abnormal UA, start empiric Rocephin-mult drug allergies reviewed with Pharmacy.  Ucx Klebsiella-sensitivities reviewed Rocephin changed to cefdinir    Acute on chronic hyponatremia  -Likely due to cirrhosis, hypervolemia  -Pt on several medications with potential to worsen hyponatremia including: diuretics, NSAIDS, PPI, SSRI/SNRI, neuroleptics  -DC IVF     Anemia with hx varices/SNOW  -Admit to telemetry unit with serial H/H scheduled  -Type and screen ordered with PRBC transfusion for hemoglobin less than 7  -Patient with known pernicious anemia and esophageal varices due to SNOW  -PPI  -Iron supp     PAF  -Patient currently rate controlled on diltiazem and Coreg, therefore continue  -Continue oral iron supplementation  - Eliquis dosage decreased to 2.5 mg twice daily due to CrCl 39 cc/min  -Patient with chronic anemia, but H&H at baseline therefore anticoagulation continued at this time     Hypothyroidism  -levothyroxine     DVT Prophylaxis: Eliquis  Diet: ADULT DIET; Regular; 3 carb choices (45 gm/meal);  Low Fat/Low Chol/High Fiber/MASON; 1500 ml  Code Status: Full Code    PT/OT Eval Status: consulted    Dispo - pending precert, return to [facility] [address] [address] APRN - CNP

## 2022-06-13 NOTE — PROGRESS NOTES
Occupational Therapy  Facility/Department: [place] [address] - MED SURG/ORTHO  Occupational Therapy Initial Assessment    Name: Anna Mcknight  : 1945  MRN: [id number]  Date of Service: 2022    Discharge Recommendations:  Subacute/Skilled Nursing Facility  OT Equipment Recommendations  Other: Defer   Barriers to home discharge:   [] Steps to access home entry or bed/bath:   [] No rail with steps to access home entry or bed/bath   [x] Reported available assist at home upon discharge limited: pt stating family unable to provide supv/assist   [] Patient or family requests DC to other than home   [] Patient reports expectation of post acute Discharge disposition to other than home   [] Other:        Patient Diagnosis(es): The primary encounter diagnosis was Anemia, unspecified type. Diagnoses of Fall, initial encounter, Abrasion of left knee, initial encounter, Deterioration in ability to walk, YELENA (acute kidney injury) ([address] and Hyponatremia were also pertinent to this visit. Past Medical History:  has a past medical history of Allergic, Anemia, Anesthesia complication, Arthritis, Asthma, Atrial fibrillation ([address] Cirrhosis ([address] Cirrhosis, non-alcoholic ([address] COPD (chronic obstructive pulmonary disease) ([address] GERD (gastroesophageal reflux disease), GIB (gastrointestinal bleeding), Haemophilus infection, Heart murmur, Hernia, Hyperlipidemia, Hypertension, Lung collapse, Murmur, heart, Pneumonia, Reflux, Rheumatic fever, Stress fracture, SVT (supraventricular tachycardia) ([address] TIA (transient ischemic attack), Type II or unspecified type diabetes mellitus without mention of complication, not stated as uncontrolled, 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 Past Surgical History:  has a past surgical history that includes hernia repair; Hysterectomy; joint replacement (Bilateral);  Foot surgery (Right, 2013); knee surgery (); knee surgery (); Cardiac catheterization (07/14/14); Cataract removal; bone marrow biopsy; Breast biopsy; fine needle aspiration; Colonoscopy; Upper gastrointestinal endoscopy (N/A, 7/21/2018); bronchoscopy (N/A, 5/7/2021); bronchoscopy (5/7/2021); bronchoscopy (5/7/2021); Colonoscopy (N/A, 6/10/2021); Upper gastrointestinal endoscopy (N/A, 6/10/2021); Upper gastrointestinal endoscopy (N/A, 6/10/2021); and Upper gastrointestinal endoscopy (N/A, 6/6/2022). Treatment Diagnosis: Impaired mobility      Assessment   Performance deficits / Impairments: Decreased functional mobility ; Decreased ADL status; Decreased safe awareness;Decreased balance;Decreased endurance  Assessment: Pt is a 68 yo female presenting to [facility] with a dx of FTT in adult. PTA pt was living alone in a multilevel house and reports using a rollator for functional mobility. Pt states she was IND with I/ADLs at baseline. Currently pt is presenting below baseline and is requiring SBA for LB dressing and functional mobility. Pt reports family is unable to provided supv/assist at home. Recommend pt to discharge to SNF prior to home for continued skilled occupational therapy services.   Treatment Diagnosis: Impaired mobility  Prognosis: Fair  Decision Making: Medium Complexity  REQUIRES OT FOLLOW-UP: Yes  Activity Tolerance  Activity Tolerance: Patient Tolerated treatment well  Activity Tolerance Comments: No reports of pain or fatigue during session        Plan   Plan  Times per Week: 3-5x  Current Treatment Recommendations: Strengthening,Balance training,Functional mobility training,Endurance training,Pain management,Gait training,Equipment evaluation, education, & procurement,Patient/Caregiver education & training,Safety education & training,Self-Care / ADL,Home management training,Positioning     Restrictions  Restrictions/Precautions  Restrictions/Precautions: General Precautions  Position Activity Restriction  Other position/activity restrictions: IV, PW    Subjective General  Chart Reviewed: Yes,Orders,Progress Notes,History and Physical,Previous Admission,Labs  Patient assessed for rehabilitation services?: Yes  Response to previous treatment: Patient with no complaints from previous session  Referring Practitioner: ZIGGY Schreiber CNP  Diagnosis: FTT  Subjective  Subjective: Pt presented in bed and requesting to use toilet upon entry. Pt agreeble to therapy evaluation  General Comment  Comments: RN cleared pt for therapy     Social/Functional History  Social/Functional History  Lives With: Alone  Type of Home: House  Home Layout: Two level,Multi-level (Has chair lift)  Home Access: Level entry,Stairs to enter without rails ([address] through garage door enterance)  Entrance Stairs - Number of Steps: [address]  Bathroom Shower/Tub: Walk-in shower  Bathroom Toilet: Handicap height  Bathroom Equipment: Grab bars in shower,Shower chair,Toilet raiser  Bathroom Accessibility: Accessible  Home Equipment: Rollator,Walker, rolling,Cane, quad,Oxygen,Walker, standard (3.5L at home; has portable O2 tank)  Has the patient had two or more falls in the past year or any fall with injury in the past year?: Yes  Receives Help From: Family  ADL Assistance: [address]: Independent  Homemaking Responsibilities: Yes  Ambulation Assistance: Independent  Transfer Assistance: Independent  Active : Yes  Mode of Transportation: Car  Occupation: Retired  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  [address]: Shopping and watching TV  Additional Comments: Pt reports family is unable to provide supv/assist at discharge       Objective   Heart Rate: 99  Heart Rate Source: Monitor  BP: (!) 148/76  BP Location: Right upper arm  BP Method: Automatic  Patient Position: High fowlers  MAP (Calculated): 100  Resp: 14  SpO2: 99 %  O2 Device: Nasal cannula  Vision Exceptions: Cataracts  Hearing: Within functional limits          Safety Devices  Type of Devices:  All chetan prominences offloaded; All fall risk precautions in place; Bed alarm in place;Call light within reach; Left in bed;Gait belt;Nurse notified      Sitting: Intact  Standing: Impaired  Standing - Static: Fair  Standing - Dynamic: Constant support      Toilet Transfers  Toilet - Technique: Ambulating (with RW)  Equipment Used: Standard toilet  Toilet Transfer: Stand by assistance    AROM: Generally decreased, functional  PROM: Generally decreased, functional  Strength: Generally decreased, functional  Coordination: Generally decreased, functional  Tone: Normal  Sensation: Intact  ADL  Grooming: Stand by assistance  Grooming Skilled Clinical Factors: washing hands at sink; SBA for balance  LE Dressing: Stand by assistance  LE Dressing Skilled Clinical Factors: SBA for brief managment  Toileting Skilled Clinical Factors: Pt able to complete 3/3 parts at toilet. Pt performed anterior pericare after urination  Additional Comments: Pt declining     Activity Tolerance  Activity Tolerance: Patient tolerated evaluation without incident;Patient limited by fatigue;Patient limited by pain    Bed mobility  Supine to Sit: Supervision  Sit to Supine: Supervision  Scooting: Supervision  Bed Mobility Comments: HOB slightly elevated     Transfers  Stand Pivot Transfers: Stand by assistance  Sit to stand: Stand by assistance  Stand to sit: Stand by assistance  Transfer Comments: Use of RW     Cognition  Overall Cognitive Status: WNL  Perception  Overall Perceptual Status: WFL               Education Given To: Patient  Education Provided: Role of Therapy;Plan of Care;Transfer Training;Precautions; Equipment  Education Method: Verbal  Barriers to Learning: None  Education Outcome: Verbalized understanding       AM-PAC Score  AM-PAC Inpatient Daily Activity Raw Score: 19 (06/13/22 1555)  AM-PAC Inpatient ADL T-Scale Score : 40.22 (06/13/22 1555)  ADL Inpatient CMS 0-100% Score: 42.8 (06/13/22 1555)  ADL Inpatient CMS G-Code Modifier : CK (06/13/22 1395)    Goals  Short Term Goals  Time Frame for Short term goals: 1 week (6/20) unless noted  Short Term Goal 1: Pt will complete bathroom mobility with mod I and use of LRAD  Short Term Goal 2: Pt will complete complete all aspects of toileting, including transfers, with mod I  Short Term Goal 3: Pt will perform bed mobility with mod I  Short Term Goal 4: Pt will perform item retrieval within room with mod I  Patient Goals   Patient goals : \"to go home\"       Therapy Time   Individual Concurrent Group Co-treatment   Time In 1455         Time Out 1535         Minutes 40         Timed Code Treatment Minutes: 30 Minutes      If pt is unable to be seen after this session, please let this note serve as discharge summary. Please see case management note for discharge disposition. Thank you.       Nela Malave OT

## 2022-06-13 NOTE — FLOWSHEET NOTE
06/13/22 1312   Encounter Summary   Encounter Overview/Reason  Spiritual/Emotional Needs; Rituals, Rites and Sacraments   Service Provided For: Patient   Referral/Consult From: Patient   Last Encounter    (6/13 follow up, communion, sppt and prayer)   Complexity of Encounter Moderate   Begin Time 1245   End Time  1313   Total Time Calculated 28 min   Encounter    Type Follow up   Spiritual/Emotional needs   Type Spiritual Support   Rituals, Rites and Sacraments   Type Yarsani Communion   Assessment/Intervention/Outcome   Assessment Coping;Decisional conflict; Angry   Intervention Active listening;Discussed death, afterlife; Discussed illness injury and its impact;Prayer (assurance of)/[place];Sustaining Presence/Ministry of presence   Outcome Engaged in conversation;Expressed feelings, needs, and concerns;Venting emotion

## 2022-06-14 VITALS
SYSTOLIC BLOOD PRESSURE: 136 MMHG | HEART RATE: 89 BPM | HEIGHT: 65 IN | WEIGHT: 199.5 LBS | DIASTOLIC BLOOD PRESSURE: 68 MMHG | RESPIRATION RATE: 16 BRPM | BODY MASS INDEX: 33.24 KG/M2 | TEMPERATURE: 97.8 F | OXYGEN SATURATION: 96 %

## 2022-06-14 LAB
GLUCOSE BLD-MCNC: 131 MG/DL (ref 70–99)
GLUCOSE BLD-MCNC: 147 MG/DL (ref 70–99)
GLUCOSE BLD-MCNC: 244 MG/DL (ref 70–99)
PERFORMED ON: ABNORMAL
SARS-COV-2, NAAT: NOT DETECTED

## 2022-06-14 PROCEDURE — 6370000000 HC RX 637 (ALT 250 FOR IP): Performed by: NURSE PRACTITIONER

## 2022-06-14 PROCEDURE — 2700000000 HC OXYGEN THERAPY PER DAY

## 2022-06-14 PROCEDURE — 6370000000 HC RX 637 (ALT 250 FOR IP): Performed by: HOSPITALIST

## 2022-06-14 PROCEDURE — 97530 THERAPEUTIC ACTIVITIES: CPT

## 2022-06-14 PROCEDURE — 94761 N-INVAS EAR/PLS OXIMETRY MLT: CPT

## 2022-06-14 PROCEDURE — 97116 GAIT TRAINING THERAPY: CPT

## 2022-06-14 PROCEDURE — G0378 HOSPITAL OBSERVATION PER HR: HCPCS

## 2022-06-14 PROCEDURE — 97110 THERAPEUTIC EXERCISES: CPT

## 2022-06-14 PROCEDURE — 97535 SELF CARE MNGMENT TRAINING: CPT

## 2022-06-14 PROCEDURE — 87635 SARS-COV-2 COVID-19 AMP PRB: CPT

## 2022-06-14 PROCEDURE — 94640 AIRWAY INHALATION TREATMENT: CPT

## 2022-06-14 RX ORDER — CEFDINIR 300 MG/1
300 CAPSULE ORAL EVERY 12 HOURS SCHEDULED
Qty: 8 CAPSULE | Refills: 0
Start: 2022-06-14 | End: 2022-06-18

## 2022-06-14 RX ADMIN — FERROUS SULFATE TAB 325 MG (65 MG ELEMENTAL FE) 325 MG: 325 (65 FE) TAB at 12:25

## 2022-06-14 RX ADMIN — GABAPENTIN 100 MG: 100 CAPSULE ORAL at 08:11

## 2022-06-14 RX ADMIN — FERROUS SULFATE TAB 325 MG (65 MG ELEMENTAL FE) 325 MG: 325 (65 FE) TAB at 08:11

## 2022-06-14 RX ADMIN — VENLAFAXINE 37.5 MG: 37.5 TABLET ORAL at 08:10

## 2022-06-14 RX ADMIN — CARVEDILOL 6.25 MG: 6.25 TABLET, FILM COATED ORAL at 08:11

## 2022-06-14 RX ADMIN — APIXABAN 2.5 MG: 2.5 TABLET, FILM COATED ORAL at 08:11

## 2022-06-14 RX ADMIN — INSULIN LISPRO 2 UNITS: 100 INJECTION, SOLUTION INTRAVENOUS; SUBCUTANEOUS at 12:25

## 2022-06-14 RX ADMIN — FUROSEMIDE 20 MG: 20 TABLET ORAL at 08:12

## 2022-06-14 RX ADMIN — MONTELUKAST SODIUM 10 MG: 10 TABLET ORAL at 08:10

## 2022-06-14 RX ADMIN — CEFDINIR 300 MG: 300 CAPSULE ORAL at 08:10

## 2022-06-14 RX ADMIN — Medication 1 TABLET: at 08:10

## 2022-06-14 RX ADMIN — ROPINIROLE HYDROCHLORIDE 0.5 MG: 0.5 TABLET, FILM COATED ORAL at 12:25

## 2022-06-14 RX ADMIN — LEVOTHYROXINE SODIUM 100 MCG: 0.1 TABLET ORAL at 06:18

## 2022-06-14 RX ADMIN — CARVEDILOL 6.25 MG: 6.25 TABLET, FILM COATED ORAL at 17:26

## 2022-06-14 RX ADMIN — POTASSIUM CHLORIDE 20 MEQ: 20 TABLET, EXTENDED RELEASE ORAL at 08:11

## 2022-06-14 RX ADMIN — DILTIAZEM HYDROCHLORIDE 240 MG: 240 CAPSULE, COATED, EXTENDED RELEASE ORAL at 08:10

## 2022-06-14 RX ADMIN — FERROUS SULFATE TAB 325 MG (65 MG ELEMENTAL FE) 325 MG: 325 (65 FE) TAB at 17:26

## 2022-06-14 RX ADMIN — INSULIN LISPRO 1 UNITS: 100 INJECTION, SOLUTION INTRAVENOUS; SUBCUTANEOUS at 17:26

## 2022-06-14 RX ADMIN — CALCIUM 500 MG: 500 TABLET ORAL at 08:10

## 2022-06-14 RX ADMIN — LOSARTAN POTASSIUM 100 MG: 100 TABLET, FILM COATED ORAL at 08:11

## 2022-06-14 RX ADMIN — Medication 2 PUFF: at 08:53

## 2022-06-14 RX ADMIN — URSODIOL 500 MG: 500 TABLET, FILM COATED ORAL at 08:13

## 2022-06-14 RX ADMIN — ROPINIROLE HYDROCHLORIDE 0.5 MG: 0.5 TABLET, FILM COATED ORAL at 17:26

## 2022-06-14 RX ADMIN — ASPIRIN 81 MG: 81 TABLET, COATED ORAL at 08:11

## 2022-06-14 RX ADMIN — TAMSULOSIN HYDROCHLORIDE 0.4 MG: 0.4 CAPSULE ORAL at 08:10

## 2022-06-14 RX ADMIN — TIOTROPIUM BROMIDE INHALATION SPRAY 2 PUFF: 3.12 SPRAY, METERED RESPIRATORY (INHALATION) at 08:53

## 2022-06-14 RX ADMIN — PANTOPRAZOLE SODIUM 40 MG: 40 TABLET, DELAYED RELEASE ORAL at 06:18

## 2022-06-14 RX ADMIN — ROPINIROLE HYDROCHLORIDE 0.5 MG: 0.5 TABLET, FILM COATED ORAL at 08:10

## 2022-06-14 RX ADMIN — GUAIFENESIN 600 MG: 600 TABLET, EXTENDED RELEASE ORAL at 08:10

## 2022-06-14 RX ADMIN — CETIRIZINE HYDROCHLORIDE 10 MG: 10 TABLET, FILM COATED ORAL at 08:12

## 2022-06-14 ASSESSMENT — PAIN SCALES - GENERAL
PAINLEVEL_OUTOF10: 0

## 2022-06-14 NOTE — PROGRESS NOTES
Physical Therapy  Facility/Department: [place] [address] - MED SURG/ORTHO  Daily Treatment Note  NAME: Luca De La Paz  : 1945  MRN: [id number]    Date of Service: 2022    Discharge Recommendations:  [address] [place] Mobility Inpatient   How much difficulty turning over in bed?: None  How much difficulty sitting down on / standing up from a chair with arms?: A Little  How much difficulty moving from lying on back to sitting on side of bed?: A Little  How much help from another person moving to and from a bed to a chair?: A Little  How much help from another person needed to walk in hospital room?: A Little  How much help from another person for climbing 3-5 steps with a railing?: A Little  AM-[facility] Inpatient Mobility Raw Score : 19  AM-PAC Inpatient T-Scale Score : 45.44  Mobility Inpatient CMS 0-100% Score: 41.77  Mobility Inpatient CMS G-Code Modifier : CK       Patient Diagnosis(es): The primary encounter diagnosis was Anemia, unspecified type. Diagnoses of Fall, initial encounter, Abrasion of left knee, initial encounter, Deterioration in ability to walk, YELENA (acute kidney injury) ([address] and Hyponatremia were also pertinent to this visit. Assessment   Assessment: Pt pleasant and agreeable, seems motivated. Pt came supine to sit with SBA and STS CGA/Hayes with RW. She ambulated 90', 25, + 15' with RW and CGA/SBA and performed BLE exs 15 reps in the chair. Pt is recommended for con't skilled PT and SNF at D/C. Activity Tolerance: Patient tolerated treatment well     Plan    Plan  Plan: 5-7 times per week  Current Treatment Recommendations: Strengthening;ROM;Balance training;Functional mobility training;Transfer training;ADL/Self-care training;IADL training;Neuromuscular re-education;Stair training;Gait training; Endurance training; Safety education & training;Patient/Caregiver education & training;Home exercise program;Pain management; Therapeutic activities Restrictions  Restrictions/Precautions  Restrictions/Precautions: General Precautions  Position Activity Restriction  Other position/activity restrictions: 3.5L O2     Subjective    Subjective  Subjective: In bed and agreeable, \"I'm taking the bull by the horns\"  Pain: reports pain LLE, not rated  Orientation  Overall Orientation Status: Within Functional Limits  Orientation Level: Oriented X4  Cognition  Overall Cognitive Status: WFL     Objective   Vitals     Bed Mobility Training  Bed Mobility Training: Yes  Supine to Sit: Stand-by assistance (to L with HOB elevated)  Sit to Supine: Stand-by assistance  Balance  Sitting: Intact  Standing: Impaired (with RW, pt performed functional mobility in room, also 5 consecutive sit <-> stands)  Standing - Static: Good  Transfer Training  Transfer Training: Yes  Overall Level of Assistance: Stand-by assistance  Interventions: Safety awareness training;Verbal cues  Sit to Stand: Stand-by assistance  Stand to Sit: Stand-by assistance  Toilet Transfer: Stand-by assistance (using grab bars X 2 trips to commode during session)  Gait Training  Gait Training: Yes  Gait  Overall Level of Assistance: Contact-guard assistance;Stand-by assistance  Interventions: Verbal cues; Safety awareness training  Speed/Yvonne: Slow  Distance (ft): 90 Feet (, 25, 15')  Assistive Device: Walker, rolling;Gait belt     PT Exercises  Exercise Treatment: BLE exs :  AP, LAQ, arlen, GS X 15 BLE in  chair     Safety Devices  Type of Devices: Left in chair;Call light within reach; Chair alarm in place;Nurse notified;Gait belt       Goals  Short Term Goals  Time Frame for Short term goals: 1 week, 6/17/22  Short term goal 1: Pt will perform bed mobility w/ Adrian  Short term goal 2: Pt will perform sit to stand w/ LRAD and SBA  Short term goal 3: Pt will perform 50 ft of amb w/ LRAD and CGA  Short term goal 4: Pt will complete 12-15 reps of BLE exercises to improve function towards goals by 6/13  Patient Goals   Patient goals : \"to go home\"    Education  Patient Education  Education Given To: Patient  Education Provided: Role of Therapy;Plan of Care;Home Exercise Program;Energy Conservation  Education Provided Comments: Pt educated on importance of OOB moblity for current deficits. Pt reports pain is limiting factor for LLE, pt educated on NWB for LLE for improved comfort and continued mobility. Pt demos understanding of instructions.   Education Method: Demonstration;Verbal  Barriers to Learning: None  Education Outcome: Verbalized understanding;Demonstrated understanding    Therapy Time   Individual Concurrent Group Co-treatment   Time In 1345         Time Out [address], [facility]

## 2022-06-14 NOTE — DISCHARGE SUMMARY
Hospital Medicine Discharge Summary    Patient ID: Robinson Mederos      Patient's PCP: Gill Officer. Nancy Alves.,     Admit Date: 6/9/2022     Discharge Date: 6/14/2022      Admitting Provider: No admitting provider for patient encounter. 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 Hospital Course:     Be Bhardwaj is a 69 y. o. female who presented to the ED Gene Bhardwaj is a 69 y. o. female with a history of A. fib, anemia, CAD, COPD 4 L nasal cannula baseline, HTN, GERD, HLD, hypothyroid, SNOW with cirrhosis, DM2, presents from home.  Patient was discharged from skilled nursing facility 5 days prior to arrival.  Patient was ambulating with a walker and advised that she slipped on her kitchen floor sliding down to the ground.  Injured her left knee.  Was unable to get back up.  Patient denies hitting her head or any loss of consciousness.       Upon arrival to the ED EKG was obtained revealing A. fib with LAFB.  STAT head CT negative for acute intracranial abnormality.   CXR with stable cardiomegaly and mild central pulmonary vascular congestion versus PAH.  Mention made of RUL and bibasilar atelectasis versus infiltrates.  Notable labs include: Acute hyponatremia 121, hyper kalemia 5.4, BUN/CR 20/1.4, and chronic anemia at baseline with H/H 7.9/23. 3.  Hospitalist team consulted to admit this patient such that case management may arrange for permanent SNF placement to address her adult FTT with recurrent hospitalizations. FTT  -Pt just discharged from SNF 5 days ago and has already decompensated to the extent of needing readmission  -Fall risk protocol in place with bed alarm activated  -TSH 2.81, A1c 6.0  -EKG without evidence of acute ischemia; results of recent ECHO reviewed and documented above  -PT/OT consultation placed for evaluation and treatment  -Consultation placed to Case Management as patient requires permanent SNF placement     Abnormal UA, start empiric Rocephin-mult drug allergies reviewed with Pharmacy. Ucx Klebsiella-sensitivities reviewed Rocephin changed to cefdinir     Acute on chronic hyponatremia  -Likely due to cirrhosis, hypervolemia  -Pt on several medications with potential to worsen hyponatremia including: diuretics, NSAIDS, PPI, SSRI/SNRI, neuroleptics  -DC IVF     Anemia with hx varices/SNOW  -Admit to telemetry unit with serial H/H scheduled  -Type and screen ordered with PRBC transfusion for hemoglobin less than 7  -Patient with known pernicious anemia and esophageal varices due to SNOW  -PPI  -Iron supp     PAF  -Patient currently rate controlled on diltiazem and Coreg, therefore continue  -Continue oral iron supplementation  - Eliquis dosage 5mg mg twice daily due to CrCl 76 cc/min  -Patient with chronic anemia, but H&H at baseline therefore anticoagulation continued at this time     Hypothyroidism  -levothyroxine      DC to SNF. Physical Exam Performed:     /68   Pulse 89   Temp 97.8 °F (36.6 °C) (Oral)   Resp 16   Ht 5' 5\" (1.651 m)   Wt 199 lb 8 oz (90.5 kg)   SpO2 96%   BMI 33.20 kg/m²     General appearance:  Obese. No apparent distress, appears stated age and cooperative. HEENT: Pupils equal, round, and reactive to light. Conjunctivae/corneas clear.   Neck: Supple, with full range of motion. No jugular venous distention. Trachea midline. Respiratory:  Normal respiratory effort. Diminished BS  Cardiovascular: Iregular rate and rhythm  without murmurs, rubs or gallops. Abdomen: Soft, non-tender, non-distended with normal bowel sounds. Musculoskeletal: No clubbing, cyanosis or edema bilaterally. LLE pain/tenderness  Skin: Skin color-mild chronic lower ext erythema. Neurologic:  Neurovascularly intact without any focal sensory/motor deficits. Cranial nerves: II-XII intact, grossly non-focal.  Psychiatric: Alert and oriented, thought content appropriate, normal insight  Capillary Refill: Brisk,3 seconds, normal   Peripheral Pulses: +2 palpable, equal bilaterally       Labs: For convenience and continuity at follow-up the following most recent labs are provided:      CBC:    Lab Results   Component Value Date    WBC 6.8 06/13/2022    HGB 8.3 06/13/2022    HCT 24.6 06/13/2022     06/13/2022       Renal:    Lab Results   Component Value Date     06/13/2022    K 4.5 06/13/2022    CL 97 06/13/2022    CO2 32 06/13/2022    BUN 13 06/13/2022    CREATININE 0.7 06/13/2022    CALCIUM 8.6 06/13/2022    PHOS 3.5 06/10/2022         Significant Diagnostic Studies    Radiology:   CT HEAD WO CONTRAST   Final Result   Stable exam without acute intracranial abnormality. XR FEMUR LEFT (MIN 2 VIEWS)   Final Result   Total left knee replacement with no acute bony abnormality. XR KNEE LEFT (3 VIEWS)   Final Result   Total left knee replacement which is unchanged with no acute bony abnormality. Mild edema or cellulitis in the soft tissues around the knee and a small   suprapatellar effusion. XR HIP 2-3 VW W PELVIS LEFT   Final Result   Mild osteoarthritic changes in both hips which is unchanged with no acute   abnormality seen.          XR CHEST PORTABLE   Final Result   Stable mild cardiomegaly with mild central pulmonary congestion or pulmonary   artery hypertension      Hazy bibasilar and right upper lobe atelectasis or early infiltrates which is   more apparent                Consults:     IP CONSULT TO SOCIAL WORK  IP CONSULT TO HOSPITALIST  IP CONSULT TO CASE MANAGEMENT  IP CONSULT TO SPIRITUAL SERVICES    Disposition:  Home    Condition at Discharge: Stable    Discharge Instructions/Follow-up:  See AVS    Code Status:  Prior     Activity: activity as tolerated    Diet: cardiac diet      Discharge Medications:     Discharge Medication List as of 6/14/2022  6:39 PM           Details   cefdinir (OMNICEF) 300 MG capsule Take 1 capsule by mouth every 12 hours for 8 doses, Disp-8 capsule, R-0NO PRINT              Details   carvedilol (COREG) 6.25 MG tablet Take 1 tablet by mouth 2 times daily (with meals), Disp-60 tablet, R-3Normal      ondansetron (ZOFRAN) 4 MG tablet Take 1 tablet by mouth 3 times daily as needed for Nausea or Vomiting, Disp-30 tablet, R-0Normal      dilTIAZem (TIAZAC) 240 MG extended release capsule Take 1 capsule by mouth daily, Disp-90 capsule, R-0Normal      tamsulosin (FLOMAX) 0.4 MG capsule Take 1 capsule by mouth daily, Disp-30 capsule, R-0DC to SNF      gabapentin (NEURONTIN) 100 MG capsule Take 100 mg by mouth every morning. Historical Med      gabapentin (NEURONTIN) 600 MG tablet Take 600 mg by mouth at bedtime. Historical Med      aclidinium (TUDORZA PRESSAIR) 400 MCG/ACT AEPB inhaler Inhale 1 puff into the lungs 2 times dailyHistorical Med      apixaban (ELIQUIS) 5 MG TABS tablet Take 1 tablet by mouth 2 times daily, Disp-60 tablet, R-0Normal      lidocaine 4 % external patch Place 1 patch onto the skin daily, TransDERmal, DAILY Starting Fri 5/13/2022, DC to SNF      montelukast (SINGULAIR) 10 MG tablet Take 1 tablet by mouth daily, Disp-90 tablet, R-3Normal      cyanocobalamin 1000 MCG/ML injection Inject 1 mL into the muscle every 14 days Last dose was taken on 4/9/17, Disp-6 each, R-0Normal      cetirizine (ZYRTEC) 10 MG tablet TAKE ONE TABLET BY MOUTH DAILY, Disp-90 tablet, R-2Normal      doxepin (SINEQUAN) 50 MG capsule Take 1 capsule by mouth nightly for 10 days, Disp-10 capsule, R-0Normal      levothyroxine (SYNTHROID) 100 MCG tablet Take 1 tablet by mouth Daily, Disp-90 tablet, R-1Normal      losartan (COZAAR) 100 MG tablet TAKE 1 TABLET DAILY, Disp-90 tablet, R-0Normal      pantoprazole (PROTONIX) 40 MG tablet TAKE 1 TABLET TWICE A DAY, Disp-180 tablet, R-0Normal      SITagliptin (JANUVIA) 100 MG tablet Take 1 tablet by mouth daily, Disp-90 tablet, R-3Normal      metFORMIN (GLUCOPHAGE-XR) 500 MG extended release tablet TAKE 2 TABLETS TWICE A DAY, Disp-360 tablet, R-3Normal      traZODone (DESYREL) 50 MG tablet TAKE 2 TABLETS NIGHTLY, Disp-180 tablet, R-3Normal      KLOR-CON M20 20 MEQ extended release tablet TAKE 1 TABLET DAILY, Disp-90 tablet, R-3Normal      benzonatate (TESSALON) 100 MG capsule TAKE 1 CAPSULE THREE TIMES A DAY AS NEEDED FOR COUGH, Disp-270 capsule, R-1Normal      Cholecalciferol (VITAMIN D3) 50 MCG (2000 UT) CAPS Take by mouthHistorical Med      venlafaxine (EFFEXOR) 37.5 MG tablet TAKE 1 TABLET DAILY, Disp-90 tablet, R-3Normal      atorvastatin (LIPITOR) 20 MG tablet TAKE 1 TABLET NIGHTLY, Disp-90 tablet, R-3Normal      furosemide (LASIX) 20 MG tablet TAKE 1 TABLET DAILY, Disp-90 tablet, R-3Normal      beclomethasone (QVAR) 80 MCG/ACT inhaler Inhale 2 puffs into the lungs 2 times daily, Disp-3 Inhaler, R-4Normal      levalbuterol (XOPENEX HFA) 45 MCG/ACT inhaler Inhale 2 puffs into the lungs every 6 hours as needed for Shortness of Breath, Disp-3 Inhaler, R-4Normal      ferrous sulfate 325 (65 Fe) MG tablet Take 325 mg by mouth 3 times daily (with meals)Historical Med      diphenhydrAMINE (BENADRYL) 25 MG tablet Take 50 mg by mouth nightly Historical Med      aspirin 81 MG EC tablet Take 81 mg by mouth daily Historical Med      rOPINIRole (REQUIP) 1 MG tablet Take by mouth 1 mg at 1200, 1mg at 1700, 1mg at  1900, 2mg at 2200 (total of 4mg nightly in divided doses)Historical Med      ursodiol (ACTIGALL) 500 MG tablet Take 500 mg by mouth 2 times daily Historical Med      calcium carbonate 600 MG TABS tablet Take 1 tablet by mouth daily Historical Med      Multiple Vitamins-Minerals (CENTRUM PO) Take 1 tablet by mouth daily. estrogens, conjugated, (PREMARIN) 1.25 MG tablet Take 1.25 mg by mouth daily. Time Spent on discharge is more than 30 minutes in the examination, evaluation, counseling and review of medications and discharge plan. Signed:    ZIGGY Montes De Oca - CNP   6/19/2022      Thank you Gill Officer. Nancy Alves.,  for the opportunity to be involved in this patient's care. If you have any questions or concerns, please feel free to contact me at [phone].

## 2022-06-14 NOTE — PROGRESS NOTES
Hospitalist Progress Note      PCP: Kalli Medrano. Chandu Bruner,     Date of Admission: 6/9/2022    Chief Complaint: [facility] Course: Marshall Razo is a 69 y.o. female who presented to the ED Sherrie Bhardwaj is a 69 y. o. female with a history of A. fib, anemia, CAD, COPD 4 L nasal cannula baseline, HTN, GERD, HLD, hypothyroid, SNOW with cirrhosis, DM2, presents from home.  Patient was discharged from skilled nursing facility 5 days prior to arrival.  Patient was ambulating with a walker and advised that she slipped on her kitchen floor sliding down to the ground.  Injured her left knee.  Was unable to get back up.  Patient denies hitting her head or any loss of consciousness.       Upon arrival to the ED EKG was obtained revealing A. fib with LAFB. STAT head CT negative for acute intracranial abnormality. CXR with stable cardiomegaly and mild central pulmonary vascular congestion versus PAH. Mention made of RUL and bibasilar atelectasis versus infiltrates. Notable labs include: Acute hyponatremia 121, hyper kalemia 5.4, BUN/CR 20/1.4, and chronic anemia at baseline with H/H 7.9/23. 3. Hospitalist team consulted to admit this patient such that case management may arrange for permanent SNF placement to address her adult FTT with recurrent hospitalizations. Subjective: NAD, c/o gen weakness, recent fall. Patient updated on plan of care, Patient requesting she will tell her family what she wants to tell them about her medical care. Working with PT today.       Medications:  Reviewed    Infusion Medications    dextrose      sodium chloride       Scheduled Medications    apixaban  5 mg Oral BID    cefdinir  300 mg Oral 2 times per day    guaiFENesin  600 mg Oral BID    pantoprazole  40 mg Oral QAM AC    insulin lispro  0-6 Units SubCUTAneous TID WC    insulin lispro  0-3 Units SubCUTAneous Nightly    tiotropium  2 puff Inhalation Daily    aspirin  81 mg Oral Daily    atorvastatin  20 mg Oral Nightly    fluticasone  2 puff Inhalation BID    carvedilol  6.25 mg Oral BID     cetirizine  10 mg Oral Daily    dilTIAZem  240 mg Oral Daily    calcium elemental  500 mg Oral Daily    ferrous sulfate  325 mg Oral TID     levothyroxine  100 mcg Oral Daily    montelukast  10 mg Oral Daily    losartan  100 mg Oral Daily    lidocaine  1 patch TransDERmal Daily    gabapentin  100 mg Oral Daily    gabapentin  600 mg Oral Nightly    potassium chloride  20 mEq Oral Daily with breakfast    furosemide  20 mg Oral Daily    therapeutic multivitamin-minerals  1 tablet Oral Daily    rOPINIRole  0.5 mg Oral 4x Daily    tamsulosin  0.4 mg Oral Daily    ursodiol  500 mg Oral BID    venlafaxine  37.5 mg Oral Daily    traZODone  50 mg Oral Nightly    sodium chloride flush  10 mL IntraVENous 2 times per day     PRN Meds: traMADol, glucose, dextrose bolus **OR** dextrose bolus, glucagon (rDNA), dextrose, benzonatate, diphenhydrAMINE, levalbuterol, sodium chloride flush, sodium chloride, potassium chloride **OR** potassium alternative oral replacement **OR** potassium chloride, magnesium sulfate, senna, acetaminophen **OR** acetaminophen, ondansetron **OR** ondansetron      Intake/Output Summary (Last 24 hours) at 6/14/2022 1443  Last data filed at 6/14/2022 0434  Gross per 24 hour   Intake --   Output 900 ml   Net -900 ml       Physical Exam Performed:    /68   Pulse 89   Temp 97.8 °F (36.6 °C) (Oral)   Resp 16   Ht 5' 5\" (1.651 m)   Wt 199 lb 8 oz (90.5 kg)   SpO2 96%   BMI 33.20 kg/m²     General appearance:  Obese. No apparent distress, appears stated age and cooperative. HEENT: Pupils equal, round, and reactive to light. Conjunctivae/corneas clear. Neck: Supple, with full range of motion. No jugular venous distention. Trachea midline. Respiratory:  Normal respiratory effort. Diminished BS  Cardiovascular: Iregular rate and rhythm  without murmurs, rubs or gallops.   Abdomen: Soft, non-tender, non-distended with normal bowel sounds. Musculoskeletal: No clubbing, cyanosis or edema bilaterally. LLE pain/tenderness  Skin: Skin color, texture, turgor normal.  No rashes or lesions. Neurologic:  Neurovascularly intact without any focal sensory/motor deficits. Cranial nerves: II-XII intact, grossly non-focal.  Psychiatric: Alert and oriented, thought content appropriate, normal insight  Capillary Refill: Brisk,3 seconds, normal   Peripheral Pulses: +2 palpable, equal bilaterally       Labs:   Recent Labs     06/12/22  0628 06/13/22  0641   WBC 7.5 6.8   HGB 7.7* 8.3*   HCT 22.6* 24.6*    219     Recent Labs     06/12/22  0629 06/13/22 0641   * 136   K 4.6 4.5   CL 94* 97*   CO2 31 32   BUN 11 13   CREATININE 0.8 0.7   CALCIUM 8.4 8.6     No results for input(s): AST, ALT, BILIDIR, BILITOT, ALKPHOS in the last 72 hours. No results for input(s): INR in the last 72 hours. No results for input(s): Keisha Lyon in the last 72 hours. Urinalysis:      Lab Results   Component Value Date    NITRU POSITIVE 06/10/2022    WBCUA >100 06/10/2022    BACTERIA 2+ 06/10/2022    RBCUA see below 06/10/2022    BLOODU SMALL 06/10/2022    SPECGRAV <=1.005 06/10/2022    GLUCOSEU Negative 06/10/2022    GLUCOSEU NEGATIVE 12/30/2009       Radiology:  CT HEAD WO CONTRAST   Final Result   Stable exam without acute intracranial abnormality. XR FEMUR LEFT (MIN 2 VIEWS)   Final Result   Total left knee replacement with no acute bony abnormality. XR KNEE LEFT (3 VIEWS)   Final Result   Total left knee replacement which is unchanged with no acute bony abnormality. Mild edema or cellulitis in the soft tissues around the knee and a small   suprapatellar effusion. XR HIP 2-3 VW W PELVIS LEFT   Final Result   Mild osteoarthritic changes in both hips which is unchanged with no acute   abnormality seen.          XR CHEST PORTABLE   Final Result   Stable mild cardiomegaly with mild central pulmonary congestion or pulmonary   artery hypertension      Hazy bibasilar and right upper lobe atelectasis or early infiltrates which is   more apparent                 Assessment/Plan:    Active Hospital Problems    Diagnosis     FTT (failure to thrive) in adult [R62.7]      Priority: Medium    Hypothyroid [E03.9]      Priority: Medium    A-fib ([facility] [I48.91]      Priority: Medium    Acute hyponatremia [E87.1]     Pernicious anemia [D51.0]     COPD (chronic obstructive pulmonary disease) ([facility] [J44.9]     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 ([facility] [K75.81, K74.60]     Type 2 diabetes mellitus with diabetic polyneuropathy, without long-term current use of insulin (HCC) [E11.42]      FTT  -Pt just discharged from SNF 5 days ago and has already decompensated to the extent of needing readmission  -Fall risk protocol in place with bed alarm activated  -TSH 2.81, A1c 6.0  -EKG without evidence of acute ischemia; results of recent ECHO reviewed and documented above  -PT/OT consultation placed for evaluation and treatment  -Consultation placed to Case Management as patient requires permanent SNF placement     Abnormal UA, start empiric Rocephin-mult drug allergies reviewed with Pharmacy.  Ucx Klebsiella-sensitivities reviewed Rocephin changed to cefdinir    Acute on chronic hyponatremia  -Likely due to cirrhosis, hypervolemia  -Pt on several medications with potential to worsen hyponatremia including: diuretics, NSAIDS, PPI, SSRI/SNRI, neuroleptics  -DC IVF     Anemia with hx varices/SNOW  -Admit to telemetry unit with serial H/H scheduled  -Type and screen ordered with PRBC transfusion for hemoglobin less than 7  -Patient with known pernicious anemia and esophageal varices due to SNOW  -PPI  -Iron supp     PAF  -Patient currently rate controlled on diltiazem and Coreg, therefore continue  -Continue oral iron supplementation  - Eliquis dosage 5mg mg twice daily due to CrCl 76 cc/min  -Patient with chronic anemia, but H&H at baseline therefore anticoagulation continued at this time     Hypothyroidism  -levothyroxine     DVT Prophylaxis: Eliquis  Diet: ADULT DIET; Regular; 3 carb choices (45 gm/meal); Low Fat/Low Chol/High Fiber/MASON; 1500 ml  Code Status: Full Code    PT/OT Eval Status: consulted    Dispo - pending precert, patient requesting DC to [facility].      Jeremiah Clemens, APRN - CNP

## 2022-06-14 NOTE — FLOWSHEET NOTE
06/14/22 1050   Encounter Summary   Encounter Overview/Reason  Spiritual/Emotional Needs   Service Provided For: Patient   Referral/Consult From: Patient   Support System Friends/neighbors   Last Encounter  06/14/22   Complexity of Encounter Moderate   Begin Time 0945   End Time  1051   Total Time Calculated 66 min   Encounter    Type Follow up   Spiritual/Emotional needs   Type Emotional Distress   Patient states, \"I'm taking my life back. \"  She states she does not want her children to have medical information before her. She is not deemed incapacitated for medical decision making at this time. We discussed her wishes with medical team.  She clarified she will tell her family what she wants to tell them about her medical care. We will need to update her chart to reflect her wishes.

## 2022-06-14 NOTE — PLAN OF CARE
Problem: Discharge Planning  Goal: Discharge to home or other facility with appropriate resources  6/14/2022 1830 by Charmaine Whittington RN  Outcome: Completed  6/14/2022 1145 by Charmaine Whittington RN  Outcome: Progressing     Problem: Safety - Adult  Goal: Free from fall injury  6/14/2022 1830 by Charmaine Whittington RN  Outcome: Completed  6/14/2022 1145 by Charmaine Whittington RN  Outcome: Progressing

## 2022-06-14 NOTE — CARE COORDINATION
Writer spoke with Patient per request. Patient refusing EGS requesting to pull pending cert. Reports [facility] to [facility] to [facility]. [facility] accepted started cert. Patient request to to speak with dtr regarding plans. Patient reports I am taking my care in my hands. SW following. DONNA Chamberlain  3226: Spoke with Emperatriz Naqvi at [facility] with intent to deny need to complete peer : peer by 12pm on 06/15. CNP aware nad in agreement to complete. VM left with [employer] line to schedule. Writer attempted to update PAtietn in the RR will return at later time. Roscoe Chamberlain: Writer attempt to review with patient and work on alternative plans on the phone requesting to return. Writer will attempt as time allows. DONNA Chamberlain

## 2022-06-14 NOTE — PROGRESS NOTES
Occupational Therapy  Facility/Department: [facility] - MED SURG/ORTHO  Daily Treatment Note  NAME: Lorna Beckman  : 1945  MRN: [id number]    Date of Service: 2022    Discharge Recommendations:  Subacute/Skilled Nursing Facility       AM-PAC Daily Activity Inpatient   How much help for putting on and taking off regular lower body clothing?: A Little  How much help for Bathing?: A Little  How much help for Toileting?: A Little  How much help for putting on and taking off regular upper body clothing?: A Little  How much help for taking care of personal grooming?: A Little  How much help for eating meals?: None  AM-PAC Inpatient Daily Activity Raw Score: 19  AM-PAC Inpatient ADL T-Scale Score : 40.22  ADL Inpatient CMS 0-100% Score: 42.8  ADL Inpatient CMS G-Code Modifier : CK    Patient Diagnosis(es): The primary encounter diagnosis was Anemia, unspecified type. Diagnoses of Fall, initial encounter, Abrasion of left knee, initial encounter, Deterioration in ability to walk, YELENA (acute kidney injury) ([facility] [address] and Hyponatremia were also pertinent to this visit. Assessment    Assessment: Pt demos good tolerance of OT treatment, pt requiring minimal rest breaks during session. Pt tolerated 20x UE therex seated and grossly SBA with RW for functional transfers and mobility. Pt continues to function below her baseline and would benefit from SNF at d/c as pt is unsafe to return home alone. Continue OT per POC. Activity Tolerance: Patient tolerated treatment well  Discharge Recommendations: [address]  Times per Week: 3-5x     Restrictions  Restrictions/Precautions  Restrictions/Precautions: General Precautions  Position Activity Restriction  Other position/activity restrictions: 3.5L O2    Subjective   Subjective  Subjective: Pt resting in bed, agreeable to OT treatment.     Pain: No c/o made during session    Orientation  Overall Orientation Status: Within Functional Limits    Cognition  Overall Cognitive Status: WFL        Objective    Vitals  Vitals  Heart Rate: 97  BP: 136/67  BP Location: Right upper arm  BP Method: Automatic  Patient Position: Sitting  MAP (Calculated): 90  SpO2: 95 %  O2 Device: Nasal cannula  Comment: 3.5L NC    Bed Mobility Training  Bed Mobility Training: Yes  Supine to Sit: Stand-by assistance (to L with HOB elevated)    Balance  Sitting: Intact  Standing: Impaired (with RW, pt performed functional mobility in room, also 5 consecutive sit <-> stands)  Standing - Static: Good    Transfer Training  Transfer Training: Yes  Overall Level of Assistance: Stand-by assistance  Interventions: Safety awareness training;Verbal cues  Sit to Stand: Stand-by assistance  Stand to Sit: Stand-by assistance     ADL  LE Dressing: Supervision  LE Dressing Skilled Clinical Factors: socks  Additional Comments: Pt declines additional ADLs. OT Exercises  Exercise Treatment: Pt performed the following UE therex seated in chair x20 reps: finger flex/extension, wrist flex/extension, forearm pronation/supination, elbow flex/extension, shoulder flexion, horizontal aBd/aDduction     Safety Devices  Type of Devices: Left in chair;Call light within reach; Chair alarm in place;Nurse notified;Gait belt     Patient Education  Education Given To: Patient  Education Provided: Role of Therapy;Plan of Care;Home Exercise Program;Transfer Training; Fall Prevention Strategies  Education Method: Demonstration;Verbal  Barriers to Learning: None  Education Outcome: Verbalized understanding;Continued education needed    Goals  Short Term Goals  Time Frame for Short term goals: 1 week (6/20) unless noted  Short Term Goal 1: Pt will complete bathroom mobility with mod I and use of LRAD  Short Term Goal 2: Pt will complete complete all aspects of toileting, including transfers, with mod I  Short Term Goal 3: Pt will perform bed mobility with mod I  Short Term Goal 4: Pt will perform item retrieval within room with mod I  Patient Goals   Patient goals : \"to go home\"       Therapy Time   Individual Concurrent Group Co-treatment   Time In 1415         Time Out 1455         Minutes [address], KARLIE/L

## 2022-06-14 NOTE — CARE COORDINATION
CASE MANAGEMENT DISCHARGE SUMMARY    Discharge to: [facility] skilled peer to peer completed and over turned by NP, pending rapid COVID    Precertification completed: yes  Hospital Exemption Notification (HENS) completed: yes    IMM given: (date) [address] ordered/agency: [facility]    Transportation:    Medical Transport explained to [employer]. Pt/family voice no agency preference. Agency used:[employer] [phone]   time: 9653   Ambulance form completed: Yes    Confirmed discharge plan with: Patient declined to update family     Facility/Agency, name:  [facility] [address] faxed    Phone number for report to facility: [phone]    RN, name: Kishore Castro RN    Note: Discharging nurse to complete [place], reconcile AVS, and place final copy with patient's discharge packet. RN to ensure that written prescriptions for  Level II medications are sent with patient to the facility as per protocol.     DONNA Huggins

## 2022-06-17 NOTE — PROGRESS NOTES
Physician Progress Note      Anupama Elliott  [facility] #:                  [id number]  :                       1945  ADMIT DATE:       6/3/2022 9:16 AM  DISCH DATE:        2022 6:36 PM  RESPONDING  PROVIDER #:        Guzman OSBORN          QUERY TEXT:    Pt admitted with anemia and +FOBT, \"possibly c/w GI bleed\". Per EGD notes, pt   found w/ esophageal varices not amenable to banding. If possible, please   document in progress notes and discharge summary the suspected cause of the GI   bleeding: The medical record reflects the following:    Risk Factors: SNOW w/ history of esophageal varices, afib on eliquis    Clinical Indicators: anemia w/ + FOBT  EGD findings: Two columns of small nonbleeding esophageal varices without   stigmata of recent bleed; not amenable to banding due to small variceal size. Irregular Z-line at 40 cm from incisors  A 2 cm sliding hiatal hernia. Moderate erythematous mosaic appearing mucosa in the fundus suggestive of   moderate portal hypertensive gastropathy. No evidence of gastric varices. Linear nodularity of antrum without erythema suggestive of gastric antral   vascular ectasias. No bleeding or stigmata of recent bleed noted. Treatment: Octreotide, IV protonix, GI consult, EGD, serial labs, monitoring,   Eliquis held, supportive care    Thank you,  [email or web address]  Options provided:  -- Suspected GI bleeding likely due to esophageal varices r/t cirrhosis  -- Suspected GI bleeding likely due to other specified, pls specify. -- Other - I will add my own diagnosis  -- Disagree - Not applicable / Not valid  -- Disagree - Clinically unable to determine / Unknown  -- Refer to Clinical Documentation Reviewer    PROVIDER RESPONSE TEXT:    This patient has suspected GI bleeding likely due to esophageal varices r/t   cirrhosis. Query created by:  Colbert Lundborg on 2022 2:21 PM      Electronically

## 2022-06-27 ENCOUNTER — CARE COORDINATION (OUTPATIENT)
Dept: CASE MANAGEMENT | Age: 77
End: 2022-06-27

## 2022-06-27 NOTE — CARE COORDINATION
[facility] Transitions Initial Follow Up Call    Call within 2 business days of discharge: Yes    Patient: Mansoor Lamb Patient : 1945   MRN: [id number]  Reason for Admission: fa;; with walker  Discharge Date: 22 RARS: Readmission Risk Score: 30 ( )      Last Discharge [facility]       Complaint Diagnosis Description Type Department Provider    22 Fall Anemia, unspecified type . .. ED to Hosp-Admission (Discharged) (Willem Dos Santos) Bonilla Rivera MD; [address] Acute Care Course:   6/3 to  [place] with [place]   to  [place] after a fall when she slipped with the walker. FTT and hyponatremia  [facility]  to  with a d/c after 10 days to home with [address] made:   Cardio with Vince Garsia   Cardio Dr Federico Yoon  7/15/22 PCP    Sig Hx:   Afib, GERD, COPD with O2, SNOW cirrhosis, HTN, CAD, DMII    DME:     Conversation:   Spoke with Sherry Everette after 2 IDs. She was worried about HHC and a blood draw. She stated she spoke to [place] at [facility] then said that [facility] had not contacted her. Told her about Appt this Friday. She told me that her daughter was coming home today and had not checked with her if she could drive her. Told her I would call [address] [place] [address] for Soc and left a message for Meño Li. Follow up plan: Will call again. Spoke with: [facility]: The [address] [place] [address] services provided:  reviewed appts    Care Transitions 24 Hour Call    Do you have support at home?: Alone  Are you an active caregiver in your home?: No  Care Transitions Interventions         Follow Up  Future Appointments   Date Time Provider Krzysztof Michele   2022 10:30 AM ZIGGY Pena CNP [facility]   2022  2:00 PM MD Montse Tong [facility]   7/15/2022  1:00 PM Cristina Ramos.  Caesar Polanco., DO [facility] AND RES [facility]   2022  1:00 PM [facility] MRI RM 1 [place] [facility]   2022  2:00 PM JOHN MURPHY RM 2 [facility]Z Flako Dowell   8/25/2022  1:30 PM Sonali Wallace MD [facility]       JORDAN DingN, RN   [address] [place] [address] Nurse  [phone]

## 2022-06-28 ENCOUNTER — CARE COORDINATION (OUTPATIENT)
Dept: CASE MANAGEMENT | Age: 77
End: 2022-06-28

## 2022-06-28 NOTE — CARE COORDINATION
[facility] Transitions Follow Up Call    2022    Patient: Mansoor Lamb  Patient : 1945   MRN: [id number]  Reason for Admission: anemia, fall  Discharge Date: 22 RARS: Readmission Risk Score: 30 ( )         Spoke with: Delia W Jose Guadalupe Trejo stated that [facility] is today. Shellie Sukumargary and she asked me to call tomorrow as OT is there currently. 24 hour assessment needed      Care Transitions Subsequent and Final Call    Subsequent and Final Calls  Care Transitions Interventions  Other Interventions: Follow Up  Future Appointments   Date Time Provider Krzysztof Michele   2022  2:00 PM MD Madalyn Aguirre [facility]   7/15/2022  1:00 PM Kaylee Valverde.  Zelda Diaz,  [address] AND RES [facility]   2022  1:00 PM [facility] MRI RM 1 [facility] MRI [place] [facility]   2022  2:00 PM Eri Hinojosa MD AND PULM [facility]   2022  2:00 PM [facility] MAMMO  [facility] Rad   2022  1:30 PM Leeann Ayala MD [facility]       Linda Sharma, BSN, RN   [address]  [facility] Transition Nurse  [phone]

## 2022-06-29 ENCOUNTER — CARE COORDINATION (OUTPATIENT)
Dept: CASE MANAGEMENT | Age: 77
End: 2022-06-29

## 2022-06-29 NOTE — CARE COORDINATION
[facility] Transitions Follow Up Call    2022    Patient: Mansoor Lamb  Patient : 1945   MRN: [id number]  Reason for Admission: fall with walker  Discharge Date: 22 RARS: Readmission Risk Score: 30 ( )    Acute Care Course:   6/3 to  [facility] with anemia   to  [facility] after a fall when she slipped with the walker. FTT and hyponatremia  [facility]  to  with a d/c after 10 days to home with [facility] made:   Cardio with Jua Nluis Loose - cancelled by pt   Cardio Dr Georgi Hernandez  7/15/22 PCP     Sig Hx:   Afib, GERD, COPD with O2, SNOW cirrhosis, HTN, CAD, DMII     DME:      Conversation:   Spoke with [facility] after 2 IDs. States she is doing well and PT and OT have been out but the nurse has not. She is to come today and she expects her blood to be drawn. She knows about the  appt with Dr Georgi Hernandez. She states The [facility] was like a 5 star hotel and she was very happy. She also gave a thumbs up for [facility]. She got a new rollator as last rollator was responsible for her fall when wheel broke. She states she has 14 steps but has an electric cahir to get her up and down. She has no loose rugs and a clear path. She has a much better appetite now. When she firt got home she was a little nauseous. She has not been sleeping well since spouse has  and unfortunately this is her norm. She has wonderful neighbors and her daughter help her get supplies, mail, garbage, etc. She cannot order from [employer] as no computer but daughter will help her. She has a shower chair and bars in her shower. She is not allowed to take a shower by herself. Reviewed meds. Agreeable to calls.              Follow up plan:  Bianca Dow - will hand off to [facility]       Spoke with: Flakita    [facility] of Care Initial Call    Was this an external facility discharge?  No Discharge Facility: [facility]    Challenges to be reviewed by the provider   Additional needs identified to be addressed with provider: No  none             Method of communication with provider : none    Advance Care Planning:   Does patient have an Advance Directive: reviewed and current. Care Transition Nurse contacted the patient by telephone to perform post hospital discharge assessment. Verified name and  with patient as identifiers. Provided introduction to self, and explanation of the CTN role. CTN reviewed discharge instructions, medical action plan and red flags with patient who verbalized understanding. Patient given an opportunity to ask questions and does not have any further questions or concerns at this time. Were discharge instructions available to patient? Yes. Reviewed appropriate site of care based on symptoms and resources available to patient including: PCP. The patient agrees to contact the PCP office for questions related to their healthcare. Medication reconciliation was performed with patient, who verbalizes understanding of administration of home medications. Advised obtaining a 90-day supply of all daily and as-needed medications. Was patient discharged with a pulse oximeter? no    CTN provided contact information. Plan for follow-up call in 5-7 days based on severity of symptoms and risk factors. Plan for next call: referral to ambulatory care [facility] Transitions Subsequent and Final Call    Subsequent and Final Calls  Care Transitions Interventions  Other Interventions: Follow Up  Future Appointments   Date Time Provider Krzysztof Michele   2022  2:00 PM Angela Bravo MD Edmond Reveal [facility]   7/15/2022  1:00 PM Dallas Gaspar.  [facility] DO [facility] AND RES [facility]   2022  1:00 PM [facility] MRI RM 1 [facility] MRI [place] Rad   2022  2:00 PM Ulises Perdomo MD AND PULM [facility]   2022  2:00 PM [facility] MAMMO  [facility] Rad   2022  1:30 PM Terry Alas MD [facility]     Mejia Vallejo, JORDANN, RN   [facility] Transition Nurse  [phone]

## 2022-06-30 ENCOUNTER — TELEPHONE (OUTPATIENT)
Dept: PRIMARY CARE CLINIC | Age: 77
End: 2022-06-30

## 2022-06-30 RX ORDER — ACLIDINIUM BROMIDE 400 UG/1
1 POWDER, METERED RESPIRATORY (INHALATION) 2 TIMES DAILY
Qty: 3 EACH | Refills: 1 | Status: SHIPPED | OUTPATIENT
Start: 2022-06-30

## 2022-06-30 NOTE — TELEPHONE ENCOUNTER
Pt states her chronic cough is getting worse. She is now coughing up thick brown mucus. Pt states she is taking Mucinex without relief. Pt states she cannot come to any appt's sooner than she is currently scheduled d/t transportation.

## 2022-06-30 NOTE — TELEPHONE ENCOUNTER
----- Message from Araceli Erickson sent at 6/30/2022  9:17 AM EDT -----  Subject: Message to Provider    QUESTIONS  Information for Provider? pt feels like like choking and is taking meds   and doesnt know if she needs to continue this medication.   ---------------------------------------------------------------------------  --------------  CALL BACK INFO  What is the best way for the office to contact you? OK to leave message on   voicemail, OK to respond with electronic message via [employer] portal (only   for patients who have registered [employer] account)  Preferred Call Back Phone Number? [phone]  ---------------------------------------------------------------------------  --------------  SCRIPT ANSWERS  Relationship to Patient?  Self

## 2022-07-01 NOTE — TELEPHONE ENCOUNTER
Pt is calling back asking if Dr. Michael Awad called something in for her. She is taking mucinex and it is not working. She states that the mucus she is coughing up is a dark color. Per the 's last message recommended VV. Pt says that she has no way to do a VV. Pt says that she cant come in for an appt either. Pt ask that I send a message to the Dr. Chelo Belle for him to call something in. I informed the Pt that Dr. Michael Awad is out of office but that I would send the message.     Pt states that she will call her lung

## 2022-07-01 NOTE — TELEPHONE ENCOUNTER
Unable to evaluate patient without more information such as vitals and whether or not patient is experiencing systemic symptoms and whether or not patient is SOB.

## 2022-07-01 NOTE — TELEPHONE ENCOUNTER
Please review previous messages       Pt is calling back asking if Dr. Rebecca Staley called something in for her. She is taking mucinex and it is not working. She states that the mucus she is coughing up is a dark color.     Per the 's last message recommended VV. Pt says that she has no way to do a VV.  Pt says that she cant come in for an appt either.     Pt ask that I send a message to the Dr. Sonia Deluca for him to call something in.     I informed the Pt that Dr. Rebecca Staley is out of office but that I would send the message.     Pt states that she will call her lung Dr. Jeri Vasquez

## 2022-07-05 ENCOUNTER — CARE COORDINATION (OUTPATIENT)
Dept: CARE COORDINATION | Age: 77
End: 2022-07-05

## 2022-07-05 NOTE — CARE COORDINATION
Ambulatory Care Coordination Note  7/5/2022  CM Risk Score: 11  Charlson 10 Year Mortality Risk Score: 100%     ACC: Patricia Liang, RN     Summary Note: Sheridan has been a diabetic for 9 years and knows how to treat fs is goes too low or high. Today fs was 93 and usually checks it twice a day. Reports fs last evening was 81. 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 Reviewed diabetic, low fat, low sodium diets. Reports her son wanted therapy to show her how to get up if she falls since she slid on her knees but they are too sore today to try that. Has [facility] PT/OT that's suppose to come today and are suppose to start coming to her home twice a week and RN 1 x week and should be out this week since nurse was here last Monday. Denies any recent falls. Reports taking tylenol for left knee pain and right knee is swollen too. Discussed to apply ice to areas and elevate to decrease edema. Reports has an appt with ortho tomorrow . Had some labs done last week but hasn't gotten any results back yet and none seen in chart. PLAN  1) fu appt (7/7 ortho)  2) review fs  3) review knee pain    Diabetes Assessment    Medic Alert ID: No  Meal Planning: Avoidance of concentrated sweets   How often do you test your blood sugar?: Other (Comment: twice daily)   Do you have barriers with adherence to non-pharmacologic self-management interventions?  (Nutrition/Exercise/Self-Monitoring): No   Have you ever had to go to the ED for symptoms of low blood sugar?: No       No patient-reported symptoms   Do you have hyperglycemia symptoms?: No   Do you have hypoglycemia symptoms?: No   Last Blood Sugar Value: 93   Blood Sugar Monitoring Regimen: Once a Day, At Bedtime   Blood Sugar Trends: No Change            Ambulatory Care Coordination Assessment    Care Coordination Protocol  Referral from Primary Care Provider: No  Week 1 - Initial Assessment     Do you have all of your prescriptions and are they filled?: Yes  Barriers to medication adherence: None  Are you able to afford your medications?: Yes  How often do you have trouble taking your medications the way you have been told to take them?: I always take them as prescribed. Do you have Home O2 Therapy?: No      Ability to seek help/take action for Emergent Urgent situations i.e. fire, crime, inclement weather or health crisis. : Independent  Ability to ambulate to restroom: Independent  Ability handle personal hygeine needs (bathing/dressing/grooming): Independent  Ability to manage Medications: Independent  Ability to prepare Food Preparation: Independent  Ability to maintain home (clean home, laundry): Needs Assistance  Ability to drive and/or has transportation: Dependent  Ability to do shopping: Needs Assistance  Ability to manage finances: Independent  Is patient able to live independently?: Yes     Current Housing: Private Residence        Per the Fall Risk Screening, did the patient have 2 or more falls or 1 fall with injury in the past year?: Yes  How often do you think you are about to fall and you do NOT fall?  For example, you grab something to stabilize yourself or hold onto a wall/furniture?: Occasionally  Use of a Mobility Aid: Yes  Difficulty walking/impaired gait: Yes  Issues with feet or shoes like numbness, edema, shoes not fitting: No  Changes in vision, poor vision or poor lighting in environment: No  Dizziness: No     Frequent urination at night?: No  Do you use rails/bars?: Yes  Do you have a non-slip tub mat?: Yes     Are you experiencing loss of meaning?: No  Are you experiencing loss of hope and peace?: No     Thinking about your patient's physical health needs, are there any symptoms or problems (risk indicators) you are unsure about that require further investigation?: No identified areas of uncertainly or problems already being investigated   Are the patients physical health problems impacting on their mental well-being?: No identified areas of concern   Are there any problems with your patients lifestyle behaviors (alcohol, drugs, diet, exercise) that are impacting on physical or mental well-being?: No identified areas of concern   Do you have any other concerns about your patients mental well-being? How would you rate their severity and impact on the patient?: No identified areas of concern   How would you rate their home environment in terms of safety and stability (including domestic violence, insecure housing, neighbor harassment)?: Consistently safe, supportive, stable, no identified problems   How do daily activities impact on the patient's well-being? (include current or anticipated unemployment, work, caregiving, access to transportation or other): Some general dissatisfaction but no concern   How would you rate their social network (family, work, friends)?: Adequate participation with social networks   How would you rate their financial resources (including ability to afford all required medical care)?: Financially secure, some resource challenges   How wells does the patient now understand their health and well-being (symptoms, signs or risk factors) and what they need to do to manage their health?: Reasonable to good understanding and already engages in managing health or is willing to undertake better management   How well do you think your patient can engage in healthcare discussions? (Barriers include language, deafness, aphasia, alcohol or drug problems, learning difficulties, concentration): Clear and open communication, no identified barriers   Are current services involved with this patient well-coordinated? (Include coordination with other services you are now recommendation): All required care/services in place and well-coordinated   Suggested Interventions and Community Resources  Diabetes Education: In Process   Fall Risk Prevention:  In Process Occupational Therapy: Completed   Physical Therapy: Completed   Other Services: Completed   Zone Management Tools: In Process                  Prior to Admission medications    Medication Sig Start Date End Date Taking? Authorizing Provider   aclidinium (TUDORZA PRESSAIR) 400 MCG/ACT AEPB inhaler Inhale 1 puff into the lungs 2 times daily 6/30/22   Germania Wolff MD   beclomethasone (QVAR REDIHALER) 80 MCG/ACT AERB inhaler Inhale 1 puff into the lungs 2 times daily 6/30/22   Germania Wolff MD   carvedilol (COREG) 6.25 MG tablet Take 1 tablet by mouth 2 times daily (with meals) 6/7/22   DAVONTE Martinez   ondansetron (ZOFRAN) 4 MG tablet Take 1 tablet by mouth 3 times daily as needed for Nausea or Vomiting 6/2/22   Heather Fortune. Floyd Ro, DO   dilTIAZem [facility]) 240 MG extended release capsule Take 1 capsule by mouth daily 5/31/22   Debra Ceballos MD   [facility]) 0.4 MG capsule Take 1 capsule by mouth daily 5/14/22   Neptali King MD   gabapentin (NEURONTIN) 100 MG capsule Take 100 mg by mouth every morning. Historical Provider, MD   gabapentin (NEURONTIN) 600 MG tablet Take 600 mg by mouth at bedtime. Historical Provider, MD   apixaban (ELIQUIS) 5 MG TABS tablet Take 1 tablet by mouth 2 times daily 5/12/22   Neptali King MD   lidocaine 4 % external patch Place 1 patch onto the skin daily 5/13/22   Neptali King MD   montelukast (SINGULAIR) 10 MG tablet Take 1 tablet by mouth daily 4/19/22   Dalbert Dancer, MD   cyanocobalamin 1000 MCG/ML injection Inject 1 mL into the muscle every 14 days Last dose was taken on 4/9/17 3/31/22   Heather Fortune. Floyd Ro DO   cetirizine (ZYRTEC) 10 MG tablet TAKE ONE TABLET BY MOUTH DAILY 3/21/22   Debra Ceballos MD   doxepin [facility]) 50 MG capsule Take 1 capsule by mouth nightly for 10 days 3/6/22 5/17/22  Heather Fortune. Floyd Ro DO   levothyroxine (SYNTHROID) 100 MCG tablet Take 1 tablet by mouth Daily 3/3/22   Heather Fortune.  Floyd Berger.,    losartan (COZAAR) 100 MG tablet TAKE 1 TABLET DAILY 3/2/22   Debra Ceballos MD pantoprazole (PROTONIX) 40 MG tablet TAKE 1 TABLET TWICE A DAY 3/2/22   Claudette Finch, MD   SITagliptin (JANUVIA) 100 MG tablet Take 1 tablet by mouth daily 2/25/22   Krystian Boyd.  Shaniqua Juárez.,    metFORMIN (GLUCOPHAGE-XR) 500 MG extended release tablet TAKE 2 TABLETS TWICE A DAY 1/3/22   Bret Goldman MD   traZODone (DESYREL) 50 MG tablet TAKE 2 TABLETS NIGHTLY 12/3/21   Claudette Finch, MD   KLOR-CON M20 20 MEQ extended release tablet TAKE 1 TABLET DAILY 11/4/21   Claudette Finch, MD   benzonatate (TESSALON) 100 MG capsule TAKE 1 CAPSULE THREE TIMES A DAY AS NEEDED FOR COUGH 10/18/21   Jarred White MD   Cholecalciferol (VITAMIN D3) 50 MCG (2000 UT) CAPS Take by mouth    Historical Provider, MD   venlafaxine (EFFEXOR) 37.5 MG tablet TAKE 1 TABLET DAILY 9/16/21   Claudette Finch, MD   atorvastatin (LIPITOR) 20 MG tablet TAKE 1 TABLET NIGHTLY 9/9/21   Claudette Finch, MD   furosemide (LASIX) 20 MG tablet TAKE 1 TABLET DAILY 7/19/21   Claudette Finch, MD   beclomethasone (QVAR) 80 MCG/ACT inhaler Inhale 2 puffs into the lungs 2 times daily 5/20/21   Brayan Koo MD   levalbuterol [facility]) 45 MCG/ACT inhaler Inhale 2 puffs into the lungs every 6 hours as needed for Shortness of Breath 5/20/21   Brayan Koo MD   ferrous sulfate 325 (65 Fe) MG tablet Take 325 mg by mouth 3 times daily (with meals)    Historical Provider, MD   diphenhydrAMINE (BENADRYL) 25 MG tablet Take 50 mg by mouth nightly     Historical Provider, MD   aspirin 81 MG EC tablet Take 81 mg by mouth daily     Historical Provider, MD   rOPINIRole (REQUIP) 1 MG tablet Take by mouth 1 mg at 1200, 1mg at 1700, 1mg at  1900, 2mg at 2200 (total of 4mg nightly in divided doses) 3/9/17   Historical Provider, MD   ursodiol (ACTIGALL) 500 MG tablet Take 500 mg by mouth 2 times daily     Historical Provider, MD   calcium carbonate 600 MG TABS tablet Take 1 tablet by mouth daily     Historical Provider, MD   Multiple Vitamins-Minerals (CENTRUM PO) Take 1 tablet by mouth daily. Historical Provider, MD   estrogens, conjugated, (PREMARIN) 1.25 MG tablet Take 1.25 mg by mouth daily. Historical Provider, MD       Future Appointments   Date Time Provider Krzysztof Michele   7/12/2022  2:00 PM MD Jessie Garcia [facility]   7/15/2022  1:00 PM Neris Reid.  Keisha Resendez., DO [facility] AND RES [facility]   7/19/2022  1:00 PM [facility] MRI RM 1 [facility] MRI [facility]   7/19/2022  2:00 PM Kary Aguero MD AND PULM [facility]   8/4/2022  2:00 PM [facility] MAMMO  [facility]   8/25/2022  1:30 PM [facility], MD Alric Koyanagi [facility]

## 2022-07-06 NOTE — TELEPHONE ENCOUNTER
Patient call back stated that she was IP at the hospital.  Patient schedule w/Dr Bubba Huber on 7/18//22

## 2022-07-07 ENCOUNTER — TELEPHONE (OUTPATIENT)
Dept: PRIMARY CARE CLINIC | Age: 77
End: 2022-07-07

## 2022-07-11 NOTE — PROGRESS NOTES
[facility] Office Note  7/12/2022     Subjective: Will Francis is here for follow up of SVT, HTN, HLD Chronic resp failure, history of rheumatic fever age 11  Feeling pretty good. She is on O2 3 L/min 24/7  Two recent hospitalizations  New diagnosis parox afib  [place]:  Patient underwent an echo on 8/31/2021 which demonstrated an EF of 65%. 9/9/21, she presented on oxygen. She reported that she recently went through her husbands clothes and cleaned them out and tolerated that activity well. She reported that she was fully vaccinated for COVID in February, 2021. She received Sallyann Jose and Sallyann Jose vaccine. She stated that she had Rheumatic fever as a child. She reported that she was taking her medications as prescribed and tolerating them well. Patient denied current edema, chest pain, palpitations, dizziness or syncope. Hospitalized 6.3.22 and 6.9.22  For new onset afib and hyponatremia. Today she presents in a wheel chair and on O2. She reports that she is living back at home. She reports using a rollater at home that caused her to fall and then ended up in the hospital. She stated that she now has a rollator  walker with bigger wheels that she uses at home. Reports that the Carleen Sahu NP at [facility] will not prescribe her percocet and instructed her to continue using hydrocodone which is not helping her pain and gives her a  \"stooper\" feeling. 1+ edema noted on exam. Reports a rash on her arms and legs, using a cream that the dermatologist prescribed her. Denies chest pain, palpitations, sob, dizziness, syncope           PMH:    On 6/24/16 she stated she had not had palpitations since her discharge from the hospital.  Her main complaint was of fatigue which worsened in April. 4/2016 TSH 1.08. She had lost 45 lbs over the past year. She is on multiple medications for rash. She is also on Trazodone and Effexor.   She has a rash due to liver disease and has been on multiple medications for her rash and itching. She started taking Atarax for itching - first dose taken today. She was taken off Benadryl and Periactin due to liver disease. She is also doing phototherapy by Dr. Vaughn Hopper (dermatology). She has had abdominal cramping since undergoing Colonoscopy in May 2016. She has pernicious anemia for which she takes Vit V 12 every two weeks. 2016 H/H 9.6, 29.8. On 3/31/17 she reported her palpitations have returned. They occur randomly and last a few minutes. She describes it as a racing heart beat. They do not occur daily. They have been worsening in frequency. She also states her SOB is worsening. She follows up with Silvano Sheppard for this. Her oxygen level today in the office began at 88% and ivone to 92%. She complains of a productive cough. She states this has been occurring for 1 year and has had no testing. She smoked for roughly 40 years. She also complains of worsening fatigue and poor sleep. This has also been worsening recently. She has been given Requip for this. She attributes her poor sleep to her cough. She also complains of generalized dryness. She had an episode on SVT on 8/15/18 while at [facility], . In 2018 she had a bout of vomiting and diarrhea and was hospitalized. She had an EGD done and it showed gastritis and esophageal varices without active bleeding. She did follow up with Dr Carmell Apley on 2020 and he reduced her metformin to 500mg BID. Patient's lasix was increased to 40mg per day at last office visit on 6/15/2020 due to swelling She had J&J vaccine on 21. Review of Systems: 12 point ROS negative in all areas as listed below except as in [place]  Constitutional, EENT, GI, , Musculoskeletal, skin, neurological, hematological, endocrine, Psychiatric   Reviewed past medical history, social, and family history. Nonsmoker. No alcohol. Mother: no cardiac history. Father:  of MI age 63's.    Past Medical History:   Diagnosis Date    Allergic     Anemia     Anesthesia complication     \"woke up during surgery\"    Arthritis     Osteoarthritis of bilateral CMC joints    Asthma     Followed by Dr. Jeane Alicia Atrial fibrillation ([facility]     Cirrhosis ([facility]     non alcoholic    Cirrhosis, non-alcoholic ([address]     COPD (chronic obstructive pulmonary disease) ([address]     GERD (gastroesophageal reflux disease)     GIB (gastrointestinal bleeding)     LGIB 4/2016    Haemophilus infection 05/07/2021    + resp    Heart murmur     Hernia     Hyperlipidemia     Hypertension     Lung collapse     Murmur, heart     Pneumonia     Reflux     Rheumatic fever     Stress fracture     right foot    SVT (supraventricular tachycardia) ([address]     6/19/16 - admitted X 1 day per PT     TIA (transient ischemic attack) 2015    Type II or unspecified type diabetes mellitus without mention of complication, not stated as uncontrolled     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 ([facility]     Wears glasses     as needed    Wears partial dentures     upper partial     Past Surgical History:   Procedure Laterality Date    BONE MARROW BIOPSY      BREAST BIOPSY      BRONCHOSCOPY N/A 5/7/2021    BRONCHOSCOPY DIAGNOSTIC OR CELL [address] ONLY performed by Raymond Villanueva MD at [address] [place] [address]  5/7/2021    BRONCHOSCOPY ALVEOLAR LAVAGE performed by Raymond Villanueva MD at [address] [place] [address]  5/7/2021    BRONCHOSCOPY THERAPUTIC ASPIRATION INITIAL performed by Raymond [facility]anueva MD at [facility]  07/14/14    CATARACT REMOVAL      COLONOSCOPY      X 3 per PT 6/8/16    COLONOSCOPY N/A 6/10/2021    COLONOSCOPY POLYPECTOMY ABLATION performed by Jannette Gilbert MD at [address] [facility] Right 06/03/2013    CORAL BUNIONECTOMY RIGHT FOOT WITH SCREW FIXATION;    HERNIA REPAIR      HYSTERECTOMY (CERVIX STATUS UNKNOWN)      JOINT REPLACEMENT Bilateral     TKR    puff into the lungs 2 times daily 3 each 1    carvedilol (COREG) 6.25 MG tablet Take 1 tablet by mouth 2 times daily (with meals) 60 tablet 3    ondansetron (ZOFRAN) 4 MG tablet Take 1 tablet by mouth 3 times daily as needed for Nausea or Vomiting 30 tablet 0    dilTIAZem (TIAZAC) 240 MG extended release capsule Take 1 capsule by mouth daily 90 capsule 0    tamsulosin (FLOMAX) 0.4 MG capsule Take 1 capsule by mouth daily 30 capsule 0    gabapentin (NEURONTIN) 100 MG capsule Take 100 mg by mouth every morning.  gabapentin (NEURONTIN) 600 MG tablet Take 600 mg by mouth at bedtime.       apixaban (ELIQUIS) 5 MG TABS tablet Take 1 tablet by mouth 2 times daily 60 tablet 0    montelukast (SINGULAIR) 10 MG tablet Take 1 tablet by mouth daily 90 tablet 3    cyanocobalamin 1000 MCG/ML injection Inject 1 mL into the muscle every 14 days Last dose was taken on 4/9/17 6 each 0    cetirizine (ZYRTEC) 10 MG tablet TAKE ONE TABLET BY MOUTH DAILY 90 tablet 2    doxepin (SINEQUAN) 50 MG capsule Take 1 capsule by mouth nightly for 10 days 10 capsule 0    levothyroxine (SYNTHROID) 100 MCG tablet Take 1 tablet by mouth Daily 90 tablet 1    losartan (COZAAR) 100 MG tablet TAKE 1 TABLET DAILY 90 tablet 0    pantoprazole (PROTONIX) 40 MG tablet TAKE 1 TABLET TWICE A  tablet 0    SITagliptin (JANUVIA) 100 MG tablet Take 1 tablet by mouth daily 90 tablet 3    metFORMIN (GLUCOPHAGE-XR) 500 MG extended release tablet TAKE 2 TABLETS TWICE A  tablet 3    traZODone (DESYREL) 50 MG tablet TAKE 2 TABLETS NIGHTLY 180 tablet 3    KLOR-CON M20 20 MEQ extended release tablet TAKE 1 TABLET DAILY 90 tablet 3    benzonatate (TESSALON) 100 MG capsule TAKE 1 CAPSULE THREE TIMES A DAY AS NEEDED FOR COUGH 270 capsule 1    Cholecalciferol (VITAMIN D3) 50 MCG (2000 UT) CAPS Take by mouth      venlafaxine (EFFEXOR) 37.5 MG tablet TAKE 1 TABLET DAILY 90 tablet 3    atorvastatin (LIPITOR) 20 MG tablet TAKE 1 TABLET NIGHTLY 90 tablet 3    levalbuterol (XOPENEX HFA) 45 MCG/ACT inhaler Inhale 2 puffs into the lungs every 6 hours as needed for Shortness of Breath 3 Inhaler 4    ferrous sulfate 325 (65 Fe) MG tablet Take 325 mg by mouth 3 times daily (with meals)      diphenhydrAMINE (BENADRYL) 25 MG tablet Take 50 mg by mouth nightly       rOPINIRole (REQUIP) 1 MG tablet Take by mouth 1 mg at 1200, 1mg at 1700, 1mg at  1900, 2mg at 2200 (total of 4mg nightly in divided doses)      ursodiol (ACTIGALL) 500 MG tablet Take 500 mg by mouth 2 times daily       calcium carbonate 600 MG TABS tablet Take 1 tablet by mouth daily       Multiple Vitamins-Minerals (CENTRUM PO) Take 1 tablet by mouth daily.  estrogens, conjugated, (PREMARIN) 1.25 MG tablet Take 1.25 mg by mouth daily.  lidocaine 4 % external patch Place 1 patch onto the skin daily (Patient not taking: Reported on 7/12/2022)      [DISCONTINUED] furosemide (LASIX) 20 MG tablet TAKE 1 TABLET DAILY 90 tablet 3    beclomethasone (QVAR) 80 MCG/ACT inhaler Inhale 2 puffs into the lungs 2 times daily 3 Inhaler 4    [DISCONTINUED] aspirin 81 MG EC tablet Take 81 mg by mouth daily        No facility-administered encounter medications on file as of 7/12/2022.         Lab Data:    Lab Results   Component Value Date    TRIG 105 09/18/2021    TRIG 133 02/04/2019    TRIG 110 01/11/2017     Lab Results   Component Value Date    HDL 73 (A) 09/18/2021    HDL 75 (H) 06/22/2020    HDL 78 (A) 02/04/2019     Lab Results   Component Value Date    LDLCALC 63 09/18/2021    LDLCALC 69 06/22/2020    LDLCALC 66 02/04/2019     Lab Results   Component Value Date    LABVLDL 19 06/22/2020    LABVLDL 25 07/14/2014    LABVLDL 50 11/13/2013     A1C:   Lab Results   Component Value Date    LABA1C 6.2 06/10/2022   LABS: 2/2019 - [facility] - , , HDL 78, LDL 66, AIC 4.9    IMAGING:   I have reviewed the following tests and documented in this encounter as follows:   Discussed with patient    EK22  Atrial fibrillation, left axis, left anterior fascicular block, nonspecific ST abnormality, HR 63    ECHO Limited: 22  Summary   Limited only f/u for LVEF. Normal left ventricular systolic function with ejection fraction of 55-60%. No regional wall motion abnormalites are seen. Compared to previous study from 2021 no changes noted in left   ventricular function. CXR 2021  Impression Mild improvement in CHF/pulmonary edema. Continued radiographic follow-up to complete resolution recommended. Chest CT 6/3/2021   Impression Marked improvement in the appearance of the chest with near complete resolution of the multifocal pneumonia previously present. Areas of consolidation medially and anteriorly in the right upper lobe and laterally in the superior lingula could potentially be sites of residual acute infection. Moderate postinflammatory changes throughout the lungs in sites of previous acute airspace disease. Slight to mild bullous changes. No CHF or pleural effusion. Small subpleural right lower lobe pulmonary nodule which has decreased in size since the prior study. Mild cardiomegaly. Cirrhotic appearance of the liver and mild splenomegaly appear present. Echo 2021  Summary   Left ventricular systolic function is hyperdynamic with an estimated   ejection fraction of >= 65%. The left ventricle is normal in size with mild concentric hypertrophy. No obvious regional wall motion abnormalities noted. Normal left ventricular diastolic function. Mild mitral and aortic regurgitation. Mild mitral stenosis   Systolic pulmonary artery pressure (SPAP) is normal and estimated at 32 mmHg   (right atrial pressure 3 mmHg). ECHO 2017:    Summary   Left ventricular systolic function is hyperdynamic with ejection fraction   estimated at >65 %. No regional wall motion abnormalities.    Grade II diastolic dysfunction with elevated filling atrial fibrillation (HCC) Yes    Essential hypertension, benign     SVT (supraventricular tachycardia) (HCC)     Pure hypercholesterolemia    lipids 9.18.21 at goal continue lipitor  afib now sinus she is on beta blocker diltiazem for rate control   and on anticoagulant. Aspirin not indicated so she can stop it. BP controlled   TSH normal on 5.8.21 refill levothyroxine    Plan:  1. STOP taking aspirin 81 mg tablet  2. Continue taking all medications. Refills warranted for Eliquis, Coreg, and Levothyroxine  3. Speak with Dr. Crispin Winston regarding switching narcotic medication  4. Follow up with me in 3 months          QUALITY MEASURES  1. Tobacco Cessation Counseling: Yes  2. Retake of BP if >140/90:   NA  3. Documentation to PCP/referring for new patient:  Sent to PCP at close of office visit  4. CAD patient on anti-platelet: NA  5. CAD patient on STATIN therapy:  yes  6. Patient with CHF and aFib on anticoagulation:  yes      Scribe's attestation: This note was scribed in the presence of Dr. Reina El MD   by Deana Cho LPN      I, Dr. Reina El, personally performed the services described in this documentation, as scribed by the above signed scribe in my presence. It is both accurate and complete to my knowledge. I agree with the details independently gathered by the clinical support staff, while the remaining scribed note accurately describes my personal service to the patient.

## 2022-07-12 ENCOUNTER — OFFICE VISIT (OUTPATIENT)
Dept: CARDIOLOGY CLINIC | Age: 77
End: 2022-07-12
Payer: MEDICARE

## 2022-07-12 VITALS
HEART RATE: 73 BPM | OXYGEN SATURATION: 99 % | HEIGHT: 65 IN | BODY MASS INDEX: 33.2 KG/M2 | SYSTOLIC BLOOD PRESSURE: 148 MMHG | DIASTOLIC BLOOD PRESSURE: 56 MMHG

## 2022-07-12 DIAGNOSIS — I48.0 PAROXYSMAL ATRIAL FIBRILLATION (HCC): Primary | ICD-10-CM

## 2022-07-12 DIAGNOSIS — I10 ESSENTIAL HYPERTENSION, BENIGN: ICD-10-CM

## 2022-07-12 DIAGNOSIS — E78.00 PURE HYPERCHOLESTEROLEMIA: ICD-10-CM

## 2022-07-12 DIAGNOSIS — I47.1 SVT (SUPRAVENTRICULAR TACHYCARDIA) (HCC): ICD-10-CM

## 2022-07-12 PROBLEM — E03.9 HYPOTHYROID: Status: RESOLVED | Noted: 2022-06-03 | Resolved: 2022-07-12

## 2022-07-12 PROCEDURE — 1123F ACP DISCUSS/DSCN MKR DOCD: CPT | Performed by: INTERNAL MEDICINE

## 2022-07-12 PROCEDURE — 99214 OFFICE O/P EST MOD 30 MIN: CPT | Performed by: INTERNAL MEDICINE

## 2022-07-12 RX ORDER — LEVOTHYROXINE SODIUM 0.1 MG/1
100 TABLET ORAL DAILY
Qty: 90 TABLET | Refills: 3 | Status: SHIPPED | OUTPATIENT
Start: 2022-07-12

## 2022-07-12 RX ORDER — CARVEDILOL 6.25 MG/1
6.25 TABLET ORAL 2 TIMES DAILY WITH MEALS
Qty: 180 TABLET | Refills: 3 | Status: SHIPPED | OUTPATIENT
Start: 2022-07-12 | End: 2022-07-15 | Stop reason: SDUPTHER

## 2022-07-12 RX ORDER — FUROSEMIDE 20 MG/1
TABLET ORAL
Qty: 90 TABLET | Refills: 3 | Status: SHIPPED | OUTPATIENT
Start: 2022-07-12

## 2022-07-12 RX ORDER — OXYCODONE HYDROCHLORIDE 5 MG/1
TABLET ORAL
COMMUNITY
Start: 2022-07-06 | End: 2022-07-18

## 2022-07-13 LAB
ANION GAP SERPL CALCULATED.3IONS-SCNC: 13 MMOL/L (ref 3–16)
BASOPHILS ABSOLUTE: 0 K/UL (ref 0–0.2)
BASOPHILS RELATIVE PERCENT: 0.6 %
BUN BLDV-MCNC: 11 MG/DL (ref 7–20)
CALCIUM SERPL-MCNC: 9.1 MG/DL (ref 8.3–10.6)
CHLORIDE BLD-SCNC: 90 MMOL/L (ref 99–110)
CO2: 29 MMOL/L (ref 21–32)
CREAT SERPL-MCNC: 0.8 MG/DL (ref 0.6–1.2)
EOSINOPHILS ABSOLUTE: 0.1 K/UL (ref 0–0.6)
EOSINOPHILS RELATIVE PERCENT: 1.8 %
GFR AFRICAN AMERICAN: >60
GFR NON-AFRICAN AMERICAN: >60
GLUCOSE BLD-MCNC: 80 MG/DL (ref 70–99)
HCT VFR BLD CALC: 26.3 % (ref 36–48)
HEMOGLOBIN: 8.7 G/DL (ref 12–16)
LYMPHOCYTES ABSOLUTE: 0.6 K/UL (ref 1–5.1)
LYMPHOCYTES RELATIVE PERCENT: 10.5 %
MCH RBC QN AUTO: 30.4 PG (ref 26–34)
MCHC RBC AUTO-ENTMCNC: 33.3 G/DL (ref 31–36)
MCV RBC AUTO: 91.3 FL (ref 80–100)
MONOCYTES ABSOLUTE: 0.4 K/UL (ref 0–1.3)
MONOCYTES RELATIVE PERCENT: 6.7 %
NEUTROPHILS ABSOLUTE: 4.8 K/UL (ref 1.7–7.7)
NEUTROPHILS RELATIVE PERCENT: 80.4 %
PDW BLD-RTO: 17.5 % (ref 12.4–15.4)
PLATELET # BLD: 186 K/UL (ref 135–450)
PMV BLD AUTO: 8.3 FL (ref 5–10.5)
POTASSIUM SERPL-SCNC: 4.3 MMOL/L (ref 3.5–5.1)
RBC # BLD: 2.88 M/UL (ref 4–5.2)
SODIUM BLD-SCNC: 132 MMOL/L (ref 136–145)
WBC # BLD: 6 K/UL (ref 4–11)

## 2022-07-15 ENCOUNTER — OFFICE VISIT (OUTPATIENT)
Dept: PRIMARY CARE CLINIC | Age: 77
End: 2022-07-15
Payer: MEDICARE

## 2022-07-15 VITALS
HEIGHT: 65 IN | WEIGHT: 200 LBS | RESPIRATION RATE: 18 BRPM | BODY MASS INDEX: 33.32 KG/M2 | SYSTOLIC BLOOD PRESSURE: 134 MMHG | DIASTOLIC BLOOD PRESSURE: 52 MMHG | HEART RATE: 80 BPM

## 2022-07-15 DIAGNOSIS — R39.198 DIFFICULTY VOIDING: ICD-10-CM

## 2022-07-15 DIAGNOSIS — I85.00 ESOPHAGEAL VARICES WITHOUT BLEEDING, UNSPECIFIED ESOPHAGEAL VARICES TYPE (HCC): ICD-10-CM

## 2022-07-15 DIAGNOSIS — Z00.00 MEDICARE ANNUAL WELLNESS VISIT, SUBSEQUENT: Primary | ICD-10-CM

## 2022-07-15 DIAGNOSIS — N30.01 ACUTE CYSTITIS WITH HEMATURIA: ICD-10-CM

## 2022-07-15 DIAGNOSIS — R39.89 SUSPECTED UTI: ICD-10-CM

## 2022-07-15 DIAGNOSIS — I48.0 PAROXYSMAL ATRIAL FIBRILLATION (HCC): ICD-10-CM

## 2022-07-15 DIAGNOSIS — G47.00 INSOMNIA, UNSPECIFIED TYPE: ICD-10-CM

## 2022-07-15 DIAGNOSIS — I10 ESSENTIAL HYPERTENSION, BENIGN: ICD-10-CM

## 2022-07-15 LAB
BACTERIA: ABNORMAL /HPF
BILIRUBIN URINE: NEGATIVE
BLOOD, URINE: ABNORMAL
CLARITY: ABNORMAL
COLOR: YELLOW
EPITHELIAL CELLS, UA: 1 /HPF (ref 0–5)
GLUCOSE URINE: NEGATIVE MG/DL
HYALINE CASTS: 2 /LPF (ref 0–8)
KETONES, URINE: NEGATIVE MG/DL
LEUKOCYTE ESTERASE, URINE: ABNORMAL
MICROSCOPIC EXAMINATION: YES
NITRITE, URINE: POSITIVE
PH UA: 6 (ref 5–8)
PROTEIN UA: NEGATIVE MG/DL
RBC UA: 0 /HPF (ref 0–4)
SPECIFIC GRAVITY UA: 1.01 (ref 1–1.03)
URINE TYPE: ABNORMAL
UROBILINOGEN, URINE: 0.2 E.U./DL
WBC UA: 452 /HPF (ref 0–5)

## 2022-07-15 PROCEDURE — 1123F ACP DISCUSS/DSCN MKR DOCD: CPT | Performed by: FAMILY MEDICINE

## 2022-07-15 PROCEDURE — G0439 PPPS, SUBSEQ VISIT: HCPCS | Performed by: FAMILY MEDICINE

## 2022-07-15 RX ORDER — BROMFENAC SODIUM 0.7 MG/ML
SOLUTION/ DROPS OPHTHALMIC
COMMUNITY
Start: 2022-02-08 | End: 2022-07-18

## 2022-07-15 RX ORDER — CARVEDILOL 6.25 MG/1
6.25 TABLET ORAL 2 TIMES DAILY WITH MEALS
Qty: 60 TABLET | Refills: 0 | Status: SHIPPED | OUTPATIENT
Start: 2022-07-15

## 2022-07-15 RX ORDER — DOXEPIN HYDROCHLORIDE 50 MG/1
50 CAPSULE ORAL NIGHTLY
Qty: 90 CAPSULE | Refills: 1 | Status: SHIPPED | OUTPATIENT
Start: 2022-07-15 | End: 2022-07-18

## 2022-07-15 RX ORDER — LOTEPREDNOL ETABONATE 3.8 MG/G
GEL OPHTHALMIC
COMMUNITY
Start: 2022-04-15 | End: 2022-07-18

## 2022-07-15 ASSESSMENT — PATIENT HEALTH QUESTIONNAIRE - PHQ9
3. TROUBLE FALLING OR STAYING ASLEEP: 3
5. POOR APPETITE OR OVEREATING: 0
2. FEELING DOWN, DEPRESSED OR HOPELESS: 0
1. LITTLE INTEREST OR PLEASURE IN DOING THINGS: 0
SUM OF ALL RESPONSES TO PHQ QUESTIONS 1-9: 6
SUM OF ALL RESPONSES TO PHQ QUESTIONS 1-9: 6
8. MOVING OR SPEAKING SO SLOWLY THAT OTHER PEOPLE COULD HAVE NOTICED. OR THE OPPOSITE, BEING SO FIGETY OR RESTLESS THAT YOU HAVE BEEN MOVING AROUND A LOT MORE THAN USUAL: 0
7. TROUBLE CONCENTRATING ON THINGS, SUCH AS READING THE NEWSPAPER OR WATCHING TELEVISION: 0
SUM OF ALL RESPONSES TO PHQ9 QUESTIONS 1 & 2: 0
6. FEELING BAD ABOUT YOURSELF - OR THAT YOU ARE A FAILURE OR HAVE LET YOURSELF OR YOUR FAMILY DOWN: 0
SUM OF ALL RESPONSES TO PHQ QUESTIONS 1-9: 6
9. THOUGHTS THAT YOU WOULD BE BETTER OFF DEAD, OR OF HURTING YOURSELF: 0
4. FEELING TIRED OR HAVING LITTLE ENERGY: 3
SUM OF ALL RESPONSES TO PHQ QUESTIONS 1-9: 6
10. IF YOU CHECKED OFF ANY PROBLEMS, HOW DIFFICULT HAVE THESE PROBLEMS MADE IT FOR YOU TO DO YOUR WORK, TAKE CARE OF THINGS AT HOME, OR GET ALONG WITH OTHER PEOPLE: 1

## 2022-07-15 ASSESSMENT — ANXIETY QUESTIONNAIRES
GAD7 TOTAL SCORE: 3
7. FEELING AFRAID AS IF SOMETHING AWFUL MIGHT HAPPEN: 0
4. TROUBLE RELAXING: 0
IF YOU CHECKED OFF ANY PROBLEMS ON THIS QUESTIONNAIRE, HOW DIFFICULT HAVE THESE PROBLEMS MADE IT FOR YOU TO DO YOUR WORK, TAKE CARE OF THINGS AT HOME, OR GET ALONG WITH OTHER PEOPLE: SOMEWHAT DIFFICULT
3. WORRYING TOO MUCH ABOUT DIFFERENT THINGS: 1
5. BEING SO RESTLESS THAT IT IS HARD TO SIT STILL: 0
6. BECOMING EASILY ANNOYED OR IRRITABLE: 1
2. NOT BEING ABLE TO STOP OR CONTROL WORRYING: 0
1. FEELING NERVOUS, ANXIOUS, OR ON EDGE: 1

## 2022-07-15 ASSESSMENT — LIFESTYLE VARIABLES: HOW OFTEN DO YOU HAVE A DRINK CONTAINING ALCOHOL: NEVER

## 2022-07-15 NOTE — TELEPHONE ENCOUNTER
Pt stated that she will need a short term supply of Eliquis 5mg sent to [facility] [phone]. Pt stated that she will be out this weekend. Pt stated that [facility] and will not be in until late next week.

## 2022-07-15 NOTE — PROGRESS NOTES
Medicare Annual Wellness Visit    Rossana Frazier is here for Medicare AWV (Recently diagnosed with Afib. Was put on new medications. States they are making her sleepy. ), Dysuria, Atrial Fibrillation, Hepatic Disease, Anemia, Leg Pain, Edema, COPD, Depression, Bladder Problem, and Health Maintenance    Assessment & Plan       Medicare annual wellness visit, subsequent  Reviewed hospital records  Discussed care plan with patient  AWV performed  -Chart/records reviewed, history and physical performed, health maintenance addressed and updated, presenting problems addressed. -Recommendations for Preventive Services Due: see orders and patient instructions/AVS.  -Recommended screening schedule for the next 5-10 years is provided to the patient in written form: see Patient Instructions/AVS.    2. Acute cystitis with hematuria  3. Suspected UTI  4. Difficulty voiding  - Urinalysis with Microscopic; Future  - Culture, Urine; Future  - fosfomycin tromethamine (MONUROL) 3 g PACK; Take 1 packet by mouth once for 1 dose  Dispense: 1 each; Refill: 0    Patient concerned about UTI  Leuks/nitrites positive and WBC on HPF  Start treatment, await culture  -     Urinalysis with Microscopic; Future  -     Culture, Urine; Future    Return in 3 months (on 10/15/2022). Yari James. Prabhakar Melchor., DO  7/17/2022        Subjective   The following acute and/or chronic problems were also addressed today:    Chief Complaint   Patient presents with    Medicare AWV     Recently diagnosed with Afib. Was put on new medications. States they are making her sleepy.      Dysuria    Atrial Fibrillation    Hepatic Disease    Anemia    Leg Pain    Edema    COPD    Depression    Bladder Problem    Health Maintenance       New onset Afib- admitted 6/9-6/14/[facility] for Afib  Started on coreg 6.25 mg BID  Eliquis 5 mg BID  Losartan 100 mg daily  Cardizem 240 mg daily  Feeling more tired on these new drugs  Doesn't like the way they are making her feel    Leg pain:  Saw PA at [address] [facility] [facility] muscle cleaning her house  Put her on oxycodone that makes her tired  Can't take tylenol  Sees Dr. Marielena Recinos in August and September    Low back pain:  Secondary to gait  Gabapentin 100 mg TID  Gabapentin 600 mg QHS    LE edema:  Lasix 20 mg  K+ 20 mEQ    Vitamin c with iron    COPD:  Coughing fits at times  thick  Turdorza 2 puffs BID  Qvar 2 puffs q 6 hrs  Xopenex PRN    Pernicious Anemia:  B12 injections  Iron with   Flakito Beltran when she got home from hospital    GERD:  Protonix 40 mg daily    OAB:  Flomax 0.4 mg daily    Depression:   Effexor 37 .5 mg daily  Doxepin 10 mg QHS  Trzosone 100 mg qhs    No longer on eye drops after cataract surgery  Has appointment for [facility]  Hypothyroidism:  Levothyroxine 100 mcg daily      Patient's complete Health Risk Assessment and screening values have been reviewed and are found in Flowsheets. The following problems were reviewed today and where indicated follow up appointments were made and/or referrals ordered.     Positive Risk Factor Screenings with Interventions:      Depression:  PHQ-2 Score: 0  PHQ-9 Total Score: 6    Severity:1-4 = minimal depression, 5-9 = mild depression, 10-14 = moderate depression, 15-19 = moderately severe depression, 20-27 = severe depression  Depression Interventions:  Patient declines any further evaluation/treatment for this issue           Health Habits/Nutrition:  Physical Activity: Inactive    Days of Exercise per Week: 0 days    Minutes of Exercise per Session: 0 min     Have you lost any weight without trying in the past 3 months?: No  Body mass index: (!) 33.28  Have you seen the dentist within the past year?: Yes  Health Habits/Nutrition Interventions:  Inadequate physical activity:  patient is not ready to increase his/her physical activity level at this time             Objective   Vitals:    07/15/22 1302   BP: (!) 134/52   Pulse: 80   Resp: 18   Weight: 200 lb (90.7 kg)   Height: 5' 5\" (1.651 m)      Body mass index is 33.28 kg/m².       General Appearance: alert and oriented to person, place and time, well developed and well- nourished, in no acute distress    Skin: warm and dry, no rash or erythema    Head: normocephalic and atraumatic    Eyes: pupils equal, round, and reactive to light, extraocular eye movements intact, conjunctivae normal    ENT: tympanic membrane, external ear and ear canal normal bilaterally, nose without deformity, nasal mucosa and turbinates normal without polyps    Neck: supple and non-tender without mass, no thyromegaly or thyroid nodules, no cervical lymphadenopathy    Pulmonary/Chest: on nasal cannual oxygen, decreased breath sounds, inspiratory wheezing on right lung fields, no rales or rhonchi, normal air movement, no respiratory distress    Cardiovascular: normal rate, regular rhythm, normal S1 and S2, no murmurs, rubs, clicks, or gallops, distal pulses intact, no carotid bruits    Abdomen: soft, non-tender, non-distended, normal bowel sounds, no masses or organomegaly    Extremities: no cyanosis, clubbing or edema    Musculoskeletal: normal range of motion, no joint swelling, deformity or tenderness    Neurologic: reflexes normal and symmetric, no cranial nerve deficit, gait, coordination and speech normal       Allergies   Allergen Reactions    Latex Swelling and Rash    Clindamycin Itching    Pennsaid [Diclofenac Sodium] Itching and Rash    Torsemide Itching    Actos [Pioglitazone] Diarrhea    Amoxicillin Other (See Comments)    Augmentin [Amoxicillin-Pot Clavulanate] Other (See Comments)    Bactrim [Sulfamethoxazole-Trimethoprim] Other (See Comments)     Stomach ache     Ciprofloxacin Other (See Comments)     Makes her weird  Makes anxious and she has weird dreams    Doxycycline Other (See Comments)     Feels like she is smothering, chest tightness    Levaquin [Levofloxacin]      Body aches    Ativan [Lorazepam] Other (See Comments) and Anxiety     And confusion  Had weird dreams and hallucinations    Cephalosporins Rash    Pcn [Penicillins] Rash and Itching    Proventil [Albuterol] Anxiety     Prior to Visit Medications    Medication Sig Taking? Authorizing Provider   bromfenac (PROLENSA) 0.07 % SOLN PROLENSA 0.07 % SOLN Yes Historical Provider, MD   LOTEMAX SM 0.38 % GEL INSTILL 1 DROP INTO AFFECTED EYES THREE TIMES A DAY STARTING THE DAY OF SURGERY Yes Historical Provider, MD   carvedilol (COREG) 6.25 MG tablet Take 1 tablet by mouth in the morning and 1 tablet in the evening. Take with meals. Yes Haritha Kennedy MD   apixaban (ELIQUIS) 5 MG TABS tablet Take 1 tablet by mouth in the morning and 1 tablet before bedtime. Yes Haritha Kennedy MD   furosemide (LASIX) 20 MG tablet TAKE 1 TABLET DAILY Yes Haritha Kennedy MD   oxyCODONE (ROXICODONE) 5 MG immediate release tablet  Yes Historical Provider, MD   Ascorbic Acid (VITAMIN C PO) Take by mouth Yes Historical Provider, MD   levothyroxine (SYNTHROID) 100 MCG tablet Take 1 tablet by mouth Daily Yes Haritha Kennedy MD   aclidinium (TUDORZA PRESSAIR) 400 MCG/ACT AEPB inhaler Inhale 1 puff into the lungs 2 times daily Yes Lola Senior MD   beclomethasone (QVAR REDIHALER) 80 MCG/ACT AERB inhaler Inhale 1 puff into the lungs 2 times daily Yes Lola Senior MD   ondansetron (ZOFRAN) 4 MG tablet Take 1 tablet by mouth 3 times daily as needed for Nausea or Vomiting Yes Chary Hoffmann. Kassandra Rodriguez,    dilTIAZem [facility]) 240 MG extended release capsule Take 1 capsule by mouth daily Yes Haritha Kennedy MD   tamsulosin (FLOMAX) 0.4 MG capsule Take 1 capsule by mouth daily Yes Larry Guzman MD   gabapentin (NEURONTIN) 100 MG capsule Take 100 mg by mouth every morning. Yes Historical Provider, MD   gabapentin (NEURONTIN) 600 MG tablet Take 600 mg by mouth at bedtime.  Yes Historical Provider, MD   montelukast (SINGULAIR) 10 MG tablet Take 1 tablet by mouth daily Yes Yessenia Kaur MD   cyanocobalamin 1000 MCG/ML injection Inject 1 mL into the muscle every 14 days Last dose was taken on 4/9/17 Yes Dahlia Roberto., DO   cetirizine (ZYRTEC) 10 MG tablet TAKE ONE TABLET BY MOUTH DAILY Yes Celsa Cobos MD   losartan (COZAAR) 100 MG tablet TAKE 1 TABLET DAILY Yes Celsa Cobos MD   pantoprazole (PROTONIX) 40 MG tablet TAKE 1 TABLET TWICE A DAY Yes Celsa Cobos MD   SITagliptin (JANUVIA) 100 MG tablet Take 1 tablet by mouth daily Yes Seth Roberto., DO   metFORMIN (GLUCOPHAGE-XR) 500 MG extended release tablet TAKE 2 TABLETS TWICE A DAY Yes Fouzia Cartwright MD   traZODone (DESYREL) 50 MG tablet TAKE 2 TABLETS NIGHTLY Yes Celsa Cobos MD   KLOR-CON M20 20 MEQ extended release tablet TAKE 1 TABLET DAILY Yes Celsa Cobos MD   benzonatate (TESSALON) 100 MG capsule TAKE 1 CAPSULE THREE TIMES A DAY AS NEEDED FOR COUGH Yes John Child MD   Cholecalciferol (VITAMIN D3) 50 MCG (2000 UT) CAPS Take by mouth Yes Historical Provider, MD   venlafaxine (EFFEXOR) 37.5 MG tablet TAKE 1 TABLET DAILY Yes Celsa Cobos MD   atorvastatin (LIPITOR) 20 MG tablet TAKE 1 TABLET NIGHTLY Yes Celsa Cobos MD   beclomethasone (QVAR) 80 MCG/ACT inhaler Inhale 2 puffs into the lungs 2 times daily Yes Carolina Otoole MD   levalbuterol [facility]) 45 MCG/ACT inhaler Inhale 2 puffs into the lungs every 6 hours as needed for Shortness of Breath Yes Carolina Otoole MD   ferrous sulfate 325 (65 Fe) MG tablet Take 325 mg by mouth 3 times daily (with meals) Yes Historical Provider, MD   calcium carbonate 600 MG TABS tablet Take 1 tablet by mouth daily  Yes Historical Provider, MD   Multiple Vitamins-Minerals (CENTRUM PO) Take 1 tablet by mouth daily. Yes Historical Provider, MD   estrogens, conjugated, (PREMARIN) 1.25 MG tablet Take 1.25 mg by mouth daily.  Yes Historical Provider, MD   lidocaine 4 % external patch Place 1 patch onto the skin daily  Patient not taking: No sig reported  Jason Nelson MD   doxepin (SINEQUAN) 50 MG capsule Take 1 capsule by mouth nightly for 10 days  Lang Alarcon DO       CareTeam (Including outside providers/suppliers regularly involved in providing care):   Patient Care Team:  Harsh Alarcon DO as PCP - General (Family Medicine)  Harsh Alarcon DO as PCP - [facility] Empaneled Provider  Serenity Jenkins MD as Consulting Physician (Pulmonology)  Elvin King MD as Consulting Physician (Otolaryngology)  Андрей Clemons MD as Referring Physician (Rheumatology)  Deisi Casas MD as Consulting Physician (Orthopedic Surgery)  Rosie Centeno.  FRANKLYN Liang as Ambulatory Care Manager     Reviewed and updated this visit:  Tobacco  Allergies  Meds  Med Hx  Surg Hx  Soc Hx  Fam Hx

## 2022-07-15 NOTE — PATIENT INSTRUCTIONS
Continue current medications    Blood work for next few weeks    See Dr. Margy Soulier and Dr. Hank Barnett as planned    Follow up in 3 months    Personalized Preventive Plan for Ronan Lynch - 7/15/2022  Medicare offers a range of preventive health benefits. Some of the tests and screenings are paid in full while other may be subject to a deductible, co-insurance, and/or copay. Some of these benefits include a comprehensive review of your medical history including lifestyle, illnesses that may run in your family, and various assessments and screenings as appropriate. After reviewing your medical record and screening and assessments performed today your provider may have ordered immunizations, labs, imaging, and/or referrals for you. A list of these orders (if applicable) as well as your Preventive Care list are included within your After Visit Summary for your review. Other Preventive Recommendations:    A preventive eye exam performed by an eye specialist is recommended every 1-2 years to screen for glaucoma; cataracts, macular degeneration, and other eye disorders. A preventive dental visit is recommended every 6 months. Try to get at least 150 minutes of exercise per week or 10,000 steps per day on a pedometer . Order or download the FREE \"Exercise & Physical Activity: Your Everyday Guide\" from [employer]. Call 7-850.301.3354 or search [employer] online. You need 0513-3936 mg of calcium and 5359-1199 IU of vitamin D per day. It is possible to meet your calcium requirement with diet alone, but a vitamin D supplement is usually necessary to meet this goal.  When exposed to the sun, use a sunscreen that protects against both UVA and UVB radiation with an SPF of 30 or greater. Reapply every 2 to 3 hours or after sweating, drying off with a towel, or swimming. Always wear a seat belt when traveling in a car. Always wear a helmet when riding a bicycle or motorcycle.

## 2022-07-17 RX ORDER — GRANULES FOR ORAL 3 G/1
3 POWDER ORAL ONCE
Qty: 1 EACH | Refills: 0 | Status: SHIPPED | OUTPATIENT
Start: 2022-07-17 | End: 2022-07-17

## 2022-07-18 ENCOUNTER — TELEPHONE (OUTPATIENT)
Dept: PRIMARY CARE CLINIC | Age: 77
End: 2022-07-18

## 2022-07-18 ENCOUNTER — TELEPHONE (OUTPATIENT)
Dept: PULMONOLOGY | Age: 77
End: 2022-07-18

## 2022-07-18 ENCOUNTER — OFFICE VISIT (OUTPATIENT)
Dept: PULMONOLOGY | Age: 77
End: 2022-07-18
Payer: MEDICARE

## 2022-07-18 VITALS
HEIGHT: 65 IN | HEART RATE: 73 BPM | DIASTOLIC BLOOD PRESSURE: 59 MMHG | SYSTOLIC BLOOD PRESSURE: 160 MMHG | TEMPERATURE: 97.5 F | OXYGEN SATURATION: 95 % | BODY MASS INDEX: 33.28 KG/M2

## 2022-07-18 DIAGNOSIS — Z87.891 FORMER SMOKER: ICD-10-CM

## 2022-07-18 DIAGNOSIS — J44.9 CHRONIC OBSTRUCTIVE PULMONARY DISEASE, UNSPECIFIED COPD TYPE (HCC): ICD-10-CM

## 2022-07-18 DIAGNOSIS — J98.4 RESTRICTIVE LUNG DISEASE: Primary | ICD-10-CM

## 2022-07-18 DIAGNOSIS — B96.20 E. COLI UTI: Primary | ICD-10-CM

## 2022-07-18 DIAGNOSIS — T17.500A MUCUS PLUGGING OF BRONCHI: ICD-10-CM

## 2022-07-18 DIAGNOSIS — J96.11 CHRONIC RESPIRATORY FAILURE WITH HYPOXIA (HCC): ICD-10-CM

## 2022-07-18 DIAGNOSIS — E66.9 OBESITY (BMI 30.0-34.9): ICD-10-CM

## 2022-07-18 DIAGNOSIS — N39.0 E. COLI UTI: Primary | ICD-10-CM

## 2022-07-18 LAB
ORGANISM: ABNORMAL
URINE CULTURE, ROUTINE: ABNORMAL

## 2022-07-18 PROCEDURE — 1123F ACP DISCUSS/DSCN MKR DOCD: CPT | Performed by: INTERNAL MEDICINE

## 2022-07-18 PROCEDURE — 99213 OFFICE O/P EST LOW 20 MIN: CPT | Performed by: INTERNAL MEDICINE

## 2022-07-18 RX ORDER — MULTIVIT-MIN/IRON/FOLIC ACID/K 18-600-40
CAPSULE ORAL
COMMUNITY

## 2022-07-18 RX ORDER — ASCORBIC ACID 500 MG
500 TABLET ORAL DAILY
COMMUNITY

## 2022-07-18 RX ORDER — VENLAFAXINE HYDROCHLORIDE 37.5 MG/1
37.5 CAPSULE, EXTENDED RELEASE ORAL DAILY
COMMUNITY
End: 2022-09-13 | Stop reason: SDUPTHER

## 2022-07-18 RX ORDER — URSODIOL 300 MG/1
300 CAPSULE ORAL 2 TIMES DAILY
COMMUNITY

## 2022-07-18 RX ORDER — CEFDINIR 300 MG/1
300 CAPSULE ORAL 2 TIMES DAILY
Qty: 14 CAPSULE | Refills: 0 | Status: SHIPPED | OUTPATIENT
Start: 2022-07-18 | End: 2022-07-18 | Stop reason: ALTCHOICE

## 2022-07-18 ASSESSMENT — ENCOUNTER SYMPTOMS
BACK PAIN: 1
RHINORRHEA: 1
COUGH: 1
SHORTNESS OF BREATH: 1
WHEEZING: 0

## 2022-07-18 NOTE — TELEPHONE ENCOUNTER
----- Message from Clifton Romero sent at 7/18/2022  3:40 PM EDT -----  Subject: Message to Provider    QUESTIONS  Information for Provider? Patient called in stating that the new   medication Dr. Blu Pizarro called in for her UTI has penicillin in it and the   pharmacy is recommending that she not take it due to an existing   penicillin allergy. Patient would like a new prescription if possible.   ---------------------------------------------------------------------------  --------------  Kd Blum INFO  [phone]; OK to leave message on voicemail  ---------------------------------------------------------------------------  --------------  SCRIPT ANSWERS  Relationship to Patient?  Self

## 2022-07-18 NOTE — TELEPHONE ENCOUNTER
Called patient. Being treated for UTI as discussed at visit and after. Unclear if omnicef will work for patient but sensitive to other cephalosporins and patient tolerates  1. E. coli UTI    - cefdinir (OMNICEF) 300 MG capsule; Take 1 capsule by mouth in the morning and 1 capsule before bedtime. Do all this for 7 days. Dispense: 14 capsule;  Refill: 0

## 2022-07-18 NOTE — TELEPHONE ENCOUNTER
----- Message from Owen Stewart sent at 7/18/2022 10:23 AM EDT -----  Subject: Message to Provider    QUESTIONS  Information for Provider? Pt called in and stated went to [facility] to pick   up antibiotics and it was 56.00 pt stated i am not paying that much and   what is the provider treating me for . Can someone please give the pt a   call back. Nothing with sulphur in it .  ---------------------------------------------------------------------------  --------------  Perfecto Ivory INFO  [phone]; OK to leave message on voicemail  ---------------------------------------------------------------------------  --------------  SCRIPT ANSWERS  Relationship to Patient?  Self

## 2022-07-18 NOTE — PROGRESS NOTES
Pulmonary Outpatient Note   Billy Fontanez MD       2022    1. Restrictive lung disease    2. Mucus plugging of bronchi    3. Chronic obstructive pulmonary disease, unspecified COPD type ([address]    4. Chronic respiratory failure with hypoxia (HCC)    5. Former smoker    6. Obesity (BMI 30.0-34. 9)            ASSESSMENT/PLAN:  Abnormal CT chest.  CT chest images reviewed. There is appearance of pulmonary edema, although they could be progressive interstitial process since her LVEF was normal.  LVEDP was high normal on prior cardiac catheterization. Mucous plugging. The patient has significant mucus, is not able to clear her chest.  Suggested she increase her nebulizer to 3 times daily followed by her Acapella  ? COPD. She has air trapping and reduced diffusion on a prior PFT. She probably should get repeat PFT in the future, last was in 2016  Restrictive lung disease. Reduced FVC and ERV. There is questionable scarring of the right upper lobe. Former smoker. Nonspecific lung nodules, stable on repeat CT chest  Obesity. Should make attempts to lose weight, but presently recovering from recent illness as delineated below  Prophylaxis. She has received Pneumovax, Prevnar. She has received the J&J COVID-19 vaccine and a booster. Annual flu shot recommended    Follow-up in 2 months, call earlier if symptomatic. May consider repeat CT depending on symptoms, clinical findings suggest more pulmonary edema on her recent CT chest pulmonary embolism protocol on 2022    No orders of the defined types were placed in this encounter. No follow-ups on file. Chief Complaint:   Follow-up (6 mo fu) and Follow-Up from Hospital       HPI: Henny Lainez is a 69y.o. year old female here for follow-up. Her PCP is Dr. Damaris Dobbins. Cyndee Chandler.DO. Dillon Asher is a very pleasant but very unfortunate 69-year-old female who presents for follow-up for possible COPD.   The patient is a , her   1.5 years ago from colon cancer. She was last seen by telemedicine visit on 5/17/2022. The patient is very uncomfortable today, can barely sit in her Rollator. She has significant left hip pain, cannot drive herself. She has multiple medical problems including atrial fibrillation, hyperlipidemia, SVT, GERD, pernicious anemia, DJD, low vitamin D levels, SNOW/cirrhosis with varices, RLS, hypertension, osteoporosis, DM 2, diabetic neuropathy, lumbar discitis, hypothyroidism, DJD. The patient has a 40-pack-year history of smoking, quit in the year 2000. The patient had a fall, landed on her left knee and could not get up. She had to come in via squad, was found to have atrial fibrillation with RVR. Head CT was negative. She had possible CHF versus pneumonia. She had right upper lobe infiltrate versus atelectasis. She also had hyponatremia, hyperkalemia, elevated creatinine, low H&H, failure to thrive. She was treated with Rocephin, transitioned to cefdinir. Patient has cough with light yellow to brown phlegm, denies clear-cut hemoptysis. She remains on oxygen at 4 LPM O2. She has occasional wheezing, denies any chest pain. She has no allergic rhinitis or GERD symptoms. Her appetite is fair. The patient has been placed on the nebulizer, does not know the name. The patient has significant cough with inability to clear secretions. She cannot lay supine due to the cough. She is compliant with her oxygen. She is due to see Dr. Andre Garcia for her hip pain. She does see a pain specialist who refuses to write Percocet. The patient says when she takes oxycodone, she is \"out of it\" for several hours. She does not have the side effect with Percocet, this medication had been cleared by Dr. Andre Garcia in the past.  She has a good appetite, denies GI or  complaints. She denies weight loss. She has no leg edema. She was seen by Dr. Becky Fischer for atrial fibrillation, hypertension and hyperlipidemia.   She mentions using Zyrtec and Singulair. Echocardiogram had shown mild mitral stenosis, she has a history of rheumatic fever at age 11. She has multiple other medical problems including hyperlipidemia, CAD, GERD, low vitamin D levels, hypertension, hypothyroidism, osteoporosis, prediabetes, CAD, RLS. She has a good appetite, did have retention of urine. The catheter was removed and had to be replaced as she was unable to pass urine. She has no GI complaints. The rest of her ROS are negative. She is keen to leave her ECF and go home. CT chest 5/7/2022  No evidence of pulmonary embolism. Four-chamber cardiomegaly without   pericardial effusion however smooth interlobular septal thickening with lower   lobe predominance along with mild mosaicism concerning for interstitial   pulmonary edema pattern without decompensation evidence as there is no   pleural effusion evident. CT chest 4/6/2022  Mediastinum: Thyroid is homogeneous in appearance. Small calcifications seen   in the left thyroid. There are borderline enlarged lymph nodes identified in   the mediastinum. These lymph nodes are stable and unchanged when compared to   the prior study. The cardiac chambers are enlarged. There is no pericardial   effusion. Prominence of the hilar region stable and unchanged. Lungs/pleura: There is improvement seen in the nodular focus previously noted   within the right upper lobe. There is some underlying ground-glass   nodularity interseptal thickening suggests progression of chronic   interstitial changes. The markings previously seen in the superior segment   of left lower lobe have resolved. Previously seen airspace disease in the   lingula is improving. There is improvement seen in the aeration of the lower   lung fields. No pulmonary mass or new nodular density is identified. There   is a stable 5 mm noncalcified nodule seen within the right lower lobe in the   periphery.   No pleural effusion or suspicious pleural thickening. Upper Abdomen: The visualized upper abdomen is stable. Mild enlargement of   the spleen with cirrhotic morphology of the liver. Soft Tissues/Bones: Degenerative changes seen within the spine with no acute   chest wall abnormality. Previous notes reviewed and edited as necessary. Nathan Ray is a pleasant 27-year-old female who was followed by Dr. Josey Borrego, last seen on 6/8/2021. She has COPD, was treated with a prolonged course of steroids for organizing pneumonia. CT had shown improvement. She was on oxygen at 3 LPM. At baseline she has multiple medical problems including hypertension, SVT, hyperlipidemia, diabetes mellitus with neuropathy, hypothyroidism, SNOW with cirrhosis, esophageal varices, GERD, diabetic neuropathy, osteoporosis, low vitamin D level, pernicious anemia, DJD. She had esophageal varices. She does have a heart murmur, had rheumatic fever in childhood. She is followed by Dr. Gaye Arvizu. 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 She had smoked from age 9 for about 55 years, less than 1 pack/day. She smoked heavily since her 35s. She has 2 sons and 2 daughters. The patient was first seen by Dr. Josey Borrego during her hospitalization on 5/6/2021, underwent bronchoscopy on 5/7/2021. Cultures were negative. Cytology showed severe acute inflammation. Her CT chest had shown improvement. The patient has been on Kam Payer as needed. The patient lives in a bilevel house, does have difficulty with climbing steps, in the store she uses a cart. She can walk less than half a block. She does have wheezing and cough at night, the symptoms appear to wake her up. She denies GE reflux. She has lost 40 pounds, but her appetite has not improved. She did golf. She denies any  complaints, she has leg edema. She has RLS, is on Requip.     Echo 8/31/2021   Left ventricular systolic function is hyperdynamic with an estimated ejection fraction of >= 65%. The left ventricle is normal in size with mild concentric hypertrophy. No obvious regional wall motion abnormalities noted. Normal left ventricular diastolic function. Mild mitral and aortic regurgitation. Mild mitral stenosis   Systolic pulmonary artery pressure (SPAP) is normal and estimated at 32 mmHg   (right atrial pressure 3 mmHg). CT chest 6/3/2021  Mediastinum: Thyroid normal in size. Coarse calcification noted inferiorly   in the left lobe unchanged. Multiple scattered lymph nodes in the   mediastinum which have decreased in size since the prior study. Heart is   mildly enlarged. Thoracic aorta normal in size. No pericardial effusion. Lungs/pleura: Slight emphysematous changes present. Small amount of scarring   or postinflammatory change anteriorly in the right apex at the site of prior   acute airspace disease. Small focal area of consolidation remains present   laterally in the superior lingula improved from the prior study. Moderate   opacity also present anteriorly and medially in the right upper lobe with   some bronchiectasis. Significant improvement has occurred in this area. Slight bibasilar atelectasis or postinflammatory changes present   significantly improved from the prior study. 6.4 x 3.8 subpleural nodule   present laterally the lower right lower lobe appearing slightly smaller. Multiple scattered areas of stranding seen throughout the lungs in areas of   previous acute airspace disease. No abnormal vascular congestion. Airways   are clear. No pleural effusion. Upper Abdomen: Liver has a mildly nodular contour with enlargement the   lateral segment of the left lobe. Spleen appears mildly enlarged. No other   significant abnormality. Soft Tissues/Bones: No acute abnormality. PFT 2/16/2016  1. Spirometry: The FEV-1 is 2.07 L, which is 83% predicted.   The FEV-1/FVC ratio is normal.  Inhaled bronchodilators are given.  There is no significant improvement. 2.  Lung volumes: Total lung capacity is markedly elevated as measured. 3.  Diffusion capacity:  DLCO is 18.09 ml/min/mmHg, which is 76% predicted.   4.  Flow volume loop is relatively normal.     Past Medical History:   Diagnosis Date    Allergic     Anemia     Anesthesia complication     \"woke up during surgery\"    Arthritis     Osteoarthritis of bilateral CMC joints    Asthma     Followed by Dr. Brayan Ventura    Atrial fibrillation ([facility]     Cirrhosis ([facility]     non alcoholic    Cirrhosis, non-alcoholic ([address]     COPD (chronic obstructive pulmonary disease) (HCC)     GERD (gastroesophageal reflux disease)     GIB (gastrointestinal bleeding)     LGIB 4/2016    Haemophilus infection 05/07/2021    + resp    Heart murmur     Hernia     Hyperlipidemia     Hypertension     Lung collapse     Murmur, heart     Paroxysmal atrial fibrillation ([address] 05/2022    Pneumonia     Reflux     Rheumatic fever     Stress fracture     right foot    SVT (supraventricular tachycardia) ([facility]     6/19/16 - admitted X 1 day per PT     TIA (transient ischemic attack) 2015    Type II or unspecified type diabetes mellitus without mention of complication, not stated as uncontrolled     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 ([facility]     Wears glasses     as needed    Wears partial dentures     upper partial       Past Surgical History:   Procedure Laterality Date    BONE MARROW BIOPSY      BREAST BIOPSY      BRONCHOSCOPY N/A 5/7/2021    BRONCHOSCOPY DIAGNOSTIC OR CELL [address] ONLY performed by Flakito Quiñonez MD at [facility]  5/7/2021    BRONCHOSCOPY ALVEOLAR LAVAGE performed by Flakito Quiñonez MD at [facility]  5/7/2021    BRONCHOSCOPY THERAPUTIC ASPIRATION INITIAL performed by Flakito Quiñonez MD at [place] [address]  07/14/14    CATARACT REMOVAL      COLONOSCOPY      X 3 per PT 6/8/16    COLONOSCOPY N/A 6/10/2021    COLONOSCOPY POLYPECTOMY ABLATION performed by Desmond Rivera MD at [address] [facility] Right 2013    CORAL BUNIONECTOMY RIGHT FOOT WITH SCREW FIXATION;    HERNIA REPAIR      HYSTERECTOMY (CERVIX STATUS UNKNOWN)      JOINT REPLACEMENT Bilateral     TKR    KNEE SURGERY  09    left    KNEE SURGERY  2010    right    UPPER GASTROINTESTINAL ENDOSCOPY N/A 2018    EGD BIOPSY performed by Lynda Whitney MD at [address] 6/10/2021    EGD POLYP ABLATION/OTHER performed by Desmond Rivera MD at [facility] 6/10/2021    EGD BAND LIGATION performed by Desmond Rivera MD at [facility] 2022    EGD DIAGNOSTIC ONLY performed by Jeb Gomes MD at [address] [place] [address] History     Tobacco Use    Smoking status: Former     Packs/day: 1.00     Years: 40.00     Pack years: 40.00     Types: Cigarettes     Start date: 1956     Quit date: 2000     Years since quittin.1    Smokeless tobacco: Never    Tobacco comments:     stopped cold turkey in !! ( )   Substance Use Topics    Alcohol use: No     Alcohol/week: 0.0 standard drinks       Family History   Problem Relation Age of Onset    Colon Cancer Mother     Other Mother     Diabetes Father     Kidney Disease Paternal Grandmother     Lupus Daughter     Lupus Other          Current Outpatient Medications:     ursodiol (ACTIGALL) 300 MG capsule, Take 300 mg by mouth in the morning and 300 mg before bedtime. , Disp: , Rfl:     venlafaxine (EFFEXOR XR) 37.5 MG extended release capsule, Take 37.5 mg by mouth in the morning., Disp: , Rfl:     vitamin C (ASCORBIC ACID) 500 MG tablet, Take 500 mg by mouth in the morning., Disp: , Rfl:     Cholecalciferol (VITAMIN D) 50 MCG (2000) CAPS capsule, Take by mouth, Disp: , Rfl:     carvedilol (COREG) 6.25 MG tablet, Take 1 tablet by mouth in the morning and 1 tablet in the evening. Take with meals. , Disp: 60 tablet, Rfl: 0    apixaban (ELIQUIS) 5 MG TABS tablet, Take 1 tablet by mouth in the morning and 1 tablet before bedtime. , Disp: 60 tablet, Rfl: 0    furosemide (LASIX) 20 MG tablet, TAKE 1 TABLET DAILY, Disp: 90 tablet, Rfl: 3    levothyroxine (SYNTHROID) 100 MCG tablet, Take 1 tablet by mouth Daily, Disp: 90 tablet, Rfl: 3    aclidinium (TUDORZA PRESSAIR) 400 MCG/ACT AEPB inhaler, Inhale 1 puff into the lungs 2 times daily, Disp: 3 each, Rfl: 1    beclomethasone (QVAR REDIHALER) 80 MCG/ACT AERB inhaler, Inhale 1 puff into the lungs 2 times daily, Disp: 3 each, Rfl: 1    dilTIAZem (TIAZAC) 240 MG extended release capsule, Take 1 capsule by mouth daily, Disp: 90 capsule, Rfl: 0    tamsulosin (FLOMAX) 0.4 MG capsule, Take 1 capsule by mouth daily, Disp: 30 capsule, Rfl: 0    gabapentin (NEURONTIN) 100 MG capsule, Take 100 mg by mouth every morning. , Disp: , Rfl:     gabapentin (NEURONTIN) 600 MG tablet, Take 600 mg by mouth at bedtime. , Disp: , Rfl:     lidocaine 4 % external patch, Place 1 patch onto the skin daily, Disp: , Rfl:     montelukast (SINGULAIR) 10 MG tablet, Take 1 tablet by mouth daily, Disp: 90 tablet, Rfl: 3    cetirizine (ZYRTEC) 10 MG tablet, TAKE ONE TABLET BY MOUTH DAILY, Disp: 90 tablet, Rfl: 2    losartan (COZAAR) 100 MG tablet, TAKE 1 TABLET DAILY, Disp: 90 tablet, Rfl: 0    pantoprazole (PROTONIX) 40 MG tablet, TAKE 1 TABLET TWICE A DAY, Disp: 180 tablet, Rfl: 0    SITagliptin (JANUVIA) 100 MG tablet, Take 1 tablet by mouth daily, Disp: 90 tablet, Rfl: 3    metFORMIN (GLUCOPHAGE-XR) 500 MG extended release tablet, TAKE 2 TABLETS TWICE A DAY, Disp: 360 tablet, Rfl: 3    traZODone (DESYREL) 50 MG tablet, TAKE 2 TABLETS NIGHTLY, Disp: 180 tablet, Rfl: 3    KLOR-CON M20 20 MEQ extended release tablet, TAKE 1 TABLET DAILY, Disp: 90 tablet, Rfl: 3    benzonatate (TESSALON) 100 MG capsule, TAKE 1 CAPSULE THREE TIMES A DAY AS NEEDED FOR COUGH, Disp: 270 capsule, Rfl: 1    atorvastatin (LIPITOR) 20 MG tablet, TAKE 1 TABLET NIGHTLY, Disp: 90 tablet, Rfl: 3    levalbuterol (XOPENEX HFA) 45 MCG/ACT inhaler, Inhale 2 puffs into the lungs every 6 hours as needed for Shortness of Breath, Disp: 3 Inhaler, Rfl: 4    ferrous sulfate 325 (65 Fe) MG tablet, Take 325 mg by mouth 3 times daily (with meals), Disp: , Rfl:     calcium carbonate 600 MG TABS tablet, Take 1 tablet by mouth daily , Disp: , Rfl:     Multiple Vitamins-Minerals (CENTRUM PO), Take 1 tablet by mouth daily. , Disp: , Rfl:     estrogens, conjugated, (PREMARIN) 1.25 MG tablet, Take 1.25 mg by mouth daily. , Disp: , Rfl:     cyanocobalamin 1000 MCG/ML injection, Inject 1 mL into the muscle every 14 days Last dose was taken on 4/9/17 (Patient not taking: Reported on 7/18/2022), Disp: 6 each, Rfl: 0    Latex, Clindamycin, Pennsaid [diclofenac sodium], Torsemide, Actos [pioglitazone], Amoxicillin, Augmentin [amoxicillin-pot clavulanate], Bactrim [sulfamethoxazole-trimethoprim], Ciprofloxacin, Doxycycline, Levaquin [levofloxacin], Ativan [lorazepam], Cephalosporins, Pcn [penicillins], and Proventil [albuterol]    Vitals:    07/18/22 1343   BP: (!) 160/59   Site: Left Upper Arm   Position: Sitting   Cuff Size: Medium Adult   Pulse: 73   Temp: 97.5 °F (36.4 °C)   TempSrc: Temporal   SpO2: 95%   Height: 5' 5\" (1.651 m)       Review of Systems   Constitutional:  Positive for activity change. Negative for unexpected weight change. HENT:  Positive for congestion and rhinorrhea. Respiratory:  Positive for cough and shortness of breath. Negative for wheezing. Genitourinary:         Retention of urine   Musculoskeletal:  Positive for arthralgias and back pain. Psychiatric/Behavioral:  Positive for sleep disturbance. All other systems reviewed and are negative. Physical examination:  Vitals reviewed. Constitutional:       General: She is not in acute distress. Appearance: Normal appearance. She is well-developed. She is not ill-appearing, toxic-appearing or diaphoretic. Comments: Pleasant, overweight   HENT:     Head: Normocephalic and atraumatic. Nose: Nose normal.      Mouth/Throat:      Mouth: Mucous membranes are moist.      Pharynx: Oropharynx is clear. No oropharyngeal exudate or posterior oropharyngeal erythema. Comments: Class IV airway  Eyes:     General: No scleral icterus. Right eye: No discharge. Left eye: No discharge. Conjunctiva/sclera: Conjunctivae normal.      Pupils: Pupils are equal, round, and reactive to light. Neck:     Thyroid: No thyromegaly. Vascular: No JVD. Trachea: No tracheal deviation. Comments: Relatively short neck  Cardiovascular:     Rate and Rhythm: Normal rate and regular rhythm. Heart sounds: Murmur (    Parasternal ejection systolic) heard. No friction rub. No gallop. Pulmonary:     Effort: Pulmonary effort is normal. No respiratory distress. Breath sounds: Normal breath sounds. No stridor. No wheezing, rhonchi or rales. Abdominal:      Palpations: Abdomen is soft. Tenderness: There is no abdominal tenderness. There is no guarding or rebound. Musculoskeletal:      Right lower leg: No edema. Left lower leg: No edema. Comments: Bilateral scars of TKR   Lymphadenopathy:     Cervical: No cervical adenopathy. Skin:     General: Skin is warm and dry. Coloration: Skin is not jaundiced or pale. Findings: No bruising, erythema, lesion or rash. Neurological:     Mental Status: She is alert and oriented to person, place, and time.       Gait: Gait normal.   Psychiatric:         Mood and Affect: Mood normal.         Behavior: Behavior normal.

## 2022-07-18 NOTE — TELEPHONE ENCOUNTER
QUESTIONS   Information for Provider? Patient called in stating that the new   medication Dr. Prentis Lyon called in for her UTI has penicillin in it and the   pharmacy is recommending that she not take it due to an existing   penicillin allergy. Patient would like a new prescription if possible.        Please instruct   Thank you

## 2022-07-18 NOTE — PROGRESS NOTES
MA Communication:   The following orders are received by verbal communication from Kelly Shrestha MD    Orders include: fu 2 mo

## 2022-07-18 NOTE — TELEPHONE ENCOUNTER
Per Dr. Dyan Cr patient went home to check info on nebulizer and the medicine is still good and the name is Ipratropium inhaler solution 2% good until 02/12/23 and she has 12 packs.

## 2022-07-19 NOTE — TELEPHONE ENCOUNTER
Tell her to stop Tudorza while on ipratropium bromide nebulizer. Instead she should add Xopenex MDI or nebulizer.   Please let me know if she needs a prescription

## 2022-07-20 ENCOUNTER — TELEPHONE (OUTPATIENT)
Dept: PRIMARY CARE CLINIC | Age: 77
End: 2022-07-20

## 2022-07-20 ENCOUNTER — CARE COORDINATION (OUTPATIENT)
Dept: CARE COORDINATION | Age: 77
End: 2022-07-20

## 2022-07-20 LAB
BASOPHILS ABSOLUTE: 0 K/UL (ref 0–0.2)
BASOPHILS RELATIVE PERCENT: 0.5 %
EOSINOPHILS ABSOLUTE: 0.2 K/UL (ref 0–0.6)
EOSINOPHILS RELATIVE PERCENT: 2.8 %
HCT VFR BLD CALC: 27.2 % (ref 36–48)
HEMOGLOBIN: 9.1 G/DL (ref 12–16)
LYMPHOCYTES ABSOLUTE: 0.7 K/UL (ref 1–5.1)
LYMPHOCYTES RELATIVE PERCENT: 10.9 %
MCH RBC QN AUTO: 30.6 PG (ref 26–34)
MCHC RBC AUTO-ENTMCNC: 33.4 G/DL (ref 31–36)
MCV RBC AUTO: 91.7 FL (ref 80–100)
MONOCYTES ABSOLUTE: 0.4 K/UL (ref 0–1.3)
MONOCYTES RELATIVE PERCENT: 5.4 %
NEUTROPHILS ABSOLUTE: 5.3 K/UL (ref 1.7–7.7)
NEUTROPHILS RELATIVE PERCENT: 80.4 %
PDW BLD-RTO: 17.2 % (ref 12.4–15.4)
PLATELET # BLD: 176 K/UL (ref 135–450)
PMV BLD AUTO: 8.4 FL (ref 5–10.5)
RBC # BLD: 2.96 M/UL (ref 4–5.2)
WBC # BLD: 6.6 K/UL (ref 4–11)

## 2022-07-20 NOTE — CARE COORDINATION
Ambulatory Care Coordination Note  7/20/2022    ACC: Spike Cole. Garth RN    Summary Note: Reports is doing better with UTI, going the the bathroom just fine but has a little headache. Doesn't take anything for headaches. Discussed to try laying down in dark room with cool washcloth on forehead and to make sure to drink fluids. If continues or worsens to call office. Pt had some issues with my chart and gave her phone number to call. No sob noted while talking on phone. PLAN  1) fu appts  2) review headaches  3) fu UTI  4) review labs/fs  COPD Assessment    Does the patient understand envrionmental exposure?: Yes  Is the patient able to verbalize Rescue vs. Long Acting medications?: Yes  Does the patient have a nebulizer?: No  Does the patient use a space with inhaled medications?: No     No patient-reported symptoms         Symptoms:                  Lab Results       None            Care Coordination Interventions    Referral from Primary Care Provider: No  Suggested Interventions and Community Resources  Diabetes Education: In Process  Fall Risk Prevention: In Process  Occupational Therapy: Completed (Comment: Count includes the [facility] 2 x week)  Physical Therapy: Completed (Comment: Count includes the [facility] 2 x week)  Other Services: Completed (Comment: Count includes the [facility] nurse 1 x week)  Zone Management Tools: In Process          Goals Addressed                   This Visit's Progress     Self Monitoring   Improving     Self-Monitored Blood Glucose - I will notify my provider if I have any blood sugar readings less than 70 more than 2 times a month. Patient Reported Blood Glucose No flowsheet data found. Barriers: lack of support  Plan for overcoming my barriers: will work with [facility]  Confidence: 9/10  Anticipated Goal Completion Date: 1/5/2023                Prior to Admission medications    Medication Sig Start Date End Date Taking?  Authorizing Provider   ipratropium (ATROVENT) 0.02 % nebulizer solution Take 0.5 mg by nebulization in the morning and 0.5 mg at noon and 0.5 mg in the evening and 0.5 mg before bedtime. Historical Provider, MD   ursodiol (ACTIGALL) 300 MG capsule Take 300 mg by mouth in the morning and 300 mg before bedtime. Historical Provider, MD   venlafaxine (EFFEXOR XR) 37.5 MG extended release capsule Take 37.5 mg by mouth in the morning. Historical Provider, MD   vitamin C (ASCORBIC ACID) 500 MG tablet Take 500 mg by mouth in the morning. Historical Provider, MD   Cholecalciferol (VITAMIN D) 50 MCG (2000 UT) CAPS capsule Take by mouth    Historical Provider, MD   carvedilol (COREG) 6.25 MG tablet Take 1 tablet by mouth in the morning and 1 tablet in the evening. Take with meals. 7/15/22   Ross Orta MD   apixaban (ELIQUIS) 5 MG TABS tablet Take 1 tablet by mouth in the morning and 1 tablet before bedtime. 7/15/22   Ross Orta MD   furosemide (LASIX) 20 MG tablet TAKE 1 TABLET DAILY 7/12/22   Ross Orta MD   levothyroxine (SYNTHROID) 100 MCG tablet Take 1 tablet by mouth Daily 7/12/22   Ross Orta MD   aclidinium (TUDORZA PRESSAIR) 400 MCG/ACT AEPB inhaler Inhale 1 puff into the lungs 2 times daily 6/30/22   Kelly Donohue MD   beclomethasone (QVAR REDIHALER) 80 MCG/ACT AERB inhaler Inhale 1 puff into the lungs 2 times daily 6/30/22   Kelly Donohue MD   dilTIAZem [facility]) 240 MG extended release capsule Take 1 capsule by mouth daily 5/31/22   Ross Orta MD   tamsulosin [facility]) 0.4 MG capsule Take 1 capsule by mouth daily 5/14/22   Marielle Maradiaga MD   gabapentin (NEURONTIN) 100 MG capsule Take 100 mg by mouth every morning. Historical Provider, MD   gabapentin (NEURONTIN) 600 MG tablet Take 600 mg by mouth at bedtime.     Historical Provider, MD   lidocaine 4 % external patch Place 1 patch onto the skin daily 5/13/22   Marielle Maradiaga MD   montelukast (SINGULAIR) 10 MG tablet Take 1 tablet by mouth daily 4/19/22   Martha Deras MD   cyanocobalamin 1000 MCG/ML injection Inject 1 mL into the muscle every 14 days Last dose was taken on 4/9/17  Patient not taking: Reported on 7/18/2022 3/31/22   Loretta Roth. Everardo Larson,    cetirizine (ZYRTEC) 10 MG tablet TAKE ONE TABLET BY MOUTH DAILY 3/21/22   Karie Yuan MD   losartan (COZAAR) 100 MG tablet TAKE 1 TABLET DAILY 3/2/22   Karie Yuan MD   pantoprazole (PROTONIX) 40 MG tablet TAKE 1 TABLET TWICE A DAY 3/2/22   Karie Yuan MD   SITagliptin (JANUVIA) 100 MG tablet Take 1 tablet by mouth daily 2/25/22   Loretta Roth. Everardo Larson,    metFORMIN (GLUCOPHAGE-XR) 500 MG extended release tablet TAKE 2 TABLETS TWICE A DAY 1/3/22   Pretty Guerrero MD   traZODone (DESYREL) 50 MG tablet TAKE 2 TABLETS NIGHTLY 12/3/21   Karie Yuan MD   KLOR-CON M20 20 MEQ extended release tablet TAKE 1 TABLET DAILY 11/4/21   Karie Yuan MD   benzonatate (TESSALON) 100 MG capsule TAKE 1 CAPSULE THREE TIMES A DAY AS NEEDED FOR COUGH 10/18/21   José Miguel Sanabria MD   atorvastatin (LIPITOR) 20 MG tablet TAKE 1 TABLET NIGHTLY 9/9/21   Karie Yuan MD   levalbuterol [facility]) 45 MCG/ACT inhaler Inhale 2 puffs into the lungs every 6 hours as needed for Shortness of Breath 5/20/21   Joe Nesbitt MD   ferrous sulfate 325 (65 Fe) MG tablet Take 325 mg by mouth 3 times daily (with meals)    Historical Provider, MD   calcium carbonate 600 MG TABS tablet Take 1 tablet by mouth daily     Historical Provider, MD   Multiple Vitamins-Minerals (CENTRUM PO) Take 1 tablet by mouth daily. Historical Provider, MD   estrogens, conjugated, (PREMARIN) 1.25 MG tablet Take 1.25 mg by mouth daily.     Historical Provider, MD       Future Appointments   Date Time Provider Krzysztof Michele   8/25/2022  1:30 PM MD Dilcia Adam [facility]   9/19/2022  2:00 PM Mark Wheeler MD AND JONO [facility]   9/22/2022  2:00 PM JOHN Chaviraa Al [facility]   11/1/2022  2:30 PM Karie Yuan MD [facility]

## 2022-07-20 NOTE — TELEPHONE ENCOUNTER
Spoke with the patient and informed of Dr. Heather Fernandez response. She will call back with any questions or if she needs a script called in.

## 2022-07-21 ENCOUNTER — TELEPHONE (OUTPATIENT)
Dept: CARDIOLOGY CLINIC | Age: 77
End: 2022-07-21

## 2022-07-21 NOTE — TELEPHONE ENCOUNTER
[facility] did a phone visit with pt and was reviewing her Dx's. They asked pt if she was doing her daily weights for heart failure. Pt stated she does not have heart failure . [facility] needs to know does pt have failure. They have on pt's paper work that she is being treated for hear failure. I did not see on her chart  heart failure. Please call [facility].

## 2022-07-21 NOTE — TELEPHONE ENCOUNTER
Called and spoke to patient said she uses [facility], notified patient she does not have CHF. Left message for care coordination   And notified Isabella via voicemail.

## 2022-07-25 RX ORDER — PANTOPRAZOLE SODIUM 40 MG/1
TABLET, DELAYED RELEASE ORAL
Qty: 180 TABLET | Refills: 3 | Status: SHIPPED | OUTPATIENT
Start: 2022-07-25

## 2022-07-26 ENCOUNTER — TELEPHONE (OUTPATIENT)
Dept: CARDIOLOGY CLINIC | Age: 77
End: 2022-07-26

## 2022-07-26 RX ORDER — LOSARTAN POTASSIUM 100 MG/1
TABLET ORAL
Qty: 90 TABLET | Refills: 3 | Status: SHIPPED | OUTPATIENT
Start: 2022-07-26

## 2022-07-26 NOTE — TELEPHONE ENCOUNTER
Pt is requesting a refill of Losartan 100mg. Preferred pharmacy is [facility] [phone]. Last ov 07/12/2022 [facility]

## 2022-07-27 LAB
BASOPHILS ABSOLUTE: 0 /ΜL
BASOPHILS RELATIVE PERCENT: 0.5 %
EOSINOPHILS ABSOLUTE: 0.2 /ΜL
EOSINOPHILS RELATIVE PERCENT: 2.9 %
HCT VFR BLD CALC: 30.5 % (ref 36–46)
HEMOGLOBIN: 9.5 G/DL (ref 12–16)
LYMPHOCYTES ABSOLUTE: 0.6 /ΜL
LYMPHOCYTES RELATIVE PERCENT: 10.5 %
MCH RBC QN AUTO: 30.3 PG
MCHC RBC AUTO-ENTMCNC: 31.1 G/DL
MCV RBC AUTO: 97.1 FL
MONOCYTES ABSOLUTE: 0.4 /ΜL
MONOCYTES RELATIVE PERCENT: 7.3 %
NEUTROPHILS ABSOLUTE: 4.6 /ΜL
NEUTROPHILS RELATIVE PERCENT: 78.3 %
PLATELET # BLD: 157 K/ΜL
PMV BLD AUTO: 11.6 FL
RBC # BLD: 3.14 10^6/ΜL
WBC # BLD: 5.9 10^3/ML

## 2022-07-28 LAB
BASOPHILS # BLD: 0.5 %
BASOPHILS ABSOLUTE: 0 X10(3)/MCL (ref 0–0.1)
EOSINOPHIL # BLD: 2.9 %
EOSINOPHILS ABSOLUTE: 0.2 X10(3)/MCL (ref 0–0.5)
HCT VFR BLD CALC: 30.5 % (ref 34–45)
HEMOGLOBIN: 9.5 G/DL (ref 11.2–15.7)
IMMATURE CELLS ABSOLUTE COUNT: 0 X10(3)/MCL (ref 0–0.1)
IMMATURE GRANULOCYTES: 0.5 %
LYMPHOCYTES # BLD: 10.5 %
LYMPHOCYTES ABSOLUTE: 0.6 X10(3)/MCL (ref 1.2–3.9)
MCH RBC QN AUTO: 30.3 PG (ref 26–34)
MCHC RBC AUTO-ENTMCNC: 31.1 G/DL (ref 30.7–35.5)
MCV RBC AUTO: 97.1 FL (ref 80–100)
MONOCYTES # BLD: 7.3 %
MONOCYTES ABSOLUTE: 0.4 X10(3)/MCL (ref 0.3–0.9)
NEUTROPHILS ABSOLUTE: 4.6 X10(3)/MCL (ref 1.6–6.1)
NEUTROPHILS: 78.3 %
PDW BLD-RTO: 15.2 %
PLATELET # BLD: 157 X10(3)/MCL (ref 155–369)
PMV BLD AUTO: 11.6 FL (ref 8.8–12.5)
RBC # BLD: 3.14 X10(6)/MCL (ref 3.9–5.2)
WBC # BLD: 5.9 X10(3)/MCL (ref 3.7–10.3)

## 2022-08-02 ENCOUNTER — TELEPHONE (OUTPATIENT)
Dept: PRIMARY CARE CLINIC | Age: 77
End: 2022-08-02

## 2022-08-03 ENCOUNTER — CARE COORDINATION (OUTPATIENT)
Dept: CARE COORDINATION | Age: 77
End: 2022-08-03

## 2022-08-03 NOTE — CARE COORDINATION
Ambulatory Care Coordination Note  8/3/2022    ACC: Anel Sanchez, RN    Summary Note: Sheridan has been doing good since being home from [facility]. No sob noted while speaking on phone. Sheridan checks fs twice a day and this morning it was 102. 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 Reviewed diabetic, low fat, low sodium diets. No changes in her medications. Unsure if she's staying with her PCP practice and plans to call and speak with Dr Uli Jason about this. Sheridan hasn't been home long from [facility] and had her eyes dilated , doesn't have to go back for another year, so she's going to rest.  PLAN  1) fu appts  2) review PCP change? 3) review sob  4) review fs/cks twice a day  Diabetes Assessment    Medic Alert ID: No  Meal Planning: Avoidance of concentrated sweets, Plate Method   How often do you test your blood sugar?: Other (Comment: twice daily)   Do you have barriers with adherence to non-pharmacologic self-management interventions? (Nutrition/Exercise/Self-Monitoring): No   Have you ever had to go to the ED for symptoms of low blood sugar?: No       No patient-reported symptoms   Do you have hyperglycemia symptoms?: No   Do you have hypoglycemia symptoms?: No   Last Blood Sugar Value: 102   Blood Sugar Monitoring Regimen: Once a Day, At Bedtime   Blood Sugar Trends: No Change         and   COPD Assessment    Does the patient understand envrionmental exposure?: Yes  Is the patient able to verbalize Rescue vs. Long Acting medications?: Yes  Does the patient have a nebulizer?: No  Does the patient use a space with inhaled medications?: No     No patient-reported symptoms         Symptoms:                  Lab Results       None            Care Coordination Interventions    Referral from Primary Care Provider: No  Suggested Interventions and Community Resources  Diabetes Education:  In Process  Fall Risk Prevention: Completed  Occupational Therapy: Completed (Comment: [facility] 2 x week)  Physical Therapy: Completed (Comment: [facility] 2 x week)  Smoking Cessation: Declined  Other Services: Completed (Comment: [facility] nurse 1 x week)  Zone Management Tools: In Process          Goals Addressed                   This Visit's Progress     Self Monitoring   Improving     Self-Monitored Blood Glucose - I will notify my provider if I have any blood sugar readings less than 70 more than 2 times a month. Patient Reported Blood Glucose No flowsheet data found. Barriers: lack of support  Plan for overcoming my barriers: will work with [facility]  Confidence: 9/10  Anticipated Goal Completion Date: 1/5/2023                Prior to Admission medications    Medication Sig Start Date End Date Taking? Authorizing Provider   losartan (COZAAR) 100 MG tablet TAKE 1 TABLET DAILY 7/26/22   Carrol Hannah MD   pantoprazole (PROTONIX) 40 MG tablet TAKE 1 TABLET TWICE A DAY 7/25/22   Carrol Hannah MD   ipratropium (ATROVENT) 0.02 % nebulizer solution Take 0.5 mg by nebulization in the morning and 0.5 mg at noon and 0.5 mg in the evening and 0.5 mg before bedtime. Historical Provider, MD   ursodiol (ACTIGALL) 300 MG capsule Take 300 mg by mouth in the morning and 300 mg before bedtime. Historical Provider, MD   venlafaxine (EFFEXOR XR) 37.5 MG extended release capsule Take 37.5 mg by mouth in the morning. Historical Provider, MD   vitamin C (ASCORBIC ACID) 500 MG tablet Take 500 mg by mouth in the morning. Historical Provider, MD   Cholecalciferol (VITAMIN D) 50 MCG (2000 UT) CAPS capsule Take by mouth    Historical Provider, MD   carvedilol (COREG) 6.25 MG tablet Take 1 tablet by mouth in the morning and 1 tablet in the evening. Take with meals. 7/15/22   Scot Hinojosa MD   apixaban (ELIQUIS) 5 MG TABS tablet Take 1 tablet by mouth in the morning and 1 tablet before bedtime.  7/15/22   Scot Hinojosa MD   furosemide (LASIX) 20 MG tablet TAKE 1 TABLET DAILY 7/12/22   Norval Model Suzan Carney MD   levothyroxine (SYNTHROID) 100 MCG tablet Take 1 tablet by mouth Daily 7/12/22   Scot Hinojosa MD   aclidinium (TUDORZA PRESSAIR) 400 MCG/ACT AEPB inhaler Inhale 1 puff into the lungs 2 times daily 6/30/22   Codi Beasley MD   beclomethasone (QVAR REDIHALER) 80 MCG/ACT AERB inhaler Inhale 1 puff into the lungs 2 times daily 6/30/22   Codi Beasley MD   dilTIAZem [facility]) 240 MG extended release capsule Take 1 capsule by mouth daily 5/31/22   Scot Hinojosa MD   tamsulosin [facility]) 0.4 MG capsule Take 1 capsule by mouth daily 5/14/22   La Nena Urbina MD   gabapentin (NEURONTIN) 100 MG capsule Take 100 mg by mouth every morning. Historical Provider, MD   gabapentin (NEURONTIN) 600 MG tablet Take 600 mg by mouth at bedtime. Historical Provider, MD   lidocaine 4 % external patch Place 1 patch onto the skin daily 5/13/22   La Nena Urbina MD   montelukast (SINGULAIR) 10 MG tablet Take 1 tablet by mouth daily 4/19/22   Wilhelmenia Litten, MD   cyanocobalamin 1000 MCG/ML injection Inject 1 mL into the muscle every 14 days Last dose was taken on 4/9/17  Patient not taking: Reported on 7/18/2022 3/31/22   Marino Calderon, DO   cetirizine (ZYRTEC) 10 MG tablet TAKE ONE TABLET BY MOUTH DAILY 3/21/22   Scot Hinojosa MD   SITagliptin [facility]) 100 MG tablet Take 1 tablet by mouth daily 2/25/22   Marino Calderon,    metFORMIN (GLUCOPHAGE-XR) 500 MG extended release tablet TAKE 2 TABLETS TWICE A DAY 1/3/22   Polina Coronel MD   traZODone (DESYREL) 50 MG tablet TAKE 2 TABLETS NIGHTLY 12/3/21   Scot Hinojosa MD   KLOR-CON M20 20 MEQ extended release tablet TAKE 1 TABLET DAILY 11/4/21   Scot Hinojosa MD   benzonatate (TESSALON) 100 MG capsule TAKE 1 CAPSULE THREE TIMES A DAY AS NEEDED FOR COUGH 10/18/21   Wilhelmenia Litten, MD   atorvastatin (LIPITOR) 20 MG tablet TAKE 1 TABLET NIGHTLY 9/9/21   Scot Hinojosa MD   levalbuterol [facility]) 45 MCG/ACT inhaler Inhale 2 puffs into the lungs every 6 hours as needed for Shortness of Breath 5/20/21   Merle Stanley MD   ferrous sulfate 325 (65 Fe) MG tablet Take 325 mg by mouth 3 times daily (with meals)    Historical Provider, MD   calcium carbonate 600 MG TABS tablet Take 1 tablet by mouth daily     Historical Provider, MD   Multiple Vitamins-Minerals (CENTRUM PO) Take 1 tablet by mouth daily. Historical Provider, MD   estrogens, conjugated, (PREMARIN) 1.25 MG tablet Take 1.25 mg by mouth daily.     Historical Provider, MD       Future Appointments   Date Time Provider Krzysztof Michele   8/25/2022  1:30 PM MD Funmi Scott [facility]   9/19/2022  2:00 PM Martina Rockwell MD AND PULM [facility]   9/22/2022  2:00 PM JOHN MURPHY  [facility]   11/1/2022  2:30 PM Melanie Mercado MD [facility]

## 2022-08-04 NOTE — TELEPHONE ENCOUNTER
Please call home health and inform they can double dose of furosemide for the next few days and assess leg edema and crackling in left lung. Assess for improvement and decrease back to original dose. Please ensure patient is scheduled for follow-up in 3 months, around 10/15/2022, as discussed in last note.

## 2022-08-05 NOTE — TELEPHONE ENCOUNTER
Spoke with Juan Valverde from [employer] and gave her changes and directions per Dr. Keke White confirmed understanding and stated they would be back out to see patient on Tues. 8/9/22. I called patient to inform her of changes and give her instructions. Advised pt to double furosemide. Patient confirmed understanding. Advised pt to scheduled follow up in 3 months. Patient stated she is following Dr. Huber Ambriz but has not contacted office yet. Patient aware [employer] will be there Tues and inform us of patients edema and Left lung status.

## 2022-08-09 ENCOUNTER — TELEPHONE (OUTPATIENT)
Dept: CARDIOLOGY CLINIC | Age: 77
End: 2022-08-09

## 2022-08-09 ENCOUNTER — TELEPHONE (OUTPATIENT)
Dept: PRIMARY CARE CLINIC | Age: 77
End: 2022-08-09

## 2022-08-09 DIAGNOSIS — J44.9 CHRONIC OBSTRUCTIVE PULMONARY DISEASE, UNSPECIFIED COPD TYPE (HCC): Primary | ICD-10-CM

## 2022-08-09 NOTE — TELEPHONE ENCOUNTER
Home Health is calling to give update. Starting Friday doubled lasix  Legs are less swollen       Left lung:  No crackling but still a little wheezing. Wheezing cleared up after beep breaths. Pt had labs drawn last week and would like to know what her sodium level was.

## 2022-08-09 NOTE — TELEPHONE ENCOUNTER
Refill sent. Was pended for 1 dose at 2.5 mL dispensed  Changed to 30 days    Please let me know if needs alternative  1. Chronic obstructive pulmonary disease, unspecified COPD type (HCC)  - ipratropium (ATROVENT) 0.02 % nebulizer solution; Take 2.5 mLs by nebulization in the morning and 2.5 mLs at noon and 2.5 mLs in the evening and 2.5 mLs before bedtime.   Dispense: 300 mL; Refill: 0

## 2022-08-09 NOTE — TELEPHONE ENCOUNTER
Refill Request       Last Seen: Last Seen Department: 7/15/2022  Last Seen by PCP: 7/15/2022    Last Written: 7/19/22, Historical provider    Next Appointment:   Future Appointments   Date Time Provider Krzysztof Leny   8/25/2022  1:30 PM MD Kassandra Argueta [facility]   9/19/2022  2:00 PM Elliot Quiroz MD AND PULM [facility]   9/22/2022  2:00 PM [facility] Mammie Hedger   11/1/2022  2:30 PM Bill Rivera [facility]         Requested Prescriptions     Pending Prescriptions Disp Refills    ipratropium (ATROVENT) 0.02 % nebulizer solution 2.5 mL 0     Sig: Take 2.5 mLs by nebulization in the morning and 2.5 mLs at noon and 2.5 mLs in the evening and 2.5 mLs before bedtime.

## 2022-08-09 NOTE — TELEPHONE ENCOUNTER
Pt stated that she is going to have an epidural shot on 09/13/2022 with Dr. Dillon Bah. Dr. Dillon Bah would like to hold Eliquis 5mg starting on 09/062022. Please advise.

## 2022-08-09 NOTE — TELEPHONE ENCOUNTER
ipratropium (ATROVENT) 0.02 % nebulizer solution    Pt needs refill      Pharmacy:  [facility] [id number] Ximena MUNIZ [id number] [facility] [phone] [address] [facility], [address]   Phone:  [phone]  Fax:  [phone]

## 2022-08-15 DIAGNOSIS — E78.2 MIXED HYPERLIPIDEMIA: ICD-10-CM

## 2022-08-16 ENCOUNTER — TELEPHONE (OUTPATIENT)
Dept: PULMONOLOGY | Age: 77
End: 2022-08-16

## 2022-08-16 NOTE — TELEPHONE ENCOUNTER
Please ask her to use her Acapella aggressively, along with her nebulizer. She should call back if she does not improve in a few days.

## 2022-08-16 NOTE — TELEPHONE ENCOUNTER
Patient is producing brownish phlegm stating that she would like to speak with someone about antibiotics. Please call patient and advise.

## 2022-08-16 NOTE — TELEPHONE ENCOUNTER
Patient was contacted, pt stated sometime went she cough on the last couple of days she notice some brownish phlegh,pt denied fever, or other symptoms,pt also stated was using her nebulizer 4 times a days,but stop using 3 days ago. Please advise.

## 2022-08-17 RX ORDER — ATORVASTATIN CALCIUM 20 MG/1
TABLET, FILM COATED ORAL
Qty: 90 TABLET | Refills: 1 | Status: SHIPPED | OUTPATIENT
Start: 2022-08-17

## 2022-08-17 NOTE — TELEPHONE ENCOUNTER
Last OV 7/12/22    Assessment:       Encounter Diagnoses   Name Primary? Paroxysmal atrial fibrillation (HCC) Yes    Essential hypertension, benign      SVT (supraventricular tachycardia) (HCC)      Pure hypercholesterolemia     lipids 9.18.21 at goal continue lipitor  afib now sinus she is on beta blocker diltiazem for rate control   and on anticoagulant. Aspirin not indicated so she can stop it. BP controlled   TSH normal on 5.8.21 refill levothyroxine     Plan:  1. STOP taking aspirin 81 mg tablet  2. Continue taking all medications. Refills warranted for Eliquis, Coreg, and Levothyroxine  3. Speak with Dr. Valeriano Benitez regarding switching narcotic medication  4.  Follow up with me in 3 months

## 2022-08-23 ENCOUNTER — TELEPHONE (OUTPATIENT)
Dept: DERMATOLOGY | Age: 77
End: 2022-08-23

## 2022-08-23 ENCOUNTER — CARE COORDINATION (OUTPATIENT)
Dept: CARE COORDINATION | Age: 77
End: 2022-08-23

## 2022-08-23 NOTE — CARE COORDINATION
Ambulatory Care Coordination Note  8/23/2022    ACC: Lv Huerta, RN    Summary Note: Reports is doing ok. Sometimes trouble with left leg but overall is doing good. Does plan on following Dr Michael Awad when he [place] . Goes Sept 13 for epidural injection. Reports fs are good and usually between  or so. Reviewed diabetic, low fat, low sodium diets. No sob noted while speaking on phone. Reports no longer has [facility] coming in. PLAN  1) fu appts  2) review fs  3) review sob  Diabetes Assessment    Medic Alert ID: No  Meal Planning: Avoidance of concentrated sweets, Plate Method, Carb counting   How often do you test your blood sugar?: Other (Comment: twice daily)   Do you have barriers with adherence to non-pharmacologic self-management interventions?  (Nutrition/Exercise/Self-Monitoring): No   Have you ever had to go to the ED for symptoms of low blood sugar?: No       No patient-reported symptoms   Do you have hyperglycemia symptoms?: No   Do you have hypoglycemia symptoms?: No   Last Blood Sugar Value: 120   Blood Sugar Monitoring Regimen: Once a Day   Blood Sugar Trends: No Change         and   COPD Assessment    Does the patient understand envrionmental exposure?: Yes  Is the patient able to verbalize Rescue vs. Long Acting medications?: Yes  Does the patient have a nebulizer?: No  Does the patient use a space with inhaled medications?: No     No patient-reported symptoms         Symptoms:  None: Yes                 Lab Results       None            Care Coordination Interventions    Referral from Primary Care Provider: No  Suggested Interventions and Community Resources  Diabetes Education: Completed  Fall Risk Prevention: Completed  Occupational Therapy: Completed (Comment: [facility] 2 x week)  Physical Therapy: Completed (Comment: [facility] 2 x week)  Smoking Cessation: Declined  Other Services: Completed (Comment: [facility] nurse 1 x week)  Zone Management Tools: Completed          Goals Addressed This Visit's Progress     Self Monitoring   Improving     Self-Monitored Blood Glucose - I will notify my provider if I have any blood sugar readings less than 70 more than 2 times a month. Patient Reported Blood Glucose No flowsheet data found. Barriers: lack of support  Plan for overcoming my barriers: will work with ACM  Confidence: 9/10  Anticipated Goal Completion Date: 1/5/2023                Prior to Admission medications    Medication Sig Start Date End Date Taking? Authorizing Provider   atorvastatin (LIPITOR) 20 MG tablet TAKE 1 TABLET NIGHTLY 8/17/22   Miranda Walker MD   ipratropium (ATROVENT) 0.02 % nebulizer solution Take 2.5 mLs by nebulization in the morning and 2.5 mLs at noon and 2.5 mLs in the evening and 2.5 mLs before bedtime. 8/9/22   Loral Barley. Odalis Ravi.,    losartan (COZAAR) 100 MG tablet TAKE 1 TABLET DAILY 7/26/22   Dyanne Schaumann, MD   pantoprazole (PROTONIX) 40 MG tablet TAKE 1 TABLET TWICE A DAY 7/25/22   Dyanne Schaumann, MD   ursodiol (ACTIGALL) 300 MG capsule Take 300 mg by mouth in the morning and 300 mg before bedtime. Historical Provider, MD   venlafaxine (EFFEXOR XR) 37.5 MG extended release capsule Take 37.5 mg by mouth in the morning. Historical Provider, MD   vitamin C (ASCORBIC ACID) 500 MG tablet Take 500 mg by mouth in the morning. Historical Provider, MD   Cholecalciferol (VITAMIN D) 50 MCG (2000 UT) CAPS capsule Take by mouth    Historical Provider, MD   carvedilol (COREG) 6.25 MG tablet Take 1 tablet by mouth in the morning and 1 tablet in the evening. Take with meals. 7/15/22   Miranda Walker MD   apixaban (ELIQUIS) 5 MG TABS tablet Take 1 tablet by mouth in the morning and 1 tablet before bedtime.  7/15/22   Miranda Walker MD   furosemide (LASIX) 20 MG tablet TAKE 1 TABLET DAILY 7/12/22   Miranda Walker MD   levothyroxine (SYNTHROID) 100 MCG tablet Take 1 tablet by mouth Daily 7/12/22   Miranda Walker MD   aclidinium (Ponce Mulling) 400 MCG/ACT AEPB inhaler Inhale 1 puff into the lungs 2 times daily 6/30/22   Deonna Wright MD   beclomethasone (QVAR REDIHALER) 80 MCG/ACT AERB inhaler Inhale 1 puff into the lungs 2 times daily 6/30/22   Deonna Wright MD   dilTIAZem [facility]) 240 MG extended release capsule Take 1 capsule by mouth daily 5/31/22   John Hwang MD   tamsulosin [facility]) 0.4 MG capsule Take 1 capsule by mouth daily 5/14/22   Abdulkadir Lopez MD   gabapentin (NEURONTIN) 100 MG capsule Take 100 mg by mouth every morning. Historical Provider, MD   gabapentin (NEURONTIN) 600 MG tablet Take 600 mg by mouth at bedtime. Historical Provider, MD   lidocaine 4 % external patch Place 1 patch onto the skin daily 5/13/22   Abdulkadir Lopez MD   montelukast (SINGULAIR) 10 MG tablet Take 1 tablet by mouth daily 4/19/22   Thelma Francois MD   cyanocobalamin 1000 MCG/ML injection Inject 1 mL into the muscle every 14 days Last dose was taken on 4/9/17  Patient not taking: Reported on 7/18/2022 3/31/22   Alleene Sobia. Mariluz Starks., DO   cetirizine (ZYRTEC) 10 MG tablet TAKE ONE TABLET BY MOUTH DAILY 3/21/22   John Hwang MD   SITagliptin [facility]) 100 MG tablet Take 1 tablet by mouth daily 2/25/22   Alleene Sobia.  Mariluz Starks., DO   metFORMIN (GLUCOPHAGE-XR) 500 MG extended release tablet TAKE 2 TABLETS TWICE A DAY 1/3/22   Silver Mosley MD   traZODone (DESYREL) 50 MG tablet TAKE 2 TABLETS NIGHTLY 12/3/21   John Hwang MD   KLOR-CON M20 20 MEQ extended release tablet TAKE 1 TABLET DAILY 11/4/21   John Hwang MD   benzonatate (TESSALON) 100 MG capsule TAKE 1 CAPSULE THREE TIMES A DAY AS NEEDED FOR COUGH 10/18/21   Thelma Francois MD   [facility]) 45 MCG/ACT inhaler Inhale 2 puffs into the lungs every 6 hours as needed for Shortness of Breath 5/20/21   Tristan Sapp MD   ferrous sulfate 325 (65 Fe) MG tablet Take 325 mg by mouth 3 times daily (with meals)    Historical Provider, MD   calcium carbonate 600 MG TABS tablet Take 1 tablet by mouth daily     Historical Provider, MD   Multiple Vitamins-Minerals (CENTRUM PO) Take 1 tablet by mouth daily. Historical Provider, MD   estrogens, conjugated, (PREMARIN) 1.25 MG tablet Take 1.25 mg by mouth daily.     Historical Provider, MD       Future Appointments   Date Time Provider Krzysztof Michele   9/19/2022  2:00 PM Og Sprague MD AND [facility]   9/22/2022  2:00 PM A [facility]  [facility] [place]   11/1/2022  2:30 PM [facility]

## 2022-08-23 NOTE — TELEPHONE ENCOUNTER
Pt calling to cancel and need to r/s her appt due to her daughter having covid she is scheduled on 8/25 states she is available on September 8,15,or [facility] [place] return call back @ [phone] to discuss

## 2022-08-31 RX ORDER — DILTIAZEM HYDROCHLORIDE 240 MG/1
CAPSULE, EXTENDED RELEASE ORAL
Qty: 90 CAPSULE | Refills: 1 | Status: SHIPPED | OUTPATIENT
Start: 2022-08-31

## 2022-09-02 ENCOUNTER — CARE COORDINATION (OUTPATIENT)
Dept: CARE COORDINATION | Age: 77
End: 2022-09-02

## 2022-09-02 ENCOUNTER — TELEPHONE (OUTPATIENT)
Dept: PRIMARY CARE CLINIC | Age: 77
End: 2022-09-02

## 2022-09-02 NOTE — CARE COORDINATION
7/15/22   Moirah Graves MD   apixaban (ELIQUIS) 5 MG TABS tablet Take 1 tablet by mouth in the morning and 1 tablet before bedtime. 7/15/22   Moriah Graves MD   furosemide (LASIX) 20 MG tablet TAKE 1 TABLET DAILY 7/12/22   Moriah Graves MD   levothyroxine (SYNTHROID) 100 MCG tablet Take 1 tablet by mouth Daily 7/12/22   Moriah Graves MD   aclidinium (TUDORZA PRESSAIR) 400 MCG/ACT AEPB inhaler Inhale 1 puff into the lungs 2 times daily 6/30/22   Lissy Casas MD   beclomethasone (QVAR REDIHALER) 80 MCG/ACT AERB inhaler Inhale 1 puff into the lungs 2 times daily 6/30/22   Lissy Casas MD   tamsulosin [facility]) 0.4 MG capsule Take 1 capsule by mouth daily 5/14/22   Eugene Russell MD   gabapentin (NEURONTIN) 100 MG capsule Take 100 mg by mouth every morning. Historical Provider, MD   gabapentin (NEURONTIN) 600 MG tablet Take 600 mg by mouth at bedtime. Historical Provider, MD   lidocaine 4 % external patch Place 1 patch onto the skin daily 5/13/22   Eugene Russell MD   montelukast (SINGULAIR) 10 MG tablet Take 1 tablet by mouth daily 4/19/22   Sondra Sahni MD   cyanocobalamin 1000 MCG/ML injection Inject 1 mL into the muscle every 14 days Last dose was taken on 4/9/17  Patient not taking: Reported on 7/18/2022 3/31/22   Martín Bar. Vanessa Burden., DO   cetirizine (ZYRTEC) 10 MG tablet TAKE ONE TABLET BY MOUTH DAILY 3/21/22   Moriah Graves MD   SITagliptin [facility]) 100 MG tablet Take 1 tablet by mouth daily 2/25/22   Martín Bar.  Vanessa Burden., DO   metFORMIN (GLUCOPHAGE-XR) 500 MG extended release tablet TAKE 2 TABLETS TWICE A DAY 1/3/22   Chino Trejo MD   traZODone (DESYREL) 50 MG tablet TAKE 2 TABLETS NIGHTLY 12/3/21   Moriah Graves MD   KLOR-CON M20 20 MEQ extended release tablet TAKE 1 TABLET DAILY 11/4/21   Moriah Graves MD   benzonatate (TESSALON) 100 MG capsule TAKE 1 CAPSULE THREE TIMES A DAY AS NEEDED FOR COUGH 10/18/21   Jamison MD Kaitlyn   levalbuterol [facility]) 45 MCG/ACT inhaler Inhale 2 puffs into the lungs every 6 hours as needed for Shortness of Breath 5/20/21   Aden Schultz MD   ferrous sulfate 325 (65 Fe) MG tablet Take 325 mg by mouth 3 times daily (with meals)    Historical Provider, MD   calcium carbonate 600 MG TABS tablet Take 1 tablet by mouth daily     Historical Provider, MD   Multiple Vitamins-Minerals (CENTRUM PO) Take 1 tablet by mouth daily. Historical Provider, MD   estrogens, conjugated, (PREMARIN) 1.25 MG tablet Take 1.25 mg by mouth daily.     Historical Provider, MD       Future Appointments   Date Time Provider Krzysztof Michele   9/19/2022  2:00 PM Og Sprague MD [facility]   9/22/2022  2:00 PM JOHN MURPHY  [facility]   9/29/2022  2:15 PM MD Kapil Darby [facility]   11/1/2022  2:30 PM [facility]

## 2022-09-07 DIAGNOSIS — G25.81 RESTLESS LEG SYNDROME: Primary | ICD-10-CM

## 2022-09-13 RX ORDER — VENLAFAXINE HYDROCHLORIDE 37.5 MG/1
37.5 CAPSULE, EXTENDED RELEASE ORAL DAILY
Qty: 30 CAPSULE | Refills: 5 | Status: SHIPPED | OUTPATIENT
Start: 2022-09-13

## 2022-09-13 NOTE — TELEPHONE ENCOUNTER
Attempted to reach patient. No answer. Will need to confirm if patient is taking this medication as was removed from medication list prior.

## 2022-09-14 RX ORDER — VENLAFAXINE 37.5 MG/1
TABLET ORAL
Qty: 90 TABLET | Refills: 3 | OUTPATIENT
Start: 2022-09-14

## 2022-09-15 NOTE — TELEPHONE ENCOUNTER
Pt sts she received call from pharmacy and they said [facility] would not refill medication. Please advise.

## 2022-09-16 RX ORDER — VENLAFAXINE HYDROCHLORIDE 37.5 MG/1
37.5 CAPSULE, EXTENDED RELEASE ORAL DAILY
Qty: 30 CAPSULE | Refills: 5 | Status: CANCELLED | OUTPATIENT
Start: 2022-09-16

## 2022-09-16 NOTE — TELEPHONE ENCOUNTER
[facility] messed up and deleted the first script for venlafaxine thinking it was discontinued. . They then sent another request and I deleted it thinking it was a duplicate. Instead it needed to be sent as a refill. I have tried to put in another order, but it won't let me place another order. Please refill the med. I'll call and explain it to the patient. Thanks!

## 2022-09-19 ENCOUNTER — OFFICE VISIT (OUTPATIENT)
Dept: PULMONOLOGY | Age: 77
End: 2022-09-19
Payer: MEDICARE

## 2022-09-19 VITALS
RESPIRATION RATE: 18 BRPM | HEART RATE: 76 BPM | SYSTOLIC BLOOD PRESSURE: 138 MMHG | HEIGHT: 65 IN | WEIGHT: 202 LBS | DIASTOLIC BLOOD PRESSURE: 56 MMHG | OXYGEN SATURATION: 92 % | BODY MASS INDEX: 33.66 KG/M2 | TEMPERATURE: 98 F

## 2022-09-19 DIAGNOSIS — T17.500A MUCUS PLUGGING OF BRONCHI: ICD-10-CM

## 2022-09-19 DIAGNOSIS — J44.9 CHRONIC OBSTRUCTIVE PULMONARY DISEASE, UNSPECIFIED COPD TYPE (HCC): Primary | ICD-10-CM

## 2022-09-19 DIAGNOSIS — J96.11 CHRONIC RESPIRATORY FAILURE WITH HYPOXIA (HCC): ICD-10-CM

## 2022-09-19 DIAGNOSIS — E66.9 OBESITY (BMI 30.0-34.9): ICD-10-CM

## 2022-09-19 DIAGNOSIS — Z87.891 FORMER SMOKER: ICD-10-CM

## 2022-09-19 DIAGNOSIS — J98.4 RESTRICTIVE LUNG DISEASE: ICD-10-CM

## 2022-09-19 PROCEDURE — 99213 OFFICE O/P EST LOW 20 MIN: CPT | Performed by: INTERNAL MEDICINE

## 2022-09-19 PROCEDURE — 1123F ACP DISCUSS/DSCN MKR DOCD: CPT | Performed by: INTERNAL MEDICINE

## 2022-09-19 RX ORDER — VENLAFAXINE HYDROCHLORIDE 37.5 MG/1
37.5 CAPSULE, EXTENDED RELEASE ORAL DAILY
Qty: 90 CAPSULE | Refills: 3 | Status: SHIPPED | OUTPATIENT
Start: 2022-09-19

## 2022-09-19 ASSESSMENT — ENCOUNTER SYMPTOMS
RHINORRHEA: 1
SHORTNESS OF BREATH: 1
COUGH: 1
BACK PAIN: 1
WHEEZING: 0

## 2022-09-19 NOTE — PROGRESS NOTES
Pulmonary Outpatient Note   Gaye Weber MD       9/19/2022    1. Chronic obstructive pulmonary disease, unspecified COPD type ([address]    2. Mucus plugging of bronchi    3. Restrictive lung disease    4. Chronic respiratory failure with hypoxia (HCC)    5. Former smoker    6. Obesity (BMI 30.0-34. 9)            ASSESSMENT/PLAN:  COPD. The patient's prior PFT in 2016 did not show COPD, she probably needs a repeat PFT. She is not very symptomatic at present. Continue current bronchodilator regimen. She has been using the nebulizer followed by [facility]. Would wait for imaging to improve further before doing PFT  Abnormal CT chest, mucous plugging. The patient has persistent cough,   ? Restrictive lung disease. Reduced FVC and ERV. There is questionable scarring of the right upper lobe. Former smoker. Nonspecific lung nodules, stable on repeat CT chest  Obesity. Should make attempts to lose weight, with needs significant caloric restriction   Prophylaxis. She has received Pneumovax, Prevnar. She has received the J&J COVID-19 vaccine and a booster. Would consider the new COVID-19 vaccine as a booster. Annual flu shot recommended    Follow-up in 2 months, call earlier if symptomatic. May consider repeat CT depending on symptoms, clinical findings suggest more pulmonary edema on her recent CT chest pulmonary embolism protocol on 5/7/2022    No orders of the defined types were placed in this encounter. No follow-ups on file. Chief Complaint:   Follow-up (2 mo  Restrictive lung disease)       HPI: Beverly Martinez is a 69y.o. year old female here for follow-up. Her PCP is Dr. Candida Jackson. Amberly Prado was last seen on 7/18/2022, presents for follow-up visit. She was last seen on 7/18/2022. The patient has a 40-pack-year smoking history, quit in 2020. The patient has significant back issues with pain radiating to her left thigh.   The patient had an epidural injection 2 weeks ago, had relief of pain for a few days. Symptoms have now recurred, in addition she has increased urinary frequency and often has a bowel movement whenever she tries to urinate. She has a follow-up appointment to see Dr. Carolynn Sánchez. The patient says her shortness of breath is not severe. She does have intermittent cough with yellow phlegm, more in the morning and late night. She has been using her nebulizer 3 times a day. Cough does not wake her up at night, she denies PND or orthopnea. She is using oxygen around-the-clock. She uses 3 LPM O2 with a POC, uses 4 LPM O2 with her oxygen tanks. She does have leg swelling. Previous notes reviewed and edited as necessary. [facility] [place] is a very pleasant but very unfortunate 50-year-old female who presents for follow-up for possible COPD. The patient is a , her   1.5 years ago from colon cancer. She was last seen by telemedicine visit on 2022. The patient is very uncomfortable today, can barely sit in her Rollator. She has significant left hip pain, cannot drive herself. She has multiple medical problems including atrial fibrillation, hyperlipidemia, SVT, GERD, pernicious anemia, DJD, low vitamin D levels, SNOW/cirrhosis with varices, RLS, hypertension, osteoporosis, DM 2, diabetic neuropathy, lumbar discitis, hypothyroidism, DJD. The patient has a 40-pack-year history of smoking, quit in the year . The patient had a fall, landed on her left knee and could not get up. She had to come in via squad, was found to have atrial fibrillation with RVR. Head CT was negative. She had possible CHF versus pneumonia. She had right upper lobe infiltrate versus atelectasis. She also had hyponatremia, hyperkalemia, elevated creatinine, low H&H, failure to thrive. She was treated with Rocephin, transitioned to cefdinir. Patient has cough with light yellow to brown phlegm, denies clear-cut hemoptysis. She remains on oxygen at 4 LPM O2.   She has occasional wheezing, denies any chest pain. She has no allergic rhinitis or GERD symptoms. Her appetite is fair. The patient has been placed on the nebulizer, does not know the name. The patient has significant cough with inability to clear secretions. She cannot lay supine due to the cough. She is compliant with her oxygen. She is due to see Dr. Marielena Recinos for her hip pain. She does see a pain specialist who refuses to write Percocet. The patient says when she takes oxycodone, she is \"out of it\" for several hours. She does not have the side effect with Percocet, this medication had been cleared by Dr. Marielena Recinos in the past.  She has a good appetite, denies GI or  complaints. She denies weight loss. She has no leg edema. She was seen by Dr. Liza Haley for atrial fibrillation, hypertension and hyperlipidemia. She mentions she was told there was nothing wrong with her heart. CT chest pulmonary embolism protocol 5/7/2022  No evidence of pulmonary embolism. Four-chamber cardiomegaly without   pericardial effusion however smooth interlobular septal thickening with lower   lobe predominance along with mild mosaicism concerning for interstitial   pulmonary edema pattern without decompensation evidence as there is no   pleural effusion evident. Limited images of the upper abdomen reveal nodular contours of the hepatic   parenchyma concerning for underlying parenchymal disease process such as   cirrhotic morphology without obvious perihepatic ascites           Previous notes reviewed and edited as necessary. Meryle Beecham was last seen on 9/26/2021, had been having cough with yellow phlegm. She was given an antibiotic which helped. She had a chest x-ray and follow-up that had improved. Earlier imaging showed possible pulmonary edema. She had an abnormal CT chest on 5/11/2021 and 6/3/2021 which suggested cryptogenic organizing pneumonia. She was treated with prolonged prednisone.   She had nonspecific lung nodules, was due to get a repeat CT chest.  The patient was doing very well, was active in her home and moving around. She developed sudden weakness of her legs, was seen in the ER. She had been seen for possible hip bursitis. After being seen in the ER she was told she had new onset atrial fibrillation and had MRI of her spine that showed possible osteomyelitis/discitis. She was transferred to [place] [address], evaluated by ID and it was felt that she did not have discitis. She did have her right knee drained, there was no evidence of infection. For her atrial fibrillation she was seen by cardiology, treated. She was transferred to an ECF, presently is at [facility] undergoing therapy since 5/13/2022. Denies any difficulty breathing, has been using Qvar, [place]. She hardly has to use her rescue Xopenex. She has been using Zyrtec and Singulair. Echocardiogram had shown mild mitral stenosis, she has a history of rheumatic fever at age 11. She has multiple other medical problems including hyperlipidemia, CAD, GERD, low vitamin D levels, hypertension, hypothyroidism, osteoporosis, prediabetes, CAD, RLS. She has a good appetite, did have retention of urine. The catheter was removed and had to be replaced as she was unable to pass urine. She has no GI complaints. The rest of her ROS are negative. She is keen to leave her ECF and go home. CT chest 5/7/2022  No evidence of pulmonary embolism. Four-chamber cardiomegaly without   pericardial effusion however smooth interlobular septal thickening with lower   lobe predominance along with mild mosaicism concerning for interstitial   pulmonary edema pattern without decompensation evidence as there is no   pleural effusion evident. CT chest 4/6/2022  Mediastinum: Thyroid is homogeneous in appearance. Small calcifications seen   in the left thyroid. There are borderline enlarged lymph nodes identified in   the mediastinum.   These lymph nodes are stable and unchanged when compared to   the prior study. The cardiac chambers are enlarged. There is no pericardial   effusion. Prominence of the hilar region stable and unchanged. Lungs/pleura: There is improvement seen in the nodular focus previously noted   within the right upper lobe. There is some underlying ground-glass   nodularity interseptal thickening suggests progression of chronic   interstitial changes. The markings previously seen in the superior segment   of left lower lobe have resolved. Previously seen airspace disease in the   lingula is improving. There is improvement seen in the aeration of the lower   lung fields. No pulmonary mass or new nodular density is identified. There   is a stable 5 mm noncalcified nodule seen within the right lower lobe in the   periphery. No pleural effusion or suspicious pleural thickening. Upper Abdomen: The visualized upper abdomen is stable. Mild enlargement of   the spleen with cirrhotic morphology of the liver. Soft Tissues/Bones: Degenerative changes seen within the spine with no acute   chest wall abnormality. Previous notes reviewed and edited as necessary. Karlie Damon is a pleasant 52-year-old female who was followed by Dr. Yaneli Boyd, last seen on 6/8/2021. She has COPD, was treated with a prolonged course of steroids for organizing pneumonia. CT had shown improvement. She was on oxygen at 3 LPM. At baseline she has multiple medical problems including hypertension, SVT, hyperlipidemia, diabetes mellitus with neuropathy, hypothyroidism, SNOW with cirrhosis, esophageal varices, GERD, diabetic neuropathy, osteoporosis, low vitamin D level, pernicious anemia, DJD. She had esophageal varices. She does have a heart murmur, had rheumatic fever in childhood. She is followed by Dr. Audie Browne. 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 She had smoked from age 9 for about 55 years, less than 1 pack/day.   She smoked heavily since her 35s. She has 2 sons and 2 daughters. The patient was first seen by Dr. Yakov Mercado during her hospitalization on 5/6/2021, underwent bronchoscopy on 5/7/2021. Cultures were negative. Cytology showed severe acute inflammation. Her CT chest had shown improvement. The patient has been on [employer] as needed. The patient lives in a bilevel house, does have difficulty with climbing steps, in the store she uses a cart. She can walk less than half a block. She does have wheezing and cough at night, the symptoms appear to wake her up. She denies GE reflux. She has lost 40 pounds, but her appetite has not improved. She did golf. She denies any  complaints, she has leg edema. She has RLS, is on [employer]. Echo 8/31/2021   Left ventricular systolic function is hyperdynamic with an estimated   ejection fraction of >= 65%. The left ventricle is normal in size with mild concentric hypertrophy. No obvious regional wall motion abnormalities noted. Normal left ventricular diastolic function. Mild mitral and aortic regurgitation. Mild mitral stenosis   Systolic pulmonary artery pressure (SPAP) is normal and estimated at 32 mmHg   (right atrial pressure 3 mmHg). CT chest 6/3/2021  Mediastinum: Thyroid normal in size. Coarse calcification noted inferiorly   in the left lobe unchanged. Multiple scattered lymph nodes in the   mediastinum which have decreased in size since the prior study. Heart is   mildly enlarged. Thoracic aorta normal in size. No pericardial effusion. Lungs/pleura: Slight emphysematous changes present. Small amount of scarring   or postinflammatory change anteriorly in the right apex at the site of prior   acute airspace disease. Small focal area of consolidation remains present   laterally in the superior lingula improved from the prior study. Moderate   opacity also present anteriorly and medially in the right upper lobe with   some bronchiectasis.   Significant improvement has occurred in this area. Slight bibasilar atelectasis or postinflammatory changes present   significantly improved from the prior study. 6.4 x 3.8 subpleural nodule   present laterally the lower right lower lobe appearing slightly smaller. Multiple scattered areas of stranding seen throughout the lungs in areas of   previous acute airspace disease. No abnormal vascular congestion. Airways   are clear. No pleural effusion. Upper Abdomen: Liver has a mildly nodular contour with enlargement the   lateral segment of the left lobe. Spleen appears mildly enlarged. No other   significant abnormality. Soft Tissues/Bones: No acute abnormality. PFT 2/16/2016  1. Spirometry: The FEV-1 is 2.07 L, which is 83% predicted. The FEV-1/FVC ratio is normal.  Inhaled bronchodilators are given. There is no significant improvement. 2.  Lung volumes: Total lung capacity is markedly elevated as measured. 3.  Diffusion capacity:  DLCO is 18.09 ml/min/mmHg, which is 76% predicted.   4.  Flow volume loop is relatively normal.     Past Medical History:   Diagnosis Date    Allergic     Anemia     Anesthesia complication     \"woke up during surgery\"    Arthritis     Osteoarthritis of bilateral CMC joints    Asthma     Followed by Dr. Emma Reilly    Atrial fibrillation [facility])     Cirrhosis ([facility]     non alcoholic    Cirrhosis, non-alcoholic ([address]     COPD (chronic obstructive pulmonary disease) (HCC)     GERD (gastroesophageal reflux disease)     GIB (gastrointestinal bleeding)     LGIB 4/2016    Haemophilus infection 05/07/2021    + resp    Heart murmur     Hernia     Hyperlipidemia     Hypertension     Lung collapse     Murmur, heart     Paroxysmal atrial fibrillation ([address] 05/2022    Pneumonia     Reflux     Rheumatic fever     Stress fracture     right foot    SVT (supraventricular tachycardia) ([address]     6/19/16 - admitted X 1 day per PT     TIA (transient ischemic attack) 2015    Type II or unspecified type diabetes mellitus without mention of complication, not stated as uncontrolled     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 [facility])     Wears glasses     as needed    Wears partial dentures     upper partial       Past Surgical History:   Procedure Laterality Date    BONE MARROW BIOPSY      BREAST BIOPSY      BRONCHOSCOPY N/A 2021    BRONCHOSCOPY DIAGNOSTIC OR CELL [address] ONLY performed by Raymond Villanueva MD at [address] [facility]  2021    BRONCHOSCOPY ALVEOLAR LAVAGE performed by Raymond Villanueva MD at [address] [facility]  2021    BRONCHOSCOPY THERAPUTIC ASPIRATION INITIAL performed by Raymond Villanueva MD at [address] [facility]  14    CATARACT REMOVAL      COLONOSCOPY      X 3 per PT 16    COLONOSCOPY N/A 6/10/2021    COLONOSCOPY POLYPECTOMY ABLATION performed by Jannette Gilbert MD at [address] [facility] 2013    CORAL BUNIONECTOMY RIGHT FOOT WITH SCREW FIXATION;    HERNIA REPAIR      HYSTERECTOMY (CERVIX STATUS UNKNOWN)      JOINT REPLACEMENT Bilateral     TKR    KNEE SURGERY  09    left    KNEE SURGERY  2010    right    UPPER GASTROINTESTINAL ENDOSCOPY N/A 2018    EGD BIOPSY performed by Nawaf Hansen MD at [address] [facility] 6/10/2021    EGD POLYP ABLATION/OTHER performed by Jannette Gilbert MD at [address] [place] [address] 6/10/2021    EGD BAND LIGATION performed by Jannette Gilbert MD at [address] [place] [address] 2022    EGD DIAGNOSTIC ONLY performed by Sterling Meza MD at [address] [place] [address] History     Tobacco Use    Smoking status: Former     Packs/day: 1.00     Years: 40.00     Pack years: 40.00     Types: Cigarettes     Start date: 1956     Quit date: 2000     Years since quittin.3    Smokeless tobacco: Never    Tobacco comments:     stopped cold turkey in 2000!! ( 1999)   Substance Use Topics    Alcohol use: No     Alcohol/week: 0.0 standard drinks       Family History   Problem Relation Age of Onset    Colon Cancer Mother     Other Mother     Diabetes Father     Kidney Disease Paternal Grandmother     Lupus Daughter     Lupus Other          Current Outpatient Medications:     venlafaxine (EFFEXOR XR) 37.5 MG extended release capsule, Take 1 capsule by mouth daily, Disp: 90 capsule, Rfl: 3    venlafaxine (EFFEXOR XR) 37.5 MG extended release capsule, Take 1 capsule by mouth daily, Disp: 30 capsule, Rfl: 5    dilTIAZem (TIAZAC) 240 MG extended release capsule, TAKE 1 CAPSULE DAILY, Disp: 90 capsule, Rfl: 1    atorvastatin (LIPITOR) 20 MG tablet, TAKE 1 TABLET NIGHTLY, Disp: 90 tablet, Rfl: 1    ipratropium (ATROVENT) 0.02 % nebulizer solution, Take 2.5 mLs by nebulization in the morning and 2.5 mLs at noon and 2.5 mLs in the evening and 2.5 mLs before bedtime. , Disp: 300 mL, Rfl: 0    losartan (COZAAR) 100 MG tablet, TAKE 1 TABLET DAILY, Disp: 90 tablet, Rfl: 3    pantoprazole (PROTONIX) 40 MG tablet, TAKE 1 TABLET TWICE A DAY, Disp: 180 tablet, Rfl: 3    ursodiol (ACTIGALL) 300 MG capsule, Take 300 mg by mouth in the morning and 300 mg before bedtime. , Disp: , Rfl:     vitamin C (ASCORBIC ACID) 500 MG tablet, Take 500 mg by mouth in the morning., Disp: , Rfl:     Cholecalciferol (VITAMIN D) 50 MCG (2000 UT) CAPS capsule, Take by mouth, Disp: , Rfl:     carvedilol (COREG) 6.25 MG tablet, Take 1 tablet by mouth in the morning and 1 tablet in the evening. Take with meals. , Disp: 60 tablet, Rfl: 0    apixaban (ELIQUIS) 5 MG TABS tablet, Take 1 tablet by mouth in the morning and 1 tablet before bedtime. , Disp: 60 tablet, Rfl: 0    furosemide (LASIX) 20 MG tablet, TAKE 1 TABLET DAILY, Disp: 90 tablet, Rfl: 3    levothyroxine (SYNTHROID) 100 MCG tablet, Take 1 tablet by mouth Daily, Disp: 90 tablet, Rfl: 3    aclidinium (TUDORZA PRESSAIR) 400 MCG/ACT AEPB inhaler, Inhale 1 puff into the lungs 2 times daily, Disp: 3 each, Rfl: 1    beclomethasone (QVAR REDIHALER) 80 MCG/ACT AERB inhaler, Inhale 1 puff into the lungs 2 times daily, Disp: 3 each, Rfl: 1    tamsulosin (FLOMAX) 0.4 MG capsule, Take 1 capsule by mouth daily, Disp: 30 capsule, Rfl: 0    gabapentin (NEURONTIN) 100 MG capsule, Take 100 mg by mouth every morning. , Disp: , Rfl:     gabapentin (NEURONTIN) 600 MG tablet, Take 600 mg by mouth at bedtime. , Disp: , Rfl:     montelukast (SINGULAIR) 10 MG tablet, Take 1 tablet by mouth daily, Disp: 90 tablet, Rfl: 3    cyanocobalamin 1000 MCG/ML injection, Inject 1 mL into the muscle every 14 days Last dose was taken on 4/9/17, Disp: 6 each, Rfl: 0    cetirizine (ZYRTEC) 10 MG tablet, TAKE ONE TABLET BY MOUTH DAILY, Disp: 90 tablet, Rfl: 2    SITagliptin (JANUVIA) 100 MG tablet, Take 1 tablet by mouth daily, Disp: 90 tablet, Rfl: 3    metFORMIN (GLUCOPHAGE-XR) 500 MG extended release tablet, TAKE 2 TABLETS TWICE A DAY, Disp: 360 tablet, Rfl: 3    traZODone (DESYREL) 50 MG tablet, TAKE 2 TABLETS NIGHTLY, Disp: 180 tablet, Rfl: 3    KLOR-CON M20 20 MEQ extended release tablet, TAKE 1 TABLET DAILY, Disp: 90 tablet, Rfl: 3    benzonatate (TESSALON) 100 MG capsule, TAKE 1 CAPSULE THREE TIMES A DAY AS NEEDED FOR COUGH, Disp: 270 capsule, Rfl: 1    levalbuterol (XOPENEX HFA) 45 MCG/ACT inhaler, Inhale 2 puffs into the lungs every 6 hours as needed for Shortness of Breath, Disp: 3 Inhaler, Rfl: 4    ferrous sulfate 325 (65 Fe) MG tablet, Take 325 mg by mouth 3 times daily (with meals), Disp: , Rfl:     calcium carbonate 600 MG TABS tablet, Take 1 tablet by mouth daily , Disp: , Rfl:     Multiple Vitamins-Minerals (CENTRUM PO), Take 1 tablet by mouth daily. , Disp: , Rfl:     estrogens, conjugated, (PREMARIN) 1.25 MG tablet, Take 1.25 mg by mouth daily. , Disp: , Rfl:     Latex, Clindamycin, Pennsaid [diclofenac sodium], Torsemide, Actos [pioglitazone], Amoxicillin, Augmentin [amoxicillin-pot clavulanate], Bactrim [sulfamethoxazole-trimethoprim], Ciprofloxacin, Doxycycline, Levaquin [levofloxacin], Ativan [lorazepam], Cephalosporins, Pcn [penicillins], and Proventil [albuterol]    Vitals:    09/19/22 1348   BP: (!) 138/56   Pulse: 76   Resp: 18   Temp: 98 °F (36.7 °C)   TempSrc: Temporal   SpO2: 92%   Weight: 202 lb (91.6 kg)   Height: 5' 5\" (1.651 m)       Review of Systems   Constitutional:  Positive for activity change. Negative for unexpected weight change. HENT:  Positive for congestion and rhinorrhea. Respiratory:  Positive for cough and shortness of breath. Negative for wheezing. Genitourinary:         Retention of urine   Musculoskeletal:  Positive for arthralgias and back pain. Psychiatric/Behavioral:  Positive for sleep disturbance. All other systems reviewed and are negative. Physical examination:  Vitals reviewed. Constitutional:       General: She is not in acute distress. Appearance: Normal appearance. She is well-developed. She is not ill-appearing, toxic-appearing or diaphoretic. Comments: Pleasant, overweight   HENT:     Head: Normocephalic and atraumatic. Nose: Nose normal.      Mouth/Throat:      Mouth: Mucous membranes are moist.      Pharynx: Oropharynx is clear. No oropharyngeal exudate or posterior oropharyngeal erythema. Comments: Class IV airway  Eyes:     General: No scleral icterus. Right eye: No discharge. Left eye: No discharge. Conjunctiva/sclera: Conjunctivae normal.      Pupils: Pupils are equal, round, and reactive to light. Neck:     Thyroid: No thyromegaly. Vascular: No JVD. Trachea: No tracheal deviation. Comments: Relatively short neck  Cardiovascular:     Rate and Rhythm: Normal rate and regular rhythm. Heart sounds: Murmur (parasternal ejection systolic) heard. No friction rub. No gallop.     Pulmonary:     Effort: Pulmonary effort is normal. No respiratory distress. Breath sounds: Normal breath sounds. No stridor. No wheezing, or rales. Bilateral rhonchi  Abdominal:      Palpations: Abdomen is soft. Tenderness: There is no abdominal tenderness. There is no guarding or rebound. Musculoskeletal:      Right lower leg: No edema. Left lower le+ edema, mild erythema options     Comments: Bilateral scars of TKR   Lymphadenopathy:     Cervical: No cervical adenopathy. Skin:     General: Skin is warm and dry. Coloration: Skin is not jaundiced or pale. Findings: No bruising, erythema, lesion or rash. Neurological:     Mental Status: She is alert and oriented to person, place, and time.       Gait: Gait normal.   Psychiatric:         Mood and Affect: Mood normal.         Behavior: Behavior normal.

## 2022-09-29 ENCOUNTER — OFFICE VISIT (OUTPATIENT)
Dept: DERMATOLOGY | Age: 77
End: 2022-09-29
Payer: MEDICARE

## 2022-09-29 DIAGNOSIS — I87.2 STASIS DERMATITIS OF BOTH LEGS: ICD-10-CM

## 2022-09-29 DIAGNOSIS — L29.9 PRURITUS: Primary | ICD-10-CM

## 2022-09-29 PROCEDURE — 1123F ACP DISCUSS/DSCN MKR DOCD: CPT | Performed by: DERMATOLOGY

## 2022-09-29 PROCEDURE — 99213 OFFICE O/P EST LOW 20 MIN: CPT | Performed by: DERMATOLOGY

## 2022-09-29 NOTE — PROGRESS NOTES
[facility] Dermatology  Vera Parra MD  [address]  1945    69 y.o. female     Date of Visit: 9/29/2022    Chief Complaint: rash    History of Present Illness:    1. She returns today to follow-up for previously severe generalized pruritus in the setting of SNOW (with cirrhosis) and diabetic neuropathy. Remains under great control with gabapentin. She occasionally uses triamcinolone 0.1% cream.    2.  She also complains of persistent eruption on both shins. She deals with leg swelling and typically wears compression stockings. Review of Systems:  Skin: No other concerning growths      Past Medical History, Family History, Surgical History, Medications and Allergies reviewed.     Past Medical History:   Diagnosis Date    Allergic     Anemia     Anesthesia complication     \"woke up during surgery\"    Arthritis     Osteoarthritis of bilateral CMC joints    Asthma     Followed by Dr. Nolan Castro    Atrial fibrillation ([address]     Cirrhosis ([address]     non alcoholic    Cirrhosis, non-alcoholic ([address]     COPD (chronic obstructive pulmonary disease) (HCC)     GERD (gastroesophageal reflux disease)     GIB (gastrointestinal bleeding)     LGIB 4/2016    Haemophilus infection 05/07/2021    + resp    Heart murmur     Hernia     Hyperlipidemia     Hypertension     Lung collapse     Murmur, heart     Paroxysmal atrial fibrillation ([address] 05/2022    Pneumonia     Reflux     Rheumatic fever     Stress fracture     right foot    SVT (supraventricular tachycardia) ([address]     6/19/16 - admitted X 1 day per PT     TIA (transient ischemic attack) 2015    Type II or unspecified type diabetes mellitus without mention of complication, not stated as uncontrolled     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 ([address]     Wears glasses     as needed    Wears partial dentures     upper partial     Past Surgical History:   Procedure Laterality Date    BONE MARROW BIOPSY      BREAST BIOPSY BRONCHOSCOPY N/A 5/7/2021    BRONCHOSCOPY DIAGNOSTIC OR CELL [address] ONLY performed by Arrie Sever, MD at [place] [address]  5/7/2021    BRONCHOSCOPY ALVEOLAR LAVAGE performed by Arrie Sever, MD at [place] [address]  5/7/2021    BRONCHOSCOPY THERAPUTIC ASPIRATION INITIAL performed by Arrie Sever, MD at [place] [address]  07/14/14    CATARACT REMOVAL      COLONOSCOPY      X 3 per PT 6/8/16    COLONOSCOPY N/A 6/10/2021    COLONOSCOPY POLYPECTOMY ABLATION performed by Anthony Dowell MD at [address] [facility] Right 06/03/2013    CORAL BUNIONECTOMY RIGHT FOOT WITH SCREW FIXATION;    HERNIA REPAIR      HYSTERECTOMY (CERVIX STATUS UNKNOWN)      JOINT REPLACEMENT Bilateral     TKR    KNEE SURGERY  09    left    KNEE SURGERY  2010    right    UPPER GASTROINTESTINAL ENDOSCOPY N/A 7/21/2018    EGD BIOPSY performed by Allen Hayden MD at [facility] 6/10/2021    EGD POLYP ABLATION/OTHER performed by Anthony Dowell MD at [address] [facility] 6/10/2021    EGD BAND LIGATION performed by Anthony Dowell MD at [facility] 6/6/2022    EGD DIAGNOSTIC ONLY performed by Yocasta Sharpe MD at [facility]   Allergen Reactions    Latex Swelling and Rash    Clindamycin Itching    Pennsaid [Diclofenac Sodium] Itching and Rash    Torsemide Itching    Actos [Pioglitazone] Diarrhea    Amoxicillin Other (See Comments)    Augmentin [Amoxicillin-Pot Clavulanate] Other (See Comments)    Bactrim [Sulfamethoxazole-Trimethoprim] Other (See Comments)     Stomach ache     Ciprofloxacin Other (See Comments)     Makes her weird  Makes anxious and she has weird dreams    Doxycycline Other (See Comments)     Feels like she is smothering, chest tightness    Levaquin [Levofloxacin]      Body aches    Ativan [Lorazepam] Other (See Comments) and Anxiety     And confusion  Had weird dreams and hallucinations    Cephalosporins Rash    Pcn [Penicillins] Rash and Itching    Proventil [Albuterol] Anxiety     Outpatient Medications Marked as Taking for the 9/29/22 encounter (Office Visit) with Chary Hernández MD   Medication Sig Dispense Refill    venlafaxine (EFFEXOR XR) 37.5 MG extended release capsule Take 1 capsule by mouth daily 90 capsule 3    venlafaxine (EFFEXOR XR) 37.5 MG extended release capsule Take 1 capsule by mouth daily 30 capsule 5    dilTIAZem (TIAZAC) 240 MG extended release capsule TAKE 1 CAPSULE DAILY 90 capsule 1    atorvastatin (LIPITOR) 20 MG tablet TAKE 1 TABLET NIGHTLY 90 tablet 1    ipratropium (ATROVENT) 0.02 % nebulizer solution Take 2.5 mLs by nebulization in the morning and 2.5 mLs at noon and 2.5 mLs in the evening and 2.5 mLs before bedtime. 300 mL 0    losartan (COZAAR) 100 MG tablet TAKE 1 TABLET DAILY 90 tablet 3    pantoprazole (PROTONIX) 40 MG tablet TAKE 1 TABLET TWICE A  tablet 3    ursodiol (ACTIGALL) 300 MG capsule Take 300 mg by mouth in the morning and 300 mg before bedtime. vitamin C (ASCORBIC ACID) 500 MG tablet Take 500 mg by mouth in the morning. Cholecalciferol (VITAMIN D) 50 MCG (2000 UT) CAPS capsule Take by mouth      carvedilol (COREG) 6.25 MG tablet Take 1 tablet by mouth in the morning and 1 tablet in the evening. Take with meals. 60 tablet 0    apixaban (ELIQUIS) 5 MG TABS tablet Take 1 tablet by mouth in the morning and 1 tablet before bedtime.  60 tablet 0    furosemide (LASIX) 20 MG tablet TAKE 1 TABLET DAILY 90 tablet 3    levothyroxine (SYNTHROID) 100 MCG tablet Take 1 tablet by mouth Daily 90 tablet 3    aclidinium (TUDORZA PRESSAIR) 400 MCG/ACT AEPB inhaler Inhale 1 puff into the lungs 2 times daily 3 each 1    beclomethasone (QVAR REDIHALER) 80 MCG/ACT AERB inhaler Inhale 1 puff into the lungs 2 times daily 3 each 1    tamsulosin (FLOMAX) 0.4 MG capsule Take 1 capsule by mouth daily 30 capsule 0    gabapentin (NEURONTIN) 100 MG capsule Take 100 mg by mouth every morning.       gabapentin (NEURONTIN) 600 MG tablet Take 600 mg by mouth at bedtime. montelukast (SINGULAIR) 10 MG tablet Take 1 tablet by mouth daily 90 tablet 3    cyanocobalamin 1000 MCG/ML injection Inject 1 mL into the muscle every 14 days Last dose was taken on 4/9/17 6 each 0    cetirizine (ZYRTEC) 10 MG tablet TAKE ONE TABLET BY MOUTH DAILY 90 tablet 2    SITagliptin (JANUVIA) 100 MG tablet Take 1 tablet by mouth daily 90 tablet 3    metFORMIN (GLUCOPHAGE-XR) 500 MG extended release tablet TAKE 2 TABLETS TWICE A  tablet 3    traZODone (DESYREL) 50 MG tablet TAKE 2 TABLETS NIGHTLY 180 tablet 3    KLOR-CON M20 20 MEQ extended release tablet TAKE 1 TABLET DAILY 90 tablet 3    benzonatate (TESSALON) 100 MG capsule TAKE 1 CAPSULE THREE TIMES A DAY AS NEEDED FOR COUGH 270 capsule 1    levalbuterol (XOPENEX HFA) 45 MCG/ACT inhaler Inhale 2 puffs into the lungs every 6 hours as needed for Shortness of Breath 3 Inhaler 4    ferrous sulfate 325 (65 Fe) MG tablet Take 325 mg by mouth 3 times daily (with meals)      calcium carbonate 600 MG TABS tablet Take 1 tablet by mouth daily       Multiple Vitamins-Minerals (CENTRUM PO) Take 1 tablet by mouth daily. estrogens, conjugated, (PREMARIN) 1.25 MG tablet Take 1.25 mg by mouth daily. Physical Examination       Well appearing. 1.  Trunk clear. 2.  Shins, right > left: ill defined scaly, focally crusted pink patches. Assessment and Plan     1. Pruritus -generalized, secondary to cirrhosis of the liver and diabetic neuropathy    Doing well with gabapentin. Remain on gabapentin per her other providers. Triamcinolone 0.1% cream twice daily as needed. Continue use of emollients. 2. Stasis dermatitis of both legs     Triamcinolone 0.1% cream twice daily until improved.     Continue use of compression stockings.          --Mundo Ku MD

## 2022-10-06 NOTE — TELEPHONE ENCOUNTER
Pt called [facility] wondering if [facility] could put a referral for a [facility]. In her chart, please advise.

## 2022-10-07 NOTE — TELEPHONE ENCOUNTER
NEVIN..Pt needs neurontin refilled and PA from neurologist left and there is no one in the office because Dr. Jones Few quit seeing pt and is only doing surgery. Her pcp has left [facility] and she doesn't have an appt until 11/11 with new dr in same office. Pt is going to call pcp office back and ask if they can give her script until she is seen next month. I also recommend to her to ask to see one of the residents and get a sooner appt if they can't help her without being seen by new doc in office.

## 2022-10-07 NOTE — TELEPHONE ENCOUNTER
Refill Request - Controlled Substance      Last Seen Department: 7/15/2022  Last Seen by PCP: Visit date not found    Last Written: unknown Historical provider    Last UDS: n/a    Med Agreement Signed On: n/a    Next Appointment:   Future Appointments   Date Time Provider Krzysztof Michele   10/28/2022  2:00 PM JOHN JEFFREY  [facility]   11/1/2022  2:30 PM MD Teodora Wild [facility]   11/11/2022 10:00 AM Anuel Campbell DO [facility] AND RES [facility]   10[facility]2023  [facility]         Future appointment scheduled    Requested Prescriptions     Pending Prescriptions Disp Refills    gabapentin (NEURONTIN) 100 MG capsule 90 capsule 0     Sig: Take 1 capsule by mouth 3 times daily for 30 days.

## 2022-10-07 NOTE — TELEPHONE ENCOUNTER
----- Message from Rayajaxon Adamesd sent at 10/7/2022  2:32 PM EDT -----  Subject: Refill Request    QUESTIONS  Name of Medication? gabapentin (NEURONTIN) 600 MG tablet  Patient-reported dosage and instructions? 1 tablet TID  How many days do you have left? 0  Preferred Pharmacy? [facility] [id number]  Pharmacy phone number (if available)? [phone]  ---------------------------------------------------------------------------  --------------,  Name of Medication? gabapentin (NEURONTIN) 100 MG capsule  Patient-reported dosage and instructions? 1 tablet TID  How many days do you have left? 0  [facility]acy? [facility] [id number]  Pharmacy phone number (if available)? [phone]  Additional Information for Provider? Patient is scheduled to see Dr. Keegan Little   shortly and is asking if these medications can be refilled until that   appointment. Her neurologist has quit the practice and she is out of the   medication.   ---------------------------------------------------------------------------  --------------  [address]  What is the best way for the office to contact you? OK to leave message on   voicemail  Preferred Call Back Phone Number? [phone]  ---------------------------------------------------------------------------  --------------  SCRIPT ANSWERS  Relationship to Patient?  Self

## 2022-10-07 NOTE — TELEPHONE ENCOUNTER
If she cannot get neurontin filled I can fill until seen next month. I need to know exact dosing of neurontin thought.

## 2022-10-09 RX ORDER — GABAPENTIN 100 MG/1
100 CAPSULE ORAL 3 TIMES DAILY
Qty: 90 CAPSULE | Refills: 0 | Status: SHIPPED | OUTPATIENT
Start: 2022-10-09 | End: 2022-11-08

## 2022-10-10 RX ORDER — GABAPENTIN 600 MG/1
600 TABLET ORAL NIGHTLY
Qty: 30 TABLET | Refills: 0 | OUTPATIENT
Start: 2022-10-10 | End: 2022-11-09

## 2022-10-10 RX ORDER — ROPINIROLE 1 MG/1
1 TABLET, FILM COATED ORAL 3 TIMES DAILY
Qty: 90 TABLET | Refills: 3 | Status: SHIPPED | OUTPATIENT
Start: 2022-10-10 | End: 2022-10-30 | Stop reason: SDUPTHER

## 2022-10-10 RX ORDER — GABAPENTIN 100 MG/1
100 CAPSULE ORAL 3 TIMES DAILY
Qty: 90 CAPSULE | Refills: 0 | OUTPATIENT
Start: 2022-10-10 | End: 2022-11-09

## 2022-10-10 NOTE — TELEPHONE ENCOUNTER
Pt calling because pharmacy told her they sent us a refill request  rOPINIRole (REQUIP) tablet 1 mg   Takes 5 hansen     Requests 90 day supply. Pt states that Dr. Anuel Wallace told her he would prescribe it for her as the Dr. Bogdan Vazquez was prescribing it walked out of that office. [address], [address] [phone] - F [phone]   [place] [address]   Phone:  [phone]  Fax:  [phone]      Pt would like to be called when Rx is filled.

## 2022-10-10 NOTE — TELEPHONE ENCOUNTER
She takes Neurontin 100 mg tid and 600 mg at bedtime and may take an extra 600 mg as needed during day per patient. (On medlist she only takes 600 mg at bedtime and 100 mg TID). I this pended. She is aware that this will only be done once from us for a 30 day supply and that her new pcp on 11/11/2022 can fill from there. She pleasantly VU.

## 2022-10-11 RX ORDER — ROPINIROLE 1 MG/1
1 TABLET, FILM COATED ORAL 3 TIMES DAILY
Qty: 90 TABLET | Refills: 0 | Status: SHIPPED | OUTPATIENT
Start: 2022-10-11 | End: 2022-10-30

## 2022-10-11 NOTE — TELEPHONE ENCOUNTER
gabapentin (NEURONTIN) 600 MG tablet    Pt's pharmacy received the 100mg however the Pt also takes the 600mg. Pt requesting that the 600mg be refilled.      Delfin Chapman [id number] [address] [place] [address]   Phone:  [phone]  Fax:  [phone]

## 2022-10-11 NOTE — TELEPHONE ENCOUNTER
Pt is asking for requip on inactive list be sent to [facility] [facility] [employer] [facility] [employer], pt will run out before mail order will get to her.

## 2022-10-12 RX ORDER — GABAPENTIN 600 MG/1
600 TABLET ORAL NIGHTLY
Qty: 30 TABLET | Refills: 0 | Status: SHIPPED | OUTPATIENT
Start: 2022-10-12 | End: 2022-11-11

## 2022-10-12 NOTE — TELEPHONE ENCOUNTER
Refill Request - Controlled Substance      Last Seen Department: 7/15/2022  Last Seen by PCP: Visit date not found    Last Written: unknown, historical provider    Last UDS: n/a    Med Agreement Signed On: n/a    Next Appointment:   Future Appointments   Date Time Provider Krzysztof Michele   10/28/2022  2:00 PM JOHN MURPHY  [facility]   11/1/2022  2:30 PM MD Roxana Miller [facility]   11/11/2022 10:00 AM Jay Campbell [facility]   10/3/2023  2:00 PM MD Jose Alfredo Rodríguez [facility]         Future appointment scheduled    Requested Prescriptions     Pending Prescriptions Disp Refills    gabapentin (NEURONTIN) 600 MG tablet 90 tablet      Sig: Take 1 tablet by mouth at bedtime.

## 2022-10-17 RX ORDER — CYANOCOBALAMIN 1000 UG/ML
1000 INJECTION INTRAMUSCULAR; SUBCUTANEOUS
Qty: 6 EACH | Refills: 0 | Status: SHIPPED | OUTPATIENT
Start: 2022-10-17

## 2022-10-17 NOTE — TELEPHONE ENCOUNTER
Refill Request       Last Seen: Last Seen Department: 7/15/2022  Last Seen by PCP:     Last Written: 03/31/2022 #6 with 0    Next Appointment:   Future Appointments   Date Time Provider Krzysztof Michele   10/28/2022  2:00 PM JOHN СЕРГЕЙPAUL  [facility]   11/1/2022  2:30 PM MD Laura Romero [facility]   11/11/2022 10:00 AM Shannon Campbell DO [facility] AND RES [facility]   10/3/2023  2:00 PM MD Bessie Echevarria [facility]             Requested Prescriptions     Pending Prescriptions Disp Refills    cyanocobalamin 1000 MCG/ML injection 6 each 0     Sig: Inject 1 mL into the muscle every 14 days Last dose was taken on 4/9/17

## 2022-10-21 RX ORDER — ROPINIROLE 1 MG/1
TABLET, FILM COATED ORAL
Qty: 90 TABLET | Refills: 0 | OUTPATIENT
Start: 2022-10-21

## 2022-10-27 ENCOUNTER — TELEPHONE (OUTPATIENT)
Dept: PULMONOLOGY | Age: 77
End: 2022-10-27

## 2022-10-27 RX ORDER — PREDNISONE 20 MG/1
20 TABLET ORAL 2 TIMES DAILY
Qty: 10 TABLET | Refills: 0 | Status: SHIPPED | OUTPATIENT
Start: 2022-10-27 | End: 2022-11-01

## 2022-10-27 RX ORDER — CEFUROXIME AXETIL 250 MG/1
250 TABLET ORAL 2 TIMES DAILY
Qty: 14 TABLET | Refills: 0 | Status: SHIPPED | OUTPATIENT
Start: 2022-10-27 | End: 2022-11-03

## 2022-10-27 RX ORDER — POTASSIUM CHLORIDE 1500 MG/1
TABLET, EXTENDED RELEASE ORAL
Qty: 90 TABLET | Refills: 3 | Status: SHIPPED | OUTPATIENT
Start: 2022-10-27

## 2022-10-27 NOTE — TELEPHONE ENCOUNTER
Called the patient and spoke to her. Prescribing prednisone and Ceftin. She has received cefdinir in the past, is likely not allergic to cephalosporins. She was told to call back immediately if any reaction or she is not better by next week.

## 2022-10-27 NOTE — TELEPHONE ENCOUNTER
Pt. Called stating she thinks she has a sinus infection. Her face hurts and that she has been coughing up a tan phlegm. She has also gone through a box of nebulizer's this week. If you call something in she would like it sent to the [address] [facility] [address] in [place].

## 2022-10-28 NOTE — TELEPHONE ENCOUNTER
Pt is calling states that she takes   rOPINIRole (REQUIP) 1 MG tablet   5 times a day.     Pt is requesting refill with enough for 3 months sent to:    [address], [address] [phone] - F [phone]   [place] [address]   Phone:  [phone]  Fax:  [phone]

## 2022-10-28 NOTE — TELEPHONE ENCOUNTER
Pt is calling states that she takes   rOPINIRole (REQUIP) 1 MG tablet   5 times a day.      Pt is requesting refill with enough for 3 months sent to:     [address], [address] [phone] - F [phone]   [place] [address]   Phone:  [phone]  Fax:  [phone]      Refill Request       Last Seen: Last Seen Department: 7/15/2022  Last Seen by PCP: 7/15/2022    Last Written: 10/11/2022  the medication sent in was for 3 times daily   Patient is stating she is taking it 5 times daily     Next Appointment:   Future Appointments   Date Time Provider Krzysztof Michele   11/1/2022  2:30 PM Irvin Villatoro MD [facility]   11/7/2022  2:00 PM MD Denae Valles [facility]   11/30/2022  1:20 PM JUANISA JEFFREY  [address]   10/3/2023  2:00 PM Lesia Phalen, MD [facility]             Requested Prescriptions      No prescriptions requested or ordered in this encounter

## 2022-10-30 RX ORDER — ROPINIROLE 1 MG/1
1 TABLET, FILM COATED ORAL 3 TIMES DAILY
Qty: 90 TABLET | Refills: 3 | Status: SHIPPED | OUTPATIENT
Start: 2022-10-30

## 2022-10-31 NOTE — TELEPHONE ENCOUNTER
Pt is calling because she spoke with g[facility]. Pt states that [facility]avin told her that they will not fill the Rx because it is too soon and the directions are incorrect. Pt wants a corrected Rx sent to [facility].

## 2022-10-31 NOTE — TELEPHONE ENCOUNTER
The refill that was sent yesterday to [facility] still says 3 times a day. The Pt states that she takes 5 a day and would like this corrected.

## 2022-10-31 NOTE — TELEPHONE ENCOUNTER
Called in the Rx for 5 times daily with verbal approval from Dr. Juana Borrego with [facility] at [address] [place] [address]

## 2022-11-01 ENCOUNTER — OFFICE VISIT (OUTPATIENT)
Dept: CARDIOLOGY CLINIC | Age: 77
End: 2022-11-01
Payer: MEDICARE

## 2022-11-01 VITALS
HEIGHT: 66 IN | OXYGEN SATURATION: 97 % | HEART RATE: 82 BPM | BODY MASS INDEX: 32.14 KG/M2 | SYSTOLIC BLOOD PRESSURE: 140 MMHG | WEIGHT: 200 LBS | DIASTOLIC BLOOD PRESSURE: 70 MMHG

## 2022-11-01 DIAGNOSIS — I47.1 SVT (SUPRAVENTRICULAR TACHYCARDIA) (HCC): ICD-10-CM

## 2022-11-01 DIAGNOSIS — E78.00 PURE HYPERCHOLESTEROLEMIA: ICD-10-CM

## 2022-11-01 DIAGNOSIS — I48.0 PAROXYSMAL ATRIAL FIBRILLATION (HCC): Primary | ICD-10-CM

## 2022-11-01 DIAGNOSIS — I10 ESSENTIAL HYPERTENSION, BENIGN: ICD-10-CM

## 2022-11-01 PROCEDURE — 3074F SYST BP LT 130 MM HG: CPT | Performed by: INTERNAL MEDICINE

## 2022-11-01 PROCEDURE — 1123F ACP DISCUSS/DSCN MKR DOCD: CPT | Performed by: INTERNAL MEDICINE

## 2022-11-01 PROCEDURE — 3078F DIAST BP <80 MM HG: CPT | Performed by: INTERNAL MEDICINE

## 2022-11-01 PROCEDURE — 99214 OFFICE O/P EST MOD 30 MIN: CPT | Performed by: INTERNAL MEDICINE

## 2022-11-01 RX ORDER — DIPHENHYDRAMINE HCL 25 MG
25 CAPSULE ORAL EVERY 6 HOURS PRN
COMMUNITY

## 2022-11-01 RX ORDER — DOXEPIN HYDROCHLORIDE 50 MG/1
50 CAPSULE ORAL NIGHTLY
COMMUNITY

## 2022-11-01 NOTE — PROGRESS NOTES
[address] Office Note  Follow up note   11/1/2022     Subjective: Dustin Garcia is here for follow up of SVT, HTN, HLD Chronic resp failure on O2 3 L/min 24/7, history of rheumatic fever age 11. New diagnosis parox afib 6/3/22. [place]:      Today, patient reports she has been experiencing SOB but states this is unchanged. She is wearing 3L oxygen that she requires 24/7 for respiratory failure. She has pain in left leg that is due to torn muscle from cleaning her house back in March. She is trying to arrange PT for this. Patient is taking all cardiac medications as prescribed and tolerates them well. Patient denies current edema, chest pain, sob, palpitations, dizziness or syncope. PMH:    Dustin Garcia is a 69 y. o.female with a PMH SVT, HTN, HLD Chronic resp failure on O2 3 L/min 24/7, history of rheumatic fever age 11. New diagnosis parox afib 6/3/22. On 6/24/16 she stated she had not had palpitations since her discharge from the hospital. Her main complaint was of fatigue which worsened in April. 4/2016 TSH 1.08. She had lost 45 lbs over the past year. She is on multiple medications for rash. She is also on Trazodone and Effexor. She has a rash due to liver disease and has been on multiple medications for her rash and itching. She started taking Atarax for itching - first dose taken today. She was taken off Benadryl and Periactin due to liver disease. She is also doing phototherapy by Dr. Mayra Bass (dermatology). She has had abdominal cramping since undergoing Colonoscopy in May 2016. She has pernicious anemia for which she takes Vit V 12 every two weeks. 6/2016 H/H 9.6, 29.8. On 3/31/17 she reported her palpitations have returned. They occur randomly and last a few minutes. She describes it as a racing heart beat. They do not occur daily. They have been worsening in frequency. She also states her SOB is worsening. She follows up with Silvano Loepz for this.  Her oxygen level today in the office began at 88% and ivone to 92%. She complains of a productive cough. She states this has been occurring for 1 year and has had no testing. She smoked for roughly 40 years. She also complains of worsening fatigue and poor sleep. This has also been worsening recently. She has been given Requip for this. She attributes her poor sleep to her cough. She also complains of generalized dryness. She had an episode on SVT on 8/15/18 while at [facility], . In 2018 she had a bout of vomiting and diarrhea and was hospitalized. Patient was hospitalized .3.22 and 22  For new onset afib and hyponatremia. At last OV 22 she presents in a wheel chair and on O2. She reports that she is living back at home. She reports using a rollater at home that caused her to fall and then ended up in the hospital. She stated that she now has a rollator walker with bigger wheels that she uses at home. She had J&J vaccine on 21. Review of Systems: 12 point ROS negative in all areas as listed below except as in [place]  Constitutional, EENT, GI, , Musculoskeletal, skin, neurological, hematological, endocrine, Psychiatric   Reviewed past medical history, social, and family history. Nonsmoker. No alcohol. Mother: no cardiac history. Father:  of MI age 63's.    Past Medical History:   Diagnosis Date    Allergic     Anemia     Anesthesia complication     \"woke up during surgery\"    Arthritis     Osteoarthritis of bilateral CMC joints    Asthma     Followed by Dr. Kwan Salazar    Atrial fibrillation [facility])     Cirrhosis ([facility] [address]     non alcoholic    Cirrhosis, non-alcoholic ([facility] [address]     COPD (chronic obstructive pulmonary disease) (HCC)     GERD (gastroesophageal reflux disease)     GIB (gastrointestinal bleeding)     LGIB 2016    Haemophilus infection 2021    + resp    Heart murmur     Hernia     Hyperlipidemia     Hypertension     Lung collapse     Murmur, heart     Paroxysmal atrial fibrillation ([facility] [address] 2022 Pneumonia     Reflux     Rheumatic fever     Stress fracture     right foot    SVT (supraventricular tachycardia) (HCC)     6/19/16 - admitted X 1 day per PT     TIA (transient ischemic attack) 2015    Type II or unspecified type diabetes mellitus without mention of complication, not stated as uncontrolled     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 ([facility] [address]     Wears glasses     as needed    Wears partial dentures     upper partial     Past Surgical History:   Procedure Laterality Date    BONE MARROW BIOPSY      BREAST BIOPSY      BRONCHOSCOPY N/A 5/7/2021    BRONCHOSCOPY DIAGNOSTIC OR CELL [facility] ONLY performed by Ashley Prescott MD at [place] [id number]  5/7/2021    BRONCHOSCOPY ALVEOLAR LAVAGE performed by Ashley Prescott MD at [place] [address]  5/7/2021    BRONCHOSCOPY THERAPUTIC ASPIRATION INITIAL performed by Ashley Prescott MD at [place] [id number]  07/14/14    CATARACT REMOVAL      COLONOSCOPY      X 3 per PT 6/8/16    COLONOSCOPY N/A 6/10/2021    COLONOSCOPY POLYPECTOMY ABLATION performed by Burke Hess MD at [address] [facility] Right 06/03/2013    CORAL BUNIONECTOMY RIGHT FOOT WITH SCREW FIXATION;    HERNIA REPAIR      HYSTERECTOMY (CERVIX STATUS UNKNOWN)      JOINT REPLACEMENT Bilateral     TKR    KNEE SURGERY  09    left    KNEE SURGERY  2010    right    UPPER GASTROINTESTINAL ENDOSCOPY N/A 7/21/2018    EGD BIOPSY performed by Serene Narvaez MD at [address] [place] [address] 9 6/10/2021    EGD POLYP ABLATION/OTHER performed by Burke Hess MD at [address] [place] [address], [place] 6/10/2021    EGD BAND LIGATION performed by Burke Hess MD at [address] [place] [address],[facility] 6/6/2022    EGD DIAGNOSTIC ONLY performed by Berto Daugherty MD at [address] [place] [address]       Objective:   BP (!) 140/70 (Site: Right Upper Arm, Position: Sitting)   Pulse 82   Ht 5' 6\" (1.676 m)   Wt 200 lb (90.7 kg)   SpO2 97%   BMI 32.28 kg/m²     Wt Readings from Last 3 Encounters:   11/01/22 200 lb (90.7 kg)   09/19/22 202 lb (91.6 kg)   07/15/22 200 lb (90.7 kg)       Physical Exam:  General: No Respiratory distress, appears well developed and well nourished. Eyes:  Sclera nonicteric  Nose/Sinuses:  negative findings: nose shows no deformity, asymmetry, or inflammation, nasal mucosa normal, septum midline with no perforation or bleeding  Back:  no pain to palpation  Joint:  no active joint inflammation  Musculoskeletal:  negative  Skin:  Warm and dry  Neck:  Negative for JVD and Carotid Bruits. Pain on movement of neck to left. Chest: coarse crackles to bilateral bases, respiration easy  Cardiovascular:  RRR, S1S2 normal, 2/6 MOHSEN lower left sternal border, no rub or thrill. Extremities:   trace edema, no clubbing, cyanosis,  Neuro: intact    Medications:   Outpatient Encounter Medications as of 11/1/2022   Medication Sig Dispense Refill    diphenhydrAMINE (BENADRYL ALLERGY) 25 MG capsule Take 25 mg by mouth every 6 hours as needed for Itching      doxepin (SINEQUAN) 50 MG capsule Take 50 mg by mouth nightly      rOPINIRole (REQUIP) 1 MG tablet Take 1 tablet by mouth 3 times daily 90 tablet 3    KLOR-CON M20 20 MEQ extended release tablet TAKE 1 TABLET DAILY 90 tablet 3    predniSONE (DELTASONE) 20 MG tablet Take 1 tablet by mouth 2 times daily for 5 days 10 tablet 0    cefUROXime (CEFTIN) 250 MG tablet Take 1 tablet by mouth 2 times daily for 7 days 14 tablet 0    cyanocobalamin 1000 MCG/ML injection Inject 1 mL into the muscle every 14 days Last dose was taken on 4/9/17 6 each 0    gabapentin (NEURONTIN) 600 MG tablet Take 1 tablet by mouth at bedtime for 30 days. 30 tablet 0    gabapentin (NEURONTIN) 100 MG capsule Take 1 capsule by mouth 3 times daily for 30 days.  90 capsule 0    venlafaxine (EFFEXOR XR) 37.5 MG extended release capsule Take 1 capsule by mouth daily 90 capsule 3    venlafaxine (EFFEXOR XR) 37.5 MG extended release capsule Take 1 capsule by mouth daily 30 capsule 5    dilTIAZem (TIAZAC) 240 MG extended release capsule TAKE 1 CAPSULE DAILY 90 capsule 1    atorvastatin (LIPITOR) 20 MG tablet TAKE 1 TABLET NIGHTLY 90 tablet 1    losartan (COZAAR) 100 MG tablet TAKE 1 TABLET DAILY 90 tablet 3    ursodiol (ACTIGALL) 300 MG capsule Take 300 mg by mouth in the morning and 300 mg before bedtime. vitamin C (ASCORBIC ACID) 500 MG tablet Take 500 mg by mouth in the morning. Cholecalciferol (VITAMIN D) 50 MCG (2000 UT) CAPS capsule Take by mouth      carvedilol (COREG) 6.25 MG tablet Take 1 tablet by mouth in the morning and 1 tablet in the evening. Take with meals. 60 tablet 0    apixaban (ELIQUIS) 5 MG TABS tablet Take 1 tablet by mouth in the morning and 1 tablet before bedtime.  60 tablet 0    furosemide (LASIX) 20 MG tablet TAKE 1 TABLET DAILY 90 tablet 3    levothyroxine (SYNTHROID) 100 MCG tablet Take 1 tablet by mouth Daily 90 tablet 3    aclidinium (TUDORZA PRESSAIR) 400 MCG/ACT AEPB inhaler Inhale 1 puff into the lungs 2 times daily 3 each 1    beclomethasone (QVAR REDIHALER) 80 MCG/ACT AERB inhaler Inhale 1 puff into the lungs 2 times daily 3 each 1    tamsulosin (FLOMAX) 0.4 MG capsule Take 1 capsule by mouth daily 30 capsule 0    cetirizine (ZYRTEC) 10 MG tablet TAKE ONE TABLET BY MOUTH DAILY 90 tablet 2    SITagliptin (JANUVIA) 100 MG tablet Take 1 tablet by mouth daily 90 tablet 3    metFORMIN (GLUCOPHAGE-XR) 500 MG extended release tablet TAKE 2 TABLETS TWICE A  tablet 3    traZODone (DESYREL) 50 MG tablet TAKE 2 TABLETS NIGHTLY 180 tablet 3    benzonatate (TESSALON) 100 MG capsule TAKE 1 CAPSULE THREE TIMES A DAY AS NEEDED FOR COUGH 270 capsule 1    levalbuterol (XOPENEX HFA) 45 MCG/ACT inhaler Inhale 2 puffs into the lungs every 6 hours as needed for Shortness of Breath 3 Inhaler 4    ferrous sulfate 325 (65 Fe) MG tablet Take 325 mg by mouth 3 times daily (with meals)      calcium carbonate 600 MG TABS tablet Take 1 tablet by mouth daily       estrogens, conjugated, (PREMARIN) 1.25 MG tablet Take 1.25 mg by mouth daily. ipratropium (ATROVENT) 0.02 % nebulizer solution Take 2.5 mLs by nebulization in the morning and 2.5 mLs at noon and 2.5 mLs in the evening and 2.5 mLs before bedtime. 300 mL 0    pantoprazole (PROTONIX) 40 MG tablet TAKE 1 TABLET TWICE A  tablet 3    montelukast (SINGULAIR) 10 MG tablet Take 1 tablet by mouth daily 90 tablet 3    Multiple Vitamins-Minerals (CENTRUM PO) Take 1 tablet by mouth daily. No facility-administered encounter medications on file as of 2022. Lab Data:    Lab Results   Component Value Date    TRIG 105 2021    TRIG 133 2019    TRIG 110 2017     Lab Results   Component Value Date    HDL 73 (A) 2021    HDL 75 (H) 2020    HDL 78 (A) 2019     Lab Results   Component Value Date    LDLCALC 63 2021    LDLCALC 69 2020    LDLCALC 66 2019     Lab Results   Component Value Date    LABVLDL 19 2020    LABVLDL 25 2014    LABVLDL 50 2013     A1C:   Lab Results   Component Value Date    LABA1C 6.2 06/10/2022   LABS: 2019 - [facility] - , , HDL 78, LDL 66, AIC 4.9    IMAGING:   I have reviewed the following tests and documented in this encounter as follows:   Discussed with patient    EK22  Atrial fibrillation, left axis, left anterior fascicular block, nonspecific ST abnormality, HR 63    ECHO Limited: 22  Summary   Limited only f/u for LVEF. Normal left ventricular systolic function with ejection fraction of 55-60%. No regional wall motion abnormalites are seen. Compared to previous study from 2021 no changes noted in left   ventricular function. CXR 2021  Impression Mild improvement in CHF/pulmonary edema.   Continued radiographic follow-up to complete resolution recommended. Chest CT 6/3/2021   Impression Marked improvement in the appearance of the chest with near complete resolution of the multifocal pneumonia previously present. Areas of consolidation medially and anteriorly in the right upper lobe and laterally in the superior lingula could potentially be sites of residual acute infection. Moderate postinflammatory changes throughout the lungs in sites of previous acute airspace disease. Slight to mild bullous changes. No CHF or pleural effusion. Small subpleural right lower lobe pulmonary nodule which has decreased in size since the prior study. Mild cardiomegaly. Cirrhotic appearance of the liver and mild splenomegaly appear present. Echo 8/31/2021  Summary   Left ventricular systolic function is hyperdynamic with an estimated   ejection fraction of >= 65%. The left ventricle is normal in size with mild concentric hypertrophy. No obvious regional wall motion abnormalities noted. Normal left ventricular diastolic function. Mild mitral and aortic regurgitation. Mild mitral stenosis   Systolic pulmonary artery pressure (SPAP) is normal and estimated at 32 mmHg   (right atrial pressure 3 mmHg). ECHO 4/12/2017:    Summary   Left ventricular systolic function is hyperdynamic with ejection fraction   estimated at >65 %. No regional wall motion abnormalities. Grade II diastolic dysfunction with elevated filling pressure. There is moderate concentric left ventricular hypertrophy. Left ventricle size is normal.   Mildly dilated left atrium. 5/2015 CXR    Opinion: There is persistent mild middle lobe disease. The disease   is unchanged for the past one month and this may represent middle   lobe scarring. Cardiac cath: 07/14/2014  1. Mild coronary artery disease involving left anterior descending,  circumflex and right coronary arteries. 2.  Hyperdynamic left ventricle with an ejection fraction of 65%.   3.  Elevated left ventricular end-diastolic pressure, 15 mmHg. 4.  Patent renal arteries bilaterally. RECOMMENDATIONS:   The patient is noted to have mild coronary artery disease  which does not explain her shortness of breath. She, however, does have    elevated LVEDP from uncontrolled blood pressure. She needs aggressive  antihypertensive therapy for optimal blood pressure control. Will start her  on lasix for dyspnea and add losartan to her antihypertensive regimen. She    will continue the aspirin and Lipitor as well as Toprol. Echo: 4/16/15  Left ventricular size is normal .  Mild concentric left ventricular hypertrophy is present. Global ejection fraction is normal and estimated from 55 % to 60 %. No regional wall motion abnormalities are noted. Diastolic filling parameters suggests grade I diastolic dysfunction . Mild mitral regurgitation is present. Mild tricuspid regurgitation. Systolic pulmonary artery pressure (SPAP) is normal and estimated at 35   mmHg  (RA pressure 8 mm Hg). Mild pulmonic regurgitation present. Cardiac Cath: 04/05/15  1. Mild coronary artery disease involving left anterior descending,  circumflex and right coronary arteries. 2. Hyperdynamic left ventricle with an ejection fraction of 65%. 3. Elevated left ventricular end-diastolic pressure, 15 mmHg. 4. Patent renal arteries bilaterally. Assessment:  Encounter Diagnoses   Name Primary? SVT (supraventricular tachycardia) (HCC)     Essential hypertension, benign     Paroxysmal atrial fibrillation (HCC) Yes    Pure hypercholesterolemia      lipids 9.18.21 at goal continue lipitor  afib now sinus she is on beta blocker diltiazem for rate control   and on anticoagulant. Aspirin not indicated so she can stop it. BP controlled   TSH normal on 5.8.21 refill levothyroxine    Plan:  Current medications reviewed. No changes at this time. Refills given as warranted. Lab work reviewed. Lab work CBC, CMP, Fasting lipids and TSH. Follow up with me in 6 months. This note has been scribed in the presence of Dr Arturo Veronica MD, by Grzegorz Lincoln RN.   I, Dr. Arturo Veronica, personally performed the services described in this documentation, as scribed by the above signed scribe in my presence. It is both accurate and complete to my knowledge. I agree with the details independently gathered by the clinical support staff, while the remaining scribed note accurately describes my personal service to the patient. QUALITY MEASURES  1. Tobacco Cessation Counseling: Yes  2. Retake of BP if >140/90:   NA  3. Documentation to PCP/referring for new patient:  Sent to PCP at close of office visit  4. CAD patient on anti-platelet: NA  5. CAD patient on STATIN therapy:  yes  6.  Patient with CHF and aFib on anticoagulation:  yes

## 2022-11-01 NOTE — PATIENT INSTRUCTIONS
Plan:  Current medications reviewed. No changes at this time. Refills given as warranted. Lab work reviewed. Lab work CBC, CMP, Fasting lipids and TSH. Follow up with me in 6 months.

## 2022-11-29 RX ORDER — TRAZODONE HYDROCHLORIDE 50 MG/1
TABLET ORAL
Qty: 180 TABLET | Refills: 0 | Status: SHIPPED | OUTPATIENT
Start: 2022-11-29

## 2022-12-15 ENCOUNTER — TELEPHONE (OUTPATIENT)
Dept: PULMONOLOGY | Age: 77
End: 2022-12-15

## 2022-12-15 DIAGNOSIS — J84.9 ILD (INTERSTITIAL LUNG DISEASE) (HCC): Primary | ICD-10-CM

## 2022-12-15 DIAGNOSIS — J20.9 ACUTE BRONCHITIS, UNSPECIFIED ORGANISM: ICD-10-CM

## 2022-12-15 RX ORDER — AZITHROMYCIN 250 MG/1
250 TABLET, FILM COATED ORAL SEE ADMIN INSTRUCTIONS
Qty: 6 TABLET | Refills: 0 | Status: SHIPPED | OUTPATIENT
Start: 2022-12-15 | End: 2022-12-20

## 2022-12-15 NOTE — TELEPHONE ENCOUNTER
Patient call Pia Martinez having a productive cough with some slide yellow phlegm, for couple of months now,been doing her NEB, taking Mucinex,pt would like to know what  else to do.     Please advise

## 2022-12-19 ENCOUNTER — TELEPHONE (OUTPATIENT)
Dept: CARDIOLOGY CLINIC | Age: 77
End: 2022-12-19

## 2022-12-19 NOTE — TELEPHONE ENCOUNTER
Pt is requesting a refill of cyanocobalamin 1000 MCG/ML injection   Pt stated that she has a new pcp and they will not fill until he has seen her. Pts upcoming pcp new pt ov is 01/10/2023. Pt was advised that its not a medication that cardiology usually fills. Pt wanted to check with rkg. Preferred pharmacy is [facility] [address] [facility].

## 2022-12-19 NOTE — TELEPHONE ENCOUNTER
Refill Request       Last Seen: Last Seen Department: 7/15/2022  Last Seen by PCP: 7/15/2022    Last Written: 10/17    Next Appointment:   Future Appointments   Date Time Provider Krzysztof Arellanoi   1/10/2023  1:30 PM MD BETSY [facility] [place] [facility]   1/12/2023  1:00 PM MHA CT VCT [facility]Z CT [facility]   1/12/2023  2:00 PM Andrzej Lares MD AND PULM [facility]   5/2/2023  2:00 PM Irvin Villatoro MD [facility]   10/3/2023  2:00 PM Lesia Phalen, MD [facility]       Future appointment scheduled      Requested Prescriptions     Pending Prescriptions Disp Refills    cyanocobalamin 1000 MCG/ML injection 6 each 0     Sig: Inject 1 mL into the muscle every 14 days

## 2022-12-19 NOTE — TELEPHONE ENCOUNTER
I have not prescribed B12 before and dont feel comfortable starting it.   B12 is not a critical drug that cannot wait until see her PCP

## 2022-12-20 RX ORDER — CYANOCOBALAMIN 1000 UG/ML
1000 INJECTION, SOLUTION INTRAMUSCULAR; SUBCUTANEOUS
Qty: 6 EACH | Refills: 0 | Status: SHIPPED | OUTPATIENT
Start: 2022-12-20

## 2022-12-26 ENCOUNTER — HOSPITAL ENCOUNTER (EMERGENCY)
Age: 77
Discharge: HOME | End: 2022-12-27
Attending: STUDENT IN AN ORGANIZED HEALTH CARE EDUCATION/TRAINING PROGRAM
Payer: MEDICARE

## 2022-12-26 ENCOUNTER — APPOINTMENT (OUTPATIENT)
Dept: GENERAL RADIOLOGY | Age: 77
End: 2022-12-26
Payer: MEDICARE

## 2022-12-26 DIAGNOSIS — L03.115 CELLULITIS OF RIGHT LOWER EXTREMITY: ICD-10-CM

## 2022-12-26 DIAGNOSIS — L03.116 CELLULITIS OF LEFT LOWER EXTREMITY: ICD-10-CM

## 2022-12-26 DIAGNOSIS — M25.561 ACUTE PAIN OF RIGHT KNEE: Primary | ICD-10-CM

## 2022-12-26 PROCEDURE — 99284 EMERGENCY DEPT VISIT MOD MDM: CPT

## 2022-12-26 PROCEDURE — 73560 X-RAY EXAM OF KNEE 1 OR 2: CPT

## 2022-12-26 PROCEDURE — 6370000000 HC RX 637 (ALT 250 FOR IP): Performed by: STUDENT IN AN ORGANIZED HEALTH CARE EDUCATION/TRAINING PROGRAM

## 2022-12-26 RX ORDER — OXYCODONE HYDROCHLORIDE AND ACETAMINOPHEN 5; 325 MG/1; MG/1
1 TABLET ORAL ONCE
Status: COMPLETED | OUTPATIENT
Start: 2022-12-26 | End: 2022-12-26

## 2022-12-26 RX ORDER — DOXYCYCLINE HYCLATE 100 MG
100 TABLET ORAL 2 TIMES DAILY
Qty: 14 TABLET | Refills: 0 | Status: SHIPPED | OUTPATIENT
Start: 2022-12-26 | End: 2023-01-02

## 2022-12-26 RX ORDER — DOXYCYCLINE HYCLATE 100 MG
100 TABLET ORAL ONCE
Status: COMPLETED | OUTPATIENT
Start: 2022-12-26 | End: 2022-12-26

## 2022-12-26 RX ORDER — OXYCODONE HYDROCHLORIDE 5 MG/1
5 TABLET ORAL ONCE
Status: DISCONTINUED | OUTPATIENT
Start: 2022-12-26 | End: 2022-12-26

## 2022-12-26 RX ADMIN — OXYCODONE AND ACETAMINOPHEN 1 TABLET: 5; 325 TABLET ORAL at 23:00

## 2022-12-26 RX ADMIN — DOXYCYCLINE HYCLATE 100 MG: 100 TABLET, COATED ORAL at 22:55

## 2022-12-26 ASSESSMENT — ENCOUNTER SYMPTOMS
ABDOMINAL PAIN: 0
SINUS PAIN: 0
BLOOD IN STOOL: 0
CONSTIPATION: 0
COUGH: 0
DIARRHEA: 0
SHORTNESS OF BREATH: 0
NAUSEA: 0
VOMITING: 0
COLOR CHANGE: 1
SORE THROAT: 0

## 2022-12-27 VITALS
TEMPERATURE: 97.8 F | OXYGEN SATURATION: 95 % | SYSTOLIC BLOOD PRESSURE: 113 MMHG | HEART RATE: 85 BPM | RESPIRATION RATE: 16 BRPM | DIASTOLIC BLOOD PRESSURE: 52 MMHG

## 2022-12-27 ASSESSMENT — PAIN SCALES - GENERAL: PAINLEVEL_OUTOF10: 3

## 2022-12-27 NOTE — ED NOTES
Patient left via squad to private residence in stable condition. Patients vitals were assessed before discharge and were stable. Patient verbalized understanding of discharge instructions, follow up and medications. RN explained information in discharge packet and patient verbalized they have no questions at this time. Patient left ER with all paperwork and belongings that RN is aware of.         Marcos Villarreal RN  12/27/22 [id number]

## 2022-12-27 NOTE — ED PROVIDER NOTES
ATTENDING PHYSICIAN NOTE       Date of evaluation: 12/26/2022    Chief Complaint     Knee Pain (Right knee swelling and pain, left leg redness)      History of Present Illness     Gracia Sinha is a 69 y.o. female who presents right knee pain and bilateral lower extremity redness. Reports that redness has been ongoing for the last month with no improvement. She has not been placed on antibiotics. Reports that earlier tonight she was getting up from a seated position when she felt her right knee lock and give out. She denies any significant injury. Since then she has had increasing pain and difficulty ambulating. Patient reports history of bilateral knee replacements. Denies any fever, cough, nausea, vomiting, chest pain, shortness of breath, abdominal pain, changes in bowel or bladder, syncope, or unilateral weakness. Patient has history of diabetes. Review of Systems     Review of Systems   Constitutional:  Negative for appetite change, chills and fever. HENT:  Negative for congestion, sinus pain and sore throat. Eyes:  Negative for visual disturbance. Respiratory:  Negative for cough and shortness of breath. Cardiovascular:  Positive for leg swelling. Negative for palpitations. Gastrointestinal:  Negative for abdominal pain, blood in stool, constipation, diarrhea, nausea and vomiting. Genitourinary:  Negative for dysuria, flank pain and hematuria. Musculoskeletal:  Positive for arthralgias, gait problem and joint swelling. Negative for myalgias and neck pain. Skin:  Positive for color change. Negative for wound. Neurological:  Negative for dizziness, syncope and headaches. Psychiatric/Behavioral:  Negative for confusion.       Past Medical, Surgical, Family, and Social History     She has a past medical history of Allergic, Anemia, Anesthesia complication, Arthritis, Asthma, Atrial fibrillation ([facility] Cirrhosis ([facility] Cirrhosis, non-alcoholic ([facility] COPD (chronic obstructive pulmonary disease) [facility] GERD (gastroesophageal reflux disease), GIB (gastrointestinal bleeding), Haemophilus infection, Heart murmur, Hernia, Hyperlipidemia, Hypertension, Lung collapse, Murmur, heart, Paroxysmal atrial fibrillation (HCC), Pneumonia, Reflux, Rheumatic fever, Stress fracture, SVT (supraventricular tachycardia) ([facility] TIA (transient ischemic attack), Type II or unspecified type diabetes mellitus without mention of complication, not stated as uncontrolled, 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 She has a past surgical history that includes hernia repair; Hysterectomy; joint replacement (Bilateral); Foot surgery (Right, 06/03/2013); knee surgery (09); knee surgery (2010); Cardiac catheterization (07/14/14); Cataract removal; bone marrow biopsy; Breast biopsy; fine needle aspiration; Colonoscopy; Upper gastrointestinal endoscopy (N/A, 7/21/2018); bronchoscopy (N/A, 5/7/2021); bronchoscopy (5/7/2021); bronchoscopy (5/7/2021); Colonoscopy (N/A, 6/10/2021); Upper gastrointestinal endoscopy (N/A, 6/10/2021); Upper gastrointestinal endoscopy (N/A, 6/10/2021); and Upper gastrointestinal endoscopy (N/A, 6/6/2022). Her family history includes Colon Cancer in her mother; Diabetes in her father; Kidney Disease in her paternal grandmother; Lupus in her daughter and another family member; Other in her mother. She reports that she quit smoking about 22 years ago. Her smoking use included cigarettes. She started smoking about 66 years ago. She has a 40.00 pack-year smoking history. She has never used smokeless tobacco. She reports that she does not drink alcohol and does not use drugs. Medications     Previous Medications    ACLIDINIUM (TUDORZA PRESSAIR) 400 MCG/ACT AEPB INHALER    Inhale 1 puff into the lungs 2 times daily    APIXABAN (ELIQUIS) 5 MG TABS TABLET    Take 1 tablet by mouth in the morning and 1 tablet before bedtime.     ATORVASTATIN (LIPITOR) 20 MG TABLET    TAKE 1 TABLET NIGHTLY    BECLOMETHASONE (QVAR REDIHALER) 80 MCG/ACT AERB INHALER    Inhale 1 puff into the lungs 2 times daily    BENZONATATE (TESSALON) 100 MG CAPSULE    TAKE 1 CAPSULE THREE TIMES A DAY AS NEEDED FOR COUGH    CALCIUM CARBONATE 600 MG TABS TABLET    Take 1 tablet by mouth daily     CARVEDILOL (COREG) 6.25 MG TABLET    Take 1 tablet by mouth in the morning and 1 tablet in the evening. Take with meals. CETIRIZINE (ZYRTEC) 10 MG TABLET    TAKE ONE TABLET BY MOUTH DAILY    CHOLECALCIFEROL (VITAMIN D) 50 MCG (2000 UT) CAPS CAPSULE    Take by mouth    CYANOCOBALAMIN 1000 MCG/ML INJECTION    Inject 1 mL into the muscle every 14 days    DILTIAZEM (TIAZAC) 240 MG EXTENDED RELEASE CAPSULE    TAKE 1 CAPSULE DAILY    DIPHENHYDRAMINE (BENADRYL ALLERGY) 25 MG CAPSULE    Take 25 mg by mouth every 6 hours as needed for Itching    DOXEPIN (SINEQUAN) 50 MG CAPSULE    Take 50 mg by mouth nightly    ESTROGENS, CONJUGATED, (PREMARIN) 1.25 MG TABLET    Take 1.25 mg by mouth daily. FERROUS SULFATE 325 (65 FE) MG TABLET    Take 325 mg by mouth 3 times daily (with meals)    FUROSEMIDE (LASIX) 20 MG TABLET    TAKE 1 TABLET DAILY    GABAPENTIN (NEURONTIN) 100 MG CAPSULE    Take 1 capsule by mouth 3 times daily for 30 days. GABAPENTIN (NEURONTIN) 600 MG TABLET    Take 1 tablet by mouth at bedtime for 30 days. IPRATROPIUM (ATROVENT) 0.02 % NEBULIZER SOLUTION    Take 2.5 mLs by nebulization in the morning and 2.5 mLs at noon and 2.5 mLs in the evening and 2.5 mLs before bedtime.     KLOR-CON M20 20 MEQ EXTENDED RELEASE TABLET    TAKE 1 TABLET DAILY    LEVALBUTEROL (XOPENEX HFA) 45 MCG/ACT INHALER    Inhale 2 puffs into the lungs every 6 hours as needed for Shortness of Breath    LEVOTHYROXINE (SYNTHROID) 100 MCG TABLET    Take 1 tablet by mouth Daily    LOSARTAN (COZAAR) 100 MG TABLET    TAKE 1 TABLET DAILY    METFORMIN (GLUCOPHAGE-XR) 500 MG EXTENDED RELEASE TABLET    TAKE 2 TABLETS TWICE A DAY    MONTELUKAST (SINGULAIR) 10 MG TABLET    Take 1 tablet by mouth daily    MULTIPLE VITAMINS-MINERALS (CENTRUM PO)    Take 1 tablet by mouth daily. PANTOPRAZOLE (PROTONIX) 40 MG TABLET    TAKE 1 TABLET TWICE A DAY    ROPINIROLE (REQUIP) 1 MG TABLET    Take 1 tablet by mouth 3 times daily    SITAGLIPTIN (JANUVIA) 100 MG TABLET    Take 1 tablet by mouth daily    TAMSULOSIN (FLOMAX) 0.4 MG CAPSULE    Take 1 capsule by mouth daily    TRAZODONE (DESYREL) 50 MG TABLET    TAKE 2 TABLETS NIGHTLY    URSODIOL (ACTIGALL) 300 MG CAPSULE    Take 300 mg by mouth in the morning and 300 mg before bedtime. VENLAFAXINE (EFFEXOR XR) 37.5 MG EXTENDED RELEASE CAPSULE    Take 1 capsule by mouth daily    VENLAFAXINE (EFFEXOR XR) 37.5 MG EXTENDED RELEASE CAPSULE    Take 1 capsule by mouth daily    VITAMIN C (ASCORBIC ACID) 500 MG TABLET    Take 500 mg by mouth in the morning. Allergies     She is allergic to latex, clindamycin, pennsaid [diclofenac sodium], torsemide, actos [pioglitazone], amoxicillin, augmentin [amoxicillin-pot clavulanate], bactrim [sulfamethoxazole-trimethoprim], ciprofloxacin, doxycycline, levaquin [levofloxacin], ativan [lorazepam], cephalosporins, pcn [penicillins], and proventil [albuterol]. Physical Exam     INITIAL VITALS: BP: (!) 180/84, Temp: 97.8 °F (36.6 °C), Heart Rate: 85, Resp: 16, SpO2: 95 %   Physical Exam  Vitals and nursing note reviewed. Constitutional:       General: She is not in acute distress. HENT:      Head: Normocephalic and atraumatic. Right Ear: External ear normal.      Left Ear: External ear normal.      Nose: Nose normal. No congestion or rhinorrhea. Mouth/Throat:      Pharynx: Oropharynx is clear. No oropharyngeal exudate or posterior oropharyngeal erythema. Eyes:      Extraocular Movements: Extraocular movements intact. Conjunctiva/sclera: Conjunctivae normal.   Cardiovascular:      Rate and Rhythm: Normal rate and regular rhythm.       Pulses: Normal pulses. Heart sounds: Normal heart sounds. Pulmonary:      Effort: Pulmonary effort is normal.      Breath sounds: Normal breath sounds. No wheezing or rhonchi. Abdominal:      General: There is no distension. Palpations: Abdomen is soft. Tenderness: There is no abdominal tenderness. There is no right CVA tenderness, left CVA tenderness, guarding or rebound. Musculoskeletal:         General: No deformity or signs of injury. Cervical back: Normal range of motion and neck supple. Comments: Right knee exam with mild medial joint line tenderness with mild effusion, range of motion intact. She has crepitus with knee extension and flexion, no pain with Shirley or Lachman. Skin:     General: Skin is warm. Capillary Refill: Capillary refill takes less than 2 seconds. Comments: Bilateral erythema and tenderness involving anterior aspect roughly shin   Neurological:      General: No focal deficit present. Mental Status: She is alert. Mental status is at baseline. Cranial Nerves: No cranial nerve deficit. Sensory: No sensory deficit. Psychiatric:         Mood and Affect: Mood normal.         Behavior: Behavior normal.       Diagnostic Results     RADIOLOGY:  XR KNEE RIGHT (1-2 VIEWS)   Final Result   Joint effusion. Heterotopic ossification along the lateral femoral condyle             LABS:   No results found for this visit on 12/26/22. ED BEDSIDE ULTRASOUND:  No results found. RECENT VITALS:  BP: (!) 180/84,Temp: 97.8 °F (36.6 °C), Heart Rate: 85, Resp: 16, SpO2: 95 %     Procedures         ED Course     Nursing Notes, Past Medical Hx, Past Surgical Hx, Social Hx,Allergies, and Family Hx were reviewed.          patient was given the following medications:  Orders Placed This Encounter   Medications    DISCONTD: oxyCODONE (ROXICODONE) immediate release tablet 5 mg    doxycycline hyclate (VIBRA-TABS) tablet 100 mg     Order Specific Question:   Antimicrobial Indications     Answer:   Skin and Soft Tissue Infection    oxyCODONE-acetaminophen (PERCOCET) 5-325 MG per tablet 1 tablet    doxycycline hyclate (VIBRA-TABS) 100 MG tablet     Sig: Take 1 tablet by mouth 2 times daily for 7 days     Dispense:  14 tablet     Refill:  0       CONSULTS:  None    MEDICAL DECISIONMAKING / ASSESSMENT / Zak  is a 69 y.o. female left leg redness and knee pain. Patient presented hypertensive, afebrile, heart rate of 85, respiratory rate of 16 and satting at 95% on room air. Please see exam above. Given history and exam I feel that bilateral lower erythema consistent with cellulitis. Bedside ultrasound showed cobblestoning with no drainable abscess. There is no crepitus or significant injury to suggest underlying necrotizing fasciitis or gangrene. She has no fever or systemic symptoms to suggest underlying sepsis therefore I did not obtain labs. I also performed a bedside ultrasound of her right knee which showed minimal effusion and no drainable fluid collection. I have low suspicion for septic joint at this time with no overlying redness and she is able to ambulate and range her knee. I feel that symptoms likely secondary to underlying degenerative disease. I did obtain an x-ray as noted below    Interpreted the imaging and note  X-ray of the right knee with total knee arthroplasty. There are ossicles at the lateral aspect of the femoral epicondyle which are new. There is a joint effusion. No lytic or destructive lesions. No acute fracture    Again presentation appears secondary to underlying cellulitis. She received first dose of doxycycline here in the emergency department. Recommend that she follows up with her on-call orthopedic specialist in the next week regarding her right knee pain. She is on her baseline oxygen nasal cannula supplementation. She denies additional symptoms. Given this I felt patient stable for discharge home.   Patient is amenable with plan. Clinical Impression     1. Acute pain of right knee    2. Cellulitis of left lower extremity    3.  Cellulitis of right lower extremity        Disposition     PATIENT REFERRED TO:  Elena Bailey MD  [facility] [id number]  [phone]    Go in 2 days      DISCHARGE MEDICATIONS:  New Prescriptions    DOXYCYCLINE HYCLATE (VIBRA-TABS) 100 MG TABLET    Take 1 tablet by mouth 2 times daily for 7 days       DISPOSITION Discharge - Pending Orders Complete 12/26/2022 11:12:37 PM          Vilma Murphy MD  12/26/22 [id number]

## 2022-12-29 DIAGNOSIS — E11.42 TYPE 2 DIABETES MELLITUS WITH DIABETIC POLYNEUROPATHY, WITHOUT LONG-TERM CURRENT USE OF INSULIN (HCC): ICD-10-CM

## 2022-12-29 RX ORDER — METFORMIN HYDROCHLORIDE 500 MG/1
TABLET, EXTENDED RELEASE ORAL
Qty: 360 TABLET | Refills: 3 | OUTPATIENT
Start: 2022-12-29

## 2023-01-04 ENCOUNTER — TELEPHONE (OUTPATIENT)
Dept: PULMONOLOGY | Age: 78
End: 2023-01-04

## 2023-01-04 NOTE — TELEPHONE ENCOUNTER
Patient is still wanting to come to appointment but states she is unable to do the CT. Please advise.  Thanks

## 2023-01-05 ENCOUNTER — TELEPHONE (OUTPATIENT)
Dept: DERMATOLOGY | Age: 78
End: 2023-01-05

## 2023-01-05 DIAGNOSIS — E78.2 MIXED HYPERLIPIDEMIA: ICD-10-CM

## 2023-01-05 DIAGNOSIS — I48.0 PAROXYSMAL ATRIAL FIBRILLATION (HCC): ICD-10-CM

## 2023-01-05 DIAGNOSIS — I10 ESSENTIAL HYPERTENSION, BENIGN: ICD-10-CM

## 2023-01-05 RX ORDER — LOSARTAN POTASSIUM 100 MG/1
TABLET ORAL
Qty: 90 TABLET | Refills: 3 | Status: SHIPPED | OUTPATIENT
Start: 2023-01-05

## 2023-01-05 RX ORDER — PANTOPRAZOLE SODIUM 40 MG/1
TABLET, DELAYED RELEASE ORAL
Qty: 180 TABLET | Refills: 3 | Status: SHIPPED | OUTPATIENT
Start: 2023-01-05

## 2023-01-05 RX ORDER — ATORVASTATIN CALCIUM 20 MG/1
20 TABLET, FILM COATED ORAL NIGHTLY
Qty: 90 TABLET | Refills: 3 | Status: SHIPPED | OUTPATIENT
Start: 2023-01-05

## 2023-01-05 RX ORDER — FUROSEMIDE 20 MG/1
TABLET ORAL
Qty: 90 TABLET | Refills: 3 | Status: SHIPPED | OUTPATIENT
Start: 2023-01-05

## 2023-01-05 RX ORDER — POTASSIUM CHLORIDE 20 MEQ/1
TABLET, EXTENDED RELEASE ORAL
Qty: 90 TABLET | Refills: 3 | Status: SHIPPED | OUTPATIENT
Start: 2023-01-05

## 2023-01-05 RX ORDER — CARVEDILOL 6.25 MG/1
6.25 TABLET ORAL 2 TIMES DAILY WITH MEALS
Qty: 180 TABLET | Refills: 3 | Status: SHIPPED | OUTPATIENT
Start: 2023-01-05

## 2023-01-05 RX ORDER — DILTIAZEM HYDROCHLORIDE 240 MG/1
CAPSULE, EXTENDED RELEASE ORAL
Qty: 90 CAPSULE | Refills: 3 | Status: SHIPPED | OUTPATIENT
Start: 2023-01-05

## 2023-01-05 NOTE — TELEPHONE ENCOUNTER
[phone]. Patient no longer has [facility] for home pharmacy delivery.  She now has [facility].     [phone]  Fax# [phone]      Please advise, thank you!!

## 2023-01-17 ENCOUNTER — OFFICE VISIT (OUTPATIENT)
Dept: FAMILY MEDICINE CLINIC | Age: 78
End: 2023-01-17
Payer: MEDICARE

## 2023-01-17 VITALS
HEIGHT: 66 IN | SYSTOLIC BLOOD PRESSURE: 132 MMHG | WEIGHT: 207 LBS | OXYGEN SATURATION: 98 % | DIASTOLIC BLOOD PRESSURE: 58 MMHG | BODY MASS INDEX: 33.27 KG/M2 | HEART RATE: 78 BPM

## 2023-01-17 DIAGNOSIS — K74.60 LIVER CIRRHOSIS SECONDARY TO NASH (HCC): ICD-10-CM

## 2023-01-17 DIAGNOSIS — I25.10 CORONARY ARTERY DISEASE INVOLVING NATIVE HEART WITHOUT ANGINA PECTORIS, UNSPECIFIED VESSEL OR LESION TYPE: ICD-10-CM

## 2023-01-17 DIAGNOSIS — I85.00 ESOPHAGEAL VARICES WITHOUT BLEEDING, UNSPECIFIED ESOPHAGEAL VARICES TYPE (HCC): ICD-10-CM

## 2023-01-17 DIAGNOSIS — J44.9 CHRONIC OBSTRUCTIVE PULMONARY DISEASE, UNSPECIFIED COPD TYPE (HCC): ICD-10-CM

## 2023-01-17 DIAGNOSIS — E11.42 TYPE 2 DIABETES MELLITUS WITH DIABETIC POLYNEUROPATHY, WITHOUT LONG-TERM CURRENT USE OF INSULIN (HCC): Primary | ICD-10-CM

## 2023-01-17 DIAGNOSIS — I48.0 PAROXYSMAL ATRIAL FIBRILLATION (HCC): ICD-10-CM

## 2023-01-17 DIAGNOSIS — I10 ESSENTIAL HYPERTENSION, BENIGN: ICD-10-CM

## 2023-01-17 DIAGNOSIS — E78.00 PURE HYPERCHOLESTEROLEMIA: ICD-10-CM

## 2023-01-17 DIAGNOSIS — K75.81 LIVER CIRRHOSIS SECONDARY TO NASH (HCC): ICD-10-CM

## 2023-01-17 DIAGNOSIS — G25.81 RESTLESS LEG SYNDROME: ICD-10-CM

## 2023-01-17 DIAGNOSIS — I47.1 SVT (SUPRAVENTRICULAR TACHYCARDIA) (HCC): ICD-10-CM

## 2023-01-17 DIAGNOSIS — K21.9 GASTROESOPHAGEAL REFLUX DISEASE, UNSPECIFIED WHETHER ESOPHAGITIS PRESENT: ICD-10-CM

## 2023-01-17 PROCEDURE — 3078F DIAST BP <80 MM HG: CPT | Performed by: STUDENT IN AN ORGANIZED HEALTH CARE EDUCATION/TRAINING PROGRAM

## 2023-01-17 PROCEDURE — G8484 FLU IMMUNIZE NO ADMIN: HCPCS | Performed by: STUDENT IN AN ORGANIZED HEALTH CARE EDUCATION/TRAINING PROGRAM

## 2023-01-17 PROCEDURE — G8427 DOCREV CUR MEDS BY ELIG CLIN: HCPCS | Performed by: STUDENT IN AN ORGANIZED HEALTH CARE EDUCATION/TRAINING PROGRAM

## 2023-01-17 PROCEDURE — G8417 CALC BMI ABV UP PARAM F/U: HCPCS | Performed by: STUDENT IN AN ORGANIZED HEALTH CARE EDUCATION/TRAINING PROGRAM

## 2023-01-17 PROCEDURE — 1123F ACP DISCUSS/DSCN MKR DOCD: CPT | Performed by: STUDENT IN AN ORGANIZED HEALTH CARE EDUCATION/TRAINING PROGRAM

## 2023-01-17 PROCEDURE — 3075F SYST BP GE 130 - 139MM HG: CPT | Performed by: STUDENT IN AN ORGANIZED HEALTH CARE EDUCATION/TRAINING PROGRAM

## 2023-01-17 PROCEDURE — G8399 PT W/DXA RESULTS DOCUMENT: HCPCS | Performed by: STUDENT IN AN ORGANIZED HEALTH CARE EDUCATION/TRAINING PROGRAM

## 2023-01-17 PROCEDURE — 1090F PRES/ABSN URINE INCON ASSESS: CPT | Performed by: STUDENT IN AN ORGANIZED HEALTH CARE EDUCATION/TRAINING PROGRAM

## 2023-01-17 PROCEDURE — 99214 OFFICE O/P EST MOD 30 MIN: CPT | Performed by: STUDENT IN AN ORGANIZED HEALTH CARE EDUCATION/TRAINING PROGRAM

## 2023-01-17 PROCEDURE — 1036F TOBACCO NON-USER: CPT | Performed by: STUDENT IN AN ORGANIZED HEALTH CARE EDUCATION/TRAINING PROGRAM

## 2023-01-17 PROCEDURE — 3023F SPIROM DOC REV: CPT | Performed by: STUDENT IN AN ORGANIZED HEALTH CARE EDUCATION/TRAINING PROGRAM

## 2023-01-17 RX ORDER — FAMOTIDINE 20 MG/1
20 TABLET, FILM COATED ORAL DAILY
COMMUNITY
Start: 2022-12-09 | End: 2023-01-17 | Stop reason: SDUPTHER

## 2023-01-17 RX ORDER — OXYCODONE HYDROCHLORIDE AND ACETAMINOPHEN 5; 325 MG/1; MG/1
TABLET ORAL
COMMUNITY
Start: 2023-01-07

## 2023-01-17 RX ORDER — FAMOTIDINE 20 MG/1
20 TABLET, FILM COATED ORAL DAILY
Qty: 30 TABLET | Refills: 11 | Status: SHIPPED | OUTPATIENT
Start: 2023-01-17

## 2023-01-17 RX ORDER — URSODIOL 500 MG/1
500 TABLET, FILM COATED ORAL 2 TIMES DAILY
COMMUNITY
Start: 2022-11-30 | End: 2023-01-17

## 2023-01-17 ASSESSMENT — PATIENT HEALTH QUESTIONNAIRE - PHQ9
SUM OF ALL RESPONSES TO PHQ QUESTIONS 1-9: 2
1. LITTLE INTEREST OR PLEASURE IN DOING THINGS: 1
SUM OF ALL RESPONSES TO PHQ QUESTIONS 1-9: 2
SUM OF ALL RESPONSES TO PHQ QUESTIONS 1-9: 2
SUM OF ALL RESPONSES TO PHQ9 QUESTIONS 1 & 2: 2
2. FEELING DOWN, DEPRESSED OR HOPELESS: 1
SUM OF ALL RESPONSES TO PHQ QUESTIONS 1-9: 2

## 2023-01-17 NOTE — PROGRESS NOTES
[place] [facility]  Establish care visit   2023    Marshall Razo (:  1945) is a 69 y.o. female, here to establish care. Chief Complaint   Patient presents with    Establish Care        ASSESSMENT/ PLAN  1. Type 2 diabetes mellitus with diabetic polyneuropathy, without long-term current use of insulin (HCC)  A1c is stable and controlled. Patient will continue her medications including metformin and Jardiance. - Hemoglobin A1C; Future    2. Essential hypertension, benign  3. Paroxysmal atrial fibrillation (HCC)  4. Coronary artery disease involving native heart without angina pectoris, unspecified vessel or lesion type  5. SVT (supraventricular tachycardia) ([facility]  6. Pure hypercholesterolemia  Patient follows with cardiologist every 6 months for the above concerns. Patient reports that the above concerns are stable. Patient will continue her medications including: apixaban, atorvastatin, carvedilol, diltiazem, furosemide, losartan. Patient last saw her cardiologist last month per her report, will follow-up 5 months from now. 7. Chronic obstructive pulmonary disease, unspecified COPD type [facility])  Patient follows with a pulmonologist every 6 months for the above concerns. Patient reports that her COPD is stable. Patient will continue her medications including: Babara Pillar, Qvar, Tessalon, ipratropium, levalbuterol, montelukast.  Patient last saw her pulmonologist last month per her report, will follow-up 5 months from now. 8. 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 Esophageal varices without bleeding, unspecified esophageal varices type ([facility]  10. Gastroesophageal reflux disease, unspecified whether esophagitis present  Patient follows with a gastroenterologist for the above concerns. Patient reports that the above concerns are stable. Patient received last lab work within the past 12 months. Patient will continue her medications including Prilosec.   Patient will follow-up with her gastroenterologist as needed for the above concerns. 11. Restless leg syndrome  Stable. Patient will continue her Requip. Patient does not need refill at this time. No follow-ups on file. HPI  Patient is a 69-year-old female with a complex past medical history including type 2 diabetes, essential hypertension, paroxysmal atrial fibrillation, CAD, SVT, HLD, COPD, liver cirrhosis due to SNOW, esophageal varices, GERD, restless leg syndrome, who presents to [facility] care. Patient reports that her previous PCP had moved away and now she needs a new primary care provider. In regard to her essential hypertension, paroxysmal atrial fibrillation, CAD, SVT, HLD, the patient follows with a cardiologist, Dr. Jessica Kurtz. She reports that he fills her medications such as apixaban, atorvastatin, carvedilol, diltiazem, furosemide, losartan. She sees him approximately every 6 months, and has seen him within the past month. In regard to her COPD, the patient follows with a pulmonologist, Dr. Gely Guevara. Patient reports that he prescribes her Vinod Sebastián, Qvar, Tessalon, ipratropium, lev albuterol, montelukast.  He also patient sees him approximately every 6 months as well, having seen him recently. Patient specifically has a history of having an extended hospitalization approximately 1 decade ago, with severe pneumonia and had to be on oxygen since that time. In regard to her type 2 diabetes, the patient last had her A1c checked approximately 6 months ago and would like to checked again at this time. Patient is on a statin, Januvia 100 mg daily, metformin extended release maximum dose. She does not measure her blood sugar regularly at home. In regard to her GERD, the patient is on Protonix 40 mg daily. She reports that her symptoms are stable and good. In regard to her restless leg syndrome, the patient is on Requip 1 mg tablet 3 times daily. She reports that her symptoms are stable.     Patient also reports that she follows up with an orthopedist for recent musculoskeletal complaints. She also follows up with the dermatologist for regular skin checks. She also follows up with a gastroenterologist for her history of Illa Neigh and liver cirrhosis in addition to esophageal varices. She takes trazodone for sleep and depression in addition to venlafaxine for depression. She reports that the symptoms are stable, although she has difficulty sleeping. ROS  Review of Systems   All other systems reviewed and are negative. HISTORIES  Current Outpatient Medications on File Prior to Visit   Medication Sig Dispense Refill    apixaban (ELIQUIS) 5 MG TABS tablet Take 1 tablet by mouth 2 times daily 180 tablet 3    atorvastatin (LIPITOR) 20 MG tablet Take 1 tablet by mouth at bedtime 90 tablet 3    carvedilol (COREG) 6.25 MG tablet Take 1 tablet by mouth 2 times daily (with meals) 180 tablet 3    dilTIAZem (TIAZAC) 240 MG extended release capsule TAKE 1 CAPSULE DAILY 90 capsule 3    furosemide (LASIX) 20 MG tablet TAKE 1 TABLET DAILY 90 tablet 3    potassium chloride (KLOR-CON M20) 20 MEQ extended release tablet TAKE 1 TABLET DAILY 90 tablet 3    losartan (COZAAR) 100 MG tablet TAKE 1 TABLET DAILY 90 tablet 3    pantoprazole (PROTONIX) 40 MG tablet Take 1 tablet by mouth twice daily 180 tablet 3    cyanocobalamin 1000 MCG/ML injection Inject 1 mL into the muscle every 14 days 6 each 0    traZODone (DESYREL) 50 MG tablet TAKE 2 TABLETS NIGHTLY 180 tablet 0    diphenhydrAMINE (BENADRYL) 25 MG capsule Take 25 mg by mouth every 6 hours as needed for Itching      doxepin (SINEQUAN) 50 MG capsule Take 50 mg by mouth nightly      rOPINIRole (REQUIP) 1 MG tablet Take 1 tablet by mouth 3 times daily 90 tablet 3    gabapentin (NEURONTIN) 600 MG tablet Take 1 tablet by mouth at bedtime for 30 days. 30 tablet 0    gabapentin (NEURONTIN) 100 MG capsule Take 1 capsule by mouth 3 times daily for 30 days.  90 capsule 0    venlafaxine (EFFEXOR XR) 37.5 MG extended release capsule Take 1 capsule by mouth daily 90 capsule 3    venlafaxine (EFFEXOR XR) 37.5 MG extended release capsule Take 1 capsule by mouth daily 30 capsule 5    ipratropium (ATROVENT) 0.02 % nebulizer solution Take 2.5 mLs by nebulization in the morning and 2.5 mLs at noon and 2.5 mLs in the evening and 2.5 mLs before bedtime. 300 mL 0    ursodiol (ACTIGALL) 300 MG capsule Take 300 mg by mouth in the morning and 300 mg before bedtime. vitamin C (ASCORBIC ACID) 500 MG tablet Take 500 mg by mouth in the morning. Cholecalciferol (VITAMIN D) 50 MCG (2000 UT) CAPS capsule Take by mouth      levothyroxine (SYNTHROID) 100 MCG tablet Take 1 tablet by mouth Daily 90 tablet 3    aclidinium (TUDORZA PRESSAIR) 400 MCG/ACT AEPB inhaler Inhale 1 puff into the lungs 2 times daily 3 each 1    beclomethasone (QVAR REDIHALER) 80 MCG/ACT AERB inhaler Inhale 1 puff into the lungs 2 times daily 3 each 1    tamsulosin (FLOMAX) 0.4 MG capsule Take 1 capsule by mouth daily 30 capsule 0    montelukast (SINGULAIR) 10 MG tablet Take 1 tablet by mouth daily 90 tablet 3    cetirizine (ZYRTEC) 10 MG tablet TAKE ONE TABLET BY MOUTH DAILY 90 tablet 2    SITagliptin (JANUVIA) 100 MG tablet Take 1 tablet by mouth daily 90 tablet 3    metFORMIN (GLUCOPHAGE-XR) 500 MG extended release tablet TAKE 2 TABLETS TWICE A  tablet 3    benzonatate (TESSALON) 100 MG capsule TAKE 1 CAPSULE THREE TIMES A DAY AS NEEDED FOR COUGH 270 capsule 1    levalbuterol (XOPENEX HFA) 45 MCG/ACT inhaler Inhale 2 puffs into the lungs every 6 hours as needed for Shortness of Breath 3 Inhaler 4    ferrous sulfate 325 (65 Fe) MG tablet Take 325 mg by mouth 3 times daily (with meals)      calcium carbonate 600 MG TABS tablet Take 1 tablet by mouth daily       Multiple Vitamins-Minerals (CENTRUM PO) Take 1 tablet by mouth daily. estrogens, conjugated, (PREMARIN) 1.25 MG tablet Take 1.25 mg by mouth daily. oxyCODONE-acetaminophen (PERCOCET) 5-325 MG per tablet        No current facility-administered medications on file prior to visit.         Allergies   Allergen Reactions    Latex Swelling and Rash    Clindamycin Itching    Pennsaid [Diclofenac Sodium] Itching and Rash    Torsemide Itching    Actos [Pioglitazone] Diarrhea    Amoxicillin Other (See Comments)    Augmentin [Amoxicillin-Pot Clavulanate] Other (See Comments)    Bactrim [Sulfamethoxazole-Trimethoprim] Other (See Comments)     Stomach ache     Ciprofloxacin Other (See Comments)     Makes her weird  Makes anxious and she has weird dreams    Doxycycline Other (See Comments)     Feels like she is smothering, chest tightness    Levaquin [Levofloxacin]      Body aches    Ativan [Lorazepam] Other (See Comments) and Anxiety     And confusion  Had weird dreams and hallucinations    Cephalosporins Rash    Pcn [Penicillins] Rash and Itching    Proventil [Albuterol] Anxiety       Past Medical History:   Diagnosis Date    Allergic     Anemia     Anesthesia complication     \"woke up during surgery\"    Arthritis     Osteoarthritis of bilateral CMC joints    Asthma     Followed by Dr. Torri Peterson    Atrial fibrillation ([facility]     Cirrhosis ([address]     non alcoholic    Cirrhosis, non-alcoholic ([address]     COPD (chronic obstructive pulmonary disease) ([address]     GERD (gastroesophageal reflux disease)     GIB (gastrointestinal bleeding)     LGIB 4/2016    Haemophilus infection 05/07/2021    + resp    Heart murmur     Hernia     Hyperlipidemia     Hypertension     Lung collapse     Murmur, heart     Paroxysmal atrial fibrillation ([address] 05/2022    Pneumonia     Reflux     Rheumatic fever     Stress fracture     right foot    SVT (supraventricular tachycardia) ([address]     6/19/16 - admitted X 1 day per PT     TIA (transient ischemic attack) 2015    Type II or unspecified type diabetes mellitus without mention of complication, not stated as uncontrolled     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 [facility])     Wears glasses     as needed    Wears partial dentures     upper partial       Patient Active Problem List   Diagnosis    Hyperlipidemia    S/P knee replacement    SVT (supraventricular tachycardia) (HCC)    GERD (gastroesophageal reflux disease)    COPD (chronic obstructive pulmonary disease) (HCC)    Anemia    Pernicious anemia    Primary osteoarthritis involving multiple joints    Vitamin D deficiency    Esophageal varices without bleeding (HCC)    Restless leg syndrome    Acute hyponatremia    Pulmonary infiltrates    CAD (coronary artery disease)    Essential hypertension, benign    Hypothyroidism due to acquired atrophy of thyroid    Insomnia    Osteoporosis    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 ([facility]    Obesity    Type 2 diabetes mellitus with diabetic polyneuropathy, without long-term current use of insulin ([facility]    A-fib (HCC)    Discitis    FTT (failure to thrive) in adult       Past Surgical History:   Procedure Laterality Date    BONE MARROW BIOPSY      BREAST BIOPSY      BRONCHOSCOPY N/A 5/7/2021    BRONCHOSCOPY DIAGNOSTIC OR CELL [address] ONLY performed by Margarita Jaimes MD at [facility]  5/7/2021    BRONCHOSCOPY ALVEOLAR LAVAGE performed by Margarita Jaimes MD at [facility]  5/7/2021    BRONCHOSCOPY THERAPUTIC ASPIRATION INITIAL performed by Margarita Jaimes MD at [place] [address]  07/14/14    CATARACT REMOVAL      COLONOSCOPY      X 3 per PT 6/8/16    COLONOSCOPY N/A 6/10/2021    COLONOSCOPY POLYPECTOMY ABLATION performed by Pj Jimenes MD at [address] [place] [address] 06/03/2013    CORAL BUNIONECTOMY RIGHT FOOT WITH SCREW FIXATION;    HERNIA REPAIR      HYSTERECTOMY (CERVIX STATUS UNKNOWN)      JOINT REPLACEMENT Bilateral     TKR    KNEE SURGERY  09    left    KNEE SURGERY  2010    right    UPPER GASTROINTESTINAL ENDOSCOPY N/A 7/21/2018    EGD BIOPSY performed by Douglas Hull MD at [facility] N/A 6/10/2021    EGD POLYP ABLATION/OTHER performed by Rodney Koo MD at [address] [facility] [address] 6/10/2021    EGD BAND LIGATION performed by Rodney Koo MD at [facility] 2022    EGD DIAGNOSTIC ONLY performed by Red Griggs MD at [address] [place] [address]     Socioeconomic History    Marital status:      Spouse name: Elliott Phillip \"Avinash\"- 2021 colon cancer    Number of children: 4    Years of education: Not on file    Highest education level: Not on file   Occupational History     Employer: [employer]   Tobacco Use    Smoking status: Former     Packs/day: 1.00     Years: 40.00     Pack years: 40.00     Types: Cigarettes     Start date: 1956     Quit date: 2000     Years since quittin.6    Smokeless tobacco: Never    Tobacco comments:     stopped cold turkey in !! ( )   Vaping Use    Vaping Use: Never used   Substance and Sexual Activity    Alcohol use: No     Alcohol/week: 0.0 standard drinks    Drug use: No    Sexual activity: Not Currently   Other Topics Concern    Not on file   Social History Narrative    Not on file     Social Determinants of Health     Financial Resource Strain: Not on file   Food Insecurity: Not on file   Transportation Needs: No Transportation Needs    Lack of Transportation (Medical): No    Lack of Transportation (Non-Medical):  No   Physical Activity: Inactive    Days of Exercise per Week: 0 days    Minutes of Exercise per Session: 0 min   Stress: Not on file   Social Connections: Not on file   Intimate Partner Violence: Not on file   Housing Stability: Not on file        Family History   Problem Relation Age of Onset    Colon Cancer Mother     Other Mother     Diabetes Father     Kidney Disease Paternal Grandmother     Lupus Daughter     Lupus Other PE  Vitals:    01/17/23 1333   BP: (!) 132/58   Pulse: 78   SpO2: 98%   Weight: 207 lb (93.9 kg)   Height: 5' 6\" (1.676 m)     Estimated body mass index is 33.41 kg/m² as calculated from the following:    Height as of this encounter: 5' 6\" (1.676 m). Weight as of this encounter: 207 lb (93.9 kg). Physical Exam  Vitals reviewed. Constitutional:       General: She is not in acute distress. Appearance: Normal appearance. She is not ill-appearing, toxic-appearing or diaphoretic. HENT:      Head: Normocephalic and atraumatic. Right Ear: External ear normal.      Left Ear: External ear normal.      Nose: Nose normal.      Mouth/Throat:      Mouth: Mucous membranes are moist.   Eyes:      General: No scleral icterus. Conjunctiva/sclera: Conjunctivae normal.   Cardiovascular:      Rate and Rhythm: Normal rate and regular rhythm. Comments: Systolic murmur heard  Pulmonary:      Effort: Pulmonary effort is normal. No respiratory distress. Breath sounds: Normal breath sounds. No wheezing. Comments: Patient on oxygen  Abdominal:      General: There is no distension. Palpations: Abdomen is soft. Musculoskeletal:      Cervical back: Normal range of motion. Right lower leg: No edema. Left lower leg: No edema. Skin:     General: Skin is warm and dry. Neurological:      Mental Status: She is alert. Mental status is at baseline.    Psychiatric:         Behavior: Behavior normal.       Immunization History   Administered Date(s) Administered    COVID-19, J&J, (age 18y+), IM, 0.5 mL 03/08/2021, 12/30/2021    Pneumococcal Conjugate 13-valent ([id number]) 06/25/2013    Pneumococcal Polysaccharide ([id number]) 01/01/2005    Tdap (Boostrix, Adacel) 06/09/2022       Health Maintenance   Topic Date Due    Hepatitis A vaccine (1 of 2 - Risk 2-dose series) Never done    Hepatitis B vaccine (1 of 3 - Risk 3-dose series) Never done    Shingles vaccine (1 of 2) Never done Pneumococcal 65+ years Vaccine (3 - PPSV23 if available, else PCV20) 06/25/2014    COVID-19 Vaccine (3 - Booster for Nory series) 02/24/2022    Flu vaccine (1) Never done    Lipids  09/18/2022    Depression Screen  07/15/2023    Annual Wellness Visit (AWV)  07/16/2023    DTaP/Tdap/Td vaccine (2 - Td or Tdap) 06/09/2032    DEXA (modify frequency per FRAX score)  Completed    Hepatitis C screen  Completed    Hib vaccine  Aged Out    Meningococcal (ACWY) vaccine  Aged Out       PSH, PMH, SH and FH reviewed and noted. Recent and past labs, tests and consults also reviewed. Recent or new meds also reviewed. Total time spent with patient and in the chart reviewing is 60 minutes. Tyrell Manzano MD    This dictation was generated by voice recognition computer software. Although all attempts are made to edit the dictation for accuracy, there may be errors in the transcription that are not intended.

## 2023-01-24 DIAGNOSIS — J44.9 CHRONIC OBSTRUCTIVE PULMONARY DISEASE, UNSPECIFIED COPD TYPE (HCC): Primary | ICD-10-CM

## 2023-01-24 RX ORDER — ACLIDINIUM BROMIDE 400 UG/1
1 POWDER, METERED RESPIRATORY (INHALATION) 2 TIMES DAILY
Qty: 3 EACH | Refills: 0 | Status: SHIPPED | OUTPATIENT
Start: 2023-01-24

## 2023-01-30 RX ORDER — TRAZODONE HYDROCHLORIDE 50 MG/1
TABLET ORAL
Qty: 180 TABLET | Refills: 1 | Status: SHIPPED | OUTPATIENT
Start: 2023-01-30

## 2023-02-01 DIAGNOSIS — T78.40XA ALLERGY, INITIAL ENCOUNTER: Primary | ICD-10-CM

## 2023-02-01 RX ORDER — CETIRIZINE HYDROCHLORIDE 10 MG/1
TABLET ORAL
Qty: 90 TABLET | Refills: 3 | Status: SHIPPED | OUTPATIENT
Start: 2023-02-01

## 2023-02-01 NOTE — TELEPHONE ENCOUNTER
Pt is requesting refill of cetirizine (ZYRTEC) 10 MG tablet  Preferred pharmacy is [facility] [address] [phone]. Pt stated that her pharmacy has sent several requests. Last ov11/01/2022 blancag. Upcoming ov 05/02/2023 michael. Please advise.

## 2023-02-03 ENCOUNTER — TELEPHONE (OUTPATIENT)
Dept: PULMONOLOGY | Age: 78
End: 2023-02-03

## 2023-02-03 NOTE — TELEPHONE ENCOUNTER
Called [facility]. They stated they had already transferred pt cetirizine Rx to [facility]. Called [facility] to verify that pt cetirizine Rx had been received. Garo Day verified that Rx had been received and was ready for pt to .

## 2023-02-03 NOTE — TELEPHONE ENCOUNTER
Pt prescription was sent to the [address] and was supposed to go to the [facility].  Pt states she will not go  rx at the [facility] and would like another rx to be sent to   [address] [place] [address]   [phone]

## 2023-02-06 ENCOUNTER — TELEPHONE (OUTPATIENT)
Dept: PULMONOLOGY | Age: 78
End: 2023-02-06

## 2023-02-06 ENCOUNTER — CLINICAL DOCUMENTATION (OUTPATIENT)
Dept: SPIRITUAL SERVICES | Age: 78
End: 2023-02-06

## 2023-02-06 NOTE — FLOWSHEET NOTE
02/06/23 1442   Encounter Summary   Encounter Overview/Reason  Spiritual/Emotional Needs   Service Provided For: Patient   Referral/Consult From: Patient   Support System Family members;Friends/neighbors   Last Encounter  02/06/23  (Patient called for support.   Offered listening presence.)   Complexity of Encounter Moderate   Begin Time 1300   End Time  1340   Total Time Calculated 40 min   Spiritual/Emotional needs   Type Spiritual Support   Assessment/Intervention/Outcome   Assessment Calm;Stress overload   Intervention Active listening;Explored/Affirmed feelings, thoughts, concerns;Nurtured Hope   Outcome Receptive   Plan and Referrals   Plan/Referrals Other (Comment)  ( support calls prn.)

## 2023-02-06 NOTE — TELEPHONE ENCOUNTER
Patient cancelled appointment on 2/6/23 with Dr Maira De Paz  for CT same day . Reason: pt state having problems with her water heater/furnace broke down and over flow her basement     Patient did not reschedule appointment. Appointment rescheduled:   pt will call on later time to schedule appt.

## 2023-02-10 ENCOUNTER — APPOINTMENT (OUTPATIENT)
Dept: GENERAL RADIOLOGY | Age: 78
End: 2023-02-10
Payer: MEDICARE

## 2023-02-10 ENCOUNTER — HOSPITAL ENCOUNTER (EMERGENCY)
Age: 78
Discharge: HOME | End: 2023-02-10
Payer: MEDICARE

## 2023-02-10 VITALS
WEIGHT: 207 LBS | SYSTOLIC BLOOD PRESSURE: 165 MMHG | OXYGEN SATURATION: 100 % | BODY MASS INDEX: 33.27 KG/M2 | HEART RATE: 67 BPM | TEMPERATURE: 97.9 F | DIASTOLIC BLOOD PRESSURE: 60 MMHG | RESPIRATION RATE: 18 BRPM | HEIGHT: 66 IN

## 2023-02-10 DIAGNOSIS — S93.401A SPRAIN OF RIGHT ANKLE, UNSPECIFIED LIGAMENT, INITIAL ENCOUNTER: Primary | ICD-10-CM

## 2023-02-10 PROCEDURE — 73610 X-RAY EXAM OF ANKLE: CPT

## 2023-02-10 PROCEDURE — 99284 EMERGENCY DEPT VISIT MOD MDM: CPT

## 2023-02-10 ASSESSMENT — PAIN SCALES - GENERAL: PAINLEVEL_OUTOF10: 9

## 2023-02-10 ASSESSMENT — PAIN - FUNCTIONAL ASSESSMENT
PAIN_FUNCTIONAL_ASSESSMENT: 0-10
PAIN_FUNCTIONAL_ASSESSMENT: NONE - DENIES PAIN

## 2023-02-10 NOTE — ED NOTES
[facility] at bedside to transport home. Report given. Patient has all personal belongings and discharge paperwork with self/on stretcher. No additional needs or questions at this time. Pt verbalized understanding to follow up with Dr. Vincent Botello.       Jackelyn Diana, RN  02/10/23 0295

## 2023-02-10 NOTE — ED NOTES
Private ambulance transportation arranged for patient d/t oxygen requirements. [facility] ETA 1200 and will transport pt home to private residence.       Austin Hess RN  02/10/23 5996

## 2023-02-11 NOTE — ED PROVIDER NOTES
[address] [facility]  ED  EMERGENCY DEPARTMENT ENCOUNTER        Pt Name: Adrian Hinkle  MRN: [id number]  [place] 1945  Date of evaluation: 2/10/2023  Provider: ZIGGY Lowe - JUSTUS  PCP: Rosa Jackson MD        MEENA. I have evaluated this patient. My supervising physician was available for consultation. CHIEF COMPLAINT       Chief Complaint   Patient presents with    Ankle Pain     Pt to ED with c/o right ankle pain. Pt reports two weeks ago she was transferring from one stair chair to another and rolled her ankle in the process. Pt reports pain has not gone away. Had appointment with PCP this morning but came here instead       HISTORY OF PRESENT ILLNESS: 1 or more Elements     History From: the patient. Limitations to history : None    Adrian Hinkle is a 69 y.o. female who presents to the emergency department today with complaints of right ankle pain. Patient states that she rolled her ankle 2 weeks ago when she was transferring from her chair, she actually had an appointment with orthopedics today but canceled it so she can come to the ER instead. She is here for further evaluation    Nursing Notes were all reviewed and agreed with or any disagreements were addressed in the HPI. REVIEW OF SYSTEMS :      Review of Systems    Positives and Pertinent negatives as per HPI.      SURGICAL HISTORY     Past Surgical History:   Procedure Laterality Date    BONE MARROW BIOPSY      BREAST BIOPSY      BRONCHOSCOPY N/A 5/7/2021    BRONCHOSCOPY DIAGNOSTIC OR CELL [facility] ONLY performed by Sara Hull MD at [facility]  5/7/2021    BRONCHOSCOPY ALVEOLAR LAVAGE performed by Sara Hull MD at [facility]  5/7/2021    BRONCHOSCOPY THERAPUTIC ASPIRATION INITIAL performed by Sara Hull MD at [place] [address]  07/14/14    CATARACT REMOVAL      COLONOSCOPY      X 3 per PT 6/8/16    COLONOSCOPY N/A 6/10/2021 COLONOSCOPY POLYPECTOMY ABLATION performed by Vira Schwartz MD at [address] Right 06/03/2013    CORAL BUNIONECTOMY RIGHT FOOT WITH SCREW FIXATION;    HERNIA REPAIR      HYSTERECTOMY (CERVIX STATUS UNKNOWN)      JOINT REPLACEMENT Bilateral     TKR    KNEE SURGERY  09    left    KNEE SURGERY  2010    right    UPPER GASTROINTESTINAL ENDOSCOPY N/A 7/21/2018    EGD BIOPSY performed by Elo Jimenez MD at [address] 6/10/2021    EGD POLYP ABLATION/OTHER performed by Vira Schwartz MD at [address] 6/10/2021    EGD BAND LIGATION performed by Vira Schwartz MD at [address] 6/6/2022    EGD DIAGNOSTIC ONLY performed by Nato Cheng MD at [address]       Discharge Medication List as of 2/10/2023 11:41 AM        CONTINUE these medications which have NOT CHANGED    Details   cetirizine (ZYRTEC) 10 MG tablet TAKE ONE TABLET BY MOUTH DAILY, Disp-90 tablet, R-3Normal      traZODone (DESYREL) 50 MG tablet TAKE 2 TABLETS NIGHTLY, Disp-180 tablet, R-1Normal      aclidinium (TUDORZA PRESSAIR) 400 MCG/ACT AEPB inhaler Inhale 1 puff into the lungs in the morning and 1 puff in the evening., Disp-3 each, R-0Normal      beclomethasone (QVAR REDIHALER) 80 MCG/ACT AERB inhaler Inhale 1 puff into the lungs in the morning and 1 puff in the evening., Disp-3 each, R-0Normal      oxyCODONE-acetaminophen (PERCOCET) 5-325 MG per tablet Historical Med      famotidine (PEPCID) 20 MG tablet Take 1 tablet by mouth daily, Disp-30 tablet, R-11Normal      apixaban (ELIQUIS) 5 MG TABS tablet Take 1 tablet by mouth 2 times daily, Disp-180 tablet, R-3Normal      atorvastatin (LIPITOR) 20 MG tablet Take 1 tablet by mouth at bedtime, Disp-90 tablet, R-3Normal      carvedilol (COREG) 6.25 MG tablet Take 1 tablet by mouth 2 times daily (with meals), Disp-180 tablet, R-3Normal      dilTIAZem (TIAZAC) 240 MG extended release capsule TAKE 1 CAPSULE DAILY, Disp-90 capsule, R-3Normal      furosemide (LASIX) 20 MG tablet TAKE 1 TABLET DAILY, Disp-90 tablet, R-3Normal      potassium chloride (KLOR-CON M20) 20 MEQ extended release tablet TAKE 1 TABLET DAILY, Disp-90 tablet, R-3Normal      losartan (COZAAR) 100 MG tablet TAKE 1 TABLET DAILY, Disp-90 tablet, R-3Normal      pantoprazole (PROTONIX) 40 MG tablet Take 1 tablet by mouth twice daily, Disp-180 tablet, R-3Normal      cyanocobalamin 1000 MCG/ML injection Inject 1 mL into the muscle every 14 days, Disp-6 each, R-0Normal      diphenhydrAMINE (BENADRYL) 25 MG capsule Take 25 mg by mouth every 6 hours as needed for ItchingHistorical Med      doxepin (SINEQUAN) 50 MG capsule Take 50 mg by mouth nightlyHistorical Med      rOPINIRole (REQUIP) 1 MG tablet Take 1 tablet by mouth 3 times daily, Disp-90 tablet, R-3Normal      gabapentin (NEURONTIN) 600 MG tablet Take 1 tablet by mouth at bedtime for 30 days. , Disp-30 tablet, R-0Normal      gabapentin (NEURONTIN) 100 MG capsule Take 1 capsule by mouth 3 times daily for 30 days. , Disp-90 capsule, R-0Normal      !! venlafaxine (EFFEXOR XR) 37.5 MG extended release capsule Take 1 capsule by mouth daily, Disp-90 capsule, R-3Normal      !! venlafaxine (EFFEXOR XR) 37.5 MG extended release capsule Take 1 capsule by mouth daily, Disp-30 capsule, R-5Normal      ipratropium (ATROVENT) 0.02 % nebulizer solution Take 2.5 mLs by nebulization in the morning and 2.5 mLs at noon and 2.5 mLs in the evening and 2.5 mLs before bedtime. , Disp-300 mL, R-0Normal      ursodiol (ACTIGALL) 300 MG capsule Take 300 mg by mouth in the morning and 300 mg before bedtime. Historical Med      vitamin C (ASCORBIC ACID) 500 MG tablet Take 500 mg by mouth in the morning. Historical Med      Cholecalciferol (VITAMIN D) 50 MCG (2000 UT) CAPS capsule Take by mouthHistorical Med levothyroxine (SYNTHROID) 100 MCG tablet Take 1 tablet by mouth Daily, Disp-90 tablet, R-3Normal      tamsulosin (FLOMAX) 0.4 MG capsule Take 1 capsule by mouth daily, Disp-30 capsule, R-0DC to SNF      montelukast (SINGULAIR) 10 MG tablet Take 1 tablet by mouth daily, Disp-90 tablet, R-3Normal      SITagliptin (JANUVIA) 100 MG tablet Take 1 tablet by mouth daily, Disp-90 tablet, R-3Normal      metFORMIN (GLUCOPHAGE-XR) 500 MG extended release tablet TAKE 2 TABLETS TWICE A DAY, Disp-360 tablet, R-3Normal      benzonatate (TESSALON) 100 MG capsule TAKE 1 CAPSULE THREE TIMES A DAY AS NEEDED FOR COUGH, Disp-270 capsule, R-1Normal      levalbuterol (XOPENEX HFA) 45 MCG/ACT inhaler Inhale 2 puffs into the lungs every 6 hours as needed for Shortness of Breath, Disp-3 Inhaler, R-4Normal      ferrous sulfate 325 (65 Fe) MG tablet Take 325 mg by mouth 3 times daily (with meals)Historical Med      calcium carbonate 600 MG TABS tablet Take 1 tablet by mouth daily Historical Med      Multiple Vitamins-Minerals (CENTRUM PO) Take 1 tablet by mouth daily. estrogens, conjugated, (PREMARIN) 1.25 MG tablet Take 1.25 mg by mouth daily. !! - Potential duplicate medications found. Please discuss with provider.           ALLERGIES     Latex, Clindamycin, Pennsaid [diclofenac sodium], Torsemide, Actos [pioglitazone], Amoxicillin, Augmentin [amoxicillin-pot clavulanate], Bactrim [sulfamethoxazole-trimethoprim], Ciprofloxacin, Doxycycline, Levaquin [levofloxacin], Ativan [lorazepam], Cephalosporins, Pcn [penicillins], and Proventil [albuterol]    FAMILYHISTORY       Family History   Problem Relation Age of Onset    Colon Cancer Mother     Other Mother     Diabetes Father     Kidney Disease Paternal Grandmother     Lupus Daughter     Lupus Other         SOCIAL HISTORY       Social History     Tobacco Use    Smoking status: Former     Packs/day: 1.00     Years: 40.00     Pack years: 40.00     Types: Cigarettes     Start date: 1956     Quit date: 2000     Years since quittin.7    Smokeless tobacco: Never    Tobacco comments:     stopped cold turkey in !! ( )   Vaping Use    Vaping Use: Never used   Substance Use Topics    Alcohol use: No     Alcohol/week: 0.0 standard drinks    Drug use: No       SCREENINGS        [place] Coma Scale  Eye Opening: Spontaneous  Best Verbal Response: Oriented  Best Motor Response: Obeys commands  [place] Coma Scale Score: 15                CIWA Assessment  BP: (!) 165/60  Heart Rate: 67           PHYSICAL EXAM  1 or more Elements     ED Triage Vitals [02/10/23 0959]   BP Temp Temp Source Heart Rate Resp SpO2 Height Weight   (!) 150/61 97.9 °F (36.6 °C) Oral 66 16 98 % 5' 6\" (1.676 m) 207 lb (93.9 kg)       Physical Exam    Diffuse tenderness throughout the right ankle there is no ecchymosis, minimal swelling. No joint laxity, no step-offs. 2+ dorsalis pedis and posterior tibial pulses present, other extremities are atraumatic and nontender. DIAGNOSTIC RESULTS   LABS:    Labs Reviewed - No data to display    When ordered only abnormal lab results are displayed. All other labs were within normal range or not returned as of this dictation. EKG: When ordered, EKG's are interpreted by the Emergency Department Physician in the absence of a cardiologist.  Please see their note for interpretation of EKG. RADIOLOGY:   Non-plain film images such as CT, Ultrasound and MRI are read by the radiologist. Plain radiographic images are visualized and preliminarily interpreted by the ED Provider with the below findings:        Interpretation per the Radiologist below, if available at the time of this note:    XR ANKLE RIGHT (MIN 3 VIEWS)   Final Result   No acute fracture or dislocation. XR ANKLE RIGHT (MIN 3 VIEWS)    Result Date: 2/10/2023  EXAMINATION: THREE XRAY VIEWS OF THE RIGHT ANKLE 2/10/2023 10:10 am COMPARISON: None.  HISTORY: ORDERING SYSTEM PROVIDED HISTORY: fall TECHNOLOGIST PROVIDED HISTORY: Reason for exam:->fall Reason for Exam: fall FINDINGS: No acute fracture or dislocation. The talar dome is smooth. No acute fracture or dislocation. No results found. PROCEDURES   Unless otherwise noted below, none     Procedures    CRITICAL CARE TIME (.cctime)       PAST MEDICAL HISTORY      has a past medical history of Allergic, Anemia, Anesthesia complication, Arthritis, Asthma, Atrial fibrillation ([address] Cirrhosis ([address] Cirrhosis, non-alcoholic ([address] COPD (chronic obstructive pulmonary disease) ([address] GERD (gastroesophageal reflux disease), GIB (gastrointestinal bleeding), Haemophilus infection (05/07/2021), Heart murmur, Hernia, Hyperlipidemia, Hypertension, Lung collapse, Murmur, heart, Paroxysmal atrial fibrillation ([address] (05/2022), Pneumonia, Reflux, Rheumatic fever, Stress fracture, SVT (supraventricular tachycardia) ([address] TIA (transient ischemic attack) (2015), Type II or unspecified type diabetes mellitus without mention of complication, not stated as uncontrolled, 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 EMERGENCY DEPARTMENT COURSE and DIFFERENTIAL DIAGNOSIS/MDM:   Vitals:    Vitals:    02/10/23 0959 02/10/23 1139   BP: (!) 150/61 (!) 165/60   Pulse: 66 67   Resp: 16 18   Temp: 97.9 °F (36.6 °C)    TempSrc: Oral    SpO2: 98% 100%   Weight: 207 lb (93.9 kg)    Height: 5' 6\" (1.676 m)        Patient was given the following medications:  Medications - No data to display          Is this patient to be included in the SEP-1 Core Measure due to severe sepsis or septic shock? No   Exclusion criteria - the patient is NOT to be included for SEP-1 Core Measure due to:   Infection is not suspected    Chronic Conditions affecting care:    has a past medical history of Allergic, Anemia, Anesthesia complication, Arthritis, Asthma, Atrial fibrillation ([address] Cirrhosis ([address] Cirrhosis, non-alcoholic ([address] COPD (chronic obstructive pulmonary disease) ([facility] GERD (gastroesophageal reflux disease), GIB (gastrointestinal bleeding), Haemophilus infection (05/07/2021), Heart murmur, Hernia, Hyperlipidemia, Hypertension, Lung collapse, Murmur, heart, Paroxysmal atrial fibrillation ([facility]) (05/2022), Pneumonia, Reflux, Rheumatic fever, Stress fracture, SVT (supraventricular tachycardia) ([facility] TIA (transient ischemic attack) (2015), Type II or unspecified type diabetes mellitus without mention of complication, not stated as uncontrolled, 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 CONSULTS: (Who and What was discussed)  None      Social Determinants Significantly Affecting Health : None    Records Reviewed (External and Source)     CC/HPI Summary, DDx, ED Course, and Reassessment: Patient presented to the emergency department today with complaints of right ankle pain, she states that she injured it several weeks ago, she actually had an appointment to see Dr. Re Cook today however she canceled it so she could come to the ED. I did do an x-ray which was negative, had a conversation with the patient in regards the fact that she needs to follow-up with orthopedics for persistent discomfort, no indication of acute emergent medical condition at this time. She was discharged in good condition. I am the Primary Clinician of Record. FINAL IMPRESSION      1.  Sprain of right ankle, unspecified ligament, initial encounter          DISPOSITION/PLAN     DISPOSITION Decision to Discharge    PATIENT REFERRED TO:  Randolph Veliz, [address] [place] [address] [facility]  [phone]    Call   For follow up      DISCHARGE MEDICATIONS:  Discharge Medication List as of 2/10/2023 11:41 AM          DISCONTINUED MEDICATIONS:  Discharge Medication List as of 2/10/2023 11:41 AM                 (Please note that portions of this note were completed with a voice recognition program.  Efforts were made to edit the dictations but occasionally words are mis-transcribed.)    Duane Johnson, APRN - CNP (electronically signed)      Duane Johnson, APRN - CNP  02/11/23 7519

## 2023-02-24 RX ORDER — ROPINIROLE 1 MG/1
TABLET, FILM COATED ORAL
Qty: 150 TABLET | OUTPATIENT
Start: 2023-02-24

## 2023-02-26 DIAGNOSIS — J44.9 CHRONIC OBSTRUCTIVE PULMONARY DISEASE, UNSPECIFIED COPD TYPE (HCC): ICD-10-CM

## 2023-02-27 RX ORDER — ACLIDINIUM BROMIDE 400 UG/1
POWDER, METERED RESPIRATORY (INHALATION)
Qty: 3 EACH | Refills: 0 | Status: SHIPPED | OUTPATIENT
Start: 2023-02-27

## 2023-02-28 RX ORDER — ROPINIROLE 1 MG/1
1 TABLET, FILM COATED ORAL 3 TIMES DAILY
Qty: 90 TABLET | Refills: 0 | Status: SHIPPED | OUTPATIENT
Start: 2023-02-28 | End: 2023-02-28 | Stop reason: SDUPTHER

## 2023-02-28 RX ORDER — ROPINIROLE 1 MG/1
1 TABLET, FILM COATED ORAL 3 TIMES DAILY
Qty: 90 TABLET | Refills: 0 | Status: SHIPPED | OUTPATIENT
Start: 2023-02-28

## 2023-02-28 NOTE — TELEPHONE ENCOUNTER
Contacted pt to let her know rx was sent. Pt is requesting for requip to be resent to [facility] instead of the [facility].  Please advise

## 2023-02-28 NOTE — TELEPHONE ENCOUNTER
Patient is requesting a refill of rOPINIRole (REQUIP) 1 MG tablet     -- Medication was filled previously by Dr. Vira Metcalf, but patient is asking if Dr. Quinten Hong will refill for her. Informed patient that Dr. Quinten Hong is out of the office. Pt is asking if any other provider would be willing to fill for her. Patient has enough medication to last her until Saturday.     Please advise    Last Office Visit  -  1/17/23  Next Office Visit  -  None

## 2023-02-28 NOTE — TELEPHONE ENCOUNTER
Dr Melanie Hickey was pt previous PCP. Pt est care with dr holcomb 1/17/23. Is this a med that would not get refilled usually?

## 2023-03-01 NOTE — TELEPHONE ENCOUNTER
Patient called again about this and insists she takes this medication 5 times a day . Not 3 and she would like refills.     She said it is fine that it is sent to   [facility]

## 2023-03-06 ENCOUNTER — TELEPHONE (OUTPATIENT)
Dept: FAMILY MEDICINE CLINIC | Age: 78
End: 2023-03-06

## 2023-03-06 ENCOUNTER — TELEPHONE (OUTPATIENT)
Dept: CARDIOLOGY CLINIC | Age: 78
End: 2023-03-06

## 2023-03-06 NOTE — TELEPHONE ENCOUNTER
Eri Casanova from [facility] called to see if pt needs her lab work completed now or closer to her 6m f/u with rkg in May 2023. Eri Casanova can be reached at [phone]. Please advise.

## 2023-03-06 NOTE — TELEPHONE ENCOUNTER
Spoke with [employer]. Patient is due now for labs. She VU. She had other MD orders for labs and was trying to coordinate with them when to draw to limit sticks.

## 2023-03-08 ENCOUNTER — SCHEDULED TELEPHONE ENCOUNTER (OUTPATIENT)
Dept: FAMILY MEDICINE CLINIC | Age: 78
End: 2023-03-08
Payer: MEDICARE

## 2023-03-08 DIAGNOSIS — G25.81 RESTLESS LEG SYNDROME: Primary | ICD-10-CM

## 2023-03-08 DIAGNOSIS — R05.1 ACUTE COUGH: ICD-10-CM

## 2023-03-08 PROCEDURE — 99442 PR PHYS/QHP TELEPHONE EVALUATION 11-20 MIN: CPT | Performed by: STUDENT IN AN ORGANIZED HEALTH CARE EDUCATION/TRAINING PROGRAM

## 2023-03-08 RX ORDER — PROMETHAZINE HYDROCHLORIDE 6.25 MG/5ML
6.25 SYRUP ORAL 4 TIMES DAILY PRN
Qty: 140 ML | Refills: 0 | Status: SHIPPED | OUTPATIENT
Start: 2023-03-08 | End: 2023-03-15

## 2023-03-08 RX ORDER — ROPINIROLE 1 MG/1
TABLET, FILM COATED ORAL
Qty: 150 TABLET | Refills: 2 | Status: SHIPPED | OUTPATIENT
Start: 2023-03-08 | End: 2023-03-10 | Stop reason: SDUPTHER

## 2023-03-08 RX ORDER — URSODIOL 500 MG/1
TABLET, FILM COATED ORAL
COMMUNITY
Start: 2023-02-01

## 2023-03-08 RX ORDER — FLUCONAZOLE 150 MG/1
TABLET ORAL
COMMUNITY
Start: 2022-12-29

## 2023-03-08 NOTE — PROGRESS NOTES
Telephone Visit, Audio Only    Petty Taylor (:  1945) is a 69 y.o. female,Established patient, here for evaluation of the following chief complaint(s): Other (Discuss medication ) and Cough (Cough has changed )         ASSESSMENT/PLAN:  1. Restless leg syndrome  -     rOPINIRole (REQUIP) 1 MG tablet; Take 1 tablet by mouth 3 times daily AND 2 tablets nightly., Disp-150 tablet, R-2Normal  Reviewed chart, patient did used to be on more ropinirole than she has been prescribed recently. Therefore we will restart this old prescription at this time. Patient to follow-up in 3 months. 2. Acute cough  -     promethazine (PHENERGAN) 6.25 MG/5ML syrup; Take 5 mLs by mouth 4 times daily as needed for Nausea, Disp-140 mL, R-0Normal  Discussed with the patient that her cough could be caused from different things including flu and COVID. Patient is not amenable to further testing at this time. Patient just wants a noncough medication that can help with the amount of phlegm and drainage that she is having. No follow-ups on file. Subjective   SUBJECTIVE/OBJECTIVE:  HPI  Patient is a 80-year-old female with a complex past medical history, who presents over the telephone, audio only, to discuss restless leg syndrome. The patient states that she had previously been prescribed 1 mg of ropinirole, 5 total tablets daily. She will take 1 tablet in the morning, 1 in the late afternoon, 1 in the evening, and 2 at bedtime. She reports that she took this for restless leg/neuropathy and that it worked well for her. Patient states that for some reason a provider a couple of months ago had accidentally refilled 1 mg 3 times daily on accident, and that subsequent refills have been for this amount. Patient would like to be prescribed her original 5 tablets daily.     Patient also reports that she has had an upper respiratory infection for several days now, but is having persistent drainage and mucus causing the cough. Patient has not been at home COVID test it would not like to do so at this time. Patient just wants something for the cough to help reduce the amount of phlegm/mucus that she is producing. Review of Systems   All other systems reviewed and are negative. Objective     Physical Exam  Pulmonary:      Comments: Not dyspneic to conversation  Neurological:      Mental Status: She is alert. On this date 3/8/2023 I have spent 20 minutes reviewing previous notes, test results and face to face with the patient discussing the diagnosis and importance of compliance with the treatment plan as well as documenting on the day of the visit. An electronic signature was used to authenticate this note.     --Sujatha Nguyen MD

## 2023-03-09 DIAGNOSIS — G25.81 RESTLESS LEG SYNDROME: ICD-10-CM

## 2023-03-09 NOTE — TELEPHONE ENCOUNTER
Pt requesting Medication be ReSent To  [facility] because she will run out before mail pharmacy is available.   rOPINIRole (REQUIP) 1 MG tablet       [facility] [id number] - [address] [facility] [address] [phone]

## 2023-03-10 RX ORDER — ROPINIROLE 1 MG/1
TABLET, FILM COATED ORAL
Qty: 150 TABLET | Refills: 2 | Status: SHIPPED | OUTPATIENT
Start: 2023-03-10

## 2023-03-13 RX ORDER — CYANOCOBALAMIN 1000 UG/ML
INJECTION, SOLUTION INTRAMUSCULAR; SUBCUTANEOUS
Qty: 6 ML | OUTPATIENT
Start: 2023-03-13

## 2023-03-14 LAB
ALBUMIN SERPL-MCNC: 3.6 G/DL
ALP BLD-CCNC: 66 U/L
ALT SERPL-CCNC: 11 U/L
ANION GAP SERPL CALCULATED.3IONS-SCNC: 10 MMOL/L
AST SERPL-CCNC: 17 U/L
AVERAGE GLUCOSE: 103
BASOPHILS ABSOLUTE: 0 /ΜL
BASOPHILS RELATIVE PERCENT: 0.3 %
BILIRUB SERPL-MCNC: 0.2 MG/DL (ref 0.1–1.4)
BUN BLDV-MCNC: 9 MG/DL
CALCIUM SERPL-MCNC: 8.5 MG/DL
CHLORIDE BLD-SCNC: 91 MMOL/L
CHOLESTEROL, TOTAL: 138 MG/DL
CHOLESTEROL/HDL RATIO: ABNORMAL
CO2: 30 MMOL/L
CREAT SERPL-MCNC: 0.7 MG/DL
EGFR: 89
EOSINOPHILS ABSOLUTE: 0.2 /ΜL
EOSINOPHILS RELATIVE PERCENT: 3 %
GLUCOSE BLD-MCNC: 86 MG/DL
HBA1C MFR BLD: 5.2 %
HCT VFR BLD CALC: 29.6 % (ref 36–46)
HDLC SERPL-MCNC: 72 MG/DL (ref 35–70)
HEMOGLOBIN: 9.1 G/DL (ref 12–16)
INR BLD: 1.1
LDL CHOLESTEROL CALCULATED: 49 MG/DL (ref 0–160)
LYMPHOCYTES ABSOLUTE: 0.8 /ΜL
LYMPHOCYTES RELATIVE PERCENT: 10.5 %
MCH RBC QN AUTO: 28.6 PG
MCHC RBC AUTO-ENTMCNC: 30.7 G/DL
MCV RBC AUTO: 93.1 FL
MONOCYTES ABSOLUTE: 0.4 /ΜL
MONOCYTES RELATIVE PERCENT: 4.7 %
NEUTROPHILS ABSOLUTE: 6 /ΜL
NEUTROPHILS RELATIVE PERCENT: 81.1 %
NONHDLC SERPL-MCNC: 66 MG/DL
PLATELET # BLD: 203 K/ΜL
PMV BLD AUTO: ABNORMAL FL
POTASSIUM SERPL-SCNC: 4.1 MMOL/L
PROTIME: 14.5 SECONDS
RBC # BLD: 3.18 10^6/ΜL
SODIUM BLD-SCNC: 131 MMOL/L
TOTAL PROTEIN: 7.2
TRIGL SERPL-MCNC: 94 MG/DL
VLDLC SERPL CALC-MCNC: ABNORMAL MG/DL
WBC # BLD: 7.4 10^3/ML

## 2023-03-17 ENCOUNTER — TELEPHONE (OUTPATIENT)
Dept: ORTHOPEDIC SURGERY | Age: 78
End: 2023-03-17

## 2023-03-17 DIAGNOSIS — R05.1 ACUTE COUGH: ICD-10-CM

## 2023-03-17 RX ORDER — PROMETHAZINE HYDROCHLORIDE 6.25 MG/5ML
6.25 SYRUP ORAL 4 TIMES DAILY PRN
Qty: 140 ML | Refills: 0 | Status: SHIPPED | OUTPATIENT
Start: 2023-03-17 | End: 2023-03-21 | Stop reason: SDUPTHER

## 2023-03-21 DIAGNOSIS — R05.8 UPPER AIRWAY COUGH SYNDROME: Primary | ICD-10-CM

## 2023-03-21 DIAGNOSIS — R09.82 POST-NASAL DRIP: ICD-10-CM

## 2023-03-21 RX ORDER — PROMETHAZINE HYDROCHLORIDE 6.25 MG/5ML
6.25 SYRUP ORAL 4 TIMES DAILY PRN
Qty: 140 ML | Refills: 0 | Status: ON HOLD | OUTPATIENT
Start: 2023-03-21 | End: 2023-03-28

## 2023-03-22 DIAGNOSIS — E11.42 TYPE 2 DIABETES MELLITUS WITH DIABETIC POLYNEUROPATHY, WITHOUT LONG-TERM CURRENT USE OF INSULIN (HCC): ICD-10-CM

## 2023-03-22 RX ORDER — METFORMIN HYDROCHLORIDE 500 MG/1
TABLET, EXTENDED RELEASE ORAL
Qty: 360 TABLET | Refills: 3 | Status: ON HOLD | OUTPATIENT
Start: 2023-03-22

## 2023-03-22 NOTE — TELEPHONE ENCOUNTER
Last Office Visit  -  3/8/23  Next Office Visit  -  n/a    Last Filled  -  1/3/22  Last UDS -    Contract -

## 2023-03-22 NOTE — TELEPHONE ENCOUNTER
Pt requesting refill  metFORMIN (GLUCOPHAGE-XR) 500 MG extended release tablet   Last Office Visit  -  3/8/23  Next Office Visit  -  none

## 2023-03-26 ENCOUNTER — APPOINTMENT (OUTPATIENT)
Dept: CT IMAGING | Age: 78
DRG: 552 | End: 2023-03-26
Payer: MEDICARE

## 2023-03-26 ENCOUNTER — HOSPITAL ENCOUNTER (INPATIENT)
Age: 78
LOS: 6 days | Discharge: NURSING, REHAB OR LONG-TERM CARE | DRG: 552 | End: 2023-04-04
Attending: EMERGENCY MEDICINE | Admitting: INTERNAL MEDICINE
Payer: MEDICARE

## 2023-03-26 DIAGNOSIS — M46.46 DISCITIS OF LUMBAR REGION: ICD-10-CM

## 2023-03-26 DIAGNOSIS — R79.89 ELEVATED D-DIMER: ICD-10-CM

## 2023-03-26 DIAGNOSIS — R60.0 BILATERAL LOWER EXTREMITY EDEMA: Primary | ICD-10-CM

## 2023-03-26 DIAGNOSIS — R79.89 ELEVATED BRAIN NATRIURETIC PEPTIDE (BNP) LEVEL: ICD-10-CM

## 2023-03-26 PROBLEM — M79.606 LEG PAIN: Status: ACTIVE | Noted: 2023-03-26

## 2023-03-26 LAB
ALBUMIN SERPL-MCNC: 4.3 G/DL (ref 3.4–5)
ALBUMIN/GLOB SERPL: 0.9 {RATIO} (ref 1.1–2.2)
ALP SERPL-CCNC: 76 U/L (ref 40–129)
ALT SERPL-CCNC: 15 U/L (ref 10–40)
ANION GAP SERPL CALCULATED.3IONS-SCNC: 9 MMOL/L (ref 3–16)
AST SERPL-CCNC: 22 U/L (ref 15–37)
BASOPHILS # BLD: 0 K/UL (ref 0–0.2)
BASOPHILS NFR BLD: 0.6 %
BILIRUB SERPL-MCNC: 0.3 MG/DL (ref 0–1)
BILIRUB UR QL STRIP.AUTO: NEGATIVE
BUN SERPL-MCNC: 12 MG/DL (ref 7–20)
CALCIUM SERPL-MCNC: 9.3 MG/DL (ref 8.3–10.6)
CHLORIDE SERPL-SCNC: 91 MMOL/L (ref 99–110)
CLARITY UR: CLEAR
CO2 SERPL-SCNC: 31 MMOL/L (ref 21–32)
COLOR UR: YELLOW
CREAT SERPL-MCNC: 0.6 MG/DL (ref 0.6–1.2)
D DIMER: 1.64 UG/ML FEU (ref 0–0.6)
DEPRECATED RDW RBC AUTO: 15.2 % (ref 12.4–15.4)
EOSINOPHIL # BLD: 0.2 K/UL (ref 0–0.6)
EOSINOPHIL NFR BLD: 2.9 %
FLUAV RNA RESP QL NAA+PROBE: NOT DETECTED
FLUBV RNA RESP QL NAA+PROBE: NOT DETECTED
GFR SERPLBLD CREATININE-BSD FMLA CKD-EPI: >60 ML/MIN/{1.73_M2}
GLUCOSE BLD-MCNC: 104 MG/DL (ref 70–99)
GLUCOSE BLD-MCNC: 176 MG/DL (ref 70–99)
GLUCOSE BLD-MCNC: 85 MG/DL (ref 70–99)
GLUCOSE SERPL-MCNC: 93 MG/DL (ref 70–99)
GLUCOSE UR STRIP.AUTO-MCNC: NEGATIVE MG/DL
HCT VFR BLD AUTO: 33.2 % (ref 36–48)
HGB BLD-MCNC: 11.1 G/DL (ref 12–16)
HGB UR QL STRIP.AUTO: NEGATIVE
KETONES UR STRIP.AUTO-MCNC: NEGATIVE MG/DL
LEUKOCYTE ESTERASE UR QL STRIP.AUTO: NEGATIVE
LYMPHOCYTES # BLD: 0.7 K/UL (ref 1–5.1)
LYMPHOCYTES NFR BLD: 12.6 %
MAGNESIUM SERPL-MCNC: 1.9 MG/DL (ref 1.8–2.4)
MCH RBC QN AUTO: 29.1 PG (ref 26–34)
MCHC RBC AUTO-ENTMCNC: 33.3 G/DL (ref 31–36)
MCV RBC AUTO: 87.4 FL (ref 80–100)
MONOCYTES # BLD: 0.4 K/UL (ref 0–1.3)
MONOCYTES NFR BLD: 6.1 %
NEUTROPHILS # BLD: 4.6 K/UL (ref 1.7–7.7)
NEUTROPHILS NFR BLD: 77.8 %
NITRITE UR QL STRIP.AUTO: NEGATIVE
NT-PROBNP SERPL-MCNC: 1132 PG/ML (ref 0–449)
PERFORMED ON: ABNORMAL
PERFORMED ON: ABNORMAL
PERFORMED ON: NORMAL
PH UR STRIP.AUTO: 7 [PH] (ref 5–8)
PLATELET # BLD AUTO: 222 K/UL (ref 135–450)
PMV BLD AUTO: 7.3 FL (ref 5–10.5)
POTASSIUM SERPL-SCNC: 3.9 MMOL/L (ref 3.5–5.1)
PROT SERPL-MCNC: 9 G/DL (ref 6.4–8.2)
PROT UR STRIP.AUTO-MCNC: NEGATIVE MG/DL
RBC # BLD AUTO: 3.8 M/UL (ref 4–5.2)
SARS-COV-2 RNA RESP QL NAA+PROBE: NOT DETECTED
SODIUM SERPL-SCNC: 131 MMOL/L (ref 136–145)
SP GR UR STRIP.AUTO: <=1.005 (ref 1–1.03)
TROPONIN T SERPL-MCNC: <0.01 NG/ML
UA COMPLETE W REFLEX CULTURE PNL UR: NORMAL
UA DIPSTICK W REFLEX MICRO PNL UR: NORMAL
URN SPEC COLLECT METH UR: NORMAL
UROBILINOGEN UR STRIP-ACNC: 0.2 E.U./DL
WBC # BLD AUTO: 5.9 K/UL (ref 4–11)

## 2023-03-26 PROCEDURE — 2500000003 HC RX 250 WO HCPCS

## 2023-03-26 PROCEDURE — 2580000003 HC RX 258

## 2023-03-26 PROCEDURE — 87636 SARSCOV2 & INF A&B AMP PRB: CPT

## 2023-03-26 PROCEDURE — 81003 URINALYSIS AUTO W/O SCOPE: CPT

## 2023-03-26 PROCEDURE — 94761 N-INVAS EAR/PLS OXIMETRY MLT: CPT

## 2023-03-26 PROCEDURE — 80053 COMPREHEN METABOLIC PANEL: CPT

## 2023-03-26 PROCEDURE — 6370000000 HC RX 637 (ALT 250 FOR IP)

## 2023-03-26 PROCEDURE — G0378 HOSPITAL OBSERVATION PER HR: HCPCS

## 2023-03-26 PROCEDURE — 6360000002 HC RX W HCPCS

## 2023-03-26 PROCEDURE — 2700000000 HC OXYGEN THERAPY PER DAY

## 2023-03-26 PROCEDURE — 97110 THERAPEUTIC EXERCISES: CPT

## 2023-03-26 PROCEDURE — 97530 THERAPEUTIC ACTIVITIES: CPT

## 2023-03-26 PROCEDURE — 85379 FIBRIN DEGRADATION QUANT: CPT

## 2023-03-26 PROCEDURE — 97162 PT EVAL MOD COMPLEX 30 MIN: CPT

## 2023-03-26 PROCEDURE — 84484 ASSAY OF TROPONIN QUANT: CPT

## 2023-03-26 PROCEDURE — 6370000000 HC RX 637 (ALT 250 FOR IP): Performed by: NURSE PRACTITIONER

## 2023-03-26 PROCEDURE — 96372 THER/PROPH/DIAG INJ SC/IM: CPT

## 2023-03-26 PROCEDURE — 36415 COLL VENOUS BLD VENIPUNCTURE: CPT

## 2023-03-26 PROCEDURE — 97535 SELF CARE MNGMENT TRAINING: CPT

## 2023-03-26 PROCEDURE — 99285 EMERGENCY DEPT VISIT HI MDM: CPT

## 2023-03-26 PROCEDURE — 72131 CT LUMBAR SPINE W/O DYE: CPT

## 2023-03-26 PROCEDURE — 87040 BLOOD CULTURE FOR BACTERIA: CPT

## 2023-03-26 PROCEDURE — 97166 OT EVAL MOD COMPLEX 45 MIN: CPT

## 2023-03-26 PROCEDURE — 94640 AIRWAY INHALATION TREATMENT: CPT

## 2023-03-26 PROCEDURE — 83880 ASSAY OF NATRIURETIC PEPTIDE: CPT

## 2023-03-26 PROCEDURE — 96374 THER/PROPH/DIAG INJ IV PUSH: CPT

## 2023-03-26 PROCEDURE — 83735 ASSAY OF MAGNESIUM: CPT

## 2023-03-26 PROCEDURE — 85025 COMPLETE CBC W/AUTO DIFF WBC: CPT

## 2023-03-26 RX ORDER — DILTIAZEM HYDROCHLORIDE 120 MG/1
240 CAPSULE, COATED, EXTENDED RELEASE ORAL DAILY
Status: DISCONTINUED | OUTPATIENT
Start: 2023-03-26 | End: 2023-04-04 | Stop reason: HOSPADM

## 2023-03-26 RX ORDER — URSODIOL 300 MG/1
300 CAPSULE ORAL 2 TIMES DAILY
Status: DISCONTINUED | OUTPATIENT
Start: 2023-03-26 | End: 2023-04-04 | Stop reason: HOSPADM

## 2023-03-26 RX ORDER — POLYETHYLENE GLYCOL 3350 17 G/17G
17 POWDER, FOR SOLUTION ORAL DAILY PRN
Status: DISCONTINUED | OUTPATIENT
Start: 2023-03-26 | End: 2023-04-04 | Stop reason: HOSPADM

## 2023-03-26 RX ORDER — DEXTROSE MONOHYDRATE 100 MG/ML
INJECTION, SOLUTION INTRAVENOUS CONTINUOUS PRN
Status: DISCONTINUED | OUTPATIENT
Start: 2023-03-26 | End: 2023-04-04 | Stop reason: HOSPADM

## 2023-03-26 RX ORDER — ONDANSETRON 4 MG/1
4 TABLET, ORALLY DISINTEGRATING ORAL EVERY 8 HOURS PRN
Status: DISCONTINUED | OUTPATIENT
Start: 2023-03-26 | End: 2023-04-04 | Stop reason: HOSPADM

## 2023-03-26 RX ORDER — ASCORBIC ACID 500 MG
500 TABLET ORAL DAILY
Status: DISCONTINUED | OUTPATIENT
Start: 2023-03-26 | End: 2023-04-04 | Stop reason: HOSPADM

## 2023-03-26 RX ORDER — TAMSULOSIN HYDROCHLORIDE 0.4 MG/1
0.4 CAPSULE ORAL DAILY
Status: DISCONTINUED | OUTPATIENT
Start: 2023-03-26 | End: 2023-04-04 | Stop reason: HOSPADM

## 2023-03-26 RX ORDER — LOSARTAN POTASSIUM 100 MG/1
100 TABLET ORAL DAILY
Status: DISCONTINUED | OUTPATIENT
Start: 2023-03-26 | End: 2023-04-04 | Stop reason: HOSPADM

## 2023-03-26 RX ORDER — ATORVASTATIN CALCIUM 10 MG/1
20 TABLET, FILM COATED ORAL NIGHTLY
Status: DISCONTINUED | OUTPATIENT
Start: 2023-03-26 | End: 2023-04-04 | Stop reason: HOSPADM

## 2023-03-26 RX ORDER — ACETAMINOPHEN 325 MG/1
650 TABLET ORAL EVERY 6 HOURS PRN
Status: DISCONTINUED | OUTPATIENT
Start: 2023-03-26 | End: 2023-04-04 | Stop reason: HOSPADM

## 2023-03-26 RX ORDER — ROPINIROLE 0.5 MG/1
1 TABLET, FILM COATED ORAL
Status: DISCONTINUED | OUTPATIENT
Start: 2023-03-26 | End: 2023-04-04 | Stop reason: HOSPADM

## 2023-03-26 RX ORDER — VITAMIN B COMPLEX
2000 TABLET ORAL DAILY
Status: DISCONTINUED | OUTPATIENT
Start: 2023-03-26 | End: 2023-04-04 | Stop reason: HOSPADM

## 2023-03-26 RX ORDER — VENLAFAXINE HYDROCHLORIDE 37.5 MG/1
37.5 CAPSULE, EXTENDED RELEASE ORAL DAILY
Status: DISCONTINUED | OUTPATIENT
Start: 2023-03-26 | End: 2023-04-04 | Stop reason: HOSPADM

## 2023-03-26 RX ORDER — FLUTICASONE PROPIONATE 110 UG/1
2 AEROSOL, METERED RESPIRATORY (INHALATION) 2 TIMES DAILY
Status: DISCONTINUED | OUTPATIENT
Start: 2023-03-26 | End: 2023-04-04 | Stop reason: HOSPADM

## 2023-03-26 RX ORDER — ONDANSETRON 2 MG/ML
4 INJECTION INTRAMUSCULAR; INTRAVENOUS EVERY 6 HOURS PRN
Status: DISCONTINUED | OUTPATIENT
Start: 2023-03-26 | End: 2023-04-04 | Stop reason: HOSPADM

## 2023-03-26 RX ORDER — INSULIN LISPRO 100 [IU]/ML
0-4 INJECTION, SOLUTION INTRAVENOUS; SUBCUTANEOUS NIGHTLY
Status: DISCONTINUED | OUTPATIENT
Start: 2023-03-26 | End: 2023-04-04 | Stop reason: HOSPADM

## 2023-03-26 RX ORDER — PANTOPRAZOLE SODIUM 40 MG/1
40 TABLET, DELAYED RELEASE ORAL
Status: DISCONTINUED | OUTPATIENT
Start: 2023-03-26 | End: 2023-04-04 | Stop reason: HOSPADM

## 2023-03-26 RX ORDER — M-VIT,TX,IRON,MINS/CALC/FOLIC 27MG-0.4MG
1 TABLET ORAL DAILY
Status: DISCONTINUED | OUTPATIENT
Start: 2023-03-26 | End: 2023-04-04 | Stop reason: HOSPADM

## 2023-03-26 RX ORDER — OXYCODONE HYDROCHLORIDE AND ACETAMINOPHEN 5; 325 MG/1; MG/1
1 TABLET ORAL ONCE
Status: COMPLETED | OUTPATIENT
Start: 2023-03-26 | End: 2023-03-26

## 2023-03-26 RX ORDER — SODIUM CHLORIDE 0.9 % (FLUSH) 0.9 %
5-40 SYRINGE (ML) INJECTION PRN
Status: DISCONTINUED | OUTPATIENT
Start: 2023-03-26 | End: 2023-04-04 | Stop reason: HOSPADM

## 2023-03-26 RX ORDER — FERROUS SULFATE 325(65) MG
325 TABLET ORAL
Status: DISCONTINUED | OUTPATIENT
Start: 2023-03-26 | End: 2023-04-04 | Stop reason: HOSPADM

## 2023-03-26 RX ORDER — SODIUM CHLORIDE 0.9 % (FLUSH) 0.9 %
5-40 SYRINGE (ML) INJECTION EVERY 12 HOURS SCHEDULED
Status: DISCONTINUED | OUTPATIENT
Start: 2023-03-26 | End: 2023-04-04 | Stop reason: HOSPADM

## 2023-03-26 RX ORDER — BENZONATATE 100 MG/1
100 CAPSULE ORAL 3 TIMES DAILY PRN
Status: DISCONTINUED | OUTPATIENT
Start: 2023-03-26 | End: 2023-04-04 | Stop reason: HOSPADM

## 2023-03-26 RX ORDER — CARVEDILOL 6.25 MG/1
6.25 TABLET ORAL 2 TIMES DAILY WITH MEALS
Status: DISCONTINUED | OUTPATIENT
Start: 2023-03-26 | End: 2023-04-04 | Stop reason: HOSPADM

## 2023-03-26 RX ORDER — FUROSEMIDE 20 MG/1
20 TABLET ORAL DAILY
Status: DISCONTINUED | OUTPATIENT
Start: 2023-03-27 | End: 2023-04-04 | Stop reason: HOSPADM

## 2023-03-26 RX ORDER — INSULIN LISPRO 100 [IU]/ML
0-4 INJECTION, SOLUTION INTRAVENOUS; SUBCUTANEOUS
Status: DISCONTINUED | OUTPATIENT
Start: 2023-03-26 | End: 2023-04-04 | Stop reason: HOSPADM

## 2023-03-26 RX ORDER — CETIRIZINE HYDROCHLORIDE 10 MG/1
10 TABLET ORAL DAILY
Status: DISCONTINUED | OUTPATIENT
Start: 2023-03-26 | End: 2023-04-04 | Stop reason: HOSPADM

## 2023-03-26 RX ORDER — ACETAMINOPHEN 650 MG/1
650 SUPPOSITORY RECTAL EVERY 6 HOURS PRN
Status: DISCONTINUED | OUTPATIENT
Start: 2023-03-26 | End: 2023-04-04 | Stop reason: HOSPADM

## 2023-03-26 RX ORDER — LEVOTHYROXINE SODIUM 0.1 MG/1
100 TABLET ORAL DAILY
Status: DISCONTINUED | OUTPATIENT
Start: 2023-03-26 | End: 2023-04-04 | Stop reason: HOSPADM

## 2023-03-26 RX ORDER — OXYCODONE HYDROCHLORIDE AND ACETAMINOPHEN 5; 325 MG/1; MG/1
0.5 TABLET ORAL 2 TIMES DAILY
Status: DISCONTINUED | OUTPATIENT
Start: 2023-03-26 | End: 2023-04-04 | Stop reason: HOSPADM

## 2023-03-26 RX ORDER — GABAPENTIN 100 MG/1
100 CAPSULE ORAL 3 TIMES DAILY
Status: DISCONTINUED | OUTPATIENT
Start: 2023-03-26 | End: 2023-04-04 | Stop reason: HOSPADM

## 2023-03-26 RX ORDER — DIPHENHYDRAMINE HCL 25 MG
25 TABLET ORAL EVERY 6 HOURS PRN
Status: DISCONTINUED | OUTPATIENT
Start: 2023-03-26 | End: 2023-04-04 | Stop reason: HOSPADM

## 2023-03-26 RX ORDER — SODIUM CHLORIDE 9 MG/ML
INJECTION, SOLUTION INTRAVENOUS PRN
Status: DISCONTINUED | OUTPATIENT
Start: 2023-03-26 | End: 2023-04-04 | Stop reason: HOSPADM

## 2023-03-26 RX ORDER — TRAZODONE HYDROCHLORIDE 50 MG/1
100 TABLET ORAL NIGHTLY
Status: DISCONTINUED | OUTPATIENT
Start: 2023-03-26 | End: 2023-04-04 | Stop reason: HOSPADM

## 2023-03-26 RX ORDER — CALCIUM CARBONATE 500(1250)
1 TABLET ORAL DAILY
Status: DISCONTINUED | OUTPATIENT
Start: 2023-03-26 | End: 2023-04-04 | Stop reason: HOSPADM

## 2023-03-26 RX ORDER — FAMOTIDINE 20 MG/1
20 TABLET, FILM COATED ORAL DAILY
Status: DISCONTINUED | OUTPATIENT
Start: 2023-03-26 | End: 2023-04-04 | Stop reason: HOSPADM

## 2023-03-26 RX ORDER — MONTELUKAST SODIUM 10 MG/1
10 TABLET ORAL DAILY
Status: DISCONTINUED | OUTPATIENT
Start: 2023-03-26 | End: 2023-04-04 | Stop reason: HOSPADM

## 2023-03-26 RX ORDER — LEVALBUTEROL TARTRATE 45 UG/1
2 AEROSOL, METERED ORAL EVERY 6 HOURS PRN
Status: DISCONTINUED | OUTPATIENT
Start: 2023-03-26 | End: 2023-04-04 | Stop reason: HOSPADM

## 2023-03-26 RX ORDER — CYANOCOBALAMIN 1000 UG/ML
1000 INJECTION, SOLUTION INTRAMUSCULAR; SUBCUTANEOUS
Status: DISCONTINUED | OUTPATIENT
Start: 2023-03-26 | End: 2023-04-04 | Stop reason: HOSPADM

## 2023-03-26 RX ORDER — FUROSEMIDE 10 MG/ML
40 INJECTION INTRAMUSCULAR; INTRAVENOUS ONCE
Status: COMPLETED | OUTPATIENT
Start: 2023-03-26 | End: 2023-03-26

## 2023-03-26 RX ORDER — GABAPENTIN 300 MG/1
600 CAPSULE ORAL NIGHTLY
Status: DISCONTINUED | OUTPATIENT
Start: 2023-03-26 | End: 2023-04-04 | Stop reason: HOSPADM

## 2023-03-26 RX ADMIN — URSODIOL 300 MG: 300 CAPSULE ORAL at 20:12

## 2023-03-26 RX ADMIN — ROPINIROLE HYDROCHLORIDE 1 MG: 0.5 TABLET, FILM COATED ORAL at 11:42

## 2023-03-26 RX ADMIN — ATORVASTATIN CALCIUM 20 MG: 10 TABLET, FILM COATED ORAL at 20:12

## 2023-03-26 RX ADMIN — FUROSEMIDE 40 MG: 10 INJECTION, SOLUTION INTRAMUSCULAR; INTRAVENOUS at 07:44

## 2023-03-26 RX ADMIN — URSODIOL 300 MG: 300 CAPSULE ORAL at 11:42

## 2023-03-26 RX ADMIN — FAMOTIDINE 20 MG: 20 TABLET, FILM COATED ORAL at 11:43

## 2023-03-26 RX ADMIN — VENLAFAXINE HYDROCHLORIDE 37.5 MG: 37.5 CAPSULE, EXTENDED RELEASE ORAL at 11:44

## 2023-03-26 RX ADMIN — APIXABAN 5 MG: 5 TABLET, FILM COATED ORAL at 20:12

## 2023-03-26 RX ADMIN — LOSARTAN POTASSIUM 100 MG: 100 TABLET, FILM COATED ORAL at 11:55

## 2023-03-26 RX ADMIN — OXYCODONE AND ACETAMINOPHEN 1 TABLET: 5; 325 TABLET ORAL at 06:35

## 2023-03-26 RX ADMIN — OXYCODONE HYDROCHLORIDE AND ACETAMINOPHEN 500 MG: 500 TABLET ORAL at 11:44

## 2023-03-26 RX ADMIN — GABAPENTIN 100 MG: 100 CAPSULE ORAL at 20:13

## 2023-03-26 RX ADMIN — OXYCODONE HYDROCHLORIDE AND ACETAMINOPHEN 0.5 TABLET: 5; 325 TABLET ORAL at 20:13

## 2023-03-26 RX ADMIN — MONTELUKAST SODIUM 10 MG: 10 TABLET ORAL at 11:55

## 2023-03-26 RX ADMIN — GABAPENTIN 100 MG: 100 CAPSULE ORAL at 14:44

## 2023-03-26 RX ADMIN — ROPINIROLE HYDROCHLORIDE 1 MG: 0.5 TABLET, FILM COATED ORAL at 22:11

## 2023-03-26 RX ADMIN — ROPINIROLE HYDROCHLORIDE 1 MG: 0.5 TABLET, FILM COATED ORAL at 14:44

## 2023-03-26 RX ADMIN — SODIUM CHLORIDE, PRESERVATIVE FREE 10 ML: 5 INJECTION INTRAVENOUS at 11:44

## 2023-03-26 RX ADMIN — ROPINIROLE HYDROCHLORIDE 1 MG: 0.5 TABLET, FILM COATED ORAL at 18:24

## 2023-03-26 RX ADMIN — CETIRIZINE HYDROCHLORIDE 10 MG: 10 TABLET, FILM COATED ORAL at 11:42

## 2023-03-26 RX ADMIN — ACETAMINOPHEN 650 MG: 325 TABLET ORAL at 15:45

## 2023-03-26 RX ADMIN — LEVOTHYROXINE SODIUM 100 MCG: 0.1 TABLET ORAL at 11:42

## 2023-03-26 RX ADMIN — ESTROGENS, CONJUGATED 1.25 MG: 0.62 TABLET, FILM COATED ORAL at 14:45

## 2023-03-26 RX ADMIN — SODIUM CHLORIDE, PRESERVATIVE FREE 10 ML: 5 INJECTION INTRAVENOUS at 20:11

## 2023-03-26 RX ADMIN — PANTOPRAZOLE SODIUM 40 MG: 40 TABLET, DELAYED RELEASE ORAL at 17:08

## 2023-03-26 RX ADMIN — CYANOCOBALAMIN 1000 MCG: 1000 INJECTION, SOLUTION INTRAMUSCULAR; SUBCUTANEOUS at 14:45

## 2023-03-26 RX ADMIN — CHOLECALCIFEROL TAB 25 MCG (1000 UNIT) 2000 UNITS: 25 TAB at 11:42

## 2023-03-26 RX ADMIN — CARVEDILOL 6.25 MG: 6.25 TABLET, FILM COATED ORAL at 11:43

## 2023-03-26 RX ADMIN — CARVEDILOL 6.25 MG: 6.25 TABLET, FILM COATED ORAL at 17:08

## 2023-03-26 RX ADMIN — FERROUS SULFATE TAB 325 MG (65 MG ELEMENTAL FE) 325 MG: 325 (65 FE) TAB at 11:43

## 2023-03-26 RX ADMIN — TAMSULOSIN HYDROCHLORIDE 0.4 MG: 0.4 CAPSULE ORAL at 11:42

## 2023-03-26 RX ADMIN — FERROUS SULFATE TAB 325 MG (65 MG ELEMENTAL FE) 325 MG: 325 (65 FE) TAB at 17:08

## 2023-03-26 RX ADMIN — DIPHENHYDRAMINE HYDROCHLORIDE 25 MG: 25 TABLET ORAL at 22:11

## 2023-03-26 RX ADMIN — GABAPENTIN 600 MG: 300 CAPSULE ORAL at 20:12

## 2023-03-26 RX ADMIN — Medication 2 PUFF: at 20:56

## 2023-03-26 RX ADMIN — APIXABAN 5 MG: 5 TABLET, FILM COATED ORAL at 11:44

## 2023-03-26 RX ADMIN — TRAZODONE HYDROCHLORIDE 100 MG: 50 TABLET ORAL at 20:15

## 2023-03-26 RX ADMIN — CALCIUM 500 MG: 500 TABLET ORAL at 11:44

## 2023-03-26 RX ADMIN — DILTIAZEM HYDROCHLORIDE 240 MG: 120 CAPSULE, COATED, EXTENDED RELEASE ORAL at 11:42

## 2023-03-26 RX ADMIN — Medication 1 TABLET: at 11:42

## 2023-03-26 RX ADMIN — OXYCODONE HYDROCHLORIDE AND ACETAMINOPHEN 0.5 TABLET: 5; 325 TABLET ORAL at 11:55

## 2023-03-26 ASSESSMENT — PAIN - FUNCTIONAL ASSESSMENT
PAIN_FUNCTIONAL_ASSESSMENT: 0-10

## 2023-03-26 ASSESSMENT — PAIN SCALES - GENERAL
PAINLEVEL_OUTOF10: 8
PAINLEVEL_OUTOF10: 6
PAINLEVEL_OUTOF10: 6
PAINLEVEL_OUTOF10: 4
PAINLEVEL_OUTOF10: 6
PAINLEVEL_OUTOF10: 6

## 2023-03-26 ASSESSMENT — PAIN DESCRIPTION - ORIENTATION
ORIENTATION: LEFT;RIGHT
ORIENTATION: RIGHT;LEFT

## 2023-03-26 ASSESSMENT — ENCOUNTER SYMPTOMS
NAUSEA: 0
COUGH: 0
EYES NEGATIVE: 1
ALLERGIC/IMMUNOLOGIC NEGATIVE: 1
SHORTNESS OF BREATH: 1
VOMITING: 0
RHINORRHEA: 0
COLOR CHANGE: 1
ABDOMINAL PAIN: 0

## 2023-03-26 ASSESSMENT — PAIN DESCRIPTION - LOCATION
LOCATION: LEG
LOCATION: LEG

## 2023-03-26 ASSESSMENT — PAIN DESCRIPTION - DESCRIPTORS: DESCRIPTORS: ACHING

## 2023-03-26 NOTE — RT PROTOCOL NOTE
Protocol order mode. 4-6 - enter or revise RT Bronchodilator order(s) to two equivalent RT bronchodilator orders with one order with BID Frequency and one order with Frequency of every 4 hours PRN wheezing or increased work of breathing using Per Protocol order mode. 7-10 - enter or revise RT Bronchodilator order(s) to two equivalent RT bronchodilator orders with one order with TID Frequency and one order with Frequency of every 4 hours PRN wheezing or increased work of breathing using Per Protocol order mode. 11-13 - enter or revise RT Bronchodilator order(s) to one equivalent RT bronchodilator order with QID Frequency and an Albuterol order with Frequency of every 4 hours PRN wheezing or increased work of breathing using Per Protocol order mode. Greater than 13 - enter or revise RT Bronchodilator order(s) to one equivalent RT bronchodilator order with every 4 hours Frequency and an Albuterol order with Frequency of every 2 hours PRN wheezing or increased work of breathing using Per Protocol order mode. RT to enter RT Home Evaluation for COPD & MDI Assessment order using Per Protocol order mode.     Electronically signed by René Garcia RCP on 3/26/2023 at 11:03 AM

## 2023-03-26 NOTE — ED NOTES
Ambulated pt approximately 6 feet with a walker, on 4L nasal cannula. Pt denies feeling SOB, dizzy, or feeling light headed. Pt's gait was weak, pt reporting pain (worse in RLE). Pt obviously attempting to bear weight with only upper extremities. Purposeful movement, but no more than shuffling. Pt's SpO2 dropped to 89%.       Gerda Lua RN  03/26/23 4656

## 2023-03-26 NOTE — ED PROVIDER NOTES
erythema to bilateral anterior shins that patient states is chronic and no new changes today         I was the primary provider for the patient. MDM: Patient was evaluated due to worsening leg pain over the last day or so and reportedly living by herself with some worsening gait over the last couple of days. She had good range of motion of all joints and I do not suspect septic arthritis. She did have some right calf pain although is on Eliquis. She denied any chest pain or new shortness of breath and normally wears oxygen and is at her baseline. Story not suggestive of acute coronary syndrome or pulmonary embolism. She did receive her baseline Percocet to see if this helps. Ultimately, her D-dimer was elevated and therefore ultrasound of the right lower extremity was ordered. She did attempt to ambulate but was very unsteady per nursing staff and complaining of significant right lower leg pain. Since she does live by herself and reports worsening trouble with walking and increasing pain, I do believe she will need to be admitted for PT/OT evaluation and ultrasound of the right lower extremity even though she is on a blood thinner. She was stable for the floor and agreed with this plan.   She is on her baseline oxygen and at this time I do not suspect pneumonia or other significant pathology but her oxygen can be trended in the hospital.        Keturah Tran MD  03/26/23 0197
Obeys commands  [place] Coma Scale Score: 15               PHYSICAL EXAM    (up to 7 for level 4, 8 or more for level 5)     ED Triage Vitals   BP Temp Temp Source Heart Rate Resp SpO2 Height Weight   03/26/23 0601 03/26/23 0601 03/26/23 0601 03/26/23 0559 03/26/23 0601 03/26/23 0601 03/26/23 0559 03/26/23 0559   (!) 193/80 98 °F (36.7 °C) Oral 85 18 95 % 5' 6\" (1.676 m) 200 lb (90.7 kg)       Physical Exam  Constitutional:       General: She is not in acute distress. Appearance: Normal appearance. She is obese. She is not ill-appearing. HENT:      Head: Normocephalic and atraumatic. Mouth/Throat:      Mouth: Mucous membranes are dry. Eyes:      General: No scleral icterus. Conjunctiva/sclera: Conjunctivae normal.   Cardiovascular:      Rate and Rhythm: Normal rate and regular rhythm. Pulses: Normal pulses. Heart sounds: Normal heart sounds. No murmur heard. No friction rub. No gallop. Pulmonary:      Effort: Pulmonary effort is normal.      Breath sounds: Wheezing present. No rhonchi or rales. Abdominal:      General: Abdomen is flat. Bowel sounds are normal. There is no distension. Palpations: Abdomen is soft. Tenderness: There is no abdominal tenderness. Musculoskeletal:      Right hip: Decreased range of motion. Left hip: Decreased range of motion. Right lower leg: Tenderness present. 2+ Pitting Edema present. Left lower leg: Tenderness present. 2+ Pitting Edema present. Comments: Strength is 1 out of 5 in the lower extremities. Sensation is intact. Skin:     General: Skin is warm and dry. Neurological:      General: No focal deficit present. Mental Status: She is alert and oriented to person, place, and time. Psychiatric:         Mood and Affect: Mood normal.         Behavior: Behavior normal.         Thought Content:  Thought content normal.         Judgment: Judgment normal.       DIAGNOSTIC RESULTS     EKG: All EKG's are interpreted

## 2023-03-26 NOTE — H&P
every 6 hours as needed for Itching    Historical Provider, MD   doxepin (SINEQUAN) 50 MG capsule Take 50 mg by mouth nightly    Historical Provider, MD   gabapentin (NEURONTIN) 600 MG tablet Take 1 tablet by mouth at bedtime for 30 days. 10/12/22 3/26/23  Becca Lockhart DO   gabapentin (NEURONTIN) 100 MG capsule Take 1 capsule by mouth 3 times daily for 30 days. 10/9/22 3/26/23  Becca Posey DO   venlafaxine (EFFEXOR XR) 37.5 MG extended release capsule Take 1 capsule by mouth daily 9/19/22   Lamar Anand MD   venlafaxine (EFFEXOR XR) 37.5 MG extended release capsule Take 1 capsule by mouth daily 9/13/22   Lamar Anand MD   ipratropium (ATROVENT) 0.02 % nebulizer solution Take 2.5 mLs by nebulization in the morning and 2.5 mLs at noon and 2.5 mLs in the evening and 2.5 mLs before bedtime. 8/9/22   Elliot Flanagan. Rjei Moore DO   ursodiol (ACTIGALL) 300 MG capsule Take 300 mg by mouth in the morning and 300 mg before bedtime. Historical Provider, MD   vitamin C (ASCORBIC ACID) 500 MG tablet Take 500 mg by mouth in the morning. Historical Provider, MD   Cholecalciferol (VITAMIN D) 50 MCG (2000 UT) CAPS capsule Take by mouth    Historical Provider, MD   levothyroxine (SYNTHROID) 100 MCG tablet Take 1 tablet by mouth Daily 7/12/22   Lamar Anand MD   tamsulosin [facility]) 0.4 MG capsule Take 1 capsule by mouth daily 5/14/22   Kaity Penny MD   montelukast (SINGULAIR) 10 MG tablet Take 1 tablet by mouth daily 4/19/22   Sage Benitez MD   SITagliptin (JANUVIA) 100 MG tablet Take 1 tablet by mouth daily 2/25/22   Elliot Moore DO   benzonatate (TESSALON) 100 MG capsule TAKE 1 CAPSULE THREE TIMES A DAY AS NEEDED FOR COUGH 10/18/21   Sage Benitez MD   levalbuterol [facility]) 45 MCG/ACT inhaler Inhale 2 puffs into the lungs every 6 hours as needed for Shortness of Breath 5/20/21   Romi Brannon MD   ferrous sulfate 325 (65 Fe) MG tablet Take 325 mg by mouth 3 times daily (with meals)    Historical

## 2023-03-27 ENCOUNTER — APPOINTMENT (OUTPATIENT)
Dept: VASCULAR LAB | Age: 78
DRG: 552 | End: 2023-03-27
Payer: MEDICARE

## 2023-03-27 ENCOUNTER — APPOINTMENT (OUTPATIENT)
Dept: MRI IMAGING | Age: 78
DRG: 552 | End: 2023-03-27
Payer: MEDICARE

## 2023-03-27 LAB
ANION GAP SERPL CALCULATED.3IONS-SCNC: 7 MMOL/L (ref 3–16)
BASOPHILS # BLD: 0 K/UL (ref 0–0.2)
BASOPHILS NFR BLD: 0.3 %
BUN SERPL-MCNC: 10 MG/DL (ref 7–20)
CALCIUM SERPL-MCNC: 8.4 MG/DL (ref 8.3–10.6)
CHLORIDE SERPL-SCNC: 91 MMOL/L (ref 99–110)
CK SERPL-CCNC: 33 U/L (ref 26–192)
CO2 SERPL-SCNC: 31 MMOL/L (ref 21–32)
CREAT SERPL-MCNC: <0.5 MG/DL (ref 0.6–1.2)
CRP SERPL-MCNC: 12.9 MG/L (ref 0–5.1)
DEPRECATED RDW RBC AUTO: 15.3 % (ref 12.4–15.4)
EOSINOPHIL # BLD: 0.2 K/UL (ref 0–0.6)
EOSINOPHIL NFR BLD: 2.3 %
ERYTHROCYTE [SEDIMENTATION RATE] IN BLOOD BY WESTERGREN METHOD: 71 MM/HR (ref 0–30)
GFR SERPLBLD CREATININE-BSD FMLA CKD-EPI: >60 ML/MIN/{1.73_M2}
GLUCOSE BLD-MCNC: 102 MG/DL (ref 70–99)
GLUCOSE BLD-MCNC: 108 MG/DL (ref 70–99)
GLUCOSE BLD-MCNC: 178 MG/DL (ref 70–99)
GLUCOSE BLD-MCNC: 99 MG/DL (ref 70–99)
GLUCOSE SERPL-MCNC: 98 MG/DL (ref 70–99)
HCT VFR BLD AUTO: 28.7 % (ref 36–48)
HGB BLD-MCNC: 9.6 G/DL (ref 12–16)
LYMPHOCYTES # BLD: 0.7 K/UL (ref 1–5.1)
LYMPHOCYTES NFR BLD: 9.9 %
MCH RBC QN AUTO: 29.3 PG (ref 26–34)
MCHC RBC AUTO-ENTMCNC: 33.4 G/DL (ref 31–36)
MCV RBC AUTO: 87.6 FL (ref 80–100)
MONOCYTES # BLD: 0.5 K/UL (ref 0–1.3)
MONOCYTES NFR BLD: 6.5 %
NEUTROPHILS # BLD: 5.9 K/UL (ref 1.7–7.7)
NEUTROPHILS NFR BLD: 81 %
PERFORMED ON: ABNORMAL
PERFORMED ON: NORMAL
PLATELET # BLD AUTO: 175 K/UL (ref 135–450)
PMV BLD AUTO: 6.6 FL (ref 5–10.5)
POTASSIUM SERPL-SCNC: 3.8 MMOL/L (ref 3.5–5.1)
RBC # BLD AUTO: 3.28 M/UL (ref 4–5.2)
SODIUM SERPL-SCNC: 129 MMOL/L (ref 136–145)
WBC # BLD AUTO: 7.3 K/UL (ref 4–11)

## 2023-03-27 PROCEDURE — 2580000003 HC RX 258

## 2023-03-27 PROCEDURE — 85025 COMPLETE CBC W/AUTO DIFF WBC: CPT

## 2023-03-27 PROCEDURE — 72158 MRI LUMBAR SPINE W/O & W/DYE: CPT

## 2023-03-27 PROCEDURE — A9579 GAD-BASE MR CONTRAST NOS,1ML: HCPCS

## 2023-03-27 PROCEDURE — 6370000000 HC RX 637 (ALT 250 FOR IP)

## 2023-03-27 PROCEDURE — 83550 IRON BINDING TEST: CPT

## 2023-03-27 PROCEDURE — 2700000000 HC OXYGEN THERAPY PER DAY

## 2023-03-27 PROCEDURE — G0378 HOSPITAL OBSERVATION PER HR: HCPCS

## 2023-03-27 PROCEDURE — 36415 COLL VENOUS BLD VENIPUNCTURE: CPT

## 2023-03-27 PROCEDURE — 83540 ASSAY OF IRON: CPT

## 2023-03-27 PROCEDURE — 93971 EXTREMITY STUDY: CPT

## 2023-03-27 PROCEDURE — 94761 N-INVAS EAR/PLS OXIMETRY MLT: CPT

## 2023-03-27 PROCEDURE — 85652 RBC SED RATE AUTOMATED: CPT

## 2023-03-27 PROCEDURE — 6370000000 HC RX 637 (ALT 250 FOR IP): Performed by: NURSE PRACTITIONER

## 2023-03-27 PROCEDURE — 86140 C-REACTIVE PROTEIN: CPT

## 2023-03-27 PROCEDURE — 94640 AIRWAY INHALATION TREATMENT: CPT

## 2023-03-27 PROCEDURE — 6360000004 HC RX CONTRAST MEDICATION

## 2023-03-27 PROCEDURE — 80048 BASIC METABOLIC PNL TOTAL CA: CPT

## 2023-03-27 PROCEDURE — 82550 ASSAY OF CK (CPK): CPT

## 2023-03-27 RX ORDER — MONTELUKAST SODIUM 10 MG/1
10 TABLET ORAL DAILY
Qty: 90 TABLET | Refills: 1 | Status: SHIPPED | OUTPATIENT
Start: 2023-03-27

## 2023-03-27 RX ADMIN — FERROUS SULFATE TAB 325 MG (65 MG ELEMENTAL FE) 325 MG: 325 (65 FE) TAB at 12:36

## 2023-03-27 RX ADMIN — OXYCODONE HYDROCHLORIDE AND ACETAMINOPHEN 500 MG: 500 TABLET ORAL at 08:51

## 2023-03-27 RX ADMIN — ROPINIROLE HYDROCHLORIDE 1 MG: 0.5 TABLET, FILM COATED ORAL at 17:35

## 2023-03-27 RX ADMIN — CALCIUM 500 MG: 500 TABLET ORAL at 08:51

## 2023-03-27 RX ADMIN — ACETAMINOPHEN 650 MG: 325 TABLET ORAL at 06:38

## 2023-03-27 RX ADMIN — ATORVASTATIN CALCIUM 20 MG: 10 TABLET, FILM COATED ORAL at 22:12

## 2023-03-27 RX ADMIN — ROPINIROLE HYDROCHLORIDE 1 MG: 0.5 TABLET, FILM COATED ORAL at 15:29

## 2023-03-27 RX ADMIN — APIXABAN 5 MG: 5 TABLET, FILM COATED ORAL at 08:51

## 2023-03-27 RX ADMIN — FERROUS SULFATE TAB 325 MG (65 MG ELEMENTAL FE) 325 MG: 325 (65 FE) TAB at 08:51

## 2023-03-27 RX ADMIN — SODIUM CHLORIDE, PRESERVATIVE FREE 10 ML: 5 INJECTION INTRAVENOUS at 08:53

## 2023-03-27 RX ADMIN — VENLAFAXINE HYDROCHLORIDE 37.5 MG: 37.5 CAPSULE, EXTENDED RELEASE ORAL at 08:52

## 2023-03-27 RX ADMIN — SODIUM CHLORIDE, PRESERVATIVE FREE 10 ML: 5 INJECTION INTRAVENOUS at 22:13

## 2023-03-27 RX ADMIN — TAMSULOSIN HYDROCHLORIDE 0.4 MG: 0.4 CAPSULE ORAL at 08:52

## 2023-03-27 RX ADMIN — MONTELUKAST SODIUM 10 MG: 10 TABLET ORAL at 08:51

## 2023-03-27 RX ADMIN — DILTIAZEM HYDROCHLORIDE 240 MG: 120 CAPSULE, COATED, EXTENDED RELEASE ORAL at 08:52

## 2023-03-27 RX ADMIN — OXYCODONE HYDROCHLORIDE AND ACETAMINOPHEN 0.5 TABLET: 5; 325 TABLET ORAL at 22:15

## 2023-03-27 RX ADMIN — GADOTERIDOL 19 ML: 279.3 INJECTION, SOLUTION INTRAVENOUS at 12:56

## 2023-03-27 RX ADMIN — ESTROGENS, CONJUGATED 1.25 MG: 0.62 TABLET, FILM COATED ORAL at 08:57

## 2023-03-27 RX ADMIN — ROPINIROLE HYDROCHLORIDE 1 MG: 0.5 TABLET, FILM COATED ORAL at 12:36

## 2023-03-27 RX ADMIN — FAMOTIDINE 20 MG: 20 TABLET, FILM COATED ORAL at 08:51

## 2023-03-27 RX ADMIN — APIXABAN 5 MG: 5 TABLET, FILM COATED ORAL at 22:12

## 2023-03-27 RX ADMIN — Medication 2 PUFF: at 19:56

## 2023-03-27 RX ADMIN — GABAPENTIN 600 MG: 300 CAPSULE ORAL at 22:12

## 2023-03-27 RX ADMIN — GABAPENTIN 100 MG: 100 CAPSULE ORAL at 22:14

## 2023-03-27 RX ADMIN — CARVEDILOL 6.25 MG: 6.25 TABLET, FILM COATED ORAL at 17:36

## 2023-03-27 RX ADMIN — PANTOPRAZOLE SODIUM 40 MG: 40 TABLET, DELAYED RELEASE ORAL at 06:16

## 2023-03-27 RX ADMIN — LOSARTAN POTASSIUM 100 MG: 100 TABLET, FILM COATED ORAL at 08:51

## 2023-03-27 RX ADMIN — GABAPENTIN 100 MG: 100 CAPSULE ORAL at 15:29

## 2023-03-27 RX ADMIN — TRAZODONE HYDROCHLORIDE 100 MG: 50 TABLET ORAL at 22:21

## 2023-03-27 RX ADMIN — ROPINIROLE HYDROCHLORIDE 1 MG: 0.5 TABLET, FILM COATED ORAL at 22:13

## 2023-03-27 RX ADMIN — Medication 1 TABLET: at 08:52

## 2023-03-27 RX ADMIN — GABAPENTIN 100 MG: 100 CAPSULE ORAL at 08:52

## 2023-03-27 RX ADMIN — TIOTROPIUM BROMIDE INHALATION SPRAY 2 PUFF: 3.12 SPRAY, METERED RESPIRATORY (INHALATION) at 08:32

## 2023-03-27 RX ADMIN — DIPHENHYDRAMINE HYDROCHLORIDE 25 MG: 25 TABLET ORAL at 06:39

## 2023-03-27 RX ADMIN — Medication 2 PUFF: at 08:32

## 2023-03-27 RX ADMIN — PANTOPRAZOLE SODIUM 40 MG: 40 TABLET, DELAYED RELEASE ORAL at 15:29

## 2023-03-27 RX ADMIN — URSODIOL 300 MG: 300 CAPSULE ORAL at 22:12

## 2023-03-27 RX ADMIN — ROPINIROLE HYDROCHLORIDE 1 MG: 0.5 TABLET, FILM COATED ORAL at 06:16

## 2023-03-27 RX ADMIN — CETIRIZINE HYDROCHLORIDE 10 MG: 10 TABLET, FILM COATED ORAL at 08:51

## 2023-03-27 RX ADMIN — LEVOTHYROXINE SODIUM 100 MCG: 0.1 TABLET ORAL at 06:16

## 2023-03-27 RX ADMIN — OXYCODONE HYDROCHLORIDE AND ACETAMINOPHEN 0.5 TABLET: 5; 325 TABLET ORAL at 08:52

## 2023-03-27 RX ADMIN — FERROUS SULFATE TAB 325 MG (65 MG ELEMENTAL FE) 325 MG: 325 (65 FE) TAB at 17:36

## 2023-03-27 RX ADMIN — CARVEDILOL 6.25 MG: 6.25 TABLET, FILM COATED ORAL at 08:51

## 2023-03-27 RX ADMIN — FUROSEMIDE 20 MG: 20 TABLET ORAL at 08:51

## 2023-03-27 RX ADMIN — CHOLECALCIFEROL TAB 25 MCG (1000 UNIT) 2000 UNITS: 25 TAB at 08:52

## 2023-03-27 RX ADMIN — URSODIOL 300 MG: 300 CAPSULE ORAL at 08:51

## 2023-03-27 ASSESSMENT — PAIN DESCRIPTION - ORIENTATION
ORIENTATION: RIGHT
ORIENTATION: RIGHT

## 2023-03-27 ASSESSMENT — PAIN SCALES - GENERAL
PAINLEVEL_OUTOF10: 5
PAINLEVEL_OUTOF10: 0
PAINLEVEL_OUTOF10: 5
PAINLEVEL_OUTOF10: 4
PAINLEVEL_OUTOF10: 8

## 2023-03-27 ASSESSMENT — PAIN DESCRIPTION - LOCATION
LOCATION: LEG

## 2023-03-27 ASSESSMENT — PAIN DESCRIPTION - DESCRIPTORS
DESCRIPTORS: ACHING
DESCRIPTORS: ACHING

## 2023-03-27 NOTE — CARE COORDINATION
Writer spoke with patient at bedside, she is refusing SNF, and wants to go home and resume [address] with [facility]. Writer called Oksana Merino with [facility] to let know patient would likely DC tomorrow.      Saul Hernandez RN

## 2023-03-27 NOTE — CARE COORDINATION
Case Management Assessment  Initial Evaluation    Date/Time of Evaluation: 3/27/2023 2:13 PM  Assessment Completed by: Petros Lutz RN    If patient is discharged prior to next notation, then this note serves as note for discharge by case management. Patient Name: Lior Singh                   YOB: 1945  Diagnosis: Leg pain [M79.606]  Elevated brain natriuretic peptide (BNP) level [R79.89]  Bilateral lower extremity edema [R60.0]  Elevated d-dimer [R79.89]                   Date / Time: 3/26/2023  5:55 AM    Patient Admission Status: Observation   Readmission Risk (Low < 19, Mod (19-27), High > 27): Readmission Risk Score: 30 ( )    Current PCP: Bree Alfonso MD  PCP verified by CM? Yes    Chart Reviewed: Yes      History Provided by: Patient  Patient Orientation: Alert and Oriented    Patient Cognition: Alert    Hospitalization in the last 30 days (Readmission):  No    If yes, Readmission Assessment in CM Navigator will be completed. Advance Directives:      Code Status: Limited   Patient's Primary Decision Maker is: Named in [address] [place] [address]    Primary Decision Maker: Santi Wilhelm [facility] Child - [phone]    Secondary Decision Maker: Macy Erm - Child - [phone]    Discharge Planning:    Patient lives with: Alone Type of Home: House  Primary Care Giver: Self  Patient Support Systems include: Children   Current Financial resources: None  Current community resources: None  Current services prior to admission: Oxygen Therapy, Other (Comment) (PT)            Current DME:              Type of Home Care services:  OT, PT    ADLS  Prior functional level: Independent in ADLs/IADLs  Current functional level:      PT AM-PAC:   /24  OT AM-PAC: 14 /24    Family can provide assistance at DC: Yes  Would you like Case Management to discuss the discharge plan with any other family members/significant others, and if so, who?     Plans to Return to Present Housing: Yes  Other

## 2023-03-28 LAB
GLUCOSE BLD-MCNC: 109 MG/DL (ref 70–99)
GLUCOSE BLD-MCNC: 111 MG/DL (ref 70–99)
GLUCOSE BLD-MCNC: 135 MG/DL (ref 70–99)
GLUCOSE BLD-MCNC: 323 MG/DL (ref 70–99)
IRON SATN MFR SERPL: 8 % (ref 15–50)
IRON SERPL-MCNC: 26 UG/DL (ref 37–145)
PERFORMED ON: ABNORMAL
TIBC SERPL-MCNC: 323 UG/DL (ref 260–445)

## 2023-03-28 PROCEDURE — 6370000000 HC RX 637 (ALT 250 FOR IP)

## 2023-03-28 PROCEDURE — 97116 GAIT TRAINING THERAPY: CPT

## 2023-03-28 PROCEDURE — 6370000000 HC RX 637 (ALT 250 FOR IP): Performed by: NURSE PRACTITIONER

## 2023-03-28 PROCEDURE — 96375 TX/PRO/DX INJ NEW DRUG ADDON: CPT

## 2023-03-28 PROCEDURE — 97110 THERAPEUTIC EXERCISES: CPT

## 2023-03-28 PROCEDURE — 94761 N-INVAS EAR/PLS OXIMETRY MLT: CPT

## 2023-03-28 PROCEDURE — G0378 HOSPITAL OBSERVATION PER HR: HCPCS

## 2023-03-28 PROCEDURE — 2580000003 HC RX 258

## 2023-03-28 PROCEDURE — 94640 AIRWAY INHALATION TREATMENT: CPT

## 2023-03-28 PROCEDURE — 97530 THERAPEUTIC ACTIVITIES: CPT

## 2023-03-28 PROCEDURE — 6360000002 HC RX W HCPCS

## 2023-03-28 PROCEDURE — 2700000000 HC OXYGEN THERAPY PER DAY

## 2023-03-28 PROCEDURE — 99222 1ST HOSP IP/OBS MODERATE 55: CPT | Performed by: INTERNAL MEDICINE

## 2023-03-28 RX ORDER — GABAPENTIN 100 MG/1
100 CAPSULE ORAL 3 TIMES DAILY
Qty: 90 CAPSULE | Refills: 0 | Status: SHIPPED | OUTPATIENT
Start: 2023-03-28 | End: 2023-04-27

## 2023-03-28 RX ORDER — GABAPENTIN 600 MG/1
600 TABLET ORAL NIGHTLY
Qty: 30 TABLET | Refills: 0 | Status: SHIPPED | OUTPATIENT
Start: 2023-03-28 | End: 2023-04-27

## 2023-03-28 RX ADMIN — PANTOPRAZOLE SODIUM 40 MG: 40 TABLET, DELAYED RELEASE ORAL at 17:16

## 2023-03-28 RX ADMIN — OXYCODONE HYDROCHLORIDE AND ACETAMINOPHEN 0.5 TABLET: 5; 325 TABLET ORAL at 21:59

## 2023-03-28 RX ADMIN — ROPINIROLE HYDROCHLORIDE 1 MG: 0.5 TABLET, FILM COATED ORAL at 06:33

## 2023-03-28 RX ADMIN — ONDANSETRON 4 MG: 2 INJECTION INTRAMUSCULAR; INTRAVENOUS at 11:37

## 2023-03-28 RX ADMIN — TIOTROPIUM BROMIDE INHALATION SPRAY 2 PUFF: 3.12 SPRAY, METERED RESPIRATORY (INHALATION) at 08:18

## 2023-03-28 RX ADMIN — OXYCODONE HYDROCHLORIDE AND ACETAMINOPHEN 500 MG: 500 TABLET ORAL at 10:39

## 2023-03-28 RX ADMIN — SODIUM CHLORIDE, PRESERVATIVE FREE 10 ML: 5 INJECTION INTRAVENOUS at 10:40

## 2023-03-28 RX ADMIN — APIXABAN 5 MG: 5 TABLET, FILM COATED ORAL at 10:39

## 2023-03-28 RX ADMIN — SODIUM CHLORIDE, PRESERVATIVE FREE 10 ML: 5 INJECTION INTRAVENOUS at 20:56

## 2023-03-28 RX ADMIN — VENLAFAXINE HYDROCHLORIDE 37.5 MG: 37.5 CAPSULE, EXTENDED RELEASE ORAL at 10:40

## 2023-03-28 RX ADMIN — CALCIUM 500 MG: 500 TABLET ORAL at 10:40

## 2023-03-28 RX ADMIN — ROPINIROLE HYDROCHLORIDE 1 MG: 0.5 TABLET, FILM COATED ORAL at 21:59

## 2023-03-28 RX ADMIN — DILTIAZEM HYDROCHLORIDE 240 MG: 120 CAPSULE, COATED, EXTENDED RELEASE ORAL at 10:39

## 2023-03-28 RX ADMIN — Medication 2 PUFF: at 08:18

## 2023-03-28 RX ADMIN — URSODIOL 300 MG: 300 CAPSULE ORAL at 20:55

## 2023-03-28 RX ADMIN — PANTOPRAZOLE SODIUM 40 MG: 40 TABLET, DELAYED RELEASE ORAL at 06:33

## 2023-03-28 RX ADMIN — LOSARTAN POTASSIUM 100 MG: 100 TABLET, FILM COATED ORAL at 10:49

## 2023-03-28 RX ADMIN — GABAPENTIN 100 MG: 100 CAPSULE ORAL at 14:27

## 2023-03-28 RX ADMIN — CHOLECALCIFEROL TAB 25 MCG (1000 UNIT) 2000 UNITS: 25 TAB at 10:39

## 2023-03-28 RX ADMIN — FERROUS SULFATE TAB 325 MG (65 MG ELEMENTAL FE) 325 MG: 325 (65 FE) TAB at 10:39

## 2023-03-28 RX ADMIN — ROPINIROLE HYDROCHLORIDE 1 MG: 0.5 TABLET, FILM COATED ORAL at 10:39

## 2023-03-28 RX ADMIN — ROPINIROLE HYDROCHLORIDE 1 MG: 0.5 TABLET, FILM COATED ORAL at 17:16

## 2023-03-28 RX ADMIN — APIXABAN 5 MG: 5 TABLET, FILM COATED ORAL at 20:55

## 2023-03-28 RX ADMIN — URSODIOL 300 MG: 300 CAPSULE ORAL at 10:38

## 2023-03-28 RX ADMIN — FERROUS SULFATE TAB 325 MG (65 MG ELEMENTAL FE) 325 MG: 325 (65 FE) TAB at 17:15

## 2023-03-28 RX ADMIN — FERROUS SULFATE TAB 325 MG (65 MG ELEMENTAL FE) 325 MG: 325 (65 FE) TAB at 14:27

## 2023-03-28 RX ADMIN — GABAPENTIN 600 MG: 300 CAPSULE ORAL at 21:59

## 2023-03-28 RX ADMIN — MONTELUKAST SODIUM 10 MG: 10 TABLET ORAL at 11:48

## 2023-03-28 RX ADMIN — CARVEDILOL 6.25 MG: 6.25 TABLET, FILM COATED ORAL at 10:40

## 2023-03-28 RX ADMIN — CETIRIZINE HYDROCHLORIDE 10 MG: 10 TABLET, FILM COATED ORAL at 10:40

## 2023-03-28 RX ADMIN — ESTROGENS, CONJUGATED 1.25 MG: 0.62 TABLET, FILM COATED ORAL at 10:40

## 2023-03-28 RX ADMIN — OXYCODONE HYDROCHLORIDE AND ACETAMINOPHEN 0.5 TABLET: 5; 325 TABLET ORAL at 10:38

## 2023-03-28 RX ADMIN — TAMSULOSIN HYDROCHLORIDE 0.4 MG: 0.4 CAPSULE ORAL at 10:39

## 2023-03-28 RX ADMIN — ROPINIROLE HYDROCHLORIDE 1 MG: 0.5 TABLET, FILM COATED ORAL at 14:26

## 2023-03-28 RX ADMIN — Medication 1 TABLET: at 10:39

## 2023-03-28 RX ADMIN — GABAPENTIN 100 MG: 100 CAPSULE ORAL at 21:03

## 2023-03-28 RX ADMIN — DIPHENHYDRAMINE HYDROCHLORIDE 25 MG: 25 TABLET ORAL at 20:59

## 2023-03-28 RX ADMIN — TRAZODONE HYDROCHLORIDE 100 MG: 50 TABLET ORAL at 21:59

## 2023-03-28 RX ADMIN — GABAPENTIN 100 MG: 100 CAPSULE ORAL at 10:40

## 2023-03-28 RX ADMIN — FAMOTIDINE 20 MG: 20 TABLET, FILM COATED ORAL at 10:40

## 2023-03-28 RX ADMIN — LEVOTHYROXINE SODIUM 100 MCG: 0.1 TABLET ORAL at 06:33

## 2023-03-28 RX ADMIN — ATORVASTATIN CALCIUM 20 MG: 10 TABLET, FILM COATED ORAL at 20:54

## 2023-03-28 RX ADMIN — FUROSEMIDE 20 MG: 20 TABLET ORAL at 10:39

## 2023-03-28 RX ADMIN — CARVEDILOL 6.25 MG: 6.25 TABLET, FILM COATED ORAL at 17:16

## 2023-03-28 RX ADMIN — Medication 2 PUFF: at 20:01

## 2023-03-28 ASSESSMENT — PAIN DESCRIPTION - LOCATION
LOCATION: LEG
LOCATION: LEG

## 2023-03-28 ASSESSMENT — PAIN DESCRIPTION - ORIENTATION: ORIENTATION: RIGHT;LEFT

## 2023-03-28 ASSESSMENT — PAIN SCALES - GENERAL
PAINLEVEL_OUTOF10: 3
PAINLEVEL_OUTOF10: 4
PAINLEVEL_OUTOF10: 3

## 2023-03-28 ASSESSMENT — PAIN DESCRIPTION - PAIN TYPE: TYPE: ACUTE PAIN

## 2023-03-28 NOTE — TELEPHONE ENCOUNTER
Requesting Refill    gabapentin (NEURONTIN) capsule 100 mg     gabapentin (NEURONTIN) capsule 600 mg       Last Office Visit  -  1/17/23,  3/8/23  Next Office Visit  -  none

## 2023-03-28 NOTE — TELEPHONE ENCOUNTER
Last Office Visit  -  03/08/2023  Next Office Visit  -  n/a    Last Filled  -  10/12/2022 10/09/2022  Last UDS -  05/07/2022  Contract -  n/a

## 2023-03-29 DIAGNOSIS — J44.9 CHRONIC OBSTRUCTIVE PULMONARY DISEASE, UNSPECIFIED COPD TYPE (HCC): ICD-10-CM

## 2023-03-29 PROBLEM — M46.46 DISCITIS OF LUMBAR REGION: Status: ACTIVE | Noted: 2023-03-29

## 2023-03-29 LAB
GLUCOSE BLD-MCNC: 108 MG/DL (ref 70–99)
GLUCOSE BLD-MCNC: 117 MG/DL (ref 70–99)
GLUCOSE BLD-MCNC: 167 MG/DL (ref 70–99)
INR PPP: 1.2 (ref 0.84–1.16)
LV EF: 58 %
LVEF MODALITY: NORMAL
MRSA DNA SPEC QL NAA+PROBE: NORMAL
PERFORMED ON: ABNORMAL
PROTHROMBIN TIME: 15.2 SEC (ref 11.5–14.8)

## 2023-03-29 PROCEDURE — 94761 N-INVAS EAR/PLS OXIMETRY MLT: CPT

## 2023-03-29 PROCEDURE — 6370000000 HC RX 637 (ALT 250 FOR IP)

## 2023-03-29 PROCEDURE — 94640 AIRWAY INHALATION TREATMENT: CPT

## 2023-03-29 PROCEDURE — C8929 TTE W OR WO FOL WCON,DOPPLER: HCPCS

## 2023-03-29 PROCEDURE — 36415 COLL VENOUS BLD VENIPUNCTURE: CPT

## 2023-03-29 PROCEDURE — 6370000000 HC RX 637 (ALT 250 FOR IP): Performed by: NURSE PRACTITIONER

## 2023-03-29 PROCEDURE — 2580000003 HC RX 258

## 2023-03-29 PROCEDURE — 85610 PROTHROMBIN TIME: CPT

## 2023-03-29 PROCEDURE — 2700000000 HC OXYGEN THERAPY PER DAY

## 2023-03-29 PROCEDURE — 1200000000 HC SEMI PRIVATE

## 2023-03-29 PROCEDURE — 87641 MR-STAPH DNA AMP PROBE: CPT

## 2023-03-29 RX ORDER — ACLIDINIUM BROMIDE 400 UG/1
POWDER, METERED RESPIRATORY (INHALATION)
Qty: 3 EACH | Refills: 1 | Status: SHIPPED | OUTPATIENT
Start: 2023-03-29

## 2023-03-29 RX ADMIN — ROPINIROLE HYDROCHLORIDE 1 MG: 0.5 TABLET, FILM COATED ORAL at 13:04

## 2023-03-29 RX ADMIN — ROPINIROLE HYDROCHLORIDE 1 MG: 0.5 TABLET, FILM COATED ORAL at 06:29

## 2023-03-29 RX ADMIN — PANTOPRAZOLE SODIUM 40 MG: 40 TABLET, DELAYED RELEASE ORAL at 17:20

## 2023-03-29 RX ADMIN — CETIRIZINE HYDROCHLORIDE 10 MG: 10 TABLET, FILM COATED ORAL at 09:50

## 2023-03-29 RX ADMIN — LOSARTAN POTASSIUM 100 MG: 100 TABLET, FILM COATED ORAL at 09:51

## 2023-03-29 RX ADMIN — FAMOTIDINE 20 MG: 20 TABLET, FILM COATED ORAL at 09:50

## 2023-03-29 RX ADMIN — SODIUM CHLORIDE, PRESERVATIVE FREE 10 ML: 5 INJECTION INTRAVENOUS at 20:11

## 2023-03-29 RX ADMIN — ACETAMINOPHEN 650 MG: 325 TABLET ORAL at 22:27

## 2023-03-29 RX ADMIN — Medication 2 PUFF: at 08:31

## 2023-03-29 RX ADMIN — ATORVASTATIN CALCIUM 20 MG: 10 TABLET, FILM COATED ORAL at 20:12

## 2023-03-29 RX ADMIN — URSODIOL 300 MG: 300 CAPSULE ORAL at 09:51

## 2023-03-29 RX ADMIN — ROPINIROLE HYDROCHLORIDE 1 MG: 0.5 TABLET, FILM COATED ORAL at 09:51

## 2023-03-29 RX ADMIN — OXYCODONE HYDROCHLORIDE AND ACETAMINOPHEN 500 MG: 500 TABLET ORAL at 09:51

## 2023-03-29 RX ADMIN — Medication 1 TABLET: at 09:51

## 2023-03-29 RX ADMIN — GABAPENTIN 600 MG: 300 CAPSULE ORAL at 21:46

## 2023-03-29 RX ADMIN — DIPHENHYDRAMINE HYDROCHLORIDE 25 MG: 25 TABLET ORAL at 20:14

## 2023-03-29 RX ADMIN — FERROUS SULFATE TAB 325 MG (65 MG ELEMENTAL FE) 325 MG: 325 (65 FE) TAB at 09:51

## 2023-03-29 RX ADMIN — FERROUS SULFATE TAB 325 MG (65 MG ELEMENTAL FE) 325 MG: 325 (65 FE) TAB at 17:20

## 2023-03-29 RX ADMIN — MONTELUKAST SODIUM 10 MG: 10 TABLET ORAL at 09:50

## 2023-03-29 RX ADMIN — GABAPENTIN 100 MG: 100 CAPSULE ORAL at 13:03

## 2023-03-29 RX ADMIN — OXYCODONE HYDROCHLORIDE AND ACETAMINOPHEN 0.5 TABLET: 5; 325 TABLET ORAL at 21:47

## 2023-03-29 RX ADMIN — CALCIUM 500 MG: 500 TABLET ORAL at 09:50

## 2023-03-29 RX ADMIN — TIOTROPIUM BROMIDE INHALATION SPRAY 2 PUFF: 3.12 SPRAY, METERED RESPIRATORY (INHALATION) at 08:31

## 2023-03-29 RX ADMIN — GABAPENTIN 100 MG: 100 CAPSULE ORAL at 09:50

## 2023-03-29 RX ADMIN — ROPINIROLE HYDROCHLORIDE 1 MG: 0.5 TABLET, FILM COATED ORAL at 21:47

## 2023-03-29 RX ADMIN — TAMSULOSIN HYDROCHLORIDE 0.4 MG: 0.4 CAPSULE ORAL at 09:51

## 2023-03-29 RX ADMIN — Medication 2 PUFF: at 19:53

## 2023-03-29 RX ADMIN — GABAPENTIN 100 MG: 100 CAPSULE ORAL at 20:13

## 2023-03-29 RX ADMIN — ESTROGENS, CONJUGATED 1.25 MG: 0.62 TABLET, FILM COATED ORAL at 10:05

## 2023-03-29 RX ADMIN — VENLAFAXINE HYDROCHLORIDE 37.5 MG: 37.5 CAPSULE, EXTENDED RELEASE ORAL at 09:50

## 2023-03-29 RX ADMIN — OXYCODONE HYDROCHLORIDE AND ACETAMINOPHEN 0.5 TABLET: 5; 325 TABLET ORAL at 09:51

## 2023-03-29 RX ADMIN — CHOLECALCIFEROL TAB 25 MCG (1000 UNIT) 2000 UNITS: 25 TAB at 09:51

## 2023-03-29 RX ADMIN — DILTIAZEM HYDROCHLORIDE 240 MG: 120 CAPSULE, COATED, EXTENDED RELEASE ORAL at 09:50

## 2023-03-29 RX ADMIN — URSODIOL 300 MG: 300 CAPSULE ORAL at 20:13

## 2023-03-29 RX ADMIN — CARVEDILOL 6.25 MG: 6.25 TABLET, FILM COATED ORAL at 09:51

## 2023-03-29 RX ADMIN — TRAZODONE HYDROCHLORIDE 100 MG: 50 TABLET ORAL at 21:46

## 2023-03-29 RX ADMIN — ROPINIROLE HYDROCHLORIDE 1 MG: 0.5 TABLET, FILM COATED ORAL at 17:20

## 2023-03-29 RX ADMIN — LEVOTHYROXINE SODIUM 100 MCG: 0.1 TABLET ORAL at 06:29

## 2023-03-29 RX ADMIN — FERROUS SULFATE TAB 325 MG (65 MG ELEMENTAL FE) 325 MG: 325 (65 FE) TAB at 13:04

## 2023-03-29 RX ADMIN — CARVEDILOL 6.25 MG: 6.25 TABLET, FILM COATED ORAL at 17:20

## 2023-03-29 RX ADMIN — PANTOPRAZOLE SODIUM 40 MG: 40 TABLET, DELAYED RELEASE ORAL at 06:29

## 2023-03-29 RX ADMIN — SODIUM CHLORIDE, PRESERVATIVE FREE 10 ML: 5 INJECTION INTRAVENOUS at 09:52

## 2023-03-29 ASSESSMENT — PAIN SCALES - GENERAL
PAINLEVEL_OUTOF10: 6
PAINLEVEL_OUTOF10: 7
PAINLEVEL_OUTOF10: 0

## 2023-03-29 ASSESSMENT — PAIN DESCRIPTION - LOCATION: LOCATION: NECK

## 2023-03-29 NOTE — PLAN OF CARE
Problem: Safety - Adult  Goal: Free from fall injury  Outcome: Progressing     Problem: Discharge Planning  Goal: Discharge to home or other facility with appropriate resources  Outcome: Progressing     Problem: Pain  Goal: Verbalizes/displays adequate comfort level or baseline comfort level  Outcome: Progressing     Problem: Skin/Tissue Integrity  Goal: Absence of new skin breakdown  Description: 1. Monitor for areas of redness and/or skin breakdown  2.   Assess vascular access sites hourly  Outcome: Progressing

## 2023-03-29 NOTE — CARE COORDINATION
Writer reviewed chart day 3. ID recommends TTE, and am IR needle biopsy for the discitis that showed in the MRI. ID, and hosp are following.      Pablo Barrera RN

## 2023-03-30 ENCOUNTER — APPOINTMENT (OUTPATIENT)
Dept: CT IMAGING | Age: 78
DRG: 552 | End: 2023-03-30
Payer: MEDICARE

## 2023-03-30 LAB
ALBUMIN SERPL-MCNC: 3.1 G/DL (ref 3.4–5)
ALBUMIN/GLOB SERPL: 0.7 {RATIO} (ref 1.1–2.2)
ALP SERPL-CCNC: 62 U/L (ref 40–129)
ALT SERPL-CCNC: 9 U/L (ref 10–40)
ANION GAP SERPL CALCULATED.3IONS-SCNC: 8 MMOL/L (ref 3–16)
AST SERPL-CCNC: 15 U/L (ref 15–37)
BACTERIA BLD CULT ORG #2: NORMAL
BACTERIA BLD CULT: NORMAL
BASOPHILS # BLD: 0 K/UL (ref 0–0.2)
BASOPHILS NFR BLD: 0.6 %
BILIRUB SERPL-MCNC: 0.4 MG/DL (ref 0–1)
BUN SERPL-MCNC: 13 MG/DL (ref 7–20)
CALCIUM SERPL-MCNC: 8.8 MG/DL (ref 8.3–10.6)
CHLORIDE SERPL-SCNC: 91 MMOL/L (ref 99–110)
CO2 SERPL-SCNC: 31 MMOL/L (ref 21–32)
CREAT SERPL-MCNC: 0.6 MG/DL (ref 0.6–1.2)
DEPRECATED RDW RBC AUTO: 15.2 % (ref 12.4–15.4)
EOSINOPHIL # BLD: 0.3 K/UL (ref 0–0.6)
EOSINOPHIL NFR BLD: 4.8 %
GFR SERPLBLD CREATININE-BSD FMLA CKD-EPI: >60 ML/MIN/{1.73_M2}
GLUCOSE BLD-MCNC: 112 MG/DL (ref 70–99)
GLUCOSE BLD-MCNC: 129 MG/DL (ref 70–99)
GLUCOSE BLD-MCNC: 137 MG/DL (ref 70–99)
GLUCOSE BLD-MCNC: 153 MG/DL (ref 70–99)
GLUCOSE SERPL-MCNC: 113 MG/DL (ref 70–99)
HCT VFR BLD AUTO: 28.4 % (ref 36–48)
HGB BLD-MCNC: 9.3 G/DL (ref 12–16)
LYMPHOCYTES # BLD: 0.8 K/UL (ref 1–5.1)
LYMPHOCYTES NFR BLD: 13.7 %
MCH RBC QN AUTO: 28.7 PG (ref 26–34)
MCHC RBC AUTO-ENTMCNC: 32.9 G/DL (ref 31–36)
MCV RBC AUTO: 87.3 FL (ref 80–100)
MONOCYTES # BLD: 0.4 K/UL (ref 0–1.3)
MONOCYTES NFR BLD: 7.5 %
NEUTROPHILS # BLD: 4.2 K/UL (ref 1.7–7.7)
NEUTROPHILS NFR BLD: 73.4 %
PERFORMED ON: ABNORMAL
PLATELET # BLD AUTO: 191 K/UL (ref 135–450)
PMV BLD AUTO: 6.8 FL (ref 5–10.5)
POTASSIUM SERPL-SCNC: 4.7 MMOL/L (ref 3.5–5.1)
PROT SERPL-MCNC: 7.3 G/DL (ref 6.4–8.2)
RBC # BLD AUTO: 3.25 M/UL (ref 4–5.2)
SODIUM SERPL-SCNC: 130 MMOL/L (ref 136–145)
WBC # BLD AUTO: 5.8 K/UL (ref 4–11)

## 2023-03-30 PROCEDURE — 6370000000 HC RX 637 (ALT 250 FOR IP)

## 2023-03-30 PROCEDURE — 2580000003 HC RX 258

## 2023-03-30 PROCEDURE — 97530 THERAPEUTIC ACTIVITIES: CPT

## 2023-03-30 PROCEDURE — 36569 INSJ PICC 5 YR+ W/O IMAGING: CPT

## 2023-03-30 PROCEDURE — 2700000000 HC OXYGEN THERAPY PER DAY

## 2023-03-30 PROCEDURE — 87205 SMEAR GRAM STAIN: CPT

## 2023-03-30 PROCEDURE — 80053 COMPREHEN METABOLIC PANEL: CPT

## 2023-03-30 PROCEDURE — 94640 AIRWAY INHALATION TREATMENT: CPT

## 2023-03-30 PROCEDURE — 87116 MYCOBACTERIA CULTURE: CPT

## 2023-03-30 PROCEDURE — 87206 SMEAR FLUORESCENT/ACID STAI: CPT

## 2023-03-30 PROCEDURE — 94761 N-INVAS EAR/PLS OXIMETRY MLT: CPT

## 2023-03-30 PROCEDURE — 97535 SELF CARE MNGMENT TRAINING: CPT

## 2023-03-30 PROCEDURE — 88307 TISSUE EXAM BY PATHOLOGIST: CPT

## 2023-03-30 PROCEDURE — 77012 CT SCAN FOR NEEDLE BIOPSY: CPT

## 2023-03-30 PROCEDURE — 87102 FUNGUS ISOLATION CULTURE: CPT

## 2023-03-30 PROCEDURE — 87070 CULTURE OTHR SPECIMN AEROBIC: CPT

## 2023-03-30 PROCEDURE — 1200000000 HC SEMI PRIVATE

## 2023-03-30 PROCEDURE — 87075 CULTR BACTERIA EXCEPT BLOOD: CPT

## 2023-03-30 PROCEDURE — 85025 COMPLETE CBC W/AUTO DIFF WBC: CPT

## 2023-03-30 PROCEDURE — C1751 CATH, INF, PER/CENT/MIDLINE: HCPCS

## 2023-03-30 PROCEDURE — 36415 COLL VENOUS BLD VENIPUNCTURE: CPT

## 2023-03-30 PROCEDURE — 2580000003 HC RX 258: Performed by: INTERNAL MEDICINE

## 2023-03-30 PROCEDURE — 6370000000 HC RX 637 (ALT 250 FOR IP): Performed by: NURSE PRACTITIONER

## 2023-03-30 PROCEDURE — 6360000002 HC RX W HCPCS: Performed by: RADIOLOGY

## 2023-03-30 PROCEDURE — 0SB23ZX EXCISION OF LUMBAR VERTEBRAL DISC, PERCUTANEOUS APPROACH, DIAGNOSTIC: ICD-10-PCS | Performed by: RADIOLOGY

## 2023-03-30 PROCEDURE — 2709999900 CT BIOPSY DEEP BONE PERCUTANEOUS

## 2023-03-30 PROCEDURE — 6360000002 HC RX W HCPCS: Performed by: INTERNAL MEDICINE

## 2023-03-30 PROCEDURE — 99232 SBSQ HOSP IP/OBS MODERATE 35: CPT | Performed by: INTERNAL MEDICINE

## 2023-03-30 RX ORDER — SODIUM CHLORIDE 0.9 % (FLUSH) 0.9 %
5-40 SYRINGE (ML) INJECTION EVERY 12 HOURS SCHEDULED
Status: DISCONTINUED | OUTPATIENT
Start: 2023-03-30 | End: 2023-04-04 | Stop reason: HOSPADM

## 2023-03-30 RX ORDER — SODIUM CHLORIDE 9 MG/ML
25 INJECTION, SOLUTION INTRAVENOUS PRN
Status: DISCONTINUED | OUTPATIENT
Start: 2023-03-30 | End: 2023-04-04 | Stop reason: HOSPADM

## 2023-03-30 RX ORDER — MIDAZOLAM HYDROCHLORIDE 1 MG/ML
INJECTION INTRAMUSCULAR; INTRAVENOUS
Status: COMPLETED | OUTPATIENT
Start: 2023-03-30 | End: 2023-03-30

## 2023-03-30 RX ORDER — FENTANYL CITRATE 50 UG/ML
INJECTION, SOLUTION INTRAMUSCULAR; INTRAVENOUS
Status: COMPLETED | OUTPATIENT
Start: 2023-03-30 | End: 2023-03-30

## 2023-03-30 RX ORDER — LIDOCAINE HYDROCHLORIDE 10 MG/ML
5 INJECTION, SOLUTION INFILTRATION; PERINEURAL ONCE
Status: DISCONTINUED | OUTPATIENT
Start: 2023-03-30 | End: 2023-04-04 | Stop reason: HOSPADM

## 2023-03-30 RX ORDER — SODIUM CHLORIDE 0.9 % (FLUSH) 0.9 %
5-40 SYRINGE (ML) INJECTION PRN
Status: DISCONTINUED | OUTPATIENT
Start: 2023-03-30 | End: 2023-04-04 | Stop reason: HOSPADM

## 2023-03-30 RX ADMIN — TIOTROPIUM BROMIDE INHALATION SPRAY 2 PUFF: 3.12 SPRAY, METERED RESPIRATORY (INHALATION) at 07:43

## 2023-03-30 RX ADMIN — CHOLECALCIFEROL TAB 25 MCG (1000 UNIT) 2000 UNITS: 25 TAB at 09:08

## 2023-03-30 RX ADMIN — CETIRIZINE HYDROCHLORIDE 10 MG: 10 TABLET, FILM COATED ORAL at 09:07

## 2023-03-30 RX ADMIN — DIPHENHYDRAMINE HYDROCHLORIDE 25 MG: 25 TABLET ORAL at 19:58

## 2023-03-30 RX ADMIN — SODIUM CHLORIDE, PRESERVATIVE FREE 10 ML: 5 INJECTION INTRAVENOUS at 19:58

## 2023-03-30 RX ADMIN — DILTIAZEM HYDROCHLORIDE 240 MG: 120 CAPSULE, COATED, EXTENDED RELEASE ORAL at 09:05

## 2023-03-30 RX ADMIN — Medication 2 PUFF: at 07:43

## 2023-03-30 RX ADMIN — Medication 2 PUFF: at 19:59

## 2023-03-30 RX ADMIN — OXYCODONE HYDROCHLORIDE AND ACETAMINOPHEN 0.5 TABLET: 5; 325 TABLET ORAL at 09:08

## 2023-03-30 RX ADMIN — FERROUS SULFATE TAB 325 MG (65 MG ELEMENTAL FE) 325 MG: 325 (65 FE) TAB at 16:52

## 2023-03-30 RX ADMIN — ROPINIROLE HYDROCHLORIDE 1 MG: 0.5 TABLET, FILM COATED ORAL at 11:29

## 2023-03-30 RX ADMIN — CARVEDILOL 6.25 MG: 6.25 TABLET, FILM COATED ORAL at 09:07

## 2023-03-30 RX ADMIN — Medication 10 ML: at 14:35

## 2023-03-30 RX ADMIN — LOSARTAN POTASSIUM 100 MG: 100 TABLET, FILM COATED ORAL at 09:04

## 2023-03-30 RX ADMIN — TRAZODONE HYDROCHLORIDE 100 MG: 50 TABLET ORAL at 22:37

## 2023-03-30 RX ADMIN — FERROUS SULFATE TAB 325 MG (65 MG ELEMENTAL FE) 325 MG: 325 (65 FE) TAB at 09:04

## 2023-03-30 RX ADMIN — CALCIUM 500 MG: 500 TABLET ORAL at 09:03

## 2023-03-30 RX ADMIN — FERROUS SULFATE TAB 325 MG (65 MG ELEMENTAL FE) 325 MG: 325 (65 FE) TAB at 11:29

## 2023-03-30 RX ADMIN — CEFEPIME 2000 MG: 2 INJECTION, POWDER, FOR SOLUTION INTRAVENOUS at 22:45

## 2023-03-30 RX ADMIN — ATORVASTATIN CALCIUM 20 MG: 10 TABLET, FILM COATED ORAL at 19:58

## 2023-03-30 RX ADMIN — URSODIOL 300 MG: 300 CAPSULE ORAL at 19:58

## 2023-03-30 RX ADMIN — ROPINIROLE HYDROCHLORIDE 1 MG: 0.5 TABLET, FILM COATED ORAL at 22:38

## 2023-03-30 RX ADMIN — GABAPENTIN 600 MG: 300 CAPSULE ORAL at 22:37

## 2023-03-30 RX ADMIN — LEVOTHYROXINE SODIUM 100 MCG: 0.1 TABLET ORAL at 06:35

## 2023-03-30 RX ADMIN — ROPINIROLE HYDROCHLORIDE 1 MG: 0.5 TABLET, FILM COATED ORAL at 18:10

## 2023-03-30 RX ADMIN — OXYCODONE HYDROCHLORIDE AND ACETAMINOPHEN 500 MG: 500 TABLET ORAL at 09:08

## 2023-03-30 RX ADMIN — MIDAZOLAM 0.5 MG: 1 INJECTION INTRAMUSCULAR; INTRAVENOUS at 10:41

## 2023-03-30 RX ADMIN — GABAPENTIN 100 MG: 100 CAPSULE ORAL at 19:57

## 2023-03-30 RX ADMIN — CARVEDILOL 6.25 MG: 6.25 TABLET, FILM COATED ORAL at 16:52

## 2023-03-30 RX ADMIN — DIPHENHYDRAMINE HYDROCHLORIDE 25 MG: 25 TABLET ORAL at 11:29

## 2023-03-30 RX ADMIN — ROPINIROLE HYDROCHLORIDE 1 MG: 0.5 TABLET, FILM COATED ORAL at 13:57

## 2023-03-30 RX ADMIN — ESTROGENS, CONJUGATED 1.25 MG: 0.62 TABLET, FILM COATED ORAL at 09:11

## 2023-03-30 RX ADMIN — MIDAZOLAM 0.5 MG: 1 INJECTION INTRAMUSCULAR; INTRAVENOUS at 10:36

## 2023-03-30 RX ADMIN — PANTOPRAZOLE SODIUM 40 MG: 40 TABLET, DELAYED RELEASE ORAL at 06:35

## 2023-03-30 RX ADMIN — ACETAMINOPHEN 650 MG: 325 TABLET ORAL at 22:37

## 2023-03-30 RX ADMIN — FENTANYL CITRATE 25 MCG: 50 INJECTION, SOLUTION INTRAMUSCULAR; INTRAVENOUS at 10:41

## 2023-03-30 RX ADMIN — SODIUM CHLORIDE 25 ML: 9 INJECTION, SOLUTION INTRAVENOUS at 22:44

## 2023-03-30 RX ADMIN — TAMSULOSIN HYDROCHLORIDE 0.4 MG: 0.4 CAPSULE ORAL at 09:06

## 2023-03-30 RX ADMIN — PANTOPRAZOLE SODIUM 40 MG: 40 TABLET, DELAYED RELEASE ORAL at 16:52

## 2023-03-30 RX ADMIN — URSODIOL 300 MG: 300 CAPSULE ORAL at 09:05

## 2023-03-30 RX ADMIN — GABAPENTIN 100 MG: 100 CAPSULE ORAL at 09:06

## 2023-03-30 RX ADMIN — Medication 1 TABLET: at 09:03

## 2023-03-30 RX ADMIN — MIDAZOLAM 0.5 MG: 1 INJECTION INTRAMUSCULAR; INTRAVENOUS at 10:38

## 2023-03-30 RX ADMIN — OXYCODONE HYDROCHLORIDE AND ACETAMINOPHEN 0.5 TABLET: 5; 325 TABLET ORAL at 22:38

## 2023-03-30 RX ADMIN — CEFEPIME 2000 MG: 2 INJECTION, POWDER, FOR SOLUTION INTRAVENOUS at 14:44

## 2023-03-30 RX ADMIN — FAMOTIDINE 20 MG: 20 TABLET, FILM COATED ORAL at 09:07

## 2023-03-30 RX ADMIN — FENTANYL CITRATE 25 MCG: 50 INJECTION, SOLUTION INTRAMUSCULAR; INTRAVENOUS at 10:38

## 2023-03-30 RX ADMIN — VANCOMYCIN HYDROCHLORIDE 1000 MG: 1 INJECTION, POWDER, LYOPHILIZED, FOR SOLUTION INTRAVENOUS at 15:26

## 2023-03-30 RX ADMIN — SODIUM CHLORIDE, PRESERVATIVE FREE 10 ML: 5 INJECTION INTRAVENOUS at 22:42

## 2023-03-30 RX ADMIN — FENTANYL CITRATE 25 MCG: 50 INJECTION, SOLUTION INTRAMUSCULAR; INTRAVENOUS at 10:36

## 2023-03-30 RX ADMIN — ROPINIROLE HYDROCHLORIDE 1 MG: 0.5 TABLET, FILM COATED ORAL at 06:35

## 2023-03-30 RX ADMIN — SODIUM CHLORIDE: 9 INJECTION, SOLUTION INTRAVENOUS at 14:43

## 2023-03-30 RX ADMIN — MONTELUKAST SODIUM 10 MG: 10 TABLET ORAL at 09:07

## 2023-03-30 RX ADMIN — SODIUM CHLORIDE, PRESERVATIVE FREE 10 ML: 5 INJECTION INTRAVENOUS at 09:06

## 2023-03-30 RX ADMIN — VENLAFAXINE HYDROCHLORIDE 37.5 MG: 37.5 CAPSULE, EXTENDED RELEASE ORAL at 09:05

## 2023-03-30 RX ADMIN — GABAPENTIN 100 MG: 100 CAPSULE ORAL at 13:57

## 2023-03-30 ASSESSMENT — PAIN DESCRIPTION - ORIENTATION: ORIENTATION: RIGHT;LEFT

## 2023-03-30 ASSESSMENT — PAIN SCALES - GENERAL
PAINLEVEL_OUTOF10: 4
PAINLEVEL_OUTOF10: 5
PAINLEVEL_OUTOF10: 0

## 2023-03-30 ASSESSMENT — PAIN DESCRIPTION - LOCATION: LOCATION: LEG

## 2023-03-30 NOTE — OR NURSING
Time out completed prior to procedure. Pt was place prone on the CT table. Pt was placed on all cardiac monitoring.  Pt placed arrived on 4L NC

## 2023-03-30 NOTE — OR NURSING
Pt arrived for image guided bone biopsy . Procedure explained including the risk and benefits of the procedure. All questions answered. Pt verbalizes understanding of the of procedure and states no more questions. Consent signed. Vital signs stable, labs, allergies, medications, and code status reviewed. No contraindications noted.      Vitals:    03/30/23 1023   BP: (!) 165/84   Pulse: 77   Resp: 16   Temp:    SpO2: 95%    (vital signs in table format)    Eleuterio Score  2 - Able to move 4 extremities voluntarily on command  2 - BP+/- 20mmHg of normal  2 - Fully awake  1 - Needs oxygen to maintain oxygen saturation >90%  2 - Able to breathe deeply and cough freely    Allergies  Latex, Clindamycin, Pennsaid [diclofenac sodium], Torsemide, Actos [pioglitazone], Amoxicillin, Augmentin [amoxicillin-pot clavulanate], Bactrim [sulfamethoxazole-trimethoprim], Ciprofloxacin, Clavulanic acid, Doxycycline, Levaquin [levofloxacin], Sulfamethoxazole, Trimethoprim, Ativan [lorazepam], Cephalosporins, Pcn [penicillins], and Proventil [albuterol]    Labs  Lab Results   Component Value Date    INR 1.20 (H) 03/29/2023    PROTIME 15.2 (H) 03/29/2023       Lab Results   Component Value Date    CREATININE 0.6 03/30/2023    BUN 13 03/30/2023    GFR >60 01/07/2010     (L) 03/30/2023    K 4.7 03/30/2023    CL 91 (L) 03/30/2023    CO2 31 03/30/2023       Lab Results   Component Value Date    WBC 5.8 03/30/2023    HGB 9.3 (L) 03/30/2023    HCT 28.4 (L) 03/30/2023    MCV 87.3 03/30/2023     03/30/2023

## 2023-03-30 NOTE — CARE COORDINATION
Writer reviewed chart, patient needs a RUDY, need to determine if IV ABX will be needed, active with [facility] for O2, and [facility] for HHC. Patient is declining SNF.     Raymond Abdi RN

## 2023-03-30 NOTE — OR NURSING
Image guided BONE BIOPSY completed by Dr. Paola Ayoub. Pt tolerated procedure without any signs or symptoms of distress. Vital signs stable. Report given  to  RN. Pt transported [facility] to [facility] in stable condition via stretcher. Total Biopsy: 6  Received: Versed: 1.5 mg       Fentanyl: 75 mcg  Bandage to LOWER BACK that is clean dry and intact. Patient to lay on BACK side for 1 hour.      Vital Signs  Vitals:    03/30/23 1054   BP: (!) 141/54   Pulse: 63   Resp: 13   Temp:    SpO2: 97%    (vital signs in table format)    Post Eleuterio  2 - Able to move 4 extremities voluntarily on command  2 - BP+/- 20mmHg of normal  2 - Fully awake  1 - Needs oxygen to maintain oxygen saturation >90%  2 - Able to breathe deeply and cough freely

## 2023-03-31 ENCOUNTER — ANESTHESIA EVENT (OUTPATIENT)
Dept: CARDIAC CATH/INVASIVE PROCEDURES | Age: 78
End: 2023-03-31
Payer: MEDICARE

## 2023-03-31 ENCOUNTER — APPOINTMENT (OUTPATIENT)
Dept: CARDIAC CATH/INVASIVE PROCEDURES | Age: 78
DRG: 552 | End: 2023-03-31
Payer: MEDICARE

## 2023-03-31 ENCOUNTER — ANESTHESIA (OUTPATIENT)
Dept: CARDIAC CATH/INVASIVE PROCEDURES | Age: 78
End: 2023-03-31
Payer: MEDICARE

## 2023-03-31 LAB
ACID FAST STN SPEC QL: NORMAL
CK SERPL-CCNC: 20 U/L (ref 26–192)
GLUCOSE BLD-MCNC: 110 MG/DL (ref 70–99)
GLUCOSE BLD-MCNC: 112 MG/DL (ref 70–99)
GLUCOSE BLD-MCNC: 123 MG/DL (ref 70–99)
LOEFFLER MB STN SPEC: NORMAL
PERFORMED ON: ABNORMAL
VANCOMYCIN SERPL-MCNC: 5.6 UG/ML

## 2023-03-31 PROCEDURE — 6360000002 HC RX W HCPCS: Performed by: INTERNAL MEDICINE

## 2023-03-31 PROCEDURE — 2580000003 HC RX 258: Performed by: INTERNAL MEDICINE

## 2023-03-31 PROCEDURE — 1200000000 HC SEMI PRIVATE

## 2023-03-31 PROCEDURE — 97116 GAIT TRAINING THERAPY: CPT

## 2023-03-31 PROCEDURE — 6370000000 HC RX 637 (ALT 250 FOR IP)

## 2023-03-31 PROCEDURE — 94761 N-INVAS EAR/PLS OXIMETRY MLT: CPT

## 2023-03-31 PROCEDURE — 94640 AIRWAY INHALATION TREATMENT: CPT

## 2023-03-31 PROCEDURE — 99232 SBSQ HOSP IP/OBS MODERATE 35: CPT | Performed by: INTERNAL MEDICINE

## 2023-03-31 PROCEDURE — 6370000000 HC RX 637 (ALT 250 FOR IP): Performed by: INTERNAL MEDICINE

## 2023-03-31 PROCEDURE — 80202 ASSAY OF VANCOMYCIN: CPT

## 2023-03-31 PROCEDURE — 2700000000 HC OXYGEN THERAPY PER DAY

## 2023-03-31 PROCEDURE — 97110 THERAPEUTIC EXERCISES: CPT

## 2023-03-31 PROCEDURE — 82550 ASSAY OF CK (CPK): CPT

## 2023-03-31 RX ORDER — IPRATROPIUM BROMIDE AND ALBUTEROL SULFATE 2.5; .5 MG/3ML; MG/3ML
1 SOLUTION RESPIRATORY (INHALATION) ONCE
Status: COMPLETED | OUTPATIENT
Start: 2023-03-31 | End: 2023-03-31

## 2023-03-31 RX ORDER — GUAIFENESIN 600 MG/1
1200 TABLET, EXTENDED RELEASE ORAL 2 TIMES DAILY
Status: DISCONTINUED | OUTPATIENT
Start: 2023-03-31 | End: 2023-04-04 | Stop reason: HOSPADM

## 2023-03-31 RX ADMIN — APIXABAN 5 MG: 5 TABLET, FILM COATED ORAL at 21:33

## 2023-03-31 RX ADMIN — URSODIOL 300 MG: 300 CAPSULE ORAL at 11:56

## 2023-03-31 RX ADMIN — CEFEPIME 2000 MG: 2 INJECTION, POWDER, FOR SOLUTION INTRAVENOUS at 06:34

## 2023-03-31 RX ADMIN — URSODIOL 300 MG: 300 CAPSULE ORAL at 21:32

## 2023-03-31 RX ADMIN — ESTROGENS, CONJUGATED 1.25 MG: 0.62 TABLET, FILM COATED ORAL at 12:04

## 2023-03-31 RX ADMIN — GABAPENTIN 100 MG: 100 CAPSULE ORAL at 11:07

## 2023-03-31 RX ADMIN — Medication 1 TABLET: at 11:56

## 2023-03-31 RX ADMIN — FAMOTIDINE 20 MG: 20 TABLET, FILM COATED ORAL at 11:06

## 2023-03-31 RX ADMIN — TAMSULOSIN HYDROCHLORIDE 0.4 MG: 0.4 CAPSULE ORAL at 11:07

## 2023-03-31 RX ADMIN — ROPINIROLE HYDROCHLORIDE 1 MG: 0.5 TABLET, FILM COATED ORAL at 14:02

## 2023-03-31 RX ADMIN — DAPTOMYCIN 450 MG: 500 INJECTION, POWDER, LYOPHILIZED, FOR SOLUTION INTRAVENOUS at 14:28

## 2023-03-31 RX ADMIN — FUROSEMIDE 20 MG: 20 TABLET ORAL at 11:07

## 2023-03-31 RX ADMIN — FERROUS SULFATE TAB 325 MG (65 MG ELEMENTAL FE) 325 MG: 325 (65 FE) TAB at 11:08

## 2023-03-31 RX ADMIN — PANTOPRAZOLE SODIUM 40 MG: 40 TABLET, DELAYED RELEASE ORAL at 16:50

## 2023-03-31 RX ADMIN — GUAIFENESIN 1200 MG: 600 TABLET, EXTENDED RELEASE ORAL at 11:56

## 2023-03-31 RX ADMIN — SODIUM CHLORIDE, PRESERVATIVE FREE 10 ML: 5 INJECTION INTRAVENOUS at 12:06

## 2023-03-31 RX ADMIN — OXYCODONE HYDROCHLORIDE AND ACETAMINOPHEN 500 MG: 500 TABLET ORAL at 11:08

## 2023-03-31 RX ADMIN — VANCOMYCIN HYDROCHLORIDE 1000 MG: 1 INJECTION, POWDER, LYOPHILIZED, FOR SOLUTION INTRAVENOUS at 03:33

## 2023-03-31 RX ADMIN — VENLAFAXINE HYDROCHLORIDE 37.5 MG: 37.5 CAPSULE, EXTENDED RELEASE ORAL at 12:04

## 2023-03-31 RX ADMIN — LEVOTHYROXINE SODIUM 100 MCG: 0.1 TABLET ORAL at 06:32

## 2023-03-31 RX ADMIN — DILTIAZEM HYDROCHLORIDE 240 MG: 120 CAPSULE, COATED, EXTENDED RELEASE ORAL at 11:08

## 2023-03-31 RX ADMIN — OXYCODONE HYDROCHLORIDE AND ACETAMINOPHEN 0.5 TABLET: 5; 325 TABLET ORAL at 11:09

## 2023-03-31 RX ADMIN — CETIRIZINE HYDROCHLORIDE 10 MG: 10 TABLET, FILM COATED ORAL at 11:07

## 2023-03-31 RX ADMIN — ROPINIROLE HYDROCHLORIDE 1 MG: 0.5 TABLET, FILM COATED ORAL at 17:58

## 2023-03-31 RX ADMIN — ACETAMINOPHEN 650 MG: 325 TABLET ORAL at 21:47

## 2023-03-31 RX ADMIN — PANTOPRAZOLE SODIUM 40 MG: 40 TABLET, DELAYED RELEASE ORAL at 06:32

## 2023-03-31 RX ADMIN — APIXABAN 5 MG: 5 TABLET, FILM COATED ORAL at 11:08

## 2023-03-31 RX ADMIN — ATORVASTATIN CALCIUM 20 MG: 10 TABLET, FILM COATED ORAL at 21:34

## 2023-03-31 RX ADMIN — GABAPENTIN 100 MG: 100 CAPSULE ORAL at 14:02

## 2023-03-31 RX ADMIN — IPRATROPIUM BROMIDE AND ALBUTEROL SULFATE 1 AMPULE: 2.5; .5 SOLUTION RESPIRATORY (INHALATION) at 10:20

## 2023-03-31 RX ADMIN — OXYCODONE HYDROCHLORIDE AND ACETAMINOPHEN 0.5 TABLET: 5; 325 TABLET ORAL at 21:32

## 2023-03-31 RX ADMIN — ROPINIROLE HYDROCHLORIDE 1 MG: 0.5 TABLET, FILM COATED ORAL at 06:32

## 2023-03-31 RX ADMIN — ROPINIROLE HYDROCHLORIDE 1 MG: 0.5 TABLET, FILM COATED ORAL at 21:33

## 2023-03-31 RX ADMIN — FERROUS SULFATE TAB 325 MG (65 MG ELEMENTAL FE) 325 MG: 325 (65 FE) TAB at 16:50

## 2023-03-31 RX ADMIN — GABAPENTIN 100 MG: 100 CAPSULE ORAL at 21:35

## 2023-03-31 RX ADMIN — GUAIFENESIN 1200 MG: 600 TABLET, EXTENDED RELEASE ORAL at 21:32

## 2023-03-31 RX ADMIN — TRAZODONE HYDROCHLORIDE 100 MG: 50 TABLET ORAL at 21:32

## 2023-03-31 RX ADMIN — ROPINIROLE HYDROCHLORIDE 1 MG: 0.5 TABLET, FILM COATED ORAL at 11:56

## 2023-03-31 RX ADMIN — FERROUS SULFATE TAB 325 MG (65 MG ELEMENTAL FE) 325 MG: 325 (65 FE) TAB at 13:03

## 2023-03-31 RX ADMIN — CHOLECALCIFEROL TAB 25 MCG (1000 UNIT) 2000 UNITS: 25 TAB at 12:04

## 2023-03-31 RX ADMIN — MONTELUKAST SODIUM 10 MG: 10 TABLET ORAL at 11:06

## 2023-03-31 RX ADMIN — Medication 2 PUFF: at 19:58

## 2023-03-31 RX ADMIN — LOSARTAN POTASSIUM 100 MG: 100 TABLET, FILM COATED ORAL at 11:06

## 2023-03-31 RX ADMIN — CARVEDILOL 6.25 MG: 6.25 TABLET, FILM COATED ORAL at 16:50

## 2023-03-31 RX ADMIN — GABAPENTIN 600 MG: 300 CAPSULE ORAL at 21:34

## 2023-03-31 RX ADMIN — VANCOMYCIN HYDROCHLORIDE 1250 MG: 10 INJECTION, POWDER, LYOPHILIZED, FOR SOLUTION INTRAVENOUS at 16:59

## 2023-03-31 RX ADMIN — CEFEPIME 2000 MG: 2 INJECTION, POWDER, FOR SOLUTION INTRAVENOUS at 18:41

## 2023-03-31 RX ADMIN — CALCIUM 500 MG: 500 TABLET ORAL at 11:06

## 2023-03-31 RX ADMIN — CARVEDILOL 6.25 MG: 6.25 TABLET, FILM COATED ORAL at 11:07

## 2023-03-31 ASSESSMENT — PAIN DESCRIPTION - LOCATION
LOCATION: LEG;BACK
LOCATION: LEG
LOCATION: BACK

## 2023-03-31 ASSESSMENT — PAIN DESCRIPTION - ORIENTATION
ORIENTATION: RIGHT
ORIENTATION: MID

## 2023-03-31 ASSESSMENT — PAIN SCALES - GENERAL
PAINLEVEL_OUTOF10: 3
PAINLEVEL_OUTOF10: 2
PAINLEVEL_OUTOF10: 2

## 2023-03-31 ASSESSMENT — PAIN DESCRIPTION - DESCRIPTORS
DESCRIPTORS: ACHING
DESCRIPTORS: ACHING

## 2023-03-31 NOTE — ANESTHESIA PRE PROCEDURE
(COZAAR) tablet 100 mg  100 mg Oral Daily Trang Siegel, APRN - CNP   100 mg at 03/30/23 4067    levothyroxine (SYNTHROID) tablet 100 mcg  100 mcg Oral Daily Trang Siegel, APRN - CNP   100 mcg at 03/31/23 3594    levalbuterol (XOPENEX HFA) inhaler 2 puff  2 puff Inhalation Q6H PRN Trang Siegel, APRN - CNP        ipratropium (ATROVENT) 0.02 % nebulizer solution 0.5 mg  0.5 mg Nebulization 4x Daily PRN Trang Siegel, APRN - CNP        rOPINIRole (REQUIP) tablet 1 mg  1 mg Oral 5x daily Trang Siegel, APRN - CNP   1 mg at 03/31/23 1344    tamsulosin (FLOMAX) capsule 0.4 mg  0.4 mg Oral Daily Trang Siegel, APRN - CNP   0.4 mg at 03/30/23 7730    traZODone (DESYREL) tablet 100 mg  100 mg Oral Nightly Trang Siegel, APRN - CNP   100 mg at 03/30/23 2237    venlafaxine (EFFEXOR XR) extended release capsule 37.5 mg  37.5 mg Oral Daily Trang Siegel, APRN - CNP   37.5 mg at 03/30/23 4743    ascorbic acid (VITAMIN C) tablet 500 mg  500 mg Oral Daily Trang Siegel, APRN - CNP   500 mg at 03/30/23 0908    ursodiol (ACTIGALL) capsule 300 mg  300 mg Oral BID Trang Siegel, APRN - CNP   300 mg at 03/30/23 1958    sodium chloride flush 0.9 % injection 5-40 mL  5-40 mL IntraVENous 2 times per day Trang Siegel, APRN - CNP   10 mL at 03/30/23 1958    sodium chloride flush 0.9 % injection 5-40 mL  5-40 mL IntraVENous PRN Trang Siegel, APRN - CNP   10 mL at 03/30/23 1435    0.9 % sodium chloride infusion   IntraVENous PRN Trang Siegel, APRN -  mL/hr at 03/31/23 1014 NoRateChange at 03/31/23 1014    ondansetron (ZOFRAN-ODT) disintegrating tablet 4 mg  4 mg Oral Q8H PRN Trang Siegel, APRN - CNP        Or    ondansetron (ZOFRAN) injection 4 mg  4 mg IntraVENous Q6H PRN Trang Siegel, APRN - CNP   4 mg at 03/28/23 1137    polyethylene glycol (GLYCOLAX) packet 17 g  17 g Oral Daily PRN Trang Siegel, APRN - CNP        acetaminophen (TYLENOL) tablet 650 mg  650 mg Oral Q6H PRN Trang Siegel, APRN - CNP   650 mg at

## 2023-03-31 NOTE — CARE COORDINATION
Writer reviewed chart patient will likely need IV ABX, waiting for ID to put their recs in. RUDY not needed at this time. Patient refuses SNF, wants to go back [facility] and [facility]. Writer spoke with Leyda Francis at [facility] for IV ABX needs.      Rommel Contreras RN

## 2023-03-31 NOTE — DISCHARGE INSTR - COC
Continuity of Care Form    Patient Name: Dustin Garcia   :  1945  MRN:  [id number]    Admit date:  3/26/2023  Discharge date:  23    Code Status Order: Full Code   Advance Directives:     Admitting Physician:  Davion Shah MD  PCP: Desi Kennedy MD    Discharging Nurse: [facility] Unit/Room#: 8391/4942-67  Discharging Unit Phone Number: [phone]    Emergency Contact:   Extended Emergency Contact Information  Primary Emergency Contact: [facility] Phone: [phone]  Mobile Phone: [phone]  Relation: Child  Secondary Emergency Contact: Andres Mckenzie [address] Phone: [phone]  Mobile Phone: [phone]  Relation: Child    Past Surgical History:  Past Surgical History:   Procedure Laterality Date    BONE MARROW BIOPSY      BREAST BIOPSY      BRONCHOSCOPY N/A 2021    BRONCHOSCOPY DIAGNOSTIC OR CELL [address] ONLY performed by Roxana Hyatt MD at [facility]  2021    BRONCHOSCOPY ALVEOLAR LAVAGE performed by Roxana Hyatt MD at [facility]  2021    BRONCHOSCOPY THERAPUTIC ASPIRATION INITIAL performed by Roxana Hyatt MD at [place] [address]  14    CATARACT REMOVAL      COLONOSCOPY      X 3 per PT 16    COLONOSCOPY N/A 6/10/2021    COLONOSCOPY POLYPECTOMY ABLATION performed by Dipak Wright MD at [address] [place] [address] 2013    CORAL BUNIONECTOMY RIGHT FOOT WITH SCREW FIXATION;    HERNIA REPAIR      HYSTERECTOMY (CERVIX STATUS UNKNOWN)      JOINT REPLACEMENT Bilateral     TKR    KNEE SURGERY  09    left    KNEE SURGERY  2010    right    UPPER GASTROINTESTINAL ENDOSCOPY N/A 2018    EGD BIOPSY performed by Warner Monroy MD at [address] [place] [address] 6/10/2021    EGD POLYP ABLATION/OTHER performed by Dipak Wright MD at [address] [place] [address] Dr Sanders

## 2023-03-31 NOTE — CONSULTS
Consult [facility]/called to Infectious Disease on 03/28/23 at 11:03 AM Rosy Sanchez
Received consult request for RUDY. On review of the patient's chart, blood cultures have been negative, TTE showed no valvular lesions for endocarditis, and patient has no other clinical findings suggestive of endocarditis. This was reviewed with ID team and at this time, there does not seem to be a strong indication to proceed with RUDY. If her clinical situation changes, we are happy to re-evaluate her candidacy for a RUDY in the future. Further management of possible discitis per ID and primary teams.     Danette Tipton, [address] [place] [address] Road
400-600 mg/L*hr    Recent Labs     04/04/23  0415   CREATININE <0.5*     Estimated Creatinine Clearance: 113 mL/min (based on SCr of 0.5 mg/dL). Recent Labs     04/04/23  0415   WBC 5.1     Current Dose / Plan:   Vancomycin 1250 mg IV q12h. Kinetics predict an AUC = 424 mg/L*h with an estimated steady-state vancomycin trough = 12.6 mcg/mL  Continue current dose  Will continue to monitor clinical condition and make adjustments to regimen as appropriate.     Chelsie Davis, PharmD 4/4/2023 7:45 AM
Nightly  venlafaxine (EFFEXOR XR) extended release capsule 37.5 mg, 37.5 mg, Oral, Daily  ascorbic acid (VITAMIN C) tablet 500 mg, 500 mg, Oral, Daily  ursodiol (ACTIGALL) capsule 300 mg, 300 mg, Oral, BID  sodium chloride flush 0.9 % injection 5-40 mL, 5-40 mL, IntraVENous, 2 times per day  sodium chloride flush 0.9 % injection 5-40 mL, 5-40 mL, IntraVENous, PRN  0.9 % sodium chloride infusion, , IntraVENous, PRN  ondansetron (ZOFRAN-ODT) disintegrating tablet 4 mg, 4 mg, Oral, Q8H PRN **OR** ondansetron (ZOFRAN) injection 4 mg, 4 mg, IntraVENous, Q6H PRN  polyethylene glycol (GLYCOLAX) packet 17 g, 17 g, Oral, Daily PRN  acetaminophen (TYLENOL) tablet 650 mg, 650 mg, Oral, Q6H PRN **OR** acetaminophen (TYLENOL) suppository 650 mg, 650 mg, Rectal, Q6H PRN  glucose chewable tablet 16 g, 4 tablet, Oral, PRN  dextrose bolus 10% 125 mL, 125 mL, IntraVENous, PRN **OR** dextrose bolus 10% 250 mL, 250 mL, IntraVENous, PRN  glucagon (rDNA) injection 1 mg, 1 mg, SubCUTAneous, PRN  dextrose 10 % infusion, , IntraVENous, Continuous PRN  insulin lispro (HUMALOG) injection vial 0-4 Units, 0-4 Units, SubCUTAneous, TID WC  insulin lispro (HUMALOG) injection vial 0-4 Units, 0-4 Units, SubCUTAneous, Nightly  oxyCODONE-acetaminophen (PERCOCET) 5-325 MG per tablet 0.5 tablet, 0.5 tablet, Oral, BID  cyanocobalamin injection 1,000 mcg, 1,000 mcg, IntraMUSCular, Q14 Days  diphenhydrAMINE (BENADRYL) tablet 25 mg, 25 mg, Oral, Q6H PRN    Allergies   Allergen Reactions    Latex Swelling and Rash    Clindamycin Itching    Pennsaid [Diclofenac Sodium] Itching and Rash    Torsemide Itching    Actos [Pioglitazone] Diarrhea    Amoxicillin Other (See Comments)    Augmentin [Amoxicillin-Pot Clavulanate] Other (See Comments)    Bactrim [Sulfamethoxazole-Trimethoprim] Other (See Comments)     Stomach ache     Ciprofloxacin Other (See Comments)     Makes her weird  Makes anxious and she has weird dreams    Clavulanic Acid Other (See Comments)

## 2023-04-01 LAB
GLUCOSE BLD-MCNC: 104 MG/DL (ref 70–99)
GLUCOSE BLD-MCNC: 140 MG/DL (ref 70–99)
GLUCOSE BLD-MCNC: 154 MG/DL (ref 70–99)
GLUCOSE BLD-MCNC: 169 MG/DL (ref 70–99)
PERFORMED ON: ABNORMAL
VANCOMYCIN TROUGH SERPL-MCNC: 10.8 UG/ML (ref 10–20)

## 2023-04-01 PROCEDURE — 6360000002 HC RX W HCPCS

## 2023-04-01 PROCEDURE — 6370000000 HC RX 637 (ALT 250 FOR IP)

## 2023-04-01 PROCEDURE — 2580000003 HC RX 258: Performed by: INTERNAL MEDICINE

## 2023-04-01 PROCEDURE — 6370000000 HC RX 637 (ALT 250 FOR IP): Performed by: INTERNAL MEDICINE

## 2023-04-01 PROCEDURE — 6370000000 HC RX 637 (ALT 250 FOR IP): Performed by: NURSE PRACTITIONER

## 2023-04-01 PROCEDURE — 1200000000 HC SEMI PRIVATE

## 2023-04-01 PROCEDURE — 6360000002 HC RX W HCPCS: Performed by: INTERNAL MEDICINE

## 2023-04-01 PROCEDURE — 80202 ASSAY OF VANCOMYCIN: CPT

## 2023-04-01 PROCEDURE — 2580000003 HC RX 258

## 2023-04-01 PROCEDURE — 2700000000 HC OXYGEN THERAPY PER DAY

## 2023-04-01 PROCEDURE — 94640 AIRWAY INHALATION TREATMENT: CPT

## 2023-04-01 RX ADMIN — SODIUM CHLORIDE, PRESERVATIVE FREE 10 ML: 5 INJECTION INTRAVENOUS at 10:40

## 2023-04-01 RX ADMIN — Medication 1 TABLET: at 10:36

## 2023-04-01 RX ADMIN — MONTELUKAST SODIUM 10 MG: 10 TABLET ORAL at 10:37

## 2023-04-01 RX ADMIN — FUROSEMIDE 20 MG: 20 TABLET ORAL at 10:38

## 2023-04-01 RX ADMIN — GABAPENTIN 600 MG: 300 CAPSULE ORAL at 22:03

## 2023-04-01 RX ADMIN — GUAIFENESIN 1200 MG: 600 TABLET, EXTENDED RELEASE ORAL at 22:05

## 2023-04-01 RX ADMIN — CARVEDILOL 6.25 MG: 6.25 TABLET, FILM COATED ORAL at 10:38

## 2023-04-01 RX ADMIN — LEVOTHYROXINE SODIUM 100 MCG: 0.1 TABLET ORAL at 06:48

## 2023-04-01 RX ADMIN — CEFEPIME 2000 MG: 2 INJECTION, POWDER, FOR SOLUTION INTRAVENOUS at 01:45

## 2023-04-01 RX ADMIN — OXYCODONE HYDROCHLORIDE AND ACETAMINOPHEN 500 MG: 500 TABLET ORAL at 10:37

## 2023-04-01 RX ADMIN — LOSARTAN POTASSIUM 100 MG: 100 TABLET, FILM COATED ORAL at 10:36

## 2023-04-01 RX ADMIN — OXYCODONE HYDROCHLORIDE AND ACETAMINOPHEN 0.5 TABLET: 5; 325 TABLET ORAL at 22:04

## 2023-04-01 RX ADMIN — URSODIOL 300 MG: 300 CAPSULE ORAL at 10:38

## 2023-04-01 RX ADMIN — VENLAFAXINE HYDROCHLORIDE 37.5 MG: 37.5 CAPSULE, EXTENDED RELEASE ORAL at 10:37

## 2023-04-01 RX ADMIN — FERROUS SULFATE TAB 325 MG (65 MG ELEMENTAL FE) 325 MG: 325 (65 FE) TAB at 10:37

## 2023-04-01 RX ADMIN — VANCOMYCIN HYDROCHLORIDE 1250 MG: 10 INJECTION, POWDER, LYOPHILIZED, FOR SOLUTION INTRAVENOUS at 04:25

## 2023-04-01 RX ADMIN — ROPINIROLE HYDROCHLORIDE 1 MG: 0.5 TABLET, FILM COATED ORAL at 06:49

## 2023-04-01 RX ADMIN — GABAPENTIN 100 MG: 100 CAPSULE ORAL at 22:20

## 2023-04-01 RX ADMIN — CARVEDILOL 6.25 MG: 6.25 TABLET, FILM COATED ORAL at 15:45

## 2023-04-01 RX ADMIN — GABAPENTIN 100 MG: 100 CAPSULE ORAL at 15:04

## 2023-04-01 RX ADMIN — APIXABAN 5 MG: 5 TABLET, FILM COATED ORAL at 10:36

## 2023-04-01 RX ADMIN — PANTOPRAZOLE SODIUM 40 MG: 40 TABLET, DELAYED RELEASE ORAL at 06:49

## 2023-04-01 RX ADMIN — ATORVASTATIN CALCIUM 20 MG: 10 TABLET, FILM COATED ORAL at 22:04

## 2023-04-01 RX ADMIN — Medication 2 PUFF: at 09:27

## 2023-04-01 RX ADMIN — TAMSULOSIN HYDROCHLORIDE 0.4 MG: 0.4 CAPSULE ORAL at 10:37

## 2023-04-01 RX ADMIN — DILTIAZEM HYDROCHLORIDE 240 MG: 120 CAPSULE, COATED, EXTENDED RELEASE ORAL at 10:38

## 2023-04-01 RX ADMIN — APIXABAN 5 MG: 5 TABLET, FILM COATED ORAL at 22:04

## 2023-04-01 RX ADMIN — ROPINIROLE HYDROCHLORIDE 1 MG: 0.5 TABLET, FILM COATED ORAL at 15:04

## 2023-04-01 RX ADMIN — TIOTROPIUM BROMIDE INHALATION SPRAY 2 PUFF: 3.12 SPRAY, METERED RESPIRATORY (INHALATION) at 09:27

## 2023-04-01 RX ADMIN — ROPINIROLE HYDROCHLORIDE 1 MG: 0.5 TABLET, FILM COATED ORAL at 10:36

## 2023-04-01 RX ADMIN — DIPHENHYDRAMINE HYDROCHLORIDE 25 MG: 25 TABLET ORAL at 22:11

## 2023-04-01 RX ADMIN — FERROUS SULFATE TAB 325 MG (65 MG ELEMENTAL FE) 325 MG: 325 (65 FE) TAB at 15:45

## 2023-04-01 RX ADMIN — OXYCODONE HYDROCHLORIDE AND ACETAMINOPHEN 0.5 TABLET: 5; 325 TABLET ORAL at 10:39

## 2023-04-01 RX ADMIN — ROPINIROLE HYDROCHLORIDE 1 MG: 0.5 TABLET, FILM COATED ORAL at 22:03

## 2023-04-01 RX ADMIN — GUAIFENESIN 1200 MG: 600 TABLET, EXTENDED RELEASE ORAL at 10:37

## 2023-04-01 RX ADMIN — CHOLECALCIFEROL TAB 25 MCG (1000 UNIT) 2000 UNITS: 25 TAB at 10:36

## 2023-04-01 RX ADMIN — URSODIOL 300 MG: 300 CAPSULE ORAL at 22:05

## 2023-04-01 RX ADMIN — CEFEPIME 2000 MG: 2 INJECTION, POWDER, FOR SOLUTION INTRAVENOUS at 10:46

## 2023-04-01 RX ADMIN — GABAPENTIN 100 MG: 100 CAPSULE ORAL at 10:37

## 2023-04-01 RX ADMIN — ONDANSETRON 4 MG: 4 TABLET, ORALLY DISINTEGRATING ORAL at 23:37

## 2023-04-01 RX ADMIN — PANTOPRAZOLE SODIUM 40 MG: 40 TABLET, DELAYED RELEASE ORAL at 15:04

## 2023-04-01 RX ADMIN — VANCOMYCIN HYDROCHLORIDE 1250 MG: 10 INJECTION, POWDER, LYOPHILIZED, FOR SOLUTION INTRAVENOUS at 15:49

## 2023-04-01 RX ADMIN — TRAZODONE HYDROCHLORIDE 100 MG: 50 TABLET ORAL at 22:03

## 2023-04-01 RX ADMIN — FAMOTIDINE 20 MG: 20 TABLET, FILM COATED ORAL at 10:36

## 2023-04-01 RX ADMIN — ACETAMINOPHEN 650 MG: 325 TABLET ORAL at 22:11

## 2023-04-01 RX ADMIN — CEFEPIME 2000 MG: 2 INJECTION, POWDER, FOR SOLUTION INTRAVENOUS at 18:22

## 2023-04-01 RX ADMIN — CETIRIZINE HYDROCHLORIDE 10 MG: 10 TABLET, FILM COATED ORAL at 10:37

## 2023-04-01 RX ADMIN — CALCIUM 500 MG: 500 TABLET ORAL at 10:36

## 2023-04-01 RX ADMIN — ONDANSETRON 4 MG: 2 INJECTION INTRAMUSCULAR; INTRAVENOUS at 11:50

## 2023-04-01 RX ADMIN — Medication 2 PUFF: at 19:51

## 2023-04-01 ASSESSMENT — PAIN DESCRIPTION - ORIENTATION: ORIENTATION: RIGHT;LEFT

## 2023-04-01 ASSESSMENT — PAIN SCALES - GENERAL: PAINLEVEL_OUTOF10: 3

## 2023-04-01 ASSESSMENT — PAIN DESCRIPTION - LOCATION: LOCATION: LEG

## 2023-04-01 NOTE — CARE COORDINATION
Late entry- spoke to [place] with [facility] AT [facility] today re: possible dc home this w/e with IV abx infusion. She is not sure all internal requirements can be met to accomplish this over the weekend and is forwarding to admin. Will respond back when she hears something. Addendum:  Staffing an issue for [facility] over the w/e- plan for [facility] Monday. Also requesting labs to written for Tuesdays.

## 2023-04-02 LAB
ANION GAP SERPL CALCULATED.3IONS-SCNC: 8 MMOL/L (ref 3–16)
BUN SERPL-MCNC: 13 MG/DL (ref 7–20)
CALCIUM SERPL-MCNC: 8.8 MG/DL (ref 8.3–10.6)
CHLORIDE SERPL-SCNC: 94 MMOL/L (ref 99–110)
CO2 SERPL-SCNC: 30 MMOL/L (ref 21–32)
CREAT SERPL-MCNC: 0.7 MG/DL (ref 0.6–1.2)
DEPRECATED RDW RBC AUTO: 15 % (ref 12.4–15.4)
GFR SERPLBLD CREATININE-BSD FMLA CKD-EPI: >60 ML/MIN/{1.73_M2}
GLUCOSE BLD-MCNC: 107 MG/DL (ref 70–99)
GLUCOSE BLD-MCNC: 112 MG/DL (ref 70–99)
GLUCOSE BLD-MCNC: 112 MG/DL (ref 70–99)
GLUCOSE BLD-MCNC: 123 MG/DL (ref 70–99)
GLUCOSE SERPL-MCNC: 205 MG/DL (ref 70–99)
HCT VFR BLD AUTO: 28 % (ref 36–48)
HGB BLD-MCNC: 9.1 G/DL (ref 12–16)
MCH RBC QN AUTO: 28.6 PG (ref 26–34)
MCHC RBC AUTO-ENTMCNC: 32.7 G/DL (ref 31–36)
MCV RBC AUTO: 87.5 FL (ref 80–100)
PERFORMED ON: ABNORMAL
PLATELET # BLD AUTO: 196 K/UL (ref 135–450)
PMV BLD AUTO: 6.9 FL (ref 5–10.5)
POTASSIUM SERPL-SCNC: 4 MMOL/L (ref 3.5–5.1)
RBC # BLD AUTO: 3.2 M/UL (ref 4–5.2)
SODIUM SERPL-SCNC: 132 MMOL/L (ref 136–145)
WBC # BLD AUTO: 6.8 K/UL (ref 4–11)

## 2023-04-02 PROCEDURE — 80048 BASIC METABOLIC PNL TOTAL CA: CPT

## 2023-04-02 PROCEDURE — 6360000002 HC RX W HCPCS

## 2023-04-02 PROCEDURE — 2580000003 HC RX 258

## 2023-04-02 PROCEDURE — 2580000003 HC RX 258: Performed by: INTERNAL MEDICINE

## 2023-04-02 PROCEDURE — 85027 COMPLETE CBC AUTOMATED: CPT

## 2023-04-02 PROCEDURE — 6360000002 HC RX W HCPCS: Performed by: INTERNAL MEDICINE

## 2023-04-02 PROCEDURE — 6370000000 HC RX 637 (ALT 250 FOR IP): Performed by: INTERNAL MEDICINE

## 2023-04-02 PROCEDURE — 94640 AIRWAY INHALATION TREATMENT: CPT

## 2023-04-02 PROCEDURE — 94761 N-INVAS EAR/PLS OXIMETRY MLT: CPT

## 2023-04-02 PROCEDURE — 6370000000 HC RX 637 (ALT 250 FOR IP)

## 2023-04-02 PROCEDURE — 1200000000 HC SEMI PRIVATE

## 2023-04-02 PROCEDURE — 2700000000 HC OXYGEN THERAPY PER DAY

## 2023-04-02 RX ORDER — SODIUM CHLORIDE FOR INHALATION 0.9 %
3 VIAL, NEBULIZER (ML) INHALATION EVERY 8 HOURS PRN
Status: DISCONTINUED | OUTPATIENT
Start: 2023-04-02 | End: 2023-04-04 | Stop reason: HOSPADM

## 2023-04-02 RX ORDER — LEVALBUTEROL 1.25 MG/.5ML
1.25 SOLUTION, CONCENTRATE RESPIRATORY (INHALATION) EVERY 8 HOURS PRN
Status: DISCONTINUED | OUTPATIENT
Start: 2023-04-02 | End: 2023-04-04 | Stop reason: HOSPADM

## 2023-04-02 RX ADMIN — GABAPENTIN 100 MG: 100 CAPSULE ORAL at 09:57

## 2023-04-02 RX ADMIN — TIOTROPIUM BROMIDE INHALATION SPRAY 2 PUFF: 3.12 SPRAY, METERED RESPIRATORY (INHALATION) at 11:13

## 2023-04-02 RX ADMIN — APIXABAN 5 MG: 5 TABLET, FILM COATED ORAL at 20:54

## 2023-04-02 RX ADMIN — TAMSULOSIN HYDROCHLORIDE 0.4 MG: 0.4 CAPSULE ORAL at 09:56

## 2023-04-02 RX ADMIN — Medication 2 PUFF: at 19:38

## 2023-04-02 RX ADMIN — VANCOMYCIN HYDROCHLORIDE 1250 MG: 10 INJECTION, POWDER, LYOPHILIZED, FOR SOLUTION INTRAVENOUS at 10:54

## 2023-04-02 RX ADMIN — PANTOPRAZOLE SODIUM 40 MG: 40 TABLET, DELAYED RELEASE ORAL at 15:55

## 2023-04-02 RX ADMIN — LEVOTHYROXINE SODIUM 100 MCG: 0.1 TABLET ORAL at 10:09

## 2023-04-02 RX ADMIN — POLYETHYLENE GLYCOL 3350 17 G: 17 POWDER, FOR SOLUTION ORAL at 15:49

## 2023-04-02 RX ADMIN — CALCIUM 500 MG: 500 TABLET ORAL at 09:57

## 2023-04-02 RX ADMIN — GABAPENTIN 100 MG: 100 CAPSULE ORAL at 14:16

## 2023-04-02 RX ADMIN — GABAPENTIN 100 MG: 100 CAPSULE ORAL at 20:57

## 2023-04-02 RX ADMIN — URSODIOL 300 MG: 300 CAPSULE ORAL at 20:54

## 2023-04-02 RX ADMIN — ONDANSETRON 4 MG: 2 INJECTION INTRAMUSCULAR; INTRAVENOUS at 13:29

## 2023-04-02 RX ADMIN — SODIUM CHLORIDE, PRESERVATIVE FREE 10 ML: 5 INJECTION INTRAVENOUS at 21:00

## 2023-04-02 RX ADMIN — LEVALBUTEROL 1.25 MG: 1.25 SOLUTION, CONCENTRATE RESPIRATORY (INHALATION) at 11:31

## 2023-04-02 RX ADMIN — LOSARTAN POTASSIUM 100 MG: 100 TABLET, FILM COATED ORAL at 09:57

## 2023-04-02 RX ADMIN — DILTIAZEM HYDROCHLORIDE 240 MG: 120 CAPSULE, COATED, EXTENDED RELEASE ORAL at 09:56

## 2023-04-02 RX ADMIN — FUROSEMIDE 20 MG: 20 TABLET ORAL at 09:57

## 2023-04-02 RX ADMIN — ROPINIROLE HYDROCHLORIDE 1 MG: 0.5 TABLET, FILM COATED ORAL at 20:57

## 2023-04-02 RX ADMIN — CARVEDILOL 6.25 MG: 6.25 TABLET, FILM COATED ORAL at 09:57

## 2023-04-02 RX ADMIN — VENLAFAXINE HYDROCHLORIDE 37.5 MG: 37.5 CAPSULE, EXTENDED RELEASE ORAL at 09:57

## 2023-04-02 RX ADMIN — PANTOPRAZOLE SODIUM 40 MG: 40 TABLET, DELAYED RELEASE ORAL at 10:09

## 2023-04-02 RX ADMIN — OXYCODONE HYDROCHLORIDE AND ACETAMINOPHEN 0.5 TABLET: 5; 325 TABLET ORAL at 09:58

## 2023-04-02 RX ADMIN — CEFEPIME 2000 MG: 2 INJECTION, POWDER, FOR SOLUTION INTRAVENOUS at 22:20

## 2023-04-02 RX ADMIN — SODIUM CHLORIDE, PRESERVATIVE FREE 10 ML: 5 INJECTION INTRAVENOUS at 10:04

## 2023-04-02 RX ADMIN — CARVEDILOL 6.25 MG: 6.25 TABLET, FILM COATED ORAL at 16:46

## 2023-04-02 RX ADMIN — ROPINIROLE HYDROCHLORIDE 1 MG: 0.5 TABLET, FILM COATED ORAL at 11:42

## 2023-04-02 RX ADMIN — ESTROGENS, CONJUGATED 1.25 MG: 0.62 TABLET, FILM COATED ORAL at 10:09

## 2023-04-02 RX ADMIN — CETIRIZINE HYDROCHLORIDE 10 MG: 10 TABLET, FILM COATED ORAL at 09:56

## 2023-04-02 RX ADMIN — CEFEPIME 2000 MG: 2 INJECTION, POWDER, FOR SOLUTION INTRAVENOUS at 03:07

## 2023-04-02 RX ADMIN — CEFEPIME 2000 MG: 2 INJECTION, POWDER, FOR SOLUTION INTRAVENOUS at 12:42

## 2023-04-02 RX ADMIN — ROPINIROLE HYDROCHLORIDE 1 MG: 0.5 TABLET, FILM COATED ORAL at 14:16

## 2023-04-02 RX ADMIN — GUAIFENESIN 1200 MG: 600 TABLET, EXTENDED RELEASE ORAL at 09:55

## 2023-04-02 RX ADMIN — MONTELUKAST SODIUM 10 MG: 10 TABLET ORAL at 09:55

## 2023-04-02 RX ADMIN — GABAPENTIN 600 MG: 300 CAPSULE ORAL at 20:56

## 2023-04-02 RX ADMIN — ROPINIROLE HYDROCHLORIDE 1 MG: 0.5 TABLET, FILM COATED ORAL at 18:31

## 2023-04-02 RX ADMIN — URSODIOL 300 MG: 300 CAPSULE ORAL at 09:56

## 2023-04-02 RX ADMIN — APIXABAN 5 MG: 5 TABLET, FILM COATED ORAL at 09:57

## 2023-04-02 RX ADMIN — SODIUM CHLORIDE, PRESERVATIVE FREE 10 ML: 5 INJECTION INTRAVENOUS at 20:59

## 2023-04-02 RX ADMIN — GUAIFENESIN 1200 MG: 600 TABLET, EXTENDED RELEASE ORAL at 20:54

## 2023-04-02 RX ADMIN — OXYCODONE HYDROCHLORIDE AND ACETAMINOPHEN 500 MG: 500 TABLET ORAL at 09:56

## 2023-04-02 RX ADMIN — Medication 2 PUFF: at 11:13

## 2023-04-02 RX ADMIN — ATORVASTATIN CALCIUM 20 MG: 10 TABLET, FILM COATED ORAL at 20:55

## 2023-04-02 RX ADMIN — TRAZODONE HYDROCHLORIDE 100 MG: 50 TABLET ORAL at 20:55

## 2023-04-02 RX ADMIN — FERROUS SULFATE TAB 325 MG (65 MG ELEMENTAL FE) 325 MG: 325 (65 FE) TAB at 09:57

## 2023-04-02 RX ADMIN — OXYCODONE HYDROCHLORIDE AND ACETAMINOPHEN 0.5 TABLET: 5; 325 TABLET ORAL at 20:53

## 2023-04-02 RX ADMIN — FAMOTIDINE 20 MG: 20 TABLET, FILM COATED ORAL at 09:57

## 2023-04-02 RX ADMIN — Medication 1 TABLET: at 09:55

## 2023-04-02 RX ADMIN — SODIUM CHLORIDE: 9 INJECTION, SOLUTION INTRAVENOUS at 22:19

## 2023-04-02 RX ADMIN — CHOLECALCIFEROL TAB 25 MCG (1000 UNIT) 2000 UNITS: 25 TAB at 09:56

## 2023-04-02 RX ADMIN — FERROUS SULFATE TAB 325 MG (65 MG ELEMENTAL FE) 325 MG: 325 (65 FE) TAB at 16:46

## 2023-04-02 RX ADMIN — IPRATROPIUM BROMIDE 0.5 MG: 0.5 SOLUTION RESPIRATORY (INHALATION) at 11:22

## 2023-04-02 RX ADMIN — FERROUS SULFATE TAB 325 MG (65 MG ELEMENTAL FE) 325 MG: 325 (65 FE) TAB at 11:42

## 2023-04-02 RX ADMIN — ROPINIROLE HYDROCHLORIDE 1 MG: 0.5 TABLET, FILM COATED ORAL at 09:56

## 2023-04-02 ASSESSMENT — PAIN SCALES - GENERAL: PAINLEVEL_OUTOF10: 0

## 2023-04-02 NOTE — CARE COORDINATION
Pt and family requested referral to [facility] for IV abx infusions rather than home with [facility]/infusion company. Pt will need cecelia cert if accepted. Referral made. Addendum: Opal Dominguez with [facility] thinks pt insurance is likely OON but will run to see if she has an OON benefit.

## 2023-04-02 NOTE — CARE COORDINATION
[facility]. [facility]

## 2023-04-03 LAB
ANION GAP SERPL CALCULATED.3IONS-SCNC: 7 MMOL/L (ref 3–16)
BUN SERPL-MCNC: 14 MG/DL (ref 7–20)
CALCIUM SERPL-MCNC: 8.7 MG/DL (ref 8.3–10.6)
CHLORIDE SERPL-SCNC: 95 MMOL/L (ref 99–110)
CO2 SERPL-SCNC: 32 MMOL/L (ref 21–32)
CREAT SERPL-MCNC: 0.6 MG/DL (ref 0.6–1.2)
DEPRECATED RDW RBC AUTO: 14.9 % (ref 12.4–15.4)
GFR SERPLBLD CREATININE-BSD FMLA CKD-EPI: >60 ML/MIN/{1.73_M2}
GLUCOSE BLD-MCNC: 129 MG/DL (ref 70–99)
GLUCOSE BLD-MCNC: 149 MG/DL (ref 70–99)
GLUCOSE BLD-MCNC: 99 MG/DL (ref 70–99)
GLUCOSE SERPL-MCNC: 108 MG/DL (ref 70–99)
HCT VFR BLD AUTO: 27.1 % (ref 36–48)
HGB BLD-MCNC: 8.9 G/DL (ref 12–16)
MCH RBC QN AUTO: 28.6 PG (ref 26–34)
MCHC RBC AUTO-ENTMCNC: 33 G/DL (ref 31–36)
MCV RBC AUTO: 86.6 FL (ref 80–100)
PERFORMED ON: ABNORMAL
PERFORMED ON: ABNORMAL
PERFORMED ON: NORMAL
PLATELET # BLD AUTO: 201 K/UL (ref 135–450)
PMV BLD AUTO: 7 FL (ref 5–10.5)
POTASSIUM SERPL-SCNC: 4.3 MMOL/L (ref 3.5–5.1)
RBC # BLD AUTO: 3.13 M/UL (ref 4–5.2)
SODIUM SERPL-SCNC: 134 MMOL/L (ref 136–145)
WBC # BLD AUTO: 6 K/UL (ref 4–11)

## 2023-04-03 PROCEDURE — 2700000000 HC OXYGEN THERAPY PER DAY

## 2023-04-03 PROCEDURE — 80048 BASIC METABOLIC PNL TOTAL CA: CPT

## 2023-04-03 PROCEDURE — 2580000003 HC RX 258: Performed by: INTERNAL MEDICINE

## 2023-04-03 PROCEDURE — 97116 GAIT TRAINING THERAPY: CPT

## 2023-04-03 PROCEDURE — 94761 N-INVAS EAR/PLS OXIMETRY MLT: CPT

## 2023-04-03 PROCEDURE — 85027 COMPLETE CBC AUTOMATED: CPT

## 2023-04-03 PROCEDURE — 97535 SELF CARE MNGMENT TRAINING: CPT

## 2023-04-03 PROCEDURE — 97110 THERAPEUTIC EXERCISES: CPT

## 2023-04-03 PROCEDURE — 6360000002 HC RX W HCPCS: Performed by: INTERNAL MEDICINE

## 2023-04-03 PROCEDURE — 1200000000 HC SEMI PRIVATE

## 2023-04-03 PROCEDURE — 6370000000 HC RX 637 (ALT 250 FOR IP): Performed by: INTERNAL MEDICINE

## 2023-04-03 PROCEDURE — 6370000000 HC RX 637 (ALT 250 FOR IP)

## 2023-04-03 PROCEDURE — 97530 THERAPEUTIC ACTIVITIES: CPT

## 2023-04-03 PROCEDURE — 94640 AIRWAY INHALATION TREATMENT: CPT

## 2023-04-03 RX ORDER — OXYCODONE HYDROCHLORIDE AND ACETAMINOPHEN 5; 325 MG/1; MG/1
0.5 TABLET ORAL EVERY 6 HOURS PRN
Qty: 20 TABLET | Refills: 0 | Status: SHIPPED | OUTPATIENT
Start: 2023-04-03 | End: 2023-04-13

## 2023-04-03 RX ORDER — HYDRALAZINE HYDROCHLORIDE 25 MG/1
25 TABLET, FILM COATED ORAL EVERY 8 HOURS SCHEDULED
Qty: 90 TABLET | Refills: 3
Start: 2023-04-03

## 2023-04-03 RX ORDER — GABAPENTIN 300 MG/1
600 CAPSULE ORAL NIGHTLY
Qty: 60 CAPSULE | Refills: 0 | Status: SHIPPED | OUTPATIENT
Start: 2023-04-03 | End: 2023-05-03

## 2023-04-03 RX ORDER — HYDRALAZINE HYDROCHLORIDE 25 MG/1
25 TABLET, FILM COATED ORAL EVERY 8 HOURS SCHEDULED
Status: DISCONTINUED | OUTPATIENT
Start: 2023-04-03 | End: 2023-04-04 | Stop reason: HOSPADM

## 2023-04-03 RX ORDER — SENNA AND DOCUSATE SODIUM 50; 8.6 MG/1; MG/1
1 TABLET, FILM COATED ORAL DAILY
Status: DISCONTINUED | OUTPATIENT
Start: 2023-04-03 | End: 2023-04-04 | Stop reason: HOSPADM

## 2023-04-03 RX ORDER — SENNA AND DOCUSATE SODIUM 50; 8.6 MG/1; MG/1
1 TABLET, FILM COATED ORAL DAILY
Qty: 30 TABLET | Refills: 0
Start: 2023-04-03

## 2023-04-03 RX ADMIN — DOCUSATE SODIUM 50 MG AND SENNOSIDES 8.6 MG 1 TABLET: 8.6; 5 TABLET, FILM COATED ORAL at 11:56

## 2023-04-03 RX ADMIN — MONTELUKAST SODIUM 10 MG: 10 TABLET ORAL at 08:05

## 2023-04-03 RX ADMIN — FERROUS SULFATE TAB 325 MG (65 MG ELEMENTAL FE) 325 MG: 325 (65 FE) TAB at 11:56

## 2023-04-03 RX ADMIN — ESTROGENS, CONJUGATED 1.25 MG: 0.62 TABLET, FILM COATED ORAL at 08:11

## 2023-04-03 RX ADMIN — OXYCODONE HYDROCHLORIDE AND ACETAMINOPHEN 500 MG: 500 TABLET ORAL at 08:06

## 2023-04-03 RX ADMIN — ROPINIROLE HYDROCHLORIDE 1 MG: 0.5 TABLET, FILM COATED ORAL at 14:23

## 2023-04-03 RX ADMIN — URSODIOL 300 MG: 300 CAPSULE ORAL at 21:41

## 2023-04-03 RX ADMIN — VANCOMYCIN HYDROCHLORIDE 1250 MG: 10 INJECTION, POWDER, LYOPHILIZED, FOR SOLUTION INTRAVENOUS at 00:52

## 2023-04-03 RX ADMIN — OXYCODONE HYDROCHLORIDE AND ACETAMINOPHEN 0.5 TABLET: 5; 325 TABLET ORAL at 08:06

## 2023-04-03 RX ADMIN — LEVOTHYROXINE SODIUM 100 MCG: 0.1 TABLET ORAL at 06:07

## 2023-04-03 RX ADMIN — FERROUS SULFATE TAB 325 MG (65 MG ELEMENTAL FE) 325 MG: 325 (65 FE) TAB at 16:48

## 2023-04-03 RX ADMIN — FAMOTIDINE 20 MG: 20 TABLET, FILM COATED ORAL at 08:05

## 2023-04-03 RX ADMIN — CEFEPIME 2000 MG: 2 INJECTION, POWDER, FOR SOLUTION INTRAVENOUS at 14:29

## 2023-04-03 RX ADMIN — CARVEDILOL 6.25 MG: 6.25 TABLET, FILM COATED ORAL at 16:48

## 2023-04-03 RX ADMIN — GABAPENTIN 100 MG: 100 CAPSULE ORAL at 14:23

## 2023-04-03 RX ADMIN — Medication 1 TABLET: at 08:05

## 2023-04-03 RX ADMIN — CHOLECALCIFEROL TAB 25 MCG (1000 UNIT) 2000 UNITS: 25 TAB at 08:05

## 2023-04-03 RX ADMIN — CEFEPIME 2000 MG: 2 INJECTION, POWDER, FOR SOLUTION INTRAVENOUS at 06:09

## 2023-04-03 RX ADMIN — APIXABAN 5 MG: 5 TABLET, FILM COATED ORAL at 08:06

## 2023-04-03 RX ADMIN — CEFEPIME 2000 MG: 2 INJECTION, POWDER, FOR SOLUTION INTRAVENOUS at 23:08

## 2023-04-03 RX ADMIN — OXYCODONE HYDROCHLORIDE AND ACETAMINOPHEN 0.5 TABLET: 5; 325 TABLET ORAL at 21:43

## 2023-04-03 RX ADMIN — ATORVASTATIN CALCIUM 20 MG: 10 TABLET, FILM COATED ORAL at 21:43

## 2023-04-03 RX ADMIN — CARVEDILOL 6.25 MG: 6.25 TABLET, FILM COATED ORAL at 08:05

## 2023-04-03 RX ADMIN — DILTIAZEM HYDROCHLORIDE 240 MG: 120 CAPSULE, COATED, EXTENDED RELEASE ORAL at 08:06

## 2023-04-03 RX ADMIN — TAMSULOSIN HYDROCHLORIDE 0.4 MG: 0.4 CAPSULE ORAL at 08:05

## 2023-04-03 RX ADMIN — ROPINIROLE HYDROCHLORIDE 1 MG: 0.5 TABLET, FILM COATED ORAL at 21:43

## 2023-04-03 RX ADMIN — PANTOPRAZOLE SODIUM 40 MG: 40 TABLET, DELAYED RELEASE ORAL at 06:07

## 2023-04-03 RX ADMIN — CETIRIZINE HYDROCHLORIDE 10 MG: 10 TABLET, FILM COATED ORAL at 08:06

## 2023-04-03 RX ADMIN — FUROSEMIDE 20 MG: 20 TABLET ORAL at 08:05

## 2023-04-03 RX ADMIN — FERROUS SULFATE TAB 325 MG (65 MG ELEMENTAL FE) 325 MG: 325 (65 FE) TAB at 08:04

## 2023-04-03 RX ADMIN — LOSARTAN POTASSIUM 100 MG: 100 TABLET, FILM COATED ORAL at 08:05

## 2023-04-03 RX ADMIN — GUAIFENESIN 1200 MG: 600 TABLET, EXTENDED RELEASE ORAL at 08:04

## 2023-04-03 RX ADMIN — URSODIOL 300 MG: 300 CAPSULE ORAL at 08:04

## 2023-04-03 RX ADMIN — HYDRALAZINE HYDROCHLORIDE 25 MG: 25 TABLET, FILM COATED ORAL at 21:44

## 2023-04-03 RX ADMIN — Medication 2 PUFF: at 07:49

## 2023-04-03 RX ADMIN — CALCIUM 500 MG: 500 TABLET ORAL at 08:06

## 2023-04-03 RX ADMIN — VENLAFAXINE HYDROCHLORIDE 37.5 MG: 37.5 CAPSULE, EXTENDED RELEASE ORAL at 08:05

## 2023-04-03 RX ADMIN — Medication 2 PUFF: at 19:48

## 2023-04-03 RX ADMIN — ROPINIROLE HYDROCHLORIDE 1 MG: 0.5 TABLET, FILM COATED ORAL at 11:56

## 2023-04-03 RX ADMIN — TRAZODONE HYDROCHLORIDE 100 MG: 50 TABLET ORAL at 21:44

## 2023-04-03 RX ADMIN — PANTOPRAZOLE SODIUM 40 MG: 40 TABLET, DELAYED RELEASE ORAL at 16:48

## 2023-04-03 RX ADMIN — ROPINIROLE HYDROCHLORIDE 1 MG: 0.5 TABLET, FILM COATED ORAL at 06:07

## 2023-04-03 RX ADMIN — HYDRALAZINE HYDROCHLORIDE 25 MG: 25 TABLET, FILM COATED ORAL at 14:23

## 2023-04-03 RX ADMIN — GABAPENTIN 100 MG: 100 CAPSULE ORAL at 08:05

## 2023-04-03 RX ADMIN — GABAPENTIN 100 MG: 100 CAPSULE ORAL at 21:42

## 2023-04-03 RX ADMIN — TIOTROPIUM BROMIDE INHALATION SPRAY 2 PUFF: 3.12 SPRAY, METERED RESPIRATORY (INHALATION) at 07:49

## 2023-04-03 RX ADMIN — APIXABAN 5 MG: 5 TABLET, FILM COATED ORAL at 21:41

## 2023-04-03 RX ADMIN — VANCOMYCIN HYDROCHLORIDE 1250 MG: 10 INJECTION, POWDER, LYOPHILIZED, FOR SOLUTION INTRAVENOUS at 12:39

## 2023-04-03 RX ADMIN — GUAIFENESIN 1200 MG: 600 TABLET, EXTENDED RELEASE ORAL at 21:44

## 2023-04-03 RX ADMIN — VANCOMYCIN HYDROCHLORIDE 1250 MG: 10 INJECTION, POWDER, LYOPHILIZED, FOR SOLUTION INTRAVENOUS at 23:00

## 2023-04-03 RX ADMIN — GABAPENTIN 600 MG: 300 CAPSULE ORAL at 21:41

## 2023-04-03 RX ADMIN — ROPINIROLE HYDROCHLORIDE 1 MG: 0.5 TABLET, FILM COATED ORAL at 16:48

## 2023-04-03 ASSESSMENT — PAIN DESCRIPTION - PAIN TYPE: TYPE: ACUTE PAIN

## 2023-04-03 ASSESSMENT — PAIN DESCRIPTION - DESCRIPTORS
DESCRIPTORS: ACHING
DESCRIPTORS: ACHING

## 2023-04-03 ASSESSMENT — PAIN DESCRIPTION - LOCATION
LOCATION: LEG
LOCATION: LEG

## 2023-04-03 ASSESSMENT — PAIN DESCRIPTION - ORIENTATION: ORIENTATION: RIGHT;LEFT

## 2023-04-03 ASSESSMENT — PAIN SCALES - GENERAL
PAINLEVEL_OUTOF10: 0
PAINLEVEL_OUTOF10: 5
PAINLEVEL_OUTOF10: 0

## 2023-04-03 NOTE — ACP (ADVANCE CARE PLANNING)
Advance Care Planning     General Advance Care Planning (ACP) Conversation    Date of Conversation: 3/26/2023  Conducted with: Patient with Decision Making Capacity    Healthcare Decision Maker:    Primary Decision Maker: Sonali Guerrero - Child - [phone]    Secondary Decision Maker: Trice Espinal - Child - [phone]  Click here to complete Healthcare Decision Makers including selection of the Healthcare Decision Maker Relationship (ie \"Primary\"). Today we documented Decision Maker(s) consistent with Legal Next of Kin hierarchy. Content/Action Overview:   Has ACP document(s) on file - reflects the patient's care preferences  Reviewed DNR/DNI and patient elects Full Code (Attempt Resuscitation)      Length of Voluntary ACP Conversation in minutes:  <16 minutes (Non-Billable)    Sharona Doe RN

## 2023-04-03 NOTE — CARE COORDINATION
Writer SARATH for Nita @ [facility], and Ricky Chung @ [facility] for an update on acceptance. Cert will need done once accepted. IV ABX/PT/OT. [facility] accept and will start cert as soon as PT/OT puts updated notes in they are aware I need them for cert to get started. Patient also indicated that her [facility]      Dav Roca RN

## 2023-04-04 VITALS
OXYGEN SATURATION: 95 % | SYSTOLIC BLOOD PRESSURE: 152 MMHG | RESPIRATION RATE: 18 BRPM | TEMPERATURE: 97.7 F | DIASTOLIC BLOOD PRESSURE: 78 MMHG | HEIGHT: 66 IN | HEART RATE: 78 BPM | BODY MASS INDEX: 34.72 KG/M2 | WEIGHT: 216 LBS

## 2023-04-04 LAB
ANION GAP SERPL CALCULATED.3IONS-SCNC: 7 MMOL/L (ref 3–16)
BACTERIA SPEC AEROBE CULT: NORMAL
BACTERIA SPEC ANAEROBE CULT: NORMAL
BUN SERPL-MCNC: 13 MG/DL (ref 7–20)
CALCIUM SERPL-MCNC: 8.5 MG/DL (ref 8.3–10.6)
CHLORIDE SERPL-SCNC: 94 MMOL/L (ref 99–110)
CO2 SERPL-SCNC: 30 MMOL/L (ref 21–32)
CREAT SERPL-MCNC: <0.5 MG/DL (ref 0.6–1.2)
DEPRECATED RDW RBC AUTO: 15.1 % (ref 12.4–15.4)
GFR SERPLBLD CREATININE-BSD FMLA CKD-EPI: >60 ML/MIN/{1.73_M2}
GLUCOSE BLD-MCNC: 103 MG/DL (ref 70–99)
GLUCOSE BLD-MCNC: 124 MG/DL (ref 70–99)
GLUCOSE SERPL-MCNC: 103 MG/DL (ref 70–99)
GRAM STN SPEC: NORMAL
HCT VFR BLD AUTO: 25.5 % (ref 36–48)
HGB BLD-MCNC: 8.4 G/DL (ref 12–16)
MCH RBC QN AUTO: 28.6 PG (ref 26–34)
MCHC RBC AUTO-ENTMCNC: 33.2 G/DL (ref 31–36)
MCV RBC AUTO: 86.3 FL (ref 80–100)
PERFORMED ON: ABNORMAL
PERFORMED ON: ABNORMAL
PLATELET # BLD AUTO: 178 K/UL (ref 135–450)
PMV BLD AUTO: 6.9 FL (ref 5–10.5)
POTASSIUM SERPL-SCNC: 3.8 MMOL/L (ref 3.5–5.1)
RBC # BLD AUTO: 2.95 M/UL (ref 4–5.2)
SARS-COV-2 RDRP RESP QL NAA+PROBE: NOT DETECTED
SODIUM SERPL-SCNC: 131 MMOL/L (ref 136–145)
WBC # BLD AUTO: 5.1 K/UL (ref 4–11)

## 2023-04-04 PROCEDURE — 94761 N-INVAS EAR/PLS OXIMETRY MLT: CPT

## 2023-04-04 PROCEDURE — 2580000003 HC RX 258: Performed by: INTERNAL MEDICINE

## 2023-04-04 PROCEDURE — 97530 THERAPEUTIC ACTIVITIES: CPT

## 2023-04-04 PROCEDURE — 6360000002 HC RX W HCPCS: Performed by: INTERNAL MEDICINE

## 2023-04-04 PROCEDURE — 94640 AIRWAY INHALATION TREATMENT: CPT

## 2023-04-04 PROCEDURE — 85027 COMPLETE CBC AUTOMATED: CPT

## 2023-04-04 PROCEDURE — 6370000000 HC RX 637 (ALT 250 FOR IP): Performed by: INTERNAL MEDICINE

## 2023-04-04 PROCEDURE — 2580000003 HC RX 258

## 2023-04-04 PROCEDURE — 2700000000 HC OXYGEN THERAPY PER DAY

## 2023-04-04 PROCEDURE — 87635 SARS-COV-2 COVID-19 AMP PRB: CPT

## 2023-04-04 PROCEDURE — 80048 BASIC METABOLIC PNL TOTAL CA: CPT

## 2023-04-04 PROCEDURE — 97110 THERAPEUTIC EXERCISES: CPT

## 2023-04-04 PROCEDURE — 6370000000 HC RX 637 (ALT 250 FOR IP)

## 2023-04-04 RX ADMIN — Medication 2 PUFF: at 07:47

## 2023-04-04 RX ADMIN — ROPINIROLE HYDROCHLORIDE 1 MG: 0.5 TABLET, FILM COATED ORAL at 11:30

## 2023-04-04 RX ADMIN — LOSARTAN POTASSIUM 100 MG: 100 TABLET, FILM COATED ORAL at 09:40

## 2023-04-04 RX ADMIN — MONTELUKAST SODIUM 10 MG: 10 TABLET ORAL at 09:40

## 2023-04-04 RX ADMIN — SODIUM CHLORIDE, PRESERVATIVE FREE 10 ML: 5 INJECTION INTRAVENOUS at 09:49

## 2023-04-04 RX ADMIN — GABAPENTIN 100 MG: 100 CAPSULE ORAL at 09:41

## 2023-04-04 RX ADMIN — FUROSEMIDE 20 MG: 20 TABLET ORAL at 09:41

## 2023-04-04 RX ADMIN — FERROUS SULFATE TAB 325 MG (65 MG ELEMENTAL FE) 325 MG: 325 (65 FE) TAB at 09:47

## 2023-04-04 RX ADMIN — ESTROGENS, CONJUGATED 1.25 MG: 0.62 TABLET, FILM COATED ORAL at 09:38

## 2023-04-04 RX ADMIN — SODIUM CHLORIDE, PRESERVATIVE FREE 10 ML: 5 INJECTION INTRAVENOUS at 09:43

## 2023-04-04 RX ADMIN — CETIRIZINE HYDROCHLORIDE 10 MG: 10 TABLET, FILM COATED ORAL at 09:39

## 2023-04-04 RX ADMIN — URSODIOL 300 MG: 300 CAPSULE ORAL at 09:41

## 2023-04-04 RX ADMIN — OXYCODONE HYDROCHLORIDE AND ACETAMINOPHEN 500 MG: 500 TABLET ORAL at 09:41

## 2023-04-04 RX ADMIN — CARVEDILOL 6.25 MG: 6.25 TABLET, FILM COATED ORAL at 09:46

## 2023-04-04 RX ADMIN — CEFEPIME 2000 MG: 2 INJECTION, POWDER, FOR SOLUTION INTRAVENOUS at 06:32

## 2023-04-04 RX ADMIN — HYDRALAZINE HYDROCHLORIDE 25 MG: 25 TABLET, FILM COATED ORAL at 06:07

## 2023-04-04 RX ADMIN — PANTOPRAZOLE SODIUM 40 MG: 40 TABLET, DELAYED RELEASE ORAL at 06:07

## 2023-04-04 RX ADMIN — FERROUS SULFATE TAB 325 MG (65 MG ELEMENTAL FE) 325 MG: 325 (65 FE) TAB at 11:30

## 2023-04-04 RX ADMIN — Medication 1 TABLET: at 09:40

## 2023-04-04 RX ADMIN — VANCOMYCIN HYDROCHLORIDE 1250 MG: 10 INJECTION, POWDER, LYOPHILIZED, FOR SOLUTION INTRAVENOUS at 11:33

## 2023-04-04 RX ADMIN — VENLAFAXINE HYDROCHLORIDE 37.5 MG: 37.5 CAPSULE, EXTENDED RELEASE ORAL at 09:40

## 2023-04-04 RX ADMIN — TAMSULOSIN HYDROCHLORIDE 0.4 MG: 0.4 CAPSULE ORAL at 09:40

## 2023-04-04 RX ADMIN — APIXABAN 5 MG: 5 TABLET, FILM COATED ORAL at 09:40

## 2023-04-04 RX ADMIN — TIOTROPIUM BROMIDE INHALATION SPRAY 2 PUFF: 3.12 SPRAY, METERED RESPIRATORY (INHALATION) at 07:47

## 2023-04-04 RX ADMIN — GUAIFENESIN 1200 MG: 600 TABLET, EXTENDED RELEASE ORAL at 09:39

## 2023-04-04 RX ADMIN — CHOLECALCIFEROL TAB 25 MCG (1000 UNIT) 2000 UNITS: 25 TAB at 09:40

## 2023-04-04 RX ADMIN — ROPINIROLE HYDROCHLORIDE 1 MG: 0.5 TABLET, FILM COATED ORAL at 09:39

## 2023-04-04 RX ADMIN — CALCIUM 500 MG: 500 TABLET ORAL at 09:40

## 2023-04-04 RX ADMIN — FAMOTIDINE 20 MG: 20 TABLET, FILM COATED ORAL at 09:41

## 2023-04-04 RX ADMIN — DILTIAZEM HYDROCHLORIDE 240 MG: 120 CAPSULE, COATED, EXTENDED RELEASE ORAL at 09:40

## 2023-04-04 RX ADMIN — OXYCODONE HYDROCHLORIDE AND ACETAMINOPHEN 0.5 TABLET: 5; 325 TABLET ORAL at 09:41

## 2023-04-04 RX ADMIN — LEVOTHYROXINE SODIUM 100 MCG: 0.1 TABLET ORAL at 06:07

## 2023-04-04 ASSESSMENT — PAIN DESCRIPTION - DESCRIPTORS: DESCRIPTORS: ACHING

## 2023-04-04 ASSESSMENT — PAIN DESCRIPTION - ORIENTATION: ORIENTATION: RIGHT;LEFT

## 2023-04-04 ASSESSMENT — PAIN DESCRIPTION - LOCATION: LOCATION: LEG

## 2023-04-04 ASSESSMENT — PAIN SCALES - GENERAL
PAINLEVEL_OUTOF10: 0
PAINLEVEL_OUTOF10: 3

## 2023-04-04 NOTE — CARE COORDINATION
CASE MANAGEMENT DISCHARGE SUMMARY      Discharge to: [facility]    Precertification completed: Yes  Hospital Exemption Notification (HENS) completed: Yes    IMM given: (date) 4/3/23      Transportation: [employer] explained to Dr. Z. Pt/family voice no agency preference. Agency used: [facility]   time: [id number]   Ambulance form completed: Yes    Confirmed discharge plan with: Renate Skelton     Patient: yes/no     Family:  yes    Name: Sharda Blank number: [phone]     Facility/Agency, name:  [facility] faxed   Phone number for report to facility: [phone]     RN, name: Silverio Guardado    Note: Discharging nurse to complete [facility], reconcile [facility], and place final copy with patient's discharge packet. RN to ensure that written prescriptions for  Level II medications are sent with patient to the facility as per protocol.       Myriam Rocha RN

## 2023-04-04 NOTE — DISCHARGE SUMMARY
oxyCODONE-acetaminophen (PERCOCET) 5-325 MG per tablet Take 0.5 tablets by mouth every 6 hours as needed for Pain for up to 10 days. Max Daily Amount: 2 tablets  Qty: 20 tablet, Refills: 0    Comments: Reduce doses taken as pain becomes manageable  Associated Diagnoses: Discitis of lumbar region      gabapentin (NEURONTIN) 300 MG capsule Take 2 capsules by mouth at bedtime for 30 days. Qty: 60 capsule, Refills: 0    Associated Diagnoses: Discitis of lumbar region                Details   aclidinium (TUDORZA PRESSAIR) 400 MCG/ACT AEPB inhaler USE 1 INHALATION BY MOUTH INTO  THE LUNGS IN THE MORNING AND IN  THE EVENING  Qty: 3 each, Refills: 1    Comments: Requesting 1 year supply  Associated Diagnoses: Chronic obstructive pulmonary disease, unspecified COPD type ([facility])      montelukast (SINGULAIR) 10 MG tablet Take 1 tablet by mouth daily  Qty: 90 tablet, Refills: 1      metFORMIN (GLUCOPHAGE-XR) 500 MG extended release tablet TAKE 2 TABLETS TWICE A DAY  Qty: 360 tablet, Refills: 3    Associated Diagnoses: Type 2 diabetes mellitus with diabetic polyneuropathy, without long-term current use of insulin ([facility])      rOPINIRole (REQUIP) 1 MG tablet Take 1 tablet by mouth 3 times daily AND 2 tablets nightly. Qty: 150 tablet, Refills: 2    Associated Diagnoses: Restless leg syndrome      cetirizine (ZYRTEC) 10 MG tablet TAKE ONE TABLET BY MOUTH DAILY  Qty: 90 tablet, Refills: 3    Associated Diagnoses: Allergy, initial encounter      traZODone (DESYREL) 50 MG tablet TAKE 2 TABLETS NIGHTLY  Qty: 180 tablet, Refills: 1      beclomethasone (QVAR REDIHALER) 80 MCG/ACT AERB inhaler Inhale 1 puff into the lungs in the morning and 1 puff in the evening.   Qty: 3 each, Refills: 0    Associated Diagnoses: Chronic obstructive pulmonary disease, unspecified COPD type ([facility])      apixaban (ELIQUIS) 5 MG TABS tablet Take 1 tablet by mouth 2 times daily  Qty: 180 tablet, Refills: 3    Associated Diagnoses: Paroxysmal atrial fibrillation
benzonatate (TESSALON) 100 MG capsule TAKE 1 CAPSULE THREE TIMES A DAY AS NEEDED FOR COUGH  Qty: 270 capsule, Refills: 1      levalbuterol (XOPENEX HFA) 45 MCG/ACT inhaler Inhale 2 puffs into the lungs every 6 hours as needed for Shortness of Breath  Qty: 3 Inhaler, Refills: 4    Associated Diagnoses: Simple chronic bronchitis ([facility])      ferrous sulfate 325 (65 Fe) MG tablet Take 325 mg by mouth 3 times daily (with meals)      calcium carbonate 600 MG TABS tablet Take 1 tablet by mouth daily       Multiple Vitamins-Minerals (CENTRUM PO) Take 1 tablet by mouth daily. estrogens, conjugated, (PREMARIN) 1.25 MG tablet Take 1.25 mg by mouth daily. Time Spent on discharge: 33 mins in the examination, evaluation, counseling and review of medications and discharge plan. Signed:    Negrita Raya MD   4/3/2023      Thank you Mattie Blackwood MD for the opportunity to be involved in this patient's care. If you have any questions or concerns, please feel free to contact me at [phone].

## 2023-04-04 NOTE — PROGRESS NOTES
03/26/23 1102   RT Protocol   History Pulmonary Disease 1   Respiratory pattern 0   Breath sounds 0   Cough 0   Indications for Bronchodilator Therapy None   Bronchodilator Assessment Score 1
03/27/23 1015   Encounter Summary   Encounter Overview/Reason  Spiritual/Emotional Needs   Service Provided For: Patient   Referral/Consult From: Patient   Support System Family members   Last Encounter  03/27/23  (Phone visit with patient, prayer, support, will follow)   Complexity of Encounter Moderate   Begin Time 0940   End Time  1016   Total Time Calculated 36 min   Spiritual/Emotional needs   Type Spiritual Support;Emotional Distress   Assessment/Intervention/Outcome   Assessment Desire for reconciliation; Interrupted family processes; Loneliness;Social isolation   Intervention Active listening;Discussed illness injury and its impact; Explored/Affirmed feelings, thoughts, concerns;Nurtured Hope;Prayer (assurance of)/[place]   Outcome Encouraged;Engaged in conversation;Expressed feelings, needs, and concerns;Receptive   Plan and Referrals   Plan/Referrals Continue to visit, (comment)   Patient struggling with continued functional decline and strained family relationships. She states she does not want to go to a \"nursing home\" for rehab and feels she is able to return home for therapy. Patient feels isolated and distant from family members, which distresses her.  offered an opportunity to process thoughts and feelings about functional decline and stressors. Had prayer. Will continue to follow as able.
03/27/23 1351   Encounter Summary   Encounter Overview/Reason  Attempted Encounter   Service Provided For: Patient not available   Referral/Consult From: Other    Support System Family members   Last Encounter  03/27/23  (Pt requested reading glasses.  [address] Left glasses on the side table.)   Begin Time 1100   End Time  1105   Total Time Calculated 5 min     Chaplain Leann Ann
4 Eyes Skin Assessment     The patient is being assess for   Admission    I agree that 2 RN's have performed a thorough Head to Toe Skin Assessment on the patient. ALL assessment sites listed below have been assessed. Areas assessed for pressure by both nurses:   [x]   Head, Face, and Ears   [x]   Shoulders, Back, and Chest, Abdomen  [x]   Arms, Elbows, and Hands   [x]   Coccyx, Sacrum, and Ischium  [x]   Legs, Feet, and Heels        Skin Assessed Under all Medical Devices by both nurses:  O2 device tubing              All Mepilex Borders were peeled back and area peeked at by both nurses:  No: NA  Please list where Mepilex Borders are located:  NA             **SHARE this note so that the co-signing nurse is able to place an eSignature**    Co-signer eSignature: Electronically signed by Homer Olea RN on 3/26/23 at 2:12 PM EDT    Does the Patient have Skin Breakdown related to pressure?   No     (Insert Photo hereNA)         Jack Prevention initiated:  Yes   Wound Care Orders initiated:  No      [facility] nurse consulted for Pressure Injury (Stage 3,4, Unstageable, DTI, NWPT, Complex wounds)and New or Established Ostomies:  NA      Primary Nurse eSignature: Electronically signed by Christie Freed RN on 3/26/23 at 2:08 PM EDT
Comprehensive Nutrition Assessment    Type and Reason for Visit:  Positive Nutrition Screen    Nutrition Recommendations/Plan:   Continue Regular diet and monitor intakes  Monitor nutrition adequacy, pertinent labs, bowel habits, wt changes, and clinical progress     Malnutrition Assessment:  Malnutrition Status: At risk for malnutrition (Comment) (decreased appetite and unintended wt loss) (03/27/23 1319)      Nutrition Assessment:    Positive nutrition screen for weight loss: 68 yo female with PMH of cirrhosis, COPD, GERD, GI bleed, HTN, and controlled DM p/w leg pain. Pt reports having an appetite and consuming three meals and two snacks per day during her stay here, one 51-75% intake reported per EMR. Pt reports over the past year her appetite has decreased and she has had 30 lb unintentional weight loss since her 's passing. Weight loss difficult to assess due to stated wt history and stated current weight. Nutrition Related Findings:    Labs reviewed. No N/V/D/C. Bilateral LE +2 pitting edema. Wound Type: None       Current Nutrition Intake & Therapies:    Average Meal Intake: 51-75%  Average Supplements Intake: None Ordered  ADULT DIET; Regular    Anthropometric Measures:  Height: 5' 6\" (167.6 cm)  Ideal Body Weight (IBW): 130 lbs (59 kg)    Admission Body Weight: 200 lb (90.7 kg)  Current Body Weight: 200 lb (90.7 kg),   IBW. Weight Source: Stated  Current BMI (kg/m2): 32.3        Weight Adjustment For: No Adjustment       BMI Categories: Obese Class 1 (BMI 30.0-34. 9)    Estimated Daily Nutrient Needs:  Energy Requirements Based On: Kcal/kg  Weight Used for Energy Requirements: Ideal  Energy (kcal/day): 6406-2195 kcals  Weight Used for Protein Requirements: Ideal  Protein (g/day): 59-89 g  Method Used for Fluid Requirements: 1 ml/kcal  Fluid (ml/day): 3766-8554 ml    Nutrition Diagnosis:   Predicted inadequate energy intake related to inadequate protein-energy intake as evidenced by intake 51-75%
DIT called regarding PICC placement and received necessary information. Pt requesting code status be changed to full code. Dr. Jenn Doran notified and aware. MD wants to Dr. Migdalia Araujo to also discuss this tomorrow 3/31/23 .
ID Follow-up NOTE    CC:   Leg pain  Antibiotics: none    Admit Date: 3/26/2023  Hospital Day: 6    Subjective:     Patient is s/p IR guided bx of the L spine without growth, no fevers or chills. Tolerating abx well, got PICC line. Objective:     Patient Vitals for the past 8 hrs:   BP Temp Temp src Pulse Resp SpO2   03/31/23 1052 (!) 185/75 98.1 °F (36.7 °C) Oral 88 16 92 %   03/31/23 1020 -- -- -- -- -- 96 %   03/31/23 0900 (!) 127/59 98 °F (36.7 °C) Oral 89 16 93 %       I/O last 3 completed shifts: In: 630 [P.O.:630]  Out: 1700 [Urine:1700]  No intake/output data recorded. EXAM:  GENERAL: No apparent distress. HEENT: Membranes moist, no oral lesion  NECK:  Supple, no lymphadenopathy  LUNGS: Clear b/l, no rales, no dullness  CARDIAC: RRR, no murmur appreciated  ABD:  + BS, soft / NT  EXT:  No rash, there is bilateral 2+ LE edema, no lesions  NEURO: No focal neurologic findings  PSYCH: Orientation, sensorium, mood normal  LINES:  Peripheral iv, LUE picc. Data Review:  Lab Results   Component Value Date    WBC 5.8 03/30/2023    HGB 9.3 (L) 03/30/2023    HCT 28.4 (L) 03/30/2023    MCV 87.3 03/30/2023     03/30/2023     Lab Results   Component Value Date    CREATININE 0.6 03/30/2023    BUN 13 03/30/2023     (L) 03/30/2023    K 4.7 03/30/2023    CL 91 (L) 03/30/2023    CO2 31 03/30/2023       Hepatic Function Panel:   Lab Results   Component Value Date/Time    ALKPHOS 62 03/30/2023 07:37 AM    ALT 9 03/30/2023 07:37 AM    AST 15 03/30/2023 07:37 AM    PROT 7.3 03/30/2023 07:37 AM    BILITOT 0.4 03/30/2023 07:37 AM    BILIDIR <0.2 04/10/2016 11:24 PM    IBILI see below 04/10/2016 11:24 PM    LABALBU 3.1 03/30/2023 07:37 AM       MICRO:  Bcx X 2 3/26: Neg  L4/5 ir guided bx cx 3/30:No growth. MRSA nares neg    IMAGING:  MRI lumbar spine 3/27:  1.  Findings consistent with acute discitis/osteomyelitis at L4-5 with   progressive osteolysis of the adjacent endplates and new grade 1
Nurse to call DIT to place line
Occupational Therapy    Facility/Department: [facility] C5 - MED SURG/ORTHO  Daily Treatment Note  NAME: Corrinne Ishihara  : 1945  MRN: [id number]    Date of Service: 4/3/2023    AM-PAC score  AM-PAC Inpatient Daily Activity Raw Score: 16 (23 134)  AM-PAC Inpatient ADL T-Scale Score : 35.96 (23 134)  ADL Inpatient CMS 0-100% Score: 53.32 (23)  ADL Inpatient CMS G-Code Modifier : CK (23)    Discharge Recommendations:  Subacute/Skilled Nursing Facility  OT Equipment Recommendations  Equipment Needed: No  Other: defer to next level of care    Patient Diagnosis(es): The primary encounter diagnosis was Bilateral lower extremity edema. Diagnoses of Elevated d-dimer, Elevated brain natriuretic peptide (BNP) level, and Discitis of lumbar region were also pertinent to this visit. Assessment   At this time pt is SBA bed mobility, maxA LB dressing, SBA toileting/stance sink side ADLs, SBA STS transfers with RW, CGA/Hayes ambulation in room with RW due to dec strength, balance, endurance and to inc overall pt safety and ind with functional tasks. Pt is currently functioning below baseline, will continue to benefit from skilled OT services in the acute care setting, and SNF is recommended at discharge to increase pt safety and ind with ADLs/transfers/ambulation. Pt seen co-tx collaboration this date to safely meet goals and pt will have better occupational performance outcomes with a co-treatment than 1:1 session. Activity Tolerance: Patient tolerated treatment well  Discharge Recommendations: Subacute/Skilled Nursing Facility  Equipment Needed: No  Other: defer to next level of care      Plan  Occupational Therapy Plan  Times Per Week: 3-5x/wk while in acute care setting  Current Treatment Recommendations: Strengthening;Balance training;Functional mobility training; Endurance training;Gait training; Safety education & training;Self-Care / ADL;Pain management;Patient/Caregiver education &
Occupational Therapy  Facility/Department: [facility] C5 - MED SURG/ORTHO  Daily Treatment Note  NAME: Yessenia Scott  : 1945  MRN: [id number]    Date of Service: 3/30/2023    Discharge Recommendations:  Subacute/Skilled Nursing Facility       AM-PAC score  AM-PAC Inpatient Daily Activity Raw Score: 14 (23 1528)  AM-PAC Inpatient ADL T-Scale Score : 33.39 (23 1528)  ADL Inpatient CMS 0-100% Score: 59.67 (23 1528)  ADL Inpatient CMS G-Code Modifier : CK (23 152)      Patient Diagnosis(es): The primary encounter diagnosis was Bilateral lower extremity edema. Diagnoses of Elevated d-dimer and Elevated brain natriuretic peptide (BNP) level were also pertinent to this visit. Assessment    Assessment: Pt tolerated OT treatment well, initially refusing then verbalizes motivation once encouraged. Pt grossly min-mod A for transfers with RW and bed mobility. Pt max-total A for LB ADLs and toileting at this time. Due to functional deficits continue to recommend SNF at d/c. Activity Tolerance: Patient tolerated treatment well  Discharge Recommendations: Subacute/Skilled Nursing Facility      Plan   Occupational Therapy Plan  Times Per Week: 3-5x     Restrictions  Restrictions/Precautions  Restrictions/Precautions: Fall Risk;General Precautions  Position Activity Restriction  Other position/activity restrictions: Up with assist. Candis Burgess. IV    Subjective   Subjective  Subjective: Pt resting in bed, agreeable to OT treatment with encouragement. Orientation  Overall Orientation Status: Within Functional Limits    Pain: Pt reports chronic pain in back, not rated.     Cognition  Overall Cognitive Status: Exceptions  Safety Judgement: Decreased awareness of need for safety;Decreased awareness of need for assistance  Insights: Decreased awareness of deficits        Objective    Vitals  Vitals:    23    BP: 125/67   Pulse: 61   Resp:    Temp:    SpO2: 93%          Bed Mobility Training  Bed
Occupational Therapy/Physical Therapy  OT/PT follow up attempted. Pt with  at this time, requesting therapy to return later this date. Will follow per POC.   KARLIE Bowman/BRYANT
Physical Therapy  Facility/Department: [place] [address] - [facility] SURG/ORTHO  Physical Therapy Initial Assessment and Treatment    Name: Neftali Ramirez  : 1945  MRN: [id number]  Date of Service: 3/26/2023    Discharge Recommendations:  Subacute/Skilled Nursing Facility   PT Equipment Recommendations  Equipment Needed: No  Other: defer to patient's facility      Patient Diagnosis(es): The primary encounter diagnosis was Bilateral lower extremity edema. Diagnoses of Elevated d-dimer and Elevated brain natriuretic peptide (BNP) level were also pertinent to this visit. Past Medical History:  has a past medical history of Allergic, Anemia, Anesthesia complication, Arthritis, Asthma, Atrial fibrillation ([address] Cirrhosis ([address] Cirrhosis, non-alcoholic ([address] COPD (chronic obstructive pulmonary disease) ([address] GERD (gastroesophageal reflux disease), GIB (gastrointestinal bleeding), Haemophilus infection, Heart murmur, Hernia, Hyperlipidemia, Hypertension, Lung collapse, Murmur, heart, Paroxysmal atrial fibrillation ([address] Pneumonia, Reflux, Rheumatic fever, Stress fracture, SVT (supraventricular tachycardia) ([address] TIA (transient ischemic attack), Type II or unspecified type diabetes mellitus without mention of complication, not stated as uncontrolled, 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 Past Surgical History:  has a past surgical history that includes hernia repair; Hysterectomy; joint replacement (Bilateral); Foot surgery (Right, 2013); knee surgery (); knee surgery (); Cardiac catheterization (14); Cataract removal; bone marrow biopsy; Breast biopsy; fine needle aspiration; Colonoscopy; Upper gastrointestinal endoscopy (N/A, 2018); bronchoscopy (N/A, 2021); bronchoscopy (2021); bronchoscopy (2021); Colonoscopy (N/A, 6/10/2021); Upper gastrointestinal endoscopy (N/A, 6/10/2021);  Upper gastrointestinal endoscopy (N/A, 6/10/2021);
Physician Progress Note      Yaima Us  CSN #:                  [id number]  :                       1945  ADMIT DATE:       3/26/2023 5:55 AM  DISCH DATE:  RESPONDING  PROVIDER #:        Barby Pike MD          QUERY TEXT:    Pt admitted with leg pain and swelling. Pt noted to have COPD and wear 4L NC   at home. If possible, please document in the progress notes and discharge   summary if you are evaluating and/or treating any of the following: The medical record reflects the following:  Risk Factors: COPD, CHF, obesity, SNOW  Clinical Indicators: Per progress notes \"COPD, stable without exacerbation. Wears 4L per NC at home, stable on this. Continue home regimen\". Treatment: Supplemental oxygen, serial labs, supportive care    Thank you,  Parul Turner RN BSN  Options provided:  -- Chronic respiratory failure with hypoxia  -- Other - I will add my own diagnosis  -- Disagree - Not applicable / Not valid  -- Disagree - Clinically unable to determine / Unknown  -- Refer to Clinical Documentation Reviewer    PROVIDER RESPONSE TEXT:    This patient has chronic respiratory failure with hypoxia.     Query created by: Shahana Pozo on 3/29/2023 11:55 AM      Electronically signed by:  Barby Pike MD 3/29/2023 12:02 PM
Pt BP high on Admission to the floor 187/78. However patient had not had morning BP medications and was in a lot of pain. Pt given morning medication and BP is now 160/74.  WCTM
Pt busy with PT. Informed Pt that Arielle Boudreaux would be in tomorrow (Monday 3/27).     Gómez Seaman       03/26/23 1404   Encounter Summary   Encounter Overview/Reason  Initial Encounter   Service Provided For: Patient   Referral/Consult From: Patient   Last Encounter    (3/26 Epic consult, Pt w/ PT)   Complexity of Encounter Low   Begin Time 1400   End Time  1404   Total Time Calculated 4 min
Pt returned to unit C5 via RN.
Pt to be NPO after midnight and no thinners, in preparation for procedure. Tomorrow.
Pt transported to bone biopsy procedure. Cmu notified.
RADIOLOGY:  Patient status post CT-guided aspiration/biopsy of L4-L5 intervertebral disc space level. Patient tolerated the procedure well. Full report to follow.
RN cleaned up patient, changed her Purewick and brief and assisted patient to chair. Patient was then witnessed a few moments later pulling the Purewick to the side of the brief and urinating on the floor while in the chair. Patient then called out and said that the purewick device is not working and there is now urine all over the floor. Patient is completely alert and oriented.  Issue has been addressed with Charge RN
RN cleaned up patient, changed her Purewick and brief and assisted patient to chair. Patient was then witnessed a few moments later pulling the Purewick to the side of the brief and urinating on the floor while in the chair. Patient then called out and said that the purewick device is not working and there is now urine all over the floor. Patient is completely alert and oriented.  Issue has been addressed with Charge RN
Report called to Edda at [facility]. All questions answered.
Upon arrival to place PICC line assessed chart for issues related to picc placement, check for consent, and did time out with RN Sherman Sims. Pt. Tolerated PICC placement well, no difficulty accessing basilic vein and 3CG technology used to verify PICC tip placement. Positive P wave with no negative deflection. Printed wave form and placed In chart.  Reported off to [employer]
PROT 7.3 03/30/2023 07:37 AM    BILITOT 0.4 03/30/2023 07:37 AM    BILIDIR <0.2 04/10/2016 11:24 PM    IBILI see below 04/10/2016 11:24 PM    LABALBU 3.1 03/30/2023 07:37 AM       MICRO:  Bcx X 2 3/26: Neg  L4/5 ir guided bx cx 3/30:pending   MRSA nares neg    IMAGING:  MRI lumbar spine 3/27:  1. Findings consistent with acute discitis/osteomyelitis at L4-5 with   progressive osteolysis of the adjacent endplates and new grade 1   anterolisthesis of L4 relative to L5. There is resulting severe spinal canal   stenosis and severe bilateral neural foraminal narrowing. Concomitant septic   facet arthropathy is present. 2. Interval development of acute discitis/osteomyelitis at L2-3.   3. No epidural abscess or paraspinal abscess identified.        Scheduled Meds:   apixaban  5 mg Oral BID    atorvastatin  20 mg Oral Nightly    ferrous sulfate  325 mg Oral TID WC    furosemide  20 mg Oral Daily    tiotropium  2 puff Inhalation Daily    fluticasone  2 puff Inhalation BID    calcium elemental  1 tablet Oral Daily    carvedilol  6.25 mg Oral BID WC    cetirizine  10 mg Oral Daily    Vitamin D  2,000 Units Oral Daily    dilTIAZem  240 mg Oral Daily    estrogens (conjugated)  1.25 mg Oral Daily    famotidine  20 mg Oral Daily    gabapentin  100 mg Oral TID    gabapentin  600 mg Oral Nightly    pantoprazole  40 mg Oral BID AC    therapeutic multivitamin-minerals  1 tablet Oral Daily    montelukast  10 mg Oral Daily    losartan  100 mg Oral Daily    levothyroxine  100 mcg Oral Daily    rOPINIRole  1 mg Oral 5x daily    tamsulosin  0.4 mg Oral Daily    traZODone  100 mg Oral Nightly    venlafaxine  37.5 mg Oral Daily    vitamin C  500 mg Oral Daily    ursodiol  300 mg Oral BID    sodium chloride flush  5-40 mL IntraVENous 2 times per day    insulin lispro  0-4 Units SubCUTAneous TID WC    insulin lispro  0-4 Units SubCUTAneous Nightly    oxyCODONE-acetaminophen  0.5 tablet Oral BID    cyanocobalamin  1,000 mcg IntraMUSCular
Requirements: Ideal  Protein (g/day): 59-89 g  Method Used for Fluid Requirements: 1 ml/kcal  Fluid (ml/day): 0460-1042 ml    Nutrition Diagnosis:   Inadequate oral intake related to inadequate protein-energy intake as evidenced by poor intake prior to admission, weight loss    Nutrition Interventions:   Food and/or Nutrient Delivery: Start Oral Nutrition Supplement, Continue Current Diet  Nutrition Education/Counseling: No recommendation at this time  Coordination of Nutrition Care: Continue to monitor while inpatient       Goals:  Previous Goal Met: Progressing toward Goal(s)  Goals: PO intake 50% or greater, prior to discharge       Nutrition Monitoring and Evaluation:   Behavioral-Environmental Outcomes: None Identified  Food/Nutrient Intake Outcomes: Food and Nutrient Intake, Supplement Intake  Physical Signs/Symptoms Outcomes: Biochemical Data, Nutrition Focused Physical Findings, Weight    Discharge Planning:    Continue Oral Nutrition Supplement, Continue current diet     [address]: Office: [phone]; [address]: [phone]
Resp:    Temp: 98 °F (36.7 °C)   SpO2: 92%            Bed Mobility Training  Bed Mobility Training: Yes  Interventions: Safety awareness training; Tactile cues; Verbal cues  Supine to Sit: Moderate assistance    Balance  Sitting: Intact  Standing: Impaired (RW)  Standing - Static: Fair;Constant support  Standing - Dynamic: Fair;Poor;Constant support    Transfer Training  Transfer Training: Yes (RW)  Interventions: Safety awareness training; Tactile cues; Verbal cues  Sit to Stand: Minimum assistance  Stand to Sit: Contact-guard assistance       ADL  Toileting: Dependent/Total  Toileting Skilled Clinical Factors: purewick    OT Exercises  A/AROM Exercises: Pt performed the following UE therex seated in chair with rest breaks x15 reps: shoulder flexion, horizontal aBd/aDduction, elbow flex/extension       Safety Devices  Type of Devices: All fall risk precautions in place;Call light within reach;Nurse notified;Gait belt;Patient at risk for falls; Left in chair;Chair alarm in place       Patient Education  Education Given To: Patient  Education Provided: Role of Therapy;Plan of Care;Transfer Training; Fall Prevention Strategies; Home Exercise Program  Education Method: Demonstration;Verbal  Barriers to Learning: None  Education Outcome: Verbalized understanding;Continued education needed    Goals  Short Term Goals  Time Frame for Short Term Goals: 1 week (4/02) unless note -- all goals ongoing 3/28/23  Short Term Goal 1: Pt will complete toilet transfers with min Ax1  Short Term Goal 2: Pt will tolerate >5 min stance with CGA in prep for standing level ADLs  Short Term Goal 3: Pt will complete LB dressing with CGA  Short Term Goal 4: Pt will complete x15 BUE exercises for strength and endurance  Patient Goals   Patient goals : to return home       Therapy Time   Individual Concurrent Group Co-treatment   Time In 1000         Time Out 1140         Minutes [address], KARLIE/L
SubCUTAneous Nightly    oxyCODONE-acetaminophen  0.5 tablet Oral BID    cyanocobalamin  1,000 mcg IntraMUSCular Q14 Days     PRN Meds: benzonatate, levalbuterol, ipratropium, sodium chloride flush, sodium chloride, ondansetron **OR** ondansetron, polyethylene glycol, acetaminophen **OR** acetaminophen, glucose, dextrose bolus **OR** dextrose bolus, glucagon (rDNA), dextrose, diphenhydrAMINE      Intake/Output Summary (Last 24 hours) at 3/29/2023 0977  Last data filed at 3/29/2023 1393  Gross per 24 hour   Intake --   Output 2800 ml   Net -2800 ml         Physical Exam Performed:    BP (!) 163/67   Pulse 76   Temp 98 °F (36.7 °C) (Oral)   Resp 18   Ht 5' 6\" (1.676 m)   Wt 200 lb (90.7 kg)   SpO2 96%   BMI 32.28 kg/m²     General appearance: No apparent distress, appears stated age and cooperative. HEENT: Pupils equal, round, and reactive to light. Conjunctivae/corneas clear. Neck: Supple, with full range of motion. No jugular venous distention. Trachea midline. Respiratory:  Normal respiratory effort. Clear to auscultation, bilaterally without Rales/Wheezes/Rhonchi. Cardiovascular: Regular rate and rhythm with normal S1/S2 without murmurs, rubs or gallops. Abdomen: Soft, non-tender, non-distended with normal bowel sounds. Musculoskeletal: No clubbing or cyanosis. 2+ pitting soft edema bilaterally. Full range of motion without deformity. Lower extremity muscles are tender to slight pressure. Skin: Skin color, texture, turgor normal. Bilateral pretibial erythema. Patient states these are not new  Neurologic:  Neurovascularly intact without any focal sensory/motor deficits. Cranial nerves: II-XII intact, grossly non-focal.  Psychiatric: Alert and oriented, thought content appropriate, normal insight  Capillary Refill: Brisk, 3 seconds, normal   Peripheral Pulses: +2 palpable, equal bilaterally     I examined the patient today (03/29/23). Physical exam is similar to yesterday (3/28).     Labs:   Recent
Peripheral Pulses: +2 palpable, equal bilaterally       Labs:   Recent Labs     03/30/23  0737   WBC 5.8   HGB 9.3*   HCT 28.4*        Recent Labs     03/30/23  0737   *   K 4.7   CL 91*   CO2 31   BUN 13   CREATININE 0.6   CALCIUM 8.8     Recent Labs     03/30/23  0737   AST 15   ALT 9*   BILITOT 0.4   ALKPHOS 63     Recent Labs     03/29/23  1854   INR 1.20*     Recent Labs     03/31/23  1403   CKTOTAL 20*       Urinalysis:      Lab Results   Component Value Date/Time    NITRU Negative 03/26/2023 07:42 AM    WBCUA 452 07/15/2022 02:32 PM    BACTERIA 4+ 07/15/2022 02:32 PM    RBCUA 0 07/15/2022 02:32 PM    BLOODU Negative 03/26/2023 07:42 AM    SPECGRAV <=1.005 03/26/2023 07:42 AM    GLUCOSEU Negative 03/26/2023 07:42 AM    GLUCOSEU NEGATIVE 12/30/2009 10:41 AM       Radiology:  CT BIOPSY DEEP BONE PERCUTANEOUS   Preliminary Result   Status post successful CT-guided aspiration and biopsy of left lateral margin   of L4-L5 intervertebral disc space as described above. CT GUIDED NEEDLE PLACEMENT   Preliminary Result   Status post successful CT-guided aspiration and biopsy of left lateral margin   of L4-L5 intervertebral disc space as described above. VL Extremity Venous Right   Final Result      MRI LUMBAR SPINE W WO CONTRAST   Final Result   1. Findings consistent with acute discitis/osteomyelitis at L4-5 with   progressive osteolysis of the adjacent endplates and new grade 1   anterolisthesis of L4 relative to L5. There is resulting severe spinal canal   stenosis and severe bilateral neural foraminal narrowing. Concomitant septic   facet arthropathy is present. 2. Interval development of acute discitis/osteomyelitis at L2-3.   3. No epidural abscess or paraspinal abscess identified. CT LUMBAR SPINE WO CONTRAST   Final Result   1. Remote history of discitis/osteomyelitis at L4-5 showing progressive   imaging appearance since 05/07/2022.   There is also ill-defined soft tissue
deficits. Cranial nerves: II-XII intact, grossly non-focal.  Psychiatric: Alert and oriented, thought content appropriate, normal insight  Capillary Refill: Brisk, 3 seconds, normal   Peripheral Pulses: +2 palpable, equal bilaterally     I examined the patient today (03/31/23). Physical exam is similar to yesterday (3/30). Labs:   Recent Labs     03/30/23  0737   WBC 5.8   HGB 9.3*   HCT 28.4*          Recent Labs     03/30/23  0737   *   K 4.7   CL 91*   CO2 31   BUN 13   CREATININE 0.6   CALCIUM 8.8       Recent Labs     03/30/23  0737   AST 15   ALT 9*   BILITOT 0.4   ALKPHOS 62       Recent Labs     03/29/23  1854   INR 1.20*       No results for input(s): Shelley Cameron in the last 72 hours. Urinalysis:      Lab Results   Component Value Date/Time    NITRU Negative 03/26/2023 07:42 AM    WBCUA 452 07/15/2022 02:32 PM    BACTERIA 4+ 07/15/2022 02:32 PM    RBCUA 0 07/15/2022 02:32 PM    BLOODU Negative 03/26/2023 07:42 AM    SPECGRAV <=1.005 03/26/2023 07:42 AM    GLUCOSEU Negative 03/26/2023 07:42 AM    GLUCOSEU NEGATIVE 12/30/2009 10:41 AM       Radiology:  CT BIOPSY DEEP BONE PERCUTANEOUS   Preliminary Result   Status post successful CT-guided aspiration and biopsy of left lateral margin   of L4-L5 intervertebral disc space as described above. CT GUIDED NEEDLE PLACEMENT   Preliminary Result   Status post successful CT-guided aspiration and biopsy of left lateral margin   of L4-L5 intervertebral disc space as described above. VL Extremity Venous Right   Final Result      MRI LUMBAR SPINE W WO CONTRAST   Final Result   1. Findings consistent with acute discitis/osteomyelitis at L4-5 with   progressive osteolysis of the adjacent endplates and new grade 1   anterolisthesis of L4 relative to L5. There is resulting severe spinal canal   stenosis and severe bilateral neural foraminal narrowing. Concomitant septic   facet arthropathy is present.    2. Interval development of
ft)  Assistive Device: Walker, rolling;Gait belt     PT Exercises  Exercise Treatment: 10- 12x AROM bilateral: long arc quads, ankle pumps, heel slides. SAQ  Other Specialty Interventions  Other Treatments/Modalities: standing balance at sink activities sba rw 1-2 minutes. Safety Devices  Type of Devices: All fall risk precautions in place; Bed alarm in place;Call light within reach;Gait belt; Heels elevated for pressure relief;Patient at risk for falls; Left in bed;Nurse notified  Restraints  Restraints Initially in Place: No       Goals  Short Term Goals  Time Frame for Short Term Goals: 1 week 4/2/23  Short Term Goal 1: Supine <> sit with SBA -4/03min A  Short Term Goal 2: Sit <> stand with SBA    -4/03 met  Short Term Goal 3: Bed <> chair with RW and SBA    -4/03 CGA rw  Short Term Goal 4: Ambulate 50 feet with RW and SBA   -3/28 30' rw min A  -4/03 cga A 25' RW  Short Term Goal 5: By 3/29/23 patient will tolerate 12-15 reps of her exercises to maximize her endurance and strength.    -4/03 on-going  Patient Goals   Patient Goals : To go home. Education  Patient Education  Education Given To: Patient  Education Provided: Role of Therapy;Plan of Care;Home Exercise Program;Fall Prevention Strategies;Transfer Training;Equipment  Education Provided Comments: patient educated on the benefits of increased mobility, benefits of completing her exercises.   Education Method: Demonstration;Verbal  Barriers to Learning: None  Education Outcome: Verbalized understanding;Continued education needed    Therapy Time   Individual Concurrent Group Co-treatment   Time In 1310         Time Out 1348         Minutes 38         Timed Code Treatment Minutes: [address] [place]
hours. Recent Labs     03/26/23  0600   TROPONINI <0.01       Urinalysis:      Lab Results   Component Value Date/Time    NITRU Negative 03/26/2023 07:42 AM    WBCUA 452 07/15/2022 02:32 PM    BACTERIA 4+ 07/15/2022 02:32 PM    RBCUA 0 07/15/2022 02:32 PM    BLOODU Negative 03/26/2023 07:42 AM    SPECGRAV <=1.005 03/26/2023 07:42 AM    GLUCOSEU Negative 03/26/2023 07:42 AM    GLUCOSEU NEGATIVE 12/30/2009 10:41 AM       Radiology:  CT LUMBAR SPINE WO CONTRAST   Final Result   1. Remote history of discitis/osteomyelitis at L4-5 showing progressive   imaging appearance since 05/07/2022. There is also ill-defined soft tissue   attenuation about this level. It is unclear if this may represent sequela of   previously treated infection or if residual/recurrent infection may be   present. Recommend a follow-up MRI of the lumbar spine with and without   contrast.   2. Additional erosions at the endplates about J1-3 and L3-4 with some   progression noted at L2-3. No other areas of abnormal soft tissue. 3. Degenerative changes are otherwise noted at L3-4 and L4-5. VL Extremity Venous Right    (Results Pending)   MRI LUMBAR SPINE W WO CONTRAST    (Results Pending)       IP CONSULT TO HOSPITALIST  IP CONSULT TO SPIRITUAL SERVICES    Assessment/Plan:    Active Hospital Problems    Diagnosis     Leg pain [M79.606]        PLAN    Bilateral leg pain  Continue home dose gabapentin and percocet  RLE doppler ordered 3/26. Results not back yet  PT/OT consulted     Lumbar osteomyelitis/discitis, treated in 2022  Blood cultures are pending  CT lumbar spine suspicious for progression  MRI lumbar spine ordered and currently pending. Will ask ID to see if MRI confirms progression     Chronic diastolic heart failure  No evidence of exacerbation.  No acute component  Echo from 2017 with mention of G2 DD  Echo 5/2022: EF 55-60%: no regional WMAs  BNP 1132 on arrival, but is a normal value for her age     Essential
risk precautions in place;Call light within reach;Nurse notified;Gait belt;Patient at risk for falls; Left in chair;Chair alarm in place       Goals  Short Term Goals  Time Frame for Short Term Goals: 1 week 4/2/23  Short Term Goal 1: Supine <> sit with SBA -3/28 min A  Short Term Goal 2: Sit <> stand with SBA   -3/28 min A rw  Short Term Goal 3: Bed <> chair with RW and SBA   -3/28 min A rw  Short Term Goal 4: Ambulate 50 feet with RW and SBA   -3/28 30' rw min A  Short Term Goal 5: By 3/29/23 patient will tolerate 12-15 reps of her exercises to maximize her endurance and strength.   -3/28 on-going  Patient Goals   Patient Goals : To go home. Education  Patient Education  Education Given To: Patient  Education Provided: Role of Therapy;Plan of Care;Home Exercise Program;Fall Prevention Strategies;Transfer Training;Equipment  Education Provided Comments: patient educated on the benefits of increased mobility, use of call bell, safety with use of rolling walker, benefits of completing her exercises.   Education Method: Demonstration;Verbal  Barriers to Learning: None  Education Outcome: Verbalized understanding;Continued education needed    Therapy Time   Individual Concurrent Group Co-treatment   Time In 4083         Time Out 1125         Minutes 46         Timed Code Treatment Minutes: [address]
risk precautions in place;Call light within reach;Nurse notified;Gait belt;Patient at risk for falls; Left in chair;Chair alarm in place       Goals  Short Term Goals  Time Frame for Short Term Goals: 1 week 4/2/23  Short Term Goal 1: Supine <> sit with SBA -3/31 min A  Short Term Goal 2: Sit <> stand with SBA    -3/31 min A rw  Short Term Goal 3: Bed <> chair with RW and SBA    -3/31 min A rw  Short Term Goal 4: Ambulate 50 feet with RW and SBA   -3/28 30' rw min A  -3/31 min A 5' RW  Short Term Goal 5: By 3/29/23 patient will tolerate 12-15 reps of her exercises to maximize her endurance and strength.    -3/31 on-going  Patient Goals   Patient Goals : To go home. Education  Patient Education  Education Given To: Patient  Education Provided: Role of Therapy;Plan of Care;Home Exercise Program;Fall Prevention Strategies;Transfer Training;Equipment  Education Provided Comments: patient educated on the benefits of increased mobility, benefits of completing her exercises. Education Method: Demonstration;Verbal  Barriers to Learning: None  Education Outcome: Verbalized understanding;Continued education needed    Therapy Time   Individual Concurrent Group Co-treatment   Time In 0755         Time Out 0820         Minutes 25         Timed Code Treatment Minutes: [id number]     If pt is unable to be seen after this session, please let this note serve as discharge summary. Please see case management note for discharge disposition. Thank you.
your back to sitting on the side of a flat bed without using bedrails?: A Little  How much help is needed moving to and from a bed to a chair?: A Little  How much help is needed standing up from a chair using your arms?: A Little  How much help is needed walking in hospital room?: A Little  How much help is needed climbing 3-5 steps with a railing?: A Lot  AM-PAC Inpatient Mobility Raw Score : 17  AM-PAC Inpatient T-Scale Score : 42.13  Mobility Inpatient CMS 0-100% Score: 50.57  Mobility Inpatient CMS G-Code Modifier : CK    Goals  Short Term Goals  Time Frame for Short Term Goals: 1 week 4/2/23  Short Term Goal 1: Supine <> sit with SBA -4/03min A  Short Term Goal 2: Sit <> stand with SBA    -4/03 met  Short Term Goal 3: Bed <> chair with RW and SBA    -4/03 CGA rw  Short Term Goal 4: Ambulate 50 feet with RW and SBA   - 4/04 CGA with RW 50 ft  Short Term Goal 5: By 3/29/23 patient will tolerate 12-15 reps of her exercises to maximize her endurance and strength.    -4/04 MET  Patient Goals   Patient Goals : To go home. Education  Patient Education  Education Given To: Patient  Education Provided: Role of Therapy;Plan of Care;Home Exercise Program;Fall Prevention Strategies;Transfer Training;Equipment  Education Provided Comments: patient educated on the benefits of increased mobility, benefits of OOB activity  Education Method: Demonstration;Verbal  Barriers to Learning: None  Education Outcome: Verbalized understanding;Continued education needed    Therapy Time   Individual Concurrent Group Co-treatment   Time In 1107         Time Out 1131         Minutes 24         Timed Code Treatment Minutes: 24 Minutes     If pt is unable to be seen after this session, please let this note serve as discharge summary. Please see case management note for discharge disposition. Thank you.     Shraddha King, PT, DPT
(Results Pending)   CT GUIDED NEEDLE PLACEMENT    (Results Pending)       IP CONSULT TO HOSPITALIST  IP CONSULT TO SPIRITUAL SERVICES  IP CONSULT TO INFECTIOUS DISEASES    Assessment/Plan:    Active Hospital Problems    Diagnosis     Discitis of lumbar region [M46.46]     Leg pain [M79.606]        PLAN    Bilateral leg pain  Continue home dose gabapentin and percocet  RLE doppler does not show any acute DVT  PT/OT consulted  Pain is improving. Unclear reason but probably lumbar osteomyelitis/discitis plays a role. No changes overnight     Lumbar osteomyelitis/discitis  This condition was previously suspected and treated but seems to have relapsed. Blood cultures are pending, currently negative  MRI lumbar spine shows acute osteomyelitis again  ESR and CRP are elevated  Infectious disease consulted. Lumbar spine aspiration performed. Cultures pending  Antibiotic decision per ID  TTE performed. No vegetation. Chronic diastolic heart failure  No evidence of exacerbation. No acute component  Echo from 2017 with mention of G2 DD  Echo 5/2022: EF 55-60%: no regional WMAs  BNP 1132 on arrival, but is a normal value for her age  Does not have any new problems     Essential hypertension  Continue carvedilol, lasix, losartan, diltiazem  Monitor vitals  No chest pain. No new problems     Hyperlipidemia, controlled  LDL noted to be 59 in March 2023  Continue home dose statin  CK is normal.  No changes in management     Diabetes type II, controlled  A1C noted to be 5.2 in March 2023  Holding home oral antidiabetic regimen while admitted  Sliding scale insulin, monitor and adjust as needed  Carb control diet. Blood sugars are acceptable with occasional elevations.      Atrial fibrillation, paroxsymal  Rate controlled  Continue home dose diltiazem, eliquis  Monitor on telemetry     COPD, stable without exacerbation  Wears 4L per NC at home, stable on this  Continue home regimen     SNOW  Liver enzymes normal on
Problems    Diagnosis     Leg pain [M79.606]        PLAN    Bilateral leg pain  Continue home dose gabapentin and percocet  RLE doppler does not show any acute DVT  PT/OT consulted  Pain is better today. Lumbar osteomyelitis/discitis, treated in 2022  Blood cultures are pending  CT lumbar spine suspicious for progression  MRI lumbar spine is also showing acute findings  ESR and CRP are elevated  Infectious disease consulted     Chronic diastolic heart failure  No evidence of exacerbation. No acute component  Echo from 2017 with mention of G2 DD  Echo 5/2022: EF 55-60%: no regional WMAs  BNP 1132 on arrival, but is a normal value for her age  No changes clinically. Essential hypertension  Continue carvedilol, lasix, losartan, diltiazem  Monitor vitals  No chest pain. Overall stable     Hyperlipidemia, controlled  LDL noted to be 59 in March 2023  Continue home dose statin  CK is normal.     Diabetes type II, controlled  A1C noted to be 5.2 in March 2023  Hold home oral antidiabetic regimen while admitted  Sliding scale insulin, monitor and adjust as needed  Carb control diet. Blood sugars are acceptable     Atrial fibrillation, paroxsymal  Rate controlled  Continue home dose diltiazem, eliquis  Monitor on telemetry     COPD, stable without exacerbation  Wears 4L per NC at home, stable on this  Continue home regimen     SNOW  Liver enzymes normal on arrival     Obesity  Body mass index is 32.28 kg/m². Complicating assessment and treatment, placing patient at high risk for multiple co-morbidities as well as early death and contributing to the patient's presentation. Counseled on weight loss. Anemia  Patient has a component of iron deficiency. Given suspected infection, we will not replace IV iron at this point. May require outpatient GI follow-up. D/w patient. Questions answered    DVT Prophylaxis: Eliquis  Diet: ADULT DIET;  Regular  Code Status: Limited  PT/OT Eval Status: ordered    Dispo - Unclear
progressive   imaging appearance since 05/07/2022. There is also ill-defined soft tissue   attenuation about this level. It is unclear if this may represent sequela of   previously treated infection or if residual/recurrent infection may be   present. Recommend a follow-up MRI of the lumbar spine with and without   contrast.   2. Additional erosions at the endplates about V4-4 and L3-4 with some   progression noted at L2-3. No other areas of abnormal soft tissue. 3. Degenerative changes are otherwise noted at L3-4 and L4-5. IP CONSULT TO HOSPITALIST  IP CONSULT TO SPIRITUAL SERVICES  IP CONSULT TO INFECTIOUS DISEASES  IP CONSULT TO PHARMACY    Assessment/Plan:    Active Hospital Problems    Diagnosis     Discitis of lumbar region [M46.46]     Leg pain [M79.606]      This is a 45-year-old female admitted with  lumbar osteomyelitis/discitis infectious disease consulted and following CRP elevated, status post IR guided aspiration of the area on 3/30/2023 cultures pending currently on cefepime and daptomycin recommended continued antibiotic till 5/11/2023. Transthoracic echo showing mild thickened mitral wall but no huy endocarditis ,no need for RUDY at this time per cardiology and infectious disease. 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 Chronic diastolic heart failure 2D echo on 05/2022 with a EF of 55 to 60% stable continue with current medication. Hypertension controlled on Coreg, Lasix, losartan, diltiazem. Hyperlipidemia on statin. COPD with chronic respiratory failure continue with inhalers and oxygen per nasal cannula. Paroxysmal atrial fibrillation controlled on diltiazem and Eliquis.   Diabetes mellitus type 2 controlled continue with current care hemoglobin A1c 5.2 on 3/14
Sitting Balance Exercises: ~8 mins seated at EOB  Education Given To: Patient  Education Provided: Role of Therapy;Plan of Care;Transfer Training  Education Provided Comments: Disease Specific Education: Pt educated on importance of OOB mobility, prevention of complications of bedrest, and general safety during hospitalization. Pt verbalized understanding  Education Method: Verbal  Barriers to Learning: None  Education Outcome: Verbalized understanding;Continued education needed      AM-PAC Score  AM-PAC Inpatient Daily Activity Raw Score: 14 (03/26/23 1729)  AM-PAC Inpatient ADL T-Scale Score : 33.39 (03/26/23 1729)  ADL Inpatient CMS 0-100% Score: 59.67 (03/26/23 1729)  ADL Inpatient CMS G-Code Modifier : CK (03/26/23 1729)        Goals  Short Term Goals  Time Frame for Short Term Goals: 1 week (4/02) unless note  Short Term Goal 1: Pt will complete toilet transfers with min Ax1  Short Term Goal 2: Pt will tolerate >5 min stance with CGA in prep for standing level ADLs  Short Term Goal 3: Pt will complete LB dressing with CGA  Short Term Goal 4: Pt will complete x15 BUE exercises for strength and endurance  Patient Goals   Patient goals : to return home       Therapy Time   Individual Concurrent Group Co-treatment   Time In 1545         Time Out 1618         Minutes 33         Timed Code Treatment Minutes: 23 Minutes (10 min eval)     If pt is unable to be seen after this session, please let this note serve as discharge summary. Please see case management note for discharge disposition. Thank you.     Nicholas Yan OT

## 2023-04-04 NOTE — PLAN OF CARE
Problem: Safety - Adult  Goal: Free from fall injury  Outcome: Adequate for Discharge     Problem: Discharge Planning  Goal: Discharge to home or other facility with appropriate resources  Outcome: Adequate for Discharge     Problem: Pain  Goal: Verbalizes/displays adequate comfort level or baseline comfort level  Outcome: Adequate for Discharge     Problem: Skin/Tissue Integrity  Goal: Absence of new skin breakdown  Description: 1. Monitor for areas of redness and/or skin breakdown  2. Assess vascular access sites hourly  3. Every 4-6 hours minimum:  Change oxygen saturation probe site  4. Every 4-6 hours:  If on nasal continuous positive airway pressure, respiratory therapy assess nares and determine need for appliance change or resting period.   Outcome: Adequate for Discharge     Problem: ABCDS Injury Assessment  Goal: Absence of physical injury  Outcome: Adequate for Discharge     Problem: Nutrition Deficit:  Goal: Optimize nutritional status  Outcome: Adequate for Discharge     Problem: Chronic Conditions and Co-morbidities  Goal: Patient's chronic conditions and co-morbidity symptoms are monitored and maintained or improved  Outcome: Adequate for Discharge

## 2023-04-05 ENCOUNTER — TELEPHONE (OUTPATIENT)
Dept: INFECTIOUS DISEASES | Age: 78
End: 2023-04-05

## 2023-04-05 ENCOUNTER — CLINICAL DOCUMENTATION (OUTPATIENT)
Dept: INFECTIOUS DISEASES | Age: 78
End: 2023-04-05

## 2023-04-05 ENCOUNTER — HOSPITAL ENCOUNTER (EMERGENCY)
Age: 78
Discharge: HOME | End: 2023-04-05
Attending: STUDENT IN AN ORGANIZED HEALTH CARE EDUCATION/TRAINING PROGRAM
Payer: MEDICARE

## 2023-04-05 VITALS
OXYGEN SATURATION: 98 % | TEMPERATURE: 97.8 F | DIASTOLIC BLOOD PRESSURE: 88 MMHG | WEIGHT: 216 LBS | HEIGHT: 66 IN | BODY MASS INDEX: 34.72 KG/M2 | SYSTOLIC BLOOD PRESSURE: 159 MMHG | HEART RATE: 88 BPM | RESPIRATION RATE: 18 BRPM

## 2023-04-05 DIAGNOSIS — M46.46 DISCITIS OF LUMBAR REGION: Primary | ICD-10-CM

## 2023-04-05 DIAGNOSIS — M46.46 DISCITIS OF LUMBAR REGION: ICD-10-CM

## 2023-04-05 DIAGNOSIS — Z92.29 HISTORY OF ANTIMICROBIAL USE: Primary | ICD-10-CM

## 2023-04-05 PROCEDURE — 2580000003 HC RX 258: Performed by: STUDENT IN AN ORGANIZED HEALTH CARE EDUCATION/TRAINING PROGRAM

## 2023-04-05 PROCEDURE — 6370000000 HC RX 637 (ALT 250 FOR IP): Performed by: STUDENT IN AN ORGANIZED HEALTH CARE EDUCATION/TRAINING PROGRAM

## 2023-04-05 PROCEDURE — 6360000002 HC RX W HCPCS: Performed by: STUDENT IN AN ORGANIZED HEALTH CARE EDUCATION/TRAINING PROGRAM

## 2023-04-05 RX ORDER — ONDANSETRON 4 MG/1
4 TABLET, ORALLY DISINTEGRATING ORAL ONCE
Status: COMPLETED | OUTPATIENT
Start: 2023-04-05 | End: 2023-04-05

## 2023-04-05 RX ADMIN — CEFEPIME 2000 MG: 2 INJECTION, POWDER, FOR SOLUTION INTRAVENOUS at 03:19

## 2023-04-05 RX ADMIN — ONDANSETRON 4 MG: 4 TABLET, ORALLY DISINTEGRATING ORAL at 05:14

## 2023-04-05 ASSESSMENT — PAIN - FUNCTIONAL ASSESSMENT: PAIN_FUNCTIONAL_ASSESSMENT: NONE - DENIES PAIN

## 2023-04-05 NOTE — PROGRESS NOTES
Dr. Ortiz has placed a referral order for pharmacist to manage Outpatient Parental Antimicrobial Therapy (OPAT) pursuant the ID Collaborative Practice Agreement. Patient and/or caregiver has verbally consented to have drug therapy managed by a pharmacist. The benefits and risks of complex antimicrobial therapy including drug-specific adverse reactions and necessary follow-up were discussed with patient and/or caregiver and they are amenable to OPAT and pharmacist management. Pertinent PMH and HPI:  PMH SNOW, chronic pain with opioid use, htn, hld, COPD on 4L, T2DM, Afib    P/w BL leg pain, numbness, and difficulty walking     Pertinent Objective Data: Wt Readings from Last 1 Encounters:   04/05/23 216 lb (98 kg)      BMI Readings from Last 1 Encounters:   04/05/23 34.86 kg/m²      Serum creatinine: 0.5 mg/dL   Estimated creatinine clearance: 113 mL/min    Lab Results   Component Value Date    CRP 12.9 (H) 03/27/2023       Lab Results   Component Value Date    SEDRATE 71 (H) 03/27/2023       Lab Results   Component Value Date    CKTOTAL 20 (L) 03/31/2023       Imaging:   3/26 MRI:  1. Findings consistent with acute discitis/osteomyelitis at L4-5 with   progressive osteolysis of the adjacent endplates and new grade 1   anterolisthesis of L4 relative to L5. There is resulting severe spinal canal   stenosis and severe bilateral neural foraminal narrowing. Concomitant septic   facet arthropathy is present. 2. Interval development of acute discitis/osteomyelitis at L2-3.   3. No epidural abscess or paraspinal abscess identified. Micro:   3/30 cultures: NGTD    OPAT Orders:  Diagnosis  Lumbar osteo s/p biopsy on 3/30    FINAL DIAGNOSIS:     Lumbar soft tissue and bone L4-L5, needle core biopsy:   -Fragments of unremarkable skeletal muscle and focally degenerated   fibrocartilage.   -Negative for inflammation or neoplasia. -No bone in the biopsy sample.     Antimicrobial Regimen and Projected Term Date IV

## 2023-04-05 NOTE — TELEPHONE ENCOUNTER
Contacted [facility]  Verified OPAT orders with Ida Benitez  Drug: iv daptomycin 450mg qd and cefepime 2g q12h   - Planned End date: 5/11/2023  Check CBC w diff, CMP, ESR, CRP, CPK every Mon or Tue - FAX result to [address] are done in facility on Tuesday, Thursday and Saturday  Also gave Ida Benitez our office number

## 2023-04-05 NOTE — ED PROVIDER NOTES
Nick Sorensen MD   losartan (COZAAR) 100 MG tablet TAKE 1 TABLET DAILY 1/5/23   Nick Sorensen MD   pantoprazole (PROTONIX) 40 MG tablet Take 1 tablet by mouth twice daily 1/5/23   Nick Sorensen MD   cyanocobalamin 1000 MCG/ML injection Inject 1 mL into the muscle every 14 days 12/20/22   Sergio Ortiz DO   diphenhydrAMINE (BENADRYL) 25 MG capsule Take 25 mg by mouth every 6 hours as needed for Itching    Historical Provider, MD   venlafaxine (EFFEXOR XR) 37.5 MG extended release capsule Take 1 capsule by mouth daily 9/19/22   Nick Sorensen MD   ipratropium (ATROVENT) 0.02 % nebulizer solution Take 2.5 mLs by nebulization in the morning and 2.5 mLs at noon and 2.5 mLs in the evening and 2.5 mLs before bedtime. 8/9/22 Arden Holiday. Rajendra Singh DO   ursodiol (ACTIGALL) 300 MG capsule Take 300 mg by mouth in the morning and 300 mg before bedtime. Historical Provider, MD   vitamin C (ASCORBIC ACID) 500 MG tablet Take 500 mg by mouth in the morning. Historical Provider, MD   Cholecalciferol (VITAMIN D) 50 MCG (2000 UT) CAPS capsule Take by mouth    Historical Provider, MD   levothyroxine (SYNTHROID) 100 MCG tablet Take 1 tablet by mouth Daily 7/12/22   Nick Sorensen MD   tamsulosin [facility]) 0.4 MG capsule Take 1 capsule by mouth daily 5/14/22   Arnold Menon MD   benzonatate (TESSALON) 100 MG capsule TAKE 1 CAPSULE THREE TIMES A DAY AS NEEDED FOR COUGH 10/18/21   Judie Pink MD   levalbuterol [facility] HFA) 45 MCG/ACT inhaler Inhale 2 puffs into the lungs every 6 hours as needed for Shortness of Breath 5/20/21   Naima Olivares MD   ferrous sulfate 325 (65 Fe) MG tablet Take 325 mg by mouth 3 times daily (with meals)    Historical Provider, MD   calcium carbonate 600 MG TABS tablet Take 1 tablet by mouth daily     Historical Provider, MD   Multiple Vitamins-Minerals (CENTRUM PO) Take 1 tablet by mouth daily.     Historical Provider, MD   estrogens, conjugated, (PREMARIN) 1.25 MG tablet Take 1.25 mg by mouth

## 2023-04-05 NOTE — ED NOTES
RN reviewed AVS with pt who denies concerns or questions. Report given bedside to [facility] medical transport. Pt was loaded into stretcher and departed with all personal belongings without complication. RN called [facility] and gave report to Nitin  on SIPX, he denies questions at this time.      Lorena Gaxiola RN  04/05/23 0285

## 2023-04-12 ENCOUNTER — HOSPITAL ENCOUNTER (INPATIENT)
Age: 78
LOS: 5 days | Discharge: HOSPICE | DRG: 193 | End: 2023-04-17
Attending: EMERGENCY MEDICINE | Admitting: INTERNAL MEDICINE
Payer: MEDICARE

## 2023-04-12 ENCOUNTER — APPOINTMENT (OUTPATIENT)
Dept: GENERAL RADIOLOGY | Age: 78
DRG: 193 | End: 2023-04-12
Payer: MEDICARE

## 2023-04-12 DIAGNOSIS — J18.9 PNEUMONIA OF BOTH LUNGS DUE TO INFECTIOUS ORGANISM, UNSPECIFIED PART OF LUNG: Primary | ICD-10-CM

## 2023-04-12 PROBLEM — R06.02 SOB (SHORTNESS OF BREATH): Status: ACTIVE | Noted: 2023-04-12

## 2023-04-12 PROBLEM — I48.91 ATRIAL FIBRILLATION WITH RVR (HCC): Status: ACTIVE | Noted: 2023-04-12

## 2023-04-12 PROBLEM — J96.21 ACUTE ON CHRONIC RESPIRATORY FAILURE WITH HYPOXIA (HCC): Status: ACTIVE | Noted: 2023-04-12

## 2023-04-12 PROBLEM — I27.20 PULMONARY HTN (HCC): Status: ACTIVE | Noted: 2023-04-12

## 2023-04-12 LAB
ALBUMIN SERPL-MCNC: 3.2 G/DL (ref 3.4–5)
ALBUMIN/GLOB SERPL: 0.7 {RATIO} (ref 1.1–2.2)
ALP SERPL-CCNC: 62 U/L (ref 40–129)
ALT SERPL-CCNC: 12 U/L (ref 10–40)
ANION GAP SERPL CALCULATED.3IONS-SCNC: 10 MMOL/L (ref 3–16)
AST SERPL-CCNC: 23 U/L (ref 15–37)
BASE EXCESS BLDV CALC-SCNC: -3.8 MMOL/L (ref -3–3)
BASOPHILS # BLD: 0.1 K/UL (ref 0–0.2)
BASOPHILS NFR BLD: 0.5 %
BILIRUB SERPL-MCNC: 0.5 MG/DL (ref 0–1)
BUN SERPL-MCNC: 35 MG/DL (ref 7–20)
CALCIUM SERPL-MCNC: 9 MG/DL (ref 8.3–10.6)
CHLORIDE SERPL-SCNC: 93 MMOL/L (ref 99–110)
CO2 BLDV-SCNC: 23 MMOL/L
CO2 SERPL-SCNC: 23 MMOL/L (ref 21–32)
COHGB MFR BLDV: 1 % (ref 0–1.5)
CREAT SERPL-MCNC: 1 MG/DL (ref 0.6–1.2)
DEPRECATED RDW RBC AUTO: 15.5 % (ref 12.4–15.4)
EKG ATRIAL RATE: 136 BPM
EKG DIAGNOSIS: NORMAL
EKG Q-T INTERVAL: 322 MS
EKG QRS DURATION: 92 MS
EKG QTC CALCULATION (BAZETT): 415 MS
EKG R AXIS: -51 DEGREES
EKG T AXIS: 75 DEGREES
EKG VENTRICULAR RATE: 100 BPM
EOSINOPHIL # BLD: 0.1 K/UL (ref 0–0.6)
EOSINOPHIL NFR BLD: 1.3 %
GFR SERPLBLD CREATININE-BSD FMLA CKD-EPI: 58 ML/MIN/{1.73_M2}
GLUCOSE BLD-MCNC: 179 MG/DL (ref 70–99)
GLUCOSE BLD-MCNC: 183 MG/DL (ref 70–99)
GLUCOSE BLD-MCNC: 190 MG/DL (ref 70–99)
GLUCOSE SERPL-MCNC: 100 MG/DL (ref 70–99)
HCO3 BLDV-SCNC: 21.5 MMOL/L (ref 23–29)
HCT VFR BLD AUTO: 26.6 % (ref 36–48)
HGB BLD-MCNC: 8.8 G/DL (ref 12–16)
LACTATE BLDV-SCNC: 0.9 MMOL/L (ref 0.4–1.9)
LYMPHOCYTES # BLD: 0.7 K/UL (ref 1–5.1)
LYMPHOCYTES NFR BLD: 6.5 %
MCH RBC QN AUTO: 28.4 PG (ref 26–34)
MCHC RBC AUTO-ENTMCNC: 33.1 G/DL (ref 31–36)
MCV RBC AUTO: 85.6 FL (ref 80–100)
METHGB MFR BLDV: 0.5 %
MONOCYTES # BLD: 0.8 K/UL (ref 0–1.3)
MONOCYTES NFR BLD: 7.1 %
NEUTROPHILS # BLD: 9.5 K/UL (ref 1.7–7.7)
NEUTROPHILS NFR BLD: 84.6 %
NT-PROBNP SERPL-MCNC: 1559 PG/ML (ref 0–449)
O2 THERAPY: ABNORMAL
PCO2 BLDV: 40.1 MMHG (ref 40–50)
PERFORMED ON: ABNORMAL
PH BLDV: 7.35 [PH] (ref 7.35–7.45)
PLATELET # BLD AUTO: 214 K/UL (ref 135–450)
PMV BLD AUTO: 7.9 FL (ref 5–10.5)
PO2 BLDV: 78.6 MMHG (ref 25–40)
POTASSIUM SERPL-SCNC: 4.7 MMOL/L (ref 3.5–5.1)
PROCALCITONIN SERPL IA-MCNC: 0.2 NG/ML (ref 0–0.15)
PROT SERPL-MCNC: 7.7 G/DL (ref 6.4–8.2)
RBC # BLD AUTO: 3.11 M/UL (ref 4–5.2)
SAO2 % BLDV: 94 %
SARS-COV-2 RDRP RESP QL NAA+PROBE: NOT DETECTED
SODIUM SERPL-SCNC: 126 MMOL/L (ref 136–145)
TROPONIN T SERPL-MCNC: <0.01 NG/ML
WBC # BLD AUTO: 11.2 K/UL (ref 4–11)

## 2023-04-12 PROCEDURE — 94761 N-INVAS EAR/PLS OXIMETRY MLT: CPT

## 2023-04-12 PROCEDURE — 87040 BLOOD CULTURE FOR BACTERIA: CPT

## 2023-04-12 PROCEDURE — 83605 ASSAY OF LACTIC ACID: CPT

## 2023-04-12 PROCEDURE — 96374 THER/PROPH/DIAG INJ IV PUSH: CPT

## 2023-04-12 PROCEDURE — 93005 ELECTROCARDIOGRAM TRACING: CPT | Performed by: EMERGENCY MEDICINE

## 2023-04-12 PROCEDURE — 6360000002 HC RX W HCPCS: Performed by: INTERNAL MEDICINE

## 2023-04-12 PROCEDURE — 2580000003 HC RX 258: Performed by: EMERGENCY MEDICINE

## 2023-04-12 PROCEDURE — 82803 BLOOD GASES ANY COMBINATION: CPT

## 2023-04-12 PROCEDURE — 84484 ASSAY OF TROPONIN QUANT: CPT

## 2023-04-12 PROCEDURE — 84145 PROCALCITONIN (PCT): CPT

## 2023-04-12 PROCEDURE — 31720 CLEARANCE OF AIRWAYS: CPT

## 2023-04-12 PROCEDURE — 94640 AIRWAY INHALATION TREATMENT: CPT

## 2023-04-12 PROCEDURE — 6370000000 HC RX 637 (ALT 250 FOR IP): Performed by: INTERNAL MEDICINE

## 2023-04-12 PROCEDURE — 2060000000 HC ICU INTERMEDIATE R&B

## 2023-04-12 PROCEDURE — 85025 COMPLETE CBC W/AUTO DIFF WBC: CPT

## 2023-04-12 PROCEDURE — 2580000003 HC RX 258: Performed by: INTERNAL MEDICINE

## 2023-04-12 PROCEDURE — 83880 ASSAY OF NATRIURETIC PEPTIDE: CPT

## 2023-04-12 PROCEDURE — 87641 MR-STAPH DNA AMP PROBE: CPT

## 2023-04-12 PROCEDURE — 99223 1ST HOSP IP/OBS HIGH 75: CPT | Performed by: INTERNAL MEDICINE

## 2023-04-12 PROCEDURE — 6360000002 HC RX W HCPCS: Performed by: EMERGENCY MEDICINE

## 2023-04-12 PROCEDURE — 93010 ELECTROCARDIOGRAM REPORT: CPT | Performed by: INTERNAL MEDICINE

## 2023-04-12 PROCEDURE — 6360000002 HC RX W HCPCS: Performed by: NURSE PRACTITIONER

## 2023-04-12 PROCEDURE — 99285 EMERGENCY DEPT VISIT HI MDM: CPT

## 2023-04-12 PROCEDURE — 1200000000 HC SEMI PRIVATE

## 2023-04-12 PROCEDURE — 2700000000 HC OXYGEN THERAPY PER DAY

## 2023-04-12 PROCEDURE — 71045 X-RAY EXAM CHEST 1 VIEW: CPT

## 2023-04-12 PROCEDURE — 80053 COMPREHEN METABOLIC PANEL: CPT

## 2023-04-12 PROCEDURE — 87635 SARS-COV-2 COVID-19 AMP PRB: CPT

## 2023-04-12 PROCEDURE — 2500000003 HC RX 250 WO HCPCS: Performed by: INTERNAL MEDICINE

## 2023-04-12 RX ORDER — LOSARTAN POTASSIUM 100 MG/1
100 TABLET ORAL DAILY
Status: DISCONTINUED | OUTPATIENT
Start: 2023-04-12 | End: 2023-04-17

## 2023-04-12 RX ORDER — SODIUM CHLORIDE 0.9 % (FLUSH) 0.9 %
10 SYRINGE (ML) INJECTION EVERY 12 HOURS SCHEDULED
Status: DISCONTINUED | OUTPATIENT
Start: 2023-04-12 | End: 2023-04-17

## 2023-04-12 RX ORDER — PANTOPRAZOLE SODIUM 40 MG/1
40 TABLET, DELAYED RELEASE ORAL
Status: DISCONTINUED | OUTPATIENT
Start: 2023-04-12 | End: 2023-04-17

## 2023-04-12 RX ORDER — LEVALBUTEROL TARTRATE 45 UG/1
2 AEROSOL, METERED ORAL EVERY 6 HOURS PRN
Status: DISCONTINUED | OUTPATIENT
Start: 2023-04-12 | End: 2023-04-17

## 2023-04-12 RX ORDER — BENZONATATE 100 MG/1
200 CAPSULE ORAL 3 TIMES DAILY PRN
Status: DISCONTINUED | OUTPATIENT
Start: 2023-04-12 | End: 2023-04-17 | Stop reason: HOSPADM

## 2023-04-12 RX ORDER — SENNA AND DOCUSATE SODIUM 50; 8.6 MG/1; MG/1
1 TABLET, FILM COATED ORAL DAILY
Status: DISCONTINUED | OUTPATIENT
Start: 2023-04-12 | End: 2023-04-17

## 2023-04-12 RX ORDER — ACETYLCYSTEINE 200 MG/ML
600 SOLUTION ORAL; RESPIRATORY (INHALATION) 2 TIMES DAILY
Status: DISCONTINUED | OUTPATIENT
Start: 2023-04-12 | End: 2023-04-17

## 2023-04-12 RX ORDER — ONDANSETRON 2 MG/ML
4 INJECTION INTRAMUSCULAR; INTRAVENOUS EVERY 4 HOURS PRN
Status: DISCONTINUED | OUTPATIENT
Start: 2023-04-12 | End: 2023-04-17 | Stop reason: HOSPADM

## 2023-04-12 RX ORDER — DEXTROSE MONOHYDRATE 100 MG/ML
INJECTION, SOLUTION INTRAVENOUS CONTINUOUS PRN
Status: DISCONTINUED | OUTPATIENT
Start: 2023-04-12 | End: 2023-04-17 | Stop reason: HOSPADM

## 2023-04-12 RX ORDER — CHOLECALCIFEROL (VITAMIN D3) 125 MCG
TABLET ORAL DAILY
COMMUNITY

## 2023-04-12 RX ORDER — SODIUM CHLORIDE 9 MG/ML
INJECTION, SOLUTION INTRAVENOUS PRN
Status: DISCONTINUED | OUTPATIENT
Start: 2023-04-12 | End: 2023-04-17 | Stop reason: HOSPADM

## 2023-04-12 RX ORDER — FLUTICASONE PROPIONATE 110 UG/1
4 AEROSOL, METERED RESPIRATORY (INHALATION) 2 TIMES DAILY
Status: DISCONTINUED | OUTPATIENT
Start: 2023-04-12 | End: 2023-04-12

## 2023-04-12 RX ORDER — INSULIN LISPRO 100 [IU]/ML
0-8 INJECTION, SOLUTION INTRAVENOUS; SUBCUTANEOUS
Status: DISCONTINUED | OUTPATIENT
Start: 2023-04-12 | End: 2023-04-17

## 2023-04-12 RX ORDER — ACETAMINOPHEN 650 MG/1
650 SUPPOSITORY RECTAL EVERY 4 HOURS PRN
Status: DISCONTINUED | OUTPATIENT
Start: 2023-04-12 | End: 2023-04-17 | Stop reason: HOSPADM

## 2023-04-12 RX ORDER — GABAPENTIN 300 MG/1
600 CAPSULE ORAL NIGHTLY
Status: DISCONTINUED | OUTPATIENT
Start: 2023-04-12 | End: 2023-04-17

## 2023-04-12 RX ORDER — HYDROXYZINE HYDROCHLORIDE 50 MG/ML
50 INJECTION, SOLUTION INTRAMUSCULAR ONCE
Status: COMPLETED | OUTPATIENT
Start: 2023-04-12 | End: 2023-04-12

## 2023-04-12 RX ORDER — LEVALBUTEROL 1.25 MG/.5ML
1.25 SOLUTION, CONCENTRATE RESPIRATORY (INHALATION) ONCE
Status: COMPLETED | OUTPATIENT
Start: 2023-04-12 | End: 2023-04-12

## 2023-04-12 RX ORDER — CARVEDILOL 6.25 MG/1
6.25 TABLET ORAL 2 TIMES DAILY WITH MEALS
Status: DISCONTINUED | OUTPATIENT
Start: 2023-04-12 | End: 2023-04-17

## 2023-04-12 RX ORDER — FUROSEMIDE 20 MG/1
20 TABLET ORAL DAILY
Status: DISCONTINUED | OUTPATIENT
Start: 2023-04-12 | End: 2023-04-17

## 2023-04-12 RX ORDER — METHYLPREDNISOLONE SODIUM SUCCINATE 40 MG/ML
40 INJECTION, POWDER, LYOPHILIZED, FOR SOLUTION INTRAMUSCULAR; INTRAVENOUS ONCE
Status: COMPLETED | OUTPATIENT
Start: 2023-04-12 | End: 2023-04-12

## 2023-04-12 RX ORDER — ROPINIROLE 0.5 MG/1
2 TABLET, FILM COATED ORAL NIGHTLY
Status: DISCONTINUED | OUTPATIENT
Start: 2023-04-12 | End: 2023-04-17

## 2023-04-12 RX ORDER — HYDRALAZINE HYDROCHLORIDE 25 MG/1
25 TABLET, FILM COATED ORAL EVERY 8 HOURS SCHEDULED
Status: DISCONTINUED | OUTPATIENT
Start: 2023-04-12 | End: 2023-04-17

## 2023-04-12 RX ORDER — CETIRIZINE HYDROCHLORIDE 10 MG/1
10 TABLET ORAL DAILY
Status: DISCONTINUED | OUTPATIENT
Start: 2023-04-12 | End: 2023-04-12

## 2023-04-12 RX ORDER — TAMSULOSIN HYDROCHLORIDE 0.4 MG/1
0.4 CAPSULE ORAL DAILY
Status: DISCONTINUED | OUTPATIENT
Start: 2023-04-12 | End: 2023-04-17

## 2023-04-12 RX ORDER — VENLAFAXINE HYDROCHLORIDE 37.5 MG/1
37.5 CAPSULE, EXTENDED RELEASE ORAL DAILY
Status: DISCONTINUED | OUTPATIENT
Start: 2023-04-12 | End: 2023-04-17

## 2023-04-12 RX ORDER — DILTIAZEM HYDROCHLORIDE 240 MG/1
240 CAPSULE, EXTENDED RELEASE ORAL DAILY
COMMUNITY

## 2023-04-12 RX ORDER — URSODIOL 300 MG/1
300 CAPSULE ORAL 2 TIMES DAILY
Status: DISCONTINUED | OUTPATIENT
Start: 2023-04-12 | End: 2023-04-17

## 2023-04-12 RX ORDER — POTASSIUM CHLORIDE 1500 MG/1
20 TABLET, FILM COATED, EXTENDED RELEASE ORAL
COMMUNITY

## 2023-04-12 RX ORDER — ONDANSETRON 4 MG/1
4 TABLET, FILM COATED ORAL
COMMUNITY

## 2023-04-12 RX ORDER — INSULIN LISPRO 100 [IU]/ML
0-4 INJECTION, SOLUTION INTRAVENOUS; SUBCUTANEOUS NIGHTLY
Status: DISCONTINUED | OUTPATIENT
Start: 2023-04-12 | End: 2023-04-17

## 2023-04-12 RX ORDER — TRAZODONE HYDROCHLORIDE 50 MG/1
100 TABLET ORAL NIGHTLY PRN
Status: DISCONTINUED | OUTPATIENT
Start: 2023-04-12 | End: 2023-04-17 | Stop reason: HOSPADM

## 2023-04-12 RX ORDER — DILTIAZEM HYDROCHLORIDE 120 MG/1
240 CAPSULE, COATED, EXTENDED RELEASE ORAL DAILY
Status: DISCONTINUED | OUTPATIENT
Start: 2023-04-12 | End: 2023-04-17

## 2023-04-12 RX ORDER — LEVOTHYROXINE SODIUM 0.1 MG/1
100 TABLET ORAL DAILY
Status: DISCONTINUED | OUTPATIENT
Start: 2023-04-12 | End: 2023-04-17

## 2023-04-12 RX ORDER — SODIUM CHLORIDE 0.9 % (FLUSH) 0.9 %
10 SYRINGE (ML) INJECTION PRN
Status: DISCONTINUED | OUTPATIENT
Start: 2023-04-12 | End: 2023-04-17 | Stop reason: HOSPADM

## 2023-04-12 RX ORDER — MONTELUKAST SODIUM 10 MG/1
10 TABLET ORAL DAILY
Status: DISCONTINUED | OUTPATIENT
Start: 2023-04-12 | End: 2023-04-17

## 2023-04-12 RX ORDER — SODIUM CHLORIDE, PRESERVATIVE FREE 5 ML
INJECTION INTRAVENOUS 2 TIMES DAILY
COMMUNITY

## 2023-04-12 RX ORDER — ACETAMINOPHEN 325 MG/1
650 TABLET ORAL EVERY 4 HOURS PRN
Status: DISCONTINUED | OUTPATIENT
Start: 2023-04-12 | End: 2023-04-17 | Stop reason: HOSPADM

## 2023-04-12 RX ORDER — LEVALBUTEROL 1.25 MG/.5ML
0.63 SOLUTION, CONCENTRATE RESPIRATORY (INHALATION) EVERY 6 HOURS
Status: DISCONTINUED | OUTPATIENT
Start: 2023-04-12 | End: 2023-04-15

## 2023-04-12 RX ORDER — ATORVASTATIN CALCIUM 10 MG/1
20 TABLET, FILM COATED ORAL NIGHTLY
Status: DISCONTINUED | OUTPATIENT
Start: 2023-04-12 | End: 2023-04-17

## 2023-04-12 RX ADMIN — LEVALBUTEROL 0.63 MG: 1.25 SOLUTION, CONCENTRATE RESPIRATORY (INHALATION) at 19:24

## 2023-04-12 RX ADMIN — Medication 4 PUFF: at 08:51

## 2023-04-12 RX ADMIN — CEFEPIME 2000 MG: 2 INJECTION, POWDER, FOR SOLUTION INTRAVENOUS at 17:28

## 2023-04-12 RX ADMIN — PANTOPRAZOLE SODIUM 40 MG: 40 TABLET, DELAYED RELEASE ORAL at 07:59

## 2023-04-12 RX ADMIN — ACETYLCYSTEINE 600 MG: 200 INHALANT RESPIRATORY (INHALATION) at 12:24

## 2023-04-12 RX ADMIN — VANCOMYCIN HYDROCHLORIDE 1500 MG: 10 INJECTION, POWDER, LYOPHILIZED, FOR SOLUTION INTRAVENOUS at 06:28

## 2023-04-12 RX ADMIN — DAPTOMYCIN 450 MG: 500 INJECTION, POWDER, LYOPHILIZED, FOR SOLUTION INTRAVENOUS at 08:35

## 2023-04-12 RX ADMIN — CARVEDILOL 6.25 MG: 6.25 TABLET, FILM COATED ORAL at 08:02

## 2023-04-12 RX ADMIN — LEVALBUTEROL HYDROCHLORIDE 1.25 MG: 1.25 SOLUTION, CONCENTRATE RESPIRATORY (INHALATION) at 05:09

## 2023-04-12 RX ADMIN — LEVOTHYROXINE SODIUM 100 MCG: 0.1 TABLET ORAL at 08:04

## 2023-04-12 RX ADMIN — METHYLPREDNISOLONE SODIUM SUCCINATE 40 MG: 40 INJECTION, POWDER, FOR SOLUTION INTRAMUSCULAR; INTRAVENOUS at 05:10

## 2023-04-12 RX ADMIN — CEFEPIME 2000 MG: 2 INJECTION, POWDER, FOR SOLUTION INTRAVENOUS at 05:56

## 2023-04-12 RX ADMIN — DILTIAZEM HYDROCHLORIDE 240 MG: 120 CAPSULE, COATED, EXTENDED RELEASE ORAL at 10:53

## 2023-04-12 RX ADMIN — SODIUM CHLORIDE, PRESERVATIVE FREE 10 ML: 5 INJECTION INTRAVENOUS at 11:02

## 2023-04-12 RX ADMIN — LEVALBUTEROL 0.63 MG: 1.25 SOLUTION, CONCENTRATE RESPIRATORY (INHALATION) at 12:24

## 2023-04-12 RX ADMIN — HYDROXYZINE HYDROCHLORIDE 50 MG: 50 INJECTION, SOLUTION INTRAMUSCULAR at 21:09

## 2023-04-12 RX ADMIN — ACETYLCYSTEINE 600 MG: 200 INHALANT RESPIRATORY (INHALATION) at 19:24

## 2023-04-12 ASSESSMENT — LIFESTYLE VARIABLES
HOW OFTEN DO YOU HAVE A DRINK CONTAINING ALCOHOL: NEVER
HOW MANY STANDARD DRINKS CONTAINING ALCOHOL DO YOU HAVE ON A TYPICAL DAY: PATIENT DOES NOT DRINK

## 2023-04-12 ASSESSMENT — PAIN SCALES - GENERAL: PAINLEVEL_OUTOF10: 0

## 2023-04-12 ASSESSMENT — PAIN - FUNCTIONAL ASSESSMENT: PAIN_FUNCTIONAL_ASSESSMENT: NONE - DENIES PAIN

## 2023-04-13 ENCOUNTER — APPOINTMENT (OUTPATIENT)
Dept: GENERAL RADIOLOGY | Age: 78
DRG: 193 | End: 2023-04-13
Payer: MEDICARE

## 2023-04-13 LAB
BASE EXCESS BLDA CALC-SCNC: -4.1 MMOL/L (ref -3–3)
CO2 BLDA-SCNC: 21.8 MMOL/L
COHGB MFR BLDA: 0.3 % (ref 0–1.5)
GLUCOSE BLD-MCNC: 139 MG/DL (ref 70–99)
GLUCOSE BLD-MCNC: 142 MG/DL (ref 70–99)
GLUCOSE BLD-MCNC: 148 MG/DL (ref 70–99)
GLUCOSE BLD-MCNC: 162 MG/DL (ref 70–99)
HCO3 BLDA-SCNC: 20.7 MMOL/L (ref 21–29)
HGB BLDA-MCNC: 12 G/DL (ref 12–16)
METHGB MFR BLDA: 0.3 %
MRSA DNA SPEC QL NAA+PROBE: NORMAL
O2 THERAPY: ABNORMAL
PCO2 BLDA: 36.9 MMHG (ref 35–45)
PERFORMED ON: ABNORMAL
PH BLDA: 7.37 [PH] (ref 7.35–7.45)
PO2 BLDA: 72.5 MMHG (ref 75–108)
SAO2 % BLDA: 92.7 %

## 2023-04-13 PROCEDURE — 6360000002 HC RX W HCPCS: Performed by: INTERNAL MEDICINE

## 2023-04-13 PROCEDURE — 6370000000 HC RX 637 (ALT 250 FOR IP): Performed by: INTERNAL MEDICINE

## 2023-04-13 PROCEDURE — 94640 AIRWAY INHALATION TREATMENT: CPT

## 2023-04-13 PROCEDURE — 71045 X-RAY EXAM CHEST 1 VIEW: CPT

## 2023-04-13 PROCEDURE — 94669 MECHANICAL CHEST WALL OSCILL: CPT

## 2023-04-13 PROCEDURE — 94761 N-INVAS EAR/PLS OXIMETRY MLT: CPT

## 2023-04-13 PROCEDURE — 1200000000 HC SEMI PRIVATE

## 2023-04-13 PROCEDURE — 99233 SBSQ HOSP IP/OBS HIGH 50: CPT | Performed by: INTERNAL MEDICINE

## 2023-04-13 PROCEDURE — 2700000000 HC OXYGEN THERAPY PER DAY

## 2023-04-13 PROCEDURE — 2060000000 HC ICU INTERMEDIATE R&B

## 2023-04-13 PROCEDURE — 82803 BLOOD GASES ANY COMBINATION: CPT

## 2023-04-13 PROCEDURE — 36600 WITHDRAWAL OF ARTERIAL BLOOD: CPT

## 2023-04-13 PROCEDURE — 2580000003 HC RX 258: Performed by: INTERNAL MEDICINE

## 2023-04-13 PROCEDURE — 6360000002 HC RX W HCPCS: Performed by: NURSE PRACTITIONER

## 2023-04-13 RX ORDER — OLANZAPINE 10 MG/1
5 INJECTION, POWDER, LYOPHILIZED, FOR SOLUTION INTRAMUSCULAR ONCE
Status: DISCONTINUED | OUTPATIENT
Start: 2023-04-13 | End: 2023-04-13

## 2023-04-13 RX ORDER — LIDOCAINE HYDROCHLORIDE 10 MG/ML
1 INJECTION, SOLUTION INFILTRATION; PERINEURAL
Status: CANCELLED | OUTPATIENT
Start: 2023-04-13 | End: 2023-04-14

## 2023-04-13 RX ORDER — MIDAZOLAM HYDROCHLORIDE 1 MG/ML
2 INJECTION INTRAMUSCULAR; INTRAVENOUS
Status: CANCELLED | OUTPATIENT
Start: 2023-04-13 | End: 2023-04-14

## 2023-04-13 RX ORDER — SODIUM CHLORIDE 9 MG/ML
INJECTION, SOLUTION INTRAVENOUS PRN
Status: CANCELLED | OUTPATIENT
Start: 2023-04-13

## 2023-04-13 RX ORDER — LORAZEPAM 2 MG/ML
1 INJECTION INTRAMUSCULAR EVERY 6 HOURS PRN
Status: DISCONTINUED | OUTPATIENT
Start: 2023-04-13 | End: 2023-04-17

## 2023-04-13 RX ORDER — SODIUM CHLORIDE 0.9 % (FLUSH) 0.9 %
5-40 SYRINGE (ML) INJECTION PRN
Status: CANCELLED | OUTPATIENT
Start: 2023-04-13

## 2023-04-13 RX ORDER — SODIUM CHLORIDE, SODIUM LACTATE, POTASSIUM CHLORIDE, CALCIUM CHLORIDE 600; 310; 30; 20 MG/100ML; MG/100ML; MG/100ML; MG/100ML
INJECTION, SOLUTION INTRAVENOUS CONTINUOUS
Status: CANCELLED | OUTPATIENT
Start: 2023-04-13

## 2023-04-13 RX ORDER — LORAZEPAM 2 MG/ML
1 INJECTION INTRAMUSCULAR ONCE
Status: COMPLETED | OUTPATIENT
Start: 2023-04-13 | End: 2023-04-13

## 2023-04-13 RX ORDER — SODIUM CHLORIDE 0.9 % (FLUSH) 0.9 %
5-40 SYRINGE (ML) INJECTION EVERY 12 HOURS SCHEDULED
Status: CANCELLED | OUTPATIENT
Start: 2023-04-13

## 2023-04-13 RX ADMIN — LEVALBUTEROL 1.25 MG: 1.25 SOLUTION, CONCENTRATE RESPIRATORY (INHALATION) at 13:31

## 2023-04-13 RX ADMIN — URSODIOL 300 MG: 300 CAPSULE ORAL at 20:14

## 2023-04-13 RX ADMIN — DAPTOMYCIN 450 MG: 500 INJECTION, POWDER, LYOPHILIZED, FOR SOLUTION INTRAVENOUS at 10:04

## 2023-04-13 RX ADMIN — CEFEPIME 2000 MG: 2 INJECTION, POWDER, FOR SOLUTION INTRAVENOUS at 05:40

## 2023-04-13 RX ADMIN — LEVALBUTEROL 1.25 MG: 1.25 SOLUTION, CONCENTRATE RESPIRATORY (INHALATION) at 07:59

## 2023-04-13 RX ADMIN — CEFEPIME 2000 MG: 2 INJECTION, POWDER, FOR SOLUTION INTRAVENOUS at 17:46

## 2023-04-13 RX ADMIN — HYDRALAZINE HYDROCHLORIDE 25 MG: 25 TABLET, FILM COATED ORAL at 22:00

## 2023-04-13 RX ADMIN — LORAZEPAM 1 MG: 2 INJECTION INTRAMUSCULAR; INTRAVENOUS at 18:22

## 2023-04-13 RX ADMIN — SODIUM CHLORIDE, PRESERVATIVE FREE 10 ML: 5 INJECTION INTRAVENOUS at 20:15

## 2023-04-13 RX ADMIN — PANTOPRAZOLE SODIUM 40 MG: 40 TABLET, DELAYED RELEASE ORAL at 14:46

## 2023-04-13 RX ADMIN — ACETYLCYSTEINE 600 MG: 200 INHALANT RESPIRATORY (INHALATION) at 20:28

## 2023-04-13 RX ADMIN — ACETYLCYSTEINE 600 MG: 200 INHALANT RESPIRATORY (INHALATION) at 07:59

## 2023-04-13 RX ADMIN — LEVALBUTEROL 0.63 MG: 1.25 SOLUTION, CONCENTRATE RESPIRATORY (INHALATION) at 02:00

## 2023-04-13 RX ADMIN — ROPINIROLE HYDROCHLORIDE 2 MG: 2 TABLET, FILM COATED ORAL at 20:15

## 2023-04-13 RX ADMIN — GABAPENTIN 600 MG: 300 CAPSULE ORAL at 20:14

## 2023-04-13 RX ADMIN — HYDRALAZINE HYDROCHLORIDE 25 MG: 25 TABLET, FILM COATED ORAL at 14:46

## 2023-04-13 RX ADMIN — LORAZEPAM 1 MG: 2 INJECTION INTRAMUSCULAR; INTRAVENOUS at 01:53

## 2023-04-13 RX ADMIN — LEVALBUTEROL 0.63 MG: 1.25 SOLUTION, CONCENTRATE RESPIRATORY (INHALATION) at 20:28

## 2023-04-13 RX ADMIN — APIXABAN 5 MG: 5 TABLET, FILM COATED ORAL at 20:15

## 2023-04-13 RX ADMIN — ATORVASTATIN CALCIUM 20 MG: 10 TABLET, FILM COATED ORAL at 20:14

## 2023-04-13 RX ADMIN — CARVEDILOL 6.25 MG: 6.25 TABLET, FILM COATED ORAL at 16:41

## 2023-04-13 ASSESSMENT — PAIN SCALES - GENERAL
PAINLEVEL_OUTOF10: 0

## 2023-04-14 ENCOUNTER — APPOINTMENT (OUTPATIENT)
Dept: GENERAL RADIOLOGY | Age: 78
DRG: 193 | End: 2023-04-14
Payer: MEDICARE

## 2023-04-14 LAB
AMMONIA PLAS-SCNC: 24 UMOL/L (ref 11–51)
ANION GAP SERPL CALCULATED.3IONS-SCNC: 11 MMOL/L (ref 3–16)
BASOPHILS # BLD: 0 K/UL (ref 0–0.2)
BASOPHILS NFR BLD: 0.1 %
BUN SERPL-MCNC: 23 MG/DL (ref 7–20)
CALCIUM SERPL-MCNC: 9.1 MG/DL (ref 8.3–10.6)
CHLORIDE SERPL-SCNC: 98 MMOL/L (ref 99–110)
CO2 SERPL-SCNC: 24 MMOL/L (ref 21–32)
CREAT SERPL-MCNC: 0.7 MG/DL (ref 0.6–1.2)
CRP SERPL-MCNC: 197.3 MG/L (ref 0–5.1)
DEPRECATED RDW RBC AUTO: 15.5 % (ref 12.4–15.4)
EOSINOPHIL # BLD: 0 K/UL (ref 0–0.6)
EOSINOPHIL NFR BLD: 0.1 %
GFR SERPLBLD CREATININE-BSD FMLA CKD-EPI: >60 ML/MIN/{1.73_M2}
GLUCOSE BLD-MCNC: 105 MG/DL (ref 70–99)
GLUCOSE BLD-MCNC: 112 MG/DL (ref 70–99)
GLUCOSE BLD-MCNC: 122 MG/DL (ref 70–99)
GLUCOSE BLD-MCNC: 129 MG/DL (ref 70–99)
GLUCOSE SERPL-MCNC: 118 MG/DL (ref 70–99)
HCT VFR BLD AUTO: 27.7 % (ref 36–48)
HGB BLD-MCNC: 9 G/DL (ref 12–16)
LYMPHOCYTES # BLD: 0.6 K/UL (ref 1–5.1)
LYMPHOCYTES NFR BLD: 4.6 %
MCH RBC QN AUTO: 27.9 PG (ref 26–34)
MCHC RBC AUTO-ENTMCNC: 32.6 G/DL (ref 31–36)
MCV RBC AUTO: 85.5 FL (ref 80–100)
MONOCYTES # BLD: 1.1 K/UL (ref 0–1.3)
MONOCYTES NFR BLD: 8.9 %
NEUTROPHILS # BLD: 11 K/UL (ref 1.7–7.7)
NEUTROPHILS NFR BLD: 86.3 %
PERFORMED ON: ABNORMAL
PLATELET # BLD AUTO: 234 K/UL (ref 135–450)
PMV BLD AUTO: 6.9 FL (ref 5–10.5)
POTASSIUM SERPL-SCNC: 3.8 MMOL/L (ref 3.5–5.1)
PROCALCITONIN SERPL IA-MCNC: 0.59 NG/ML (ref 0–0.15)
RBC # BLD AUTO: 3.24 M/UL (ref 4–5.2)
SODIUM SERPL-SCNC: 133 MMOL/L (ref 136–145)
WBC # BLD AUTO: 12.8 K/UL (ref 4–11)

## 2023-04-14 PROCEDURE — 51702 INSERT TEMP BLADDER CATH: CPT

## 2023-04-14 PROCEDURE — 2580000003 HC RX 258: Performed by: INTERNAL MEDICINE

## 2023-04-14 PROCEDURE — 6360000002 HC RX W HCPCS: Performed by: INTERNAL MEDICINE

## 2023-04-14 PROCEDURE — 6370000000 HC RX 637 (ALT 250 FOR IP): Performed by: INTERNAL MEDICINE

## 2023-04-14 PROCEDURE — 80048 BASIC METABOLIC PNL TOTAL CA: CPT

## 2023-04-14 PROCEDURE — 71045 X-RAY EXAM CHEST 1 VIEW: CPT

## 2023-04-14 PROCEDURE — 2700000000 HC OXYGEN THERAPY PER DAY

## 2023-04-14 PROCEDURE — 85025 COMPLETE CBC W/AUTO DIFF WBC: CPT

## 2023-04-14 PROCEDURE — 87324 CLOSTRIDIUM AG IA: CPT

## 2023-04-14 PROCEDURE — APPNB45 APP NON BILLABLE 31-45 MINUTES: Performed by: NURSE PRACTITIONER

## 2023-04-14 PROCEDURE — 94669 MECHANICAL CHEST WALL OSCILL: CPT

## 2023-04-14 PROCEDURE — 99223 1ST HOSP IP/OBS HIGH 75: CPT | Performed by: INTERNAL MEDICINE

## 2023-04-14 PROCEDURE — 94640 AIRWAY INHALATION TREATMENT: CPT

## 2023-04-14 PROCEDURE — 82140 ASSAY OF AMMONIA: CPT

## 2023-04-14 PROCEDURE — 2000000000 HC ICU R&B

## 2023-04-14 PROCEDURE — 87449 NOS EACH ORGANISM AG IA: CPT

## 2023-04-14 PROCEDURE — 86140 C-REACTIVE PROTEIN: CPT

## 2023-04-14 PROCEDURE — 6360000002 HC RX W HCPCS: Performed by: NURSE PRACTITIONER

## 2023-04-14 PROCEDURE — 2500000003 HC RX 250 WO HCPCS: Performed by: NURSE PRACTITIONER

## 2023-04-14 PROCEDURE — 94761 N-INVAS EAR/PLS OXIMETRY MLT: CPT

## 2023-04-14 PROCEDURE — 99291 CRITICAL CARE FIRST HOUR: CPT | Performed by: INTERNAL MEDICINE

## 2023-04-14 PROCEDURE — 94660 CPAP INITIATION&MGMT: CPT

## 2023-04-14 PROCEDURE — 84145 PROCALCITONIN (PCT): CPT

## 2023-04-14 RX ORDER — FUROSEMIDE 10 MG/ML
20 INJECTION INTRAMUSCULAR; INTRAVENOUS ONCE
Status: COMPLETED | OUTPATIENT
Start: 2023-04-14 | End: 2023-04-14

## 2023-04-14 RX ORDER — DEXMEDETOMIDINE HYDROCHLORIDE 4 UG/ML
.1-1.5 INJECTION, SOLUTION INTRAVENOUS CONTINUOUS
Status: DISCONTINUED | OUTPATIENT
Start: 2023-04-14 | End: 2023-04-17 | Stop reason: HOSPADM

## 2023-04-14 RX ORDER — LORAZEPAM 2 MG/ML
0.5 INJECTION INTRAMUSCULAR ONCE
Status: COMPLETED | OUTPATIENT
Start: 2023-04-14 | End: 2023-04-14

## 2023-04-14 RX ADMIN — ALTEPLASE 1 MG: 2.2 INJECTION, POWDER, LYOPHILIZED, FOR SOLUTION INTRAVENOUS at 16:20

## 2023-04-14 RX ADMIN — ACETYLCYSTEINE 600 MG: 200 INHALANT RESPIRATORY (INHALATION) at 19:44

## 2023-04-14 RX ADMIN — SODIUM CHLORIDE, PRESERVATIVE FREE 10 ML: 5 INJECTION INTRAVENOUS at 09:01

## 2023-04-14 RX ADMIN — MEROPENEM 1000 MG: 1 INJECTION, POWDER, FOR SOLUTION INTRAVENOUS at 21:44

## 2023-04-14 RX ADMIN — APIXABAN 5 MG: 5 TABLET, FILM COATED ORAL at 09:03

## 2023-04-14 RX ADMIN — Medication 0.5 MCG/KG/HR: at 16:22

## 2023-04-14 RX ADMIN — CEFEPIME 2000 MG: 2 INJECTION, POWDER, FOR SOLUTION INTRAVENOUS at 05:13

## 2023-04-14 RX ADMIN — LORAZEPAM 1 MG: 2 INJECTION INTRAMUSCULAR; INTRAVENOUS at 21:42

## 2023-04-14 RX ADMIN — SENNOSIDES AND DOCUSATE SODIUM 1 TABLET: 50; 8.6 TABLET ORAL at 09:03

## 2023-04-14 RX ADMIN — MUPIROCIN: 20 OINTMENT TOPICAL at 12:46

## 2023-04-14 RX ADMIN — VANCOMYCIN HYDROCHLORIDE 1750 MG: 1 INJECTION, POWDER, LYOPHILIZED, FOR SOLUTION INTRAVENOUS at 17:51

## 2023-04-14 RX ADMIN — CEFEPIME 2000 MG: 2 INJECTION, POWDER, FOR SOLUTION INTRAVENOUS at 13:38

## 2023-04-14 RX ADMIN — LEVALBUTEROL 0.63 MG: 1.25 SOLUTION, CONCENTRATE RESPIRATORY (INHALATION) at 07:37

## 2023-04-14 RX ADMIN — LEVALBUTEROL 0.63 MG: 1.25 SOLUTION, CONCENTRATE RESPIRATORY (INHALATION) at 19:44

## 2023-04-14 RX ADMIN — DILTIAZEM HYDROCHLORIDE 240 MG: 120 CAPSULE, COATED, EXTENDED RELEASE ORAL at 09:02

## 2023-04-14 RX ADMIN — MUPIROCIN: 20 OINTMENT TOPICAL at 21:07

## 2023-04-14 RX ADMIN — Medication 0.9 MCG/KG/HR: at 23:44

## 2023-04-14 RX ADMIN — CARVEDILOL 6.25 MG: 6.25 TABLET, FILM COATED ORAL at 09:03

## 2023-04-14 RX ADMIN — LEVOTHYROXINE SODIUM 100 MCG: 0.1 TABLET ORAL at 05:10

## 2023-04-14 RX ADMIN — LORAZEPAM 0.5 MG: 2 INJECTION INTRAMUSCULAR; INTRAVENOUS at 13:35

## 2023-04-14 RX ADMIN — TAMSULOSIN HYDROCHLORIDE 0.4 MG: 0.4 CAPSULE ORAL at 09:02

## 2023-04-14 RX ADMIN — VENLAFAXINE HYDROCHLORIDE 37.5 MG: 37.5 CAPSULE, EXTENDED RELEASE ORAL at 09:03

## 2023-04-14 RX ADMIN — ACETYLCYSTEINE 600 MG: 200 INHALANT RESPIRATORY (INHALATION) at 07:36

## 2023-04-14 RX ADMIN — MICONAZOLE NITRATE: 2 POWDER TOPICAL at 21:07

## 2023-04-14 RX ADMIN — FUROSEMIDE 20 MG: 20 TABLET ORAL at 09:03

## 2023-04-14 RX ADMIN — LOSARTAN POTASSIUM 100 MG: 100 TABLET, FILM COATED ORAL at 09:03

## 2023-04-14 RX ADMIN — LORAZEPAM 1 MG: 2 INJECTION INTRAMUSCULAR; INTRAVENOUS at 02:53

## 2023-04-14 RX ADMIN — HYDRALAZINE HYDROCHLORIDE 25 MG: 25 TABLET, FILM COATED ORAL at 05:09

## 2023-04-14 RX ADMIN — MICONAZOLE NITRATE: 2 POWDER TOPICAL at 12:46

## 2023-04-14 RX ADMIN — LORAZEPAM 1 MG: 2 INJECTION INTRAMUSCULAR; INTRAVENOUS at 09:00

## 2023-04-14 RX ADMIN — ACETAMINOPHEN 650 MG: 325 TABLET ORAL at 10:34

## 2023-04-14 RX ADMIN — PANTOPRAZOLE SODIUM 40 MG: 40 TABLET, DELAYED RELEASE ORAL at 05:10

## 2023-04-14 RX ADMIN — LEVALBUTEROL 0.63 MG: 1.25 SOLUTION, CONCENTRATE RESPIRATORY (INHALATION) at 04:17

## 2023-04-14 RX ADMIN — ROPINIROLE HYDROCHLORIDE 3 MG: 2 TABLET, FILM COATED ORAL at 09:02

## 2023-04-14 RX ADMIN — DAPTOMYCIN 450 MG: 500 INJECTION, POWDER, LYOPHILIZED, FOR SOLUTION INTRAVENOUS at 09:19

## 2023-04-14 RX ADMIN — FUROSEMIDE 20 MG: 10 INJECTION, SOLUTION INTRAMUSCULAR; INTRAVENOUS at 12:46

## 2023-04-14 RX ADMIN — MONTELUKAST 10 MG: 10 TABLET, FILM COATED ORAL at 09:03

## 2023-04-14 RX ADMIN — URSODIOL 300 MG: 300 CAPSULE ORAL at 09:02

## 2023-04-14 ASSESSMENT — PAIN SCALES - GENERAL: PAINLEVEL_OUTOF10: 0

## 2023-04-15 LAB
ALBUMIN SERPL-MCNC: 2.5 G/DL (ref 3.4–5)
ALP SERPL-CCNC: 62 U/L (ref 40–129)
ALT SERPL-CCNC: 11 U/L (ref 10–40)
ANION GAP SERPL CALCULATED.3IONS-SCNC: 13 MMOL/L (ref 3–16)
AST SERPL-CCNC: 20 U/L (ref 15–37)
BASOPHILS # BLD: 0 K/UL (ref 0–0.2)
BASOPHILS NFR BLD: 0.1 %
BILIRUB DIRECT SERPL-MCNC: 0.5 MG/DL (ref 0–0.3)
BILIRUB INDIRECT SERPL-MCNC: 0.3 MG/DL (ref 0–1)
BILIRUB SERPL-MCNC: 0.8 MG/DL (ref 0–1)
BUN SERPL-MCNC: 29 MG/DL (ref 7–20)
C DIFF TOX A+B STL QL IA: NORMAL
CALCIUM SERPL-MCNC: 8.6 MG/DL (ref 8.3–10.6)
CHLORIDE SERPL-SCNC: 97 MMOL/L (ref 99–110)
CO2 SERPL-SCNC: 24 MMOL/L (ref 21–32)
CREAT SERPL-MCNC: 1.1 MG/DL (ref 0.6–1.2)
DEPRECATED RDW RBC AUTO: 15.5 % (ref 12.4–15.4)
EOSINOPHIL # BLD: 0 K/UL (ref 0–0.6)
EOSINOPHIL NFR BLD: 0.1 %
GFR SERPLBLD CREATININE-BSD FMLA CKD-EPI: 52 ML/MIN/{1.73_M2}
GLUCOSE BLD-MCNC: 108 MG/DL (ref 70–99)
GLUCOSE BLD-MCNC: 111 MG/DL (ref 70–99)
GLUCOSE BLD-MCNC: 113 MG/DL (ref 70–99)
GLUCOSE BLD-MCNC: 98 MG/DL (ref 70–99)
GLUCOSE SERPL-MCNC: 98 MG/DL (ref 70–99)
HCT VFR BLD AUTO: 22.5 % (ref 36–48)
HGB BLD-MCNC: 7.3 G/DL (ref 12–16)
LACTATE BLDV-SCNC: 0.8 MMOL/L (ref 0.4–2)
LYMPHOCYTES # BLD: 0.6 K/UL (ref 1–5.1)
LYMPHOCYTES NFR BLD: 4.8 %
MAGNESIUM SERPL-MCNC: 1.6 MG/DL (ref 1.8–2.4)
MCH RBC QN AUTO: 27.5 PG (ref 26–34)
MCHC RBC AUTO-ENTMCNC: 32.2 G/DL (ref 31–36)
MCV RBC AUTO: 85.2 FL (ref 80–100)
MONOCYTES # BLD: 1.1 K/UL (ref 0–1.3)
MONOCYTES NFR BLD: 8.3 %
NEUTROPHILS # BLD: 11.6 K/UL (ref 1.7–7.7)
NEUTROPHILS NFR BLD: 86.7 %
NT-PROBNP SERPL-MCNC: ABNORMAL PG/ML (ref 0–449)
PERFORMED ON: ABNORMAL
PERFORMED ON: NORMAL
PHOSPHATE SERPL-MCNC: 3.7 MG/DL (ref 2.5–4.9)
PLATELET # BLD AUTO: 209 K/UL (ref 135–450)
PMV BLD AUTO: 6.8 FL (ref 5–10.5)
POTASSIUM SERPL-SCNC: 3.6 MMOL/L (ref 3.5–5.1)
POTASSIUM SERPL-SCNC: 3.6 MMOL/L (ref 3.5–5.1)
PROT SERPL-MCNC: 7 G/DL (ref 6.4–8.2)
RBC # BLD AUTO: 2.64 M/UL (ref 4–5.2)
SODIUM SERPL-SCNC: 134 MMOL/L (ref 136–145)
VANCOMYCIN SERPL-MCNC: 20.5 UG/ML
WBC # BLD AUTO: 13.3 K/UL (ref 4–11)

## 2023-04-15 PROCEDURE — 99291 CRITICAL CARE FIRST HOUR: CPT | Performed by: INTERNAL MEDICINE

## 2023-04-15 PROCEDURE — 2580000003 HC RX 258: Performed by: INTERNAL MEDICINE

## 2023-04-15 PROCEDURE — 6360000002 HC RX W HCPCS: Performed by: INTERNAL MEDICINE

## 2023-04-15 PROCEDURE — 83880 ASSAY OF NATRIURETIC PEPTIDE: CPT

## 2023-04-15 PROCEDURE — 94640 AIRWAY INHALATION TREATMENT: CPT

## 2023-04-15 PROCEDURE — 83735 ASSAY OF MAGNESIUM: CPT

## 2023-04-15 PROCEDURE — 94761 N-INVAS EAR/PLS OXIMETRY MLT: CPT

## 2023-04-15 PROCEDURE — 36592 COLLECT BLOOD FROM PICC: CPT

## 2023-04-15 PROCEDURE — 2500000003 HC RX 250 WO HCPCS: Performed by: NURSE PRACTITIONER

## 2023-04-15 PROCEDURE — 94660 CPAP INITIATION&MGMT: CPT

## 2023-04-15 PROCEDURE — 80076 HEPATIC FUNCTION PANEL: CPT

## 2023-04-15 PROCEDURE — 83605 ASSAY OF LACTIC ACID: CPT

## 2023-04-15 PROCEDURE — 2000000000 HC ICU R&B

## 2023-04-15 PROCEDURE — 6370000000 HC RX 637 (ALT 250 FOR IP): Performed by: INTERNAL MEDICINE

## 2023-04-15 PROCEDURE — 80069 RENAL FUNCTION PANEL: CPT

## 2023-04-15 PROCEDURE — 2700000000 HC OXYGEN THERAPY PER DAY

## 2023-04-15 PROCEDURE — 80202 ASSAY OF VANCOMYCIN: CPT

## 2023-04-15 PROCEDURE — 85025 COMPLETE CBC W/AUTO DIFF WBC: CPT

## 2023-04-15 RX ORDER — POTASSIUM CHLORIDE 29.8 MG/ML
20 INJECTION INTRAVENOUS ONCE
Status: COMPLETED | OUTPATIENT
Start: 2023-04-15 | End: 2023-04-15

## 2023-04-15 RX ORDER — MAGNESIUM SULFATE IN WATER 40 MG/ML
4000 INJECTION, SOLUTION INTRAVENOUS ONCE
Status: COMPLETED | OUTPATIENT
Start: 2023-04-15 | End: 2023-04-15

## 2023-04-15 RX ORDER — LEVALBUTEROL 1.25 MG/.5ML
0.63 SOLUTION, CONCENTRATE RESPIRATORY (INHALATION)
Status: DISCONTINUED | OUTPATIENT
Start: 2023-04-15 | End: 2023-04-17

## 2023-04-15 RX ADMIN — APIXABAN 5 MG: 5 TABLET, FILM COATED ORAL at 09:08

## 2023-04-15 RX ADMIN — ATORVASTATIN CALCIUM 20 MG: 10 TABLET, FILM COATED ORAL at 19:43

## 2023-04-15 RX ADMIN — CARVEDILOL 6.25 MG: 6.25 TABLET, FILM COATED ORAL at 17:01

## 2023-04-15 RX ADMIN — URSODIOL 300 MG: 300 CAPSULE ORAL at 19:47

## 2023-04-15 RX ADMIN — Medication 1 MCG/KG/HR: at 23:25

## 2023-04-15 RX ADMIN — LEVALBUTEROL 0.63 MG: 1.25 SOLUTION, CONCENTRATE RESPIRATORY (INHALATION) at 15:53

## 2023-04-15 RX ADMIN — POTASSIUM CHLORIDE 20 MEQ: 29.8 INJECTION, SOLUTION INTRAVENOUS at 18:47

## 2023-04-15 RX ADMIN — MONTELUKAST 10 MG: 10 TABLET, FILM COATED ORAL at 09:08

## 2023-04-15 RX ADMIN — APIXABAN 5 MG: 5 TABLET, FILM COATED ORAL at 19:43

## 2023-04-15 RX ADMIN — ACETYLCYSTEINE 600 MG: 200 INHALANT RESPIRATORY (INHALATION) at 20:41

## 2023-04-15 RX ADMIN — VANCOMYCIN HYDROCHLORIDE 1000 MG: 1 INJECTION, POWDER, LYOPHILIZED, FOR SOLUTION INTRAVENOUS at 05:39

## 2023-04-15 RX ADMIN — GABAPENTIN 600 MG: 300 CAPSULE ORAL at 19:42

## 2023-04-15 RX ADMIN — DILTIAZEM HYDROCHLORIDE 240 MG: 120 CAPSULE, COATED, EXTENDED RELEASE ORAL at 09:08

## 2023-04-15 RX ADMIN — PANTOPRAZOLE SODIUM 40 MG: 40 TABLET, DELAYED RELEASE ORAL at 17:01

## 2023-04-15 RX ADMIN — MEROPENEM 1000 MG: 1 INJECTION, POWDER, FOR SOLUTION INTRAVENOUS at 21:35

## 2023-04-15 RX ADMIN — MUPIROCIN: 20 OINTMENT TOPICAL at 09:10

## 2023-04-15 RX ADMIN — URSODIOL 300 MG: 300 CAPSULE ORAL at 10:13

## 2023-04-15 RX ADMIN — MUPIROCIN: 20 OINTMENT TOPICAL at 19:45

## 2023-04-15 RX ADMIN — SODIUM CHLORIDE, PRESERVATIVE FREE 10 ML: 5 INJECTION INTRAVENOUS at 09:10

## 2023-04-15 RX ADMIN — TAMSULOSIN HYDROCHLORIDE 0.4 MG: 0.4 CAPSULE ORAL at 09:08

## 2023-04-15 RX ADMIN — ACETAMINOPHEN 650 MG: 325 TABLET ORAL at 17:01

## 2023-04-15 RX ADMIN — SENNOSIDES AND DOCUSATE SODIUM 1 TABLET: 50; 8.6 TABLET ORAL at 09:08

## 2023-04-15 RX ADMIN — LEVALBUTEROL 0.63 MG: 1.25 SOLUTION, CONCENTRATE RESPIRATORY (INHALATION) at 20:41

## 2023-04-15 RX ADMIN — SODIUM CHLORIDE, PRESERVATIVE FREE 10 ML: 5 INJECTION INTRAVENOUS at 19:44

## 2023-04-15 RX ADMIN — LEVALBUTEROL 0.63 MG: 1.25 SOLUTION, CONCENTRATE RESPIRATORY (INHALATION) at 02:25

## 2023-04-15 RX ADMIN — ROPINIROLE HYDROCHLORIDE 3 MG: 2 TABLET, FILM COATED ORAL at 10:12

## 2023-04-15 RX ADMIN — PANTOPRAZOLE SODIUM 40 MG: 40 TABLET, DELAYED RELEASE ORAL at 06:49

## 2023-04-15 RX ADMIN — VENLAFAXINE HYDROCHLORIDE 37.5 MG: 37.5 CAPSULE, EXTENDED RELEASE ORAL at 09:08

## 2023-04-15 RX ADMIN — LEVOTHYROXINE SODIUM 100 MCG: 0.1 TABLET ORAL at 06:49

## 2023-04-15 RX ADMIN — LOSARTAN POTASSIUM 100 MG: 100 TABLET, FILM COATED ORAL at 09:08

## 2023-04-15 RX ADMIN — MICONAZOLE NITRATE: 2 POWDER TOPICAL at 10:15

## 2023-04-15 RX ADMIN — Medication 1 MCG/KG/HR: at 18:45

## 2023-04-15 RX ADMIN — HYDRALAZINE HYDROCHLORIDE 25 MG: 25 TABLET, FILM COATED ORAL at 15:38

## 2023-04-15 RX ADMIN — MICONAZOLE NITRATE: 2 POWDER TOPICAL at 19:44

## 2023-04-15 RX ADMIN — ROPINIROLE HYDROCHLORIDE 2 MG: 2 TABLET, FILM COATED ORAL at 19:42

## 2023-04-15 RX ADMIN — MEROPENEM 1000 MG: 1 INJECTION, POWDER, FOR SOLUTION INTRAVENOUS at 06:52

## 2023-04-15 RX ADMIN — CARVEDILOL 6.25 MG: 6.25 TABLET, FILM COATED ORAL at 10:11

## 2023-04-15 RX ADMIN — MEROPENEM 1000 MG: 1 INJECTION, POWDER, FOR SOLUTION INTRAVENOUS at 15:38

## 2023-04-15 RX ADMIN — FUROSEMIDE 20 MG: 20 TABLET ORAL at 09:08

## 2023-04-15 RX ADMIN — MAGNESIUM SULFATE HEPTAHYDRATE 4000 MG: 40 INJECTION, SOLUTION INTRAVENOUS at 18:54

## 2023-04-16 LAB
ANION GAP SERPL CALCULATED.3IONS-SCNC: 11 MMOL/L (ref 3–16)
BACTERIA BLD CULT ORG #2: NORMAL
BACTERIA BLD CULT: NORMAL
BUN SERPL-MCNC: 45 MG/DL (ref 7–20)
CALCIUM SERPL-MCNC: 8.5 MG/DL (ref 8.3–10.6)
CHLORIDE SERPL-SCNC: 95 MMOL/L (ref 99–110)
CO2 SERPL-SCNC: 22 MMOL/L (ref 21–32)
CREAT SERPL-MCNC: 1.8 MG/DL (ref 0.6–1.2)
DEPRECATED RDW RBC AUTO: 15.8 % (ref 12.4–15.4)
GFR SERPLBLD CREATININE-BSD FMLA CKD-EPI: 29 ML/MIN/{1.73_M2}
GLUCOSE BLD-MCNC: 86 MG/DL (ref 70–99)
GLUCOSE BLD-MCNC: 92 MG/DL (ref 70–99)
GLUCOSE SERPL-MCNC: 83 MG/DL (ref 70–99)
HCT VFR BLD AUTO: 22.1 % (ref 36–48)
HGB BLD-MCNC: 7.1 G/DL (ref 12–16)
MAGNESIUM SERPL-MCNC: 3 MG/DL (ref 1.8–2.4)
MCH RBC QN AUTO: 27.3 PG (ref 26–34)
MCHC RBC AUTO-ENTMCNC: 32.1 G/DL (ref 31–36)
MCV RBC AUTO: 85.2 FL (ref 80–100)
PERFORMED ON: NORMAL
PERFORMED ON: NORMAL
PLATELET # BLD AUTO: 227 K/UL (ref 135–450)
PMV BLD AUTO: 7.1 FL (ref 5–10.5)
POTASSIUM SERPL-SCNC: 3.8 MMOL/L (ref 3.5–5.1)
RBC # BLD AUTO: 2.6 M/UL (ref 4–5.2)
SODIUM SERPL-SCNC: 128 MMOL/L (ref 136–145)
VANCOMYCIN TROUGH SERPL-MCNC: 18.8 UG/ML (ref 10–20)
WBC # BLD AUTO: 12.6 K/UL (ref 4–11)

## 2023-04-16 PROCEDURE — 85027 COMPLETE CBC AUTOMATED: CPT

## 2023-04-16 PROCEDURE — 2580000003 HC RX 258: Performed by: INTERNAL MEDICINE

## 2023-04-16 PROCEDURE — 94640 AIRWAY INHALATION TREATMENT: CPT

## 2023-04-16 PROCEDURE — 2000000000 HC ICU R&B

## 2023-04-16 PROCEDURE — 2700000000 HC OXYGEN THERAPY PER DAY

## 2023-04-16 PROCEDURE — 6360000002 HC RX W HCPCS: Performed by: INTERNAL MEDICINE

## 2023-04-16 PROCEDURE — 99291 CRITICAL CARE FIRST HOUR: CPT | Performed by: INTERNAL MEDICINE

## 2023-04-16 PROCEDURE — 94660 CPAP INITIATION&MGMT: CPT

## 2023-04-16 PROCEDURE — 6370000000 HC RX 637 (ALT 250 FOR IP): Performed by: INTERNAL MEDICINE

## 2023-04-16 PROCEDURE — 2500000003 HC RX 250 WO HCPCS: Performed by: NURSE PRACTITIONER

## 2023-04-16 PROCEDURE — 94761 N-INVAS EAR/PLS OXIMETRY MLT: CPT

## 2023-04-16 PROCEDURE — 36592 COLLECT BLOOD FROM PICC: CPT

## 2023-04-16 PROCEDURE — 83735 ASSAY OF MAGNESIUM: CPT

## 2023-04-16 PROCEDURE — 80202 ASSAY OF VANCOMYCIN: CPT

## 2023-04-16 PROCEDURE — 80048 BASIC METABOLIC PNL TOTAL CA: CPT

## 2023-04-16 RX ADMIN — DILTIAZEM HYDROCHLORIDE 240 MG: 120 CAPSULE, COATED, EXTENDED RELEASE ORAL at 09:33

## 2023-04-16 RX ADMIN — Medication 1 MCG/KG/HR: at 04:40

## 2023-04-16 RX ADMIN — ACETYLCYSTEINE 600 MG: 200 INHALANT RESPIRATORY (INHALATION) at 20:43

## 2023-04-16 RX ADMIN — URSODIOL 300 MG: 300 CAPSULE ORAL at 09:42

## 2023-04-16 RX ADMIN — LOSARTAN POTASSIUM 100 MG: 100 TABLET, FILM COATED ORAL at 09:33

## 2023-04-16 RX ADMIN — LEVOTHYROXINE SODIUM 100 MCG: 0.1 TABLET ORAL at 06:20

## 2023-04-16 RX ADMIN — CARVEDILOL 6.25 MG: 6.25 TABLET, FILM COATED ORAL at 09:42

## 2023-04-16 RX ADMIN — LEVALBUTEROL 0.63 MG: 1.25 SOLUTION, CONCENTRATE RESPIRATORY (INHALATION) at 08:10

## 2023-04-16 RX ADMIN — MEROPENEM 1000 MG: 1 INJECTION, POWDER, FOR SOLUTION INTRAVENOUS at 06:10

## 2023-04-16 RX ADMIN — SODIUM CHLORIDE, PRESERVATIVE FREE 10 ML: 5 INJECTION INTRAVENOUS at 09:34

## 2023-04-16 RX ADMIN — MICONAZOLE NITRATE: 2 POWDER TOPICAL at 20:00

## 2023-04-16 RX ADMIN — MONTELUKAST 10 MG: 10 TABLET, FILM COATED ORAL at 09:42

## 2023-04-16 RX ADMIN — VENLAFAXINE HYDROCHLORIDE 37.5 MG: 37.5 CAPSULE, EXTENDED RELEASE ORAL at 09:33

## 2023-04-16 RX ADMIN — LEVALBUTEROL 0.63 MG: 1.25 SOLUTION, CONCENTRATE RESPIRATORY (INHALATION) at 15:07

## 2023-04-16 RX ADMIN — PANTOPRAZOLE SODIUM 40 MG: 40 TABLET, DELAYED RELEASE ORAL at 17:43

## 2023-04-16 RX ADMIN — APIXABAN 5 MG: 5 TABLET, FILM COATED ORAL at 09:34

## 2023-04-16 RX ADMIN — ACETYLCYSTEINE 600 MG: 200 INHALANT RESPIRATORY (INHALATION) at 08:07

## 2023-04-16 RX ADMIN — MICONAZOLE NITRATE: 2 POWDER TOPICAL at 09:43

## 2023-04-16 RX ADMIN — VANCOMYCIN HYDROCHLORIDE 500 MG: 500 INJECTION, POWDER, LYOPHILIZED, FOR SOLUTION INTRAVENOUS at 06:28

## 2023-04-16 RX ADMIN — FUROSEMIDE 20 MG: 20 TABLET ORAL at 09:33

## 2023-04-16 RX ADMIN — LEVALBUTEROL 0.63 MG: 1.25 SOLUTION, CONCENTRATE RESPIRATORY (INHALATION) at 20:45

## 2023-04-16 RX ADMIN — PANTOPRAZOLE SODIUM 40 MG: 40 TABLET, DELAYED RELEASE ORAL at 06:20

## 2023-04-16 RX ADMIN — MEROPENEM 1000 MG: 1 INJECTION, POWDER, FOR SOLUTION INTRAVENOUS at 14:27

## 2023-04-16 RX ADMIN — TAMSULOSIN HYDROCHLORIDE 0.4 MG: 0.4 CAPSULE ORAL at 09:34

## 2023-04-16 RX ADMIN — ROPINIROLE HYDROCHLORIDE 3 MG: 2 TABLET, FILM COATED ORAL at 09:41

## 2023-04-16 RX ADMIN — SENNOSIDES AND DOCUSATE SODIUM 1 TABLET: 50; 8.6 TABLET ORAL at 09:33

## 2023-04-16 RX ADMIN — MUPIROCIN: 20 OINTMENT TOPICAL at 09:34

## 2023-04-17 ENCOUNTER — APPOINTMENT (OUTPATIENT)
Dept: GENERAL RADIOLOGY | Age: 78
DRG: 193 | End: 2023-04-17
Payer: MEDICARE

## 2023-04-17 VITALS
RESPIRATION RATE: 24 BRPM | BODY MASS INDEX: 27.92 KG/M2 | HEIGHT: 66 IN | WEIGHT: 173.72 LBS | SYSTOLIC BLOOD PRESSURE: 127 MMHG | HEART RATE: 76 BPM | DIASTOLIC BLOOD PRESSURE: 52 MMHG | OXYGEN SATURATION: 86 % | TEMPERATURE: 98.5 F

## 2023-04-17 LAB
FUNGUS SPEC CULT: NORMAL
GLUCOSE BLD-MCNC: 96 MG/DL (ref 70–99)
LOEFFLER MB STN SPEC: NORMAL
PERFORMED ON: NORMAL
VANCOMYCIN SERPL-MCNC: 20.3 UG/ML

## 2023-04-17 PROCEDURE — 80202 ASSAY OF VANCOMYCIN: CPT

## 2023-04-17 PROCEDURE — 6360000002 HC RX W HCPCS: Performed by: NURSE PRACTITIONER

## 2023-04-17 PROCEDURE — APPNB45 APP NON BILLABLE 31-45 MINUTES: Performed by: NURSE PRACTITIONER

## 2023-04-17 PROCEDURE — 6360000002 HC RX W HCPCS: Performed by: INTERNAL MEDICINE

## 2023-04-17 PROCEDURE — 94660 CPAP INITIATION&MGMT: CPT

## 2023-04-17 PROCEDURE — 36415 COLL VENOUS BLD VENIPUNCTURE: CPT

## 2023-04-17 PROCEDURE — 94761 N-INVAS EAR/PLS OXIMETRY MLT: CPT

## 2023-04-17 PROCEDURE — 2500000003 HC RX 250 WO HCPCS: Performed by: NURSE PRACTITIONER

## 2023-04-17 PROCEDURE — 2700000000 HC OXYGEN THERAPY PER DAY

## 2023-04-17 PROCEDURE — 71045 X-RAY EXAM CHEST 1 VIEW: CPT

## 2023-04-17 PROCEDURE — 99233 SBSQ HOSP IP/OBS HIGH 50: CPT | Performed by: STUDENT IN AN ORGANIZED HEALTH CARE EDUCATION/TRAINING PROGRAM

## 2023-04-17 RX ORDER — MORPHINE SULFATE 2 MG/ML
2 INJECTION, SOLUTION INTRAMUSCULAR; INTRAVENOUS EVERY 30 MIN PRN
Status: DISCONTINUED | OUTPATIENT
Start: 2023-04-17 | End: 2023-04-17

## 2023-04-17 RX ORDER — MORPHINE SULFATE 2 MG/ML
2 INJECTION, SOLUTION INTRAMUSCULAR; INTRAVENOUS
Status: DISCONTINUED | OUTPATIENT
Start: 2023-04-17 | End: 2023-04-17 | Stop reason: HOSPADM

## 2023-04-17 RX ORDER — LORAZEPAM 2 MG/ML
1 INJECTION INTRAMUSCULAR
Status: DISCONTINUED | OUTPATIENT
Start: 2023-04-17 | End: 2023-04-17

## 2023-04-17 RX ORDER — ATROPINE SULFATE 10 MG/ML
1 SOLUTION/ DROPS OPHTHALMIC EVERY 4 HOURS PRN
Status: DISCONTINUED | OUTPATIENT
Start: 2023-04-17 | End: 2023-04-17 | Stop reason: HOSPADM

## 2023-04-17 RX ORDER — LORAZEPAM 2 MG/ML
1 INJECTION INTRAMUSCULAR EVERY 30 MIN PRN
Status: DISCONTINUED | OUTPATIENT
Start: 2023-04-17 | End: 2023-04-17 | Stop reason: HOSPADM

## 2023-04-17 RX ADMIN — LEVALBUTEROL 0.63 MG: 1.25 SOLUTION, CONCENTRATE RESPIRATORY (INHALATION) at 08:37

## 2023-04-17 RX ADMIN — MICONAZOLE NITRATE: 2 POWDER TOPICAL at 07:46

## 2023-04-17 RX ADMIN — MORPHINE SULFATE 2 MG: 2 INJECTION, SOLUTION INTRAMUSCULAR; INTRAVENOUS at 17:48

## 2023-04-17 RX ADMIN — MORPHINE SULFATE 2 MG: 2 INJECTION, SOLUTION INTRAMUSCULAR; INTRAVENOUS at 17:17

## 2023-04-17 RX ADMIN — LORAZEPAM 1 MG: 2 INJECTION INTRAMUSCULAR; INTRAVENOUS at 17:20

## 2023-04-17 RX ADMIN — ACETYLCYSTEINE 600 MG: 200 INHALANT RESPIRATORY (INHALATION) at 08:36

## 2023-04-17 RX ADMIN — LORAZEPAM 1 MG: 2 INJECTION INTRAMUSCULAR; INTRAVENOUS at 17:01

## 2023-04-17 RX ADMIN — Medication 0.6 MCG/KG/HR: at 07:40

## 2023-04-17 RX ADMIN — MORPHINE SULFATE 2 MG: 2 INJECTION, SOLUTION INTRAMUSCULAR; INTRAVENOUS at 17:35

## 2023-04-17 RX ADMIN — MORPHINE SULFATE 2 MG: 2 INJECTION, SOLUTION INTRAMUSCULAR; INTRAVENOUS at 17:01

## 2023-04-17 RX ADMIN — LORAZEPAM 1 MG: 2 INJECTION INTRAMUSCULAR; INTRAVENOUS at 04:19

## 2023-04-17 RX ADMIN — Medication 0.2 MCG/KG/HR: at 03:37

## 2023-04-17 ASSESSMENT — PAIN SCALES - GENERAL
PAINLEVEL_OUTOF10: 5
PAINLEVEL_OUTOF10: 5

## 2023-04-17 NOTE — PROGRESS NOTES
04/17/23 0425   NIV Type   NIV Started/Stopped On   Equipment Type v60   Mode Bilevel   Mask Type Full face mask   Mask Size Medium   Settings/Measurements   PIP Observed 17 cm H20   IPAP 16 cmH20   CPAP/EPAP 6 cmH2O   Vt (Measured) 530 mL   Rate Ordered 16   Resp 29   FiO2  80 %   Minute Volume (L/min) 12 Liters   Mask Leak (lpm) 6 lpm   Comfort Level Fair   Using Accessory Muscles No   SpO2 98   Patient's Home Machine No   Alarm Settings   Alarms On Y   Low Pressure (cmH2O) 6 cmH2O   High Pressure (cmH2O) 30 cmH2O   RR Low (bpm) 6   RR High (bpm) 50 br/min   Oxygen Therapy/Pulse Ox   Heart Rate 62   SpO2 98 %

## 2023-04-17 NOTE — PROGRESS NOTES
Hospitalist Progress Note      PCP: Jennifer Osullivan MD    Date of Admission: 4/12/2023    Chief Complaint:   Respiratory failure    Hospital Course:      69 y.o. 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 The patient denies any fever/chills or other signs/sxs of systemic illness or identifiable aggravating/alleviating factors. Subjective:      Patient unresponsive. BiPAP is in place. Patient not tolerating being off BiPAP her O2 sats did drop into the 70s. Patient is a limited code. Plan for evaluation by hospice today.     Medications:  Reviewed    Infusion Medications    dexmedetomidine 0.6 mcg/kg/hr (04/17/23 0740)    dextrose      sodium chloride       Scheduled Medications    levalbuterol  0.63 mg Nebulization Q6H WA    miconazole   Topical BID    mupirocin   Each Nostril BID    meropenem  1,000 mg IntraVENous Q8H    venlafaxine  37.5 mg Oral Daily    ursodiol  300 mg Oral BID    tamsulosin  0.4 mg Oral Daily    sennosides-docusate sodium  1 tablet Oral Daily    rOPINIRole  3 mg Oral Daily    And    rOPINIRole  2 mg Oral Nightly    pantoprazole  40 mg Oral BID AC    montelukast  10 mg Oral Daily    losartan  100 mg Oral Daily    levothyroxine  100 mcg Oral Daily    hydrALAZINE  25 mg Oral 3 times per day    gabapentin  600 mg Oral Nightly    furosemide  20 mg Oral Daily    dilTIAZem  240 mg Oral Daily    carvedilol  6.25 mg Oral BID WC    atorvastatin  20 mg Oral Nightly    apixaban  5 mg Oral BID    sodium chloride flush  10 mL IntraVENous 2 times per day    insulin lispro  0-8 Units SubCUTAneous TID WC    insulin lispro  0-4 Units SubCUTAneous Nightly    acetylcysteine  600 mg Inhalation BID     PRN Meds: LORazepam, traZODone, levalbuterol, benzonatate, glucose, dextrose bolus **OR** dextrose bolus, glucagon (rDNA),

## 2023-04-17 NOTE — PROGRESS NOTES
04/16/23 2050   Oxygen Therapy/Pulse Ox   O2 Therapy Oxygen   O2 Device PAP (positive airway pressure)   FiO2  (S)  80 %  (decreased FIO2 to 80%)   Heart Rate 61   Resp 19   SpO2 100 %

## 2023-04-17 NOTE — CONSULTS
Palliative Care Initial Note  Palliative Care Admit date:  4/17/23  Reason for c/s:  [facility]    ACP/palcare referral noted. Aware referral called to [facility] to meet today. Will defer assessment/mtg until results of mtg w/ hospice known.   Has a 'limited' code to reflect DNR/DNI

## 2023-04-17 NOTE — PROGRESS NOTES
04/17/23 1621   Oxygen Therapy/Pulse Ox   O2 Therapy Oxygen   O2 Device Heated high flow cannula   O2 Flow Rate (L/min) 40 L/min   FiO2  70 %   Heart Rate 76   Resp 24   SpO2 (!) 86 %

## 2023-04-17 NOTE — PROGRESS NOTES
[facility] with pt's daughter Nancy Pain and meeting setup for today at 16:30 to discuss hospice services.     Thank you,  Milo Morales, RN  [phone]

## 2023-04-17 NOTE — CARE COORDINATION
Referral sent to [facility]),  will be contacting family to set up a meeting.     Osito Carreno RN

## 2023-04-17 NOTE — PROGRESS NOTES
04/17/23 0010   NIV Type   $NIV $Daily Charge   NIV Started/Stopped On   Equipment Type v60   Mode Bilevel   Mask Type Full face mask   Mask Size Medium   Settings/Measurements   PIP Observed 17 cm H20   IPAP 16 cmH20   CPAP/EPAP 6 cmH2O   Vt (Measured) 530 mL   Rate Ordered 16   Resp 29   FiO2  80 %   Minute Volume (L/min) 12.4 Liters   Mask Leak (lpm) 6 lpm   Comfort Level Good   Using Accessory Muscles No   SpO2 98   Patient's Home Machine No   Alarm Settings   Alarms On Y   Low Pressure (cmH2O) 6 cmH2O   High Pressure (cmH2O) 30 cmH2O   RR Low (bpm) 6   RR High (bpm) 50 br/min   Oxygen Therapy/Pulse Ox   Heart Rate 61   SpO2 99 %

## 2023-04-17 NOTE — PROGRESS NOTES
When arriving on shift, pt was found with vapotherm ripped out, O2 in the 50's, PICC and PIV laying in the bed pulled out. Bipap applied at that time, pt's oxygen recovered to 90%.  When patient was asked why she removed her lines and oxygen she responded \"I just want to die\"

## 2023-04-17 NOTE — PROGRESS NOTES
04/17/23 0837   NIV Type   Skin Protection for O2 Device Yes   Location Nose   NIV Started/Stopped On   Equipment Type v60   Mode Bilevel   Mask Type Full face mask   Mask Size Medium   Bonnet size Medium   Settings/Measurements   PIP Observed 17 cm H20   IPAP 16 cmH20   CPAP/EPAP 6 cmH2O   Vt (Measured) 981 mL   Rate Ordered 16   Resp 18   FiO2  80 %   I Time/ I Time % 1.1 s   Minute Volume (L/min) 18.1 Liters   Mask Leak (lpm) 8 lpm   Comfort Level Good   Using Accessory Muscles No   SpO2 100   Oxygen Therapy/Pulse Ox   Heart Rate 55   SpO2 100 %

## 2023-04-17 NOTE — PROGRESS NOTES
Pt resting peacefully in bed on bipap for most of the night tonight. Pt's bi pap started alarming, found patient without bipap refusing to put it back on. Vapotherm offered and patient stated \"I am going to die, I want to die I don't want any of that\" Pt's O2 reached 40%, bipap reapplied at 100%, oxygen recovered, Precedex restarted.

## 2023-04-17 NOTE — PROGRESS NOTES
04/17/23 1726   Encounter Summary   Encounter Overview/Reason  Rituals, Rites and Sacraments   Service Provided For: Patient and family together   Referral/Consult From: Family   Last Encounter  04/17/23  ( provided prayer and prayers of commendation)   Complexity of Encounter Moderate   Begin Time 1655   End Time  1726   Total Time Calculated 31 min   Spiritual/Emotional needs   Type Spiritual Support;Spiritual Distress   Assessment/Intervention/Outcome   Assessment Angry; Complicated grieving   Intervention Active listening;Prayer (assurance of)/[place]   Outcome Comfort;Deescalated;Peace   Plan and Referrals   Plan/Referrals No future visits requested

## 2023-04-17 NOTE — PROGRESS NOTES
04/17/23 1128   Oxygen Therapy/Pulse Ox   O2 Therapy Oxygen   O2 Device Heated high flow cannula   O2 Flow Rate (L/min) 40 L/min   FiO2  100 %   Heart Rate 58   Resp 20   SpO2 98 %

## 2023-04-17 NOTE — PROGRESS NOTES
Pulmonary & Critical Care Medicine ICU Progress Note      Events of Last 24 hours:   Pt able to be weaned to vapotherm. She does not want to be intubated. Meeting with [facility] [place] today. Invasive Lines: PICC D#None   CVC D#None  Art Line D#None            Vitals:  BP (!) 97/41   Pulse 56   Temp 98.5 °F (36.9 °C) (Bladder)   Resp 29   Ht 5' 6\" (1.676 m)   Wt 173 lb 11.6 oz (78.8 kg)   SpO2 100%   BMI 28.04 kg/m²    Tmax:  CVP:        Intake/Output Summary (Last 24 hours) at 4/17/2023 0803  Last data filed at 4/17/2023 0600  Gross per 24 hour   Intake 1445.45 ml   Output 395 ml   Net 1050.45 ml       EXAM:  Physical Exam  Constitutional:       Appearance: She is ill-appearing and toxic-appearing. HENT:      Head: Normocephalic and atraumatic. Nose: Nose normal.      Mouth/Throat:      Pharynx: No oropharyngeal exudate. Eyes:      General: No scleral icterus. Right eye: No discharge. Left eye: No discharge. Cardiovascular:      Rate and Rhythm: Normal rate. Heart sounds: No murmur heard. No gallop. Pulmonary:      Effort: Pulmonary effort is normal.      Breath sounds: Wheezing and rales present. Abdominal:      General: Abdomen is flat. Bowel sounds are normal. There is no distension. Tenderness: There is no abdominal tenderness. Musculoskeletal:         General: No swelling. Cervical back: Normal range of motion. Skin:     General: Skin is warm and dry. Neurological:      Mental Status: She is alert and oriented to person, place, and time.         Medications:  Scheduled Meds:   vancomycin (VANCOCIN) intermittent dosing (placeholder)   Other RX Placeholder    levalbuterol  0.63 mg Nebulization Q6H WA    miconazole   Topical BID    mupirocin   Each Nostril BID    meropenem  1,000 mg IntraVENous Q8H    venlafaxine  37.5 mg Oral Daily    ursodiol  300 mg Oral BID    tamsulosin  0.4 mg Oral Daily    sennosides-docusate sodium  1 tablet Oral Daily

## 2023-04-17 NOTE — PROGRESS NOTES
Patient seen and examined this am during ICU rounds. Increased agitation overnight and remains on BiPAP  Precedex currently off. Patient calm this am but breathing is labored. Able to answer questions and asking for water. Placed on on Vaoptherm 100%/40L with sats 100%, will wean as tolerated. Call placed to patient's daughter and updated. Requesting hospice meeting today, [address] Discussed with case management and bedside RN.

## 2023-04-18 LAB
ACID FAST STN SPEC QL: NORMAL
MYCOBACTERIUM SPEC CULT: NORMAL

## 2023-04-24 ENCOUNTER — TELEPHONE (OUTPATIENT)
Dept: PULMONOLOGY | Age: 78
End: 2023-04-24

## 2023-04-24 LAB
FUNGUS SPEC CULT: NORMAL
LOEFFLER MB STN SPEC: NORMAL

## 2023-04-24 NOTE — TELEPHONE ENCOUNTER
Daughter called to let Dr. Felipe Agee that her Mom passed last week of Respiratory Failure. She appreciates everything the office did for her Mom.

## 2023-04-25 LAB
ACID FAST STN SPEC QL: NORMAL
MYCOBACTERIUM SPEC CULT: NORMAL

## 2023-04-25 NOTE — PROGRESS NOTES
Physician Progress Note      Kodi Sprague  CSN #:                  [id number]  :                       1945  ADMIT DATE:       2023 4:15 AM  DISCH DATE:        2023 9:44 PM  RESPONDING  PROVIDER #:        Sandra Cope MD          QUERY TEXT:    Pt admitted with Community-acquired pneumonia/acute respiratory failure. ID   consult notes-\"concern would be for MRSA and resistant GN organisms\"  If   possible, please document in the progress notes and discharge summary if you   are evaluating and/or treating any of the following:    Note: CAP and HCAP indicate where the pneumonia was acquired, not a specific   type. The medical record reflects the following:  Risk Factors: Suspected lumbar discitis - on antibiotics recently dapto and   cefepime, cirrhosis  Clinical Indicators: Procalcitonin- .20 to .59 ID consult,  \"Clinical picture   consistent with evolving pneumonia. Since this has developed while on dapto   and cefepime, concern would be for MRSA and resistant GN organisms\"  Treatment: -change abx - stop dapto and cefepime and give meropenem + vanc   per pharmacy dosing-check ammonia and update CRP and procal -BC are in   process -check C diff (suspicion is low at this point)  -sputum culture if she can expectorate a meaningful sample    Thank-You, Chiquita Marquez RN, BSN, CCDS  Options provided:  -- Suspected Gram negative pneumonia  -- Other - I will add my own diagnosis  -- Disagree - Not applicable / Not valid  -- Disagree - Clinically unable to determine / Unknown  -- Refer to Clinical Documentation Reviewer    PROVIDER RESPONSE TEXT:    This patient has suspected gram negative pneumonia.     Query created by: Janice Rizvi on 2023 2:16 PM      Electronically signed by:  Sandra Cope MD 2023 2:22 PM

## 2023-04-25 NOTE — DISCHARGE SUMMARY
Hospital Medicine Discharge Summary    Patient ID: Juana Kidney      Patient's PCP: Carlos A Barrera MD    Admitting Physician: Elena Anderson MD  Discharge Physician: Dori Zacarias MD     Admit Date: 2023     Disposition:     Discharge Diagnoses: Active Hospital Problems    Diagnosis     Acute on chronic respiratory failure with hypoxia (HCC) [J96.21]     Atrial fibrillation with RVR (HCC) [I48.91]     SOB (shortness of breath) [R06.02]     Pulmonary HTN (HCC) [I27.20]     Lumbar discitis [M46.46]     Acute hyponatremia [E87.1]     PNA (pneumonia) [J18.9]     Normocytic normochromic anemia [D64.9]     GERD (gastroesophageal reflux disease) [K21.9]     COPD (chronic obstructive pulmonary disease) ([facility] [J44.9]     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 ([facility] [K75.81, K74.60]     DM (diabetes mellitus) ([facility] [address] [facility] [E11.9]     CAD (coronary artery disease) [I25.10]     Hyperlipidemia [E78.5]     Obesity [E66.9]        Date of Death:2023  Time of Death:1819    Immediate Cause of Death:Hypoxic respiratory failure  Underlying Conditions:Pneumonia  Other Contributing Conditions:COPD, Lumbar osteomyelitis, CHF, DMII, Hypertension, hypothyroidism. Hospital Course:     52-year-old female with COPD diagnosis with a baseline oxygen requirements of 4 L/min at home, she currently resides in a SNF and presented with increasing shortness of breath and dyspnea for the week prior to admission. She was found to be somnolent and decreased LOC. Patient was admitted in 2023 with bilateral lower extremity pain attribute it to a lumbar discitis she had a biopsy showing C acnes in broth discharged on daptomycin and cefepime. She came back in during this admission with low-grade fevers and chest x-ray showing multifocal opacities. She was transferred to the ICU on 2023 because of impending respiratory failure. Patient has subsequently been stabilized    Admitted for pneumonia.  Progressive

## 2023-05-01 LAB
FUNGUS SPEC CULT: NORMAL
LOEFFLER MB STN SPEC: NORMAL

## 2023-05-02 LAB
ACID FAST STN SPEC QL: NORMAL
MYCOBACTERIUM SPEC CULT: NORMAL

## 2023-05-09 LAB
ACID FAST STN SPEC QL: NORMAL
MYCOBACTERIUM SPEC CULT: NORMAL

## 2023-05-16 LAB
ACID FAST STN SPEC QL: NORMAL
MYCOBACTERIUM SPEC CULT: NORMAL

## 2024-01-26 NOTE — PLAN OF CARE
Problem: Safety - Adult  Goal: Free from fall injury  6/13/2022 1224 by Shruthi Simmons RN  Outcome: Progressing    Bed in lowest position, wheels locked, 2/4 side rails up, nonskid footwear on. Bed/ chair check alarm in place, call light within reach. Pt instructed to call out when needing assistance. Pt stated understanding. Nurse will continue to monitor. Problem: Pain  Goal: Verbalizes/displays adequate comfort level or baseline comfort level  6/13/2022 1224 by Shruthi Simmons RN  Outcome: Progressing     Pt scoring pain on 0-10 scale. Pain medications given per MAR. Pt instructed to call out when pain level increasing. Call light within reach. Nurse will continue to reassess and monitor. n/a

## 2024-12-31 NOTE — TELEPHONE ENCOUNTER
Nutrition Assessment   Reason for consult/assessment: Reassessment, Vent support, Enteral nutrition    Diagnosis, Labs, Medication, History: Reviewed    Pertinent nutrition history prior to admission: PMH: CHF EF 64%, dementia, baseline A/O X 1 from NH, DM2.    Pertinent nutrition information pertaining to hospital course: Contact, MRDO isolatoin, perirectum MASD, unstagebale sacral pressure injury, malleolus L- skin tear. Pt with reduced alertness a/o X 0, colorectal recommending bowel reg, AMS likely 2/2 encephalopathy 2/2 sepsis, septic shocl, lactic acidosis, JAMES on CKD, hypernatremia 2/2 dehydration, fecal impaction. 12/24 pt now intubated. 12/27 TF resumed after releiving large stool burden. 12/31 pt with now loose, not liquid stools X 4 in last 24 hours, flexi removed., TF at goal 50 ml/hr.                              Oral diet order: NPO     Enteral nutrition: Jevity 1.5 at goal rate of 50 ml/hr X 24 hours           Diet tolerance: Tolerating nutrition support   Food allergies: None known    Demographic/Anthropometrics Information  Gender: female  Patient Age: 83 year old  Height:   Ht Readings from Last 1 Encounters:   12/22/24 5' 5.35\" (1.66 m)      Weight:   Wt Readings from Last 1 Encounters:   12/29/24 77.7 kg      BMI:   BMI Readings from Last 1 Encounters:   12/29/24 28.20 kg/m²       Usual weight: 70.2 kg (Per EMR wt history)  % Weight change: 12% unintentional wt loss X 1 year  Weight change significant: No  Reason for weight change: Decreased intake    Estimated Needs:  Calculated energy needs: 8888-8709  kcal                Calculated protein needs: 67-86  g     Calculated Fluid Needs: Per Provider               NFPE  Nutrition Focused Physical Exam  Physical Exam Completed: Yes (12/23/24 1303 : Leopoldo Babbo, Lisa M, MAURA)   Body Fat  Orbital region: Normal  Cheek region (buccal fat pads): Normal  Upper arm region (triceps/biceps): Normal  Thoracic and lumbar region (rib/lower back/mid-axillary  cyanocobalamin 1000 MCG/ML injection    Last OV: 7/15/2022      Pt is going to establish care with Dr. Caroline Jones 1/10/2023. Pt requesting refill to get her until her appt in January. line): Unable to assess  Muscle Mass  Temporal region (temporalis muscle): Severe  Collarbone region (clavicle bone, pectoralis major, trapezius muscle): Severe  Shoulder region (deltoid muscle): Severe  Scapular bone region (trapezius, supraspinatus, infraspinatus muscles): Severe  Hand region: Unable to assess (swollen hands)  Patellar region (quadriceps muscle): Normal  Anterior thigh region (quadriceps muscle): Mild/Moderate  Posterior calf region (gastrocnemius muscle): Unable to assess                  Interventions            Enteral nutrition formula: Jevity 1.5 Calorie  Rate: currently at 50 ml/hr  Access site:                                                                      Intervention: Enteral nutrition    Goals & Monitoring  Goal: Achieve normal electrolytes, Meet >/equal 75% estimated needs, Improve skin integrity, Maintain or improve weight   Intervention goal status: Some progress toward goal  Time frame to achieve goal: Ongoing    Dietitian will monitor: Biochemical data, medical tests, procedures, Anthropometric Measurements, Enteral nutrition intake & tolerance          Dietitian Notes & Recommendations  12/23/2024: Pt seen d/t wound consult received. PMH noted, current problem list noted, SLP on consult but d/t reduced alertness cannot evaluate. Pt with fecal impaction- start bowel regimen, JAMES on CKD, hypernatremia likely 2/2 dehydration. NFPE completed, see above..   ADAT per SLP recs as appropriate, pt currently NPO. Per RN plan is to place a DHT.   If/when DHT placed and placement confirmed by XR, suggest starting Jevity 1.5 at 15 ml/hr, if tolerated, increase by 15 ml Q8-12 until a goal rate of 50 ml/hr X 24 hours is reached, to provide 1800 kcals/d, 76 g pro/d, 1200 ml total volume and 912 ml free water---meeting 100% of estimated needs.   Additional FWF per primary service MD, suggest a minimum of 30 ml Q4 and before and after meds for tube patency.   Monitor plan for nutrition, I/O,  wt trend, labs.     12/24/24: Chart, labs, meds reviewed. Consult received for tube feed order review and order. Pt now intubated on 12/24.24, MAP>65, on two pressors with lactic acid elevated.   Pt receiving Jevity 1.5 at 15 ml/hr X 24 hours via NGT.  Pt at risk for refeeding, start thiamine 200 mg IV + folic acid for 3-5 days d/t refeeding risk. Check daily Mg, K Phos until pt at tube feeding goal rate, replete Mg, K Phos prn if depleted.   Keep pt at Jevity 1.5 at 15 ml/hr X 24 hours trickle feed, reassess on 12/25 if able to titrate Q8-12 hours pending MAP, lactic acid and pressor requirements.   Discussed with Dr Lanza and RN.     12/27/24: Chart, labs, meds reviewed. Noted TF have been off since 12/24-25 d/t large stool burden, lactulose increase to 20 g daily TID on 12/27, pt now stooling and enteral feeds resumed on 12/27 at 30 ml/hr, orders updated after discussing plan with Dr Geoff Martin. Noted thiamine still ordered d/t refeeding risk.   REC:  Continue with Jevity 1.5 at 30 ml/hr, if tolerated, increase by 15 ml Q8-12 d/t refeeding risk  until a goal rate of 50 ml/hr X 25 hours is reached to meet 100% of estimated needs. This provides: 1800 kcals/d, 76 g pro/d, 1200 ml total volume and 912 ml free water.   Additional FWF managed by primary service MD, suggest a minimum of 30 ml Q4 and before and after meds for tube patency.  Continue to check Mg, K Phos daily and replete prn, may discontinue thiamine and folic acid once pt is at enteral feeding goal, tolerating and Mg, K+< Phos are stable.   Monitor stooling, labs, wt trend, tolerance to enteral feeds.   D/W Dr Geoff Martin and SHEILA Baum    12/31/24: RD f/u note. Chart, labs, meds reviewed. Noted pt flexiseal removed, now with loose, not liquid stools X 4 in 24 hours per flow sheets.   Pt appears to be tolerating Jevity 1.5 at 50 ml/hr X 24 hours, providing 1800 kcals/d, 76 g pro/d, 1200 ml total volume and 912 ml free water---meeting 100% of estimated  needs, additional free water flush adjustments per primary service MD.   Noted K, Mg, Phos stable and pt at goal TF rate, noted thiamine still ordered.   REC:  Continue with Jevity 1.5 at goal rate of 50 ml/hr to meet 100% of estimated needs.   Will hold off on starting banatrol at this time after speaking with Dr Geoff Martin, pt with stericolitis.   Discontinue thiamine pt no longer at refeeding risk.   Monitor labs, wt trend, I/O, tolerance to enteral feeds.   D/W Dr Choi and RN.       TREATMENT PLAN:   Continue with Jevity 1.5 at goal rate of 50 ml/hr to meet 100% of estimated needs.   Will hold off on starting banatrol at this time after speaking with Dr Geoff Martin.  Discontinue thiamine pt no longer at refeeding risk.   Monitor stooling consistency for improvement  Monitor labs, wt trend, I/O, tolerance to enteral feeds.   D/W Dr Choi and RN.      Status: 3    Nutrition Diagnosis/PES   Nutrition Diagnosis: Inadequate oral intake     Related to: Inability to take/tolerate   As evidenced by: Need for NPO status      Primary nutrition diagnosis status: Active nutrition diagnosis

## 2025-03-03 NOTE — DISCHARGE SUMMARY
Health Maintenance       Pneumococcal Vaccine 50+ (1 of 2 - PCV)  Never done    Shingles Vaccine (2 of 2)  Overdue since 1/14/2022    DTaP/Tdap/Td Vaccine (2 - Td or Tdap)  Overdue since 9/19/2023           Following review of the above:  Patient is not proceeding with: Dtap/Tdap/Td, Pneumococcal, and Shingles    Note: Refer to final orders and clinician documentation.       Hospital Medicine Discharge Summary    Patient ID: Antoinette Santillan      Patient's PCP: Sharonda Orellana. 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 Hospital Course:   69 y. o. female w/ hx Afib on Eliquis as well as SNOW cirrhosis and O2 dependent COPD who presented to [facility] with abnormal labs found to be significantly anemic compared to prior.     She was recently discharged from SNF for rehab and reports ongoing fatigue but denies huy bleeding.      The patient denies any fever/chills or other signs/sxs of systemic illness or identifiable aggravating/alleviating factors. Cirrhosis of liver/SNOW history of esophageal varices status post banding in 6/10/2021   -admitted with anemia positive Hemoccult, GI consult underwent EGD on 6/6/2022  -No varices amenable to banding. 2 cm hiatal hernia.    Continue with IV Protonix twice daily, octreotide drip was started on 6/4/2022  by GI for 72 hours.      Anemia   -Stable upon discharge      Atrial fibrillation   -Eliquis resumed, patient to follow-up with PCP and Cardiologist about continuing      Hypertension continue with home medication.     Coronary artery disease continue with home medication.     Hyperlipidemia on statin.     COPD with chronic respiratory failure at home 3-1/2 to 4 L oxygen continue with inhalers and supplemental oxygen.     Hyponatremia secondary to cirrhosis of the liver  -Sodium at baseline upon discharge      Hypothyroidism   -continue with the Synthroid.     Diabetes mellitus type 2, controlled   -continue with the current home regimen   -hemoglobin A1c= 6.0 on 6/3/2022      Physical Exam Performed:     /60   Pulse 78   Temp 98.2 °F (36.8 °C)   Resp 18   Wt 220 lb (99.8 kg)   SpO2 97%   BMI 35.53 kg/m²       General appearance:  No apparent distress, appears stated age and cooperative. HEENT:  Normal cephalic, atraumatic without obvious deformity. Pupils equal, round, and reactive to light. Extra ocular muscles intact. Conjunctivae/corneas clear. Neck: Supple, with full range of motion. No jugular venous distention. Trachea midline. Respiratory:  Normal respiratory effort. Clear to auscultation, bilaterally without Rales/Wheezes/Rhonchi. Cardiovascular:  Regular rate and rhythm with normal S1/S2 without murmurs, rubs or gallops. Abdomen: Soft, non-tender, non-distended with normal bowel sounds. Musculoskeletal:  No clubbing, cyanosis or edema bilaterally. Full range of motion without deformity. Skin: Skin color, texture, turgor normal.  No rashes or lesions. Neurologic:  Neurovascularly intact without any focal sensory/motor deficits. Cranial nerves: II-XII intact, grossly non-focal.  Psychiatric:  Alert and oriented, thought content appropriate, normal insight  Capillary Refill: Brisk,< 3 seconds   Peripheral Pulses: +2 palpable, equal bilaterally       Labs:  For convenience and continuity at follow-up the following most recent labs are provided:      CBC:    Lab Results   Component Value Date    WBC 5.8 06/07/2022    HGB 8.0 06/07/2022    HCT 24.0 06/07/2022     06/07/2022       Renal:    Lab Results   Component Value Date     06/07/2022    K 4.6 06/07/2022    CL 90 06/07/2022    CO2 33 06/07/2022    BUN 15 06/07/2022    CREATININE 0.8 06/07/2022    CALCIUM 8.2 06/07/2022    PHOS 4.1 06/04/2022         Significant Diagnostic Studies    Radiology:   No orders to display          Consults:     IP CONSULT TO GI  IP CONSULT TO SPIRITUAL SERVICES  IP CONSULT TO HOME CARE NEEDS    Disposition:  PT/OT recommending SNF, patient adamantly refused, home with [place] [id number]      Condition at Discharge: Stable    Discharge Instructions/Follow-up:    Follow-up with PCP in 1 week  Follow-up with GI in 4-6 weeks   Follow-up with Cardiology     Code Status:  Prior FULL    Activity: activity as tolerated    Diet: regular diet      Discharge Medications:     Discharge Medication List as of 6/7/2022  6:05 PM           Details   carvedilol (COREG) 6.25 MG tablet Take 1 tablet by mouth 2 times daily (with meals), Disp-60 tablet, R-3Normal              Details   ondansetron (ZOFRAN) 4 MG tablet Take 1 tablet by mouth 3 times daily as needed for Nausea or Vomiting, Disp-30 tablet, R-0Normal      dilTIAZem (TIAZAC) 240 MG extended release capsule Take 1 capsule by mouth daily, Disp-90 capsule, R-0Normal      tamsulosin (FLOMAX) 0.4 MG capsule Take 1 capsule by mouth daily, Disp-30 capsule, R-0DC to SNF      gabapentin (NEURONTIN) 100 MG capsule Take 100 mg by mouth 3 times daily. Historical Med      gabapentin (NEURONTIN) 600 MG tablet Take 600 mg by mouth at bedtime. Historical Med      aclidinium (TUDORZA PRESSAIR) 400 MCG/ACT AEPB inhaler Inhale 1 puff into the lungs 2 times dailyHistorical Med      apixaban (ELIQUIS) 5 MG TABS tablet Take 1 tablet by mouth 2 times daily, Disp-60 tablet, R-0Normal      lidocaine 4 % external patch Place 1 patch onto the skin daily, TransDERmal, DAILY Starting Fri 5/13/2022, DC to SNF      montelukast (SINGULAIR) 10 MG tablet Take 1 tablet by mouth daily, Disp-90 tablet, R-3Normal      cyanocobalamin 1000 MCG/ML injection Inject 1 mL into the muscle every 14 days Last dose was taken on 4/9/17, Disp-6 each, R-0Normal      cetirizine (ZYRTEC) 10 MG tablet TAKE ONE TABLET BY MOUTH DAILY, Disp-90 tablet, R-2Normal      doxepin (SINEQUAN) 50 MG capsule Take 1 capsule by mouth nightly for 10 days, Disp-10 capsule, R-0Normal      levothyroxine (SYNTHROID) 100 MCG tablet Take 1 tablet by mouth Daily, Disp-90 tablet, R-1Normal      losartan (COZAAR) 100 MG tablet TAKE 1 TABLET DAILY, Disp-90 tablet, R-0Normal      pantoprazole (PROTONIX) 40 MG tablet TAKE 1 TABLET TWICE A DAY, Disp-180 tablet, R-0Normal      SITagliptin (JANUVIA) 100 MG tablet Take 1 tablet by mouth daily, Disp-90 tablet, R-3Normal      nystatin (MYCOSTATIN) 700144 UNIT/ML suspension Take by mouth 4 times daily, Oral, 4 TIMES DAILY Starting Mon 2/14/2022, Disp-1 each, R-0, Normal      metFORMIN (GLUCOPHAGE-XR) 500 MG extended release tablet TAKE 2 TABLETS TWICE A DAY, Disp-360 tablet, R-3Normal      traZODone (DESYREL) 50 MG tablet TAKE 2 TABLETS NIGHTLY, Disp-180 tablet, R-3Normal      KLOR-CON M20 20 MEQ extended release tablet TAKE 1 TABLET DAILY, Disp-90 tablet, R-3Normal      benzonatate (TESSALON) 100 MG capsule TAKE 1 CAPSULE THREE TIMES A DAY AS NEEDED FOR COUGH, Disp-270 capsule, R-1Normal      Cholecalciferol (VITAMIN D3) 50 MCG (2000 UT) CAPS Take by mouthHistorical Med      venlafaxine (EFFEXOR) 37.5 MG tablet TAKE 1 TABLET DAILY, Disp-90 tablet, R-3Normal      atorvastatin (LIPITOR) 20 MG tablet TAKE 1 TABLET NIGHTLY, Disp-90 tablet, R-3Normal      furosemide (LASIX) 20 MG tablet TAKE 1 TABLET DAILY, Disp-90 tablet, R-3Normal      beclomethasone (QVAR) 80 MCG/ACT inhaler Inhale 2 puffs into the lungs 2 times daily, Disp-3 Inhaler, R-4Normal      levalbuterol (XOPENEX HFA) 45 MCG/ACT inhaler Inhale 2 puffs into the lungs every 6 hours as needed for Shortness of Breath, Disp-3 Inhaler, R-4Normal      ferrous sulfate 325 (65 Fe) MG tablet Take 325 mg by mouth 3 times daily (with meals)Historical Med      diphenhydrAMINE (BENADRYL) 25 MG tablet Take 50 mg by mouth nightly Historical Med      aspirin 81 MG EC tablet Take 81 mg by mouth daily Historical Med      rOPINIRole (REQUIP) 1 MG tablet Take by mouth 1 mg at 1200, 1mg at 1700, 1mg at  1900, 2mg at 2200 (total of 4mg nightly in divided doses)Historical Med      ursodiol (ACTIGALL) 500 MG tablet Take 500 mg by mouth 2 times daily Historical Med      calcium carbonate 600 MG TABS tablet Take 1 tablet by mouth daily Historical Med      Multiple Vitamins-Minerals (CENTRUM PO) Take 1 tablet by mouth daily. estrogens, conjugated, (PREMARIN) 1.25 MG tablet Take 1.25 mg by mouth daily. Time Spent on discharge is more than 30 minutes in the examination, evaluation, counseling and review of medications and discharge plan. Signed:    DAVONTE Don   6/8/2022      Thank you Yudy Fam. Adilene Drummond.,  for the opportunity to be involved in this patient's care. If you have any questions or concerns, please feel free to contact me at [phone]. normal...

## 2025-07-04 NOTE — CONSULTS
Infectious Diseases Inpatient Consult Note      Reason for Consult:  Lumbar diskitis and Rt TKA effusion h/o Cirrhosis of liver     Requesting Physician:  Mansoor TRINH      Primary Care Physician:  Jeannie Lujan., DO    History Obtained From:  [facility] AND Patient     CHIEF COMPLAINT:     Chief Complaint   Patient presents with    Leg Pain     started 2 months ago, was seen in ER twice and was told she had a torned muscle. b/l leg pain, took 5mg percocet         HISTORY OF PRESENT ILLNESS:  77 y.o. Woman admitted with lower leg weakness and back pain, events not clear per history but per EMR she was seen in ED in March for Left upper thigh pain as she felt pop after a squat at home and her Left TKA was bothering her seen by  and she was seen in ED, 4/29/22 for back pain and she had MRI at Ortho office per records and was referred to see Spine specialist per record and in the mean time she has worsening back pain and leg weakness MRI of the L spine concerning for Diskitis/osteomyelitis. She now had Rt knee effusion seen by ortho s/p bed side knee aspiration and cx in process. She was placed on IV abx given the results we are consulted for recommendations, She has h/o bi  Lateral TKA, on chronic Home OXGEN, Cirrhosis of liver from SNOW. Multiple abx allergies listed.   Location : back pain, Rt Tka pain, Left thigh pain       Quality : aching        Severity 8/10:     Duration : 1 weeks     Timing : intermittent   Context : bi lateral TKA history     Modifying factors : none   Associated signs and symptoms: no fevers no chills         Past Medical History:    Past Medical History:   Diagnosis Date    Allergic     Anemia     Anesthesia complication     \"woke up during surgery\"    Arthritis     Osteoarthritis of bilateral CMC joints    Asthma     Followed by Dr. Tiburcio aSntiago Cirrhosis [facility])     non alcoholic    Cirrhosis, non-alcoholic [facility]     COPD (chronic obstructive pulmonary disease) ([facility]     POD#3  S/p Repeat  Section  No complaints , pain under control. Breast feeding, ambulating.  Pain under control.  Objective:  Heart [id number]  Lungs CTA  Abdomen: soft, (+) BS, incision - clean,dry, intact.  Lochia - appropriate.  Uterus firm.  Low extremities - normal.  A/P;   S/p repeat CS. POD # 3  Will discharge home today.   GERD (gastroesophageal reflux disease)     GIB (gastrointestinal bleeding)     LGIB 4/2016    Haemophilus infection 05/07/2021    + resp    Heart murmur     Hernia     Hyperlipidemia     Hypertension     Lung collapse     Murmur, heart     Pneumonia     Reflux     Rheumatic fever     Stress fracture     right foot    SVT (supraventricular tachycardia) ([facility]     6/19/16 - admitted X 1 day per PT     TIA (transient ischemic attack) 2015    Type II or unspecified type diabetes mellitus without mention of complication, not stated as uncontrolled     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 ([facility]     Wears glasses     as needed    Wears partial dentures     upper partial       Past Surgical History:    Past Surgical History:   Procedure Laterality Date    BONE MARROW BIOPSY      BREAST BIOPSY      BRONCHOSCOPY N/A 5/7/2021    BRONCHOSCOPY DIAGNOSTIC OR CELL [address] ONLY performed by Damon Zamarripa MD at [address] [place] [address]  5/7/2021    BRONCHOSCOPY ALVEOLAR LAVAGE performed by Damon Zamarripa MD at [address] [place] [address]  5/7/2021    BRONCHOSCOPY THERAPUTIC ASPIRATION INITIAL performed by Damon [facility]ripa MD at [facility]  07/14/14    CATARACT REMOVAL      COLONOSCOPY      X 3 per PT 6/8/16    COLONOSCOPY N/A 6/10/2021    COLONOSCOPY POLYPECTOMY ABLATION performed by Rodney Koo MD at [address] [place] [address] Right 06/03/2013    CORAL BUNIONECTOMY RIGHT FOOT WITH SCREW FIXATION;    HERNIA REPAIR      HYSTERECTOMY      JOINT REPLACEMENT Bilateral     TKR    KNEE SURGERY  09    left    KNEE SURGERY  2010    right    UPPER GASTROINTESTINAL ENDOSCOPY N/A 7/21/2018    EGD BIOPSY performed by Douglas Hull MD at [place] [address] N/A 6/10/2021    EGD POLYP ABLATION/OTHER performed by Rodney Koo MD at [place] [address] ENDOSCOPY N/A 6/10/2021    EGD BAND LIGATION performed by Katherine Perry MD at [place] [address]       Current Medications:    No outpatient medications have been marked as taking for the 22 encounter [facility] Encounter).        Allergies:  Latex, Clindamycin, Pennsaid [diclofenac sodium], Torsemide, Actos [pioglitazone], Amoxicillin, Augmentin [amoxicillin-pot clavulanate], Bactrim [sulfamethoxazole-trimethoprim], Ciprofloxacin, Doxycycline, Levaquin [levofloxacin], Ativan [lorazepam], Cephalosporins, Pcn [penicillins], and Proventil [albuterol]    Immunizations :   Immunization History   Administered Date(s) Administered    COVID-19, J&J, PF, 0.5 mL 2021, 2021    Pneumococcal Conjugate 13-valent ([id number]) 2013    Pneumococcal Polysaccharide ([id number]) 2005         Social History:    Social History     Tobacco Use    Smoking status: Former Smoker     Packs/day: 1.00     Years: 40.00     Pack years: 40.00     Types: Cigarettes     Start date: 1956     Quit date: 2000     Years since quittin.9    Smokeless tobacco: Never Used    Tobacco comment: stopped cold turkey in !! ( )   Vaping Use    Vaping Use: Never used   Substance Use Topics    Alcohol use: No     Alcohol/week: 0.0 standard drinks    Drug use: No     Social History     Tobacco Use   Smoking Status Former Smoker    Packs/day: 1.00    Years: 40.00    Pack years: 40.00    Types: Cigarettes    Start date: 1956   Baer Quit date: 2000    Years since quittin.9   Smokeless Tobacco Never Used   Tobacco Comment    stopped cold turkey in !! ( 1999)      Family History   Problem Relation Age of Onset    Colon Cancer Mother     Other Mother     Diabetes Father     Kidney Disease Paternal Grandmother     Lupus Daughter     Lupus Other          REVIEW OF SYSTEMS:     Constitutional:  negative for fevers, chills, night sweats  Eyes:  negative for blurred vision, eye discharge, visual disturbance   HEENT:  negative for hearing loss, ear drainage,nasal congestion  Respiratory:  negative for cough, shortness of breath or hemoptysis   Cardiovascular:  negative for chest pain, palpitations, syncope  Gastrointestinal:  negative for nausea, vomiting, diarrhea, constipation, abdominal pain  Genitourinary:  negative for frequency, dysuria, urinary incontinence, hematuria  Hematologic/Lymphatic:  negative for easy bruising, bleeding and lymphadenopathy  Allergic/Immunologic:  negative for recurrent infections, angioedema, anaphylaxis   Endocrine:  negative for weight changes, polyuria, polydipsia and polyphagia  Musculoskeletal: Back pain,++  swelling, decreased range of motion  Rt knee effusion   Integumentary: No rashes, skin lesions  Neurological:  negative for headaches, slurred speech, unilateral weakness  Psychiatric: negative for hallucinations,confusion,agitation.      PHYSICAL EXAM:      Vitals:    BP (!) 188/74   Pulse 97   Temp 98 °F (36.7 °C) (Oral)   Resp 18   Ht 5' 6\" (1.676 m)   Wt 219 lb (99.3 kg)   SpO2 94%   BMI 35.35 kg/m²     General Appearance: alert,in some acute distress, +  pallor, no icterus  On nasal cannula chronic ill appearing woman    Skin: warm and dry, no rash or erythema  Head: normocephalic and atraumatic  Eyes: pupils equal, round, and reactive to light, conjunctivae normal  ENT: tympanic membrane, external ear and ear canal normal bilaterally, nose without deformity, nasal mucosa and turbinates normal without polyps  Neck: supple and non-tender without mass, no thyromegaly  no cervical lymphadenopathy  Pulmonary/Chest: clear to auscultation bilaterally- no wheezes, rales or rhonchi, normal air movement, no respiratory distress  Cardiovascular: normal rate, regular rhythm, normal S1 and S2, no murmurs, rubs, clicks, or gallops, no carotid bruits  Abdomen: soft, non-tender, ++ -distended, normal bowel sounds, no masses or organomegaly  Extremities: no cyanosis, clubbing or +  edema  Musculoskeletal: normal range of motion, no joint swelling, deformity or tenderness  Integumentary: No rashes, no abnormal skin lesions, no petechiae  Neurologic: reflexes normal and symmetric, no cranial nerve deficit  Psych:  Orientation, sensorium, mood normal   Lines: IV  Rt TKA effusion local warmth +  Left TKA incision healed well  Lower leg edema + pretibial some local erythema+     DATA:    CBC:   Lab Results   Component Value Date    WBC 7.2 05/08/2022    HGB 11.8 (L) 05/08/2022    HCT 35.4 (L) 05/08/2022    MCV 87.8 05/08/2022     05/08/2022     RENAL:   Lab Results   Component Value Date    CREATININE 0.8 05/08/2022    BUN 11 05/08/2022     05/08/2022    K 4.0 05/08/2022    CL 96 (L) 05/08/2022    CO2 30 05/08/2022     SED RATE:   Lab Results   Component Value Date    SEDRATE 38 05/07/2022     CK:   Lab Results   Component Value Date    CKTOTAL 58 05/07/2022     CRP:   Lab Results   Component Value Date    CRP 39.6 05/07/2022     Hepatic Function Panel:   Lab Results   Component Value Date    ALKPHOS 73 05/08/2022    ALT 24 05/08/2022    AST 20 05/08/2022    PROT 7.2 05/08/2022    BILITOT 0.6 05/08/2022    BILIDIR <0.2 04/10/2016    IBILI see below 04/10/2016    LABALBU 3.7 05/08/2022     UA:  Lab Results   Component Value Date    COLORU Straw 05/07/2022    CLARITYU Clear 05/07/2022    GLUCOSEU 100 05/07/2022    GLUCOSEU NEGATIVE 12/30/2009    BILIRUBINUR Negative 05/07/2022    BILIRUBINUR NEGATIVE 12/30/2009    KETUA Negative 05/07/2022    SPECGRAV <=1.005 05/07/2022    BLOODU TRACE-INTACT 05/07/2022    PHUR 6.0 05/07/2022    PHUR 6.0 05/07/2022    PROTEINU Negative 05/07/2022    UROBILINOGEN 0.2 05/07/2022    NITRU Negative 05/07/2022    LEUKOCYTESUR Negative 05/07/2022    LABMICR YES 05/07/2022    URINETYPE NotGiven 05/07/2022      Urine Microscopic:   Lab Results   Component Value Date    BACTERIA 1+ 05/07/2022    WBCUA None seen 05/07/2022    RBCUA 0-2 05/07/2022    EPIU  07/20/2018     Urine Reflex to Culture:   Lab Results   Component Value Date    URRFLXCULT Yes 07/20/2018       CRP  39.6     ESR  38       MICRO: cultures reviewed and updated by me     /Time       Culture, Blood 1 [[id number]] Collected: 05/07/22 1652   Order Status: Sent Specimen: Blood Updated: 05/08/22 0233   Culture, Urine [[id number]] Collected: 05/07/22 1738   Order Status: Sent Specimen: Urine, clean catch Updated: 05/08/22 0233   Culture, Urine [[id number]] Collected: 05/08/22 0120   Order Status: Canceled Specimen: Robert Pill catch from Urine, clean catch    Culture, Blood 1 [[id number]] Collected: 05/08/22 0000   Order Status: Canceled Specimen: Blood    Culture, Blood 2 [[id number]] Collected: 05/07/22 1710   Order Status: Sent Specimen: Blood Updated: 05/07/22 1744       Blood Culture:   Lab Results   Component Value Date    BC No Growth after 4 days of incubation. 05/06/2021    BLOODCULT2 No Growth after 4 days of incubation. 05/06/2021       Viral Culture:    Lab Results   Component Value Date    COVID19 Not Detected 05/12/2021     Urine Culture: No results for input(s): Dung Bud in the last 72 hours.     Scheduled Meds:   meropenem  1,000 mg IntraVENous Q8H    vancomycin  1,500 mg IntraVENous Q24H    dilTIAZem  10 mg IntraVENous Once    sodium chloride flush  10 mL IntraVENous 2 times per day    enoxaparin  40 mg SubCUTAneous Daily    aspirin  81 mg Oral Daily    atorvastatin  20 mg Oral Nightly    fluticasone  4 puff Inhalation BID    cetirizine  10 mg Oral Daily    dilTIAZem  240 mg Oral Daily    famotidine  20 mg Oral BID    levothyroxine  100 mcg Oral Daily    losartan  100 mg Oral Daily    montelukast  10 mg Oral Daily    pantoprazole  40 mg Oral BID    rOPINIRole  1 mg Oral Nightly    venlafaxine  37.5 mg Oral Daily    furosemide  40 mg IntraVENous BID    gabapentin  600 mg Oral Nightly    gabapentin  100 mg Oral TID   [facility] insulin lispro  0-6 Units SubCUTAneous Q4H    doxepin  10 mg Oral Nightly       Continuous Infusions:   dilTIAZem 5 mg/hr (05/07/22 1939)    sodium chloride      dextrose         PRN Meds:  labetalol, metoprolol, sodium chloride flush, sodium chloride, promethazine **OR** ondansetron, acetaminophen **OR** acetaminophen, benzonatate, levalbuterol, traZODone, glucose, glucagon (rDNA), dextrose, dextrose bolus **OR** dextrose bolus    Imaging:   MRI THORACIC SPINE W WO CONTRAST   Preliminary Result   1. Mild multilevel thoracic spondylosis with slightly exaggerated kyphosis. No high-grade spinal canal stenosis or neural foraminal narrowing. 2. No abnormal spinal cord signal.         MRI LUMBAR SPINE W WO CONTRAST   Final Result   1. Edema and enhancement at L4-5, concerning for discitis/osteomyelitis. Differential would include Modic type 1 degenerative change. 2. Multilevel degenerative change with mild spinal canal narrowing at L3-4   and L4-5.   3. Multilevel neural foraminal narrowing as above. 4. Anterolisthesis at L4-5. CT CHEST PULMONARY EMBOLISM W CONTRAST   Final Result   No evidence of pulmonary embolism. Four-chamber cardiomegaly without   pericardial effusion however smooth interlobular septal thickening with lower   lobe predominance along with mild mosaicism concerning for interstitial   pulmonary edema pattern without decompensation evidence as there is no   pleural effusion evident. Limited images of the upper abdomen reveal nodular contours of the hepatic   parenchyma concerning for underlying parenchymal disease process such as   cirrhotic morphology without obvious perihepatic ascites         CT Lumbar Spine WO Contrast   Final Result   1. Multiple areas of lucency, with surrounding sclerosis involving the lumbar   spine, most prominent at the L3 and L5 levels. Changes are new since the   prior study.   Differential considerations would include multiple Schmorl   nodes and discogenic degenerative changes, or potentially infection, or   metastatic disease. MR imaging of the lumbar spine recommended to further   evaluate these lesions. 2. Diffuse disc protrusion, facet and ligamentous hypertrophy, L4-L5 disc   level, resulting in a moderate degree of acquired central spinal stenosis         XR KNEE RIGHT (1-2 VIEWS)   Final Result   1. No acute fracture or malalignment. All pertinent images and reports for the current Hospitalization were reviewed by me.     IMPRESSION:    Patient Active Problem List   Diagnosis    Hyperlipidemia    S/P knee replacement    SVT (supraventricular tachycardia) (HCC)    GERD (gastroesophageal reflux disease)    COPD (chronic obstructive pulmonary disease) (HCC)    Pernicious anemia    Primary osteoarthritis involving multiple joints    Vitamin D deficiency    Esophageal varices without bleeding ([address]    Restless leg syndrome    Pulmonary infiltrates    3-vessel coronary artery disease    Essential hypertension, benign    Hypothyroidism due to acquired atrophy of thyroid    Insomnia    Osteoporosis    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 ([address]    Obesity    Type 2 diabetes mellitus with diabetic polyneuropathy, without long-term current use of insulin (HCC)    A-fib (HCC)     Admitted with back pain and lower leg weakness  MRI of L spine concerning for Diskitis and osteomyelitis but no fluid collection  Rt TKA effusion s/p bed side aspiration  Fluid cx in process  Blood cx in process   A fib new  COPD on home OXYGYEN   Ex smoker  Cirrhosis of liver SNOW +  Multiple abx allergies  Poor dentition+     Given her Cirrhosis of liver immune suppressed and risk for Bacteremias and Rt TKA effusion s/p bed side aspiration fluid cx in process in  context of MRI L spine findings potential for the same bacterial infection likely from Bacteremic seeding await cx to guide therapy -          Labs, Microbiology, Radiology and pertinent results from current hospitalization and care every where were reviewed by me as a part of the consultation. PLAN :  1. Cont IV Vancomycin x 1500 mg Q 18 hrs   2. Cont IV Meropenem x 1 gm q 8 HRS  3 Blood cx in process  4. Rt TKA fluid cx in process  5. Trend ESR, CRP  6. Ortho following for her Rt TKA  7. OPAT d/w pt     Discussed with patient/Family and Nursing   Risk of Complications/Morbidity: High      · Illness(es)/ Infection present that pose threat to bodily function. · There is potential for severe exacerbation of infection/side effects of treatment. · Therapy requires intensive monitoring for antimicrobial agent toxicity. Thanks for allowing me to participate in your patient's care please call me with any questions or concerns.     Dr. Maru Manriquez MD  [address] [place] [address] Physician  Phone: [phone]   Fax : [phone]

## (undated) DEVICE — BOWL MED M 16OZ PLAS CAP GRAD

## (undated) DEVICE — CANNULA NSL AD TBNG L7FT PVC STR NONFLARED PRNG O2 DEL W STD

## (undated) DEVICE — FRAME EYEWR PROTCT ASST CLR DISP SAFEVIEW

## (undated) DEVICE — ELECTRODE,RADIOTRANSLUCENT,FOAM,3PK: Brand: MEDLINE

## (undated) DEVICE — ELECTRODE,ECG,STRESS,FOAM,3PK: Brand: MEDLINE

## (undated) DEVICE — ELECTRODE ECG MONITR FOAM TEAR DROP ADLT RED

## (undated) DEVICE — LINER,SOFT,SUCTION CANISTER,1500CC: Brand: MEDLINE

## (undated) DEVICE — ERBE NESSY®PLATE 170 SPLIT; 168CM²; CABLE 3M: Brand: ERBE

## (undated) DEVICE — SYRINGE MED 50ML LUERSLIP TIP

## (undated) DEVICE — FORCEPS BX L240CM DIA2.4MM L NDL RAD JAW 4 133340

## (undated) DEVICE — CANNULA,OXY,ADULT,SUPERSOFT,W/7'TUB,SC: Brand: MEDLINE INDUSTRIES, INC.

## (undated) DEVICE — TUBING, SUCTION, 3/16" X 6', STRAIGHT: Brand: MEDLINE

## (undated) DEVICE — SINGLE USE BIOPSY VALVE MAJ-210: Brand: SINGLE USE BIOPSY VALVE (STERILE)

## (undated) DEVICE — TUBING, SUCTION, 3/16" X 12', STRAIGHT: Brand: MEDLINE

## (undated) DEVICE — CONMED SCOPE SAVER BITE BLOCK, 20X27 MM: Brand: SCOPE SAVER

## (undated) DEVICE — TRAP,MUCUS SPECIMEN, 80CC: Brand: MEDLINE

## (undated) DEVICE — MASK CAPNOGRAPHY AD W35IN DIA58IN SAMP LN L10FT O2 LN

## (undated) DEVICE — SINGLE USE SUCTION VALVE MAJ-209: Brand: SINGLE USE SUCTION VALVE (STERILE)

## (undated) DEVICE — SYRINGE MED 10ML SLIP TIP BLNT FILL AND LUERLOCK DISP

## (undated) DEVICE — YANKAUER,BULB TIP,W/O VENT,RIGID,STERILE: Brand: MEDLINE

## (undated) DEVICE — Z DISCONTINUED USE 2276105 GOWN PROTCT UNIV CHST W28IN L49IN SL 24IN BLU SPUNBOND FLM

## (undated) DEVICE — SNARE ENDOSCP L240CM LOOP W27MM SHTH DIA2.4MM WRK CHN 2.8MM

## (undated) DEVICE — SMARTBAND LIG KT

## (undated) DEVICE — THE DISPOSABLE ROTH NET PLATINUM FOOD BOLUS RETRIEVAL DEVICE IS USED IN THE ENDOSCOPIC RETRIEVAL FOOD BOLUS.: Brand: ROTH NET PLATINUM

## (undated) DEVICE — ENDOSCOPIC KIT 2 12 FT OP4 DE2 GWN SYR